# Patient Record
Sex: FEMALE | Race: BLACK OR AFRICAN AMERICAN | NOT HISPANIC OR LATINO | Employment: OTHER | URBAN - METROPOLITAN AREA
[De-identification: names, ages, dates, MRNs, and addresses within clinical notes are randomized per-mention and may not be internally consistent; named-entity substitution may affect disease eponyms.]

---

## 2017-01-09 ENCOUNTER — LAB VISIT (OUTPATIENT)
Dept: LAB | Facility: HOSPITAL | Age: 82
End: 2017-01-09
Attending: INTERNAL MEDICINE
Payer: MEDICARE

## 2017-01-09 DIAGNOSIS — E55.9 HYPOVITAMINOSIS D: ICD-10-CM

## 2017-01-09 DIAGNOSIS — D63.8 ANEMIA OF CHRONIC DISEASE: ICD-10-CM

## 2017-01-09 DIAGNOSIS — D63.8 ANEMIA OF OTHER CHRONIC DISEASE: ICD-10-CM

## 2017-01-09 DIAGNOSIS — E78.00 HYPERCHOLESTEREMIA: ICD-10-CM

## 2017-01-09 DIAGNOSIS — R76.8 ELEVATED SERUM IMMUNOGLOBULIN FREE LIGHT CHAINS: ICD-10-CM

## 2017-01-09 DIAGNOSIS — D64.9 ANEMIA: ICD-10-CM

## 2017-01-09 LAB
25(OH)D3+25(OH)D2 SERPL-MCNC: 41 NG/ML
ALBUMIN SERPL BCP-MCNC: 3.5 G/DL
ALP SERPL-CCNC: 61 U/L
ALT SERPL W/O P-5'-P-CCNC: 21 U/L
ANION GAP SERPL CALC-SCNC: 10 MMOL/L
AST SERPL-CCNC: 16 U/L
BASOPHILS # BLD AUTO: 0.02 K/UL
BASOPHILS # BLD AUTO: 0.02 K/UL
BASOPHILS NFR BLD: 0.6 %
BASOPHILS NFR BLD: 0.6 %
BILIRUB SERPL-MCNC: 0.4 MG/DL
BUN SERPL-MCNC: 20 MG/DL
CALCIUM SERPL-MCNC: 9.3 MG/DL
CHLORIDE SERPL-SCNC: 107 MMOL/L
CHOLEST/HDLC SERPL: 5.4 {RATIO}
CO2 SERPL-SCNC: 25 MMOL/L
CREAT SERPL-MCNC: 1.1 MG/DL
DIFFERENTIAL METHOD: ABNORMAL
DIFFERENTIAL METHOD: ABNORMAL
EOSINOPHIL # BLD AUTO: 0 K/UL
EOSINOPHIL # BLD AUTO: 0 K/UL
EOSINOPHIL NFR BLD: 0.8 %
EOSINOPHIL NFR BLD: 0.8 %
ERYTHROCYTE [DISTWIDTH] IN BLOOD BY AUTOMATED COUNT: 14.1 %
ERYTHROCYTE [DISTWIDTH] IN BLOOD BY AUTOMATED COUNT: 14.1 %
ERYTHROCYTE [SEDIMENTATION RATE] IN BLOOD BY WESTERGREN METHOD: 18 MM/HR
EST. GFR  (AFRICAN AMERICAN): 55.2 ML/MIN/1.73 M^2
EST. GFR  (NON AFRICAN AMERICAN): 47.9 ML/MIN/1.73 M^2
GLUCOSE SERPL-MCNC: 109 MG/DL
HCT VFR BLD AUTO: 35.7 %
HCT VFR BLD AUTO: 35.7 %
HDL/CHOLESTEROL RATIO: 18.6 %
HDLC SERPL-MCNC: 215 MG/DL
HDLC SERPL-MCNC: 40 MG/DL
HGB BLD-MCNC: 11.6 G/DL
HGB BLD-MCNC: 11.6 G/DL
LDLC SERPL CALC-MCNC: 147.2 MG/DL
LYMPHOCYTES # BLD AUTO: 2.1 K/UL
LYMPHOCYTES # BLD AUTO: 2.1 K/UL
LYMPHOCYTES NFR BLD: 57.6 %
LYMPHOCYTES NFR BLD: 57.6 %
MCH RBC QN AUTO: 29.7 PG
MCH RBC QN AUTO: 29.7 PG
MCHC RBC AUTO-ENTMCNC: 32.5 %
MCHC RBC AUTO-ENTMCNC: 32.5 %
MCV RBC AUTO: 92 FL
MCV RBC AUTO: 92 FL
MONOCYTES # BLD AUTO: 0.3 K/UL
MONOCYTES # BLD AUTO: 0.3 K/UL
MONOCYTES NFR BLD: 9 %
MONOCYTES NFR BLD: 9 %
NEUTROPHILS # BLD AUTO: 1.1 K/UL
NEUTROPHILS # BLD AUTO: 1.1 K/UL
NEUTROPHILS NFR BLD: 32 %
NEUTROPHILS NFR BLD: 32 %
NONHDLC SERPL-MCNC: 175 MG/DL
PLATELET # BLD AUTO: 244 K/UL
PLATELET # BLD AUTO: 244 K/UL
PMV BLD AUTO: 10.7 FL
PMV BLD AUTO: 10.7 FL
POTASSIUM SERPL-SCNC: 4.2 MMOL/L
PROT SERPL-MCNC: 6.9 G/DL
RBC # BLD AUTO: 3.9 M/UL
RBC # BLD AUTO: 3.9 M/UL
SODIUM SERPL-SCNC: 142 MMOL/L
TRIGL SERPL-MCNC: 139 MG/DL
WBC # BLD AUTO: 3.56 K/UL
WBC # BLD AUTO: 3.56 K/UL

## 2017-01-09 PROCEDURE — 80061 LIPID PANEL: CPT

## 2017-01-09 PROCEDURE — 82306 VITAMIN D 25 HYDROXY: CPT

## 2017-01-09 PROCEDURE — 80053 COMPREHEN METABOLIC PANEL: CPT

## 2017-01-09 PROCEDURE — 85025 COMPLETE CBC W/AUTO DIFF WBC: CPT | Mod: PO

## 2017-01-09 PROCEDURE — 36415 COLL VENOUS BLD VENIPUNCTURE: CPT | Mod: PO

## 2017-01-09 PROCEDURE — 85651 RBC SED RATE NONAUTOMATED: CPT

## 2017-01-09 PROCEDURE — 83520 IMMUNOASSAY QUANT NOS NONAB: CPT

## 2017-01-10 LAB
KAPPA LC SER QL IA: 6.29 MG/DL
KAPPA LC/LAMBDA SER IA: 2.15
LAMBDA LC SER QL IA: 2.93 MG/DL

## 2017-01-12 ENCOUNTER — OFFICE VISIT (OUTPATIENT)
Dept: HEMATOLOGY/ONCOLOGY | Facility: CLINIC | Age: 82
End: 2017-01-12
Payer: MEDICARE

## 2017-01-12 VITALS
OXYGEN SATURATION: 97 % | BODY MASS INDEX: 33.38 KG/M2 | DIASTOLIC BLOOD PRESSURE: 68 MMHG | HEART RATE: 67 BPM | WEIGHT: 200.63 LBS | SYSTOLIC BLOOD PRESSURE: 120 MMHG | TEMPERATURE: 99 F

## 2017-01-12 DIAGNOSIS — D64.9 ANEMIA, UNSPECIFIED TYPE: Primary | ICD-10-CM

## 2017-01-12 PROCEDURE — 1125F AMNT PAIN NOTED PAIN PRSNT: CPT | Mod: S$GLB,,, | Performed by: INTERNAL MEDICINE

## 2017-01-12 PROCEDURE — 99999 PR PBB SHADOW E&M-EST. PATIENT-LVL V: CPT | Mod: PBBFAC,,, | Performed by: INTERNAL MEDICINE

## 2017-01-12 PROCEDURE — 1157F ADVNC CARE PLAN IN RCRD: CPT | Mod: S$GLB,,, | Performed by: INTERNAL MEDICINE

## 2017-01-12 PROCEDURE — 3078F DIAST BP <80 MM HG: CPT | Mod: S$GLB,,, | Performed by: INTERNAL MEDICINE

## 2017-01-12 PROCEDURE — 1160F RVW MEDS BY RX/DR IN RCRD: CPT | Mod: S$GLB,,, | Performed by: INTERNAL MEDICINE

## 2017-01-12 PROCEDURE — 3074F SYST BP LT 130 MM HG: CPT | Mod: S$GLB,,, | Performed by: INTERNAL MEDICINE

## 2017-01-12 PROCEDURE — 99213 OFFICE O/P EST LOW 20 MIN: CPT | Mod: S$GLB,,, | Performed by: INTERNAL MEDICINE

## 2017-01-12 PROCEDURE — 1159F MED LIST DOCD IN RCRD: CPT | Mod: S$GLB,,, | Performed by: INTERNAL MEDICINE

## 2017-01-12 RX ORDER — LATANOPROST 50 UG/ML
1 SOLUTION/ DROPS OPHTHALMIC NIGHTLY
Refills: 5 | COMMUNITY
Start: 2016-12-13 | End: 2017-03-14

## 2017-01-12 NOTE — MR AVS SNAPSHOT
[facility]  [address] [place] [id number]  Phone: [phone]                  Cindy Lyman   2017 2:00 PM   Office Visit    Description:  Female : 1935   Provider:  Kitty Sultana MD   [facility]  [facility]           Reason for Visit     Follow-up           Diagnoses this Visit        Comments    Anemia, unspecified type    -  Primary            To Do List           Future Appointments        Provider Department Dept Phone    3/1/2017 1:30 PM Tony Carmen MD [facility] - Cardiology [phone]    3/10/2017 1:15 PM LAB, [place] [facility] [phone]    3/14/2017 1:30 PM Kitty Sultana MD [facility] [phone]      Goals (5 Years of Data)              Today    16    Blood Pressure < 150/90   120/68  160/68  160/68      Follow-Up and Disposition     Return in about 2 months (around 3/12/2017).      [facility]     [facility] -  Assistance  Registered nurses in the [facility] provide clinical advisement, health education, appointment booking, and other advisory services.  Call for this free service at [phone].             Medications           Message regarding Medications     Verify the changes and/or additions to your medication regime listed below are the same as discussed with your clinician today.  If any of these changes or additions are incorrect, please notify your healthcare provider.        STOP taking these medications     ferrous sulfate 325 (65 FE) MG EC tablet Take 1 tablet (325 mg total) by mouth once daily.           Verify that the below list of medications is an accurate representation of the medications you are currently taking.  If none reported, the list may be blank. If incorrect, please contact your healthcare provider. Carry this list with you in case of emergency.           Current Medications      amlodipine (NORVASC) 10 MG tablet ONE TABLET BY MOUTH once a day    aspirin (ECOTRIN) 81 MG EC tablet Take 81 mg by mouth once daily.    carbamazepine (TEGRETOL) 200 mg tablet Take 1 tablet by mouth Daily.     cholecalciferol, vitamin D3, 1,000 unit capsule Take 1 capsule (1,000 Units total) by mouth once daily.    citalopram (CELEXA) 20 MG tablet ONE TABLET BY MOUTH once a day    cloNIDine (CATAPRES) 0.3 MG tablet Take 1 tablet (0.3 mg total) by mouth 3 (three) times daily.    clopidogrel (PLAVIX) 75 mg tablet Take 75 mg by mouth once daily.    CRESTOR 20 mg tablet ONE TABLET BY MOUTH EVERY EVENING    docusate sodium (COLACE) 100 MG capsule Take 1 capsule (100 mg total) by mouth 2 (two) times daily.    fluticasone (FLOVENT HFA) 110 mcg/actuation inhaler Inhale 1 puff into the lungs every 12 (twelve) hours.    furosemide (LASIX) 40 MG tablet TAKE ONE TABLET BY MOUTH EVERY DAY AS NEEDED FOR SHORTNESS OF BREATH or leg swelling    gabapentin (NEURONTIN) 300 MG capsule Take 1 capsule (300 mg total) by mouth 3 (three) times daily.    hydrALAZINE (APRESOLINE) 100 MG tablet Take 1 tablet (100 mg total) by mouth 3 (three) times daily.    hydrocodone-acetaminophen 5-325mg (NORCO) 5-325 mg per tablet Take 1 tablet by mouth 3 (three) times daily as needed for Pain.    latanoprost 0.005 % ophthalmic solution Place 1 drop into both eyes nightly.    metoprolol tartrate (LOPRESSOR) 100 MG tablet Take 1 tablet (100 mg total) by mouth 2 (two) times daily.    miconazole (MICOTIN) 2 % cream Apply topically 2 (two) times daily.    nitroGLYCERIN (NITROSTAT) 0.4 MG SL tablet Place 0.4 mg under the tongue every 5 (five) minutes as needed for Chest pain.    polyethylene glycol (GLYCOLAX) 17 gram PwPk Take 17 g by mouth once daily.    rosuvastatin (CRESTOR) 20 MG tablet Take 1.5 tablets (30 mg total) by mouth every evening. 1 Tablet Oral Every day    valsartan (DIOVAN) 320 MG tablet ONE TABLET BY MOUTH once a day    verapamil (VERELAN) 360  MG C24P Take 1 capsule (360 mg total) by mouth once daily.    verapamil (VERELAN) 360 MG C24P Take 1 capsule (360 mg total) by mouth once daily.    XOPENEX HFA 45 mcg/actuation inhaler one to two puffs by mouth every 4 hours as needed for wheezing and shortness of breath    azelastine (ASTELIN) 137 mcg (0.1 %) nasal spray 1 spray (137 mcg total) by Nasal route 2 (two) times daily.           Clinical Reference Information           Vital Signs - Last Recorded  Most recent update: 1/12/2017  1:50 PM by Tarsha Domingo LPN    BP Pulse Temp Wt SpO2 BMI    120/68 (BP Location: Right arm, Patient Position: Sitting, BP Method: Manual) 67 99.3 °F (37.4 °C) (Oral) 91 kg (200 lb 9.9 oz) 97% 33.38 kg/m2      Blood Pressure          Most Recent Value    BP  120/68      Allergies as of 1/12/2017     Doxycycline Hyclate    Singulair [Montelukast]    Latex, Natural Rubber    Penicillins    Ventolin  [Albuterol Sulfate]      Immunizations Administered on Date of Encounter - 1/12/2017     None      Orders Placed During Today's Visit     Future Labs/Procedures Expected by Expires    CBC auto differential  1/12/2017 1/12/2018    Ferritin  1/12/2017 1/12/2018    Iron and TIBC  1/12/2017 1/12/2018

## 2017-01-12 NOTE — PROGRESS NOTES
"Subjective:       Patient ID: Cindy Lyman is a 81 y.o. female.    Chief Complaint: Follow-up    Diagnosis: Anemia  HPI  The patient is a pleasant 80-year-old female with past medical history of CAD, asthma, CHF, COPD, depression, hypertension, thyroid disease, seen today for f/u for anemia of chronic disease. Bone marrow biopsy neg for hematological malignancy    Today,she has no new issues  She continues with chronic arthalgias and  back pain-stable  No SOB/CP/N/V/cough  No fatigue/lightheadedness  No melena,hematochezia or change in bowel habits    She is followed by Cardiology for CHF.      Hx of RLE DVT  S/p anticoagulation completed 11/2015     Colonoscopy 5 yrs ago  [facility] - unremarkable        Bone Marrow biopsy 5/26/2016  Hypercellular marrow for age, 50-70%, with trilineage hematopoiesis, see comment  --Erythroid hyperplasia  --Mild plasmacytosis, 5-10%, polytypic by in situ hybridization studies, see comment  --No increase in blasts  --Adequate megakaryocytes  --Decreased stainable iron  --Mild reticulin fibrosis  COMMENT: Concomitantly submitted flow cytometric analysis detects no abnormal hematopoietic population. Bcells are polyclonal with a subset of hematogones detected, and T cells are immunophenotypically unremarkable.  The blast gate is not increased.  Although there is a mild plasmacytosis, by in situ hybridization studies, this appears polytypic. Correlate clinicallyand with any available cytogenetic and molecular studies    Plasma cell proliferative disorder, FISH:  An insufficient number of plasma cells were observed using cytoplasmic immunoglobulin staining method .This result does not exclude the presence of a plasma cell proliferative disorder."    [place] red stain neg    PAST MEDICAL HISTORY:  Aortic stenosis, arthritis, asthma, CAD, carpal tunnel, cataracts, CHF, COPD, depression, hyperlipidemia, hypertension, pulmonary hypertension, thyroid disease, TMJ.    PAST SURGICAL HISTORY: "  Partial hysterectomy, carpal tunnel release, eye surgery, left hip replacement, back surgery, TMJ.            Review of Systems   Constitutional: Negative for appetite change, chills, fatigue and unexpected weight change.   HENT: Negative for congestion, hearing loss and nosebleeds.    Eyes: Negative for visual disturbance.   Respiratory: Negative for apnea, cough, chest tightness, shortness of breath and wheezing.    Cardiovascular: Negative for chest pain, palpitations and leg swelling.   Gastrointestinal: Negative for abdominal distention, abdominal pain, blood in stool, constipation, diarrhea, nausea and vomiting.   Genitourinary: Negative for dysuria, flank pain, frequency, hematuria and urgency.   Musculoskeletal: Positive for arthralgias and back pain. Negative for gait problem, joint swelling and neck pain.   Skin: Negative for rash.        No petechiae, ecchymoses   Neurological: Negative for dizziness, light-headedness, numbness and headaches.   Hematological: Negative for adenopathy. Does not bruise/bleed easily.         Lab Results   Component Value Date    WBC 3.56 (L) 01/09/2017    WBC 3.56 (L) 01/09/2017    HGB 11.6 (L) 01/09/2017    HGB 11.6 (L) 01/09/2017    HCT 35.7 (L) 01/09/2017    HCT 35.7 (L) 01/09/2017    MCV 92 01/09/2017    MCV 92 01/09/2017     01/09/2017     01/09/2017         Objective:       Vitals:    01/12/17 1348   BP: 120/68   BP Location: Right arm   Patient Position: Sitting   BP Method: Manual   Pulse: 67   Temp: 99.3 °F (37.4 °C)   TempSrc: Oral   SpO2: 97%   Weight: 91 kg (200 lb 9.9 oz)       Physical Exam   Constitutional: She is oriented to person, place, and time. She appears well-developed and well-nourished.   HENT:   Head: Normocephalic.   Mouth/Throat: Oropharynx is clear and moist. No oropharyngeal exudate.   Eyes: Conjunctivae and lids are normal. Pupils are equal, round, and reactive to light. No scleral icterus.   Neck: Normal range of motion. Neck  supple. No thyromegaly present.   Cardiovascular: Normal rate, regular rhythm and normal heart sounds.    No murmur heard.  Pulmonary/Chest: Breath sounds normal. She has no wheezes. She has no rales.   Abdominal: Soft. Bowel sounds are normal. She exhibits no distension and no mass. There is no hepatosplenomegaly. There is no tenderness. There is no rebound and no guarding.   Musculoskeletal: Normal range of motion. She exhibits edema ( 1+ RLE edema). She exhibits no tenderness.   Lymphadenopathy:     She has no cervical adenopathy.     She has no axillary adenopathy.        Right: No supraclavicular adenopathy present.        Left: No supraclavicular adenopathy present.   Neurological: She is alert and oriented to person, place, and time. No cranial nerve deficit. Coordination normal.   Skin: Skin is warm and dry. No ecchymosis, no petechiae and no rash noted. No erythema.   Psychiatric: She has a normal mood and affect.       Lab Results   Component Value Date    WBC 3.56 (L) 01/09/2017    WBC 3.56 (L) 01/09/2017    HGB 11.6 (L) 01/09/2017    HGB 11.6 (L) 01/09/2017    HCT 35.7 (L) 01/09/2017    HCT 35.7 (L) 01/09/2017    MCV 92 01/09/2017    MCV 92 01/09/2017     01/09/2017     01/09/2017     SPEP_ nl      Results for MARIE TURNER (MRN [id number]) as of 7/12/2016 14:10   Ref. Range 6/10/2016 11:08   Iron Latest Ref Range: 30 - 160 ug/dL 34   TIBC Latest Ref Range: 250 - 450 ug/dL 395   Saturated Iron Latest Ref Range: 20 - 50 % 9 (L)   Transferrin Latest Ref Range: 200 - 375 mg/dL 267   Ferritin Latest Ref Range: 20.0 - 300.0 ng/mL 63     Results for MARIE TURNER (MRN [id number]) as of 9/12/2016 14:18   Ref. Range 4/26/2016 10:45 6/10/2016 11:08   [place] Free Light Chains Latest Ref Range: 0.33 - 1.94 mg/dL 4.26 (H) 4.01 (H)   Lambda Free Light Chains Latest Ref Range: 0.57 - 2.63 mg/dL 1.67 1.68   Kappa/Lambda FLC Ratio Latest Ref Range: 0.26 - 1.60  2.55 (H) 2.39 (H)      Assessment:       1. Anemia, unspecified type        Plan:       Pt clinically stable  CBC reveals stable Hb 11g/dl+ range  Colonoscopy 5 yrs ago [facility] [place]- unremarkable  Cont Fe supp once daily based on tolerability  Bone marrow biopsy- neg for hematological malignancy  Cont to monitor  F/u  4mo with cbc, Fe studies, FLC  prior to f/u      CC: Jeremiah Reeves M.D.

## 2017-01-20 ENCOUNTER — TELEPHONE (OUTPATIENT)
Dept: FAMILY MEDICINE | Facility: CLINIC | Age: 82
End: 2017-01-20

## 2017-01-23 ENCOUNTER — OFFICE VISIT (OUTPATIENT)
Dept: FAMILY MEDICINE | Facility: CLINIC | Age: 82
End: 2017-01-23
Payer: MEDICARE

## 2017-01-23 VITALS
DIASTOLIC BLOOD PRESSURE: 78 MMHG | TEMPERATURE: 99 F | OXYGEN SATURATION: 96 % | HEIGHT: 65 IN | WEIGHT: 201.94 LBS | BODY MASS INDEX: 33.65 KG/M2 | HEART RATE: 71 BPM | SYSTOLIC BLOOD PRESSURE: 124 MMHG | RESPIRATION RATE: 17 BRPM

## 2017-01-23 DIAGNOSIS — J45.20 MILD INTERMITTENT ASTHMA WITHOUT COMPLICATION: ICD-10-CM

## 2017-01-23 DIAGNOSIS — I82.409 DEEP VEIN THROMBOSIS (DVT) OF LOWER EXTREMITY, UNSPECIFIED CHRONICITY, UNSPECIFIED LATERALITY, UNSPECIFIED VEIN: ICD-10-CM

## 2017-01-23 DIAGNOSIS — M54.31 RIGHT SCIATIC NERVE PAIN: ICD-10-CM

## 2017-01-23 DIAGNOSIS — J30.9 ALLERGIC RHINITIS, UNSPECIFIED ALLERGIC RHINITIS TRIGGER, UNSPECIFIED RHINITIS SEASONALITY: Primary | ICD-10-CM

## 2017-01-23 DIAGNOSIS — Z79.01 LONG TERM (CURRENT) USE OF ANTICOAGULANTS: ICD-10-CM

## 2017-01-23 DIAGNOSIS — I10 ESSENTIAL HYPERTENSION: ICD-10-CM

## 2017-01-23 DIAGNOSIS — M26.609 TMJ (TEMPOROMANDIBULAR JOINT DISORDER): ICD-10-CM

## 2017-01-23 DIAGNOSIS — B36.0 TINEA VERSICOLOR: ICD-10-CM

## 2017-01-23 PROCEDURE — 1159F MED LIST DOCD IN RCRD: CPT | Mod: S$GLB,,, | Performed by: NURSE PRACTITIONER

## 2017-01-23 PROCEDURE — 1125F AMNT PAIN NOTED PAIN PRSNT: CPT | Mod: S$GLB,,, | Performed by: NURSE PRACTITIONER

## 2017-01-23 PROCEDURE — 1160F RVW MEDS BY RX/DR IN RCRD: CPT | Mod: S$GLB,,, | Performed by: NURSE PRACTITIONER

## 2017-01-23 PROCEDURE — 3078F DIAST BP <80 MM HG: CPT | Mod: S$GLB,,, | Performed by: NURSE PRACTITIONER

## 2017-01-23 PROCEDURE — 99999 PR PBB SHADOW E&M-EST. PATIENT-LVL IV: CPT | Mod: PBBFAC,,, | Performed by: NURSE PRACTITIONER

## 2017-01-23 PROCEDURE — 99499 UNLISTED E&M SERVICE: CPT | Mod: S$PBB,,, | Performed by: NURSE PRACTITIONER

## 2017-01-23 PROCEDURE — 3074F SYST BP LT 130 MM HG: CPT | Mod: S$GLB,,, | Performed by: NURSE PRACTITIONER

## 2017-01-23 PROCEDURE — 1157F ADVNC CARE PLAN IN RCRD: CPT | Mod: S$GLB,,, | Performed by: NURSE PRACTITIONER

## 2017-01-23 PROCEDURE — 99214 OFFICE O/P EST MOD 30 MIN: CPT | Mod: S$GLB,,, | Performed by: NURSE PRACTITIONER

## 2017-01-23 RX ORDER — FLUTICASONE PROPIONATE 50 MCG
2 SPRAY, SUSPENSION (ML) NASAL DAILY
Qty: 1 BOTTLE | Refills: 2 | Status: SHIPPED | OUTPATIENT
Start: 2017-01-23 | End: 2017-02-22

## 2017-01-23 RX ORDER — AMLODIPINE BESYLATE 10 MG/1
TABLET ORAL
Qty: 90 TABLET | Refills: 1 | Status: SHIPPED | OUTPATIENT
Start: 2017-01-23 | End: 2017-03-07

## 2017-01-23 RX ORDER — CLOTRIMAZOLE AND BETAMETHASONE DIPROPIONATE 10; .64 MG/G; MG/G
CREAM TOPICAL 2 TIMES DAILY
Qty: 15 G | Refills: 2 | Status: SHIPPED | OUTPATIENT
Start: 2017-01-23 | End: 2017-03-14

## 2017-01-23 RX ORDER — CLONIDINE HYDROCHLORIDE 0.3 MG/1
0.3 TABLET ORAL 3 TIMES DAILY
Qty: 270 TABLET | Refills: 1 | Status: SHIPPED | OUTPATIENT
Start: 2017-01-23 | End: 2017-11-07 | Stop reason: SDUPTHER

## 2017-01-23 RX ORDER — LORATADINE 10 MG/1
10 TABLET ORAL DAILY
Refills: 0 | COMMUNITY
Start: 2017-01-23 | End: 2017-03-14

## 2017-01-23 RX ORDER — GABAPENTIN 300 MG/1
300 CAPSULE ORAL 3 TIMES DAILY
Qty: 360 CAPSULE | Refills: 0 | Status: SHIPPED | OUTPATIENT
Start: 2017-01-23 | End: 2017-01-23 | Stop reason: SDUPTHER

## 2017-01-23 RX ORDER — CARBAMAZEPINE 200 MG/1
200 TABLET ORAL DAILY
Qty: 90 TABLET | Refills: 1 | Status: SHIPPED | OUTPATIENT
Start: 2017-01-23 | End: 2017-06-21 | Stop reason: SDUPTHER

## 2017-01-23 RX ORDER — GABAPENTIN 300 MG/1
300 CAPSULE ORAL 3 TIMES DAILY
Qty: 270 CAPSULE | Refills: 1 | Status: SHIPPED | OUTPATIENT
Start: 2017-01-23 | End: 2017-08-24 | Stop reason: SDUPTHER

## 2017-01-23 RX ORDER — CLOPIDOGREL BISULFATE 75 MG/1
75 TABLET ORAL DAILY
Qty: 90 TABLET | Refills: 1 | Status: SHIPPED | OUTPATIENT
Start: 2017-01-23 | End: 2017-07-20 | Stop reason: SDUPTHER

## 2017-01-23 RX ORDER — FUROSEMIDE 40 MG/1
TABLET ORAL
Qty: 90 TABLET | Refills: 1 | Status: SHIPPED | OUTPATIENT
Start: 2017-01-23 | End: 2018-07-12 | Stop reason: SDUPTHER

## 2017-01-23 RX ORDER — LEVALBUTEROL TARTRATE 45 UG/1
AEROSOL, METERED ORAL
Qty: 15 G | Refills: 0 | Status: CANCELLED | OUTPATIENT
Start: 2017-01-23

## 2017-01-23 RX ORDER — FLUTICASONE PROPIONATE 110 UG/1
1 AEROSOL, METERED RESPIRATORY (INHALATION) EVERY 12 HOURS
Qty: 1 G | Refills: 2 | Status: SHIPPED | OUTPATIENT
Start: 2017-01-23 | End: 2018-01-12

## 2017-01-23 NOTE — MR AVS SNAPSHOT
[facility]  [address]  [place] [address]  Phone: [phone]                  Cindy Lyman   2017 1:00 PM   Office Visit    Description:  Female : 1935   Provider:  Roslyn Luz, NP-C   Department:  [facility]           Reason for Visit     Medication Refill           Diagnoses this Visit        Comments    Allergic rhinitis, unspecified allergic rhinitis trigger, unspecified rhinitis seasonality    -  Primary     Essential hypertension         Mild intermittent asthma without complication         Long term (current) use of anticoagulants         Deep vein thrombosis (DVT) of lower extremity, unspecified chronicity, unspecified laterality, unspecified vein         TMJ (temporomandibular joint disorder)         Right sciatic nerve pain         Tinea versicolor                To Do List           Future Appointments        Provider Department Dept Phone    3/1/2017 1:30 PM Tony Carmen MD [facility] - Cardiology [phone]    3/10/2017 1:15 PM [facility], [place] [facility] [phone]    3/14/2017 1:30 PM Kitty Sultana MD [facility]-Hematology Oncology [phone]      Goals (5 Years of Data)              Today    17    Blood Pressure < 150/90   124/78  120/68  160/68       These Medications        Disp Refills Start End    fluticasone (FLOVENT HFA) 110 mcg/actuation inhaler 1 g 2 2017    Inhale 1 puff into the lungs every 12 (twelve) hours. - Inhalation    [place] [facility] [place] [employer] [address] Ph #: [phone]       amlodipine (NORVASC) 10 MG tablet 90 tablet 1 2017     ONE TABLET BY MOUTH once a day    [place] [facility] [place] [employer] [address] Ph #: [phone]       furosemide (LASIX) 40 MG tablet 90 tablet 1 2017     TAKE ONE TABLET BY MOUTH EVERY DAY AS NEEDED FOR SHORTNESS OF BREATH or leg swelling     [place] [facility] [place] [facility] [employer]  #: [phone]       clopidogrel (PLAVIX) 75 mg tablet 90 tablet 1 1/23/2017     Take 1 tablet (75 mg total) by mouth once daily. - Oral    [place] [facility] [place] [facility] [employer]  #: [phone]       carbamazepine (TEGRETOL) 200 mg tablet 90 tablet 1 1/23/2017     Take 1 tablet (200 mg total) by mouth once daily. - Oral    [place] [facility] [place] [address] Ph #: [phone]       cloNIDine (CATAPRES) 0.3 MG tablet 270 tablet 1 1/23/2017     Take 1 tablet (0.3 mg total) by mouth 3 (three) times daily. - Oral    [place] [facility] [place] [address] [employer] Ph #: [phone]       fluticasone (FLONASE) 50 mcg/actuation nasal spray 1 Bottle 2 1/23/2017 2/22/2017    2 sprays by Each Nare route once daily. - Each Nare    [place] [facility] [place] [address] [employer]  #: [phone]       clotrimazole-betamethasone 1-0.05% (LOTRISONE) cream 15 g 2 1/23/2017     Apply topically 2 (two) times daily. To affected areas - Topical (Top)    [place] [facility] [place] [facility] [employer]  #: [phone]       gabapentin (NEURONTIN) 300 MG capsule 270 capsule 1 1/23/2017     Take 1 capsule (300 mg total) by mouth 3 (three) times daily. - Oral    [place] [facility] [place] [address] [employer]  #: [phone]         PURCHASE these Medications (No prescription required)        Start End    loratadine (CLARITIN) 10 mg tablet 1/23/2017 1/23/2018    Sig - Route: Take 1 tablet (10 mg total) by mouth once daily. - Oral    Class: OTC      [facility] On [facility] Care Line - 24/7 Assistance  Registered nurses in the [facility] provide clinical advisement, health education, appointment booking, and other advisory services.  Call for this free service at [phone].              Medications           Message regarding Medications     Verify the changes and/or additions to your medication regime listed below are the same as discussed with your clinician today.  If any of these changes or additions are incorrect, please notify your healthcare provider.        START taking these NEW medications        Refills    fluticasone (FLONASE) 50 mcg/actuation nasal spray 2    Si sprays by Each Nare route once daily.    Class: Normal    Route: Each Nare    loratadine (CLARITIN) 10 mg tablet 0    Sig: Take 1 tablet (10 mg total) by mouth once daily.    Class: OTC    Route: Oral    clotrimazole-betamethasone 1-0.05% (LOTRISONE) cream 2    Sig: Apply topically 2 (two) times daily. To affected areas    Class: Normal    Route: Topical (Top)      CHANGE how you are taking these medications     Start Taking Instead of    clopidogrel (PLAVIX) 75 mg tablet clopidogrel (PLAVIX) 75 mg tablet    Dosage:  Take 1 tablet (75 mg total) by mouth once daily. Dosage:  Take 75 mg by mouth once daily.    Reason for Change:  Reorder     carbamazepine (TEGRETOL) 200 mg tablet carbamazepine (TEGRETOL) 200 mg tablet    Dosage:  Take 1 tablet (200 mg total) by mouth once daily. Dosage:  Take 1 tablet by mouth Daily.     Reason for Change:  Reorder       STOP taking these medications     cholecalciferol, vitamin D3, 1,000 unit capsule Take 1 capsule (1,000 Units total) by mouth once daily.           Verify that the below list of medications is an accurate representation of the medications you are currently taking.  If none reported, the list may be blank. If incorrect, please contact your healthcare provider. Carry this list with you in case of emergency.           Current Medications     amlodipine (NORVASC) 10 MG tablet ONE TABLET BY MOUTH once a day    aspirin (ECOTRIN) 81 MG EC tablet Take 81 mg by mouth once daily.    carbamazepine (TEGRETOL) 200 mg tablet Take 1 tablet (200 mg total) by mouth once daily.     citalopram (CELEXA) 20 MG tablet ONE TABLET BY MOUTH once a day    cloNIDine (CATAPRES) 0.3 MG tablet Take 1 tablet (0.3 mg total) by mouth 3 (three) times daily.    clopidogrel (PLAVIX) 75 mg tablet Take 1 tablet (75 mg total) by mouth once daily.    CRESTOR 20 mg tablet ONE TABLET BY MOUTH EVERY EVENING    docusate sodium (COLACE) 100 MG capsule Take 1 capsule (100 mg total) by mouth 2 (two) times daily.    fluticasone (FLOVENT HFA) 110 mcg/actuation inhaler Inhale 1 puff into the lungs every 12 (twelve) hours.    furosemide (LASIX) 40 MG tablet TAKE ONE TABLET BY MOUTH EVERY DAY AS NEEDED FOR SHORTNESS OF BREATH or leg swelling    gabapentin (NEURONTIN) 300 MG capsule Take 1 capsule (300 mg total) by mouth 3 (three) times daily.    hydrALAZINE (APRESOLINE) 100 MG tablet Take 1 tablet (100 mg total) by mouth 3 (three) times daily.    hydrocodone-acetaminophen 5-325mg (NORCO) 5-325 mg per tablet Take 1 tablet by mouth 3 (three) times daily as needed for Pain.    latanoprost 0.005 % ophthalmic solution Place 1 drop into both eyes nightly.    metoprolol tartrate (LOPRESSOR) 100 MG tablet Take 1 tablet (100 mg total) by mouth 2 (two) times daily.    miconazole (MICOTIN) 2 % cream Apply topically 2 (two) times daily.    rosuvastatin (CRESTOR) 20 MG tablet Take 1.5 tablets (30 mg total) by mouth every evening. 1 Tablet Oral Every day    valsartan (DIOVAN) 320 MG tablet ONE TABLET BY MOUTH once a day    verapamil (VERELAN) 360 MG C24P Take 1 capsule (360 mg total) by mouth once daily.    XOPENEX HFA 45 mcg/actuation inhaler one to two puffs by mouth every 4 hours as needed for wheezing and shortness of breath    azelastine (ASTELIN) 137 mcg (0.1 %) nasal spray 1 spray (137 mcg total) by Nasal route 2 (two) times daily.    clotrimazole-betamethasone 1-0.05% (LOTRISONE) cream Apply topically 2 (two) times daily. To affected areas    fluticasone (FLONASE) 50 mcg/actuation nasal spray 2 sprays by Each Nare route once daily.  "   loratadine (CLARITIN) 10 mg tablet Take 1 tablet (10 mg total) by mouth once daily.    nitroGLYCERIN (NITROSTAT) 0.4 MG SL tablet Place 0.4 mg under the tongue every 5 (five) minutes as needed for Chest pain.    polyethylene glycol (GLYCOLAX) 17 gram PwPk Take 17 g by mouth once daily.           Clinical Reference Information           Vital Signs - Last Recorded  Most recent update: 1/23/2017  1:13 PM by Brandon Campos MA    BP Pulse Temp Resp Ht Wt    124/78 (BP Location: Right arm, Patient Position: Sitting, BP Method: Manual) 71 98.9 °F (37.2 °C) (Oral) 17 5' 5" (1.651 m) 91.6 kg (201 lb 15.1 oz)    SpO2 BMI             96% 33.6 kg/m2         Blood Pressure          Most Recent Value    BP  124/78      Allergies as of 1/23/2017     Doxycycline Hyclate    Singulair [Montelukast]    Latex, Natural Rubber    Penicillins    Ventolin  [Albuterol Sulfate]      Immunizations Administered on Date of Encounter - 1/23/2017     None      Instructions    Follow up in 6 months, sooner if necessary  Go to ER for new worse or concerning symptoms    Eating Heart-Healthy Foods  Eating has a big impact on your heart health. In fact, eating healthier can improve several of your heart risks at once. For instance, it helps you manage weight, cholesterol, and blood pressure. Here are ideas to help you make heart-healthy changes without giving up all the foods and flavors you love.  Getting started  · Talk with your health care provider about eating plans, such as the DASH or Mediterranean diet. You may also be referred to a dietitian.  · Change a few things at a time. Give yourself time to get used to a few eating changes before adding more.  · Work to create a tasty, healthy eating plan that you can stick to for the rest of your life.    Goals for healthy eating  Below are some tips to improve your eating habits:  · Limit saturated fats and trans fats. Saturated fats raise your levels of cholesterol, so keep these fats to a " minimum. They are found in foods such as fatty meats, whole milk, cheese, and palm and coconut oils. Avoid trans fats because they lower good cholesterol as well as raise bad cholesterol. Trans fats are most often found in processed foods.  · Reduce sodium (salt) intake. Eating too much salt may increase your blood pressure. Limit your sodium intake to 2,300 milligrams (mg) per day, or less if your health care provider recommends it. Dining out less often and eating fewer processed foods are two great ways to decrease the amount of salt you consume.  · Managing calories. A calorie is a unit of energy. Your body burns calories for fuel, but if you eat more calories than your body burns, the extras are stored as fat. Your health care provider can help you create a diet plan to manage your calories. This will likely include eating healthier foods as well as exercising regularly. To help you track your progress, keep a diary to record what you eat and how often you exercise.  Choose the right foods  Aim to make these foods staples of your diet. If you have diabetes, you may have different recommendations than what is listed here:  · Fruits and vegetable provide plenty of nutrients without a lot of calories. At meals, fill half your plate with these foods. Split the other half of your plate between whole grains and lean protein.  · Whole grains are high in fiber and rich in vitamins and nutrients. Good choices include whole-wheat bread, pasta, and brown rice.  · Lean proteins give you nutrition with less fat. Good choices include fish, skinless chicken, and beans.  · Low-fat or nonfat dairy provides nutrients without a lot of fat. Try low-fat or nonfat milk, cheese, or yogurt.  · Healthy fats can be good for you in small amounts. These are unsaturated fats, such as olive oil, nuts, and fish. Try to have at least 2 servings per week of fatty fish such as salmon, sardines, mackerel, rainbow trout, and albacore tuna. These  contain omega-3 fatty acids, which are good for your heart. Flaxseed is another source of a heart-healthy fat.  More on heart healthy eating    Read food labels  Healthy eating starts at the grocery store. Be sure to pay attention to food labels on packaged foods. Look for products that are high in fiber and protein, and low in saturated fat, cholesterol, and sodium. Avoid products that contain trans fat. And pay close attention to serving size. For instance, if you plan to eat two servings, double all the numbers on the label.  Prepare food right  A key part of healthy cooking is cutting down on added fat and salt. Look on the internet for lower-fat, lower-sodium recipes. Also, try these tips:  · Remove fat from meat and skin from poultry before cooking.  · Skim fat from the surface of soups and sauces.  · Broil, boil, bake, steam, grill, and microwave food without added fats.  · Choose ingredients that spice up your food without adding calories, fat, or sodium. Try these items: horseradish, hot sauce, lemon, mustard, nonfat salad dressings, and vinegar. For salt-free herbs and spices, try basil, cilantro, cinnamon, pepper, and rosemary.  © 0231-8742 [employer]. [address] [place] [address], [place] [address]. All rights reserved. This information is not intended as a substitute for professional medical care. Always follow your healthcare professional's instructions.

## 2017-01-23 NOTE — PATIENT INSTRUCTIONS
Follow up in 6 months, sooner if necessary  Go to ER for new worse or concerning symptoms    Eating Heart-Healthy Foods  Eating has a big impact on your heart health. In fact, eating healthier can improve several of your heart risks at once. For instance, it helps you manage weight, cholesterol, and blood pressure. Here are ideas to help you make heart-healthy changes without giving up all the foods and flavors you love.  Getting started  · Talk with your health care provider about eating plans, such as the DASH or Mediterranean diet. You may also be referred to a dietitian.  · Change a few things at a time. Give yourself time to get used to a few eating changes before adding more.  · Work to create a tasty, healthy eating plan that you can stick to for the rest of your life.    Goals for healthy eating  Below are some tips to improve your eating habits:  · Limit saturated fats and trans fats. Saturated fats raise your levels of cholesterol, so keep these fats to a minimum. They are found in foods such as fatty meats, whole milk, cheese, and palm and coconut oils. Avoid trans fats because they lower good cholesterol as well as raise bad cholesterol. Trans fats are most often found in processed foods.  · Reduce sodium (salt) intake. Eating too much salt may increase your blood pressure. Limit your sodium intake to 2,300 milligrams (mg) per day, or less if your health care provider recommends it. Dining out less often and eating fewer processed foods are two great ways to decrease the amount of salt you consume.  · Managing calories. A calorie is a unit of energy. Your body burns calories for fuel, but if you eat more calories than your body burns, the extras are stored as fat. Your health care provider can help you create a diet plan to manage your calories. This will likely include eating healthier foods as well as exercising regularly. To help you track your progress, keep a diary to record what you eat and how  often you exercise.  Choose the right foods  Aim to make these foods staples of your diet. If you have diabetes, you may have different recommendations than what is listed here:  · Fruits and vegetable provide plenty of nutrients without a lot of calories. At meals, fill half your plate with these foods. Split the other half of your plate between whole grains and lean protein.  · Whole grains are high in fiber and rich in vitamins and nutrients. Good choices include whole-wheat bread, pasta, and brown rice.  · Lean proteins give you nutrition with less fat. Good choices include fish, skinless chicken, and beans.  · Low-fat or nonfat dairy provides nutrients without a lot of fat. Try low-fat or nonfat milk, cheese, or yogurt.  · Healthy fats can be good for you in small amounts. These are unsaturated fats, such as olive oil, nuts, and fish. Try to have at least 2 servings per week of fatty fish such as salmon, sardines, mackerel, rainbow trout, and albacore tuna. These contain omega-3 fatty acids, which are good for your heart. Flaxseed is another source of a heart-healthy fat.  More on heart healthy eating    Read food labels  Healthy eating starts at the grocery store. Be sure to pay attention to food labels on packaged foods. Look for products that are high in fiber and protein, and low in saturated fat, cholesterol, and sodium. Avoid products that contain trans fat. And pay close attention to serving size. For instance, if you plan to eat two servings, double all the numbers on the label.  Prepare food right  A key part of healthy cooking is cutting down on added fat and salt. Look on the internet for lower-fat, lower-sodium recipes. Also, try these tips:  · Remove fat from meat and skin from poultry before cooking.  · Skim fat from the surface of soups and sauces.  · Broil, boil, bake, steam, grill, and microwave food without added fats.  · Choose ingredients that spice up your food without adding calories, fat,  or sodium. Try these items: horseradish, hot sauce, lemon, mustard, nonfat salad dressings, and vinegar. For salt-free herbs and spices, try basil, cilantro, cinnamon, pepper, and rosemary.  © 4095-6348 [employer]. [address] [place] [address]. All rights reserved. This information is not intended as a substitute for professional medical care. Always follow your healthcare professional's instructions.

## 2017-01-23 NOTE — PROGRESS NOTES
Subjective:       Patient ID: Cindy Lyman is a 81 y.o. female.    Chief Complaint: Medication Refill    HPI Ms Lyman is here for a follow up for her chronic medical conditions including TMJ and hypertension.  She feels well today.  Other than a rash to her R wrist.  She also reports a loss of taste.  Her labs are up to date. She would like refills.  Review of Systems   Constitutional: Negative for fever.   Respiratory: Negative.    Cardiovascular: Negative.        Objective:      Physical Exam   Constitutional: She is oriented to person, place, and time. She appears well-developed and well-nourished. No distress.   HENT:   Head: Normocephalic and atraumatic.   Right Ear: A middle ear effusion is present.   Left Ear: A middle ear effusion is present.   Nose: No mucosal edema or rhinorrhea. Right sinus exhibits no maxillary sinus tenderness and no frontal sinus tenderness. Left sinus exhibits no maxillary sinus tenderness and no frontal sinus tenderness.   Mouth/Throat: Uvula is midline, oropharynx is clear and moist and mucous membranes are normal.   Cardiovascular: Normal rate, regular rhythm and normal heart sounds.  Exam reveals no friction rub.    No murmur heard.  Pulses:       Dorsalis pedis pulses are 2+ on the right side, and 2+ on the left side.        Posterior tibial pulses are 2+ on the right side, and 2+ on the left side.   Pulmonary/Chest: Effort normal and breath sounds normal. No respiratory distress. She has no decreased breath sounds. She has no wheezes. She has no rhonchi. She has no rales.   Musculoskeletal: Normal range of motion. She exhibits no edema.   Neurological: She is alert and oriented to person, place, and time.   Skin: Skin is warm and dry. No erythema.        Psychiatric: She has a normal mood and affect. Her behavior is normal.   Vitals reviewed.      Assessment:       1. Allergic rhinitis, unspecified allergic rhinitis trigger, unspecified rhinitis seasonality    2.  Essential hypertension    3. Mild intermittent asthma without complication    4. Long term (current) use of anticoagulants [V58.61]    5. Deep vein thrombosis (DVT) of lower extremity, unspecified chronicity, unspecified laterality, unspecified vein    6. TMJ (temporomandibular joint disorder)    7. Right sciatic nerve pain    8. Tinea versicolor        Plan:       Allergic rhinitis, unspecified allergic rhinitis trigger, unspecified rhinitis seasonality  -     fluticasone (FLONASE) 50 mcg/actuation nasal spray; 2 sprays by Each Nare route once daily.  Dispense: 1 Bottle; Refill: 2  -     loratadine (CLARITIN) 10 mg tablet; Take 1 tablet (10 mg total) by mouth once daily.; Refill: 0    Essential hypertension  -     amlodipine (NORVASC) 10 MG tablet; ONE TABLET BY MOUTH once a day  Dispense: 90 tablet; Refill: 1  -     furosemide (LASIX) 40 MG tablet; TAKE ONE TABLET BY MOUTH EVERY DAY AS NEEDED FOR SHORTNESS OF BREATH or leg swelling  Dispense: 90 tablet; Refill: 1  -     cloNIDine (CATAPRES) 0.3 MG tablet; Take 1 tablet (0.3 mg total) by mouth 3 (three) times daily.  Dispense: 270 tablet; Refill: 1    Mild intermittent asthma without complication  -     fluticasone (FLOVENT HFA) 110 mcg/actuation inhaler; Inhale 1 puff into the lungs every 12 (twelve) hours.  Dispense: 1 g; Refill: 2    Long term (current) use of anticoagulants [V58.61]  -     clopidogrel (PLAVIX) 75 mg tablet; Take 1 tablet (75 mg total) by mouth once daily.  Dispense: 90 tablet; Refill: 1    Deep vein thrombosis (DVT) of lower extremity, unspecified chronicity, unspecified laterality, unspecified vein  -     clopidogrel (PLAVIX) 75 mg tablet; Take 1 tablet (75 mg total) by mouth once daily.  Dispense: 90 tablet; Refill: 1    TMJ (temporomandibular joint disorder)  -     carbamazepine (TEGRETOL) 200 mg tablet; Take 1 tablet (200 mg total) by mouth once daily.  Dispense: 90 tablet; Refill: 1    Right sciatic nerve pain  -     gabapentin (NEURONTIN)  300 MG capsule; Take 1 capsule (300 mg total) by mouth 3 (three) times daily.  Dispense: 270 capsule; Refill: 1    Tinea versicolor  -     clotrimazole-betamethasone 1-0.05% (LOTRISONE) cream; Apply topically 2 (two) times daily. To affected areas  Dispense: 15 g; Refill: 2    We reviewed her labs. After reviewing her medication, it is noted that she is on Amlodipine and Verapamil.  I have suggested she discuss this at her next visit with Dr Carmen.    F/u in 6 months, sooner if necessary.

## 2017-02-13 ENCOUNTER — TELEPHONE (OUTPATIENT)
Dept: FAMILY MEDICINE | Facility: CLINIC | Age: 82
End: 2017-02-13

## 2017-02-13 NOTE — TELEPHONE ENCOUNTER
----- Message from Vandana Lewis sent at 2/13/2017 12:50 PM CST -----  Contact: self  [phone]  Pt said that her script for Carbamazepine 200 mg says to take 1 a day, pt said that she takes 3 a day, Pls call pt [phone]. Thanks.....Brianda

## 2017-02-15 NOTE — TELEPHONE ENCOUNTER
Spoke with Steff- pharmacist at [facility], she states patient is suppose to take Carbamazapine once daily.

## 2017-02-16 NOTE — TELEPHONE ENCOUNTER
Patient reports having TMJ surgery by Dr. Jessee Sterling (an MD in Dr. Diaz's office @ [facility]) but states that the results only lasted one year.  She states he then prescribed carbamazepine 200 mg TID for the severe pain and the seizures that she would experience. She reports taking the medication TID instead of once daily per the most recent prescription.  Roslyn Sukh notified of this information and states that the patient needs to contact Dr. Sterling's office to obtain a refill since he is the original prescriber.  Patient notified and verbalized understanding.

## 2017-02-27 RX ORDER — METOPROLOL TARTRATE 100 MG/1
TABLET ORAL
Qty: 180 TABLET | Refills: 1 | Status: SHIPPED | OUTPATIENT
Start: 2017-02-27 | End: 2017-08-22 | Stop reason: SDUPTHER

## 2017-03-07 ENCOUNTER — OFFICE VISIT (OUTPATIENT)
Dept: CARDIOLOGY | Facility: CLINIC | Age: 82
End: 2017-03-07
Payer: MEDICARE

## 2017-03-07 VITALS
OXYGEN SATURATION: 97 % | BODY MASS INDEX: 33.79 KG/M2 | DIASTOLIC BLOOD PRESSURE: 72 MMHG | SYSTOLIC BLOOD PRESSURE: 160 MMHG | WEIGHT: 202.81 LBS | HEIGHT: 65 IN | HEART RATE: 71 BPM

## 2017-03-07 DIAGNOSIS — I25.83 CORONARY ARTERY DISEASE DUE TO LIPID RICH PLAQUE: Chronic | ICD-10-CM

## 2017-03-07 DIAGNOSIS — I10 ESSENTIAL HYPERTENSION: Chronic | ICD-10-CM

## 2017-03-07 DIAGNOSIS — E78.5 HYPERLIPIDEMIA, UNSPECIFIED HYPERLIPIDEMIA TYPE: Chronic | ICD-10-CM

## 2017-03-07 DIAGNOSIS — I82.411 ACUTE DEEP VEIN THROMBOSIS (DVT) OF FEMORAL VEIN OF RIGHT LOWER EXTREMITY: ICD-10-CM

## 2017-03-07 DIAGNOSIS — I25.10 CORONARY ARTERY DISEASE DUE TO LIPID RICH PLAQUE: Chronic | ICD-10-CM

## 2017-03-07 DIAGNOSIS — I27.20 PULMONARY HYPERTENSION: Primary | ICD-10-CM

## 2017-03-07 PROCEDURE — 1160F RVW MEDS BY RX/DR IN RCRD: CPT | Mod: S$GLB,,, | Performed by: INTERNAL MEDICINE

## 2017-03-07 PROCEDURE — 1157F ADVNC CARE PLAN IN RCRD: CPT | Mod: S$GLB,,, | Performed by: INTERNAL MEDICINE

## 2017-03-07 PROCEDURE — 99214 OFFICE O/P EST MOD 30 MIN: CPT | Mod: S$GLB,,, | Performed by: INTERNAL MEDICINE

## 2017-03-07 PROCEDURE — 99999 PR PBB SHADOW E&M-EST. PATIENT-LVL V: CPT | Mod: PBBFAC,,, | Performed by: INTERNAL MEDICINE

## 2017-03-07 PROCEDURE — 99499 UNLISTED E&M SERVICE: CPT | Mod: S$PBB,,, | Performed by: INTERNAL MEDICINE

## 2017-03-07 PROCEDURE — 93000 ELECTROCARDIOGRAM COMPLETE: CPT | Mod: S$GLB,,, | Performed by: INTERNAL MEDICINE

## 2017-03-07 PROCEDURE — 3077F SYST BP >= 140 MM HG: CPT | Mod: S$GLB,,, | Performed by: INTERNAL MEDICINE

## 2017-03-07 PROCEDURE — 3078F DIAST BP <80 MM HG: CPT | Mod: S$GLB,,, | Performed by: INTERNAL MEDICINE

## 2017-03-07 PROCEDURE — 1159F MED LIST DOCD IN RCRD: CPT | Mod: S$GLB,,, | Performed by: INTERNAL MEDICINE

## 2017-03-07 PROCEDURE — 1126F AMNT PAIN NOTED NONE PRSNT: CPT | Mod: S$GLB,,, | Performed by: INTERNAL MEDICINE

## 2017-03-07 NOTE — MR AVS SNAPSHOT
[facility]  [address] [place] [address]  Phone: [phone]                  Cindy Lyman   3/7/2017 1:30 PM   Office Visit    Description:  Female : 1935   Provider:  Tony Carmen MD   Department[facility]  [facility]           Reason for Visit     Valvular Heart Disease           Diagnoses this Visit        Comments    Pulmonary hypertension    -  Primary     Essential hypertension         Coronary artery disease due to lipid rich plaque         Acute deep vein thrombosis (DVT) of femoral vein of right lower extremity         Hyperlipidemia, unspecified hyperlipidemia type                To Do List           Future Appointments        Provider Department Dept Phone    3/10/2017 1:15 PM LAB, [place] [facility] [phone]    3/14/2017 1:30 PM [facility] [phone]      Goals (5 Years of Data)              Today    17    Blood Pressure < 150/90   160/72  160/72  160/72      [facility]     [facility] -  Assistance  Registered nurses in the [facility] provide clinical advisement, health education, appointment booking, and other advisory services.  Call for this free service at [phone].             Medications           Message regarding Medications     Verify the changes and/or additions to your medication regime listed below are the same as discussed with your clinician today.  If any of these changes or additions are incorrect, please notify your healthcare provider.             Verify that the below list of medications is an accurate representation of the medications you are currently taking.  If none reported, the list may be blank. If incorrect, please contact your healthcare provider. Carry this list with you in case of emergency.           Current Medications     amlodipine (NORVASC) 10 MG tablet ONE TABLET BY MOUTH once a day     aspirin (ECOTRIN) 81 MG EC tablet Take 81 mg by mouth once daily.    azelastine (ASTELIN) 137 mcg (0.1 %) nasal spray 1 spray (137 mcg total) by Nasal route 2 (two) times daily.    carbamazepine (TEGRETOL) 200 mg tablet Take 1 tablet (200 mg total) by mouth once daily.    citalopram (CELEXA) 20 MG tablet ONE TABLET BY MOUTH once a day    cloNIDine (CATAPRES) 0.3 MG tablet Take 1 tablet (0.3 mg total) by mouth 3 (three) times daily.    clopidogrel (PLAVIX) 75 mg tablet Take 1 tablet (75 mg total) by mouth once daily.    clotrimazole-betamethasone 1-0.05% (LOTRISONE) cream Apply topically 2 (two) times daily. To affected areas    CRESTOR 20 mg tablet ONE TABLET BY MOUTH EVERY EVENING    docusate sodium (COLACE) 100 MG capsule Take 1 capsule (100 mg total) by mouth 2 (two) times daily.    fluticasone (FLOVENT HFA) 110 mcg/actuation inhaler Inhale 1 puff into the lungs every 12 (twelve) hours.    furosemide (LASIX) 40 MG tablet TAKE ONE TABLET BY MOUTH EVERY DAY AS NEEDED FOR SHORTNESS OF BREATH or leg swelling    gabapentin (NEURONTIN) 300 MG capsule Take 1 capsule (300 mg total) by mouth 3 (three) times daily.    hydrALAZINE (APRESOLINE) 100 MG tablet Take 1 tablet (100 mg total) by mouth 3 (three) times daily.    hydrocodone-acetaminophen 5-325mg (NORCO) 5-325 mg per tablet Take 1 tablet by mouth 3 (three) times daily as needed for Pain.    latanoprost 0.005 % ophthalmic solution Place 1 drop into both eyes nightly.    loratadine (CLARITIN) 10 mg tablet Take 1 tablet (10 mg total) by mouth once daily.    metoprolol tartrate (LOPRESSOR) 100 MG tablet TAKE ONE TABLET BY MOUTH TWICE DAILY    miconazole (MICOTIN) 2 % cream Apply topically 2 (two) times daily.    nitroGLYCERIN (NITROSTAT) 0.4 MG SL tablet Place 0.4 mg under the tongue every 5 (five) minutes as needed for Chest pain.    polyethylene glycol (GLYCOLAX) 17 gram PwPk Take 17 g by mouth once daily.    rosuvastatin (CRESTOR) 20 MG tablet Take 1.5 tablets (30  "mg total) by mouth every evening. 1 Tablet Oral Every day    valsartan (DIOVAN) 320 MG tablet ONE TABLET BY MOUTH once a day    verapamil (VERELAN) 360 MG C24P Take 1 capsule (360 mg total) by mouth once daily.    XOPENEX HFA 45 mcg/actuation inhaler one to two puffs by mouth every 4 hours as needed for wheezing and shortness of breath           Clinical Reference Information           Your Vitals Were     BP Pulse Height Weight SpO2 BMI    160/72 (BP Location: Left arm, Patient Position: Sitting, BP Method: Automatic) 71 5' 5" (1.651 m) 92 kg (202 lb 13.2 oz) 97% 33.75 kg/m2      Blood Pressure          Most Recent Value    BP  (!)  160/72      Allergies as of 3/7/2017     Doxycycline Hyclate    Singulair [Montelukast]    Latex, Natural Rubber    Penicillins    Ventolin  [Albuterol Sulfate]      Immunizations Administered on Date of Encounter - 3/7/2017     None      Language Assistance Services     ATTENTION: Language assistance services are available, free of charge. Please call [phone].      ATENCIÓN: Si habla otoniel, tiene a pérez disposición servicios gratuitos de asistencia lingüística. Llame al [phone].     LAVON Ý: N?u b?n nói Ti?ng Vi?t, có các d?ch v? h? tr? ngôn ng? mi?n phí dành cho b?n. G?i s? [phone].         [facility] complies with applicable Federal civil rights laws and does not discriminate on the basis of race, color, national origin, age, disability, or sex.        "

## 2017-03-07 NOTE — PROGRESS NOTES
Subjective:    Patient ID:  Cindy Lyman is a 81 y.o. female who presents for follow-up of Valvular Heart Disease      HPI   CAD - stent LAD 4/2015 - moderate Cx disease  AS - moderate, HTN, COPD, Hx DVT, anemia    Echo 11/15/16    1 - Normal left ventricular systolic function (EF 55-60%).  Normal wall motion.     2 - Concentric hypertrophy.     3 - Left ventricular diastolic dysfunction.     4 - Moderate aortic stenosis, CLEMENT = 1.15 cm2, peak velocity = 2.9 m/s, mean gradient = 21.22 mmHg.     5 - Trivial mitral regurgitation.     6 - Trivial tricuspid regurgitation.     7 - Pulmonary hypertension. The estimated PA systolic pressure is 44 mmHg.      Stress test 5/25/16  LVEF: 56 %  Impression: ABNORMAL MYOCARDIAL PERFUSION  1. There is evidence for mild myocardial ischemia in the inferior wall of the left ventricle.   2. The perfusion scan is free of evidence for myocardial injury.   3. There is a mild intensity fixed defect in the anterior wall of the left ventricle, secondary to breast attenuation.   4. Resting wall motion is physiologic.   5. Resting LV function is normal.   6. The ventricular volumes are normal at rest and stress.   7. The extracardiac distribution of radioactivity is normal.   8. 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   HENT: Negative for ear discharge.    Eyes: Negative for blurred vision.   Respiratory: Negative for hemoptysis.    Endocrine: Negative for polyphagia.   Hematologic/Lymphatic: Negative for adenopathy.   Skin: Negative for color change.   Musculoskeletal: Negative for joint swelling.   Neurological: Negative for brief paralysis.   Psychiatric/Behavioral: Negative for hallucinations.        Objective:    Physical Exam   Constitutional: She is oriented to person, place, and time. She appears well-developed and well-nourished.    HENT:   Head: Normocephalic and atraumatic.   Eyes: Conjunctivae are normal. Pupils are equal, round, and reactive to light.   Neck: Normal range of motion. Neck supple.   Cardiovascular: Normal rate and intact distal pulses.    Murmur heard.   Early systolic murmur is present with a grade of 2/6   Pulmonary/Chest: Effort normal and breath sounds normal.   Abdominal: Soft. Bowel sounds are normal.   Musculoskeletal: Normal range of motion.   Neurological: She is alert and oriented to person, place, and time.   Skin: Skin is warm and dry.         Assessment:       1. Pulmonary hypertension    2. Essential hypertension    3. Coronary artery disease due to lipid rich plaque    4. Acute deep vein thrombosis (DVT) of femoral vein of right lower extremity    5. Hyperlipidemia, unspecified hyperlipidemia type         Plan:       Stop norvasc since she is on verapamil  If home BP increases consider adding cardura  OV 6 months with echo and lexiscan myoview

## 2017-03-14 ENCOUNTER — OFFICE VISIT (OUTPATIENT)
Dept: HEMATOLOGY/ONCOLOGY | Facility: CLINIC | Age: 82
End: 2017-03-14
Payer: MEDICARE

## 2017-03-14 VITALS
WEIGHT: 207.25 LBS | OXYGEN SATURATION: 97 % | HEART RATE: 65 BPM | TEMPERATURE: 99 F | SYSTOLIC BLOOD PRESSURE: 124 MMHG | DIASTOLIC BLOOD PRESSURE: 56 MMHG | BODY MASS INDEX: 34.49 KG/M2

## 2017-03-14 DIAGNOSIS — D63.8 ANEMIA OF CHRONIC DISEASE: Primary | Chronic | ICD-10-CM

## 2017-03-14 PROCEDURE — 3078F DIAST BP <80 MM HG: CPT | Mod: S$GLB,,, | Performed by: INTERNAL MEDICINE

## 2017-03-14 PROCEDURE — 1159F MED LIST DOCD IN RCRD: CPT | Mod: S$GLB,,, | Performed by: INTERNAL MEDICINE

## 2017-03-14 PROCEDURE — 1160F RVW MEDS BY RX/DR IN RCRD: CPT | Mod: S$GLB,,, | Performed by: INTERNAL MEDICINE

## 2017-03-14 PROCEDURE — 3074F SYST BP LT 130 MM HG: CPT | Mod: S$GLB,,, | Performed by: INTERNAL MEDICINE

## 2017-03-14 PROCEDURE — 99213 OFFICE O/P EST LOW 20 MIN: CPT | Mod: S$GLB,,, | Performed by: INTERNAL MEDICINE

## 2017-03-14 PROCEDURE — 1125F AMNT PAIN NOTED PAIN PRSNT: CPT | Mod: S$GLB,,, | Performed by: INTERNAL MEDICINE

## 2017-03-14 PROCEDURE — 1157F ADVNC CARE PLAN IN RCRD: CPT | Mod: S$GLB,,, | Performed by: INTERNAL MEDICINE

## 2017-03-14 PROCEDURE — 99999 PR PBB SHADOW E&M-EST. PATIENT-LVL IV: CPT | Mod: PBBFAC,,, | Performed by: INTERNAL MEDICINE

## 2017-03-14 NOTE — PROGRESS NOTES
"Subjective:       Patient ID: Cindy Lyman is a 81 y.o. female.    Chief Complaint: Follow-up    Diagnosis: Anemia of chronic disease  HPI  The patient is a pleasant 81-year-old female with past medical history of CAD, asthma, CHF, COPD, depression, hypertension, thyroid disease, seen today for f/u for anemia of chronic disease. Bone marrow biopsy neg for hematological malignancy    Today,she is doing well.  Visited ED 12/2016 with constipation  Fe supp discontinued       She continues with chronic arthalgias and  back pain-stable  No SOB/CP/N/V/cough  No fatigue/lightheadedness  No melena,hematochezia or change in bowel habits    She is followed by Cardiology for CHF.      Hx of RLE DVT  S/p anticoagulation completed 11/2015     Colonoscopy 5 yrs ago  Wjeff - unremarkable        Bone Marrow biopsy 5/26/2016  Hypercellular marrow for age, 50-70%, with trilineage hematopoiesis, see comment  --Erythroid hyperplasia  --Mild plasmacytosis, 5-10%, polytypic by in situ hybridization studies, see comment  --No increase in blasts  --Adequate megakaryocytes  --Decreased stainable iron  --Mild reticulin fibrosis  COMMENT: Concomitantly submitted flow cytometric analysis detects no abnormal hematopoietic population. Bcells are polyclonal with a subset of hematogones detected, and T cells are immunophenotypically unremarkable.  The blast gate is not increased.Although there is a mild plasmacytosis, by in situ hybridization studies, this appears polytypic. Correlate clinicallyand with any available cytogenetic and molecular studies    Plasma cell proliferative disorder, FISH:  An insufficient number of plasma cells were observed using cytoplasmic immunoglobulin staining method .This result does not exclude the presence of a plasma cell proliferative disorder."    Congo red stain neg    PAST MEDICAL HISTORY:  Aortic stenosis, arthritis, asthma, CAD, carpal tunnel, cataracts, CHF, COPD, depression, hyperlipidemia, " hypertension, pulmonary hypertension, thyroid disease, TMJ.    PAST SURGICAL HISTORY:  Partial hysterectomy, carpal tunnel release, eye surgery, left hip replacement, back surgery, TMJ.            Review of Systems   Constitutional: Negative for appetite change, chills, fatigue and unexpected weight change.   HENT: Negative for congestion, hearing loss and nosebleeds.    Eyes: Negative for visual disturbance.   Respiratory: Negative for apnea, cough, chest tightness, shortness of breath and wheezing.    Cardiovascular: Negative for chest pain, palpitations and leg swelling.   Gastrointestinal: Negative for abdominal distention, abdominal pain, blood in stool, constipation, diarrhea, nausea and vomiting.   Genitourinary: Negative for dysuria, flank pain, frequency, hematuria and urgency.   Musculoskeletal: Positive for arthralgias and back pain. Negative for gait problem, joint swelling and neck pain.   Skin: Negative for rash.        No petechiae, ecchymoses   Neurological: Negative for dizziness, light-headedness, numbness and headaches.   Hematological: Negative for adenopathy. Does not bruise/bleed easily.         Lab Results   Component Value Date    WBC 3.56 (L) 01/09/2017    WBC 3.56 (L) 01/09/2017    HGB 11.6 (L) 01/09/2017    HGB 11.6 (L) 01/09/2017    HCT 35.7 (L) 01/09/2017    HCT 35.7 (L) 01/09/2017    MCV 92 01/09/2017    MCV 92 01/09/2017     01/09/2017     01/09/2017         Objective:       Vitals:    03/14/17 1322   BP: (!) 124/56   BP Location: Right arm   Patient Position: Sitting   BP Method: Manual   Pulse: 65   Temp: 98.7 °F (37.1 °C)   TempSrc: Oral   SpO2: 97%   Weight: 94 kg (207 lb 3.7 oz)       Physical Exam   Constitutional: She is oriented to person, place, and time. She appears well-developed and well-nourished.   HENT:   Head: Normocephalic.   Mouth/Throat: Oropharynx is clear and moist. No oropharyngeal exudate.   Eyes: Conjunctivae and lids are normal. Pupils are equal,  round, and reactive to light. No scleral icterus.   Neck: Normal range of motion. Neck supple. No thyromegaly present.   Cardiovascular: Normal rate and regular rhythm.    Murmur heard.  Pulmonary/Chest: Breath sounds normal. She has no wheezes. She has no rales.   Abdominal: Soft. Bowel sounds are normal. She exhibits no distension and no mass. There is no hepatosplenomegaly. There is no tenderness. There is no rebound and no guarding.   Musculoskeletal: Normal range of motion. She exhibits edema ( 1+ RLE edema). She exhibits no tenderness.   Lymphadenopathy:     She has no cervical adenopathy.     She has no axillary adenopathy.        Right: No supraclavicular adenopathy present.        Left: No supraclavicular adenopathy present.   Neurological: She is alert and oriented to person, place, and time. No cranial nerve deficit. Coordination normal.   Skin: Skin is warm and dry. No ecchymosis, no petechiae and no rash noted. No erythema.   Psychiatric: She has a normal mood and affect.           Results for MARIE TURNER (MRN [id number]) as of 9/12/2016 14:18   Ref. Range 4/26/2016 10:45 6/10/2016 11:08   Ventura Free Light Chains Latest Ref Range: 0.33 - 1.94 mg/dL 4.26 (H) 4.01 (H)   Lambda Free Light Chains Latest Ref Range: 0.57 - 2.63 mg/dL 1.67 1.68   Kappa/Lambda FLC Ratio Latest Ref Range: 0.26 - 1.60  2.55 (H) 2.39 (H)         Labs: [facility] ( [facility]) wbc 3910/mm3 Hb 9.7g/dl Hct 28.5 % plt ct 233k  Assessment:       1. Anemia of chronic disease        Plan:       Pt clinically stable  CBC reveals trending  Hb 11g/dl to 9.7g/dl  range  Colonoscopy 5 yrs ago W [place]- unremarkable  Intolerant of Fe supp  Bone marrow biopsy- neg for hematological malignancy  Cont to monitor    F/u  4mo with cbc, Fe studies, FLC  prior to f/u      CC: Jeremiah Reeves M.D.

## 2017-03-14 NOTE — MR AVS SNAPSHOT
[facility]  [address] [place] [address]  Phone: [phone]                  Cindy Lyman   3/14/2017 1:30 PM   Office Visit    Description:  Female : 1935   Provider:  Kitty Sultana MD   [facility]           Reason for Visit     Follow-up           Diagnoses this Visit        Comments    Anemia of chronic disease    -  Primary            To Do List           Future Appointments        Provider Department Dept Phone    2017 11:00 AM LAB, [place] [facility] [phone]    6/15/2017 1:00 PM Kitty [facility]ult[facility] [phone]      Goals (5 Years of Data)              Today    3/7/17    1/23/17    Blood Pressure < 150/90   124/56  124/56  124/56      Follow-Up and Disposition     Return in about 3 months (around 2017).    Follow-up and Disposition History      [facility]     [facility] -  Assistance  Registered nurses in the [facility] provide clinical advisement, health education, appointment booking, and other advisory services.  Call for this free service at [phone].             Medications           Message regarding Medications     Verify the changes and/or additions to your medication regime listed below are the same as discussed with your clinician today.  If any of these changes or additions are incorrect, please notify your healthcare provider.        STOP taking these medications     clotrimazole-betamethasone 1-0.05% (LOTRISONE) cream Apply topically 2 (two) times daily. To affected areas    docusate sodium (COLACE) 100 MG capsule Take 1 capsule (100 mg total) by mouth 2 (two) times daily.    hydrocodone-acetaminophen 5-325mg (NORCO) 5-325 mg per tablet Take 1 tablet by mouth 3 (three) times daily as needed for Pain.    latanoprost 0.005 % ophthalmic solution Place 1 drop into both eyes nightly.     loratadine (CLARITIN) 10 mg tablet Take 1 tablet (10 mg total) by mouth once daily.    polyethylene glycol (GLYCOLAX) 17 gram PwPk Take 17 g by mouth once daily.           Verify that the below list of medications is an accurate representation of the medications you are currently taking.  If none reported, the list may be blank. If incorrect, please contact your healthcare provider. Carry this list with you in case of emergency.           Current Medications     aspirin (ECOTRIN) 81 MG EC tablet Take 81 mg by mouth once daily.    carbamazepine (TEGRETOL) 200 mg tablet Take 1 tablet (200 mg total) by mouth once daily.    citalopram (CELEXA) 20 MG tablet ONE TABLET BY MOUTH once a day    cloNIDine (CATAPRES) 0.3 MG tablet Take 1 tablet (0.3 mg total) by mouth 3 (three) times daily.    clopidogrel (PLAVIX) 75 mg tablet Take 1 tablet (75 mg total) by mouth once daily.    fluticasone (FLOVENT HFA) 110 mcg/actuation inhaler Inhale 1 puff into the lungs every 12 (twelve) hours.    furosemide (LASIX) 40 MG tablet TAKE ONE TABLET BY MOUTH EVERY DAY AS NEEDED FOR SHORTNESS OF BREATH or leg swelling    gabapentin (NEURONTIN) 300 MG capsule Take 1 capsule (300 mg total) by mouth 3 (three) times daily.    hydrALAZINE (APRESOLINE) 100 MG tablet Take 1 tablet (100 mg total) by mouth 3 (three) times daily.    metoprolol tartrate (LOPRESSOR) 100 MG tablet TAKE ONE TABLET BY MOUTH TWICE DAILY    miconazole (MICOTIN) 2 % cream Apply topically 2 (two) times daily.    nitroGLYCERIN (NITROSTAT) 0.4 MG SL tablet Place 0.4 mg under the tongue every 5 (five) minutes as needed for Chest pain.    rosuvastatin (CRESTOR) 20 MG tablet Take 1.5 tablets (30 mg total) by mouth every evening. 1 Tablet Oral Every day    valsartan (DIOVAN) 320 MG tablet ONE TABLET BY MOUTH once a day    verapamil (VERELAN) 360 MG C24P Take 1 capsule (360 mg total) by mouth once daily.    XOPENEX HFA 45 mcg/actuation inhaler one to two puffs by mouth every 4 hours as  needed for wheezing and shortness of breath    azelastine (ASTELIN) 137 mcg (0.1 %) nasal spray 1 spray (137 mcg total) by Nasal route 2 (two) times daily.           Clinical Reference Information           Your Vitals Were     BP Pulse Temp Weight SpO2 BMI    124/56 (BP Location: Right arm, Patient Position: Sitting, BP Method: Manual) 65 98.7 °F (37.1 °C) (Oral) 94 kg (207 lb 3.7 oz) 97% 34.49 kg/m2      Blood Pressure          Most Recent Value    BP  (!)  124/56      Allergies as of 3/14/2017     Doxycycline Hyclate    Singulair [Montelukast]    Latex, Natural Rubber    Penicillins    Ventolin  [Albuterol Sulfate]      Immunizations Administered on Date of Encounter - 3/14/2017     None      Orders Placed During Today's Visit     Future Labs/Procedures Expected by Expires    Immunoglobulin free LT chains blood  3/14/2017 3/14/2018      Language Assistance Services     ATTENTION: Language assistance services are available, free of charge. Please call [phone].      ATENCIÓN: Si yuli frederick, tiene a pérez disposición servicios gratuitos de asistencia lingüística. Llame al [phone].     [facility] Ý: N?u b?n nói Ti?ng Vi?t, có các d?ch v? h? tr? ngôn ng? mi?n phí jorge lh cho b?n. G?i s? [phone].         [facility] complies with applicable Federal civil rights laws and does not discriminate on the basis of race, color, national origin, age, disability, or sex.

## 2017-03-22 RX ORDER — HYDRALAZINE HYDROCHLORIDE 100 MG/1
TABLET, FILM COATED ORAL
Qty: 270 TABLET | Refills: 1 | Status: SHIPPED | OUTPATIENT
Start: 2017-03-22 | End: 2017-11-06 | Stop reason: SDUPTHER

## 2017-05-05 ENCOUNTER — TELEPHONE (OUTPATIENT)
Dept: FAMILY MEDICINE | Facility: CLINIC | Age: 82
End: 2017-05-05

## 2017-05-05 NOTE — TELEPHONE ENCOUNTER
----- Message from Lexus Castro sent at 5/5/2017 11:28 AM CDT -----  Contact: self  Pt states she is returning a call. Please call [phone].

## 2017-06-12 ENCOUNTER — LAB VISIT (OUTPATIENT)
Dept: LAB | Facility: HOSPITAL | Age: 82
End: 2017-06-12
Attending: INTERNAL MEDICINE
Payer: MEDICARE

## 2017-06-12 DIAGNOSIS — D63.8 ANEMIA OF CHRONIC DISEASE: Chronic | ICD-10-CM

## 2017-06-12 DIAGNOSIS — D64.9 ANEMIA, UNSPECIFIED TYPE: ICD-10-CM

## 2017-06-12 LAB
BASOPHILS # BLD AUTO: 0.02 K/UL
BASOPHILS NFR BLD: 0.4 %
DIFFERENTIAL METHOD: ABNORMAL
EOSINOPHIL # BLD AUTO: 0.2 K/UL
EOSINOPHIL NFR BLD: 3.4 %
ERYTHROCYTE [DISTWIDTH] IN BLOOD BY AUTOMATED COUNT: 14.3 %
FERRITIN SERPL-MCNC: 54 NG/ML
HCT VFR BLD AUTO: 31 %
HGB BLD-MCNC: 9.7 G/DL
IRON SERPL-MCNC: 38 UG/DL
LYMPHOCYTES # BLD AUTO: 2 K/UL
LYMPHOCYTES NFR BLD: 43.3 %
MCH RBC QN AUTO: 28.9 PG
MCHC RBC AUTO-ENTMCNC: 31.3 %
MCV RBC AUTO: 92 FL
MONOCYTES # BLD AUTO: 0.4 K/UL
MONOCYTES NFR BLD: 7.5 %
NEUTROPHILS # BLD AUTO: 2.1 K/UL
NEUTROPHILS NFR BLD: 45.4 %
PLATELET # BLD AUTO: 281 K/UL
PMV BLD AUTO: 10.1 FL
RBC # BLD AUTO: 3.36 M/UL
SATURATED IRON: 12 %
TOTAL IRON BINDING CAPACITY: 315 UG/DL
TRANSFERRIN SERPL-MCNC: 213 MG/DL
WBC # BLD AUTO: 4.67 K/UL

## 2017-06-12 PROCEDURE — 83540 ASSAY OF IRON: CPT

## 2017-06-12 PROCEDURE — 82728 ASSAY OF FERRITIN: CPT

## 2017-06-12 PROCEDURE — 83520 IMMUNOASSAY QUANT NOS NONAB: CPT | Mod: 59

## 2017-06-12 PROCEDURE — 85025 COMPLETE CBC W/AUTO DIFF WBC: CPT

## 2017-06-12 PROCEDURE — 36415 COLL VENOUS BLD VENIPUNCTURE: CPT | Mod: PO

## 2017-06-13 LAB
KAPPA LC SER QL IA: 4.62 MG/DL
KAPPA LC/LAMBDA SER IA: 2.83
LAMBDA LC SER QL IA: 1.63 MG/DL

## 2017-06-15 ENCOUNTER — OFFICE VISIT (OUTPATIENT)
Dept: HEMATOLOGY/ONCOLOGY | Facility: CLINIC | Age: 82
DRG: 871 | End: 2017-06-15
Payer: MEDICARE

## 2017-06-15 VITALS
DIASTOLIC BLOOD PRESSURE: 63 MMHG | SYSTOLIC BLOOD PRESSURE: 138 MMHG | WEIGHT: 210.56 LBS | OXYGEN SATURATION: 98 % | TEMPERATURE: 99 F | HEART RATE: 72 BPM | BODY MASS INDEX: 35.04 KG/M2

## 2017-06-15 DIAGNOSIS — I50.32 CHRONIC DIASTOLIC CHF (CONGESTIVE HEART FAILURE): ICD-10-CM

## 2017-06-15 DIAGNOSIS — J44.9 CHRONIC OBSTRUCTIVE PULMONARY DISEASE, UNSPECIFIED COPD TYPE: ICD-10-CM

## 2017-06-15 DIAGNOSIS — D63.8 ANEMIA OF CHRONIC DISEASE: Primary | Chronic | ICD-10-CM

## 2017-06-15 DIAGNOSIS — I10 ESSENTIAL HYPERTENSION: ICD-10-CM

## 2017-06-15 PROCEDURE — 1159F MED LIST DOCD IN RCRD: CPT | Mod: S$GLB,,, | Performed by: INTERNAL MEDICINE

## 2017-06-15 PROCEDURE — 1126F AMNT PAIN NOTED NONE PRSNT: CPT | Mod: S$GLB,,, | Performed by: INTERNAL MEDICINE

## 2017-06-15 PROCEDURE — 99999 PR PBB SHADOW E&M-EST. PATIENT-LVL IV: CPT | Mod: PBBFAC,,, | Performed by: INTERNAL MEDICINE

## 2017-06-15 PROCEDURE — 99213 OFFICE O/P EST LOW 20 MIN: CPT | Mod: S$GLB,,, | Performed by: INTERNAL MEDICINE

## 2017-06-15 PROCEDURE — 99499 UNLISTED E&M SERVICE: CPT | Mod: S$PBB,,, | Performed by: INTERNAL MEDICINE

## 2017-06-15 RX ORDER — LATANOPROST 50 UG/ML
1 SOLUTION/ DROPS OPHTHALMIC NIGHTLY
Refills: 5 | COMMUNITY
Start: 2017-03-21 | End: 2018-02-22

## 2017-06-15 RX ORDER — CLOTRIMAZOLE AND BETAMETHASONE DIPROPIONATE 10; .64 MG/G; MG/G
CREAM TOPICAL 2 TIMES DAILY
Refills: 2 | COMMUNITY
Start: 2017-03-21 | End: 2018-05-21

## 2017-06-15 NOTE — PROGRESS NOTES
Subjective:       Patient ID: Cindy Lyman is a 81 y.o. female.    Chief Complaint: Follow-up    Diagnosis: Anemia of chronic disease  HPI  The patient is a pleasant 81-year-old female with past medical history of CAD, asthma, CHF, COPD, depression, hypertension, thyroid disease, seen today for f/u for anemia of chronic disease. Bone marrow biopsy neg for hematological malignancy    Today,she reports minor bruisablity scattered on extremities  No bleeding   Taking ASA and plavix  Intolerant of Fe supp   Pt recently prescribed antifungal cream for rash on breast and extremities  No improvement in rash with medication    She continues with chronic arthalgias and  back pain-stable  She ambulates with assistance of walker  Mild GALVAN-stable, chronic  No cough/CP   No fatigue/lightheadedness  No melena,hematochezia or change in bowel habits    She is followed by Cardiology for CHF.      Hx of RLE DVT  S/p anticoagulation completed 11/2015     Colonoscopy 5 yrs ago  [facility] - unremarkable    She is followed by Pulm for COPD    Bone Marrow biopsy 5/26/2016  Hypercellular marrow for age, 50-70%, with trilineage hematopoiesis, see comment  --Erythroid hyperplasia  --Mild plasmacytosis, 5-10%, polytypic by in situ hybridization studies, see comment  --No increase in blasts  --Adequate megakaryocytes  --Decreased stainable iron  --Mild reticulin fibrosis  COMMENT: Concomitantly submitted flow cytometric analysis detects no abnormal hematopoietic population. Bcells are polyclonal with a subset of hematogones detected, and T cells are immunophenotypically unremarkable.  The blast gate is not increased.Although there is a mild plasmacytosis, by in situ hybridization studies, this appears polytypic. Correlate clinicallyand with any available cytogenetic and molecular studies    Plasma cell proliferative disorder, FISH:  An insufficient number of plasma cells were observed using cytoplasmic immunoglobulin staining method  ".This result does not exclude the presence of a plasma cell proliferative disorder."    Congo red stain neg    PAST MEDICAL HISTORY:  Aortic stenosis, arthritis, asthma, CAD, carpal tunnel, cataracts, CHF, COPD, depression, hyperlipidemia, hypertension, pulmonary hypertension, thyroid disease, TMJ.    PAST SURGICAL HISTORY:  Partial hysterectomy, carpal tunnel release, eye surgery, left hip replacement, back surgery, TMJ.            Review of Systems   Constitutional: Negative for appetite change, fatigue and unexpected weight change.   HENT: Negative for congestion, hearing loss and nosebleeds.    Eyes: Negative for visual disturbance.   Respiratory: Positive for shortness of breath. Negative for cough and chest tightness.    Cardiovascular: Negative for chest pain and leg swelling.   Gastrointestinal: Negative for abdominal pain, blood in stool, constipation, diarrhea, nausea and vomiting.   Genitourinary: Negative for flank pain and hematuria.   Musculoskeletal: Positive for arthralgias and back pain. Negative for gait problem, joint swelling and neck pain.   Skin: Positive for rash.        No petechiae, ecchymoses   Neurological: Negative for dizziness, light-headedness, numbness and headaches.   Hematological: Negative for adenopathy. Does not bruise/bleed easily.         Lab Results   Component Value Date    WBC 4.67 06/12/2017    HGB 9.7 (L) 06/12/2017    HCT 31.0 (L) 06/12/2017    MCV 92 06/12/2017     06/12/2017         Objective:       Vitals:    06/15/17 1302 06/15/17 1309   BP: 138/63    BP Location: Left arm    Patient Position: Sitting    BP Method: Manual    Pulse: 72    Temp: 100 °F (37.8 °C) 99.3 °F (37.4 °C)   TempSrc: Oral Oral   SpO2: 98%    Weight: 95.5 kg (210 lb 8.6 oz)        Physical Exam   Constitutional: She is oriented to person, place, and time. She appears well-developed and well-nourished.   HENT:   Head: Normocephalic.   Mouth/Throat: Oropharynx is clear and moist. No " oropharyngeal exudate.   Eyes: Conjunctivae and lids are normal. Pupils are equal, round, and reactive to light. No scleral icterus.   Neck: Normal range of motion. Neck supple. No thyromegaly present.   Cardiovascular: Normal rate and regular rhythm.    Murmur heard.  Pulmonary/Chest: Breath sounds normal. She has no wheezes. She has no rales.   Abdominal: Soft. Bowel sounds are normal. She exhibits no distension and no mass. There is no hepatosplenomegaly. There is no tenderness. There is no rebound and no guarding.   Musculoskeletal: Normal range of motion. She exhibits edema ( 1+ RLE edema). She exhibits no tenderness.   Lymphadenopathy:     She has no cervical adenopathy.     She has no axillary adenopathy.        Right: No supraclavicular adenopathy present.        Left: No supraclavicular adenopathy present.   Neurological: She is alert and oriented to person, place, and time. No cranial nerve deficit. Coordination normal.   Skin: Skin is warm and dry. No ecchymosis, no petechiae and no rash noted. No erythema.   Psychiatric: She has a normal mood and affect.         Results for MARIE TURNER (MRN [id number]) as of 6/15/2017 13:23   Ref. Range 6/10/2016 11:08 1/9/2017 13:46 6/12/2017 10:52   Peterson Free Light Chains Latest Ref Range: 0.33 - 1.94 mg/dL 4.01 (H) 6.29 (H) 4.62 (H)   Lambda Free Light Chains Latest Ref Range: 0.57 - 2.63 mg/dL 1.68 2.93 (H) 1.63   Kappa/Lambda FLC Ratio Latest Ref Range: 0.26 - 1.65  2.39 (H) 2.15 (H) 2.83 (H)                 Lab Results   Component Value Date    WBC 4.67 06/12/2017    HGB 9.7 (L) 06/12/2017    HCT 31.0 (L) 06/12/2017    MCV 92 06/12/2017     06/12/2017       Lab Results   Component Value Date    IRON 38 06/12/2017    TIBC 315 06/12/2017    FERRITIN 54 06/12/2017     Bone Marrow biopsy 5/26/2016  Hypercellular marrow for age, 50-70%, with trilineage hematopoiesis, see comment  --Erythroid hyperplasia  --Mild plasmacytosis, 5-10%, polytypic by in situ  "hybridization studies, see comment  --No increase in blasts  --Adequate megakaryocytes  --Decreased stainable iron  --Mild reticulin fibrosis  COMMENT: Concomitantly submitted flow cytometric analysis detects no abnormal hematopoietic population. Bcells are polyclonal with a subset of hematogones detected, and T cells are immunophenotypically unremarkable.  The blast gate is not increased.Although there is a mild plasmacytosis, by in situ hybridization studies, this appears polytypic. Correlate clinicallyand with any available cytogenetic and molecular studies    Plasma cell proliferative disorder, FISH:  An insufficient number of plasma cells were observed using cytoplasmic immunoglobulin staining method .This result does not exclude the presence of a plasma cell proliferative disorder."  Assessment:       1. Anemia of chronic disease    2. Essential hypertension    3. Chronic obstructive pulmonary disease, unspecified COPD type    4. Chronic diastolic CHF (congestive heart failure)        Plan:       Pt clinically stable  Hb 11.6 to 9.7g/dl   Colonoscopy 5 yrs ago [facility]- unremarkable  Intolerant of Fe supp  S/p extensive hematological workup including bmbx- neg for hematological malignancy  Cont to monitor  No bleeding     F/u  3mo with cbc, Fe studies, retic ct FLC  prior to f/u      CC: Jeremiah Reeves M.D.        "

## 2017-06-16 ENCOUNTER — HOSPITAL ENCOUNTER (INPATIENT)
Facility: HOSPITAL | Age: 82
LOS: 1 days | Discharge: HOME | DRG: 871 | End: 2017-06-18
Attending: EMERGENCY MEDICINE | Admitting: HOSPITALIST
Payer: MEDICARE

## 2017-06-16 DIAGNOSIS — E80.6 HYPERBILIRUBINEMIA: ICD-10-CM

## 2017-06-16 DIAGNOSIS — N30.00 ACUTE CYSTITIS WITHOUT HEMATURIA: ICD-10-CM

## 2017-06-16 DIAGNOSIS — A41.9 SEPSIS, DUE TO UNSPECIFIED ORGANISM: Primary | ICD-10-CM

## 2017-06-16 DIAGNOSIS — R10.10 PAIN OF UPPER ABDOMEN: ICD-10-CM

## 2017-06-16 DIAGNOSIS — R06.02 SHORTNESS OF BREATH: ICD-10-CM

## 2017-06-16 DIAGNOSIS — R11.10 VOMITING: ICD-10-CM

## 2017-06-16 DIAGNOSIS — I50.9 CHF, ACUTE ON CHRONIC: ICD-10-CM

## 2017-06-16 DIAGNOSIS — R50.9 FEVER, UNSPECIFIED FEVER CAUSE: ICD-10-CM

## 2017-06-16 DIAGNOSIS — R79.89 ELEVATED TROPONIN: ICD-10-CM

## 2017-06-16 DIAGNOSIS — K80.20 CHOLELITHIASIS WITHOUT CHOLECYSTITIS: ICD-10-CM

## 2017-06-16 DIAGNOSIS — I50.9 ACUTE ON CHRONIC CONGESTIVE HEART FAILURE, UNSPECIFIED CONGESTIVE HEART FAILURE TYPE: ICD-10-CM

## 2017-06-16 LAB
ALBUMIN SERPL BCP-MCNC: 3.2 G/DL
ALP SERPL-CCNC: 139 U/L
ALT SERPL W/O P-5'-P-CCNC: 167 U/L
ANION GAP SERPL CALC-SCNC: 10 MMOL/L
AST SERPL-CCNC: 113 U/L
BACTERIA #/AREA URNS HPF: NORMAL /HPF
BASOPHILS # BLD AUTO: 0 K/UL
BASOPHILS NFR BLD: 0 %
BILIRUB SERPL-MCNC: 3.2 MG/DL
BILIRUB UR QL STRIP: NEGATIVE
BNP SERPL-MCNC: 950 PG/ML
BUN SERPL-MCNC: 21 MG/DL
CALCIUM SERPL-MCNC: 8.9 MG/DL
CHLORIDE SERPL-SCNC: 105 MMOL/L
CLARITY UR: CLEAR
CO2 SERPL-SCNC: 24 MMOL/L
COLOR UR: ABNORMAL
CREAT SERPL-MCNC: 1.1 MG/DL
DIFFERENTIAL METHOD: ABNORMAL
EOSINOPHIL # BLD AUTO: 0 K/UL
EOSINOPHIL NFR BLD: 0 %
ERYTHROCYTE [DISTWIDTH] IN BLOOD BY AUTOMATED COUNT: 14.2 %
EST. GFR  (AFRICAN AMERICAN): 55 ML/MIN/1.73 M^2
EST. GFR  (NON AFRICAN AMERICAN): 48 ML/MIN/1.73 M^2
GLUCOSE SERPL-MCNC: 101 MG/DL
GLUCOSE UR QL STRIP: NEGATIVE
HCT VFR BLD AUTO: 26.9 %
HGB BLD-MCNC: 9 G/DL
HGB UR QL STRIP: NEGATIVE
HYALINE CASTS #/AREA URNS LPF: 0 /LPF
KETONES UR QL STRIP: NEGATIVE
LEUKOCYTE ESTERASE UR QL STRIP: ABNORMAL
LIPASE SERPL-CCNC: 15 U/L
LYMPHOCYTES # BLD AUTO: 0.7 K/UL
LYMPHOCYTES NFR BLD: 7.8 %
MCH RBC QN AUTO: 29.7 PG
MCHC RBC AUTO-ENTMCNC: 33.5 %
MCV RBC AUTO: 89 FL
MICROSCOPIC COMMENT: NORMAL
MONOCYTES # BLD AUTO: 0.6 K/UL
MONOCYTES NFR BLD: 6.7 %
NEUTROPHILS # BLD AUTO: 7.3 K/UL
NEUTROPHILS NFR BLD: 85.5 %
NITRITE UR QL STRIP: NEGATIVE
PH UR STRIP: 5 [PH] (ref 5–8)
PLATELET # BLD AUTO: 213 K/UL
PMV BLD AUTO: 10 FL
POTASSIUM SERPL-SCNC: 3.7 MMOL/L
PROT SERPL-MCNC: 6.7 G/DL
PROT UR QL STRIP: ABNORMAL
RBC # BLD AUTO: 3.03 M/UL
RBC #/AREA URNS HPF: 1 /HPF (ref 0–4)
SODIUM SERPL-SCNC: 139 MMOL/L
SP GR UR STRIP: 1.02 (ref 1–1.03)
SQUAMOUS #/AREA URNS HPF: 4 /HPF
TROPONIN I SERPL DL<=0.01 NG/ML-MCNC: 0.1 NG/ML
URN SPEC COLLECT METH UR: ABNORMAL
UROBILINOGEN UR STRIP-ACNC: ABNORMAL EU/DL
WBC # BLD AUTO: 8.51 K/UL
WBC #/AREA URNS HPF: 2 /HPF (ref 0–5)

## 2017-06-16 PROCEDURE — 93005 ELECTROCARDIOGRAM TRACING: CPT

## 2017-06-16 PROCEDURE — 84484 ASSAY OF TROPONIN QUANT: CPT

## 2017-06-16 PROCEDURE — 96376 TX/PRO/DX INJ SAME DRUG ADON: CPT

## 2017-06-16 PROCEDURE — 96366 THER/PROPH/DIAG IV INF ADDON: CPT

## 2017-06-16 PROCEDURE — 87086 URINE CULTURE/COLONY COUNT: CPT

## 2017-06-16 PROCEDURE — 99285 EMERGENCY DEPT VISIT HI MDM: CPT

## 2017-06-16 PROCEDURE — 96367 TX/PROPH/DG ADDL SEQ IV INF: CPT

## 2017-06-16 PROCEDURE — 80053 COMPREHEN METABOLIC PANEL: CPT

## 2017-06-16 PROCEDURE — 85025 COMPLETE CBC W/AUTO DIFF WBC: CPT

## 2017-06-16 PROCEDURE — 81000 URINALYSIS NONAUTO W/SCOPE: CPT

## 2017-06-16 PROCEDURE — 25000003 PHARM REV CODE 250: Performed by: EMERGENCY MEDICINE

## 2017-06-16 PROCEDURE — 63600175 PHARM REV CODE 636 W HCPCS: Performed by: EMERGENCY MEDICINE

## 2017-06-16 PROCEDURE — 83690 ASSAY OF LIPASE: CPT

## 2017-06-16 PROCEDURE — 25500020 PHARM REV CODE 255: Performed by: EMERGENCY MEDICINE

## 2017-06-16 PROCEDURE — 96365 THER/PROPH/DIAG IV INF INIT: CPT

## 2017-06-16 PROCEDURE — 96375 TX/PRO/DX INJ NEW DRUG ADDON: CPT

## 2017-06-16 PROCEDURE — 83880 ASSAY OF NATRIURETIC PEPTIDE: CPT

## 2017-06-16 RX ORDER — ONDANSETRON 2 MG/ML
4 INJECTION INTRAMUSCULAR; INTRAVENOUS
Status: COMPLETED | OUTPATIENT
Start: 2017-06-16 | End: 2017-06-16

## 2017-06-16 RX ADMIN — LIDOCAINE HYDROCHLORIDE: 20 SOLUTION ORAL; TOPICAL at 09:06

## 2017-06-16 RX ADMIN — ONDANSETRON 4 MG: 2 INJECTION INTRAMUSCULAR; INTRAVENOUS at 08:06

## 2017-06-16 RX ADMIN — IOHEXOL 100 ML: 350 INJECTION, SOLUTION INTRAVENOUS at 10:06

## 2017-06-17 PROBLEM — I50.33 ACUTE ON CHRONIC DIASTOLIC CHF (CONGESTIVE HEART FAILURE): Status: ACTIVE | Noted: 2017-06-17

## 2017-06-17 PROBLEM — A41.9 SEPSIS: Status: ACTIVE | Noted: 2017-06-17

## 2017-06-17 PROBLEM — R06.02 SHORTNESS OF BREATH: Status: ACTIVE | Noted: 2017-06-17

## 2017-06-17 PROBLEM — R79.89 ELEVATED LFTS: Status: ACTIVE | Noted: 2017-06-17

## 2017-06-17 LAB
APAP SERPL-MCNC: <3 UG/ML
TROPONIN I SERPL DL<=0.01 NG/ML-MCNC: 0.1 NG/ML

## 2017-06-17 PROCEDURE — 94761 N-INVAS EAR/PLS OXIMETRY MLT: CPT

## 2017-06-17 PROCEDURE — 63600175 PHARM REV CODE 636 W HCPCS: Performed by: EMERGENCY MEDICINE

## 2017-06-17 PROCEDURE — 63600175 PHARM REV CODE 636 W HCPCS: Performed by: HOSPITALIST

## 2017-06-17 PROCEDURE — 25000003 PHARM REV CODE 250: Performed by: EMERGENCY MEDICINE

## 2017-06-17 PROCEDURE — 80329 ANALGESICS NON-OPIOID 1 OR 2: CPT

## 2017-06-17 PROCEDURE — 36415 COLL VENOUS BLD VENIPUNCTURE: CPT

## 2017-06-17 PROCEDURE — 84484 ASSAY OF TROPONIN QUANT: CPT

## 2017-06-17 PROCEDURE — 21400001 HC TELEMETRY ROOM

## 2017-06-17 PROCEDURE — 80074 ACUTE HEPATITIS PANEL: CPT

## 2017-06-17 RX ORDER — ONDANSETRON 2 MG/ML
4 INJECTION INTRAMUSCULAR; INTRAVENOUS EVERY 4 HOURS PRN
Status: DISCONTINUED | OUTPATIENT
Start: 2017-06-17 | End: 2017-06-18 | Stop reason: HOSPADM

## 2017-06-17 RX ORDER — CLOPIDOGREL BISULFATE 75 MG/1
75 TABLET ORAL DAILY
Status: DISCONTINUED | OUTPATIENT
Start: 2017-06-17 | End: 2017-06-17

## 2017-06-17 RX ORDER — HYDRALAZINE HYDROCHLORIDE 20 MG/ML
10 INJECTION INTRAMUSCULAR; INTRAVENOUS
Status: COMPLETED | OUTPATIENT
Start: 2017-06-17 | End: 2017-06-17

## 2017-06-17 RX ORDER — METRONIDAZOLE 500 MG/100ML
500 INJECTION, SOLUTION INTRAVENOUS
Status: COMPLETED | OUTPATIENT
Start: 2017-06-17 | End: 2017-06-17

## 2017-06-17 RX ORDER — CITALOPRAM 20 MG/1
20 TABLET, FILM COATED ORAL DAILY
Status: DISCONTINUED | OUTPATIENT
Start: 2017-06-17 | End: 2017-06-18 | Stop reason: HOSPADM

## 2017-06-17 RX ORDER — CIPROFLOXACIN 2 MG/ML
400 INJECTION, SOLUTION INTRAVENOUS
Status: COMPLETED | OUTPATIENT
Start: 2017-06-17 | End: 2017-06-17

## 2017-06-17 RX ORDER — ASPIRIN 81 MG/1
81 TABLET ORAL DAILY
Status: DISCONTINUED | OUTPATIENT
Start: 2017-06-17 | End: 2017-06-18 | Stop reason: HOSPADM

## 2017-06-17 RX ORDER — ONDANSETRON 2 MG/ML
4 INJECTION INTRAMUSCULAR; INTRAVENOUS
Status: COMPLETED | OUTPATIENT
Start: 2017-06-17 | End: 2017-06-17

## 2017-06-17 RX ORDER — MORPHINE SULFATE 10 MG/ML
8 INJECTION INTRAMUSCULAR; INTRAVENOUS; SUBCUTANEOUS
Status: COMPLETED | OUTPATIENT
Start: 2017-06-17 | End: 2017-06-17

## 2017-06-17 RX ORDER — HYDRALAZINE HYDROCHLORIDE 25 MG/1
100 TABLET, FILM COATED ORAL EVERY 8 HOURS
Status: DISCONTINUED | OUTPATIENT
Start: 2017-06-17 | End: 2017-06-18 | Stop reason: HOSPADM

## 2017-06-17 RX ORDER — HYDRALAZINE HYDROCHLORIDE 20 MG/ML
10 INJECTION INTRAMUSCULAR; INTRAVENOUS EVERY 6 HOURS PRN
Status: DISCONTINUED | OUTPATIENT
Start: 2017-06-17 | End: 2017-06-18 | Stop reason: HOSPADM

## 2017-06-17 RX ORDER — FUROSEMIDE 10 MG/ML
40 INJECTION INTRAMUSCULAR; INTRAVENOUS 2 TIMES DAILY
Status: DISCONTINUED | OUTPATIENT
Start: 2017-06-17 | End: 2017-06-17

## 2017-06-17 RX ORDER — METRONIDAZOLE 500 MG/100ML
500 INJECTION, SOLUTION INTRAVENOUS
Status: DISCONTINUED | OUTPATIENT
Start: 2017-06-17 | End: 2017-06-18 | Stop reason: HOSPADM

## 2017-06-17 RX ORDER — CARBAMAZEPINE 200 MG/1
200 TABLET ORAL DAILY
Status: DISCONTINUED | OUTPATIENT
Start: 2017-06-17 | End: 2017-06-18 | Stop reason: HOSPADM

## 2017-06-17 RX ORDER — SODIUM CHLORIDE 0.9 % (FLUSH) 0.9 %
3 SYRINGE (ML) INJECTION EVERY 8 HOURS
Status: DISCONTINUED | OUTPATIENT
Start: 2017-06-17 | End: 2017-06-18 | Stop reason: HOSPADM

## 2017-06-17 RX ORDER — GABAPENTIN 300 MG/1
300 CAPSULE ORAL 3 TIMES DAILY
Status: DISCONTINUED | OUTPATIENT
Start: 2017-06-17 | End: 2017-06-18 | Stop reason: HOSPADM

## 2017-06-17 RX ORDER — LORAZEPAM 2 MG/ML
1 INJECTION INTRAMUSCULAR ONCE
Status: COMPLETED | OUTPATIENT
Start: 2017-06-17 | End: 2017-06-17

## 2017-06-17 RX ORDER — FUROSEMIDE 10 MG/ML
20 INJECTION INTRAMUSCULAR; INTRAVENOUS 2 TIMES DAILY
Status: DISCONTINUED | OUTPATIENT
Start: 2017-06-17 | End: 2017-06-18 | Stop reason: HOSPADM

## 2017-06-17 RX ORDER — CLONIDINE HYDROCHLORIDE 0.1 MG/1
0.3 TABLET ORAL 3 TIMES DAILY
Status: DISCONTINUED | OUTPATIENT
Start: 2017-06-17 | End: 2017-06-18 | Stop reason: HOSPADM

## 2017-06-17 RX ORDER — MORPHINE SULFATE 10 MG/ML
6 INJECTION INTRAMUSCULAR; INTRAVENOUS; SUBCUTANEOUS EVERY 4 HOURS PRN
Status: DISCONTINUED | OUTPATIENT
Start: 2017-06-17 | End: 2017-06-18 | Stop reason: HOSPADM

## 2017-06-17 RX ORDER — ACETAMINOPHEN 325 MG/1
650 TABLET ORAL EVERY 8 HOURS PRN
Status: DISCONTINUED | OUTPATIENT
Start: 2017-06-17 | End: 2017-06-18 | Stop reason: HOSPADM

## 2017-06-17 RX ORDER — MORPHINE SULFATE 10 MG/ML
4 INJECTION INTRAMUSCULAR; INTRAVENOUS; SUBCUTANEOUS EVERY 4 HOURS PRN
Status: DISCONTINUED | OUTPATIENT
Start: 2017-06-17 | End: 2017-06-18 | Stop reason: HOSPADM

## 2017-06-17 RX ORDER — FAMOTIDINE 20 MG/1
20 TABLET, FILM COATED ORAL DAILY
Status: DISCONTINUED | OUTPATIENT
Start: 2017-06-17 | End: 2017-06-18

## 2017-06-17 RX ORDER — SODIUM CHLORIDE 9 MG/ML
1000 INJECTION, SOLUTION INTRAVENOUS
Status: ACTIVE | OUTPATIENT
Start: 2017-06-17 | End: 2017-06-17

## 2017-06-17 RX ORDER — RAMELTEON 8 MG/1
8 TABLET ORAL NIGHTLY PRN
Status: DISCONTINUED | OUTPATIENT
Start: 2017-06-17 | End: 2017-06-18 | Stop reason: HOSPADM

## 2017-06-17 RX ORDER — METOPROLOL TARTRATE 50 MG/1
100 TABLET ORAL 2 TIMES DAILY
Status: DISCONTINUED | OUTPATIENT
Start: 2017-06-17 | End: 2017-06-18 | Stop reason: HOSPADM

## 2017-06-17 RX ORDER — POLYETHYLENE GLYCOL 3350 17 G/17G
17 POWDER, FOR SOLUTION ORAL DAILY
Status: DISCONTINUED | OUTPATIENT
Start: 2017-06-17 | End: 2017-06-18 | Stop reason: HOSPADM

## 2017-06-17 RX ORDER — CIPROFLOXACIN 2 MG/ML
400 INJECTION, SOLUTION INTRAVENOUS
Status: DISCONTINUED | OUTPATIENT
Start: 2017-06-17 | End: 2017-06-18 | Stop reason: HOSPADM

## 2017-06-17 RX ORDER — ROSUVASTATIN CALCIUM 10 MG/1
30 TABLET, COATED ORAL NIGHTLY
Status: DISCONTINUED | OUTPATIENT
Start: 2017-06-17 | End: 2017-06-18 | Stop reason: HOSPADM

## 2017-06-17 RX ADMIN — HYDRALAZINE HYDROCHLORIDE 100 MG: 25 TABLET ORAL at 06:06

## 2017-06-17 RX ADMIN — CIPROFLOXACIN 400 MG: 2 INJECTION, SOLUTION INTRAVENOUS at 01:06

## 2017-06-17 RX ADMIN — LORAZEPAM 1 MG: 2 INJECTION INTRAMUSCULAR; INTRAVENOUS at 11:06

## 2017-06-17 RX ADMIN — CIPROFLOXACIN 400 MG: 2 INJECTION, SOLUTION INTRAVENOUS at 02:06

## 2017-06-17 RX ADMIN — ONDANSETRON 4 MG: 2 INJECTION INTRAMUSCULAR; INTRAVENOUS at 02:06

## 2017-06-17 RX ADMIN — METRONIDAZOLE 500 MG: 500 INJECTION, SOLUTION INTRAVENOUS at 01:06

## 2017-06-17 RX ADMIN — SODIUM CHLORIDE, PRESERVATIVE FREE 3 ML: 5 INJECTION INTRAVENOUS at 02:06

## 2017-06-17 RX ADMIN — METRONIDAZOLE 500 MG: 500 INJECTION, SOLUTION INTRAVENOUS at 10:06

## 2017-06-17 RX ADMIN — GABAPENTIN 300 MG: 300 CAPSULE ORAL at 01:06

## 2017-06-17 RX ADMIN — CITALOPRAM HYDROBROMIDE 20 MG: 20 TABLET ORAL at 01:06

## 2017-06-17 RX ADMIN — ONDANSETRON 4 MG: 2 INJECTION INTRAMUSCULAR; INTRAVENOUS at 08:06

## 2017-06-17 RX ADMIN — CLONIDINE HYDROCHLORIDE 0.3 MG: 0.1 TABLET ORAL at 02:06

## 2017-06-17 RX ADMIN — FUROSEMIDE 20 MG: 10 INJECTION, SOLUTION INTRAVENOUS at 06:06

## 2017-06-17 RX ADMIN — FAMOTIDINE 20 MG: 20 TABLET, FILM COATED ORAL at 01:06

## 2017-06-17 RX ADMIN — METOPROLOL TARTRATE 100 MG: 50 TABLET ORAL at 01:06

## 2017-06-17 RX ADMIN — ROSUVASTATIN CALCIUM 30 MG: 10 TABLET ORAL at 09:06

## 2017-06-17 RX ADMIN — HYDRALAZINE HYDROCHLORIDE 100 MG: 25 TABLET ORAL at 02:06

## 2017-06-17 RX ADMIN — GABAPENTIN 300 MG: 300 CAPSULE ORAL at 06:06

## 2017-06-17 RX ADMIN — CLONIDINE HYDROCHLORIDE 0.3 MG: 0.1 TABLET ORAL at 06:06

## 2017-06-17 RX ADMIN — SODIUM CHLORIDE, PRESERVATIVE FREE 3 ML: 5 INJECTION INTRAVENOUS at 10:06

## 2017-06-17 RX ADMIN — GABAPENTIN 300 MG: 300 CAPSULE ORAL at 09:06

## 2017-06-17 RX ADMIN — VERAPAMIL HYDROCHLORIDE 360 MG: 120 TABLET, FILM COATED, EXTENDED RELEASE ORAL at 01:06

## 2017-06-17 RX ADMIN — ASPIRIN 81 MG: 81 TABLET, COATED ORAL at 01:06

## 2017-06-17 RX ADMIN — CARBAMAZEPINE 200 MG: 200 TABLET ORAL at 01:06

## 2017-06-17 RX ADMIN — HYDRALAZINE HYDROCHLORIDE 10 MG: 20 INJECTION INTRAMUSCULAR; INTRAVENOUS at 03:06

## 2017-06-17 RX ADMIN — MORPHINE SULFATE 8 MG: 10 INJECTION INTRAVENOUS at 02:06

## 2017-06-17 RX ADMIN — METRONIDAZOLE 500 MG: 500 INJECTION, SOLUTION INTRAVENOUS at 04:06

## 2017-06-17 NOTE — H&P
"[facility] Hospital Medicine  History & Physical    Patient Name: Cindy Lyman  MRN: [id number]  Admission Date: 6/16/2017  Attending Physician: Ashley Moreno MD   Primary Care Provider: Jeremiah Reeves MD         Patient information was obtained from patient and ER records.     Subjective:     Principal Problem:<principal problem not specified>    Chief Complaint:   Chief Complaint   Patient presents with    Shortness of Breath     SOB since this a.m. Has hx CHF. Denies chest pain    Abdominal Pain     Abd pain, nausea. Dry heaves. Home health nurse saw her this a.m. & suspects dehydration        HPI:  81 y.o. F with history of HTN,diastolic CHF,morbid obesity,CAD,hyperlipidemia,presents to the ED c/o constant, severe upper abdominal pain with associated nausea beginning 1 night PTA. Pt states she has been "dry heaving." Pt notes chronic constipation since starting iron pills. Pt reports of having a bowel movement every 3 days. Last bowel movements (x2) were yesterday. Pt also notes chronic, unchanged pain with bowel movements. Pt took an unknown medication yesterday with tempoary relief of her abdominal pain. Pain returned upon waking. No medication changes. Pt denies fever, chills, emesis, blood in her stool, and any other symptoms. Pt also continues to complain of SOB and chest tightness. This is mildly worse than baseline. She states she has been compliant with her home meds.   Patient at arrival to ER was septic with fever and tachycardia,she has elevated LFT,likely source GI and gallbladder,CT abdomen and US show no sign of obstruction,or definitive cholecystitis,she has gall balder sludge,consern for cholangitis,she has been started on IV Abx and GI has been consulted,her abdominal pain and SOB is much improved at this time,will do stat MRCP.keep NPO at this time.    Past Medical History:   Diagnosis Date    Anemia     Anticoagulant long-term use     Aortic stenosis  "    Arthritis     Asthma     CAD (coronary artery disease) 4/24/2015    Carpal tunnel syndrome on both sides     Cataract     CHF (congestive heart failure) 5/2013    COPD (chronic obstructive pulmonary disease)     Depression     DVT (deep venous thrombosis)     Hyperlipidemia     Hypertension     Pulmonary HTN     TMJ (temporomandibular joint disorder)        Past Surgical History:   Procedure Laterality Date    BACK SURGERY      cardiac stents      CARPAL TUNNEL RELEASE      Right/Left    HYSTERECTOMY      Partial    JOINT REPLACEMENT  2009    Left hip    TEMPOROMANDIBULAR JOINT SURGERY         Review of patient's allergies indicates:   Allergen Reactions    Doxycycline hyclate Diarrhea and Nausea And Vomiting    Singulair [montelukast]      Stomach pain, Muscle pain, Cough      Latex, natural rubber     Penicillins      Other reaction(s): Anaphylaxis    Ventolin  [albuterol sulfate] Other (See Comments)       No current facility-administered medications on file prior to encounter.      Current Outpatient Prescriptions on File Prior to Encounter   Medication Sig    aspirin (ECOTRIN) 81 MG EC tablet Take 81 mg by mouth once daily.    carbamazepine (TEGRETOL) 200 mg tablet Take 1 tablet (200 mg total) by mouth once daily.    cloNIDine (CATAPRES) 0.3 MG tablet Take 1 tablet (0.3 mg total) by mouth 3 (three) times daily.    clopidogrel (PLAVIX) 75 mg tablet Take 1 tablet (75 mg total) by mouth once daily.    fluticasone (FLOVENT HFA) 110 mcg/actuation inhaler Inhale 1 puff into the lungs every 12 (twelve) hours.    furosemide (LASIX) 40 MG tablet TAKE ONE TABLET BY MOUTH EVERY DAY AS NEEDED FOR SHORTNESS OF BREATH or leg swelling    gabapentin (NEURONTIN) 300 MG capsule Take 1 capsule (300 mg total) by mouth 3 (three) times daily.    hydrALAZINE (APRESOLINE) 100 MG tablet ONE TABLET BY MOUTH THREE TIMES DAILY    metoprolol tartrate (LOPRESSOR) 100 MG tablet TAKE ONE TABLET BY MOUTH  TWICE DAILY    rosuvastatin (CRESTOR) 20 MG tablet Take 1.5 tablets (30 mg total) by mouth every evening. 1 Tablet Oral Every day    verapamil (VERELAN) 360 MG C24P Take 1 capsule (360 mg total) by mouth once daily.    azelastine (ASTELIN) 137 mcg (0.1 %) nasal spray 1 spray (137 mcg total) by Nasal route 2 (two) times daily.    citalopram (CELEXA) 20 MG tablet ONE TABLET BY MOUTH once a day    clotrimazole-betamethasone 1-0.05% (LOTRISONE) cream 2 (two) times daily. Apply to affected area    latanoprost 0.005 % ophthalmic solution Place 1 drop into both eyes nightly.    nitroGLYCERIN (NITROSTAT) 0.4 MG SL tablet Place 0.4 mg under the tongue every 5 (five) minutes as needed for Chest pain.     Family History     Problem Relation (Age of Onset)    Cancer Son    Heart disease Mother    Hypertension Daughter        Social History Main Topics    Smoking status: Never Smoker    Smokeless tobacco: Never Used    Alcohol use No    Drug use: No    Sexual activity: No     Review of Systems   Constitutional: Positive for appetite change and fever. Negative for activity change.   HENT: Negative for congestion and dental problem.    Eyes: Negative for discharge.   Respiratory: Positive for shortness of breath. Negative for wheezing.    Cardiovascular: Negative for chest pain and leg swelling.   Gastrointestinal: Positive for abdominal pain, nausea and vomiting.   Endocrine: Negative for cold intolerance and heat intolerance.   Genitourinary: Negative for difficulty urinating and dyspareunia.   Musculoskeletal: Negative for arthralgias and back pain.   Skin: Negative for color change and pallor.   Allergic/Immunologic: Negative for environmental allergies.   Neurological: Negative for dizziness and facial asymmetry.   Hematological: Negative for adenopathy. Does not bruise/bleed easily.   Psychiatric/Behavioral: Negative for agitation and behavioral problems.     Objective:     Vital Signs (Most Recent):  Temp: 99.8  °F (37.7 °C) (06/17/17 0800)  Pulse: 92 (06/17/17 0800)  Resp: 20 (06/17/17 0800)  BP: (!) 181/79 (06/17/17 0800)  SpO2: 100 % (06/17/17 0800) Vital Signs (24h Range):  Temp:  [99.8 °F (37.7 °C)-101.1 °F (38.4 °C)] 99.8 °F (37.7 °C)  Pulse:  [68-95] 92  Resp:  [18-20] 20  SpO2:  [92 %-100 %] 100 %  BP: (149-201)/(67-89) 181/79     Weight: 94.8 kg (209 lb)  Body mass index is 34.78 kg/m².    Physical Exam   Constitutional: She is oriented to person, place, and time. No distress.   HENT:   Head: Atraumatic.   Eyes: EOM are normal. Pupils are equal, round, and reactive to light.   Neck: Normal range of motion. Neck supple.   Cardiovascular: Normal rate and regular rhythm.    Pulmonary/Chest: Effort normal and breath sounds normal.   Abdominal: Soft. There is tenderness.   Musculoskeletal: Normal range of motion.   Neurological: She is oriented to person, place, and time. No cranial nerve deficit. Coordination normal.   Skin: Skin is warm and dry. She is not diaphoretic.   Psychiatric: She has a normal mood and affect. Her behavior is normal.        Significant Labs:   CBC:   Recent Labs  Lab 06/16/17 1927   WBC 8.51   HGB 9.0*   HCT 26.9*        CMP:   Recent Labs  Lab 06/16/17 1927      K 3.7      CO2 24      BUN 21   CREATININE 1.1   CALCIUM 8.9   PROT 6.7   ALBUMIN 3.2*   BILITOT 3.2*   ALKPHOS 139*   *   *   ANIONGAP 10   EGFRNONAA 48*     Troponin:   Recent Labs  Lab 06/16/17 1927 06/17/17 0625   TROPONINI 0.097* 0.100*       Significant Imaging: reviewed    Assessment/Plan:     Sepsis    concern for cholangitis,will continue with IV Abx,follow cultures,will do MRCP,may had stone and already passed.          Elevated LFTs    As above,will check also tylenol level and hepatitis panel.          Pulmonary hypertension    On Echo,stable at this time on RA.          Acute on chronic diastolic CHF (congestive heart failure)    On IV lasix,improving.stable on RA.           Shortness of breath    As above,improved.          Essential hypertension    On Home medication,monitor.          Coronary artery disease involving native coronary artery without angina pectoris    Monitor,slight elevated troponin duo to CHF exacerbation,she denies chest pain.          Obesity (BMI 35.0-39.9)    Weight ose as out patient.          Anemia of chronic disease    Stable.          Depression    On Celexa.          Hyperlipidemia    Hold statin at this time duo to elevated LFT.            VTE Risk Mitigation         Ordered     Medium Risk of VTE  Once      06/17/17 0517     Place sequential compression device  Until discontinued      06/17/17 0517     Place LAUREANO hose  Until discontinued      06/17/17 0517        Ashley Moreno MD  Department of Hospital Medicine   [facility]

## 2017-06-17 NOTE — NURSING
Received patient from ER to room via stretcher. Patient is SOB. Patient accompanied by transport and family. Transferred patient to bed. Evaluated general patient appearance and condition. Admit assessment initiated. Tele monitoring initiated. Saline lock at clean dry and Intact. No apparent distress noted at this time. Patient laying in bed. Vitals /84, RR 18, O2 95 (after resting for a bit), HR 95. Pulses palpable, abdominal sounds heard in all four quadrants, IV dislodged after checking, need to replace. Communication board up to date, call bell in reach, bed in lowest position, patient is alert and oriented times 4. Very pleasant lady.

## 2017-06-17 NOTE — PLAN OF CARE
06/17/17 1630   Discharge Assessment   Assessment Type Discharge Planning Assessment   Confirmed/corrected address and phone number on facesheet? Yes   Assessment information obtained from? Patient   Communicated expected length of stay with patient/caregiver no   Type of Healthcare Directive Received Durable power of  for health care   If Healthcare Directive is received, is it scanned into Epic? no (comment)   Prior to hospitilization cognitive status: Alert/Oriented   Prior to hospitalization functional status: Independent  (Cane as needed)   Current cognitive status: Alert/Oriented   Current Functional Status: Independent  (Cane as needed)   Arrived From home or self-care  (Lives with daughter who assists as/if needed)   Lives With child(radha), adult   Able to Return to Prior Arrangements yes   Is patient able to care for self after discharge? Yes   How many people do you have in your home that can help with your care after discharge? 1   Who are your caregiver(s) and their phone number(s)? DaughterPetar: [phone]   Patient's perception of discharge disposition home or selfcare;home health  (Patient indicated that she may want HH upon discharge)   Readmission Within The Last 30 Days no previous admission in last 30 days   Patient currently being followed by outpatient case management? No   Patient currently receives home health services? No  (N nurse visits her once per year)   Does the patient currently use HME? Yes   Name and contact number for HME provider: PHN   Patient currently receives private duty nursing? No   Patient currently receives any other outside agency services? No   Equipment Currently Used at Home cane, straight   Do you have any problems affording any of your prescribed medications? No   Is the patient taking medications as prescribed? yes   Do you have any financial concerns preventing you from receiving the healthcare you need? No   Does the patient have transportation  to healthcare appointments? Yes   Transportation Available family or friend will provide;car   On Dialysis? No   Does the patient receive services at the Coumadin Clinic? No   Are there any open cases? No   Discharge Plan A Home with family;Home Health  (Possible HH services if appropraite)   Discharge Plan B Other  (TBD)   Patient/Family In Agreement With Plan yes     [facility]  [id number], [place] [address]   [phone]    Daughter lives with patient, who assists/helps if/as needed    Signs/symptoms education reviewed and provided to patient and placed in blue folder at bedside

## 2017-06-17 NOTE — HPI
" 81 y.o. F with history of HTN,diastolic CHF,morbid obesity,CAD,hyperlipidemia,presents to the ED c/o constant, severe upper abdominal pain with associated nausea beginning 1 night PTA. Pt states she has been "dry heaving." Pt notes chronic constipation since starting iron pills. Pt reports of having a bowel movement every 3 days. Last bowel movements (x2) were yesterday. Pt also notes chronic, unchanged pain with bowel movements. Pt took an unknown medication yesterday with tempoary relief of her abdominal pain. Pain returned upon waking. No medication changes. Pt denies fever, chills, emesis, blood in her stool, and any other symptoms. Pt also continues to complain of SOB and chest tightness. This is mildly worse than baseline. She states she has been compliant with her home meds.   Patient at arrival to ER was septic with fever and tachycardia,she has elevated LFT,likely source GI and gallbladder,CT abdomen and US show no sign of obstruction,or definitive cholecystitis,she has gall balder sludge,consern for cholangitis,she has been started on IV Abx and GI has been consulted,her abdominal pain and SOB is much improved at this time,will do stat MRCP.keep NPO at this time.  "

## 2017-06-17 NOTE — CONSULTS
Reason for Consult:      Abdominal pain    HPI:    Pt is a 81 y.o. year old female comes to the hospital for evaluation of abdominal pain for the past two days, pain is cramping, mostly in the upper abdomen, radiates to her back and associated with nausea but no vomiting.  Pain was worse on 6/16/2017 and came to the hospital for further evaluation.  She reported that she was having low grade fever on 6/16/2017.  No chills.  Her CBC on 6/16 was remarkable for anemia.  Her CMP that was completely normal in Jan 2017 is now abnormal.  Her total bilirubin is 3.2, SGOT 139, AGPT 113.  CT scan of her abdomen showed GB sludge. No acute process. Her abdominal ultrasound showed gallstones otherwise unremarkable. MRCP showed gallstones but no evidence of choledocholithiasis.     Past Medical History:   Diagnosis Date    Anemia     Anticoagulant long-term use     Aortic stenosis     Arthritis     Asthma     CAD (coronary artery disease) 4/24/2015    Carpal tunnel syndrome on both sides     Cataract     CHF (congestive heart failure) 5/2013    COPD (chronic obstructive pulmonary disease)     Depression     DVT (deep venous thrombosis)     Hyperlipidemia     Hypertension     Pulmonary HTN     TMJ (temporomandibular joint disorder)        Past Surgical History:   Procedure Laterality Date    BACK SURGERY      cardiac stents      CARPAL TUNNEL RELEASE      Right/Left    HYSTERECTOMY      Partial    JOINT REPLACEMENT  2009    Left hip    TEMPOROMANDIBULAR JOINT SURGERY         Review of patient's allergies indicates:   Allergen Reactions    Doxycycline hyclate Diarrhea and Nausea And Vomiting    Singulair [montelukast]      Stomach pain, Muscle pain, Cough      Latex, natural rubber     Penicillins      Other reaction(s): Anaphylaxis    Ventolin  [albuterol sulfate] Other (See Comments)       No current facility-administered medications on file prior to encounter.      Current Outpatient Prescriptions on  File Prior to Encounter   Medication Sig Dispense Refill    aspirin (ECOTRIN) 81 MG EC tablet Take 81 mg by mouth once daily.      carbamazepine (TEGRETOL) 200 mg tablet Take 1 tablet (200 mg total) by mouth once daily. 90 tablet 1    cloNIDine (CATAPRES) 0.3 MG tablet Take 1 tablet (0.3 mg total) by mouth 3 (three) times daily. 270 tablet 1    clopidogrel (PLAVIX) 75 mg tablet Take 1 tablet (75 mg total) by mouth once daily. 90 tablet 1    fluticasone (FLOVENT HFA) 110 mcg/actuation inhaler Inhale 1 puff into the lungs every 12 (twelve) hours. 1 g 2    furosemide (LASIX) 40 MG tablet TAKE ONE TABLET BY MOUTH EVERY DAY AS NEEDED FOR SHORTNESS OF BREATH or leg swelling 90 tablet 1    gabapentin (NEURONTIN) 300 MG capsule Take 1 capsule (300 mg total) by mouth 3 (three) times daily. 270 capsule 1    hydrALAZINE (APRESOLINE) 100 MG tablet ONE TABLET BY MOUTH THREE TIMES DAILY 270 tablet 1    metoprolol tartrate (LOPRESSOR) 100 MG tablet TAKE ONE TABLET BY MOUTH TWICE DAILY 180 tablet 1    rosuvastatin (CRESTOR) 20 MG tablet Take 1.5 tablets (30 mg total) by mouth every evening. 1 Tablet Oral Every day 135 tablet 1    verapamil (VERELAN) 360 MG C24P Take 1 capsule (360 mg total) by mouth once daily. 90 capsule 3    azelastine (ASTELIN) 137 mcg (0.1 %) nasal spray 1 spray (137 mcg total) by Nasal route 2 (two) times daily. 30 mL 0    citalopram (CELEXA) 20 MG tablet ONE TABLET BY MOUTH once a day 90 tablet 0    clotrimazole-betamethasone 1-0.05% (LOTRISONE) cream 2 (two) times daily. Apply to affected area  2    latanoprost 0.005 % ophthalmic solution Place 1 drop into both eyes nightly.  5    nitroGLYCERIN (NITROSTAT) 0.4 MG SL tablet Place 0.4 mg under the tongue every 5 (five) minutes as needed for Chest pain.       Scheduled Meds:   sodium chloride 0.9%  1,000 mL Intravenous ED 1 Time    aspirin  81 mg Oral Daily    carbamazepine  200 mg Oral Daily    ciprofloxacin (CIPRO)400mg/200ml D5W IVPB  400  mg Intravenous Q12H    citalopram  20 mg Oral Daily    cloNIDine  0.3 mg Oral TID    famotidine  20 mg Oral Daily    furosemide  20 mg Intravenous BID    gabapentin  300 mg Oral TID    hydrALAZINE  100 mg Oral Q8H    metoprolol tartrate  100 mg Oral BID    metronidazole  500 mg Intravenous Q8H    polyethylene glycol  17 g Oral Daily    rosuvastatin  30 mg Oral QHS    sodium chloride 0.9%  3 mL Intravenous Q8H    verapamil  360 mg Oral Daily     Continuous Infusions:   PRN Meds:.acetaminophen, acetaminophen, hydrALAZINE, morphine, morphine, ondansetron, promethazine (PHENERGAN) IVPB, ramelteon    Social History     Social History    Marital status:      Spouse name: N/A    Number of children: N/A    Years of education: N/A     Occupational History    Not on file.     Social History Main Topics    Smoking status: Never Smoker    Smokeless tobacco: Never Used    Alcohol use No    Drug use: No    Sexual activity: No     Other Topics Concern    Not on file     Social History Narrative    No narrative on file       Family history:  Family History   Problem Relation Age of Onset    Heart disease Mother     Hypertension Daughter     Cancer Son     Breast cancer Neg Hx     Colon cancer Neg Hx     Ovarian cancer Neg Hx          Endoscopic History:  None available    Review of Systems -     Constitutional: no fever or chills  Eyes: no visual changes  ENT: no nasal congestion or sore throat  Respiratory: no cough or shorness of breath  Cardiovascular: no chest pain or palpitations  Gastrointestinal: Abdominal pain with nausea  Genitourinary: no hematuria or dysuria    Physical Exam -    General: Well developed, well nourished, no apparent distress  Vital Signs Range (Last 24H):  Temp:  [99.8 °F (37.7 °C)-101.1 °F (38.4 °C)]   Pulse:  [68-95]   Resp:  [18-20]   BP: (149-201)/(67-89)   SpO2:  [92 %-100 %]   HEENT: Anicteric, PERRLA  CVS: S1, S2, no murmers/rubs  Lungs: CTA-B, normal  excursion  Abdomen: Tender in the epigastric area. Bowel sounds are normal.  Extremities: No c/c/e, 1+ DP pulses to BLE's  Skin: No rashes/lesions.    Labs:    Recent Labs  Lab 06/16/17 1927   CALCIUM 8.9   PROT 6.7      K 3.7   CO2 24      BUN 21   CREATININE 1.1   ALKPHOS 139*   *   *   BILITOT 3.2*     Recent Results (from the past 336 hour(s))   CBC auto differential    Collection Time: 06/16/17  7:27 PM   Result Value Ref Range    WBC 8.51 3.90 - 12.70 K/uL    Hemoglobin 9.0 (L) 12.0 - 16.0 g/dL    Hematocrit 26.9 (L) 37.0 - 48.5 %    Platelets 213 150 - 350 K/uL   CBC auto differential    Collection Time: 06/12/17 10:52 AM   Result Value Ref Range    WBC 4.67 3.90 - 12.70 K/uL    Hemoglobin 9.7 (L) 12.0 - 16.0 g/dL    Hematocrit 31.0 (L) 37.0 - 48.5 %    Platelets 281 150 - 350 K/uL     No results for input(s): INR, APTT in the last 24 hours.    Invalid input(s): PT    Imaging:  CT, ultrasound and MRI results as noted.    Assessment:  Patient Active Problem List: Abdominal pain, fever and abnormal LFTs. No CBD stones on MRCP   Diagnosis    Hypertension    Hyperlipidemia    Depression    Bilateral cataracts    Pulmonary hypertension    TMJ (temporomandibular joint disorder)    Carpal tunnel syndrome on both sides    AS (aortic stenosis)    Anemia of chronic disease    CAD s/p PCI    Presence of drug coated stent in LAD coronary artery    Right leg DVT    Obesity (BMI 35.0-39.9)    DVT (deep venous thrombosis)    Uncomplicated asthma    Coronary artery disease involving native coronary artery without angina pectoris    Long term (current) use of anticoagulants [V58.61]    Pain in right ankle    Anemia    Essential hypertension    Chronic diastolic CHF (congestive heart failure)    Vitamin D deficiency    Preop examination    Right sciatic nerve pain    COPD (chronic obstructive pulmonary disease)    Shortness of breath    Elevated LFTs    Sepsis    Acute on  chronic diastolic CHF (congestive heart failure)       Recommendations:  1. Continue with antibiotics.  If patient spikes fever then I will consider doing an ERCP.  Will follow with you.      I would like to take this opportunity to thank you for this consult.  If you have any questions or concerns, please do not hesitate to contact me.       Chirag Aguilar MD

## 2017-06-17 NOTE — ED PROVIDER NOTES
"Encounter Date: 6/16/2017    SCRIBE #1 NOTE: I, Danna Mckeon, am scribing for, and in the presence of,  Patsy Blevins MD. I have scribed the following portions of the note - Other sections scribed: HPI/ROS/PE.       History     Chief Complaint   Patient presents with    Shortness of Breath     SOB since this a.m. Has hx CHF. Denies chest pain    Abdominal Pain     Abd pain, nausea. Dry heaves. Home health nurse saw her this a.m. & suspects dehydration     Review of patient's allergies indicates:   Allergen Reactions    Doxycycline hyclate Diarrhea and Nausea And Vomiting    Singulair [montelukast]      Stomach pain, Muscle pain, Cough      Latex, natural rubber     Penicillins      Other reaction(s): Anaphylaxis    Ventolin  [albuterol sulfate] Other (See Comments)     CC: Abdominal Pain    HPI: 81 y.o. F presents to the ED c/o constant, severe upper abdominal pain with associated nausea beginning 1 night PTA. Pt states she has been "dry heaving." Pt notes chronic constipation since starting iron pills. Pt reports of having a bowel movement every 3 days. Last bowel movements (x2) were yesterday. Pt also notes chronic, unchanged pain with bowel movements. Pt took an unknown medication yesterday with tempoary relief of her abdominal pain. Pain returned upon waking. No medication changes. Pt denies fever, chills, emesis, blood in her stool, and any other symptoms.     Pt also continues to complain of SOB and chest tightness. This is mildly worse than baseline. She states she has been compliant with her home meds.       PAST MEDICAL HISTORY: Aortic stenosis, CAD, CHF, pulmonary HTN, HTN, DLD, COPD, asthma, DLD, DVT, anemia, depression, arthritis, carpal tunnel, cataracts, thyroid disease, TMJ     PAST SURGICAL HISTORY:  Partial hysterectomy, carpal tunnel release, eye surgery, left hip replacement, back surgery, TMJ, cardiac stents      The history is provided by the patient.     Past Medical History: "   Diagnosis Date    Anemia     Anticoagulant long-term use     Aortic stenosis     Arthritis     Asthma     CAD (coronary artery disease) 4/24/2015    Carpal tunnel syndrome on both sides     Cataract     CHF (congestive heart failure) 5/2013    COPD (chronic obstructive pulmonary disease)     Depression     DVT (deep venous thrombosis)     Hyperlipidemia     Hypertension     Pulmonary HTN     TMJ (temporomandibular joint disorder)      Past Surgical History:   Procedure Laterality Date    BACK SURGERY      cardiac stents      CARPAL TUNNEL RELEASE      Right/Left    HYSTERECTOMY      Partial    JOINT REPLACEMENT  2009    Left hip    TEMPOROMANDIBULAR JOINT SURGERY       Family History   Problem Relation Age of Onset    Heart disease Mother     Hypertension Daughter     Cancer Son     Breast cancer Neg Hx     Colon cancer Neg Hx     Ovarian cancer Neg Hx      Social History   Substance Use Topics    Smoking status: Never Smoker    Smokeless tobacco: Never Used    Alcohol use No     Review of Systems   Constitutional: Negative for chills and fever.   HENT: Negative for ear pain and sore throat.    Eyes: Negative for pain.   Respiratory: Positive for shortness of breath (mildly worse than baseline). Negative for cough.    Cardiovascular: Negative for chest pain.   Gastrointestinal: Positive for abdominal pain, constipation and nausea. Negative for blood in stool and vomiting.   Genitourinary: Negative for dysuria and flank pain.   Musculoskeletal: Negative for neck pain.   Skin: Negative for rash.   Neurological: Negative for headaches.       Physical Exam     Initial Vitals [06/16/17 1837]   BP Pulse Resp Temp SpO2   (!) 159/72 85 20 (!) 101.1 °F (38.4 °C) 98 %     Physical Exam    Nursing note and vitals reviewed.  Constitutional: She appears well-developed and well-nourished. She is not diaphoretic. She is Obese . She is active.   Uncomfortable elderly female, awake/alert, obese    HENT:   Head: Normocephalic and atraumatic.   Mouth/Throat: Oropharynx is clear and moist.   Eyes: Conjunctivae and EOM are normal. Pupils are equal, round, and reactive to light.   Neck: Normal range of motion. Neck supple.   Cardiovascular: Normal rate and regular rhythm.   Murmur (significant) heard.  Pulmonary/Chest: No respiratory distress. She has no decreased breath sounds. She has no wheezes. She has no rhonchi. She has rales (faint bilat).   Abdominal: Soft. She exhibits no distension. There is tenderness. There is no rebound and no guarding.   Upper abdomen is diffusely tender.    Musculoskeletal: Normal range of motion. She exhibits no edema.   Pt is moving all extremities well. No lower extremity edema. Pt ambulates with a walker.   Neurological: She is alert and oriented to person, place, and time. She has normal strength.   Skin: Skin is warm and dry. No rash noted. No erythema.   Psychiatric: Her speech is normal.         ED Course   Procedures  Labs Reviewed   CBC W/ AUTO DIFFERENTIAL - Abnormal; Notable for the following:        Result Value    RBC 3.03 (*)     Hemoglobin 9.0 (*)     Hematocrit 26.9 (*)     Lymph # 0.7 (*)     Gran% 85.5 (*)     Lymph% 7.8 (*)     All other components within normal limits   COMPREHENSIVE METABOLIC PANEL - Abnormal; Notable for the following:     Albumin 3.2 (*)     Total Bilirubin 3.2 (*)     Alkaline Phosphatase 139 (*)      (*)      (*)     eGFR if  55 (*)     eGFR if non  48 (*)     All other components within normal limits   TROPONIN I - Abnormal; Notable for the following:     Troponin I 0.097 (*)     All other components within normal limits   B-TYPE NATRIURETIC PEPTIDE - Abnormal; Notable for the following:      (*)     All other components within normal limits   URINALYSIS - Abnormal; Notable for the following:     Protein, UA 2+ (*)     Urobilinogen, UA 4.0-6.0 (*)     Leukocytes, UA Trace (*)     All  other components within normal limits   CULTURE, URINE   LIPASE   URINALYSIS MICROSCOPIC     EKG Readings: (Independently Interpreted)   19:06: NSR, HR 72. L axis. RBBB. LVH. No STEMI. Appears c/w 3/7/17.       X-Rays:   Independently Interpreted Readings:   Other Readings:  CXR Cardiomegaly, mild pulmonary edema, +unchanged elevation of L hemidiaphragm    Medical Decision Making:   History:   Old Medical Records: I decided to obtain old medical records.  Old Records Summarized: records from clinic visits and records from previous admission(s).  Initial Assessment:   This is an emergent evaluation of 81-year-old female with multiple comorbidities who presents with acute, severe upper abdominal pain, nausea, vomiting, febrile here, as well as gradually increasing shortness of breath.  Differential Diagnosis:   Differential includes pancreatitis, cholecystitis, cholelithiasis, GERD, ACS equivalent, aortic dissection, dehydration, acute kidney injury, cholangitis, other.  Independently Interpreted Test(s):   I have ordered and independently interpreted X-rays - see prior notes.  I have ordered and independently interpreted EKG Reading(s) - see prior notes  Clinical Tests:   Lab Tests: Reviewed and Ordered  Radiological Study: Reviewed and Ordered  Medical Tests: Ordered and Reviewed  ED Management:  The patient initially received IV Zofran with some improvement in her nausea.  She received a GI cocktail, but continued to have pain.      EKG unchanged.    Chest x-ray shows cardiomegaly with mild pulmonary edema, consistent with priors.  Abdominal x-ray does not show acute obstruction.    WBC 8.5, left shift without bands.  Hemoglobin 9, stable.  CMP shows elevated bilirubin, alkaline phosphatase, AST, ALT, all new from prior.  Troponin elevated at 0.097, BNP elevated at 950.  UA with trace leukocytes.    CT abdomen and pelvis shows cardiomegaly, gallbladder sludge, no acute findings to explain patient's pain.   Gallbladder ultrasound shows cholelithiasis without acute cholecystitis.    Patient continued to have pain.  Given morphine with some relief.    Given suspicion for intraabdominal pathology, with equivocal UA, patient was treated with Cipro and Flagyl IV.  (She is ALLERGIC to penicillin, so I chose to avoid Zosyn.)    Patient discussed with Dr. Walls who recommended GI consult to discuss r/o cholangitis given elevated LFTs, fever, abdominal pain.  Will keep NPO, do IV fluids, continue cipro/flagyl.  Regarding abnormal cardiac findings, Will admit to tele for elevated troponin.  Will do IV lasix for pulmonary edema.  Will continue regular BP meds.    Other:   I have discussed this case with another health care provider.            Scribe Attestation:   Scribe #1: I performed the above scribed service and the documentation accurately describes the services I performed. I attest to the accuracy of the note.    Attending Attestation:           Physician Attestation for Scribe:  Physician Attestation Statement for Scribe #1: I, Patsy Blevins MD, reviewed documentation, as scribed by Danna Mckeon in my presence, and it is both accurate and complete.                 ED Course     Clinical Impression:   The primary encounter diagnosis was Shortness of breath. Diagnoses of Vomiting, Pain of upper abdomen, Cholelithiasis without cholecystitis, Acute cystitis without hematuria, Hyperbilirubinemia, Acute on chronic congestive heart failure, unspecified congestive heart failure type, Elevated troponin, and Fever, unspecified fever cause were also pertinent to this visit.          Patsy Blevins MD  06/17/17 [id number]

## 2017-06-17 NOTE — ASSESSMENT & PLAN NOTE
concern for cholangitis,will continue with IV Abx,follow cultures,will do MRCP,may had stone and already passed.

## 2017-06-17 NOTE — ED TRIAGE NOTES
Patient reports nausea , abdominal pain, fever, and sob X1 day. Patient also reports a history of CHF.

## 2017-06-17 NOTE — ED NOTES
"Report received from BRITNEY Kidd. Pt lying in bed. States "I have to pee". Pt placed on bedpan. Complains of 8/10 back pain.   "

## 2017-06-17 NOTE — SUBJECTIVE & OBJECTIVE
Past Medical History:   Diagnosis Date    Anemia     Anticoagulant long-term use     Aortic stenosis     Arthritis     Asthma     CAD (coronary artery disease) 4/24/2015    Carpal tunnel syndrome on both sides     Cataract     CHF (congestive heart failure) 5/2013    COPD (chronic obstructive pulmonary disease)     Depression     DVT (deep venous thrombosis)     Hyperlipidemia     Hypertension     Pulmonary HTN     TMJ (temporomandibular joint disorder)        Past Surgical History:   Procedure Laterality Date    BACK SURGERY      cardiac stents      CARPAL TUNNEL RELEASE      Right/Left    HYSTERECTOMY      Partial    JOINT REPLACEMENT  2009    Left hip    TEMPOROMANDIBULAR JOINT SURGERY         Review of patient's allergies indicates:   Allergen Reactions    Doxycycline hyclate Diarrhea and Nausea And Vomiting    Singulair [montelukast]      Stomach pain, Muscle pain, Cough      Latex, natural rubber     Penicillins      Other reaction(s): Anaphylaxis    Ventolin  [albuterol sulfate] Other (See Comments)       No current facility-administered medications on file prior to encounter.      Current Outpatient Prescriptions on File Prior to Encounter   Medication Sig    aspirin (ECOTRIN) 81 MG EC tablet Take 81 mg by mouth once daily.    carbamazepine (TEGRETOL) 200 mg tablet Take 1 tablet (200 mg total) by mouth once daily.    cloNIDine (CATAPRES) 0.3 MG tablet Take 1 tablet (0.3 mg total) by mouth 3 (three) times daily.    clopidogrel (PLAVIX) 75 mg tablet Take 1 tablet (75 mg total) by mouth once daily.    fluticasone (FLOVENT HFA) 110 mcg/actuation inhaler Inhale 1 puff into the lungs every 12 (twelve) hours.    furosemide (LASIX) 40 MG tablet TAKE ONE TABLET BY MOUTH EVERY DAY AS NEEDED FOR SHORTNESS OF BREATH or leg swelling    gabapentin (NEURONTIN) 300 MG capsule Take 1 capsule (300 mg total) by mouth 3 (three) times daily.    hydrALAZINE (APRESOLINE) 100 MG tablet ONE TABLET  BY MOUTH THREE TIMES DAILY    metoprolol tartrate (LOPRESSOR) 100 MG tablet TAKE ONE TABLET BY MOUTH TWICE DAILY    rosuvastatin (CRESTOR) 20 MG tablet Take 1.5 tablets (30 mg total) by mouth every evening. 1 Tablet Oral Every day    verapamil (VERELAN) 360 MG C24P Take 1 capsule (360 mg total) by mouth once daily.    azelastine (ASTELIN) 137 mcg (0.1 %) nasal spray 1 spray (137 mcg total) by Nasal route 2 (two) times daily.    citalopram (CELEXA) 20 MG tablet ONE TABLET BY MOUTH once a day    clotrimazole-betamethasone 1-0.05% (LOTRISONE) cream 2 (two) times daily. Apply to affected area    latanoprost 0.005 % ophthalmic solution Place 1 drop into both eyes nightly.    nitroGLYCERIN (NITROSTAT) 0.4 MG SL tablet Place 0.4 mg under the tongue every 5 (five) minutes as needed for Chest pain.     Family History     Problem Relation (Age of Onset)    Cancer Son    Heart disease Mother    Hypertension Daughter        Social History Main Topics    Smoking status: Never Smoker    Smokeless tobacco: Never Used    Alcohol use No    Drug use: No    Sexual activity: No     Review of Systems   Constitutional: Positive for appetite change and fever. Negative for activity change.   HENT: Negative for congestion and dental problem.    Eyes: Negative for discharge.   Respiratory: Positive for shortness of breath. Negative for wheezing.    Cardiovascular: Negative for chest pain and leg swelling.   Gastrointestinal: Positive for abdominal pain, nausea and vomiting.   Endocrine: Negative for cold intolerance and heat intolerance.   Genitourinary: Negative for difficulty urinating and dyspareunia.   Musculoskeletal: Negative for arthralgias and back pain.   Skin: Negative for color change and pallor.   Allergic/Immunologic: Negative for environmental allergies.   Neurological: Negative for dizziness and facial asymmetry.   Hematological: Negative for adenopathy. Does not bruise/bleed easily.   Psychiatric/Behavioral:  Negative for agitation and behavioral problems.     Objective:     Vital Signs (Most Recent):  Temp: 99.8 °F (37.7 °C) (06/17/17 0800)  Pulse: 92 (06/17/17 0800)  Resp: 20 (06/17/17 0800)  BP: (!) 181/79 (06/17/17 0800)  SpO2: 100 % (06/17/17 0800) Vital Signs (24h Range):  Temp:  [99.8 °F (37.7 °C)-101.1 °F (38.4 °C)] 99.8 °F (37.7 °C)  Pulse:  [68-95] 92  Resp:  [18-20] 20  SpO2:  [92 %-100 %] 100 %  BP: (149-201)/(67-89) 181/79     Weight: 94.8 kg (209 lb)  Body mass index is 34.78 kg/m².    Physical Exam   Constitutional: She is oriented to person, place, and time. No distress.   HENT:   Head: Atraumatic.   Eyes: EOM are normal. Pupils are equal, round, and reactive to light.   Neck: Normal range of motion. Neck supple.   Cardiovascular: Normal rate and regular rhythm.    Pulmonary/Chest: Effort normal and breath sounds normal.   Abdominal: Soft. There is tenderness.   Musculoskeletal: Normal range of motion.   Neurological: She is oriented to person, place, and time. No cranial nerve deficit. Coordination normal.   Skin: Skin is warm and dry. She is not diaphoretic.   Psychiatric: She has a normal mood and affect. Her behavior is normal.        Significant Labs:   CBC:   Recent Labs  Lab 06/16/17 1927   WBC 8.51   HGB 9.0*   HCT 26.9*        CMP:   Recent Labs  Lab 06/16/17 1927      K 3.7      CO2 24      BUN 21   CREATININE 1.1   CALCIUM 8.9   PROT 6.7   ALBUMIN 3.2*   BILITOT 3.2*   ALKPHOS 139*   *   *   ANIONGAP 10   EGFRNONAA 48*     Troponin:   Recent Labs  Lab 06/16/17 1927 06/17/17 0625   TROPONINI 0.097* 0.100*       Significant Imaging: reviewed

## 2017-06-18 VITALS
SYSTOLIC BLOOD PRESSURE: 131 MMHG | BODY MASS INDEX: 33.3 KG/M2 | RESPIRATION RATE: 16 BRPM | HEIGHT: 65 IN | DIASTOLIC BLOOD PRESSURE: 60 MMHG | TEMPERATURE: 99 F | OXYGEN SATURATION: 97 % | HEART RATE: 68 BPM | WEIGHT: 199.88 LBS

## 2017-06-18 LAB
ALBUMIN SERPL BCP-MCNC: 2.7 G/DL
ALP SERPL-CCNC: 104 U/L
ALT SERPL W/O P-5'-P-CCNC: 80 U/L
ANION GAP SERPL CALC-SCNC: 8 MMOL/L
AST SERPL-CCNC: 29 U/L
BACTERIA UR CULT: NORMAL
BASOPHILS # BLD AUTO: 0.01 K/UL
BASOPHILS NFR BLD: 0.2 %
BILIRUB SERPL-MCNC: 0.9 MG/DL
BUN SERPL-MCNC: 28 MG/DL
CALCIUM SERPL-MCNC: 8.7 MG/DL
CHLORIDE SERPL-SCNC: 105 MMOL/L
CO2 SERPL-SCNC: 27 MMOL/L
CREAT SERPL-MCNC: 1.5 MG/DL
DIFFERENTIAL METHOD: ABNORMAL
EOSINOPHIL # BLD AUTO: 0.1 K/UL
EOSINOPHIL NFR BLD: 2.1 %
ERYTHROCYTE [DISTWIDTH] IN BLOOD BY AUTOMATED COUNT: 13.6 %
EST. GFR  (AFRICAN AMERICAN): 38 ML/MIN/1.73 M^2
EST. GFR  (NON AFRICAN AMERICAN): 33 ML/MIN/1.73 M^2
GLUCOSE SERPL-MCNC: 95 MG/DL
HCT VFR BLD AUTO: 27.3 %
HGB BLD-MCNC: 8.9 G/DL
LYMPHOCYTES # BLD AUTO: 0.9 K/UL
LYMPHOCYTES NFR BLD: 15.3 %
MCH RBC QN AUTO: 29.6 PG
MCHC RBC AUTO-ENTMCNC: 32.6 %
MCV RBC AUTO: 91 FL
MONOCYTES # BLD AUTO: 0.6 K/UL
MONOCYTES NFR BLD: 10.4 %
NEUTROPHILS # BLD AUTO: 4.1 K/UL
NEUTROPHILS NFR BLD: 72 %
PLATELET # BLD AUTO: 197 K/UL
PMV BLD AUTO: 11 FL
POTASSIUM SERPL-SCNC: 4.2 MMOL/L
PROT SERPL-MCNC: 6.1 G/DL
RBC # BLD AUTO: 3.01 M/UL
SODIUM SERPL-SCNC: 140 MMOL/L
WBC # BLD AUTO: 5.68 K/UL

## 2017-06-18 PROCEDURE — 80053 COMPREHEN METABOLIC PANEL: CPT

## 2017-06-18 PROCEDURE — 25000003 PHARM REV CODE 250: Performed by: EMERGENCY MEDICINE

## 2017-06-18 PROCEDURE — 85025 COMPLETE CBC W/AUTO DIFF WBC: CPT

## 2017-06-18 PROCEDURE — 63600175 PHARM REV CODE 636 W HCPCS: Performed by: EMERGENCY MEDICINE

## 2017-06-18 PROCEDURE — 36415 COLL VENOUS BLD VENIPUNCTURE: CPT

## 2017-06-18 RX ORDER — CIPROFLOXACIN 500 MG/1
500 TABLET ORAL 2 TIMES DAILY
Qty: 14 TABLET | Refills: 0 | Status: SHIPPED | OUTPATIENT
Start: 2017-06-18 | End: 2017-06-25

## 2017-06-18 RX ADMIN — ASPIRIN 81 MG: 81 TABLET, COATED ORAL at 09:06

## 2017-06-18 RX ADMIN — SODIUM CHLORIDE, PRESERVATIVE FREE 3 ML: 5 INJECTION INTRAVENOUS at 06:06

## 2017-06-18 RX ADMIN — HYDRALAZINE HYDROCHLORIDE 100 MG: 25 TABLET ORAL at 06:06

## 2017-06-18 RX ADMIN — CARBAMAZEPINE 200 MG: 200 TABLET ORAL at 09:06

## 2017-06-18 RX ADMIN — VERAPAMIL HYDROCHLORIDE 360 MG: 120 TABLET, FILM COATED, EXTENDED RELEASE ORAL at 09:06

## 2017-06-18 RX ADMIN — CLONIDINE HYDROCHLORIDE 0.3 MG: 0.1 TABLET ORAL at 06:06

## 2017-06-18 RX ADMIN — MORPHINE SULFATE 4 MG: 10 INJECTION INTRAVENOUS at 04:06

## 2017-06-18 RX ADMIN — METOPROLOL TARTRATE 100 MG: 50 TABLET ORAL at 09:06

## 2017-06-18 RX ADMIN — CITALOPRAM HYDROBROMIDE 20 MG: 20 TABLET ORAL at 09:06

## 2017-06-18 RX ADMIN — CIPROFLOXACIN 400 MG: 2 INJECTION, SOLUTION INTRAVENOUS at 01:06

## 2017-06-18 RX ADMIN — POLYETHYLENE GLYCOL 3350 17 G: 17 POWDER, FOR SOLUTION ORAL at 09:06

## 2017-06-18 RX ADMIN — METRONIDAZOLE 500 MG: 500 INJECTION, SOLUTION INTRAVENOUS at 06:06

## 2017-06-18 RX ADMIN — FAMOTIDINE 20 MG: 20 TABLET, FILM COATED ORAL at 09:06

## 2017-06-18 RX ADMIN — GABAPENTIN 300 MG: 300 CAPSULE ORAL at 06:06

## 2017-06-18 NOTE — PLAN OF CARE
Problem: Fall Risk (Adult)  Goal: Identify Related Risk Factors and Signs and Symptoms  Related risk factors and signs and symptoms are identified upon initiation of Human Response Clinical Practice Guideline (CPG)   Outcome: Ongoing (interventions implemented as appropriate)   06/18/17 0334   Fall Risk   Related Risk Factors (Fall Risk) age-related changes;fatigue/slow reaction;fear of falling;gait/mobility problems;objects hard to reach;polypharmacy;sleep pattern alteration;environment unfamiliar   Signs and Symptoms (Fall Risk) presence of risk factors     Goal: Absence of Falls  Patient will demonstrate the desired outcomes by discharge/transition of care.   Outcome: Ongoing (interventions implemented as appropriate)   06/18/17 0334   Fall Risk (Adult)   Absence of Falls making progress toward outcome       Problem: Pressure Ulcer Risk (Villa Scale) (Adult,Obstetrics,Pediatric)  Goal: Identify Related Risk Factors and Signs and Symptoms  Related risk factors and signs and symptoms are identified upon initiation of Human Response Clinical Practice Guideline (CPG)   Outcome: Ongoing (interventions implemented as appropriate)   06/18/17 0334   Pressure Ulcer Risk (Villa Scale)   Related Risk Factors (Pressure Ulcer Risk (Villa Scale)) age extremes;infection;medical devices;mobility impaired     Goal: Skin Integrity  Patient will demonstrate the desired outcomes by discharge/transition of care.   Outcome: Ongoing (interventions implemented as appropriate)   06/18/17 0334   Pressure Ulcer Risk (Villa Scale) (Adult,Obstetrics,Pediatric)   Skin Integrity making progress toward outcome

## 2017-06-18 NOTE — HOSPITAL COURSE
" 81 y.o. F with history of HTN,diastolic CHF,morbid obesity,CAD,hyperlipidemia,presents to the ED c/o constant, severe upper abdominal pain with associated nausea beginning 1 night PTA. Pt states she has been "dry heaving." Pt notes chronic constipation since starting iron pills. Pt reports of having a bowel movement every 3 days. Last bowel movements (x2) were yesterday. Pt also notes chronic, unchanged pain with bowel movements. Pt took an unknown medication  with tempoary relief of her abdominal pain. Pain returned upon waking. No medication changes. Pt denies fever, chills, emesis, blood in her stool, and any other symptoms. Pt also continues to complain of SOB and chest tightness. This is mildly worse than baseline. She states she has been compliant with her home meds.   Patient at arrival to ER was septic with fever and tachycardia,she had  elevated LFT,likely source GI and gallbladder,CT abdomen and US show no sign of obstruction,or definitive cholecystitis,she has gall balder sludge,it was consern for cholangitis,she has been started on IV Abx and GI has been consulted,her abdominal pain and SOB is much improved next day,GI was consulted,and patient had unremarkable MRCP with no sign of obstruction or infection,abdominal pain,fever,resolved,patient was tolerating diet with non complain.elevatde LFT resolved,patient has been discharged home with PO Cipro and follow up with PCP in next few days.  "

## 2017-06-18 NOTE — NURSING
Saline lock removed cath tip intact no redness or swelling to site observed. Cardiac monitor removed.

## 2017-06-18 NOTE — NURSING
Received report by BRITNEY Omalley. The pt is lying in bed resting. She is not in any pain or discomfort. Tele monitor in progress with alarms set. Safety precautions maintained and bed locked in lowest position. Side rails up X2. Call bell and personal items within reach. Will continue to monitor pt for any changes.

## 2017-06-18 NOTE — DISCHARGE SUMMARY
"[facility] Hospital Medicine  Discharge Summary      Patient Name: Cindy Lyman  MRN: [id number]  Admission Date: 6/16/2017  Hospital Length of Stay: 1 days  Discharge Date and Time:  06/18/2017 10:01 AM  Attending Physician: Ashley Moreno MD   Discharging Provider: Ashley Moreno MD  Primary Care Provider: Jeremiah Reeves MD      HPI:    81 y.o. F with history of HTN,diastolic CHF,morbid obesity,CAD,hyperlipidemia,presents to the ED c/o constant, severe upper abdominal pain with associated nausea beginning 1 night PTA. Pt states she has been "dry heaving." Pt notes chronic constipation since starting iron pills. Pt reports of having a bowel movement every 3 days. Last bowel movements (x2) were yesterday. Pt also notes chronic, unchanged pain with bowel movements. Pt took an unknown medication yesterday with tempoary relief of her abdominal pain. Pain returned upon waking. No medication changes. Pt denies fever, chills, emesis, blood in her stool, and any other symptoms. Pt also continues to complain of SOB and chest tightness. This is mildly worse than baseline. She states she has been compliant with her home meds.   Patient at arrival to ER was septic with fever and tachycardia,she has elevated LFT,likely source GI and gallbladder,CT abdomen and US show no sign of obstruction,or definitive cholecystitis,she has gall balder sludge,consern for cholangitis,she has been started on IV Abx and GI has been consulted,her abdominal pain and SOB is much improved at this time,will do stat MRCP.keep NPO at this time.    * No surgery found *      Indwelling Lines/Drains at time of discharge:   Lines/Drains/Airways          No matching active lines, drains, or airways        Hospital Course:    81 y.o. F with history of HTN,diastolic CHF,morbid obesity,CAD,hyperlipidemia,presents to the ED c/o constant, severe upper abdominal pain with associated nausea beginning 1 night PTA. Pt " "states she has been "dry heaving." Pt notes chronic constipation since starting iron pills. Pt reports of having a bowel movement every 3 days. Last bowel movements (x2) were yesterday. Pt also notes chronic, unchanged pain with bowel movements. Pt took an unknown medication  with tempoary relief of her abdominal pain. Pain returned upon waking. No medication changes. Pt denies fever, chills, emesis, blood in her stool, and any other symptoms. Pt also continues to complain of SOB and chest tightness. This is mildly worse than baseline. She states she has been compliant with her home meds.   Patient at arrival to ER was septic with fever and tachycardia,she had  elevated LFT,likely source GI and gallbladder,CT abdomen and US show no sign of obstruction,or definitive cholecystitis,she has gall balder sludge,it was consern for cholangitis,she has been started on IV Abx and GI has been consulted,her abdominal pain and SOB is much improved next day,GI was consulted,and patient had unremarkable MRCP with no sign of obstruction or infection,abdominal pain,fever,resolved,patient was tolerating diet with non complain.elevatde LFT resolved,patient has been discharged home with PO Cipro and follow up with PCP in next few days.     Consults:   Consults         Status Ordering Provider     Inpatient consult to Gastroenterology  Once     Provider:  Higinio Harrell MD    Completed TACHO SHANKAR          Significant Diagnostic Studies: Labs:   CMP   Recent Labs  Lab 06/16/17 1927 06/18/17  0550    140   K 3.7 4.2    105   CO2 24 27    95   BUN 21 28*   CREATININE 1.1 1.5*   CALCIUM 8.9 8.7   PROT 6.7 6.1   ALBUMIN 3.2* 2.7*   BILITOT 3.2* 0.9   ALKPHOS 139* 104   * 29   * 80*   ANIONGAP 10 8   ESTGFRAFRICA 55* 38*   EGFRNONAA 48* 33*    and CBC   Recent Labs  Lab 06/16/17 1927 06/18/17  0550   WBC 8.51 5.68   HGB 9.0* 8.9*   HCT 26.9* 27.3*    197     Radiology: MRI: MRCP   CT scan: " CT ABDOMEN PELVIS WITH CONTRAST:   Results for orders placed or performed during the hospital encounter of 06/16/17   CT Abdomen Pelvis With Contrast    Addendum: 6/17/2017      Impression #7: Medial right renal upper pole primarily exophytic 1.7 cm intermediate attenuation mass with average 39-40 Hounsfield units, not consistent with a simple cyst. Further evaluation with elective renal ultrasound to confirm cystic components versus contrast enhanced CT or MRI abdomen with renal mass protocol can be obtained as warranted.        EPIC notification system activated.      Electronically signed by: MICHAEL JOSE MD, MD  Date:     06/17/17  Time:    01:55       Narrative    CT of the abdomen and pelvis with 100 cc of Omnipaque 350 IV contrast. No oral contrast was administered. Delayed images were obtained.    Results: Comparison made to abdominal series earlier same day, CT thorax 6/10/16 and a CT abdomen and pelvis 9/15/10.    Beam hardening with streak artifact from left hip total arthroplasty limits evaluation of the hip and pelvis.    The lung bases show minimal dependent atelectasis and stable mild elevation of the left hemidiaphragm.  The visualized heart remains enlarged without significant pericardial fluid. Aortic and mitral annular and coronary arterial calcifications noted.    The liver is normal in size containing stable subcentimeter low-attenuation parenchymal focus within the anterior lateral left hepatic lobe, which is too small to characterize but likely a cyst given the stability. The gallbladder is normally distended with layering hyperattenuated material suggesting sludge and/or small stones. The pancreas is atrophic similar to prior. Stomach is mildly distended with intraluminal contents, without wall thickening or adjacent stranding. The spleen, duodenum and bilateral adrenal glands are within normal limits. No intrahepatic or extrahepatic biliary ductal dilatation.    The kidneys are stable in  size, shape and location concentrating and extruding contrast appropriately.  No hydronephrosis.  A few scattered subcentimeter cortical foci in both kidneys, which are too small characterize. There is a 1.7 cm simple appearing cortical cyst at the medial aspect of the left renal interpolar region. The urinary bladder is within normal limits.  The uterus is surgically absent. No adnexal mass seen. Pelvic phleboliths noted.    No ascites or free air.  No lymphadenopathy.    The appendix is not identified; however, no pericecal inflammatory change. Terminal ileum is within normal limits. The small and large loops of bowel are normal in caliber without focal wall thickening or adjacent fat stranding.  No pneumatosis or portal venous gas.    Moderate to advanced scattered atherosclerosis. The aorta tapers appropriately in its descent through the abdomen.    The osseous structures appear grossly stable without acute or destructive process seen.    Impression    1.  No acute process or CT findings identified to explain patient's symptoms of upper abdominal pain.    2. Cardiomegaly.    3. Chronic elevated left hemidiaphragm.    4. Atherosclerosis, including coronary artery calcification.    5. Gallbladder sludge.    6. Additional findings as above.      Electronically signed by: MICHAEL JOSE MD, MD  Date:     06/16/17  Time:    23:47      Ultrasound: abdomen     Pending Diagnostic Studies:     Procedure Component Value Units Date/Time    Hepatitis panel, acute [[id number]] Collected:  06/17/17 0625    Order Status:  Sent Lab Status:  In process Updated:  06/17/17 0954    Specimen:  Blood from Blood         Final Active Diagnoses:    Diagnosis Date Noted POA    PRINCIPAL PROBLEM:  Sepsis [A41.9] 06/17/2017 Yes    Elevated LFTs [R94.5] 06/17/2017 Yes    Pulmonary hypertension [I27.2] 09/28/2012 Yes    Acute on chronic diastolic CHF (congestive heart failure) [I50.33] 06/17/2017 Yes    Shortness of breath [R06.02]  06/17/2017 Yes    Essential hypertension [I10] 06/10/2016 Yes    Coronary artery disease involving native coronary artery without angina pectoris [I25.10] 05/11/2015 Yes    Obesity (BMI 35.0-39.9) [E66.9] 05/09/2015 Yes     Chronic    Anemia of chronic disease [D63.8] 11/29/2013 Yes     Chronic    Hyperlipidemia [E78.5]  Yes     Chronic    Depression [F32.9]  Yes     Chronic      Problems Resolved During this Admission:    Diagnosis Date Noted Date Resolved POA      No new Assessment & Plan notes have been filed under this hospital service since the last note was generated.  Service: Hospital Medicine      Discharged Condition: stable    Disposition: Home or Self Care    Follow Up:  Follow-up Information     Jeremiah Reeves MD In 3 days.    Specialty:  Internal Medicine  Contact information:  [place] [id number] [phone]                 Patient Instructions:     Diet general     Activity as tolerated       Medications:  Reconciled Home Medications:   Current Discharge Medication List      START taking these medications    Details   ciprofloxacin HCl (CIPRO) 500 MG tablet Take 1 tablet (500 mg total) by mouth 2 (two) times daily.  Qty: 14 tablet, Refills: 0         CONTINUE these medications which have NOT CHANGED    Details   aspirin (ECOTRIN) 81 MG EC tablet Take 81 mg by mouth once daily.      carbamazepine (TEGRETOL) 200 mg tablet Take 1 tablet (200 mg total) by mouth once daily.  Qty: 90 tablet, Refills: 1    Associated Diagnoses: TMJ (temporomandibular joint disorder)      cloNIDine (CATAPRES) 0.3 MG tablet Take 1 tablet (0.3 mg total) by mouth 3 (three) times daily.  Qty: 270 tablet, Refills: 1    Associated Diagnoses: Essential hypertension      clopidogrel (PLAVIX) 75 mg tablet Take 1 tablet (75 mg total) by mouth once daily.  Qty: 90 tablet, Refills: 1    Associated Diagnoses: Long term (current) use of anticoagulants; Deep vein thrombosis (DVT) of lower extremity, unspecified  chronicity, unspecified laterality, unspecified vein      fluticasone (FLOVENT HFA) 110 mcg/actuation inhaler Inhale 1 puff into the lungs every 12 (twelve) hours.  Qty: 1 g, Refills: 2    Associated Diagnoses: Mild intermittent asthma without complication      furosemide (LASIX) 40 MG tablet TAKE ONE TABLET BY MOUTH EVERY DAY AS NEEDED FOR SHORTNESS OF BREATH or leg swelling  Qty: 90 tablet, Refills: 1    Associated Diagnoses: Essential hypertension      gabapentin (NEURONTIN) 300 MG capsule Take 1 capsule (300 mg total) by mouth 3 (three) times daily.  Qty: 270 capsule, Refills: 1    Associated Diagnoses: Right sciatic nerve pain      hydrALAZINE (APRESOLINE) 100 MG tablet ONE TABLET BY MOUTH THREE TIMES DAILY  Qty: 270 tablet, Refills: 1      metoprolol tartrate (LOPRESSOR) 100 MG tablet TAKE ONE TABLET BY MOUTH TWICE DAILY  Qty: 180 tablet, Refills: 1      rosuvastatin (CRESTOR) 20 MG tablet Take 1.5 tablets (30 mg total) by mouth every evening. 1 Tablet Oral Every day  Qty: 135 tablet, Refills: 1    Comments: Discontinue 30 day supply  Associated Diagnoses: Hyperlipidemia      verapamil (VERELAN) 360 MG C24P Take 1 capsule (360 mg total) by mouth once daily.  Qty: 90 capsule, Refills: 3      azelastine (ASTELIN) 137 mcg (0.1 %) nasal spray 1 spray (137 mcg total) by Nasal route 2 (two) times daily.  Qty: 30 mL, Refills: 0    Associated Diagnoses: Allergic rhinitis, unspecified allergic rhinitis type      citalopram (CELEXA) 20 MG tablet ONE TABLET BY MOUTH once a day  Qty: 90 tablet, Refills: 0      clotrimazole-betamethasone 1-0.05% (LOTRISONE) cream 2 (two) times daily. Apply to affected area  Refills: 2      latanoprost 0.005 % ophthalmic solution Place 1 drop into both eyes nightly.  Refills: 5      nitroGLYCERIN (NITROSTAT) 0.4 MG SL tablet Place 0.4 mg under the tongue every 5 (five) minutes as needed for Chest pain.           Time spent on the discharge of patient: 30  minutes    Ashley Moreno  MD  Department of Hospital Medicine  [facility]

## 2017-06-19 LAB
HAV IGM SERPL QL IA: NEGATIVE
HBV CORE IGM SERPL QL IA: NEGATIVE
HBV SURFACE AG SERPL QL IA: NEGATIVE
HCV AB SERPL QL IA: NEGATIVE

## 2017-06-21 ENCOUNTER — TELEPHONE (OUTPATIENT)
Dept: FAMILY MEDICINE | Facility: CLINIC | Age: 82
End: 2017-06-21

## 2017-06-21 DIAGNOSIS — M26.609 TMJ (TEMPOROMANDIBULAR JOINT DISORDER): ICD-10-CM

## 2017-06-21 RX ORDER — CARBAMAZEPINE 200 MG/1
200 TABLET ORAL 3 TIMES DAILY
Qty: 270 TABLET | Refills: 1 | Status: SHIPPED | OUTPATIENT
Start: 2017-06-21 | End: 2018-03-16 | Stop reason: SDUPTHER

## 2017-06-21 NOTE — TELEPHONE ENCOUNTER
Spoke with Darrin. States that patient was discharged from the hospital 6-18-17. Patient is still complaining of abdominal pain. Imaging showed patient with renal mass and gall stones.     Patient also states that her Tegretol is supposed to be written for 3 times daily. Last prescription was written for once daily.     Patient has follow up appointment scheduled tomorrow

## 2017-06-21 NOTE — TELEPHONE ENCOUNTER
----- Message from Lexus Castro sent at 6/20/2017  3:53 PM CDT -----  Contact: PHN  PHN calling to state pt is still having lower quadrant pain, and she isn't correct amount of carbamazepine (TEGRETOL) 200 mg tablet medication from pharmacy. Please call Darrin at [phone]

## 2017-06-22 ENCOUNTER — OFFICE VISIT (OUTPATIENT)
Dept: FAMILY MEDICINE | Facility: CLINIC | Age: 82
End: 2017-06-22
Payer: MEDICARE

## 2017-06-22 VITALS
HEART RATE: 68 BPM | SYSTOLIC BLOOD PRESSURE: 150 MMHG | RESPIRATION RATE: 17 BRPM | DIASTOLIC BLOOD PRESSURE: 74 MMHG | WEIGHT: 203.5 LBS | TEMPERATURE: 99 F | BODY MASS INDEX: 33.91 KG/M2 | HEIGHT: 65 IN | OXYGEN SATURATION: 97 %

## 2017-06-22 DIAGNOSIS — R10.11 RIGHT UPPER QUADRANT ABDOMINAL PAIN: Primary | ICD-10-CM

## 2017-06-22 DIAGNOSIS — D63.8 ANEMIA OF CHRONIC DISEASE: Chronic | ICD-10-CM

## 2017-06-22 DIAGNOSIS — K80.20 CALCULUS OF GALLBLADDER WITHOUT CHOLECYSTITIS WITHOUT OBSTRUCTION: ICD-10-CM

## 2017-06-22 DIAGNOSIS — I10 ESSENTIAL HYPERTENSION: ICD-10-CM

## 2017-06-22 DIAGNOSIS — N30.00 ACUTE CYSTITIS WITHOUT HEMATURIA: ICD-10-CM

## 2017-06-22 DIAGNOSIS — R79.89 ELEVATED LFTS: ICD-10-CM

## 2017-06-22 DIAGNOSIS — N28.1 RENAL CYST, RIGHT: ICD-10-CM

## 2017-06-22 PROCEDURE — 99499 UNLISTED E&M SERVICE: CPT | Mod: S$PBB,,, | Performed by: INTERNAL MEDICINE

## 2017-06-22 PROCEDURE — 99999 PR PBB SHADOW E&M-EST. PATIENT-LVL IV: CPT | Mod: PBBFAC,,, | Performed by: INTERNAL MEDICINE

## 2017-06-22 PROCEDURE — 1159F MED LIST DOCD IN RCRD: CPT | Mod: S$GLB,,, | Performed by: INTERNAL MEDICINE

## 2017-06-22 PROCEDURE — 1125F AMNT PAIN NOTED PAIN PRSNT: CPT | Mod: S$GLB,,, | Performed by: INTERNAL MEDICINE

## 2017-06-22 PROCEDURE — 99214 OFFICE O/P EST MOD 30 MIN: CPT | Mod: S$GLB,,, | Performed by: INTERNAL MEDICINE

## 2017-06-23 NOTE — PROGRESS NOTES
Subjective:       Patient ID: Cindy Lyman is a 81 y.o. female.    Chief Complaint: Hospital Follow Up and Abdominal Pain    She presents for follow-up after recent hospital admission with abdominal pain.  Her workup revealed cholelithiasis with gallbladder sludge.  She was also treated for UTI and noted to have a complex right renal cyst on imaging.  She continues to have abdominal pain though slightly improved.  Her pain is currently 6 out of 10.  She is tolerating a bland diet.  She is having regular bowel movements.  She denies fever since being home.  She is taking her antibiotics as prescribed.  She is with her daughter today who is her caregiver.      Review of Systems   Constitutional: Negative for fever.   Gastrointestinal: Positive for abdominal pain. Negative for blood in stool, constipation, diarrhea, nausea and vomiting.       Objective:      Physical Exam   Constitutional: She is oriented to person, place, and time. She appears well-developed and well-nourished. No distress.   HENT:   Head: Normocephalic and atraumatic.   Right Ear: External ear normal.   Left Ear: External ear normal.   Dry mucus membranes   Eyes: Conjunctivae are normal. No scleral icterus.   Cardiovascular: Normal rate, regular rhythm and normal heart sounds.  Exam reveals no gallop and no friction rub.    No murmur heard.  Pulmonary/Chest: Effort normal and breath sounds normal. No respiratory distress. She has no wheezes. She has no rales.   Abdominal: Soft. Bowel sounds are normal. She exhibits no distension and no mass. There is tenderness in the right upper quadrant. There is no rebound and no guarding.   Musculoskeletal: She exhibits no edema.   Neurological: She is alert and oriented to person, place, and time. No cranial nerve deficit.   Skin: Skin is warm and dry.   Psychiatric: She has a normal mood and affect.   Vitals reviewed.      Assessment:       1. Right upper quadrant abdominal pain    2. Calculus of  gallbladder without cholecystitis without obstruction    3. Anemia of chronic disease    4. Elevated LFTs    5. Essential hypertension    6. Renal cyst, right    7. Acute cystitis without hematuria        Plan:       Cindy was seen today for hospital follow up and abdominal pain.    Diagnoses and all orders for this visit:    Right upper quadrant abdominal pain - Continued pain though improved.  Referring for further management  -     Ambulatory referral to General Surgery    Calculus of gallbladder without cholecystitis without obstruction - noted on CT and ultrasound.  As above the patient continues to have pain.  She will continue her bland diet.  We have discussed foods to avoid.  Will refer as above for further management.  I have given her instructions to go to emergency room with any fever, worsening pain, nausea and vomiting or no BM  -     Ambulatory referral to General Surgery    Anemia of chronic disease - follow-up with hematology.    Elevated LFTs - trended down during admission.    Essential hypertension - slightly elevated today.  Monitor    Renal cyst, right - complex cyst noted on CT as well as MRCP with concern for neoplasm.  Will refer for further workup.  -     Ambulatory referral to Urology    Acute cystitis without hematuria - complete antibiotic course       follow-up after workups as above.

## 2017-07-08 ENCOUNTER — HOSPITAL ENCOUNTER (INPATIENT)
Facility: HOSPITAL | Age: 82
LOS: 9 days | Discharge: HOME HEALTH | DRG: 987 | End: 2017-07-17
Attending: EMERGENCY MEDICINE | Admitting: INTERNAL MEDICINE
Payer: MEDICARE

## 2017-07-08 DIAGNOSIS — I50.33 ACUTE ON CHRONIC DIASTOLIC CHF (CONGESTIVE HEART FAILURE): ICD-10-CM

## 2017-07-08 DIAGNOSIS — I10 MALIGNANT HYPERTENSION: ICD-10-CM

## 2017-07-08 DIAGNOSIS — R06.02 SHORTNESS OF BREATH: ICD-10-CM

## 2017-07-08 DIAGNOSIS — K80.20 CALCULUS OF GALLBLADDER WITHOUT CHOLECYSTITIS WITHOUT OBSTRUCTION: ICD-10-CM

## 2017-07-08 DIAGNOSIS — I50.9 ACUTE ON CHRONIC CONGESTIVE HEART FAILURE, UNSPECIFIED CONGESTIVE HEART FAILURE TYPE: ICD-10-CM

## 2017-07-08 DIAGNOSIS — I50.9 CHF (CONGESTIVE HEART FAILURE): ICD-10-CM

## 2017-07-08 DIAGNOSIS — R10.11 RIGHT UPPER QUADRANT ABDOMINAL PAIN: ICD-10-CM

## 2017-07-08 DIAGNOSIS — I50.42 CHRONIC COMBINED SYSTOLIC AND DIASTOLIC HEART FAILURE: ICD-10-CM

## 2017-07-08 DIAGNOSIS — K80.50 CHOLEDOCHOLITHIASIS: Primary | ICD-10-CM

## 2017-07-08 DIAGNOSIS — K80.20 CHOLELITHIASIS WITHOUT CHOLECYSTITIS: ICD-10-CM

## 2017-07-08 DIAGNOSIS — R06.2 WHEEZING: ICD-10-CM

## 2017-07-08 DIAGNOSIS — R11.2 NAUSEA AND VOMITING, INTRACTABILITY OF VOMITING NOT SPECIFIED, UNSPECIFIED VOMITING TYPE: ICD-10-CM

## 2017-07-08 DIAGNOSIS — R07.9 CHEST PAIN: ICD-10-CM

## 2017-07-08 DIAGNOSIS — R06.03 RESPIRATORY DISTRESS: ICD-10-CM

## 2017-07-08 PROBLEM — N18.30 STAGE 3 CHRONIC KIDNEY DISEASE: Chronic | Status: ACTIVE | Noted: 2017-07-08

## 2017-07-08 LAB
ALBUMIN SERPL BCP-MCNC: 3.5 G/DL
ALP SERPL-CCNC: 271 U/L
ALT SERPL W/O P-5'-P-CCNC: 184 U/L
ANION GAP SERPL CALC-SCNC: 10 MMOL/L
AST SERPL-CCNC: 381 U/L
BASOPHILS # BLD AUTO: 0.01 K/UL
BASOPHILS NFR BLD: 0.2 %
BILIRUB SERPL-MCNC: 1.5 MG/DL
BILIRUB UR QL STRIP: NEGATIVE
BNP SERPL-MCNC: 598 PG/ML
BUN SERPL-MCNC: 22 MG/DL
CALCIUM SERPL-MCNC: 9.2 MG/DL
CHLORIDE SERPL-SCNC: 105 MMOL/L
CLARITY UR: CLEAR
CO2 SERPL-SCNC: 26 MMOL/L
COLOR UR: YELLOW
CREAT SERPL-MCNC: 1.2 MG/DL
DIFFERENTIAL METHOD: ABNORMAL
EOSINOPHIL # BLD AUTO: 0 K/UL
EOSINOPHIL NFR BLD: 0.4 %
ERYTHROCYTE [DISTWIDTH] IN BLOOD BY AUTOMATED COUNT: 13.9 %
EST. GFR  (AFRICAN AMERICAN): 50 ML/MIN/1.73 M^2
EST. GFR  (NON AFRICAN AMERICAN): 43 ML/MIN/1.73 M^2
GLUCOSE SERPL-MCNC: 115 MG/DL
GLUCOSE UR QL STRIP: NEGATIVE
HCT VFR BLD AUTO: 30.7 %
HGB BLD-MCNC: 9.8 G/DL
HGB UR QL STRIP: NEGATIVE
INR PPP: 1
KETONES UR QL STRIP: NEGATIVE
LEUKOCYTE ESTERASE UR QL STRIP: NEGATIVE
LIPASE SERPL-CCNC: 49 U/L
LYMPHOCYTES # BLD AUTO: 0.8 K/UL
LYMPHOCYTES NFR BLD: 15.8 %
MCH RBC QN AUTO: 29.3 PG
MCHC RBC AUTO-ENTMCNC: 31.9 %
MCV RBC AUTO: 92 FL
MONOCYTES # BLD AUTO: 0.3 K/UL
MONOCYTES NFR BLD: 5.5 %
NEUTROPHILS # BLD AUTO: 4 K/UL
NEUTROPHILS NFR BLD: 77.9 %
NITRITE UR QL STRIP: NEGATIVE
PH UR STRIP: 7 [PH] (ref 5–8)
PLATELET # BLD AUTO: 329 K/UL
PMV BLD AUTO: 10.3 FL
POTASSIUM SERPL-SCNC: 4 MMOL/L
PROT SERPL-MCNC: 7.5 G/DL
PROT UR QL STRIP: NEGATIVE
PROTHROMBIN TIME: 10.5 SEC
RBC # BLD AUTO: 3.35 M/UL
SODIUM SERPL-SCNC: 141 MMOL/L
SP GR UR STRIP: 1.01 (ref 1–1.03)
TROPONIN I SERPL DL<=0.01 NG/ML-MCNC: 0.02 NG/ML
TROPONIN I SERPL DL<=0.01 NG/ML-MCNC: 0.06 NG/ML
TROPONIN I SERPL DL<=0.01 NG/ML-MCNC: 0.08 NG/ML
URN SPEC COLLECT METH UR: ABNORMAL
UROBILINOGEN UR STRIP-ACNC: ABNORMAL EU/DL
WBC # BLD AUTO: 5.07 K/UL

## 2017-07-08 PROCEDURE — 96375 TX/PRO/DX INJ NEW DRUG ADDON: CPT

## 2017-07-08 PROCEDURE — 94660 CPAP INITIATION&MGMT: CPT

## 2017-07-08 PROCEDURE — 63600175 PHARM REV CODE 636 W HCPCS: Performed by: INTERNAL MEDICINE

## 2017-07-08 PROCEDURE — 84484 ASSAY OF TROPONIN QUANT: CPT | Mod: 91

## 2017-07-08 PROCEDURE — 96361 HYDRATE IV INFUSION ADD-ON: CPT

## 2017-07-08 PROCEDURE — 94640 AIRWAY INHALATION TREATMENT: CPT | Mod: 76

## 2017-07-08 PROCEDURE — 87086 URINE CULTURE/COLONY COUNT: CPT

## 2017-07-08 PROCEDURE — 93306 TTE W/DOPPLER COMPLETE: CPT

## 2017-07-08 PROCEDURE — 80053 COMPREHEN METABOLIC PANEL: CPT

## 2017-07-08 PROCEDURE — 93306 TTE W/DOPPLER COMPLETE: CPT | Mod: 26,,, | Performed by: INTERNAL MEDICINE

## 2017-07-08 PROCEDURE — 84484 ASSAY OF TROPONIN QUANT: CPT

## 2017-07-08 PROCEDURE — 99900035 HC TECH TIME PER 15 MIN (STAT)

## 2017-07-08 PROCEDURE — 25000003 PHARM REV CODE 250: Performed by: EMERGENCY MEDICINE

## 2017-07-08 PROCEDURE — 85610 PROTHROMBIN TIME: CPT

## 2017-07-08 PROCEDURE — 85025 COMPLETE CBC W/AUTO DIFF WBC: CPT

## 2017-07-08 PROCEDURE — 21400001 HC TELEMETRY ROOM

## 2017-07-08 PROCEDURE — 99285 EMERGENCY DEPT VISIT HI MDM: CPT | Mod: 25

## 2017-07-08 PROCEDURE — 83690 ASSAY OF LIPASE: CPT

## 2017-07-08 PROCEDURE — 27000221 HC OXYGEN, UP TO 24 HOURS

## 2017-07-08 PROCEDURE — 83880 ASSAY OF NATRIURETIC PEPTIDE: CPT

## 2017-07-08 PROCEDURE — 25500020 PHARM REV CODE 255: Performed by: EMERGENCY MEDICINE

## 2017-07-08 PROCEDURE — 25000003 PHARM REV CODE 250: Performed by: INTERNAL MEDICINE

## 2017-07-08 PROCEDURE — 27000190 HC CPAP FULL FACE MASK W/VALVE

## 2017-07-08 PROCEDURE — 96365 THER/PROPH/DIAG IV INF INIT: CPT

## 2017-07-08 PROCEDURE — 63600175 PHARM REV CODE 636 W HCPCS: Performed by: EMERGENCY MEDICINE

## 2017-07-08 PROCEDURE — 36415 COLL VENOUS BLD VENIPUNCTURE: CPT

## 2017-07-08 PROCEDURE — 81003 URINALYSIS AUTO W/O SCOPE: CPT

## 2017-07-08 PROCEDURE — 87040 BLOOD CULTURE FOR BACTERIA: CPT

## 2017-07-08 RX ORDER — CLOPIDOGREL BISULFATE 75 MG/1
75 TABLET ORAL DAILY
Status: DISCONTINUED | OUTPATIENT
Start: 2017-07-08 | End: 2017-07-08

## 2017-07-08 RX ORDER — FUROSEMIDE 10 MG/ML
40 INJECTION INTRAMUSCULAR; INTRAVENOUS
Status: COMPLETED | OUTPATIENT
Start: 2017-07-08 | End: 2017-07-08

## 2017-07-08 RX ORDER — ROSUVASTATIN CALCIUM 10 MG/1
30 TABLET, COATED ORAL NIGHTLY
Status: DISCONTINUED | OUTPATIENT
Start: 2017-07-08 | End: 2017-07-13

## 2017-07-08 RX ORDER — HYDRALAZINE HYDROCHLORIDE 20 MG/ML
10 INJECTION INTRAMUSCULAR; INTRAVENOUS EVERY 4 HOURS PRN
Status: DISCONTINUED | OUTPATIENT
Start: 2017-07-08 | End: 2017-07-14

## 2017-07-08 RX ORDER — POLYETHYLENE GLYCOL 3350 17 G/17G
17 POWDER, FOR SOLUTION ORAL DAILY
Status: DISCONTINUED | OUTPATIENT
Start: 2017-07-08 | End: 2017-07-12

## 2017-07-08 RX ORDER — CITALOPRAM 20 MG/1
20 TABLET, FILM COATED ORAL DAILY
Status: DISCONTINUED | OUTPATIENT
Start: 2017-07-08 | End: 2017-07-13

## 2017-07-08 RX ORDER — FAMOTIDINE 20 MG/1
20 TABLET, FILM COATED ORAL DAILY
Status: DISCONTINUED | OUTPATIENT
Start: 2017-07-08 | End: 2017-07-13

## 2017-07-08 RX ORDER — VERAPAMIL HYDROCHLORIDE 180 MG/1
360 TABLET, FILM COATED, EXTENDED RELEASE ORAL DAILY
Status: DISCONTINUED | OUTPATIENT
Start: 2017-07-08 | End: 2017-07-13

## 2017-07-08 RX ORDER — FUROSEMIDE 10 MG/ML
40 INJECTION INTRAMUSCULAR; INTRAVENOUS 2 TIMES DAILY
Status: DISCONTINUED | OUTPATIENT
Start: 2017-07-08 | End: 2017-07-09

## 2017-07-08 RX ORDER — METOPROLOL TARTRATE 50 MG/1
100 TABLET ORAL 2 TIMES DAILY
Status: DISCONTINUED | OUTPATIENT
Start: 2017-07-08 | End: 2017-07-09

## 2017-07-08 RX ORDER — ALPRAZOLAM 0.5 MG/1
0.5 TABLET ORAL
Status: DISCONTINUED | OUTPATIENT
Start: 2017-07-08 | End: 2017-07-13

## 2017-07-08 RX ORDER — ENOXAPARIN SODIUM 100 MG/ML
40 INJECTION SUBCUTANEOUS EVERY 24 HOURS
Status: DISCONTINUED | OUTPATIENT
Start: 2017-07-08 | End: 2017-07-13

## 2017-07-08 RX ORDER — MORPHINE SULFATE 10 MG/ML
2 INJECTION INTRAMUSCULAR; INTRAVENOUS; SUBCUTANEOUS EVERY 4 HOURS PRN
Status: DISCONTINUED | OUTPATIENT
Start: 2017-07-08 | End: 2017-07-13

## 2017-07-08 RX ORDER — LEVALBUTEROL 1.25 MG/.5ML
1.25 SOLUTION, CONCENTRATE RESPIRATORY (INHALATION)
Status: COMPLETED | OUTPATIENT
Start: 2017-07-08 | End: 2017-07-08

## 2017-07-08 RX ORDER — HYDRALAZINE HYDROCHLORIDE 25 MG/1
100 TABLET, FILM COATED ORAL EVERY 8 HOURS
Status: DISCONTINUED | OUTPATIENT
Start: 2017-07-08 | End: 2017-07-13

## 2017-07-08 RX ORDER — METHYLPREDNISOLONE SODIUM SUCCINATE 125 MG/2ML
125 INJECTION INTRAMUSCULAR; INTRAVENOUS EVERY 8 HOURS
Status: DISCONTINUED | OUTPATIENT
Start: 2017-07-08 | End: 2017-07-08

## 2017-07-08 RX ORDER — SODIUM CHLORIDE 0.9 % (FLUSH) 0.9 %
3 SYRINGE (ML) INJECTION EVERY 8 HOURS
Status: DISCONTINUED | OUTPATIENT
Start: 2017-07-08 | End: 2017-07-13

## 2017-07-08 RX ORDER — METHYLPREDNISOLONE SODIUM SUCCINATE 125 MG/2ML
125 INJECTION INTRAMUSCULAR; INTRAVENOUS
Status: COMPLETED | OUTPATIENT
Start: 2017-07-08 | End: 2017-07-08

## 2017-07-08 RX ORDER — CLONIDINE HYDROCHLORIDE 0.1 MG/1
0.3 TABLET ORAL 3 TIMES DAILY
Status: DISCONTINUED | OUTPATIENT
Start: 2017-07-08 | End: 2017-07-13

## 2017-07-08 RX ORDER — MORPHINE SULFATE 10 MG/ML
6 INJECTION INTRAMUSCULAR; INTRAVENOUS; SUBCUTANEOUS
Status: COMPLETED | OUTPATIENT
Start: 2017-07-08 | End: 2017-07-08

## 2017-07-08 RX ORDER — ASPIRIN 81 MG/1
81 TABLET ORAL DAILY
Status: DISCONTINUED | OUTPATIENT
Start: 2017-07-08 | End: 2017-07-13

## 2017-07-08 RX ORDER — ACETAMINOPHEN 325 MG/1
650 TABLET ORAL EVERY 8 HOURS PRN
Status: DISCONTINUED | OUTPATIENT
Start: 2017-07-08 | End: 2017-07-13

## 2017-07-08 RX ORDER — MORPHINE SULFATE 10 MG/ML
4 INJECTION INTRAMUSCULAR; INTRAVENOUS; SUBCUTANEOUS EVERY 4 HOURS PRN
Status: DISCONTINUED | OUTPATIENT
Start: 2017-07-08 | End: 2017-07-15

## 2017-07-08 RX ORDER — CARBAMAZEPINE 200 MG/1
200 TABLET ORAL 3 TIMES DAILY
Status: DISCONTINUED | OUTPATIENT
Start: 2017-07-08 | End: 2017-07-13

## 2017-07-08 RX ORDER — FLUTICASONE PROPIONATE 110 UG/1
1 AEROSOL, METERED RESPIRATORY (INHALATION) EVERY 12 HOURS
Status: DISCONTINUED | OUTPATIENT
Start: 2017-07-08 | End: 2017-07-13

## 2017-07-08 RX ORDER — ONDANSETRON 2 MG/ML
4 INJECTION INTRAMUSCULAR; INTRAVENOUS
Status: COMPLETED | OUTPATIENT
Start: 2017-07-08 | End: 2017-07-08

## 2017-07-08 RX ORDER — ONDANSETRON 2 MG/ML
4 INJECTION INTRAMUSCULAR; INTRAVENOUS EVERY 4 HOURS PRN
Status: DISCONTINUED | OUTPATIENT
Start: 2017-07-08 | End: 2017-07-17 | Stop reason: HOSPADM

## 2017-07-08 RX ORDER — GABAPENTIN 300 MG/1
300 CAPSULE ORAL 3 TIMES DAILY
Status: DISCONTINUED | OUTPATIENT
Start: 2017-07-08 | End: 2017-07-13

## 2017-07-08 RX ADMIN — HYDRALAZINE HYDROCHLORIDE 100 MG: 25 TABLET ORAL at 01:07

## 2017-07-08 RX ADMIN — ASPIRIN 81 MG: 81 TABLET, COATED ORAL at 10:07

## 2017-07-08 RX ADMIN — SODIUM CHLORIDE 1000 ML: 0.9 INJECTION, SOLUTION INTRAVENOUS at 05:07

## 2017-07-08 RX ADMIN — GABAPENTIN 300 MG: 300 CAPSULE ORAL at 01:07

## 2017-07-08 RX ADMIN — ENOXAPARIN SODIUM 40 MG: 100 INJECTION SUBCUTANEOUS at 06:07

## 2017-07-08 RX ADMIN — Medication 3 ML: at 09:07

## 2017-07-08 RX ADMIN — Medication 3 ML: at 01:07

## 2017-07-08 RX ADMIN — LEVALBUTEROL 1.25 MG: 1.25 SOLUTION, CONCENTRATE RESPIRATORY (INHALATION) at 06:07

## 2017-07-08 RX ADMIN — VERAPAMIL HYDROCHLORIDE 360 MG: 120 TABLET, FILM COATED, EXTENDED RELEASE ORAL at 10:07

## 2017-07-08 RX ADMIN — FUROSEMIDE 40 MG: 10 INJECTION, SOLUTION INTRAMUSCULAR; INTRAVENOUS at 06:07

## 2017-07-08 RX ADMIN — MORPHINE SULFATE 6 MG: 10 INJECTION INTRAVENOUS at 05:07

## 2017-07-08 RX ADMIN — FAMOTIDINE 20 MG: 20 TABLET, FILM COATED ORAL at 10:07

## 2017-07-08 RX ADMIN — IOHEXOL 100 ML: 350 INJECTION, SOLUTION INTRAVENOUS at 04:07

## 2017-07-08 RX ADMIN — GABAPENTIN 300 MG: 300 CAPSULE ORAL at 09:07

## 2017-07-08 RX ADMIN — CLONIDINE HYDROCHLORIDE 0.3 MG: 0.1 TABLET ORAL at 01:07

## 2017-07-08 RX ADMIN — CLONIDINE HYDROCHLORIDE 0.3 MG: 0.1 TABLET ORAL at 09:07

## 2017-07-08 RX ADMIN — METHYLPREDNISOLONE SODIUM SUCCINATE 125 MG: 125 INJECTION, POWDER, FOR SOLUTION INTRAMUSCULAR; INTRAVENOUS at 06:07

## 2017-07-08 RX ADMIN — ONDANSETRON 4 MG: 2 INJECTION INTRAMUSCULAR; INTRAVENOUS at 05:07

## 2017-07-08 RX ADMIN — ALPRAZOLAM 0.5 MG: 0.5 TABLET ORAL at 12:07

## 2017-07-08 RX ADMIN — HYDRALAZINE HYDROCHLORIDE 10 MG: 20 INJECTION INTRAMUSCULAR; INTRAVENOUS at 10:07

## 2017-07-08 RX ADMIN — ROSUVASTATIN CALCIUM 30 MG: 10 TABLET ORAL at 09:07

## 2017-07-08 RX ADMIN — CARBAMAZEPINE 200 MG: 200 TABLET ORAL at 01:07

## 2017-07-08 RX ADMIN — PROMETHAZINE HYDROCHLORIDE 25 MG: 25 INJECTION INTRAMUSCULAR; INTRAVENOUS at 05:07

## 2017-07-08 RX ADMIN — METOPROLOL TARTRATE 100 MG: 50 TABLET, FILM COATED ORAL at 09:07

## 2017-07-08 RX ADMIN — HYDRALAZINE HYDROCHLORIDE 100 MG: 25 TABLET ORAL at 09:07

## 2017-07-08 RX ADMIN — CITALOPRAM HYDROBROMIDE 20 MG: 20 TABLET ORAL at 10:07

## 2017-07-08 RX ADMIN — CARBAMAZEPINE 200 MG: 200 TABLET ORAL at 09:07

## 2017-07-08 RX ADMIN — METOPROLOL TARTRATE 100 MG: 50 TABLET, FILM COATED ORAL at 07:07

## 2017-07-08 NOTE — ASSESSMENT & PLAN NOTE
Fluids given for n/v, now volume overloaded. Very sensitive to fluids. Check echo 11/16 pEF, +DD, PAP 44, mod AS. Wean O2 as tolerated to sat >88%.

## 2017-07-08 NOTE — CONSULTS
CC: Abdominal pain.    History of present illness: The patient is a 81 year old female with a history of HTN, pulmonary HTN, HLD, CAD, CHF, COPD, DVT, aortic stenosis and depression presenting with abdominal pain and elevated liver enzymes.  She reports development of upper abdominal pain approximately two weeks ago. The pain is located in the LUQ and RUQ and radiates to her back.  It is intermittent, sharp/stabbing in nature.  Associated nausea and dry heaving.  She was evaluated at [facility] last month for the pain and found to have gallstones on ultrasound and CT.  Her liver enzymes and bilirubin were notably elevated, and a MRCP showed cholelithiasis but no choledocholithiasis.  She was discharged home and reports the pain resolved temporarily.  Approximately two days ago, the pain returned and is now constant.  She is short of breath and requiring oxygen.  She reports a fever or 101.3 at home though is currently afebrile.  She denies heavy NSAID use.  Her bowel movements are regular.  No melena or hematochezia.    Past medical history:  Hypertension.  Pulmonary hypertension.  Hyperlipidemia.  Coronary artery disease.  Aortic stenosis.  Congestive heart failure.   Chronic obstructive pulmonary disease.  Deep venous thrombosis.  Depression.    Past surgical history:  Back surgery.  Carpal tunnel release, bilateral.  Hysterectomy, partial.  Hip replacement, left.  TMJ surgery.  Cardiac stent placement.    Social history:  Tobacco use: denies.  Alcohol use: denies.  Illicit drug use: denies.    Family history:   No family history of liver or colon cancer.    Allergies:  Doxycycline.  Singulair.  Penicillins.  Ventolin.    Medications:  aspirin (ECOTRIN) 81 MG EC tablet Take 81 mg by mouth once daily.         carbamazepine (TEGRETOL) 200 mg tablet Take 1 tablet (200 mg total) by mouth once daily. 90 tablet 1    cloNIDine (CATAPRES) 0.3 MG tablet Take 1 tablet (0.3 mg total) by mouth 3 (three) times daily. 270 tablet 1     clopidogrel (PLAVIX) 75 mg tablet Take 1 tablet (75 mg total) by mouth once daily. 90 tablet 1    fluticasone (FLOVENT HFA) 110 mcg/actuation inhaler Inhale 1 puff into the lungs every 12 (twelve) hours. 1 g 2    furosemide (LASIX) 40 MG tablet TAKE ONE TABLET BY MOUTH EVERY DAY AS NEEDED FOR SHORTNESS OF BREATH or leg swelling 90 tablet 1    gabapentin (NEURONTIN) 300 MG capsule Take 1 capsule (300 mg total) by mouth 3 (three) times daily. 270 capsule 1    hydrALAZINE (APRESOLINE) 100 MG tablet ONE TABLET BY MOUTH THREE TIMES DAILY 270 tablet 1    metoprolol tartrate (LOPRESSOR) 100 MG tablet TAKE ONE TABLET BY MOUTH TWICE DAILY 180 tablet 1    rosuvastatin (CRESTOR) 20 MG tablet Take 1.5 tablets (30 mg total) by mouth every evening. 1 Tablet Oral Every day 135 tablet 1    verapamil (VERELAN) 360 MG C24P Take 1 capsule (360 mg total) by mouth once daily. 90 capsule 3    azelastine (ASTELIN) 137 mcg (0.1 %) nasal spray 1 spray (137 mcg total) by Nasal route 2 (two) times daily. 30 mL 0    citalopram (CELEXA) 20 MG tablet ONE TABLET BY MOUTH once a day 90 tablet 0    clotrimazole-betamethasone 1-0.05% (LOTRISONE) cream 2 (two) times daily. Apply to affected area   2    latanoprost 0.005 % ophthalmic solution Place 1 drop into both eyes nightly.   5    nitroGLYCERIN (NITROSTAT) 0.4 MG SL tablet Place 0.4 mg under the tongue every 5 (five) minutes as needed for Chest pain.       Review of systems:  CONSTITUTIONAL: Positive for fever.  No weight loss.  HEENT: Eyes: Negative for double/blurred vision. Ears: Negative pain or loss of hearing. Nose:Negative for nasal congestion. Negative for rhinorrhea Mouth: Negative for dry mouth/pain Throat: Negative for masses or hoarseness.  CARDIOVASCULAR: Negative for chest pain or palpitations.  RESPIRATORY: Positive for shortness of breath.  GASTROINTESTINAL: See HPI  GENITOURINARY: Negative for dysuria/hematuria.  MUSCULOSKELETAL: Negative for osteoarthritis,  muscle pain.  SKIN: Negative for rashes/lesions.  NEUROLOGIC: Negative for headaches, numbness/tingling..  ENDOCRINE: Negative for diabetes or thyroid abnormalities.  HEMATOLOGIC: Negative for anemia or blood dyscrasias.    Vitals:    07/08/17 0835   BP: (!) 175/74   Pulse:    Resp:    Temp:        Recent Labs  Lab 07/08/17  0322   WBC 5.07   RBC 3.35*   HGB 9.8*   HCT 30.7*      MCV 92   MCH 29.3   MCHC 31.9*   ALT= 184  AST= 381  ALP= 271  Tbili= 1.5    Physical exam:  General: Elderly, overweight female in mild distress.  HEENT: PERRLA, EOMI, anicteric sclera  Cardiovascular: Non-displaced PMI, normal s1/s2, RRR, no M/G/R  Lungs: Chest nontender.  Decreased breath sounds bilaterally.  No rales, ronchi or wheezes.  Abdomen: Normal BS.  Soft, moderate RUQ and epigastric TTP.  No rebound or guarding.  Extremities: No C/C/E, 2+ dorsalis pedis pulses bilaterally  Neuro: CN II-XII intact, no asterixes or tremors  Psych: AA&O x 3  Skin: No jaundice, rashes or lesions  Musculoskeletal: 5/5 strength bilaterally    Imaging:  CT and ultrasound results reviewed.    Impression: 81 year old female with a history of HTN, pulmonary HTN, HLD, CAD, CHF, COPD, DVT, aortic stenosis and depression presenting with shortness of breath, abdominal pain and elevated liver enzymes.     Plan:  1.  Abdominal pain - likely secondary to cholelithiasis with possible choledocholithiasis (no signs of acute cholecystitis on imaging).  Liver enzymes and bilirubin are elevated, yet CBD is 5 mm on ultrasound and no choledocholithiasis on MRCP last month.  Suspect she is intermittently passing stones.  She is currently afebrile with a normal white count.  She is on plavix and last dose was taken yesterday.  Would ultimately benefit from an ERCP w/ sphincterotomy but given recent plavix use and current respiratory status, will need to defer until next week once she is more stable.   Patient seen and examined. Agree with above. Patient is  scheduled for another MRCP today. Will hold plavix for now. May consder ERCP on 7/10. Thanks for consulting.

## 2017-07-08 NOTE — ED NOTES
Pt report recieved from [facility]. Pt in room on cardiac monitor, with Sean pap in place. Pt is AAOx3. And being placed on bed pan. NAD noted will continue to monitor

## 2017-07-08 NOTE — ED PROVIDER NOTES
"Encounter Date: 7/8/2017    SCRIBE #1 NOTE: I, Jennifer Lozano, am scribing for, and in the presence of,  Patsy Blevins MD. I have scribed the following portions of the note - Other sections scribed: HPI, ROS, PE.       History     Chief Complaint   Patient presents with    Abdominal Pain     via ems with abdominal pain x 1 day , states same symptoms 3 weeks ago and she had sepsis.Denies nausea or vomiting     CC: Abdominal Pain    HPI: 81 year old female presents to the ED via EMS c/o abdominal pain and SOB.    Pt c/o generalized abdominal pain with associated nausea and "dry heaving" since yesterday morning. Symptoms are acute onset and severe (10/10). Pt reports the pain begins at the L side of her abdomen and radiates across to her R abdomen and R mid-back. Pt had a BM last night with no irregularities or complications. Pt otherwise denies fever, chills, diaphoresis, bloody stool, dysuria, and any other associated symptoms.    Pt was evaluated at this ED on 6/16/17 for similar symptoms. She had an ultrasound performed which revealed cholelithiasis without acute cholecystitis. Pt was hospitalized to r/o cholangitis due to elevated LFTs and fever. She was discharged 2 days later on 6/18/17 after a negative MRCP.     SOB is somewhat worse than chronic. Pt is not on home O2. No cough. No CP.     Pt has a past medical history of Anemia; Anticoagulant long-term use; Aortic stenosis; Arthritis; Asthma; CAD; Carpal tunnel syndrome on both sides; Cataract; CHF; COPD; Depression; DVT; Hyperlipidemia; Hypertension; Pulmonary HTN; and TMJ.    Pt has a past surgical history that includes Hysterectomy; Carpal tunnel release; Joint replacement (2009); Back surgery; Temporomandibular joint surgery; and cardiac stents..    Pt is on Plavix. She is compliant with her daily medications.      The history is provided by the patient. No  was used.     Review of patient's allergies indicates:   Allergen Reactions " "   Doxycycline hyclate Diarrhea and Nausea And Vomiting    Singulair [montelukast]      Stomach pain, Muscle pain, Cough      Latex, natural rubber     Penicillins      Other reaction(s): Anaphylaxis    Ventolin  [albuterol sulfate] Other (See Comments)     Past Medical History:   Diagnosis Date    Anemia     Anticoagulant long-term use     Aortic stenosis     Arthritis     Asthma     CAD (coronary artery disease) 4/24/2015    Carpal tunnel syndrome on both sides     Cataract     CHF (congestive heart failure) 5/2013    COPD (chronic obstructive pulmonary disease)     Depression     DVT (deep venous thrombosis)     Hyperlipidemia     Hypertension     Pulmonary HTN     TMJ (temporomandibular joint disorder)      Past Surgical History:   Procedure Laterality Date    BACK SURGERY      cardiac stents      CARPAL TUNNEL RELEASE      Right/Left    HYSTERECTOMY      Partial    JOINT REPLACEMENT  2009    Left hip    TEMPOROMANDIBULAR JOINT SURGERY       Family History   Problem Relation Age of Onset    Heart disease Mother     Hypertension Daughter     Cancer Son     Breast cancer Neg Hx     Colon cancer Neg Hx     Ovarian cancer Neg Hx      Social History   Substance Use Topics    Smoking status: Never Smoker    Smokeless tobacco: Never Used    Alcohol use No     Review of Systems   Constitutional: Negative for chills, diaphoresis and fever.   HENT: Negative for ear pain and sore throat.    Eyes: Negative for photophobia and visual disturbance.   Respiratory: Negative for cough and shortness of breath.    Cardiovascular: Negative for chest pain.   Gastrointestinal: Positive for abdominal pain and nausea. Negative for blood in stool, constipation, diarrhea and vomiting.        (+) "dry heaving"   Genitourinary: Negative for dysuria and flank pain.   Musculoskeletal: Negative for back pain and neck stiffness.   Skin: Negative for rash.   Neurological: Negative for headaches.       Physical " Exam     Initial Vitals [07/08/17 0250]   BP Pulse Resp Temp SpO2   (!) 206/87 67 18 98.2 °F (36.8 °C) 96 %      MAP       126.67         Physical Exam    Nursing note and vitals reviewed.  Constitutional: She appears well-developed and well-nourished. She is not diaphoretic.  Non-toxic appearance. She does not appear ill.   Elderly female. Awake and alert. Moaning in stretcher.   HENT:   Head: Normocephalic and atraumatic.   Eyes: Conjunctivae and EOM are normal. Pupils are equal, round, and reactive to light.   Neck: Normal range of motion. Neck supple.   Cardiovascular: Normal rate and regular rhythm.   Murmur heard.  Pulmonary/Chest: She is in respiratory distress. She has wheezes. She has rales (rare).   Abdominal: Soft. Normal appearance and bowel sounds are normal. She exhibits no distension. There is tenderness (diffuse).   Musculoskeletal: Normal range of motion.   Neurological: She is alert and oriented to person, place, and time. She has normal strength.   Skin: Skin is warm and dry.   Psychiatric: She has a normal mood and affect.         ED Course   Critical Care  Date/Time: 7/8/2017 6:00 AM  Performed by: TACHO SHANKAR  Authorized by: AIME TEMPLETON   Direct patient critical care time: 15 minutes  Additional history critical care time: 10 minutes  Ordering / reviewing critical care time: 10 minutes  Documentation critical care time: 10 minutes  Total critical care time (exclusive of procedural time) : 45 minutes        Labs Reviewed   CBC W/ AUTO DIFFERENTIAL - Abnormal; Notable for the following:        Result Value    RBC 3.35 (*)     Hemoglobin 9.8 (*)     Hematocrit 30.7 (*)     MCHC 31.9 (*)     Lymph # 0.8 (*)     Gran% 77.9 (*)     Lymph% 15.8 (*)     All other components within normal limits   COMPREHENSIVE METABOLIC PANEL - Abnormal; Notable for the following:     Glucose 115 (*)     Total Bilirubin 1.5 (*)     Alkaline Phosphatase 271 (*)      (*)      (*)     eGFR if   50 (*)     eGFR if non  43 (*)     All other components within normal limits   B-TYPE NATRIURETIC PEPTIDE - Abnormal; Notable for the following:      (*)     All other components within normal limits   URINALYSIS - Abnormal; Notable for the following:     Urobilinogen, UA 4.0-6.0 (*)     All other components within normal limits   TROPONIN I   LIPASE     EKG Readings: (Independently Interpreted)   03:36: NSR, HR 72. RBBB. LVH. No STEMI. C/w 6/16/17.       X-Rays:   Independently Interpreted Readings:   Other Readings:  CXR +cardiomegaly, +pulmonary edema     Medical Decision Making:   History:   Old Medical Records: I decided to obtain old medical records.  Old Records Summarized: records from previous admission(s) and records from clinic visits.  Initial Assessment:   This is an emergent evaluation of a 81 y.o. Female with CHF, pulmonary HTN, CAD, HTN, DLD, recent admit for cholelithiasis, presents with RUQ pain, also worsening SOB.   Differential Diagnosis:   Ddx includes cholelithiasis, cholecystitis, cholangitis, CHF exac, ACS, other.  Independently Interpreted Test(s):   I have ordered and independently interpreted X-rays - see prior notes.  I have ordered and independently interpreted EKG Reading(s) - see prior notes  Clinical Tests:   Lab Tests: Ordered and Reviewed  Radiological Study: Ordered and Reviewed  Medical Tests: Reviewed and Ordered  ED Management:  EKG unchanged.     Labs reassuring -- minimal elevation of AST/ALT, c/w 6/16/17, but otherwise no elevated WBCs, Hgb is stable, renal function is stable.     CT abdom/pelvis and u/s RUQ show cholelithiasis without acute cholecystitis or other intraabdominal pathology.    Patient had been bolus'ed NS in ED, then given morphine/zofran, then had phenergan for nausea. Increasingly SOB despite xopenex and subsequently with more rales. Started on BiPAP and also had solumedrol/lasix. CXR shows cardiomegaly, fluid  overload.    Given that patient is now requiring BiPAP, also continues to have abdominal discomfort/nausea, requires admit for management and reeval. Discussed with Dr. Nielsen, hospitalist. Will trend troponins, obtain echocardiogram (last was in 11/16), continue solumedrol/lasix/nebs PRN, and consult GI re: abdominal pain and elevated LFTs.  Other:   I have discussed this case with another health care provider.            Scribe Attestation:   Scribe #1: I performed the above scribed service and the documentation accurately describes the services I performed. I attest to the accuracy of the note.    Attending Attestation:           Physician Attestation for Scribe:  Physician Attestation Statement for Scribe #1: I, Patsy Blevins MD, reviewed documentation, as scribed by Jennifer Lozano in my presence, and it is both accurate and complete.                 ED Course     Clinical Impression:   The primary encounter diagnosis was Respiratory distress. Diagnoses of Shortness of breath, Wheezing, Right upper quadrant abdominal pain, Nausea and vomiting, intractability of vomiting not specified, unspecified vomiting type, Acute on chronic congestive heart failure, unspecified congestive heart failure type, and Calculus of gallbladder without cholecystitis without obstruction were also pertinent to this visit.                           Patsy Blevins MD  07/08/17 1745

## 2017-07-08 NOTE — PLAN OF CARE
"Problem: Patient Care Overview  Goal: Plan of Care Review  Outcome: Ongoing (interventions implemented as appropriate)  No falls this shift bed kept in low position call light and phone remain within reach bed alarm remain active. Patient able to reposition self in bed without assist. No evidence of skin breakdown noted.     Breath sounds diminished and clear, patient currently on 2.5L of oxygen via nasal canula oxygen saturation high 90s. Bipap order PRN.   7/8/17 CXR: There is bibasilar subsegmental atelectasis.    Patient complain of abdominal discomfort  No nausea or vomiting noted  patient currently NPO to allow bowel to rest.   7/8/17 GI consulted, per GI "Would ultimately benefit from an ERCP w/ sphincterotomy" however must hold PO plavix   7/8/17 CT of abd: Biliary sludge and/or layering stones the gallbladder lumen with slight distention of the gallbladder.  Mild intrahepatic biliary ductal dilatation  7/8/17 MRI MRCP: Markedly distended gallbladder with innumerable dependent gallstones.  There has been development of pericholecystic fluid near the fundus of the gallbladder  7/8/17 Surgery consulted per Surgery"  agree that she is likely intermittently passing sludge/stones.  Pain is improving.  Would need to be off plavix for 5 days prior to cholecystectomy    Cardiology consulted    Highest troponin 0.058  7/8/17 echo pending          "

## 2017-07-08 NOTE — ED TRIAGE NOTES
Pt presents to the ED with abdominal pain. Pt states that she was here 3 weeks ago and they think she has gallstones. Reports nausea but denies vomiting. Reports SOB when she lays flat. Will continue to monitor.

## 2017-07-08 NOTE — SUBJECTIVE & OBJECTIVE
Past Medical History:   Diagnosis Date    Anemia     Anticoagulant long-term use     Aortic stenosis     Arthritis     Asthma     CAD (coronary artery disease) 4/24/2015    Carpal tunnel syndrome on both sides     Cataract     CHF (congestive heart failure) 5/2013    COPD (chronic obstructive pulmonary disease)     Depression     DVT (deep venous thrombosis)     Hyperlipidemia     Hypertension     Pulmonary HTN     TMJ (temporomandibular joint disorder)        Past Surgical History:   Procedure Laterality Date    BACK SURGERY      cardiac stents      CARPAL TUNNEL RELEASE      Right/Left    HYSTERECTOMY      Partial    JOINT REPLACEMENT  2009    Left hip    TEMPOROMANDIBULAR JOINT SURGERY         Review of patient's allergies indicates:   Allergen Reactions    Doxycycline hyclate Diarrhea and Nausea And Vomiting    Singulair [montelukast]      Stomach pain, Muscle pain, Cough      Latex, natural rubber     Penicillins      Other reaction(s): Anaphylaxis    Ventolin  [albuterol sulfate] Other (See Comments)       No current facility-administered medications on file prior to encounter.      Current Outpatient Prescriptions on File Prior to Encounter   Medication Sig    aspirin (ECOTRIN) 81 MG EC tablet Take 81 mg by mouth once daily.    carbamazepine (TEGRETOL) 200 mg tablet Take 1 tablet (200 mg total) by mouth 3 (three) times daily.    citalopram (CELEXA) 20 MG tablet ONE TABLET BY MOUTH once a day    cloNIDine (CATAPRES) 0.3 MG tablet Take 1 tablet (0.3 mg total) by mouth 3 (three) times daily.    clopidogrel (PLAVIX) 75 mg tablet Take 1 tablet (75 mg total) by mouth once daily.    furosemide (LASIX) 40 MG tablet TAKE ONE TABLET BY MOUTH EVERY DAY AS NEEDED FOR SHORTNESS OF BREATH or leg swelling    gabapentin (NEURONTIN) 300 MG capsule Take 1 capsule (300 mg total) by mouth 3 (three) times daily.    hydrALAZINE (APRESOLINE) 100 MG tablet ONE TABLET BY MOUTH THREE TIMES DAILY     metoprolol tartrate (LOPRESSOR) 100 MG tablet TAKE ONE TABLET BY MOUTH TWICE DAILY    rosuvastatin (CRESTOR) 20 MG tablet Take 1.5 tablets (30 mg total) by mouth every evening. 1 Tablet Oral Every day    verapamil (VERELAN) 360 MG C24P Take 1 capsule (360 mg total) by mouth once daily.    azelastine (ASTELIN) 137 mcg (0.1 %) nasal spray 1 spray (137 mcg total) by Nasal route 2 (two) times daily.    clotrimazole-betamethasone 1-0.05% (LOTRISONE) cream 2 (two) times daily. Apply to affected area    fluticasone (FLOVENT HFA) 110 mcg/actuation inhaler Inhale 1 puff into the lungs every 12 (twelve) hours.    latanoprost 0.005 % ophthalmic solution Place 1 drop into both eyes nightly.    nitroGLYCERIN (NITROSTAT) 0.4 MG SL tablet Place 0.4 mg under the tongue every 5 (five) minutes as needed for Chest pain.     Family History     Problem Relation (Age of Onset)    Cancer Son    Heart disease Mother    Hypertension Daughter        Social History Main Topics    Smoking status: Never Smoker    Smokeless tobacco: Never Used    Alcohol use No    Drug use: No    Sexual activity: No     Review of Systems   Constitutional: Negative for chills and fever.   HENT: Negative for congestion and rhinorrhea.    Eyes: Negative for photophobia and visual disturbance.   Respiratory: Positive for shortness of breath. Negative for cough.    Cardiovascular: Negative for chest pain and palpitations.   Gastrointestinal: Positive for abdominal pain, nausea and vomiting.   Genitourinary: Negative for difficulty urinating and dysuria.   Musculoskeletal: Negative for joint swelling and neck stiffness.   Skin: Negative for rash and wound.   Neurological: Negative for dizziness and light-headedness.   Psychiatric/Behavioral: Negative for agitation and behavioral problems.     Objective:     Vital Signs (Most Recent):  Temp: 99 °F (37.2 °C) (07/08/17 0935)  Pulse: 87 (07/08/17 0935)  Resp: (!) 24 (07/08/17 0949)  BP: (!) 210/106 (07/08/17  0949)  SpO2: 97 % (07/08/17 0949) Vital Signs (24h Range):  Temp:  [97.3 °F (36.3 °C)-99 °F (37.2 °C)] 99 °F (37.2 °C)  Pulse:  [67-94] 87  Resp:  [18-26] 24  SpO2:  [90 %-99 %] 97 %  BP: (146-226)/() 210/106     Weight: 91.6 kg (202 lb)  Body mass index is 33.61 kg/m².    Physical Exam   Constitutional: She is oriented to person, place, and time. She appears well-developed and well-nourished. No distress.   HENT:   Right Ear: External ear normal.   Left Ear: External ear normal.   Nose: Nose normal.   Eyes: EOM are normal. Pupils are equal, round, and reactive to light. No scleral icterus.   Neck: Normal range of motion. Neck supple. No thyromegaly present.   Cardiovascular: Normal rate and normal heart sounds.  Exam reveals no friction rub.    No murmur heard.  Pulmonary/Chest: Effort normal. She has no wheezes. She has rales.   Abdominal: Soft. Bowel sounds are normal. She exhibits no mass. There is tenderness (RUQ).   obese   Musculoskeletal: Normal range of motion. She exhibits no edema or deformity.   Neurological: She is alert and oriented to person, place, and time. No cranial nerve deficit.   Skin: Skin is warm and dry. No pallor.        Significant Labs:   CBC:   Recent Labs  Lab 07/08/17  0322   WBC 5.07   HGB 9.8*   HCT 30.7*        CMP:   Recent Labs  Lab 07/08/17  0322      K 4.0      CO2 26   *   BUN 22   CREATININE 1.2   CALCIUM 9.2   PROT 7.5   ALBUMIN 3.5   BILITOT 1.5*   ALKPHOS 271*   *   *   ANIONGAP 10   EGFRNONAA 43*     Troponin:   Recent Labs  Lab 07/08/17  0322   TROPONINI 0.018     Urine Studies:   Recent Labs  Lab 07/08/17  0330   COLORU Yellow   APPEARANCEUA Clear   PHUR 7.0   SPECGRAV 1.010   PROTEINUA Negative   GLUCUA Negative   KETONESU Negative   BILIRUBINUA Negative   OCCULTUA Negative   NITRITE Negative   UROBILINOGEN 4.0-6.0*   LEUKOCYTESUR Negative       Significant Imaging: I have reviewed all pertinent imaging results/findings  within the past 24 hours.

## 2017-07-08 NOTE — PROGRESS NOTES
Telemetry box 2620 applied to patient (MRN [id number] ) contacted telemetry monitor tech, patient currently sinus rhythm with BBB 89, patient oriented to room. Head to toe assessment performed.  No complaints of pain or SOB noted bed in low position call light and phone within reach bed alarm active will continue to monitor.

## 2017-07-08 NOTE — HPI
Ms. Lyman is a 80 yo woman with obesity (BMI 34), HFpEF, mod AS, CKD3, anemia of chronic disease (baseline ~9.0), chronic constipation, and recent admission for abdominal pain who presented 7/8/17 for abdominal pain. Last admission she was found to be septic with fever and tachycardia (no leukocytosis). She was treated for a UTI and followed up with her PCP. She had persistent RUQ abdominal pain at her PCP visit and was referred to general surgery but has not yet seen them. Her symptoms continue to be associated with nausea and vomiting. In the ER she was treated with pain medication and antiemetics as well as fluids. She then developed shortness of breath and was found to be volume overloaded on CXR. She was started on Lasix 40 IV and BiPAP and admitted to medicine.

## 2017-07-08 NOTE — ASSESSMENT & PLAN NOTE
" SLEEP STUDY INTERPRETATION  POLYSOMNOGRAPHY REPORT      Patient: Nicole Monge  YOB: 1930  Study Date: 6/14/2017  MRN: [id number]  Referring Provider: [facility]  Ordering Provider: Que Treviño MD    Indications for Polysomnography: The patient is a 87 y old Male who is 5' 4\" and weighs 135.0 lbs.  His BMI is 23.3, Harriman sleepiness scale 0.0 and neck size is 37.0.  Relevant medical history includes ischemic cardiomyopathy, stroke, hypertension and intermittent AFib. A diagnostic polysomnogram was performed to evaluate for sleep apnea, RBD given concerns for parasomnia.    Polysomnogram Data:  A full night polysomnogram recorded the standard physiologic parameters including EEG, EOG, EMG, ECG, nasal and oral airflow.  Respiratory parameters of chest and abdominal movements were recorded with respiratory inductance plethysmography.  Oxygen saturation was recorded by pulse oximetry.      Sleep Architecture: Increased sleep latency and prolonged wake after sleep onset resulted in decreased sleep efficiency.   The total recording time of the polysomnogram was 493.8 minutes.  The total sleep time was 356.5 minutes.  Sleep latency was increased at 48.1 minutes without the use of a sleep aid.  REM latency was 211.0 minutes.  Arousal index was normal at 21.9 arousals per hour.  Sleep efficiency was decreased at 72.2%.  Wake after sleep onset was 88.5 minutes.  The patient spent 10.8% of total sleep time in Stage N1, 83.7% in Stage N2, 0.0% in Stages N3, and 5.5% in REM.  Time in REM supine was 0 minutes.    Respiration: No sleep related breathing disorder seen.     Events - The polysomnogram revealed a presence of 0 obstructive, 0 central, and 0 mixed apneas resulting in an apnea index of 0 events per hour.  There were 0 hypopneas resulting in a hypopnea index of 0 events per hour.  The combined apnea/hypopnea index was 0 events per hour.  The REM AHI was 0 events per hour.  The supine AHI was 0 events per " Counseled on weight loss.   hour.  The RERA index was 0.2 events per hour.   The RDI was 0.2 events per hour.    Snoring - was reported as absent.    Respiratory rate and pattern - was notable for normal respiratory rate and pattern.    Sustained Sleep Associated Hypoventilation - Transcutaneous carbon dioxide monitoring was not used, however significant hypoventilation was not suggested by oximetry.    Sleep Associated Hypoxemia - (Greater than 5 minutes O2 sat below 89%) was not present.  Baseline oxygen saturation was 95.8%. Lowest oxygen saturation was 85.9%.  Time spent less than or equal to 88% was 0.1 minutes.  Time spent less than or equal to 89% was 0.1 minutes.  0.2 0.2 -     Movement Activity: No sleep related movement abnormalities noted.     Periodic Limb Activity - There were 125 PLMs during the entire study. The PLM index was 21.0 movements per hour.  The PLM Arousal Index was 7.4 per hour.    REM EMG Activity - Excessive transient / sustained muscle activity was not present.    Nocturnal Behavior - Abnormal sleep related behaviors were not noted during / arising out of NREM / REM sleep.  Patient was noted to whisper through the night during periods of wakefulness.     Bruxism - None apparent.    Cardiac Summary: No cardiac abnormalities noted.   The average pulse rate was 60.0 bpm.  The minimum pulse rate was 46.9 bpm while the maximum pulse rate was 84.4 bpm. The rhythm is normal sinus. Arrhythmias were not noted.      Assessment:     No sleep related breathing disorders noted.    Increased sleep onset latency and prolonged wake after sleep onset resulted in decreased sleep efficiency.    No sleep related movement disorders or dream enactment behavior noted.    REM sleep atonia was preserved.     Patient whispered on several occasions through the night but these were during wakefulness and not during sleep.     No cardiac abnormalities noted.     Recommendations:    Advise regarding the risks of drowsy driving.    Suggest  optimizing sleep schedule and avoiding sleep deprivation.    Pharmacologic therapy should be used for management of restless legs syndrome only if present and clinically indicated and not based on the presence of periodic limb movements alone.    Diagnostic Codes:     Periodic Limb Movement Disorder G47.61    Repetitive Intrusions Into Sleep F51.8    Unspecified Sleep Disturbance G47.9           ELECTRONICALLY SIGNED BY   Cliff Deleon MD (6/16/2017)             Range(%) Time in range (min) Time in range (%) Time in or below range (min) Time in or below range (%)   0.0 - 89.0 0.1 0.0% 0.1 0.0%   0.0 - 88.0 0.1 0.0% 0.1 0.0%      - - -

## 2017-07-08 NOTE — CONSULTS
[facility]  General Surgery  Consult Note    Inpatient consult to General Surgery  Consult performed by: KATHERYN SANTOS  Consult ordered by: AIME TEMPLETON        Subjective:     Chief Complaint/Reason for Admission: abdominal pain    History of Present Illness: 80 yo female recently admitted with abdominal pain and abnormal LFTs.  Found to have sludge at the time and felt to be intermittently passing stones.  On plavix for stents placed 3-4 years ago.  Currently on Bipap with some SOB.    No current facility-administered medications on file prior to encounter.      Current Outpatient Prescriptions on File Prior to Encounter   Medication Sig    aspirin (ECOTRIN) 81 MG EC tablet Take 81 mg by mouth once daily.    carbamazepine (TEGRETOL) 200 mg tablet Take 1 tablet (200 mg total) by mouth 3 (three) times daily.    citalopram (CELEXA) 20 MG tablet ONE TABLET BY MOUTH once a day    cloNIDine (CATAPRES) 0.3 MG tablet Take 1 tablet (0.3 mg total) by mouth 3 (three) times daily.    clopidogrel (PLAVIX) 75 mg tablet Take 1 tablet (75 mg total) by mouth once daily.    furosemide (LASIX) 40 MG tablet TAKE ONE TABLET BY MOUTH EVERY DAY AS NEEDED FOR SHORTNESS OF BREATH or leg swelling    gabapentin (NEURONTIN) 300 MG capsule Take 1 capsule (300 mg total) by mouth 3 (three) times daily.    hydrALAZINE (APRESOLINE) 100 MG tablet ONE TABLET BY MOUTH THREE TIMES DAILY    metoprolol tartrate (LOPRESSOR) 100 MG tablet TAKE ONE TABLET BY MOUTH TWICE DAILY    rosuvastatin (CRESTOR) 20 MG tablet Take 1.5 tablets (30 mg total) by mouth every evening. 1 Tablet Oral Every day    verapamil (VERELAN) 360 MG C24P Take 1 capsule (360 mg total) by mouth once daily.    azelastine (ASTELIN) 137 mcg (0.1 %) nasal spray 1 spray (137 mcg total) by Nasal route 2 (two) times daily.    clotrimazole-betamethasone 1-0.05% (LOTRISONE) cream 2 (two) times daily. Apply to affected area    fluticasone (FLOVENT HFA)  110 mcg/actuation inhaler Inhale 1 puff into the lungs every 12 (twelve) hours.    latanoprost 0.005 % ophthalmic solution Place 1 drop into both eyes nightly.    nitroGLYCERIN (NITROSTAT) 0.4 MG SL tablet Place 0.4 mg under the tongue every 5 (five) minutes as needed for Chest pain.       Review of patient's allergies indicates:   Allergen Reactions    Doxycycline hyclate Diarrhea and Nausea And Vomiting    Singulair [montelukast]      Stomach pain, Muscle pain, Cough      Latex, natural rubber     Penicillins      Other reaction(s): Anaphylaxis    Ventolin  [albuterol sulfate] Other (See Comments)       Past Medical History:   Diagnosis Date    Anemia     Anticoagulant long-term use     Aortic stenosis     Arthritis     Asthma     CAD (coronary artery disease) 4/24/2015    Carpal tunnel syndrome on both sides     Cataract     CHF (congestive heart failure) 5/2013    COPD (chronic obstructive pulmonary disease)     Depression     DVT (deep venous thrombosis)     Hyperlipidemia     Hypertension     Pulmonary HTN     TMJ (temporomandibular joint disorder)      Past Surgical History:   Procedure Laterality Date    BACK SURGERY      cardiac stents      CARPAL TUNNEL RELEASE      Right/Left    HYSTERECTOMY      Partial    JOINT REPLACEMENT  2009    Left hip    TEMPOROMANDIBULAR JOINT SURGERY       Family History     Problem Relation (Age of Onset)    Cancer Son    Heart disease Mother    Hypertension Daughter        Social History Main Topics    Smoking status: Never Smoker    Smokeless tobacco: Never Used    Alcohol use No    Drug use: No    Sexual activity: No     Review of Systems   Constitutional: Negative for chills and fever.   HENT: Negative.    Eyes: Negative.    Respiratory: Positive for shortness of breath. Negative for cough.    Cardiovascular: Negative for chest pain and palpitations.   Gastrointestinal: Positive for abdominal pain and nausea. Negative for constipation and  vomiting.   Musculoskeletal: Negative.    Skin: Negative.    Hematological: Negative.    Psychiatric/Behavioral: Negative.      Objective:     Vital Signs (Most Recent):  Temp: 98.6 °F (37 °C) (07/08/17 1130)  Pulse: 81 (07/08/17 1130)  Resp: (!) 24 (07/08/17 0949)  BP: (!) 190/90 (07/08/17 1246)  SpO2: 99 % (07/08/17 1130) Vital Signs (24h Range):  Temp:  [97.3 °F (36.3 °C)-99 °F (37.2 °C)] 98.6 °F (37 °C)  Pulse:  [67-94] 81  Resp:  [18-26] 24  SpO2:  [90 %-99 %] 99 %  BP: (146-226)/() 190/90     Weight: 91.6 kg (202 lb)  Body mass index is 33.61 kg/m².      Intake/Output Summary (Last 24 hours) at 07/08/17 1255  Last data filed at 07/08/17 0900   Gross per 24 hour   Intake              120 ml   Output              100 ml   Net               20 ml       Physical Exam   Constitutional: She is oriented to person, place, and time. She appears well-developed and well-nourished.   HENT:   Head: Normocephalic and atraumatic.   Eyes: Pupils are equal, round, and reactive to light. No scleral icterus.   Neck: Normal range of motion. No thyromegaly present.   Cardiovascular: Normal rate and regular rhythm.    Pulmonary/Chest: Effort normal.   Coarse BS.  Pt wearing BiPap when examined   Abdominal: Soft. She exhibits no distension. There is no tenderness.   Neurological: She is alert and oriented to person, place, and time.   Skin: Skin is warm and dry.   Psychiatric: She has a normal mood and affect. Thought content normal.       Significant Labs:  CBC:   Recent Labs  Lab 07/08/17  0322   WBC 5.07   RBC 3.35*   HGB 9.8*   HCT 30.7*      MCV 92   MCH 29.3   MCHC 31.9*     CMP:   Recent Labs  Lab 07/08/17  0322   *   CALCIUM 9.2   ALBUMIN 3.5   PROT 7.5      K 4.0   CO2 26      BUN 22   CREATININE 1.2   ALKPHOS 271*   *   *   BILITOT 1.5*       Significant Diagnostics:  CT: I have reviewed all pertinent results/findings within the past 24 hours.    U/S: I have reviewed all  pertinent results/findings within the past 24 hours.      Assessment/Plan:     Active Diagnoses:    Diagnosis Date Noted POA    PRINCIPAL PROBLEM:  Acute on chronic diastolic CHF (congestive heart failure) [I50.33] 06/17/2017 Yes    Respiratory distress [R06.00] 07/08/2017 Yes    Cholelithiasis without cholecystitis [K80.20] 07/08/2017 Yes    Stage 3 chronic kidney disease [N18.3] 07/08/2017 Yes     Chronic    Elevated LFTs [R79.89] 06/17/2017 Yes    Coronary artery disease involving native coronary artery without angina pectoris [I25.10] 05/11/2015 Yes     Chronic    Obesity (BMI 30-39.9) [E66.9] 05/09/2015 Yes     Chronic    Anemia of chronic disease [D63.8] 11/29/2013 Yes     Chronic    AS (aortic stenosis) [I35.0] 11/22/2013 Yes     Chronic    Pulmonary hypertension [I27.2] 09/28/2012 Yes     Chronic    Hypertension [I10]  Yes     Chronic    Hyperlipidemia [E78.5]  Yes     Chronic    Depression [F32.9]  Yes     Chronic      Problems Resolved During this Admission:    Diagnosis Date Noted Date Resolved POA       Thank you for your consult.       I agree that she is likely intermittently passing sludge/stones.  Pain is improving.  Would need to be off plavix for 5 days prior to cholecystectomy.  If she can come off now and would be cleared for surgery, we would be happy to do this while she is here.  Otherwise if her symptoms resolve she can follow up as outpatient.  Does have a high chance of recurrence in the short term.  If bilirubin continues to climb would need ERCP.    Giancarlo Kevin MD  General Surgery  [facility]

## 2017-07-08 NOTE — ED NOTES
Verified Levalbuterol order between MD and pharmacy; MD states pt can receive the Levalbuterol treatment because she has received it and tolerated it in the past; pt's nurse, MD, and respiratory will monitor pt before, during, and after treatment

## 2017-07-08 NOTE — H&P
[facility] Hospital Medicine  History & Physical    Patient Name: Cindy Lyman  MRN: [id number]  Admission Date: 7/8/2017  Attending Physician: Marisela Nielsen MD  Primary Care Provider: Jeremiah Reeves MD         Patient information was obtained from patient, past medical records and ER records.     Subjective:     Principal Problem:Acute on chronic diastolic CHF (congestive heart failure)    Chief Complaint:   Chief Complaint   Patient presents with    Abdominal Pain     via ems with abdominal pain x 1 day , states same symptoms 3 weeks ago and she had sepsis.Denies nausea or vomiting        HPI: Ms. Lyman is a 82 yo woman with obesity (BMI 34), HFpEF, mod AS, CKD3, anemia of chronic disease (baseline ~9.0), chronic constipation, and recent admission for abdominal pain who presented 7/8/17 for abdominal pain. Last admission she was found to be septic with fever and tachycardia (no leukocytosis). She was treated for a UTI and followed up with her PCP. She had persistent RUQ abdominal pain at her PCP visit and was referred to general surgery but has not yet seen them. Her symptoms continue to be associated with nausea and vomiting. In the ER she was treated with pain medication and antiemetics as well as fluids. She then developed shortness of breath and was found to be volume overloaded on CXR. She was started on Lasix 40 IV and BiPAP and admitted to medicine.    Repeat echo and wean O2 as tolerated. US abdomen showed biliary sludge and cholelithiasis but no definite sonographic evidence to suggest acute cholecystitis. GI and gen surg were consulted.     Past Medical History:   Diagnosis Date    Anemia     Anticoagulant long-term use     Aortic stenosis     Arthritis     Asthma     CAD (coronary artery disease) 4/24/2015    Carpal tunnel syndrome on both sides     Cataract     CHF (congestive heart failure) 5/2013    COPD (chronic obstructive pulmonary disease)      Depression     DVT (deep venous thrombosis)     Hyperlipidemia     Hypertension     Pulmonary HTN     TMJ (temporomandibular joint disorder)        Past Surgical History:   Procedure Laterality Date    BACK SURGERY      cardiac stents      CARPAL TUNNEL RELEASE      Right/Left    HYSTERECTOMY      Partial    JOINT REPLACEMENT  2009    Left hip    TEMPOROMANDIBULAR JOINT SURGERY         Review of patient's allergies indicates:   Allergen Reactions    Doxycycline hyclate Diarrhea and Nausea And Vomiting    Singulair [montelukast]      Stomach pain, Muscle pain, Cough      Latex, natural rubber     Penicillins      Other reaction(s): Anaphylaxis    Ventolin  [albuterol sulfate] Other (See Comments)       No current facility-administered medications on file prior to encounter.      Current Outpatient Prescriptions on File Prior to Encounter   Medication Sig    aspirin (ECOTRIN) 81 MG EC tablet Take 81 mg by mouth once daily.    carbamazepine (TEGRETOL) 200 mg tablet Take 1 tablet (200 mg total) by mouth 3 (three) times daily.    citalopram (CELEXA) 20 MG tablet ONE TABLET BY MOUTH once a day    cloNIDine (CATAPRES) 0.3 MG tablet Take 1 tablet (0.3 mg total) by mouth 3 (three) times daily.    clopidogrel (PLAVIX) 75 mg tablet Take 1 tablet (75 mg total) by mouth once daily.    furosemide (LASIX) 40 MG tablet TAKE ONE TABLET BY MOUTH EVERY DAY AS NEEDED FOR SHORTNESS OF BREATH or leg swelling    gabapentin (NEURONTIN) 300 MG capsule Take 1 capsule (300 mg total) by mouth 3 (three) times daily.    hydrALAZINE (APRESOLINE) 100 MG tablet ONE TABLET BY MOUTH THREE TIMES DAILY    metoprolol tartrate (LOPRESSOR) 100 MG tablet TAKE ONE TABLET BY MOUTH TWICE DAILY    rosuvastatin (CRESTOR) 20 MG tablet Take 1.5 tablets (30 mg total) by mouth every evening. 1 Tablet Oral Every day    verapamil (VERELAN) 360 MG C24P Take 1 capsule (360 mg total) by mouth once daily.    azelastine (ASTELIN) 137 mcg (0.1  %) nasal spray 1 spray (137 mcg total) by Nasal route 2 (two) times daily.    clotrimazole-betamethasone 1-0.05% (LOTRISONE) cream 2 (two) times daily. Apply to affected area    fluticasone (FLOVENT HFA) 110 mcg/actuation inhaler Inhale 1 puff into the lungs every 12 (twelve) hours.    latanoprost 0.005 % ophthalmic solution Place 1 drop into both eyes nightly.    nitroGLYCERIN (NITROSTAT) 0.4 MG SL tablet Place 0.4 mg under the tongue every 5 (five) minutes as needed for Chest pain.     Family History     Problem Relation (Age of Onset)    Cancer Son    Heart disease Mother    Hypertension Daughter        Social History Main Topics    Smoking status: Never Smoker    Smokeless tobacco: Never Used    Alcohol use No    Drug use: No    Sexual activity: No     Review of Systems   Constitutional: Negative for chills and fever.   HENT: Negative for congestion and rhinorrhea.    Eyes: Negative for photophobia and visual disturbance.   Respiratory: Positive for shortness of breath. Negative for cough.    Cardiovascular: Negative for chest pain and palpitations.   Gastrointestinal: Positive for abdominal pain, nausea and vomiting.   Genitourinary: Negative for difficulty urinating and dysuria.   Musculoskeletal: Negative for joint swelling and neck stiffness.   Skin: Negative for rash and wound.   Neurological: Negative for dizziness and light-headedness.   Psychiatric/Behavioral: Negative for agitation and behavioral problems.     Objective:     Vital Signs (Most Recent):  Temp: 99 °F (37.2 °C) (07/08/17 0935)  Pulse: 87 (07/08/17 0935)  Resp: (!) 24 (07/08/17 0949)  BP: (!) 210/106 (07/08/17 0949)  SpO2: 97 % (07/08/17 0949) Vital Signs (24h Range):  Temp:  [97.3 °F (36.3 °C)-99 °F (37.2 °C)] 99 °F (37.2 °C)  Pulse:  [67-94] 87  Resp:  [18-26] 24  SpO2:  [90 %-99 %] 97 %  BP: (146-226)/() 210/106     Weight: 91.6 kg (202 lb)  Body mass index is 33.61 kg/m².    Physical Exam   Constitutional: She is oriented  to person, place, and time. She appears well-developed and well-nourished. No distress.   HENT:   Right Ear: External ear normal.   Left Ear: External ear normal.   Nose: Nose normal.   Eyes: EOM are normal. Pupils are equal, round, and reactive to light. No scleral icterus.   Neck: Normal range of motion. Neck supple. No thyromegaly present.   Cardiovascular: Normal rate and normal heart sounds.  Exam reveals no friction rub.    No murmur heard.  Pulmonary/Chest: Effort normal. She has no wheezes. She has rales.   Abdominal: Soft. Bowel sounds are normal. She exhibits no mass. There is tenderness (RUQ).   obese   Musculoskeletal: Normal range of motion. She exhibits no edema or deformity.   Neurological: She is alert and oriented to person, place, and time. No cranial nerve deficit.   Skin: Skin is warm and dry. No pallor.        Significant Labs:   CBC:   Recent Labs  Lab 07/08/17  0322   WBC 5.07   HGB 9.8*   HCT 30.7*        CMP:   Recent Labs  Lab 07/08/17  0322      K 4.0      CO2 26   *   BUN 22   CREATININE 1.2   CALCIUM 9.2   PROT 7.5   ALBUMIN 3.5   BILITOT 1.5*   ALKPHOS 271*   *   *   ANIONGAP 10   EGFRNONAA 43*     Troponin:   Recent Labs  Lab 07/08/17  0322   TROPONINI 0.018     Urine Studies:   Recent Labs  Lab 07/08/17  0330   COLORU Yellow   APPEARANCEUA Clear   PHUR 7.0   SPECGRAV 1.010   PROTEINUA Negative   GLUCUA Negative   KETONESU Negative   BILIRUBINUA Negative   OCCULTUA Negative   NITRITE Negative   UROBILINOGEN 4.0-6.0*   LEUKOCYTESUR Negative       Significant Imaging: I have reviewed all pertinent imaging results/findings within the past 24 hours.    Assessment/Plan:     * Acute on chronic diastolic CHF (congestive heart failure)    Fluids given for n/v, now volume overloaded. Very sensitive to fluids. Check echo 11/16 pEF, +DD, PAP 44, mod AS. Wean O2 as tolerated to sat >88%.        Cholelithiasis without cholecystitis    CC RUQ pain. Consulted  GI and surgery. Elevated LFTs. MRCP ordered.        Elevated LFTs    See above        Respiratory distress    See above        AS (aortic stenosis)    Echo        Pulmonary hypertension    Echo        Stage 3 chronic kidney disease    Stable        Coronary artery disease involving native coronary artery without angina pectoris    Stable. Trop unremarkable. Continue home meds.         Obesity (BMI 30-39.9)    Counseled on weight loss.        Anemia of chronic disease    Stable        Depression    Continue home meds        Hypertension    Cont home meds. PRN hydralazine.          VTE Risk Mitigation         Ordered     enoxaparin injection 40 mg  Daily     Route:  Subcutaneous        07/08/17 0936     Medium Risk of VTE  Once      07/08/17 0936     Place sequential compression device  Until discontinued      07/08/17 0936     Place LAUREANO hose  Until discontinued      07/08/17 0936        Marisela Nielsen MD  Department of Hospital Medicine   [facility]

## 2017-07-08 NOTE — PROGRESS NOTES
Patient returned to room no complaints of pain or SOB noted. Bed in low position call light and phone within reach bed alarm active will continue to monitor.

## 2017-07-09 LAB
ABO + RH BLD: NORMAL
ALBUMIN SERPL BCP-MCNC: 2.7 G/DL
ALP SERPL-CCNC: 211 U/L
ALT SERPL W/O P-5'-P-CCNC: 120 U/L
ANION GAP SERPL CALC-SCNC: 9 MMOL/L
AORTIC VALVE STENOSIS: ABNORMAL
AST SERPL-CCNC: 101 U/L
BASOPHILS # BLD AUTO: 0.01 K/UL
BASOPHILS NFR BLD: 0.2 %
BILIRUB SERPL-MCNC: 3.3 MG/DL
BLD GP AB SCN CELLS X3 SERPL QL: NORMAL
BUN SERPL-MCNC: 29 MG/DL
CALCIUM SERPL-MCNC: 8.6 MG/DL
CHLORIDE SERPL-SCNC: 104 MMOL/L
CO2 SERPL-SCNC: 27 MMOL/L
CREAT SERPL-MCNC: 1.9 MG/DL
DIFFERENTIAL METHOD: ABNORMAL
EOSINOPHIL # BLD AUTO: 0 K/UL
EOSINOPHIL NFR BLD: 0.2 %
ERYTHROCYTE [DISTWIDTH] IN BLOOD BY AUTOMATED COUNT: 14 %
EST. GFR  (AFRICAN AMERICAN): 28 ML/MIN/1.73 M^2
EST. GFR  (NON AFRICAN AMERICAN): 25 ML/MIN/1.73 M^2
ESTIMATED PA SYSTOLIC PRESSURE: 50.89
GLOBAL PERICARDIAL EFFUSION: ABNORMAL
GLUCOSE SERPL-MCNC: 97 MG/DL
HCT VFR BLD AUTO: 25.5 %
HGB BLD-MCNC: 8.1 G/DL
LYMPHOCYTES # BLD AUTO: 0.9 K/UL
LYMPHOCYTES NFR BLD: 17.8 %
MCH RBC QN AUTO: 29.1 PG
MCHC RBC AUTO-ENTMCNC: 31.8 %
MCV RBC AUTO: 92 FL
MITRAL VALVE STENOSIS: ABNORMAL
MONOCYTES # BLD AUTO: 0.4 K/UL
MONOCYTES NFR BLD: 7.7 %
NEUTROPHILS # BLD AUTO: 3.8 K/UL
NEUTROPHILS NFR BLD: 73.9 %
PLATELET # BLD AUTO: 253 K/UL
PMV BLD AUTO: 11.2 FL
POTASSIUM SERPL-SCNC: 4.1 MMOL/L
PROT SERPL-MCNC: 5.9 G/DL
RBC # BLD AUTO: 2.78 M/UL
RETIRED EF AND QEF - SEE NOTES: 55 (ref 55–65)
SODIUM SERPL-SCNC: 140 MMOL/L
TRICUSPID VALVE REGURGITATION: ABNORMAL
WBC # BLD AUTO: 5.18 K/UL

## 2017-07-09 PROCEDURE — 86901 BLOOD TYPING SEROLOGIC RH(D): CPT

## 2017-07-09 PROCEDURE — 63600175 PHARM REV CODE 636 W HCPCS: Performed by: EMERGENCY MEDICINE

## 2017-07-09 PROCEDURE — 36430 TRANSFUSION BLD/BLD COMPNT: CPT

## 2017-07-09 PROCEDURE — 25000003 PHARM REV CODE 250: Performed by: EMERGENCY MEDICINE

## 2017-07-09 PROCEDURE — 94640 AIRWAY INHALATION TREATMENT: CPT

## 2017-07-09 PROCEDURE — 94761 N-INVAS EAR/PLS OXIMETRY MLT: CPT

## 2017-07-09 PROCEDURE — 25000003 PHARM REV CODE 250: Performed by: INTERNAL MEDICINE

## 2017-07-09 PROCEDURE — 27000221 HC OXYGEN, UP TO 24 HOURS

## 2017-07-09 PROCEDURE — 63600175 PHARM REV CODE 636 W HCPCS: Performed by: INTERNAL MEDICINE

## 2017-07-09 PROCEDURE — 21400001 HC TELEMETRY ROOM

## 2017-07-09 PROCEDURE — P9021 RED BLOOD CELLS UNIT: HCPCS

## 2017-07-09 PROCEDURE — 86920 COMPATIBILITY TEST SPIN: CPT

## 2017-07-09 PROCEDURE — 36415 COLL VENOUS BLD VENIPUNCTURE: CPT

## 2017-07-09 PROCEDURE — 80053 COMPREHEN METABOLIC PANEL: CPT

## 2017-07-09 PROCEDURE — 86900 BLOOD TYPING SEROLOGIC ABO: CPT

## 2017-07-09 PROCEDURE — 85025 COMPLETE CBC W/AUTO DIFF WBC: CPT

## 2017-07-09 PROCEDURE — 99223 1ST HOSP IP/OBS HIGH 75: CPT | Mod: ,,, | Performed by: INTERNAL MEDICINE

## 2017-07-09 RX ORDER — HYDROCODONE BITARTRATE AND ACETAMINOPHEN 500; 5 MG/1; MG/1
TABLET ORAL
Status: DISCONTINUED | OUTPATIENT
Start: 2017-07-09 | End: 2017-07-15

## 2017-07-09 RX ORDER — AMOXICILLIN 250 MG
2 CAPSULE ORAL 2 TIMES DAILY
Status: DISCONTINUED | OUTPATIENT
Start: 2017-07-09 | End: 2017-07-13

## 2017-07-09 RX ORDER — METOPROLOL TARTRATE 25 MG/1
25 TABLET, FILM COATED ORAL 2 TIMES DAILY
Status: DISCONTINUED | OUTPATIENT
Start: 2017-07-09 | End: 2017-07-14

## 2017-07-09 RX ADMIN — CARBAMAZEPINE 200 MG: 200 TABLET ORAL at 09:07

## 2017-07-09 RX ADMIN — HYDRALAZINE HYDROCHLORIDE 10 MG: 20 INJECTION INTRAMUSCULAR; INTRAVENOUS at 07:07

## 2017-07-09 RX ADMIN — CLONIDINE HYDROCHLORIDE 0.3 MG: 0.1 TABLET ORAL at 05:07

## 2017-07-09 RX ADMIN — ENOXAPARIN SODIUM 40 MG: 100 INJECTION SUBCUTANEOUS at 06:07

## 2017-07-09 RX ADMIN — HYDRALAZINE HYDROCHLORIDE 100 MG: 25 TABLET ORAL at 01:07

## 2017-07-09 RX ADMIN — Medication 3 ML: at 05:07

## 2017-07-09 RX ADMIN — CLONIDINE HYDROCHLORIDE 0.3 MG: 0.1 TABLET ORAL at 01:07

## 2017-07-09 RX ADMIN — DOCUSATE SODIUM AND SENNOSIDES 2 TABLET: 8.6; 5 TABLET, FILM COATED ORAL at 09:07

## 2017-07-09 RX ADMIN — GABAPENTIN 300 MG: 300 CAPSULE ORAL at 09:07

## 2017-07-09 RX ADMIN — CITALOPRAM HYDROBROMIDE 20 MG: 20 TABLET ORAL at 09:07

## 2017-07-09 RX ADMIN — FUROSEMIDE 40 MG: 10 INJECTION, SOLUTION INTRAMUSCULAR; INTRAVENOUS at 09:07

## 2017-07-09 RX ADMIN — FAMOTIDINE 20 MG: 20 TABLET, FILM COATED ORAL at 09:07

## 2017-07-09 RX ADMIN — METOPROLOL TARTRATE 25 MG: 25 TABLET, FILM COATED ORAL at 09:07

## 2017-07-09 RX ADMIN — Medication 3 ML: at 01:07

## 2017-07-09 RX ADMIN — POLYETHYLENE GLYCOL 3350 17 G: 17 POWDER, FOR SOLUTION ORAL at 09:07

## 2017-07-09 RX ADMIN — CARBAMAZEPINE 200 MG: 200 TABLET ORAL at 05:07

## 2017-07-09 RX ADMIN — GABAPENTIN 300 MG: 300 CAPSULE ORAL at 05:07

## 2017-07-09 RX ADMIN — GABAPENTIN 300 MG: 300 CAPSULE ORAL at 01:07

## 2017-07-09 RX ADMIN — Medication 3 ML: at 09:07

## 2017-07-09 RX ADMIN — ASPIRIN 81 MG: 81 TABLET, COATED ORAL at 09:07

## 2017-07-09 RX ADMIN — FLUTICASONE PROPIONATE 1 PUFF: 110 AEROSOL, METERED RESPIRATORY (INHALATION) at 08:07

## 2017-07-09 RX ADMIN — HYDRALAZINE HYDROCHLORIDE 100 MG: 25 TABLET ORAL at 05:07

## 2017-07-09 RX ADMIN — CLONIDINE HYDROCHLORIDE 0.3 MG: 0.1 TABLET ORAL at 09:07

## 2017-07-09 RX ADMIN — ROSUVASTATIN CALCIUM 30 MG: 10 TABLET ORAL at 09:07

## 2017-07-09 RX ADMIN — CARBAMAZEPINE 200 MG: 200 TABLET ORAL at 01:07

## 2017-07-09 RX ADMIN — FLUTICASONE PROPIONATE 1 PUFF: 110 AEROSOL, METERED RESPIRATORY (INHALATION) at 07:07

## 2017-07-09 RX ADMIN — VERAPAMIL HYDROCHLORIDE 360 MG: 120 TABLET, FILM COATED, EXTENDED RELEASE ORAL at 09:07

## 2017-07-09 RX ADMIN — HYDRALAZINE HYDROCHLORIDE 100 MG: 25 TABLET ORAL at 09:07

## 2017-07-09 NOTE — PROGRESS NOTES
Follows with Kermit in clinic.  Preop for lap titus but delayed while holding plavix.  Pt is intermediate risk with low functional METS.  Ef preserved and no progression in AS.  Will plan fro NST in am and likely clear based on results if no significant ischemia.      Full consult to follow.

## 2017-07-09 NOTE — ASSESSMENT & PLAN NOTE
CC RUQ pain. Consulted GI and surgery. Elevated LFTs. Multiple stones. Plavix on hold for cholecystectomy.

## 2017-07-09 NOTE — PROGRESS NOTES
Dr. Strickland order dental soft diet and NPO at midnight.     @[id number] Dr. Strickland order 1 unit of PRBC's consent signed witness and placed in chart.

## 2017-07-09 NOTE — PROGRESS NOTES
"Informed Dr. Nielsen patient has anxiety when in tight spaces, patient stated "they give me something before a MRI.  Dr. Nielsen order xanax 0.5mg PO once prior to procedure.   "

## 2017-07-09 NOTE — HPI
"81 year old female presents to the ED via EMS c/o abdominal pain and SOB.  Pt has a past medical history of Anemia; Anticoagulant long-term use; Aortic stenosis; Arthritis; Asthma; CAD; Carpal tunnel syndrome on both sides; Cataract; CHF; COPD; Depression; DVT; Hyperlipidemia; Hypertension; Pulmonary HTN; and TMJ.  Pt c/o generalized abdominal pain with associated nausea and "dry heaving" since yesterday morning. Symptoms are acute onset and severe (10/10). Pt reports the pain begins at the L side of her abdomen and radiates across to her R abdomen and R mid-back. Pt had a BM last night with no irregularities or complications. Pt otherwise denies fever, chills, diaphoresis, bloody stool, dysuria, and any other associated symptoms.    Patient follows with Dr. Carmen in clinic.  She was last seen several months ago and was stable from cardiac standpoint.  She's followed for routine surveillance for aortic stenosis.  She did have diagnostic testing over a year ago which was stable.  She denies any current cardiopulmonary complaints.  She is preop for possible cholecystectomy.  Plavix is currently held for the procedure.  She does have history of PCI with stent placement couple years ago.  She has very low functional exercise tolerance.  She's agreeable to repeat ischemic workup to clear for surgery from cardiac standpoint.  "

## 2017-07-09 NOTE — CONSULTS
"  [facility]  Cardiology  Consult Note    Patient Name: Cindy Lyman  MRN: [id number]  Admission Date: 7/8/2017  Hospital Length of Stay: 1 days  Code Status: Full Code   Attending Provider: Marisela Nielsen MD   Consulting Provider: Cresencio Alfredo MD  Primary Care Physician: Jeremiah Reeves MD  Principal Problem:Acute on chronic diastolic CHF (congestive heart failure)    Patient information was obtained from patient, past medical records and ER records.     Consults preop clearance  Subjective:     Chief Complaint:  Abdominal pain and shortness of breath     HPI:   81 year old female presents to the ED via EMS c/o abdominal pain and SOB.  Pt has a past medical history of Anemia; Anticoagulant long-term use; Aortic stenosis; Arthritis; Asthma; CAD; Carpal tunnel syndrome on both sides; Cataract; CHF; COPD; Depression; DVT; Hyperlipidemia; Hypertension; Pulmonary HTN; and TMJ.  Pt c/o generalized abdominal pain with associated nausea and "dry heaving" since yesterday morning. Symptoms are acute onset and severe (10/10). Pt reports the pain begins at the L side of her abdomen and radiates across to her R abdomen and R mid-back. Pt had a BM last night with no irregularities or complications. Pt otherwise denies fever, chills, diaphoresis, bloody stool, dysuria, and any other associated symptoms.    Patient follows with Dr. Carmen in clinic.  She was last seen several months ago and was stable from cardiac standpoint.  She's followed for routine surveillance for aortic stenosis.  She did have diagnostic testing over a year ago which was stable.  She denies any current cardiopulmonary complaints.  She is preop for possible cholecystectomy.  Plavix is currently held for the procedure.  She does have history of PCI with stent placement couple years ago.  She has very low functional exercise tolerance.  She's agreeable to repeat ischemic workup to clear for surgery from cardiac " standpoint.    Past Medical History:   Diagnosis Date    Anemia     Anticoagulant long-term use     Aortic stenosis     Arthritis     Asthma     CAD (coronary artery disease) 4/24/2015    Carpal tunnel syndrome on both sides     Cataract     CHF (congestive heart failure) 5/2013    COPD (chronic obstructive pulmonary disease)     Depression     DVT (deep venous thrombosis)     Hyperlipidemia     Hypertension     Pulmonary HTN     TMJ (temporomandibular joint disorder)        Past Surgical History:   Procedure Laterality Date    BACK SURGERY      cardiac stents      CARPAL TUNNEL RELEASE      Right/Left    HYSTERECTOMY      Partial    JOINT REPLACEMENT  2009    Left hip    TEMPOROMANDIBULAR JOINT SURGERY         Review of patient's allergies indicates:   Allergen Reactions    Doxycycline hyclate Diarrhea and Nausea And Vomiting    Singulair [montelukast]      Stomach pain, Muscle pain, Cough      Latex, natural rubber     Penicillins      Other reaction(s): Anaphylaxis    Ventolin  [albuterol sulfate] Other (See Comments)       No current facility-administered medications on file prior to encounter.      Current Outpatient Prescriptions on File Prior to Encounter   Medication Sig    aspirin (ECOTRIN) 81 MG EC tablet Take 81 mg by mouth once daily.    carbamazepine (TEGRETOL) 200 mg tablet Take 1 tablet (200 mg total) by mouth 3 (three) times daily.    citalopram (CELEXA) 20 MG tablet ONE TABLET BY MOUTH once a day    cloNIDine (CATAPRES) 0.3 MG tablet Take 1 tablet (0.3 mg total) by mouth 3 (three) times daily.    clopidogrel (PLAVIX) 75 mg tablet Take 1 tablet (75 mg total) by mouth once daily.    furosemide (LASIX) 40 MG tablet TAKE ONE TABLET BY MOUTH EVERY DAY AS NEEDED FOR SHORTNESS OF BREATH or leg swelling    gabapentin (NEURONTIN) 300 MG capsule Take 1 capsule (300 mg total) by mouth 3 (three) times daily.    hydrALAZINE (APRESOLINE) 100 MG tablet ONE TABLET BY MOUTH THREE  TIMES DAILY    metoprolol tartrate (LOPRESSOR) 100 MG tablet TAKE ONE TABLET BY MOUTH TWICE DAILY    rosuvastatin (CRESTOR) 20 MG tablet Take 1.5 tablets (30 mg total) by mouth every evening. 1 Tablet Oral Every day    verapamil (VERELAN) 360 MG C24P Take 1 capsule (360 mg total) by mouth once daily.    azelastine (ASTELIN) 137 mcg (0.1 %) nasal spray 1 spray (137 mcg total) by Nasal route 2 (two) times daily.    clotrimazole-betamethasone 1-0.05% (LOTRISONE) cream 2 (two) times daily. Apply to affected area    fluticasone (FLOVENT HFA) 110 mcg/actuation inhaler Inhale 1 puff into the lungs every 12 (twelve) hours.    latanoprost 0.005 % ophthalmic solution Place 1 drop into both eyes nightly.    nitroGLYCERIN (NITROSTAT) 0.4 MG SL tablet Place 0.4 mg under the tongue every 5 (five) minutes as needed for Chest pain.     Family History     Problem Relation (Age of Onset)    Cancer Son    Heart disease Mother    Hypertension Daughter        Social History Main Topics    Smoking status: Never Smoker    Smokeless tobacco: Never Used    Alcohol use No    Drug use: No    Sexual activity: No     Review of Systems   Constitution: Negative.   HENT: Negative.    Eyes: Negative.    Cardiovascular: Positive for dyspnea on exertion. Negative for chest pain, irregular heartbeat, leg swelling, near-syncope, orthopnea, palpitations, paroxysmal nocturnal dyspnea and syncope.   Respiratory: Positive for shortness of breath.    Skin: Negative.    Musculoskeletal: Negative.    Gastrointestinal: Positive for abdominal pain. Negative for constipation and diarrhea.   Genitourinary: Negative for dysuria.   Neurological: Negative.    Psychiatric/Behavioral: Negative.      Objective:     Vital Signs (Most Recent):  Temp: 98.2 °F (36.8 °C) (07/09/17 1100)  Pulse: 67 (07/09/17 1100)  Resp: 20 (07/09/17 0816)  BP: (!) 164/73 (07/09/17 1100)  SpO2: 96 % (07/09/17 1100) Vital Signs (24h Range):  Temp:  [97.8 °F (36.6 °C)-98.7 °F  (37.1 °C)] 98.2 °F (36.8 °C)  Pulse:  [] 67  Resp:  [18-22] 20  SpO2:  [95 %-100 %] 96 %  BP: (124-166)/(60-96) 164/73     Weight: 91.3 kg (201 lb 4.5 oz)  Body mass index is 33.49 kg/m².    SpO2: 96 %  O2 Device (Oxygen Therapy): nasal cannula      Intake/Output Summary (Last 24 hours) at 07/09/17 1543  Last data filed at 07/09/17 1242   Gross per 24 hour   Intake              560 ml   Output              500 ml   Net               60 ml       Lines/Drains/Airways     Peripheral Intravenous Line                 Peripheral IV - Single Lumen 07/08/17 Left Wrist 1 day                Physical Exam   Constitutional: She is oriented to person, place, and time. She appears well-developed and well-nourished.   HENT:   Head: Normocephalic and atraumatic.   Eyes: Conjunctivae and EOM are normal. Pupils are equal, round, and reactive to light.   Neck: Normal range of motion. Neck supple. No thyromegaly present.   Cardiovascular: Normal rate and regular rhythm.    No murmur heard.  Pulmonary/Chest: Effort normal and breath sounds normal. No respiratory distress.   Abdominal: Soft. Bowel sounds are normal. There is tenderness. There is no rebound and no guarding.   Musculoskeletal: She exhibits no edema.   Neurological: She is alert and oriented to person, place, and time.   Skin: Skin is warm and dry.   Psychiatric: She has a normal mood and affect. Her behavior is normal.       Significant Labs:   CMP   Recent Labs  Lab 07/08/17  0322 07/09/17  0410    140   K 4.0 4.1    104   CO2 26 27   * 97   BUN 22 29*   CREATININE 1.2 1.9*   CALCIUM 9.2 8.6*   PROT 7.5 5.9*   ALBUMIN 3.5 2.7*   BILITOT 1.5* 3.3*   ALKPHOS 271* 211*   * 101*   * 120*   ANIONGAP 10 9   ESTGFRAFRICA 50* 28*   EGFRNONAA 43* 25*   , CBC   Recent Labs  Lab 07/08/17  0322 07/09/17  0410   WBC 5.07 5.18   HGB 9.8* 8.1*   HCT 30.7* 25.5*    253   , INR   Recent Labs  Lab 07/08/17  1049   INR 1.0   , Lipid Panel No  results for input(s): CHOL, HDL, LDLCALC, TRIG, CHOLHDL in the last 48 hours. and Troponin   Recent Labs  Lab 07/08/17  0322 07/08/17  1049 07/08/17  1747   TROPONINI 0.018 0.058* 0.079*       Significant Imaging: Echocardiogram:   2D echo with color flow doppler:   Results for orders placed or performed during the hospital encounter of 07/08/17   2D echo with color flow doppler   Result Value Ref Range    EF 55 55 - 65    Aortic Valve Stenosis MODERATE (A)     Est. PA Systolic Pressure 50.89 (A)     Pericardial Effusion NONE     Mitral Valve Stenosis MILD (A)     Tricuspid Valve Regurgitation TRIVIAL TO MILD      Assessment and Plan:     Preop examination    Patient is intermediate pretest probability with low functional METS  Plan for repeat ischemic workup in a.m. and clearance based off results        Stage 3 chronic kidney disease    Continue follow serum creatinine         Cholelithiasis without cholecystitis    Plan is for surgery if cleared from cardiac standpoint        Coronary artery disease involving native coronary artery without angina pectoris    Continues of medicines for secondary prevention  History of PCI to the LAD in 2015 with  Plavix on hold for possible surgery  No new signs of ACS  Nuclear stress test as above        Obesity (BMI 30-39.9)    Encouraged diet and exercise as tolerated        AS (aortic stenosis)    Moderate by recent echo  Continue outpatient surveillance  No further intervention required currently        Hyperlipidemia    On statin        Hypertension                 VTE Risk Mitigation         Ordered     enoxaparin injection 40 mg  Daily     Route:  Subcutaneous        07/08/17 0936     Medium Risk of VTE  Once      07/08/17 0936     Place sequential compression device  Until discontinued      07/08/17 0936     Place LAUREANO hose  Until discontinued      07/08/17 0936          Thank you for your consult. I will follow-up with patient. Please contact us if you have any additional  questions.    Cresencio Alfredo MD  Cardiology   [facility]

## 2017-07-09 NOTE — SUBJECTIVE & OBJECTIVE
Review of Systems   Constitutional: Negative for chills and fever.   HENT: Negative for congestion and rhinorrhea.    Eyes: Negative for photophobia and visual disturbance.   Respiratory: Negative for cough and shortness of breath.    Cardiovascular: Negative for chest pain and palpitations.   Gastrointestinal: Positive for abdominal pain. Negative for nausea and vomiting.   Genitourinary: Negative for difficulty urinating and dysuria.   Musculoskeletal: Negative for joint swelling and neck stiffness.   Skin: Negative for rash and wound.   Neurological: Negative for dizziness and light-headedness.   Psychiatric/Behavioral: Negative for agitation and behavioral problems.     Objective:     Vital Signs (Most Recent):  Temp: 99.2 °F (37.3 °C) (07/09/17 1600)  Pulse: 62 (07/09/17 1600)  Resp: 20 (07/09/17 0816)  BP: (!) 183/78 (07/09/17 1600)  SpO2: 96 % (07/09/17 1700) Vital Signs (24h Range):  Temp:  [97.8 °F (36.6 °C)-99.2 °F (37.3 °C)] 99.2 °F (37.3 °C)  Pulse:  [] 62  Resp:  [18-20] 20  SpO2:  [90 %-100 %] 96 %  BP: (124-183)/(60-96) 183/78     Weight: 91.3 kg (201 lb 4.5 oz)  Body mass index is 33.49 kg/m².    Physical Exam   Constitutional: She is oriented to person, place, and time. She appears well-developed and well-nourished. No distress.   Pleasant   HENT:   Right Ear: External ear normal.   Left Ear: External ear normal.   Nose: Nose normal.   Eyes: EOM are normal. Pupils are equal, round, and reactive to light. No scleral icterus.   Neck: Normal range of motion. Neck supple. No thyromegaly present.   Cardiovascular: Normal rate and normal heart sounds.  Exam reveals no friction rub.    No murmur heard.  Pulmonary/Chest: Effort normal. She has no wheezes. She has no rales.   Abdominal: Soft. Bowel sounds are normal. She exhibits no mass. There is tenderness (RUQ).   obese   Musculoskeletal: Normal range of motion. She exhibits no edema or deformity.   Neurological: She is alert and oriented to person,  place, and time. No cranial nerve deficit.   Skin: Skin is warm and dry. No pallor.   Psychiatric: She has a normal mood and affect. Her behavior is normal. Thought content normal.        Significant Labs:   CBC:     Recent Labs  Lab 07/08/17 0322 07/09/17  0410   WBC 5.07 5.18   HGB 9.8* 8.1*   HCT 30.7* 25.5*    253     CMP:     Recent Labs  Lab 07/08/17 0322 07/09/17  0410    140   K 4.0 4.1    104   CO2 26 27   * 97   BUN 22 29*   CREATININE 1.2 1.9*   CALCIUM 9.2 8.6*   PROT 7.5 5.9*   ALBUMIN 3.5 2.7*   BILITOT 1.5* 3.3*   ALKPHOS 271* 211*   * 101*   * 120*   ANIONGAP 10 9   EGFRNONAA 43* 25*     Troponin:     Recent Labs  Lab 07/08/17 0322 07/08/17  1049 07/08/17  1747   TROPONINI 0.018 0.058* 0.079*     Urine Studies:     Recent Labs  Lab 07/08/17  0330   COLORU Yellow   APPEARANCEUA Clear   PHUR 7.0   SPECGRAV 1.010   PROTEINUA Negative   GLUCUA Negative   KETONESU Negative   BILIRUBINUA Negative   OCCULTUA Negative   NITRITE Negative   UROBILINOGEN 4.0-6.0*   LEUKOCYTESUR Negative       Significant Imaging: I have reviewed all pertinent imaging results/findings within the past 24 hours.

## 2017-07-09 NOTE — ASSESSMENT & PLAN NOTE
Fluids given for n/v and became volume overloaded. Very sensitive to fluids. Echo with EF 50%, stable mod AS. Similar compared to 11/2016 pEF, +DD, PAP 44, mod AS. O2 weaned. Lasix held 2/2 elevated creatinine. Plan for NST 7/10/17 for pre-op risk assess.

## 2017-07-09 NOTE — PROGRESS NOTES
"[facility] Medicine  Progress Note    Patient Name: Cindy Lyman  MRN: [id number]  Patient Class: IP- Inpatient   Admission Date: 7/8/2017  Length of Stay: 1 days  Attending Physician: Marisela Nielsen MD  Primary Care Provider: Jeremiah Reeves MD        Subjective:     Principal Problem:Acute on chronic diastolic CHF (congestive heart failure)    HPI:  Ms. Lyamn is a 80 yo woman with obesity (BMI 34), HFpEF, mod AS, CKD3, anemia of chronic disease (baseline ~9.0), chronic constipation, and recent admission for abdominal pain who presented 7/8/17 for abdominal pain. Last admission she was found to be septic with fever and tachycardia (no leukocytosis). She was treated for a UTI and followed up with her PCP. She had persistent RUQ abdominal pain at her PCP visit and was referred to general surgery but has not yet seen them. Her symptoms continue to be associated with nausea and vomiting. In the ER she was treated with pain medication and antiemetics as well as fluids. She then developed shortness of breath and was found to be volume overloaded on CXR. She was started on Lasix 40 IV and BiPAP and admitted to medicine.    Hospital Course:  Cardiology, GI, and gen surg were consulted. Quickly weaned off of BiPAP to room air. Lasix initially, then held due to elevated creatinine. Echo 7/8/17 w/ LVEF 55%, mod AS, mild MS and TR, PAP 51. NST 7/10/2017.  US abdomen showed biliary sludge and cholelithiasis but no definite sonographic evidence to suggest acute cholecystitis. MRCP showed, "Markedly distended gallbladder with innumerable dependent gallstones.  There has been development of pericholecystic fluid near the fundus of the gallbladder that could reflect the sequela of cholecystitis" as well as a mild to moderate pericardial effusion. Plavix held in anticipation of cholecystectomy.  H&H 8.1/25.5. Transfused RBCs 7/9/17.    Review of Systems   Constitutional: Negative for " chills and fever.   HENT: Negative for congestion and rhinorrhea.    Eyes: Negative for photophobia and visual disturbance.   Respiratory: Negative for cough and shortness of breath.    Cardiovascular: Negative for chest pain and palpitations.   Gastrointestinal: Positive for abdominal pain. Negative for nausea and vomiting.   Genitourinary: Negative for difficulty urinating and dysuria.   Musculoskeletal: Negative for joint swelling and neck stiffness.   Skin: Negative for rash and wound.   Neurological: Negative for dizziness and light-headedness.   Psychiatric/Behavioral: Negative for agitation and behavioral problems.     Objective:     Vital Signs (Most Recent):  Temp: 99.2 °F (37.3 °C) (07/09/17 1600)  Pulse: 62 (07/09/17 1600)  Resp: 20 (07/09/17 0816)  BP: (!) 183/78 (07/09/17 1600)  SpO2: 96 % (07/09/17 1700) Vital Signs (24h Range):  Temp:  [97.8 °F (36.6 °C)-99.2 °F (37.3 °C)] 99.2 °F (37.3 °C)  Pulse:  [] 62  Resp:  [18-20] 20  SpO2:  [90 %-100 %] 96 %  BP: (124-183)/(60-96) 183/78     Weight: 91.3 kg (201 lb 4.5 oz)  Body mass index is 33.49 kg/m².    Physical Exam   Constitutional: She is oriented to person, place, and time. She appears well-developed and well-nourished. No distress.   Pleasant   HENT:   Right Ear: External ear normal.   Left Ear: External ear normal.   Nose: Nose normal.   Eyes: EOM are normal. Pupils are equal, round, and reactive to light. No scleral icterus.   Neck: Normal range of motion. Neck supple. No thyromegaly present.   Cardiovascular: Normal rate and normal heart sounds.  Exam reveals no friction rub.    No murmur heard.  Pulmonary/Chest: Effort normal. She has no wheezes. She has no rales.   Abdominal: Soft. Bowel sounds are normal. She exhibits no mass. There is tenderness (RUQ).   obese   Musculoskeletal: Normal range of motion. She exhibits no edema or deformity.   Neurological: She is alert and oriented to person, place, and time. No cranial nerve deficit.    Skin: Skin is warm and dry. No pallor.   Psychiatric: She has a normal mood and affect. Her behavior is normal. Thought content normal.        Significant Labs:   CBC:     Recent Labs  Lab 07/08/17  0322 07/09/17  0410   WBC 5.07 5.18   HGB 9.8* 8.1*   HCT 30.7* 25.5*    253     CMP:     Recent Labs  Lab 07/08/17 0322 07/09/17  0410    140   K 4.0 4.1    104   CO2 26 27   * 97   BUN 22 29*   CREATININE 1.2 1.9*   CALCIUM 9.2 8.6*   PROT 7.5 5.9*   ALBUMIN 3.5 2.7*   BILITOT 1.5* 3.3*   ALKPHOS 271* 211*   * 101*   * 120*   ANIONGAP 10 9   EGFRNONAA 43* 25*     Troponin:     Recent Labs  Lab 07/08/17 0322 07/08/17  1049 07/08/17  1747   TROPONINI 0.018 0.058* 0.079*     Urine Studies:     Recent Labs  Lab 07/08/17  0330   COLORU Yellow   APPEARANCEUA Clear   PHUR 7.0   SPECGRAV 1.010   PROTEINUA Negative   GLUCUA Negative   KETONESU Negative   BILIRUBINUA Negative   OCCULTUA Negative   NITRITE Negative   UROBILINOGEN 4.0-6.0*   LEUKOCYTESUR Negative       Significant Imaging: I have reviewed all pertinent imaging results/findings within the past 24 hours.    Assessment/Plan:      * Acute on chronic diastolic CHF (congestive heart failure)    Fluids given for n/v and became volume overloaded. Very sensitive to fluids. Echo with EF 50%, stable mod AS. Similar compared to 11/2016 pEF, +DD, PAP 44, mod AS. O2 weaned. Lasix held 2/2 elevated creatinine. Plan for NST 7/10/17 for pre-op risk assess.        Cholelithiasis without cholecystitis    CC RUQ pain. Consulted GI and surgery. Elevated LFTs. Multiple stones. Plavix on hold for cholecystectomy.        Elevated LFTs    See above        Respiratory distress    See above        AS (aortic stenosis)    Echo        Pulmonary hypertension    Echo        Stage 3 chronic kidney disease    Stable        Coronary artery disease involving native coronary artery without angina pectoris    Stable. Trop unremarkable. Continue home meds.          Obesity (BMI 30-39.9)    Counseled on weight loss.        Anemia of chronic disease    Stable        Depression    Continue home meds        Hypertension    Cont home meds. PRN hydralazine.          VTE Risk Mitigation         Ordered     enoxaparin injection 40 mg  Daily     Route:  Subcutaneous        07/08/17 0936     Medium Risk of VTE  Once      07/08/17 0936     Place sequential compression device  Until discontinued      07/08/17 0936     Place LAUREANO hose  Until discontinued      07/08/17 0936          Marisela Nielsen MD  Department of Hospital Medicine   [facility]

## 2017-07-09 NOTE — ASSESSMENT & PLAN NOTE
Continues of medicines for secondary prevention  History of PCI to the LAD in 2015 with  Plavix on hold for possible surgery  No new signs of ACS  Nuclear stress test as above

## 2017-07-09 NOTE — ASSESSMENT & PLAN NOTE
Patient is intermediate pretest probability with low functional METS  Plan for repeat ischemic workup in a.m. and clearance based off results

## 2017-07-09 NOTE — ASSESSMENT & PLAN NOTE
Moderate by recent echo  Continue outpatient surveillance  No further intervention required currently

## 2017-07-09 NOTE — PROGRESS NOTES
Chief Complaint / Reason for Consult:abdominal pain  80 yo woman with hx of gallstones  Symptomatic admitted for lap titus. Had elevated LFT's MRCP found no gall stones in CBD even though study was sub optimal. Patient has multiple mediacal problems on plavix. On hold for 2 days. Significat drop in H/H no signs of active GI bleeding.    ROS: no chest pain or SOB. Has some abdominal pain or melena or BRBPR.    Patient Vitals for the past 24 hrs:   BP Temp Temp src Pulse Resp SpO2 Weight   07/09/17 0816 - - - (!) 53 20 96 % -   07/09/17 0740 - - - (!) 54 - - -   07/09/17 0701 124/60 98.6 °F (37 °C) - (!) 55 18 99 % -   07/09/17 0700 124/60 98.6 °F (37 °C) - (!) 55 18 99 % -   07/09/17 0540 (!) 156/69 98 °F (36.7 °C) Oral 62 18 96 % 91.3 kg (201 lb 4.5 oz)   07/09/17 0213 - - - (!) 52 - - -   07/09/17 0019 (!) 141/92 97.8 °F (36.6 °C) Oral (!) 111 18 98 % -   07/08/17 2041 (!) 158/96 98.7 °F (37.1 °C) Oral 62 19 100 % -   07/08/17 1910 - - - 61 - - -   07/08/17 1600 (!) 166/77 98.3 °F (36.8 °C) - (!) 54 (!) 22 95 % -   07/08/17 1351 - - - - - 98 % -   07/08/17 1246 (!) 190/90 - - - - - -   07/08/17 1130 (!) 198/72 98.6 °F (37 °C) - 81 - 99 % -   07/08/17 0949 (!) 210/106 - - - (!) 24 97 % -   07/08/17 0935 (!) 226/98 99 °F (37.2 °C) Oral 87 (!) 25 95 % -       Physical Exam:  Gen - Well developed, well nourished, no apparent distress  HEENT - Anicteric  CV - S1, S2, no murmurs/rubs  Lungs - CTA-B, normal excursion  Abd - Soft, diffuse upper quadrant tenderness, ND, normal BS's, no HSM.  Ext - No c/c/e  Neuro - No asterixis    Labs:    Recent Labs  Lab 07/09/17  0410   WBC 5.18   RBC 2.78*   HGB 8.1*   HCT 25.5*      MCV 92   MCH 29.1   MCHC 31.8*       Recent Labs  Lab 07/09/17  0410   CALCIUM 8.6*   PROT 5.9*      K 4.1   CO2 27      BUN 29*   CREATININE 1.9*   ALKPHOS 211*   *   *   BILITOT 3.3*       Recent Labs  Lab 07/08/17  1049   INR 1.0       Imaging:  MRCP results  noted    Assessment:  This patient is a 81 y.o. female with:   1. Symptomatic gallstones  2. Abnormal LFT's : r/o CBD stone  3.Anemia. multifactoreal no Signs of GI bleeding  4. Multiple medical problems.    Recommendations:  1.  transfuse PRBC in light of her multiple medical problems. Lap titus with IOC when stable.

## 2017-07-09 NOTE — PROGRESS NOTES
Pain controlled.    Vitals:    07/09/17 1600 07/09/17 1700 07/09/17 1802 07/09/17 1810   BP: (!) 183/78   (!) 169/76   BP Location:       Patient Position:       BP Method:       Pulse: 62      Resp:       Temp: 99.2 °F (37.3 °C)      TempSrc:       SpO2: (!) 90% 96% (!) 94%    Weight:       Height:         A/O x 3  RRR  Soft, nontender    Labs reviewed, bili trending up.    A/P cholelithiasis, possible choledocholithiasis.  plavix on hold  CAD--workup for clearance in process by cardiology.    May need ERCP prior to cholecystectomy

## 2017-07-09 NOTE — SUBJECTIVE & OBJECTIVE
Past Medical History:   Diagnosis Date    Anemia     Anticoagulant long-term use     Aortic stenosis     Arthritis     Asthma     CAD (coronary artery disease) 4/24/2015    Carpal tunnel syndrome on both sides     Cataract     CHF (congestive heart failure) 5/2013    COPD (chronic obstructive pulmonary disease)     Depression     DVT (deep venous thrombosis)     Hyperlipidemia     Hypertension     Pulmonary HTN     TMJ (temporomandibular joint disorder)        Past Surgical History:   Procedure Laterality Date    BACK SURGERY      cardiac stents      CARPAL TUNNEL RELEASE      Right/Left    HYSTERECTOMY      Partial    JOINT REPLACEMENT  2009    Left hip    TEMPOROMANDIBULAR JOINT SURGERY         Review of patient's allergies indicates:   Allergen Reactions    Doxycycline hyclate Diarrhea and Nausea And Vomiting    Singulair [montelukast]      Stomach pain, Muscle pain, Cough      Latex, natural rubber     Penicillins      Other reaction(s): Anaphylaxis    Ventolin  [albuterol sulfate] Other (See Comments)       No current facility-administered medications on file prior to encounter.      Current Outpatient Prescriptions on File Prior to Encounter   Medication Sig    aspirin (ECOTRIN) 81 MG EC tablet Take 81 mg by mouth once daily.    carbamazepine (TEGRETOL) 200 mg tablet Take 1 tablet (200 mg total) by mouth 3 (three) times daily.    citalopram (CELEXA) 20 MG tablet ONE TABLET BY MOUTH once a day    cloNIDine (CATAPRES) 0.3 MG tablet Take 1 tablet (0.3 mg total) by mouth 3 (three) times daily.    clopidogrel (PLAVIX) 75 mg tablet Take 1 tablet (75 mg total) by mouth once daily.    furosemide (LASIX) 40 MG tablet TAKE ONE TABLET BY MOUTH EVERY DAY AS NEEDED FOR SHORTNESS OF BREATH or leg swelling    gabapentin (NEURONTIN) 300 MG capsule Take 1 capsule (300 mg total) by mouth 3 (three) times daily.    hydrALAZINE (APRESOLINE) 100 MG tablet ONE TABLET BY MOUTH THREE TIMES DAILY     metoprolol tartrate (LOPRESSOR) 100 MG tablet TAKE ONE TABLET BY MOUTH TWICE DAILY    rosuvastatin (CRESTOR) 20 MG tablet Take 1.5 tablets (30 mg total) by mouth every evening. 1 Tablet Oral Every day    verapamil (VERELAN) 360 MG C24P Take 1 capsule (360 mg total) by mouth once daily.    azelastine (ASTELIN) 137 mcg (0.1 %) nasal spray 1 spray (137 mcg total) by Nasal route 2 (two) times daily.    clotrimazole-betamethasone 1-0.05% (LOTRISONE) cream 2 (two) times daily. Apply to affected area    fluticasone (FLOVENT HFA) 110 mcg/actuation inhaler Inhale 1 puff into the lungs every 12 (twelve) hours.    latanoprost 0.005 % ophthalmic solution Place 1 drop into both eyes nightly.    nitroGLYCERIN (NITROSTAT) 0.4 MG SL tablet Place 0.4 mg under the tongue every 5 (five) minutes as needed for Chest pain.     Family History     Problem Relation (Age of Onset)    Cancer Son    Heart disease Mother    Hypertension Daughter        Social History Main Topics    Smoking status: Never Smoker    Smokeless tobacco: Never Used    Alcohol use No    Drug use: No    Sexual activity: No     Review of Systems   Constitution: Negative.   HENT: Negative.    Eyes: Negative.    Cardiovascular: Positive for dyspnea on exertion. Negative for chest pain, irregular heartbeat, leg swelling, near-syncope, orthopnea, palpitations, paroxysmal nocturnal dyspnea and syncope.   Respiratory: Positive for shortness of breath.    Skin: Negative.    Musculoskeletal: Negative.    Gastrointestinal: Positive for abdominal pain. Negative for constipation and diarrhea.   Genitourinary: Negative for dysuria.   Neurological: Negative.    Psychiatric/Behavioral: Negative.      Objective:     Vital Signs (Most Recent):  Temp: 98.2 °F (36.8 °C) (07/09/17 1100)  Pulse: 67 (07/09/17 1100)  Resp: 20 (07/09/17 0816)  BP: (!) 164/73 (07/09/17 1100)  SpO2: 96 % (07/09/17 1100) Vital Signs (24h Range):  Temp:  [97.8 °F (36.6 °C)-98.7 °F (37.1 °C)] 98.2 °F  (36.8 °C)  Pulse:  [] 67  Resp:  [18-22] 20  SpO2:  [95 %-100 %] 96 %  BP: (124-166)/(60-96) 164/73     Weight: 91.3 kg (201 lb 4.5 oz)  Body mass index is 33.49 kg/m².    SpO2: 96 %  O2 Device (Oxygen Therapy): nasal cannula      Intake/Output Summary (Last 24 hours) at 07/09/17 1543  Last data filed at 07/09/17 1242   Gross per 24 hour   Intake              560 ml   Output              500 ml   Net               60 ml       Lines/Drains/Airways     Peripheral Intravenous Line                 Peripheral IV - Single Lumen 07/08/17 Left Wrist 1 day                Physical Exam   Constitutional: She is oriented to person, place, and time. She appears well-developed and well-nourished.   HENT:   Head: Normocephalic and atraumatic.   Eyes: Conjunctivae and EOM are normal. Pupils are equal, round, and reactive to light.   Neck: Normal range of motion. Neck supple. No thyromegaly present.   Cardiovascular: Normal rate and regular rhythm.    No murmur heard.  Pulmonary/Chest: Effort normal and breath sounds normal. No respiratory distress.   Abdominal: Soft. Bowel sounds are normal. There is tenderness. There is no rebound and no guarding.   Musculoskeletal: She exhibits no edema.   Neurological: She is alert and oriented to person, place, and time.   Skin: Skin is warm and dry.   Psychiatric: She has a normal mood and affect. Her behavior is normal.       Significant Labs:   CMP   Recent Labs  Lab 07/08/17  0322 07/09/17  0410    140   K 4.0 4.1    104   CO2 26 27   * 97   BUN 22 29*   CREATININE 1.2 1.9*   CALCIUM 9.2 8.6*   PROT 7.5 5.9*   ALBUMIN 3.5 2.7*   BILITOT 1.5* 3.3*   ALKPHOS 271* 211*   * 101*   * 120*   ANIONGAP 10 9   ESTGFRAFRICA 50* 28*   EGFRNONAA 43* 25*   , CBC   Recent Labs  Lab 07/08/17  0322 07/09/17  0410   WBC 5.07 5.18   HGB 9.8* 8.1*   HCT 30.7* 25.5*    253   , INR   Recent Labs  Lab 07/08/17  1049   INR 1.0   , Lipid Panel No results for input(s):  CHOL, HDL, LDLCALC, TRIG, CHOLHDL in the last 48 hours. and Troponin   Recent Labs  Lab 07/08/17  0322 07/08/17  1049 07/08/17  1747   TROPONINI 0.018 0.058* 0.079*       Significant Imaging: Echocardiogram:   2D echo with color flow doppler:   Results for orders placed or performed during the hospital encounter of 07/08/17   2D echo with color flow doppler   Result Value Ref Range    EF 55 55 - 65    Aortic Valve Stenosis MODERATE (A)     Est. PA Systolic Pressure 50.89 (A)     Pericardial Effusion NONE     Mitral Valve Stenosis MILD (A)     Tricuspid Valve Regurgitation TRIVIAL TO MILD

## 2017-07-09 NOTE — HOSPITAL COURSE
"Ms Lyman presented with acute exacerbation of heart failure likely due to acute cholecystitis. Cardiology, GI, and gen surg were consulted. Quickly weaned off of BiPAP to room air. Lasix initially, then held due to elevated creatinine. Echo 7/8/17 w/ LVEF 55%, mod AS, mild MS and TR, PAP 51.  US abdomen showed biliary sludge and cholelithiasis but no definite sonographic evidence to suggest acute cholecystitis. MRCP showed, "Markedly distended gallbladder with innumerable dependent gallstones.  There has been development of pericholecystic fluid near the fundus of the gallbladder that could reflect the sequela of cholecystitis" as well as a mild to moderate pericardial effusion. Plavix held in anticipation of cholecystectomy. NST 7/10/2017 stable. Low-intermediate cardiovascular risk for surgery. Laproscopic cholecystectomy 7/13/17. Intraoperative angiogram showed a retained common bile duct stone. Post cholecystectomy, patient failed extubation, was re-intubated then extubated again while in PACU. Remained BiPAP dependent and was noted delirious. Was transferred to ICU for close monitoring. ABG showed adequate oxygenation while on BiPAP, noted to be CO2 retainer with adequate renal compensation. On 7/14, patient was weaned to low flow. Advised to wear BiPAP at nights but patient declined. Liver enzymes and TBil, have actually improved, suggesting patient may have passed the stone seen in CBD. GI had initially considered ERCP, but will treat conservatively as LFT's normalizing. Was on nicardipine infusion temporarily for malignant hypertension. NG removed and diet started. PO antihypertensives started on 7/15. Nicardipine infusion weaned off.  Patient is currently hemodynamically stable.  Tolerating diet without complications.  Will check home oxygen needs prior to discharge.  She will be discharged home with .  Patient will follow up with PCP, GI and Surgery.  "

## 2017-07-09 NOTE — PLAN OF CARE
07/09/17 1553   Discharge Assessment   Assessment Type Discharge Planning Assessment   Confirmed/corrected address and phone number on facesheet? Yes   Assessment information obtained from? Patient   Communicated expected length of stay with patient/caregiver no   If Healthcare Directive is received, is it scanned into Epic? no (comment)   Prior to hospitilization cognitive status: Alert/Oriented;No Deficits   Prior to hospitalization functional status: Independent;Assistive Equipment   Current cognitive status: Alert/Oriented;No Deficits   Current Functional Status: Independent;Assistive Equipment   Arrived From admitted as an inpatient   Lives With child(radha), adult   Able to Return to Prior Arrangements yes   Is patient able to care for self after discharge? Yes   How many people do you have in your home that can help with your care after discharge? 1   Who are your caregiver(s) and their phone number(s)? (dtr, Kady Swartz [phone])   Patient's perception of discharge disposition admitted as an inpatient   Readmission Within The Last 30 Days unable to assess   Patient currently being followed by outpatient case management? No   Patient currently receives home health services? No   Does the patient currently use HME? Yes   Patient currently receives private duty nursing? No   Patient currently receives any other outside agency services? No   Equipment Currently Used at Home rollator;other (see comments)  (escalator chair to second floor.)   Do you have any problems affording any of your prescribed medications? No   Is the patient taking medications as prescribed? yes   Do you have any financial concerns preventing you from receiving the healthcare you need? No   Does the patient have transportation to healthcare appointments? Yes   Transportation Available car;family or friend will provide   On Dialysis? No   Does the patient receive services at the Coumadin Clinic? No   Are there any open cases? No    Discharge Plan A Home with family   Discharge Plan B Home with family;Home Health   Patient/Family In Agreement With Plan yes   Help at home discussed.   Prefers afternoonn appts  Things that YOU are responsible for to Manage Your Care At Home:  1. Getting your prescriptions filled.  2. Taking you medications as directed. DO NOT MISS ANY DOSES!  3. Going to your follow-up doctor appointments. This is important because it allows the doctor to monitor your progress and to determine if any changes need to be made to your treatment plan.      [facility] [place] [address]  [address]  [place] [address]  Phone: [phone] Fax: [phone]

## 2017-07-10 PROBLEM — R06.03 RESPIRATORY DISTRESS: Status: RESOLVED | Noted: 2017-07-08 | Resolved: 2017-07-10

## 2017-07-10 LAB
ALBUMIN SERPL BCP-MCNC: 2.7 G/DL
ALP SERPL-CCNC: 224 U/L
ALT SERPL W/O P-5'-P-CCNC: 88 U/L
ANION GAP SERPL CALC-SCNC: 8 MMOL/L
AST SERPL-CCNC: 51 U/L
BACTERIA UR CULT: NORMAL
BASOPHILS # BLD AUTO: 0.01 K/UL
BASOPHILS NFR BLD: 0.2 %
BILIRUB SERPL-MCNC: 1.5 MG/DL
BUN SERPL-MCNC: 32 MG/DL
CALCIUM SERPL-MCNC: 8.7 MG/DL
CHLORIDE SERPL-SCNC: 105 MMOL/L
CO2 SERPL-SCNC: 27 MMOL/L
CREAT SERPL-MCNC: 1.5 MG/DL
DIASTOLIC DYSFUNCTION: NO
DIFFERENTIAL METHOD: ABNORMAL
EOSINOPHIL # BLD AUTO: 0 K/UL
EOSINOPHIL NFR BLD: 0.8 %
ERYTHROCYTE [DISTWIDTH] IN BLOOD BY AUTOMATED COUNT: 14.2 %
EST. GFR  (AFRICAN AMERICAN): 38 ML/MIN/1.73 M^2
EST. GFR  (NON AFRICAN AMERICAN): 33 ML/MIN/1.73 M^2
GLUCOSE SERPL-MCNC: 116 MG/DL
HCT VFR BLD AUTO: 30.4 %
HGB BLD-MCNC: 10.1 G/DL
LYMPHOCYTES # BLD AUTO: 0.8 K/UL
LYMPHOCYTES NFR BLD: 16 %
MCH RBC QN AUTO: 29.7 PG
MCHC RBC AUTO-ENTMCNC: 33.2 %
MCV RBC AUTO: 89 FL
MONOCYTES # BLD AUTO: 0.5 K/UL
MONOCYTES NFR BLD: 9.9 %
NEUTROPHILS # BLD AUTO: 3.5 K/UL
NEUTROPHILS NFR BLD: 72.9 %
PLATELET # BLD AUTO: 230 K/UL
PMV BLD AUTO: 11 FL
POTASSIUM SERPL-SCNC: 3.5 MMOL/L
PROT SERPL-MCNC: 6.5 G/DL
RBC # BLD AUTO: 3.4 M/UL
SODIUM SERPL-SCNC: 140 MMOL/L
WBC # BLD AUTO: 4.75 K/UL

## 2017-07-10 PROCEDURE — 93016 CV STRESS TEST SUPVJ ONLY: CPT | Mod: ,,, | Performed by: INTERNAL MEDICINE

## 2017-07-10 PROCEDURE — 25000003 PHARM REV CODE 250: Performed by: EMERGENCY MEDICINE

## 2017-07-10 PROCEDURE — 94640 AIRWAY INHALATION TREATMENT: CPT

## 2017-07-10 PROCEDURE — 85025 COMPLETE CBC W/AUTO DIFF WBC: CPT

## 2017-07-10 PROCEDURE — 36415 COLL VENOUS BLD VENIPUNCTURE: CPT

## 2017-07-10 PROCEDURE — 27000221 HC OXYGEN, UP TO 24 HOURS

## 2017-07-10 PROCEDURE — 94761 N-INVAS EAR/PLS OXIMETRY MLT: CPT

## 2017-07-10 PROCEDURE — 25000003 PHARM REV CODE 250: Performed by: INTERNAL MEDICINE

## 2017-07-10 PROCEDURE — 80053 COMPREHEN METABOLIC PANEL: CPT

## 2017-07-10 PROCEDURE — 63600175 PHARM REV CODE 636 W HCPCS: Performed by: EMERGENCY MEDICINE

## 2017-07-10 PROCEDURE — 93018 CV STRESS TEST I&R ONLY: CPT | Mod: ,,, | Performed by: INTERNAL MEDICINE

## 2017-07-10 PROCEDURE — 63600175 PHARM REV CODE 636 W HCPCS

## 2017-07-10 PROCEDURE — 21400001 HC TELEMETRY ROOM

## 2017-07-10 PROCEDURE — 36430 TRANSFUSION BLD/BLD COMPNT: CPT

## 2017-07-10 PROCEDURE — 78452 HT MUSCLE IMAGE SPECT MULT: CPT | Mod: 26,,, | Performed by: INTERNAL MEDICINE

## 2017-07-10 PROCEDURE — 93017 CV STRESS TEST TRACING ONLY: CPT

## 2017-07-10 RX ORDER — FUROSEMIDE 40 MG/1
40 TABLET ORAL DAILY
Status: DISCONTINUED | OUTPATIENT
Start: 2017-07-11 | End: 2017-07-13

## 2017-07-10 RX ORDER — ONDANSETRON 2 MG/ML
INJECTION INTRAMUSCULAR; INTRAVENOUS
Status: COMPLETED
Start: 2017-07-10 | End: 2017-07-10

## 2017-07-10 RX ORDER — REGADENOSON 0.08 MG/ML
INJECTION, SOLUTION INTRAVENOUS
Status: DISPENSED
Start: 2017-07-10 | End: 2017-07-10

## 2017-07-10 RX ADMIN — MORPHINE SULFATE 2 MG: 10 INJECTION INTRAVENOUS at 07:07

## 2017-07-10 RX ADMIN — CLONIDINE HYDROCHLORIDE 0.3 MG: 0.1 TABLET ORAL at 02:07

## 2017-07-10 RX ADMIN — FAMOTIDINE 20 MG: 20 TABLET, FILM COATED ORAL at 09:07

## 2017-07-10 RX ADMIN — CLONIDINE HYDROCHLORIDE 0.3 MG: 0.1 TABLET ORAL at 09:07

## 2017-07-10 RX ADMIN — GABAPENTIN 300 MG: 300 CAPSULE ORAL at 02:07

## 2017-07-10 RX ADMIN — GABAPENTIN 300 MG: 300 CAPSULE ORAL at 05:07

## 2017-07-10 RX ADMIN — ONDANSETRON 4 MG: 2 INJECTION INTRAMUSCULAR; INTRAVENOUS at 12:07

## 2017-07-10 RX ADMIN — HYDRALAZINE HYDROCHLORIDE 100 MG: 25 TABLET ORAL at 02:07

## 2017-07-10 RX ADMIN — HYDRALAZINE HYDROCHLORIDE 100 MG: 25 TABLET ORAL at 05:07

## 2017-07-10 RX ADMIN — Medication 3 ML: at 09:07

## 2017-07-10 RX ADMIN — ENOXAPARIN SODIUM 40 MG: 100 INJECTION SUBCUTANEOUS at 05:07

## 2017-07-10 RX ADMIN — DOCUSATE SODIUM AND SENNOSIDES 2 TABLET: 8.6; 5 TABLET, FILM COATED ORAL at 09:07

## 2017-07-10 RX ADMIN — ROSUVASTATIN CALCIUM 30 MG: 10 TABLET ORAL at 09:07

## 2017-07-10 RX ADMIN — HYDRALAZINE HYDROCHLORIDE 100 MG: 25 TABLET ORAL at 09:07

## 2017-07-10 RX ADMIN — Medication 3 ML: at 02:07

## 2017-07-10 RX ADMIN — GABAPENTIN 300 MG: 300 CAPSULE ORAL at 09:07

## 2017-07-10 RX ADMIN — Medication 3 ML: at 05:07

## 2017-07-10 RX ADMIN — ASPIRIN 81 MG: 81 TABLET, COATED ORAL at 09:07

## 2017-07-10 RX ADMIN — CLONIDINE HYDROCHLORIDE 0.3 MG: 0.1 TABLET ORAL at 05:07

## 2017-07-10 RX ADMIN — VERAPAMIL HYDROCHLORIDE 360 MG: 120 TABLET, FILM COATED, EXTENDED RELEASE ORAL at 09:07

## 2017-07-10 RX ADMIN — POLYETHYLENE GLYCOL 3350 17 G: 17 POWDER, FOR SOLUTION ORAL at 09:07

## 2017-07-10 RX ADMIN — CARBAMAZEPINE 200 MG: 200 TABLET ORAL at 05:07

## 2017-07-10 RX ADMIN — METOPROLOL TARTRATE 25 MG: 25 TABLET, FILM COATED ORAL at 09:07

## 2017-07-10 RX ADMIN — ONDANSETRON 4 MG: 2 INJECTION, SOLUTION INTRAMUSCULAR; INTRAVENOUS at 12:07

## 2017-07-10 RX ADMIN — FLUTICASONE PROPIONATE 1 PUFF: 110 AEROSOL, METERED RESPIRATORY (INHALATION) at 09:07

## 2017-07-10 RX ADMIN — CITALOPRAM HYDROBROMIDE 20 MG: 20 TABLET ORAL at 09:07

## 2017-07-10 RX ADMIN — CARBAMAZEPINE 200 MG: 200 TABLET ORAL at 02:07

## 2017-07-10 NOTE — PT/OT/SLP PROGRESS
Occupational Therapy      Cindybraeden Lyman  MRN: [id number]    Patient not seen today secondary to Unavailable (Comment) (off the unit for stress test).     Nicolette Nichols OT  7/10/2017

## 2017-07-10 NOTE — SUBJECTIVE & OBJECTIVE
Review of Systems   Constitutional: Negative for chills and fever.   HENT: Negative for congestion and rhinorrhea.    Eyes: Negative for photophobia and visual disturbance.   Respiratory: Negative for cough and shortness of breath.    Cardiovascular: Negative for chest pain and palpitations.   Gastrointestinal: Positive for abdominal pain. Negative for nausea and vomiting.   Genitourinary: Negative for difficulty urinating and dysuria.   Musculoskeletal: Negative for joint swelling and neck stiffness.   Skin: Negative for rash and wound.   Neurological: Negative for dizziness and light-headedness.   Psychiatric/Behavioral: Negative for agitation and behavioral problems.     Objective:     Vital Signs (Most Recent):  Temp: 99.2 °F (37.3 °C) (07/10/17 0900)  Pulse: 70 (07/10/17 0944)  Resp: 18 (07/10/17 0944)  BP: (!) 168/72 (07/10/17 0900)  SpO2: 97 % (07/10/17 0944) Vital Signs (24h Range):  Temp:  [98 °F (36.7 °C)-99.2 °F (37.3 °C)] 99.2 °F (37.3 °C)  Pulse:  [61-93] 70  Resp:  [14-20] 18  SpO2:  [90 %-98 %] 97 %  BP: (150-205)/(65-96) 168/72     Weight: 91.2 kg (201 lb)  Body mass index is 33.45 kg/m².    Physical Exam   Constitutional: She is oriented to person, place, and time. She appears well-developed and well-nourished. No distress.   Pleasant   HENT:   Right Ear: External ear normal.   Left Ear: External ear normal.   Nose: Nose normal.   Eyes: EOM are normal. Pupils are equal, round, and reactive to light. No scleral icterus.   Neck: Normal range of motion. Neck supple. No thyromegaly present.   Cardiovascular: Normal rate and normal heart sounds.  Exam reveals no friction rub.    No murmur heard.  Pulmonary/Chest: Effort normal. She has no wheezes. She has no rales.   Abdominal: Soft. Bowel sounds are normal. She exhibits no mass. There is tenderness (RUQ).   obese   Musculoskeletal: Normal range of motion. She exhibits no edema or deformity.   Neurological: She is alert and oriented to person, place, and  time. No cranial nerve deficit.   Skin: Skin is warm and dry. No pallor.   Psychiatric: She has a normal mood and affect. Her behavior is normal. Thought content normal.        Significant Labs:   CBC:     Recent Labs  Lab 07/09/17  0410 07/10/17  0655   WBC 5.18 4.75   HGB 8.1* 10.1*   HCT 25.5* 30.4*    230     CMP:     Recent Labs  Lab 07/09/17  0410 07/10/17  0655    140   K 4.1 3.5    105   CO2 27 27   GLU 97 116*   BUN 29* 32*   CREATININE 1.9* 1.5*   CALCIUM 8.6* 8.7   PROT 5.9* 6.5   ALBUMIN 2.7* 2.7*   BILITOT 3.3* 1.5*   ALKPHOS 211* 224*   * 51*   * 88*   ANIONGAP 9 8   EGFRNONAA 25* 33*     Troponin:     Recent Labs  Lab 07/08/17  1747   TROPONINI 0.079*     Urine Studies:   No results for input(s): COLORU, APPEARANCEUA, PHUR, SPECGRAV, PROTEINUA, GLUCUA, KETONESU, BILIRUBINUA, OCCULTUA, NITRITE, UROBILINOGEN, LEUKOCYTESUR, RBCUA, WBCUA, BACTERIA, SQUAMEPITHEL, HYALINECASTS in the last 48 hours.    Invalid input(s): DANNI    Significant Imaging: I have reviewed all pertinent imaging results/findings within the past 24 hours.

## 2017-07-10 NOTE — PROGRESS NOTES
"[facility] Medicine  Progress Note    Patient Name: Cindy Lyman  MRN: [id number]  Patient Class: IP- Inpatient   Admission Date: 7/8/2017  Length of Stay: 2 days  Attending Physician: Marisela Nielsen MD  Primary Care Provider: Jeremiah Reeves MD        Subjective:     Principal Problem:Acute on chronic diastolic CHF (congestive heart failure)    HPI:  Ms. Lyman is a 82 yo woman with obesity (BMI 34), HFpEF, mod AS, CKD3, anemia of chronic disease (baseline ~9.0), chronic constipation, and recent admission for abdominal pain who presented 7/8/17 for abdominal pain. Last admission she was found to be septic with fever and tachycardia (no leukocytosis). She was treated for a UTI and followed up with her PCP. She had persistent RUQ abdominal pain at her PCP visit and was referred to general surgery but has not yet seen them. Her symptoms continue to be associated with nausea and vomiting. In the ER she was treated with pain medication and antiemetics as well as fluids. She then developed shortness of breath and was found to be volume overloaded on CXR. She was started on Lasix 40 IV and BiPAP and admitted to medicine.    Hospital Course:  Cardiology, GI, and gen surg were consulted. Quickly weaned off of BiPAP to room air. Lasix initially, then held due to elevated creatinine. Echo 7/8/17 w/ LVEF 55%, mod AS, mild MS and TR, PAP 51. NST 7/10/2017.  US abdomen showed biliary sludge and cholelithiasis but no definite sonographic evidence to suggest acute cholecystitis. MRCP showed, "Markedly distended gallbladder with innumerable dependent gallstones.  There has been development of pericholecystic fluid near the fundus of the gallbladder that could reflect the sequela of cholecystitis" as well as a mild to moderate pericardial effusion. Plavix held in anticipation of cholecystectomy. Plan for ERCP once cardiac evaluation complete. NST 7/10/2017.  H&H 8.1/25.5. Transfused RBCs " 7/9/17.    Review of Systems   Constitutional: Negative for chills and fever.   HENT: Negative for congestion and rhinorrhea.    Eyes: Negative for photophobia and visual disturbance.   Respiratory: Negative for cough and shortness of breath.    Cardiovascular: Negative for chest pain and palpitations.   Gastrointestinal: Positive for abdominal pain. Negative for nausea and vomiting.   Genitourinary: Negative for difficulty urinating and dysuria.   Musculoskeletal: Negative for joint swelling and neck stiffness.   Skin: Negative for rash and wound.   Neurological: Negative for dizziness and light-headedness.   Psychiatric/Behavioral: Negative for agitation and behavioral problems.     Objective:     Vital Signs (Most Recent):  Temp: 99.2 °F (37.3 °C) (07/10/17 0900)  Pulse: 70 (07/10/17 0944)  Resp: 18 (07/10/17 0944)  BP: (!) 168/72 (07/10/17 0900)  SpO2: 97 % (07/10/17 0944) Vital Signs (24h Range):  Temp:  [98 °F (36.7 °C)-99.2 °F (37.3 °C)] 99.2 °F (37.3 °C)  Pulse:  [61-93] 70  Resp:  [14-20] 18  SpO2:  [90 %-98 %] 97 %  BP: (150-205)/(65-96) 168/72     Weight: 91.2 kg (201 lb)  Body mass index is 33.45 kg/m².    Physical Exam   Constitutional: She is oriented to person, place, and time. She appears well-developed and well-nourished. No distress.   Pleasant   HENT:   Right Ear: External ear normal.   Left Ear: External ear normal.   Nose: Nose normal.   Eyes: EOM are normal. Pupils are equal, round, and reactive to light. No scleral icterus.   Neck: Normal range of motion. Neck supple. No thyromegaly present.   Cardiovascular: Normal rate and normal heart sounds.  Exam reveals no friction rub.    No murmur heard.  Pulmonary/Chest: Effort normal. She has no wheezes. She has no rales.   Abdominal: Soft. Bowel sounds are normal. She exhibits no mass. There is tenderness (RUQ).   obese   Musculoskeletal: Normal range of motion. She exhibits no edema or deformity.   Neurological: She is alert and oriented to person,  place, and time. No cranial nerve deficit.   Skin: Skin is warm and dry. No pallor.   Psychiatric: She has a normal mood and affect. Her behavior is normal. Thought content normal.        Significant Labs:   CBC:     Recent Labs  Lab 07/09/17  0410 07/10/17  0655   WBC 5.18 4.75   HGB 8.1* 10.1*   HCT 25.5* 30.4*    230     CMP:     Recent Labs  Lab 07/09/17  0410 07/10/17  0655    140   K 4.1 3.5    105   CO2 27 27   GLU 97 116*   BUN 29* 32*   CREATININE 1.9* 1.5*   CALCIUM 8.6* 8.7   PROT 5.9* 6.5   ALBUMIN 2.7* 2.7*   BILITOT 3.3* 1.5*   ALKPHOS 211* 224*   * 51*   * 88*   ANIONGAP 9 8   EGFRNONAA 25* 33*     Troponin:     Recent Labs  Lab 07/08/17  1747   TROPONINI 0.079*     Urine Studies:   No results for input(s): COLORU, APPEARANCEUA, PHUR, SPECGRAV, PROTEINUA, GLUCUA, KETONESU, BILIRUBINUA, OCCULTUA, NITRITE, UROBILINOGEN, LEUKOCYTESUR, RBCUA, WBCUA, BACTERIA, SQUAMEPITHEL, HYALINECASTS in the last 48 hours.    Invalid input(s): DANNI    Significant Imaging: I have reviewed all pertinent imaging results/findings within the past 24 hours.    Assessment/Plan:      * Acute on chronic diastolic CHF (congestive heart failure)    Fluids given for n/v and became volume overloaded. Very sensitive to fluids. Echo with EF 50%, stable mod AS. Similar compared to 11/2016 pEF, +DD, PAP 44, mod AS. O2 weaned. Lasix held 2/2 elevated creatinine. Cr improved and lasix home dose restarted. NST 7/10/17 for pre-op risk assess.        Cholelithiasis without cholecystitis    CC RUQ pain. Consulted GI and surgery. Elevated LFTs. Multiple stones. Plavix on hold for cholecystectomy. ERCP once cardiac work up complete.        Elevated LFTs    See above        AS (aortic stenosis)    Echo        Pulmonary hypertension    Echo        Stage 3 chronic kidney disease    Stable        Coronary artery disease involving native coronary artery without angina pectoris    Stable. Trop unremarkable.  Continue home meds.         Obesity (BMI 30-39.9)    Counseled on weight loss.        Anemia of chronic disease    Stable. Transfused 7/9/17 with appropriate response.        Depression    Continue home meds        Hypertension    Cont home meds. PRN hydralazine.          VTE Risk Mitigation         Ordered     enoxaparin injection 40 mg  Daily     Route:  Subcutaneous        07/08/17 0936     Medium Risk of VTE  Once      07/08/17 0936     Place sequential compression device  Until discontinued      07/08/17 0936     Place LAUREANO hose  Until discontinued      07/08/17 0936          Marisela Nielsen MD  Department of Hospital Medicine   [facility]

## 2017-07-10 NOTE — PT/OT/SLP PROGRESS
"Physical Therapy      Cindy Lyman  MRN: [id number]    Patient not seen today secondary to pt is "off the floor" having a stress test.     Nurse Pat notified.     Will follow-up at later day.    Riley Cortez, PT   07/10/2017    "

## 2017-07-10 NOTE — PROGRESS NOTES
[facility]  Cardiology  Progress Note    Patient Name: Cindy Lyman  MRN: [id number]  Admission Date: 7/8/2017  Hospital Length of Stay: 2 days  Code Status: Full Code   Attending Physician: Marisela Nielsen MD   Primary Care Physician: Jeremiah Reeves MD  Expected Discharge Date:   Principal Problem:Acute on chronic diastolic CHF (congestive heart failure)    Subjective:     Hospital Course:   No notes on file    Interval History: Seen during stress test.  No acute events overnight    Review of Systems   All other systems reviewed and are negative.    Objective:     Vital Signs (Most Recent):  Temp: 99.2 °F (37.3 °C) (07/10/17 0900)  Pulse: 70 (07/10/17 0944)  Resp: 18 (07/10/17 0944)  BP: (!) 168/72 (07/10/17 0900)  SpO2: 97 % (07/10/17 0944) Vital Signs (24h Range):  Temp:  [98 °F (36.7 °C)-99.2 °F (37.3 °C)] 99.2 °F (37.3 °C)  Pulse:  [61-93] 70  Resp:  [14-20] 18  SpO2:  [93 %-98 %] 97 %  BP: (150-205)/(65-96) 168/72     Weight: 91.2 kg (201 lb)  Body mass index is 33.45 kg/m².     SpO2: 97 %  O2 Device (Oxygen Therapy): nasal cannula      Intake/Output Summary (Last 24 hours) at 07/10/17 1644  Last data filed at 07/10/17 1000   Gross per 24 hour   Intake           468.33 ml   Output              100 ml   Net           368.33 ml       Lines/Drains/Airways     Peripheral Intravenous Line                 Peripheral IV - Single Lumen 07/08/17 Left Wrist 2 days         Peripheral IV - Single Lumen 07/09/17 1925 Right;Anterior Forearm less than 1 day                Physical Exam   Constitutional: She is oriented to person, place, and time. She appears well-developed and well-nourished.   Cardiovascular: Normal rate and regular rhythm.    Pulmonary/Chest: Effort normal and breath sounds normal.   Musculoskeletal: She exhibits no edema.   Neurological: She is alert and oriented to person, place, and time.       Significant Labs:   BMP:   Recent Labs  Lab 07/09/17  0410 07/10/17  0655   GLU  97 116*    140   K 4.1 3.5    105   CO2 27 27   BUN 29* 32*   CREATININE 1.9* 1.5*   CALCIUM 8.6* 8.7    and CBC   Recent Labs  Lab 07/09/17  0410 07/10/17  0655   WBC 5.18 4.75   HGB 8.1* 10.1*   HCT 25.5* 30.4*    230       Significant Imaging: Echocardiogram:   2D echo with color flow doppler:   Results for orders placed or performed during the hospital encounter of 07/08/17   2D echo with color flow doppler   Result Value Ref Range    EF 55 55 - 65    Aortic Valve Stenosis MODERATE (A)     Est. PA Systolic Pressure 50.89 (A)     Pericardial Effusion NONE     Mitral Valve Stenosis MILD (A)     Tricuspid Valve Regurgitation TRIVIAL TO MILD      NST:  Impression: ABNORMAL MYOCARDIAL PERFUSION  1. There is evidence for mild myocardial ischemia in the inferior wall of the left ventricle.   2. The perfusion scan is free of evidence for myocardial injury.   3. There is a mild to moderate intensity fixed defect in the anterior wall of the left ventricle, secondary to breast attenuation.   4. Resting wall motion is physiologic.   5. There is resting LV dysfunction with a reduced ejection fraction of 44 %.   6. The ventricular volumes are normal at rest and stress.   7. The extracardiac distribution of radioactivity is normal.   8. When compared to the previous study from 05/25/2016, no change.    Assessment and Plan:     Brief HPI:     Preop examination    Patient is cleared at low to intermediate risk from cardiovascular standpoint was stable NST and overall normal LVEF  Okay to hold antiplatelets therapy for the procedure        Stage 3 chronic kidney disease    Continue follow serum creatinine         Cholelithiasis without cholecystitis    Plan is for surgery if cleared from cardiac standpoint        Coronary artery disease involving native coronary artery without angina pectoris    Continues of medicines for secondary prevention  History of PCI to the LAD in 2015 with  Plavix on hold for possible  surgery  No new signs of ACS  Nuclear stress test as above        Obesity (BMI 30-39.9)    Encouraged diet and exercise as tolerated        AS (aortic stenosis)    Moderate by recent echo  Continue outpatient surveillance  No further intervention required currently        Hyperlipidemia    On statin        Hypertension                 VTE Risk Mitigation         Ordered     enoxaparin injection 40 mg  Daily     Route:  Subcutaneous        07/08/17 0936     Medium Risk of VTE  Once      07/08/17 0936     Place sequential compression device  Until discontinued      07/08/17 0936     Place LAUREANO hose  Until discontinued      07/08/17 0936        No further recommendations from CV standpoint.  We'll see as needed    Cresencio Alfredo MD  Cardiology  [facility]

## 2017-07-10 NOTE — PLAN OF CARE
"Problem: Patient Care Overview  Goal: Plan of Care Review  Outcome: Ongoing (interventions implemented as appropriate)  No falls this shift bed kept in low position call light and phone remain within reach bed alarm remain active. Patient able to reposition self in bed without assist and ambulate with personal assistive device to and from bathroom. No evidence of skin breakdown noted.      Breath sounds diminished and clear, patient room air oxygen mid to low 90s, Bipap order d/c .   7/8/17 CXR: There is bibasilar subsegmental atelectasis.     Patient complain of abdominal discomfort  No nausea or vomiting noted  patient currently on dental soft diet.   7/9/17 Bilirubin 3.3  7/8/17 GI consulted, per GI "Would ultimately benefit from an ERCP w/ sphincterotomy" however must hold PO plavix   7/8/17 CT of abd: Biliary sludge and/or layering stones the gallbladder lumen with slight distention of the gallbladder.  Mild intrahepatic biliary ductal dilatation  7/8/17 MRI MRCP: Markedly distended gallbladder with innumerable dependent gallstones.  There has been development of pericholecystic fluid near the fundus of the gallbladder  7/8/17 Surgery consulted per Surgery"  agree that she is likely intermittently passing sludge/stones.  Pain is improving.  Would need to be off plavix for 5 days prior to cholecystectomy     Cardiology consulted    Highest troponin 0.058  7/8/17 EF 55%   Angiogram schedule for 7/10/17    7/9/17 Hgb 8.1/hct 25.5  Therefore, 1 unit of PRBC's order.         "

## 2017-07-10 NOTE — PROGRESS NOTES
[facility]  General Surgery  Progress Note    Subjective:     Interval History: no acute events    Post-Op Info:  * No surgery found *          Medications:  Continuous Infusions:   Scheduled Meds:   alprazolam  0.5 mg Oral 30 Min Pre-Op    aspirin  81 mg Oral Daily    carbamazepine  200 mg Oral TID    citalopram  20 mg Oral Daily    cloNIDine  0.3 mg Oral TID    enoxaparin  40 mg Subcutaneous Daily    famotidine  20 mg Oral Daily    fluticasone  1 puff Inhalation Q12H    gabapentin  300 mg Oral TID    hydrALAZINE  100 mg Oral Q8H    metoprolol tartrate  25 mg Oral BID    polyethylene glycol  17 g Oral Daily    rosuvastatin  30 mg Oral QHS    senna-docusate 8.6-50 mg  2 tablet Oral BID    sodium chloride 0.9%  3 mL Intravenous Q8H    verapamil  360 mg Oral Daily     PRN Meds:sodium chloride, acetaminophen, hydrALAZINE, morphine, morphine, ondansetron, promethazine (PHENERGAN) IVPB     Objective:     Vital Signs (Most Recent):  Temp: 98 °F (36.7 °C) (07/10/17 0552)  Pulse: 69 (07/10/17 0552)  Resp: 19 (07/10/17 0552)  BP: (!) 166/90 (07/10/17 0608)  SpO2: 96 % (07/10/17 0552) Vital Signs (24h Range):  Temp:  [98 °F (36.7 °C)-99.2 °F (37.3 °C)] 98 °F (36.7 °C)  Pulse:  [53-93] 69  Resp:  [14-20] 19  SpO2:  [90 %-97 %] 96 %  BP: (150-205)/(65-96) 166/90       Intake/Output Summary (Last 24 hours) at 07/10/17 0725  Last data filed at 07/10/17 0600   Gross per 24 hour   Intake           828.33 ml   Output              100 ml   Net           728.33 ml       Physical Exam  No apparent distress  Tender to deep palpation RUQ, no guarding    Significant Labs:  CBC:   Recent Labs  Lab 07/09/17 0410   WBC 5.18   RBC 2.78*   HGB 8.1*   HCT 25.5*      MCV 92   MCH 29.1   MCHC 31.8*     CMP:   Recent Labs  Lab 07/09/17 0410   GLU 97   CALCIUM 8.6*   ALBUMIN 2.7*   PROT 5.9*      K 4.1   CO2 27      BUN 29*   CREATININE 1.9*   ALKPHOS 211*   *   *   BILITOT 3.3*        Significant Diagnostics:  None    Assessment/Plan:   81F with gallbladder sludge/stones, with significant cardiac history with home plavix.  Continue to hold plavix  Plan for lap titus with IOC, but may need ERCP prior if evidence of CBD stone  Follow up chemistry, trend bili    Active Diagnoses:    Diagnosis Date Noted POA    PRINCIPAL PROBLEM:  Acute on chronic diastolic CHF (congestive heart failure) [I50.33] 06/17/2017 Yes    Respiratory distress [R06.00] 07/08/2017 Yes    Cholelithiasis without cholecystitis [K80.20] 07/08/2017 Yes    Stage 3 chronic kidney disease [N18.3] 07/08/2017 Yes     Chronic    Elevated LFTs [R79.89] 06/17/2017 Yes    Coronary artery disease involving native coronary artery without angina pectoris [I25.10] 05/11/2015 Yes     Chronic    Obesity (BMI 30-39.9) [E66.9] 05/09/2015 Yes     Chronic    Anemia of chronic disease [D63.8] 11/29/2013 Yes     Chronic    AS (aortic stenosis) [I35.0] 11/22/2013 Yes     Chronic    Pulmonary hypertension [I27.2] 09/28/2012 Yes     Chronic    Hypertension [I10]  Yes     Chronic    Hyperlipidemia [E78.5]  Yes     Chronic    Depression [F32.9]  Yes     Chronic      Problems Resolved During this Admission:    Diagnosis Date Noted Date Resolved POA         Mara Douglas MD  General Surgery  [facility]

## 2017-07-10 NOTE — ASSESSMENT & PLAN NOTE
CC RUQ pain. Consulted GI and surgery. Elevated LFTs. Multiple stones. Plavix on hold for cholecystectomy. ERCP once cardiac work up complete.

## 2017-07-10 NOTE — ASSESSMENT & PLAN NOTE
Fluids given for n/v and became volume overloaded. Very sensitive to fluids. Echo with EF 50%, stable mod AS. Similar compared to 11/2016 pEF, +DD, PAP 44, mod AS. O2 weaned. Lasix held 2/2 elevated creatinine. Cr improved and lasix home dose restarted. NST 7/10/17 for pre-op risk assess.

## 2017-07-10 NOTE — SUBJECTIVE & OBJECTIVE
Interval History: Seen during stress test.  No acute events overnight    Review of Systems   All other systems reviewed and are negative.    Objective:     Vital Signs (Most Recent):  Temp: 99.2 °F (37.3 °C) (07/10/17 0900)  Pulse: 70 (07/10/17 0944)  Resp: 18 (07/10/17 0944)  BP: (!) 168/72 (07/10/17 0900)  SpO2: 97 % (07/10/17 0944) Vital Signs (24h Range):  Temp:  [98 °F (36.7 °C)-99.2 °F (37.3 °C)] 99.2 °F (37.3 °C)  Pulse:  [61-93] 70  Resp:  [14-20] 18  SpO2:  [93 %-98 %] 97 %  BP: (150-205)/(65-96) 168/72     Weight: 91.2 kg (201 lb)  Body mass index is 33.45 kg/m².     SpO2: 97 %  O2 Device (Oxygen Therapy): nasal cannula      Intake/Output Summary (Last 24 hours) at 07/10/17 1644  Last data filed at 07/10/17 1000   Gross per 24 hour   Intake           468.33 ml   Output              100 ml   Net           368.33 ml       Lines/Drains/Airways     Peripheral Intravenous Line                 Peripheral IV - Single Lumen 07/08/17 Left Wrist 2 days         Peripheral IV - Single Lumen 07/09/17 1925 Right;Anterior Forearm less than 1 day                Physical Exam   Constitutional: She is oriented to person, place, and time. She appears well-developed and well-nourished.   Cardiovascular: Normal rate and regular rhythm.    Pulmonary/Chest: Effort normal and breath sounds normal.   Musculoskeletal: She exhibits no edema.   Neurological: She is alert and oriented to person, place, and time.       Significant Labs:   BMP:   Recent Labs  Lab 07/09/17  0410 07/10/17  0655   GLU 97 116*    140   K 4.1 3.5    105   CO2 27 27   BUN 29* 32*   CREATININE 1.9* 1.5*   CALCIUM 8.6* 8.7    and CBC   Recent Labs  Lab 07/09/17  0410 07/10/17  0655   WBC 5.18 4.75   HGB 8.1* 10.1*   HCT 25.5* 30.4*    230       Significant Imaging: Echocardiogram:   2D echo with color flow doppler:   Results for orders placed or performed during the hospital encounter of 07/08/17   2D echo with color flow doppler   Result  Value Ref Range    EF 55 55 - 65    Aortic Valve Stenosis MODERATE (A)     Est. PA Systolic Pressure 50.89 (A)     Pericardial Effusion NONE     Mitral Valve Stenosis MILD (A)     Tricuspid Valve Regurgitation TRIVIAL TO MILD      NST:  Impression: ABNORMAL MYOCARDIAL PERFUSION  1. There is evidence for mild myocardial ischemia in the inferior wall of the left ventricle.   2. The perfusion scan is free of evidence for myocardial injury.   3. There is a mild to moderate intensity fixed defect in the anterior wall of the left ventricle, secondary to breast attenuation.   4. Resting wall motion is physiologic.   5. There is resting LV dysfunction with a reduced ejection fraction of 44 %.   6. The ventricular volumes are normal at rest and stress.   7. The extracardiac distribution of radioactivity is normal.   8. When compared to the previous study from 05/25/2016, no change.

## 2017-07-10 NOTE — ASSESSMENT & PLAN NOTE
Patient is cleared at low to intermediate risk from cardiovascular standpoint was stable NST and overall normal LVEF  Okay to hold antiplatelets therapy for the procedure

## 2017-07-10 NOTE — PROGRESS NOTES
The patient denies having any pain overnight and she states she did not require pain medications.    Vitals:    07/09/17 2300 07/10/17 0003 07/10/17 0552 07/10/17 0600   BP: (!) 183/84 (!) 158/74 (!) 205/96    BP Location: Left arm  Left arm    Patient Position: Lying  Lying    BP Method: Automatic Manual Automatic    Pulse: 93  69    Resp: 20  19    Temp:   98 °F (36.7 °C)    TempSrc: Oral  Oral    SpO2: 97%  96%    Weight:    91.2 kg (201 lb)   Height:          alprazolam  0.5 mg Oral 30 Min Pre-Op    aspirin  81 mg Oral Daily    carbamazepine  200 mg Oral TID    citalopram  20 mg Oral Daily    cloNIDine  0.3 mg Oral TID    enoxaparin  40 mg Subcutaneous Daily    famotidine  20 mg Oral Daily    fluticasone  1 puff Inhalation Q12H    gabapentin  300 mg Oral TID    hydrALAZINE  100 mg Oral Q8H    metoprolol tartrate  25 mg Oral BID    polyethylene glycol  17 g Oral Daily    rosuvastatin  30 mg Oral QHS    senna-docusate 8.6-50 mg  2 tablet Oral BID    sodium chloride 0.9%  3 mL Intravenous Q8H    verapamil  360 mg Oral Daily     P.E.:  GEN: A x O x 3, NAD  HEENT: EOMI, PERRL, anicteric sclera  CV: RRR, no M/R/G  Chest: CTA B  Abdomen: soft, NTND, normoactive BS  Ext: No C/C/E. 2+ dorsalis pedis pulses B    Labs:    Recent Results (from the past 336 hour(s))   CBC auto differential    Collection Time: 07/09/17  4:10 AM   Result Value Ref Range    WBC 5.18 3.90 - 12.70 K/uL    Hemoglobin 8.1 (L) 12.0 - 16.0 g/dL    Hematocrit 25.5 (L) 37.0 - 48.5 %    Platelets 253 150 - 350 K/uL   CBC auto differential    Collection Time: 07/08/17  3:22 AM   Result Value Ref Range    WBC 5.07 3.90 - 12.70 K/uL    Hemoglobin 9.8 (L) 12.0 - 16.0 g/dL    Hematocrit 30.7 (L) 37.0 - 48.5 %    Platelets 329 150 - 350 K/uL     CMP  Sodium   Date Value Ref Range Status   07/09/2017 140 136 - 145 mmol/L Final     Potassium   Date Value Ref Range Status   07/09/2017 4.1 3.5 - 5.1 mmol/L Final     Chloride   Date Value Ref Range  Status   07/09/2017 104 95 - 110 mmol/L Final     CO2   Date Value Ref Range Status   07/09/2017 27 23 - 29 mmol/L Final     Glucose   Date Value Ref Range Status   07/09/2017 97 70 - 110 mg/dL Final     BUN, Bld   Date Value Ref Range Status   07/09/2017 29 (H) 8 - 23 mg/dL Final     Creatinine   Date Value Ref Range Status   07/09/2017 1.9 (H) 0.5 - 1.4 mg/dL Final     Calcium   Date Value Ref Range Status   07/09/2017 8.6 (L) 8.7 - 10.5 mg/dL Final     Total Protein   Date Value Ref Range Status   07/09/2017 5.9 (L) 6.0 - 8.4 g/dL Final     Albumin   Date Value Ref Range Status   07/09/2017 2.7 (L) 3.5 - 5.2 g/dL Final     Total Bilirubin   Date Value Ref Range Status   07/09/2017 3.3 (H) 0.1 - 1.0 mg/dL Final     Comment:     For infants and newborns, interpretation of results should be based  on gestational age, weight and in agreement with clinical  observations.  Premature Infant recommended reference ranges:  Up to 24 hours.............<8.0 mg/dL  Up to 48 hours............<12.0 mg/dL  3-5 days..................<15.0 mg/dL  6-29 days.................<15.0 mg/dL       Alkaline Phosphatase   Date Value Ref Range Status   07/09/2017 211 (H) 55 - 135 U/L Final     AST   Date Value Ref Range Status   07/09/2017 101 (H) 10 - 40 U/L Final     ALT   Date Value Ref Range Status   07/09/2017 120 (H) 10 - 44 U/L Final     Anion Gap   Date Value Ref Range Status   07/09/2017 9 8 - 16 mmol/L Final     eGFR if    Date Value Ref Range Status   07/09/2017 28 (A) >60 mL/min/1.73 m^2 Final     eGFR if non    Date Value Ref Range Status   07/09/2017 25 (A) >60 mL/min/1.73 m^2 Final     Comment:     Calculation used to obtain the estimated glomerular filtration  rate (eGFR) is the CKD-EPI equation. Since race is unknown   in our information system, the eGFR values for   -American and Non--American patients are given   for each creatinine result.         No results for input(s): INR,  APTT in the last 24 hours.    Invalid input(s): PT    A/P:  The patient is a 81 year old woman with a history of HTN, pulmonary HTN, CAD, CHF, aortic stenosis, COPD, DVT, and depression presenting with abdominal pain and transaminitis  1.  Abdominal Pain/Transaminitis - she presented with similar symptoms last month and a an MRCP was negative for choledocholithiasis.  This admission, an ultrasound showed a common bile duct of 5 mm with cholelithiasis but no definitive choledocholithiasis.  An MRCP was repeated yesterday and showed no choledocholithiasis, but possible cholecystitis.  She was previously on bipap and she had dyspnea after walking to the bathroom this morning.  Her pulmonary status needs to be optimized.  Her last dose of plavix was on Saturday, July 7th.  Once plavix has been held for five days, a laparoscopic cholecystectomy with intraoperative cholangiogram is being considered.  Gastroenterology will follow along in the event she needs an ERCP.  All labs from today are pending.

## 2017-07-11 PROBLEM — I50.42 CHRONIC COMBINED SYSTOLIC AND DIASTOLIC HEART FAILURE: Status: ACTIVE | Noted: 2017-06-17

## 2017-07-11 LAB
ALBUMIN SERPL BCP-MCNC: 2.7 G/DL
ALP SERPL-CCNC: 198 U/L
ALT SERPL W/O P-5'-P-CCNC: 69 U/L
ANION GAP SERPL CALC-SCNC: 9 MMOL/L
AST SERPL-CCNC: 30 U/L
BASOPHILS # BLD AUTO: 0.02 K/UL
BASOPHILS NFR BLD: 0.4 %
BILIRUB SERPL-MCNC: 1 MG/DL
BUN SERPL-MCNC: 31 MG/DL
CALCIUM SERPL-MCNC: 8.4 MG/DL
CHLORIDE SERPL-SCNC: 105 MMOL/L
CO2 SERPL-SCNC: 27 MMOL/L
CREAT SERPL-MCNC: 1.4 MG/DL
DIFFERENTIAL METHOD: ABNORMAL
EOSINOPHIL # BLD AUTO: 0.1 K/UL
EOSINOPHIL NFR BLD: 2.1 %
ERYTHROCYTE [DISTWIDTH] IN BLOOD BY AUTOMATED COUNT: 14.4 %
EST. GFR  (AFRICAN AMERICAN): 41 ML/MIN/1.73 M^2
EST. GFR  (NON AFRICAN AMERICAN): 36 ML/MIN/1.73 M^2
GLUCOSE SERPL-MCNC: 95 MG/DL
HCT VFR BLD AUTO: 28.5 %
HGB BLD-MCNC: 9.5 G/DL
LYMPHOCYTES # BLD AUTO: 1.2 K/UL
LYMPHOCYTES NFR BLD: 24.8 %
MCH RBC QN AUTO: 29.4 PG
MCHC RBC AUTO-ENTMCNC: 33.3 %
MCV RBC AUTO: 88 FL
MONOCYTES # BLD AUTO: 0.6 K/UL
MONOCYTES NFR BLD: 11.4 %
NEUTROPHILS # BLD AUTO: 3 K/UL
NEUTROPHILS NFR BLD: 61.3 %
PLATELET # BLD AUTO: 219 K/UL
PMV BLD AUTO: 11.1 FL
POTASSIUM SERPL-SCNC: 4.2 MMOL/L
PROT SERPL-MCNC: 5.9 G/DL
RBC # BLD AUTO: 3.23 M/UL
SODIUM SERPL-SCNC: 141 MMOL/L
WBC # BLD AUTO: 4.84 K/UL

## 2017-07-11 PROCEDURE — 25000003 PHARM REV CODE 250: Performed by: EMERGENCY MEDICINE

## 2017-07-11 PROCEDURE — 97165 OT EVAL LOW COMPLEX 30 MIN: CPT

## 2017-07-11 PROCEDURE — G8989 SELF CARE D/C STATUS: HCPCS | Mod: CH

## 2017-07-11 PROCEDURE — G8980 MOBILITY D/C STATUS: HCPCS | Mod: CI | Performed by: PHYSICAL THERAPIST

## 2017-07-11 PROCEDURE — G8979 MOBILITY GOAL STATUS: HCPCS | Mod: CI | Performed by: PHYSICAL THERAPIST

## 2017-07-11 PROCEDURE — 21400001 HC TELEMETRY ROOM

## 2017-07-11 PROCEDURE — 36415 COLL VENOUS BLD VENIPUNCTURE: CPT

## 2017-07-11 PROCEDURE — 97162 PT EVAL MOD COMPLEX 30 MIN: CPT | Performed by: PHYSICAL THERAPIST

## 2017-07-11 PROCEDURE — G8987 SELF CARE CURRENT STATUS: HCPCS | Mod: CH

## 2017-07-11 PROCEDURE — G8988 SELF CARE GOAL STATUS: HCPCS | Mod: CH

## 2017-07-11 PROCEDURE — 63600175 PHARM REV CODE 636 W HCPCS: Performed by: INTERNAL MEDICINE

## 2017-07-11 PROCEDURE — 27000221 HC OXYGEN, UP TO 24 HOURS

## 2017-07-11 PROCEDURE — 80053 COMPREHEN METABOLIC PANEL: CPT

## 2017-07-11 PROCEDURE — 25000003 PHARM REV CODE 250: Performed by: INTERNAL MEDICINE

## 2017-07-11 PROCEDURE — G8978 MOBILITY CURRENT STATUS: HCPCS | Mod: CI | Performed by: PHYSICAL THERAPIST

## 2017-07-11 PROCEDURE — 94640 AIRWAY INHALATION TREATMENT: CPT

## 2017-07-11 PROCEDURE — 94761 N-INVAS EAR/PLS OXIMETRY MLT: CPT

## 2017-07-11 PROCEDURE — 63600175 PHARM REV CODE 636 W HCPCS: Performed by: EMERGENCY MEDICINE

## 2017-07-11 PROCEDURE — 85025 COMPLETE CBC W/AUTO DIFF WBC: CPT

## 2017-07-11 RX ADMIN — FUROSEMIDE 40 MG: 40 TABLET ORAL at 09:07

## 2017-07-11 RX ADMIN — CLONIDINE HYDROCHLORIDE 0.3 MG: 0.1 TABLET ORAL at 09:07

## 2017-07-11 RX ADMIN — METOPROLOL TARTRATE 25 MG: 25 TABLET, FILM COATED ORAL at 09:07

## 2017-07-11 RX ADMIN — CARBAMAZEPINE 200 MG: 200 TABLET ORAL at 12:07

## 2017-07-11 RX ADMIN — FAMOTIDINE 20 MG: 20 TABLET, FILM COATED ORAL at 09:07

## 2017-07-11 RX ADMIN — FLUTICASONE PROPIONATE 1 PUFF: 110 AEROSOL, METERED RESPIRATORY (INHALATION) at 08:07

## 2017-07-11 RX ADMIN — GABAPENTIN 300 MG: 300 CAPSULE ORAL at 01:07

## 2017-07-11 RX ADMIN — CARBAMAZEPINE 200 MG: 200 TABLET ORAL at 05:07

## 2017-07-11 RX ADMIN — DOCUSATE SODIUM AND SENNOSIDES 2 TABLET: 8.6; 5 TABLET, FILM COATED ORAL at 09:07

## 2017-07-11 RX ADMIN — ENOXAPARIN SODIUM 40 MG: 100 INJECTION SUBCUTANEOUS at 04:07

## 2017-07-11 RX ADMIN — MORPHINE SULFATE 2 MG: 10 INJECTION INTRAVENOUS at 12:07

## 2017-07-11 RX ADMIN — VERAPAMIL HYDROCHLORIDE 360 MG: 120 TABLET, FILM COATED, EXTENDED RELEASE ORAL at 09:07

## 2017-07-11 RX ADMIN — CLONIDINE HYDROCHLORIDE 0.3 MG: 0.1 TABLET ORAL at 01:07

## 2017-07-11 RX ADMIN — Medication 3 ML: at 01:07

## 2017-07-11 RX ADMIN — CITALOPRAM HYDROBROMIDE 20 MG: 20 TABLET ORAL at 09:07

## 2017-07-11 RX ADMIN — Medication 3 ML: at 09:07

## 2017-07-11 RX ADMIN — GABAPENTIN 300 MG: 300 CAPSULE ORAL at 05:07

## 2017-07-11 RX ADMIN — POLYETHYLENE GLYCOL 3350 17 G: 17 POWDER, FOR SOLUTION ORAL at 09:07

## 2017-07-11 RX ADMIN — CARBAMAZEPINE 200 MG: 200 TABLET ORAL at 01:07

## 2017-07-11 RX ADMIN — FLUTICASONE PROPIONATE 1 PUFF: 110 AEROSOL, METERED RESPIRATORY (INHALATION) at 09:07

## 2017-07-11 RX ADMIN — GABAPENTIN 300 MG: 300 CAPSULE ORAL at 09:07

## 2017-07-11 RX ADMIN — CARBAMAZEPINE 200 MG: 200 TABLET ORAL at 09:07

## 2017-07-11 RX ADMIN — MORPHINE SULFATE 2 MG: 10 INJECTION INTRAVENOUS at 07:07

## 2017-07-11 RX ADMIN — HYDRALAZINE HYDROCHLORIDE 10 MG: 20 INJECTION INTRAMUSCULAR; INTRAVENOUS at 10:07

## 2017-07-11 RX ADMIN — HYDRALAZINE HYDROCHLORIDE 100 MG: 25 TABLET ORAL at 01:07

## 2017-07-11 RX ADMIN — ASPIRIN 81 MG: 81 TABLET, COATED ORAL at 09:07

## 2017-07-11 RX ADMIN — HYDRALAZINE HYDROCHLORIDE 10 MG: 20 INJECTION INTRAMUSCULAR; INTRAVENOUS at 02:07

## 2017-07-11 RX ADMIN — ROSUVASTATIN CALCIUM 30 MG: 10 TABLET ORAL at 09:07

## 2017-07-11 RX ADMIN — CLONIDINE HYDROCHLORIDE 0.3 MG: 0.1 TABLET ORAL at 05:07

## 2017-07-11 RX ADMIN — Medication 3 ML: at 05:07

## 2017-07-11 RX ADMIN — HYDRALAZINE HYDROCHLORIDE 100 MG: 25 TABLET ORAL at 05:07

## 2017-07-11 RX ADMIN — HYDRALAZINE HYDROCHLORIDE 100 MG: 25 TABLET ORAL at 09:07

## 2017-07-11 NOTE — PT/OT/SLP EVAL
Occupational Therapy  Evaluation/Discharge    Cindy Lyman   MRN: [id number]   Admitting Diagnosis: Chronic combined systolic and diastolic heart failure    OT Date of Treatment: 07/11/17   OT Start Time: 0944  OT Stop Time: 1008  OT Total Time (min): 24 min    Billable Minutes:  Evaluation 24 (co-tx with PT)    Diagnosis: Chronic combined systolic and diastolic heart failure       Past Medical History:   Diagnosis Date    Anemia     Anticoagulant long-term use     Aortic stenosis     Arthritis     Asthma     CAD (coronary artery disease) 4/24/2015    Carpal tunnel syndrome on both sides     Cataract     CHF (congestive heart failure) 5/2013    COPD (chronic obstructive pulmonary disease)     Depression     DVT (deep venous thrombosis)     Hyperlipidemia     Hypertension     Pulmonary HTN     TMJ (temporomandibular joint disorder)       Past Surgical History:   Procedure Laterality Date    BACK SURGERY      cardiac stents      CARPAL TUNNEL RELEASE      Right/Left    HYSTERECTOMY      Partial    JOINT REPLACEMENT  2009    Left hip    TEMPOROMANDIBULAR JOINT SURGERY         Referring physician: Morales  Date referred to OT: 7/10/17    General Precautions: Standard, fall  Orthopedic Precautions: N/A  Braces: N/A    Do you have any cultural, spiritual, Yarsani conflicts, given your current situation?: none     Patient History:  Living Environment  Lives With: child(radha), adult (daughter and 15 yo grandson who is Autistic)  Living Arrangements: house  Home Layout: Bedroom on 2nd floor, Bathroom on 2nd floor  Number of Stairs to Enter Home: 1  Number of Stairs Within Home: 13  Stair Railings at Home: inside, present on right side  Living Environment Comment: stair lift available to ascend/descend the stairs to bed/bath    Prior level of function:   Bed Mobility/Transfers: needs device  Grooming: independent  Bathing: independent  Upper Body Dressing: independent  Lower Body Dressing:  independent  Toileting: independent  Home Management Skills: independent  Driving License: Yes (Rocky is driving)  IADL Comments: The patient states she is able to amb in the house w/out AD. She uses a cane to amb to the car and then uses a rollator in the community.      Dominant hand: right    Subjective:  Communicated with nurse prior to session.    Chief Complaint: needs to rest because she moves so fast  Patient/Family stated goals: return home and resume PLOF    Pain/Comfort  Pain Rating 1: 0/10    Objective:  Patient found with: peripheral IV, pulse ox (continuous), oxygen    Cognitive Exam:  Oriented to: Person, Place, Time and Situation  Follows Commands/attention: Follows multistep  commands  Communication: clear/fluent  Memory:  No Deficits noted  Safety awareness/insight to disability: impaired as patient moves to quickly and is unaware of O2 lines  Coping skills/emotional control: Appropriate to situation    Visual/perceptual:  Intact    Physical Exam:  Postural examination/scapula alignment: No postural abnormalities identified  Skin integrity: Visible skin intact  Edema: None noted     Sensation:   Intact    Upper Extremity Range of Motion:  Right Upper Extremity: WFL  Left Upper Extremity: WFL    Upper Extremity Strength:  Right Upper Extremity: WFL  Left Upper Extremity: WFL   Strength: WFL    Fine motor coordination:   Intact    Gross motor coordination: WFL    Functional Mobility:  Bed Mobility:  Scooting/Bridging: Modified Independent  Supine to Sit: Modified Independent, Supervision    Transfers:  Sit <> Stand Assistance: Stand By Assistance  Sit <> Stand Assistive Device: Other (see comments) (rollator)  Toilet Transfer Technique: Stand Pivot  Toilet Transfer Assistance: Stand By Assistance (assist needed to manage the O2 lines)  Toilet Transfer Assistive Device: 4 wheeled walker    Functional Ambulation: The patient amb using a rollator with assist to manage O2 lines    Activities of  "Daily Living:   Grooming Position: Standing  Grooming Level of Assistance: Supervision  Toileting Where Assessed: Toilet  Toileting Level of Assistance: Modified independent          Balance:   Static Sit: GOOD: Takes MODERATE challenges from all directions  Dynamic Sit: GOOD: Maintains balance through MODERATE excursions of active trunk movement  Static Stand: FAIR+: Takes MINIMAL challenges from all directions  Dynamic stand: FAIR+: Needs CLOSE SUPERVISION during gait and is able to right self with minor LOB    AM-PAC 6 CLICK ADL  How much help from another person does this patient currently need?  1 = Unable, Total/Dependent Assistance  2 = A lot, Maximum/Moderate Assistance  3 = A little, Minimum/Contact Guard/Supervision  4 = None, Modified [place]/Independent    Putting on and taking off regular lower body clothing? : 4  Bathing (including washing, rinsing, drying)?: 4  Toileting, which includes using toilet, bedpan, or urinal? : 4  Putting on and taking off regular upper body clothing?: 4  Taking care of personal grooming such as brushing teeth?: 4  Eating meals?: 4  Total Score: 24    AM-PAC Raw Score CMS "G-Code Modifier Level of Impairment Assistance   6 % Total / Unable   7 - 9 CM 80 - 100% Maximal Assist   10-14 CL 60 - 80% Moderate Assist   15 - 19 CK 40 - 60% Moderate Assist   20 - 22 CJ 20 - 40% Minimal Assist   23 CI 1-20% SBA / CGA   24 CH 0% Independent/ Mod I       Patient left up in chair with all lines intact, call button in reach and Tomasa notified    Assessment:  Cindy Lyman is a 81 y.o. female with a medical diagnosis of Chronic combined systolic and diastolic heart failure. OT eval is complete. The patient is at her functional baseline and no OT is recommended. Nursing should assist the patient to to the bathroom for safety 2* assist needed to manage the O2 lines. Patient was was educated to request OT again if she has weakness after her scheduled surgery.      Rehab " identified problem list/impairments: Rehab identified problem list/impairments: impaired cardiopulmonary response to activity, decreased safety awareness, impaired endurance    Rehab potential is good.    Activity tolerance: Good    Discharge recommendations: Discharge Facility/Level Of Care Needs: home     Barriers to discharge: Barriers to Discharge: None    Equipment recommendations: none     GOALS:    Occupational Therapy Goals     Not on file          Multidisciplinary Problems (Resolved)        Problem: Occupational Therapy Goal    Goal Priority Disciplines Outcome Interventions   Occupational Therapy Goal   (Resolved)     OT, PT/OT Outcome(s) achieved                    PLAN:  D/C OT  Plan of Care reviewed with: patient    OT G-codes  Functional Assessment Tool Used: AM PAC  Score: 24  Functional Limitation: Self care  Self Care Current Status ():   Self Care Goal Status ():   Self Care Discharge Status (): HILDA Nichols OT  07/11/2017

## 2017-07-11 NOTE — PT/OT/SLP EVAL
Physical Therapy  Evaluation    Cindy Lyman   MRN: [id number]   Admitting Diagnosis: Acute on chronic diastolic CHF (congestive heart failure)    PT Received On: 07/11/17 (- co treated with OT. )  PT Start Time: 0940     PT Stop Time: 1010    PT Total Time (min): 30 min       Billable Minutes:  Evaluation 30 min     Diagnosis: Acute on chronic diastolic CHF (congestive heart failure)    Past Medical History:   Diagnosis Date    Anemia     Anticoagulant long-term use     Aortic stenosis     Arthritis     Asthma     CAD (coronary artery disease) 4/24/2015    Carpal tunnel syndrome on both sides     Cataract     CHF (congestive heart failure) 5/2013    COPD (chronic obstructive pulmonary disease)     Depression     DVT (deep venous thrombosis)     Hyperlipidemia     Hypertension     Pulmonary HTN     TMJ (temporomandibular joint disorder)       Past Surgical History:   Procedure Laterality Date    BACK SURGERY      cardiac stents      CARPAL TUNNEL RELEASE      Right/Left    HYSTERECTOMY      Partial    JOINT REPLACEMENT  2009    Left hip    TEMPOROMANDIBULAR JOINT SURGERY         Referring physician: MD Morales  Date referred to PT: 07/10/2017    General Precautions: Standard, fall  Orthopedic Precautions: Full weight bearing   Braces: N/A            Patient History:  Lives With: child(radha), adult (- daughter. )  Living Arrangements: house  Home Accessibility: stairs to enter home, stairs within home  Home Layout: Bedroom on 2nd floor, Bathroom on 2nd floor  Number of Stairs to Enter Home: 1  Number of Stairs Within Home: 13  Stair Railings at Home: inside, present on right side  Transportation Available: car, family or friend will provide  Living Environment Comment: - pt has a chair lift to ascend/descend stairs.     Equipment Currently Used at Home: cane, straight, rollator  DME owned (not currently used): none    Previous Level of Function:  Ambulation Skills: needs device (-  rollator for community ambulation (only). )  Transfer Skills: independent  ADL Skills: independent    Subjective:  Communicated with Nurse prior to session.    Chief Complaint: I have to go to the bathroom  Patient goals: to return to PLOF    Pain/Comfort  Pain Rating 1: 0/10      Objective:   Patient found with: pulse ox (continuous), telemetry, peripheral IV, oxygen     Cognitive Exam:  Oriented to: Person, Place and Situation    Follows Commands/attention: Follows one-step commands  Communication: clear/fluent  Safety awareness/insight to disability: impaired; pt required continuous VC's to slow down to increase safety with weight bearing task.     Physical Exam:  Postural examination/scapula alignment: Rounded shoulder, Head forward and Abnormal trunk flexion    Skin integrity: Visible skin intact  Edema: None noted     Lower Extremity Range of Motion:  Right Lower Extremity: WFL  Left Lower Extremity: WFL    Lower Extremity Strength:  Right Lower Extremity: WFL  Left Lower Extremity: WFL     Gross motor coordination: WFL    Functional Mobility:  Bed Mobility:  Rolling/Turning to Left: Modified independent, With side rail  Rolling/Turning Right: Modified independent, With side rail  Supine to Sit: Modified Independent, With side rail  Sit to Supine: Modified Independent, With siderail    Transfers:  Sit <> Stand Assistance: Modified Independent  Sit <> Stand Assistive Device: Rolling Walker    Gait:   Gait Distance: 400' with 2.0 Lpm of O2 via [place] with a sitting rest break < 1 min   Assistance 1: Modified Independent  Gait Assistive Device: Rolling walker  Gait Pattern: 3-point gait  Gait Deviation(s): forward lean    Balance:   Static Sit: GOOD: Takes MODERATE challenges from all directions  Dynamic Sit: GOOD: Maintains balance through MODERATE excursions of active trunk movement  Static Stand: FAIR+: Takes MINIMAL challenges from all directions  Dynamic stand: FAIR+: Needs CLOSE SUPERVISION during gait and is  able to right self with minor LOB    Therapeutic Activities and Exercises:  Pt required supervision with toileting and toileting transfers.     AM-PAC 6 CLICK MOBILITY  How much help from another person does this patient currently need?   1 = Unable, Total/Dependent Assistance  2 = A lot, Maximum/Moderate Assistance  3 = A little, Minimum/Contact Guard/Supervision  4 = None, Modified [place]/Independent    Turning over in bed (including adjusting bedclothes, sheets and blankets)?: 4  Sitting down on and standing up from a chair with arms (e.g., wheelchair, bedside commode, etc.): 4  Moving from lying on back to sitting on the side of the bed?: 4  Moving to and from a bed to a chair (including a wheelchair)?: 4  Need to walk in hospital room?: 4  Climbing 3-5 steps with a railing?: 3  Total Score: 23     AM-PAC Raw Score CMS G-Code Modifier Level of Impairment Assistance   6 % Total / Unable   7 - 9 CM 80 - 100% Maximal Assist   10 - 14 CL 60 - 80% Moderate Assist   15 - 19 CK 40 - 60% Moderate Assist   20 - 22 CJ 20 - 40% Minimal Assist   23 CI 1-20% SBA / CGA   24 CH 0% Independent/ Mod I     Patient left up in chair with all lines intact and call button in reach.    Assessment:   Cindy Lyman is a 81 y.o. female with a medical diagnosis of Acute on chronic diastolic CHF (congestive heart failure) and presents with Mod I  With ambulation and transfers.  No Skilled PT services warranted at this time.     Rehab identified problem list/impairments: Rehab identified problem list/impairments: impaired cardiopulmonary response to activity, decreased safety awareness    Rehab potential is good.    Activity tolerance: Good    Discharge recommendations: Discharge Facility/Level Of Care Needs: home     Barriers to discharge:  None    Equipment recommendations: Equipment Needed After Discharge: none     GOALS:    Physical Therapy Goals        Problem: Physical Therapy Goal    Goal Priority Disciplines  Outcome Goal Variances Interventions   Physical Therapy Goal     PT/OT, PT                      PLAN:    Patient to be seen   to address the above listed problems via    Plan of Care expires: 07/11/17  Plan of Care reviewed with: patient    Functional Assessment Tool Used: AM PAC  Score: 23  Functional Limitation: Mobility: Walking and moving around  Mobility: Walking and Moving Around Current Status (): CI  Mobility: Walking and Moving Around Goal Status (): CI  Mobility: Walking and Moving Around Discharge Status (): CI     Riley Cortez, PT  07/11/2017

## 2017-07-11 NOTE — ASSESSMENT & PLAN NOTE
Acute exacerbation resolved. - Fluids given for n/v and became volume overloaded. Very sensitive to fluids. Echo with EF 50%, stable mod AS. Similar compared to 11/2016 pEF, +DD, PAP 44, mod AS. O2 weaned. Lasix held 2/2 elevated creatinine. Cr improved and lasix home dose restarted. NST 7/10/17 stable. Intermediate-low cardiovascular risk for procedure.

## 2017-07-11 NOTE — PROGRESS NOTES
The patient again denies having any pain overnight and she states she did not require pain medications.    Vitals:    07/11/17 0000 07/11/17 0202 07/11/17 0233 07/11/17 0556   BP: (!) 185/84 (!) 190/80 (!) 156/70 (!) 146/68   Pulse: (!) 58 (!) 59 65    Resp: 18      Temp: 98.1 °F (36.7 °C)      TempSrc: Oral      SpO2: 95%      Weight:       Height:          alprazolam  0.5 mg Oral 30 Min Pre-Op    aspirin  81 mg Oral Daily    carbamazepine  200 mg Oral TID    citalopram  20 mg Oral Daily    cloNIDine  0.3 mg Oral TID    enoxaparin  40 mg Subcutaneous Daily    famotidine  20 mg Oral Daily    fluticasone  1 puff Inhalation Q12H    furosemide  40 mg Oral Daily    gabapentin  300 mg Oral TID    hydrALAZINE  100 mg Oral Q8H    metoprolol tartrate  25 mg Oral BID    polyethylene glycol  17 g Oral Daily    rosuvastatin  30 mg Oral QHS    senna-docusate 8.6-50 mg  2 tablet Oral BID    sodium chloride 0.9%  3 mL Intravenous Q8H    verapamil  360 mg Oral Daily     P.E.:  GEN: A x O x 3, NAD  HEENT: EOMI, PERRL, anicteric sclera  CV: RRR, no M/R/G  Chest: CTA B  Abdomen: soft, NTND, normoactive BS  Ext: No C/C/E. 2+ dorsalis pedis pulses B    Labs:    Recent Results (from the past 336 hour(s))   CBC auto differential    Collection Time: 07/10/17  6:55 AM   Result Value Ref Range    WBC 4.75 3.90 - 12.70 K/uL    Hemoglobin 10.1 (L) 12.0 - 16.0 g/dL    Hematocrit 30.4 (L) 37.0 - 48.5 %    Platelets 230 150 - 350 K/uL   CBC auto differential    Collection Time: 07/09/17  4:10 AM   Result Value Ref Range    WBC 5.18 3.90 - 12.70 K/uL    Hemoglobin 8.1 (L) 12.0 - 16.0 g/dL    Hematocrit 25.5 (L) 37.0 - 48.5 %    Platelets 253 150 - 350 K/uL   CBC auto differential    Collection Time: 07/08/17  3:22 AM   Result Value Ref Range    WBC 5.07 3.90 - 12.70 K/uL    Hemoglobin 9.8 (L) 12.0 - 16.0 g/dL    Hematocrit 30.7 (L) 37.0 - 48.5 %    Platelets 329 150 - 350 K/uL     CMP  Sodium   Date Value Ref Range Status    07/10/2017 140 136 - 145 mmol/L Final     Potassium   Date Value Ref Range Status   07/10/2017 3.5 3.5 - 5.1 mmol/L Final     Chloride   Date Value Ref Range Status   07/10/2017 105 95 - 110 mmol/L Final     CO2   Date Value Ref Range Status   07/10/2017 27 23 - 29 mmol/L Final     Glucose   Date Value Ref Range Status   07/10/2017 116 (H) 70 - 110 mg/dL Final     BUN, Bld   Date Value Ref Range Status   07/10/2017 32 (H) 8 - 23 mg/dL Final     Creatinine   Date Value Ref Range Status   07/10/2017 1.5 (H) 0.5 - 1.4 mg/dL Final     Calcium   Date Value Ref Range Status   07/10/2017 8.7 8.7 - 10.5 mg/dL Final     Total Protein   Date Value Ref Range Status   07/10/2017 6.5 6.0 - 8.4 g/dL Final     Albumin   Date Value Ref Range Status   07/10/2017 2.7 (L) 3.5 - 5.2 g/dL Final     Total Bilirubin   Date Value Ref Range Status   07/10/2017 1.5 (H) 0.1 - 1.0 mg/dL Final     Comment:     For infants and newborns, interpretation of results should be based  on gestational age, weight and in agreement with clinical  observations.  Premature Infant recommended reference ranges:  Up to 24 hours.............<8.0 mg/dL  Up to 48 hours............<12.0 mg/dL  3-5 days..................<15.0 mg/dL  6-29 days.................<15.0 mg/dL       Alkaline Phosphatase   Date Value Ref Range Status   07/10/2017 224 (H) 55 - 135 U/L Final     AST   Date Value Ref Range Status   07/10/2017 51 (H) 10 - 40 U/L Final     ALT   Date Value Ref Range Status   07/10/2017 88 (H) 10 - 44 U/L Final     Anion Gap   Date Value Ref Range Status   07/10/2017 8 8 - 16 mmol/L Final     eGFR if    Date Value Ref Range Status   07/10/2017 38 (A) >60 mL/min/1.73 m^2 Final     eGFR if non    Date Value Ref Range Status   07/10/2017 33 (A) >60 mL/min/1.73 m^2 Final     Comment:     Calculation used to obtain the estimated glomerular filtration  rate (eGFR) is the CKD-EPI equation. Since race is unknown   in our information  system, the eGFR values for   -American and Non--American patients are given   for each creatinine result.         No results for input(s): INR, APTT in the last 24 hours.    Invalid input(s): PT    A/P:  The patient is a 81 year old woman with a history of HTN, pulmonary HTN, CAD, CHF, aortic stenosis, COPD, DVT, and depression presenting with abdominal pain and transaminitis  1.  Abdominal Pain/Transaminitis - she presented with similar symptoms last month and a an MRCP was negative for choledocholithiasis.  This admission, an ultrasound showed a common bile duct of 5 mm with cholelithiasis but no definitive choledocholithiasis.  An MRCP was repeated and showed no choledocholithiasis, but possible cholecystitis.  She was previously on bipap and she had dyspnea after walking to the bathroom yesterday, but her breathing status appears better today.  Her pulmonary status needs to be optimized.  Her last dose of plavix was on Saturday, July 7th.  Once plavix has been held for five days, a laparoscopic cholecystectomy with intraoperative cholangiogram is being planned.  This was discussed with Dr. Kevin.  Gastroenterology will follow along in the event she needs an ERCP.  All labs from today are pending, but her bilirubin decreased yesterday.

## 2017-07-11 NOTE — PLAN OF CARE
Problem: Physical Therapy Goal  Goal: Physical Therapy Goal    Skilled PT orders received and initial PT Evaluation completed.      Recommended D/C to home with family.    Recommended DME's = none required at this time.    Riley Cortez, PT   07/11/2017

## 2017-07-11 NOTE — PLAN OF CARE
Problem: Fall Risk (Adult)  Intervention: Monitor/Assist with Self Care   07/09/17 0735 07/11/17 0800   Functional Level Current   Ambulation 2 - assistive person --    Transferring 2 - assistive person --    Toileting 2 - assistive person --    Bathing 2 - assistive person --    Dressing 1 - assistive equipment --    Eating 0 - independent --    Communication 0 - understands/communicates without difficulty --    Swallowing 0 - swallows foods/liquids without difficulty --    Daily Care Interventions   Self-Care Promotion independence encouraged;BADL personal objects within reach;BADL personal routines maintained;meal setup provided --    Activity   Activity Assistance Provided --  assistance, 1 person     Intervention: Reduce Risk/Promote Restraint Free Environment   07/11/17 0600   Safety Interventions   Safety Precautions emergency equipment at bedside     Intervention: Patient Rounds   07/11/17 0800   Safety Interventions   Patient Rounds bed in low position;bed wheels locked;call light in reach;clutter free environment maintained;ID band on;placement of personal items at bedside;toileting offered;visualized patient     Intervention: Safety Promotion/Fall Prevention   07/11/17 0800   Safety Interventions   Safety Promotion/Fall Prevention bed alarm set;assistive device/personal item within reach     Intervention: Safety Precautions   07/11/17 0600   Safety Interventions   Safety Precautions emergency equipment at bedside       Goal: Identify Related Risk Factors and Signs and Symptoms  Related risk factors and signs and symptoms are identified upon initiation of Human Response Clinical Practice Guideline (CPG)   Outcome: Ongoing (interventions implemented as appropriate)   07/11/17 0822   Fall Risk   Related Risk Factors (Fall Risk) age-related changes;environment unfamiliar   Signs and Symptoms (Fall Risk) presence of risk factors     Goal: Absence of Falls  Patient will demonstrate the desired outcomes by  discharge/transition of care.   Outcome: Ongoing (interventions implemented as appropriate)   17   Fall Risk (Adult)   Absence of Falls making progress toward outcome       Problem: Respiratory Distress Syndrome (,NICU)  Intervention: Correct and Maintain Adequate Oxygenation   17   Respiratory Interventions   Airway/Ventilation Management (Infant) airway patency maintained;calming measures promoted     Intervention: Provide Preventive/Supportive Care   17   Developmental Care   Environmental Modifications clustered care;lighting decreased   Hypothermia Management (Infant)   Warming Method adjust environmental temperature       Goal: Signs and Symptoms of Listed Potential Problems Will be Absent, Minimized or Managed (Respiratory Distress Syndrome)  Signs and symptoms of listed potential problems will be absent, minimized or managed by discharge/transition of care (reference Respiratory Distress Syndrome ([place],NICU) CPG).   Outcome: Ongoing (interventions implemented as appropriate)   17   Respiratory Distress Syndrome   Problems Assessed (Respiratory Distress Syndrome) all   Problems Present (Respiratory Distress Syndrome) none

## 2017-07-11 NOTE — PLAN OF CARE
Problem: Occupational Therapy Goal  Goal: Occupational Therapy Goal  Outcome: Outcome(s) achieved Date Met: 07/11/17  OT eval is complete. The patient is at her functional baseline and no OT is recommended. Nursing should assist the patient to to the bathroom for safety 2* assist needed to manage the O2 lines.

## 2017-07-11 NOTE — PROGRESS NOTES
[facility]  General Surgery  Progress Note    Subjective:     Interval History: s/p cards evaluation. Mild nausea, no other complaints    Post-Op Info:  * No surgery found *          Medications:  Continuous Infusions:   Scheduled Meds:   alprazolam  0.5 mg Oral 30 Min Pre-Op    aspirin  81 mg Oral Daily    carbamazepine  200 mg Oral TID    citalopram  20 mg Oral Daily    cloNIDine  0.3 mg Oral TID    enoxaparin  40 mg Subcutaneous Daily    famotidine  20 mg Oral Daily    fluticasone  1 puff Inhalation Q12H    furosemide  40 mg Oral Daily    gabapentin  300 mg Oral TID    hydrALAZINE  100 mg Oral Q8H    metoprolol tartrate  25 mg Oral BID    polyethylene glycol  17 g Oral Daily    rosuvastatin  30 mg Oral QHS    senna-docusate 8.6-50 mg  2 tablet Oral BID    sodium chloride 0.9%  3 mL Intravenous Q8H    verapamil  360 mg Oral Daily     PRN Meds:sodium chloride, acetaminophen, hydrALAZINE, morphine, morphine, ondansetron, promethazine (PHENERGAN) IVPB     Objective:     Vital Signs (Most Recent):  Temp: 98.2 °F (36.8 °C) (07/11/17 0400)  Pulse: 69 (07/11/17 0400)  Resp: 18 (07/11/17 0400)  BP: (!) 146/68 (07/11/17 0556)  SpO2: 97 % (07/11/17 0400) Vital Signs (24h Range):  Temp:  [98.1 °F (36.7 °C)-99.2 °F (37.3 °C)] 98.2 °F (36.8 °C)  Pulse:  [55-70] 69  Resp:  [18-19] 18  SpO2:  [95 %-99 %] 97 %  BP: (122-190)/(61-84) 146/68       Intake/Output Summary (Last 24 hours) at 07/11/17 0831  Last data filed at 07/10/17 1700   Gross per 24 hour   Intake              240 ml   Output                0 ml   Net              240 ml       Physical Exam  No apparent distress  Mild RUQ tenderness to deep palpation  Significant Labs:  CBC:   Recent Labs  Lab 07/11/17  0456   WBC 4.84   RBC 3.23*   HGB 9.5*   HCT 28.5*      MCV 88   MCH 29.4   MCHC 33.3     CMP:   Recent Labs  Lab 07/11/17  0456   GLU 95   CALCIUM 8.4*   ALBUMIN 2.7*   PROT 5.9*      K 4.2   CO2 27      BUN 31*    CREATININE 1.4   ALKPHOS 198*   ALT 69*   AST 30   BILITOT 1.0       Significant Diagnostics:  None    Assessment/Plan:   81M with gallbladder sludge/stones, with significant cardiac history  Cards clearance with low moderate risk  Off plavix since 7/8/17  Plan for cholecystectomy later this week    Active Diagnoses:    Diagnosis Date Noted POA    PRINCIPAL PROBLEM:  Acute on chronic diastolic CHF (congestive heart failure) [I50.33] 06/17/2017 Yes    Cholelithiasis without cholecystitis [K80.20] 07/08/2017 Yes    Stage 3 chronic kidney disease [N18.3] 07/08/2017 Yes     Chronic    Elevated LFTs [R79.89] 06/17/2017 Yes    Coronary artery disease involving native coronary artery without angina pectoris [I25.10] 05/11/2015 Yes     Chronic    Obesity (BMI 30-39.9) [E66.9] 05/09/2015 Yes     Chronic    Anemia of chronic disease [D63.8] 11/29/2013 Yes     Chronic    AS (aortic stenosis) [I35.0] 11/22/2013 Yes     Chronic    Pulmonary hypertension [I27.2] 09/28/2012 Yes     Chronic    Hypertension [I10]  Yes     Chronic    Hyperlipidemia [E78.5]  Yes     Chronic    Depression [F32.9]  Yes     Chronic      Problems Resolved During this Admission:    Diagnosis Date Noted Date Resolved POA    Respiratory distress [R06.00] 07/08/2017 07/10/2017 Yes         Mara Douglas MD  General Surgery  [facility]

## 2017-07-11 NOTE — SUBJECTIVE & OBJECTIVE
Review of Systems   Constitutional: Negative for chills and fever.   HENT: Negative for congestion and rhinorrhea.    Eyes: Negative for photophobia and visual disturbance.   Respiratory: Negative for cough and shortness of breath.    Cardiovascular: Negative for chest pain and palpitations.   Gastrointestinal: Positive for abdominal pain. Negative for nausea and vomiting.   Genitourinary: Negative for difficulty urinating and dysuria.   Musculoskeletal: Negative for joint swelling and neck stiffness.   Skin: Negative for rash and wound.   Neurological: Negative for dizziness and light-headedness.   Psychiatric/Behavioral: Negative for agitation and behavioral problems.     Objective:     Vital Signs (Most Recent):  Temp: 98.5 °F (36.9 °C) (07/11/17 0827)  Pulse: 69 (07/11/17 0948)  Resp: 20 (07/11/17 0948)  BP: (!) 157/71 (07/11/17 0827)  SpO2: 99 % (07/11/17 0948) Vital Signs (24h Range):  Temp:  [98.1 °F (36.7 °C)-98.6 °F (37 °C)] 98.5 °F (36.9 °C)  Pulse:  [54-69] 69  Resp:  [18-20] 20  SpO2:  [95 %-99 %] 99 %  BP: (122-190)/(61-84) 157/71     Weight: 95.3 kg (210 lb)  Body mass index is 34.95 kg/m².    Physical Exam   Constitutional: She is oriented to person, place, and time. She appears well-developed and well-nourished. No distress.   Pleasant   HENT:   Right Ear: External ear normal.   Left Ear: External ear normal.   Nose: Nose normal.   Eyes: EOM are normal. Pupils are equal, round, and reactive to light. No scleral icterus.   Neck: Normal range of motion. Neck supple. No thyromegaly present.   Cardiovascular: Normal rate and normal heart sounds.  Exam reveals no friction rub.    No murmur heard.  Pulmonary/Chest: Effort normal. She has no wheezes. She has no rales.   Abdominal: Soft. Bowel sounds are normal. She exhibits no mass. There is tenderness (RUQ).   obese   Musculoskeletal: Normal range of motion. She exhibits no edema or deformity.   Neurological: She is alert and oriented to person, place, and  time. No cranial nerve deficit.   Skin: Skin is warm and dry. No pallor.   Psychiatric: She has a normal mood and affect. Her behavior is normal. Thought content normal.        Significant Labs:   CBC:     Recent Labs  Lab 07/10/17  0655 07/11/17 0456   WBC 4.75 4.84   HGB 10.1* 9.5*   HCT 30.4* 28.5*    219     CMP:     Recent Labs  Lab 07/10/17  0655 07/11/17  0456    141   K 3.5 4.2    105   CO2 27 27   * 95   BUN 32* 31*   CREATININE 1.5* 1.4   CALCIUM 8.7 8.4*   PROT 6.5 5.9*   ALBUMIN 2.7* 2.7*   BILITOT 1.5* 1.0   ALKPHOS 224* 198*   AST 51* 30   ALT 88* 69*   ANIONGAP 8 9   EGFRNONAA 33* 36*     Troponin:   No results for input(s): TROPONINI in the last 48 hours.  Urine Studies:   No results for input(s): COLORU, APPEARANCEUA, PHUR, SPECGRAV, PROTEINUA, GLUCUA, KETONESU, BILIRUBINUA, OCCULTUA, NITRITE, UROBILINOGEN, LEUKOCYTESUR, RBCUA, WBCUA, BACTERIA, SQUAMEPITHEL, HYALINECASTS in the last 48 hours.    Invalid input(s): DANNI    Significant Imaging: I have reviewed all pertinent imaging results/findings within the past 24 hours.

## 2017-07-11 NOTE — ASSESSMENT & PLAN NOTE
CC RUQ pain. Consulted GI and surgery. Elevated LFTs. Multiple stones in gallbladder and e/o cholecystitis. Plavix on hold for cholecystectomy.

## 2017-07-11 NOTE — PROGRESS NOTES
"[facility]  Progress Note    Patient Name: Cindy Lyman  MRN: [id number]  Patient Class: IP- Inpatient   Admission Date: 7/8/2017  Length of Stay: 3 days  Attending Physician: Marisela Nielsen MD  Primary Care Provider: Jeremiah Reeves MD        Subjective:     Principal Problem:Chronic combined systolic and diastolic heart failure    HPI:  Ms. Lyman is a 82 yo woman with obesity (BMI 34), HFpEF, mod AS, CKD3, anemia of chronic disease (baseline ~9.0), chronic constipation, and recent admission for abdominal pain who presented 7/8/17 for abdominal pain. Last admission she was found to be septic with fever and tachycardia (no leukocytosis). She was treated for a UTI and followed up with her PCP. She had persistent RUQ abdominal pain at her PCP visit and was referred to general surgery but has not yet seen them. Her symptoms continue to be associated with nausea and vomiting. In the ER she was treated with pain medication and antiemetics as well as fluids. She then developed shortness of breath and was found to be volume overloaded on CXR. She was started on Lasix 40 IV and BiPAP and admitted to medicine.    Hospital Course:  Cardiology, GI, and gen surg were consulted. Quickly weaned off of BiPAP to room air. Lasix initially, then held due to elevated creatinine. Echo 7/8/17 w/ LVEF 55%, mod AS, mild MS and TR, PAP 51. NST 7/10/2017.  US abdomen showed biliary sludge and cholelithiasis but no definite sonographic evidence to suggest acute cholecystitis. MRCP showed, "Markedly distended gallbladder with innumerable dependent gallstones.  There has been development of pericholecystic fluid near the fundus of the gallbladder that could reflect the sequela of cholecystitis" as well as a mild to moderate pericardial effusion. Plavix held in anticipation of cholecystectomy. NST 7/10/2017 stable. Low-intermediate cardiovascular risk for surgery planned for later this week. "   Complaining of GALVAN, likely from deconditioning. Did well with PT/OT. O2 to maintain sat >88%.   H&H 8.1/25.5. Transfused 1 unit RBCs 7/9/17.    Review of Systems   Constitutional: Negative for chills and fever.   HENT: Negative for congestion and rhinorrhea.    Eyes: Negative for photophobia and visual disturbance.   Respiratory: Negative for cough and shortness of breath.    Cardiovascular: Negative for chest pain and palpitations.   Gastrointestinal: Positive for abdominal pain. Negative for nausea and vomiting.   Genitourinary: Negative for difficulty urinating and dysuria.   Musculoskeletal: Negative for joint swelling and neck stiffness.   Skin: Negative for rash and wound.   Neurological: Negative for dizziness and light-headedness.   Psychiatric/Behavioral: Negative for agitation and behavioral problems.     Objective:     Vital Signs (Most Recent):  Temp: 98.5 °F (36.9 °C) (07/11/17 0827)  Pulse: 69 (07/11/17 0948)  Resp: 20 (07/11/17 0948)  BP: (!) 157/71 (07/11/17 0827)  SpO2: 99 % (07/11/17 0948) Vital Signs (24h Range):  Temp:  [98.1 °F (36.7 °C)-98.6 °F (37 °C)] 98.5 °F (36.9 °C)  Pulse:  [54-69] 69  Resp:  [18-20] 20  SpO2:  [95 %-99 %] 99 %  BP: (122-190)/(61-84) 157/71     Weight: 95.3 kg (210 lb)  Body mass index is 34.95 kg/m².    Physical Exam   Constitutional: She is oriented to person, place, and time. She appears well-developed and well-nourished. No distress.   Pleasant   HENT:   Right Ear: External ear normal.   Left Ear: External ear normal.   Nose: Nose normal.   Eyes: EOM are normal. Pupils are equal, round, and reactive to light. No scleral icterus.   Neck: Normal range of motion. Neck supple. No thyromegaly present.   Cardiovascular: Normal rate and normal heart sounds.  Exam reveals no friction rub.    +murmur heard.  Pulmonary/Chest: Effort normal. She has no wheezes. She has no rales.   Abdominal: Soft. Bowel sounds are normal. She exhibits no mass. There is tenderness (RUQ).    obese   Musculoskeletal: Normal range of motion. She exhibits no edema or deformity.   Neurological: She is alert and oriented to person, place, and time. No cranial nerve deficit.   Skin: Skin is warm and dry. No pallor.   Psychiatric: She has a normal mood and affect. Her behavior is normal. Thought content normal.        Significant Labs:   CBC:     Recent Labs  Lab 07/10/17  0655 07/11/17  0456   WBC 4.75 4.84   HGB 10.1* 9.5*   HCT 30.4* 28.5*    219     CMP:     Recent Labs  Lab 07/10/17  0655 07/11/17  0456    141   K 3.5 4.2    105   CO2 27 27   * 95   BUN 32* 31*   CREATININE 1.5* 1.4   CALCIUM 8.7 8.4*   PROT 6.5 5.9*   ALBUMIN 2.7* 2.7*   BILITOT 1.5* 1.0   ALKPHOS 224* 198*   AST 51* 30   ALT 88* 69*   ANIONGAP 8 9   EGFRNONAA 33* 36*     Significant Imaging: I have reviewed all pertinent imaging results/findings within the past 24 hours.    Assessment/Plan:      * Chronic combined systolic and diastolic heart failure    Acute exacerbation resolved. - Fluids given for n/v and became volume overloaded. Very sensitive to fluids. Echo with EF 50%, stable mod AS. Similar compared to 11/2016 pEF, +DD, PAP 44, mod AS. O2 weaned. Lasix held 2/2 elevated creatinine. Cr improved and lasix home dose restarted. NST 7/10/17 stable. Intermediate-low cardiovascular risk for procedure.        Cholelithiasis without cholecystitis    CC RUQ pain. Consulted GI and surgery. Elevated LFTs. Multiple stones in gallbladder and e/o cholecystitis. Plavix on hold for cholecystectomy.        Elevated LFTs    See above        AS (aortic stenosis)    Echo        Pulmonary hypertension    Echo        Stage 3 chronic kidney disease    Stable        Coronary artery disease involving native coronary artery without angina pectoris    Stable. Trop unremarkable. Continue home meds.         Obesity (BMI 30-39.9)    Counseled on weight loss.        Anemia of chronic disease    Stable. Transfused 7/9/17 with  appropriate response.        Depression    Continue home meds        Hypertension    Cont home meds. PRN hydralazine.          VTE Risk Mitigation         Ordered     enoxaparin injection 40 mg  Daily     Route:  Subcutaneous        07/08/17 0936     Medium Risk of VTE  Once      07/08/17 0936     Place sequential compression device  Until discontinued      07/08/17 0936     Place LAUREANO hose  Until discontinued      07/08/17 0936          Marisela Nielsen MD  Department of Hospital Medicine   [facility]

## 2017-07-12 ENCOUNTER — ANESTHESIA EVENT (OUTPATIENT)
Dept: SURGERY | Facility: HOSPITAL | Age: 82
DRG: 987 | End: 2017-07-12
Payer: MEDICARE

## 2017-07-12 LAB
ALBUMIN SERPL BCP-MCNC: 2.5 G/DL
ALP SERPL-CCNC: 201 U/L
ALT SERPL W/O P-5'-P-CCNC: 54 U/L
ANION GAP SERPL CALC-SCNC: 6 MMOL/L
AST SERPL-CCNC: 37 U/L
BASOPHILS # BLD AUTO: 0.03 K/UL
BASOPHILS NFR BLD: 0.7 %
BILIRUB SERPL-MCNC: 0.7 MG/DL
BUN SERPL-MCNC: 25 MG/DL
CALCIUM SERPL-MCNC: 8.2 MG/DL
CHLORIDE SERPL-SCNC: 104 MMOL/L
CO2 SERPL-SCNC: 29 MMOL/L
CREAT SERPL-MCNC: 1.1 MG/DL
DIFFERENTIAL METHOD: ABNORMAL
EOSINOPHIL # BLD AUTO: 0.2 K/UL
EOSINOPHIL NFR BLD: 4.6 %
ERYTHROCYTE [DISTWIDTH] IN BLOOD BY AUTOMATED COUNT: 14.3 %
EST. GFR  (AFRICAN AMERICAN): 55 ML/MIN/1.73 M^2
EST. GFR  (NON AFRICAN AMERICAN): 48 ML/MIN/1.73 M^2
GLUCOSE SERPL-MCNC: 98 MG/DL
HCT VFR BLD AUTO: 27.7 %
HGB BLD-MCNC: 9 G/DL
LYMPHOCYTES # BLD AUTO: 1 K/UL
LYMPHOCYTES NFR BLD: 23.9 %
MCH RBC QN AUTO: 28.8 PG
MCHC RBC AUTO-ENTMCNC: 32.5 %
MCV RBC AUTO: 89 FL
MONOCYTES # BLD AUTO: 0.6 K/UL
MONOCYTES NFR BLD: 13.3 %
NEUTROPHILS # BLD AUTO: 2.4 K/UL
NEUTROPHILS NFR BLD: 58.5 %
PLATELET # BLD AUTO: ABNORMAL K/UL
PMV BLD AUTO: ABNORMAL FL
POTASSIUM SERPL-SCNC: 4.1 MMOL/L
PROT SERPL-MCNC: 5.4 G/DL
RBC # BLD AUTO: 3.13 M/UL
SODIUM SERPL-SCNC: 139 MMOL/L
WBC # BLD AUTO: 4.14 K/UL

## 2017-07-12 PROCEDURE — 80053 COMPREHEN METABOLIC PANEL: CPT

## 2017-07-12 PROCEDURE — 25000003 PHARM REV CODE 250: Performed by: EMERGENCY MEDICINE

## 2017-07-12 PROCEDURE — 36415 COLL VENOUS BLD VENIPUNCTURE: CPT

## 2017-07-12 PROCEDURE — 25000003 PHARM REV CODE 250: Performed by: INTERNAL MEDICINE

## 2017-07-12 PROCEDURE — 94640 AIRWAY INHALATION TREATMENT: CPT

## 2017-07-12 PROCEDURE — 85025 COMPLETE CBC W/AUTO DIFF WBC: CPT

## 2017-07-12 PROCEDURE — 63600175 PHARM REV CODE 636 W HCPCS: Performed by: EMERGENCY MEDICINE

## 2017-07-12 PROCEDURE — 21400001 HC TELEMETRY ROOM

## 2017-07-12 RX ORDER — POLYETHYLENE GLYCOL 3350 17 G/17G
17 POWDER, FOR SOLUTION ORAL 2 TIMES DAILY
Status: DISCONTINUED | OUTPATIENT
Start: 2017-07-12 | End: 2017-07-13

## 2017-07-12 RX ORDER — GLYCERIN 1 G/1
1 SUPPOSITORY RECTAL DAILY PRN
Status: DISCONTINUED | OUTPATIENT
Start: 2017-07-12 | End: 2017-07-13

## 2017-07-12 RX ADMIN — FUROSEMIDE 40 MG: 40 TABLET ORAL at 09:07

## 2017-07-12 RX ADMIN — CLONIDINE HYDROCHLORIDE 0.3 MG: 0.1 TABLET ORAL at 03:07

## 2017-07-12 RX ADMIN — Medication 3 ML: at 04:07

## 2017-07-12 RX ADMIN — METOPROLOL TARTRATE 25 MG: 25 TABLET, FILM COATED ORAL at 08:07

## 2017-07-12 RX ADMIN — GABAPENTIN 300 MG: 300 CAPSULE ORAL at 03:07

## 2017-07-12 RX ADMIN — ASPIRIN 81 MG: 81 TABLET, COATED ORAL at 09:07

## 2017-07-12 RX ADMIN — Medication 3 ML: at 10:07

## 2017-07-12 RX ADMIN — CARBAMAZEPINE 200 MG: 200 TABLET ORAL at 10:07

## 2017-07-12 RX ADMIN — CARBAMAZEPINE 200 MG: 200 TABLET ORAL at 05:07

## 2017-07-12 RX ADMIN — HYDRALAZINE HYDROCHLORIDE 100 MG: 25 TABLET ORAL at 05:07

## 2017-07-12 RX ADMIN — ROSUVASTATIN CALCIUM 30 MG: 10 TABLET ORAL at 08:07

## 2017-07-12 RX ADMIN — VERAPAMIL HYDROCHLORIDE 360 MG: 120 TABLET, FILM COATED, EXTENDED RELEASE ORAL at 09:07

## 2017-07-12 RX ADMIN — GABAPENTIN 300 MG: 300 CAPSULE ORAL at 05:07

## 2017-07-12 RX ADMIN — GABAPENTIN 300 MG: 300 CAPSULE ORAL at 09:07

## 2017-07-12 RX ADMIN — POLYETHYLENE GLYCOL 3350 17 G: 17 POWDER, FOR SOLUTION ORAL at 09:07

## 2017-07-12 RX ADMIN — CARBAMAZEPINE 200 MG: 200 TABLET ORAL at 04:07

## 2017-07-12 RX ADMIN — CITALOPRAM HYDROBROMIDE 20 MG: 20 TABLET ORAL at 09:07

## 2017-07-12 RX ADMIN — HYDRALAZINE HYDROCHLORIDE 100 MG: 25 TABLET ORAL at 09:07

## 2017-07-12 RX ADMIN — HYDRALAZINE HYDROCHLORIDE 100 MG: 25 TABLET ORAL at 03:07

## 2017-07-12 RX ADMIN — FLUTICASONE PROPIONATE 1 PUFF: 110 AEROSOL, METERED RESPIRATORY (INHALATION) at 10:07

## 2017-07-12 RX ADMIN — DOCUSATE SODIUM AND SENNOSIDES 2 TABLET: 8.6; 5 TABLET, FILM COATED ORAL at 09:07

## 2017-07-12 RX ADMIN — ENOXAPARIN SODIUM 40 MG: 100 INJECTION SUBCUTANEOUS at 04:07

## 2017-07-12 RX ADMIN — CLONIDINE HYDROCHLORIDE 0.3 MG: 0.1 TABLET ORAL at 09:07

## 2017-07-12 RX ADMIN — CLONIDINE HYDROCHLORIDE 0.3 MG: 0.1 TABLET ORAL at 05:07

## 2017-07-12 RX ADMIN — Medication 3 ML: at 06:07

## 2017-07-12 RX ADMIN — METOPROLOL TARTRATE 25 MG: 25 TABLET, FILM COATED ORAL at 09:07

## 2017-07-12 RX ADMIN — FAMOTIDINE 20 MG: 20 TABLET, FILM COATED ORAL at 09:07

## 2017-07-12 NOTE — ANESTHESIA PREPROCEDURE EVALUATION
07/12/2017  Cindy Lyman is a 81 y.o., female.    Pre-op Assessment    I have reviewed the Patient Summary Reports.      I have reviewed the Medications.     Review of Systems  Anesthesia Hx:  No problems with previous Anesthesia  History of prior surgery of interest to airway management or planning: Denies Family Hx of Anesthesia complications.   Denies Personal Hx of Anesthesia complications.   Social:  Non-Smoker    Hematology/Oncology:         -- Anemia: Hematology Comments: Hx DVT   EENT/Dental:   TMJ   Cardiovascular:   Hypertension Valvular problems/Murmurs, AS CAD  CABG/stent (CARLOS MANUEL X 2 4/2015)  CHF hyperlipidemia NST:  Impression: ABNORMAL MYOCARDIAL PERFUSION  1. There is evidence for mild myocardial ischemia in the inferior wall of the left ventricle.   2. The perfusion scan is free of evidence for myocardial injury.   3. There is a mild to moderate intensity fixed defect in the anterior wall of the left ventricle, secondary to breast attenuation.   4. Resting wall motion is physiologic.   5. There is resting LV dysfunction with a reduced ejection fraction of 44 %.   6. The ventricular volumes are normal at rest and stress.   7. The extracardiac distribution of radioactivity is normal.   8. When compared to the previous study from 05/25/2016, no change.     Assessment and Plan:     Brief HPI:      Preop examination    Patient is cleared at low to intermediate risk from cardiovascular standpoint was stable NST and overall normal LVEF  Okay to hold antiplatelets therapy for the procedure     Stage 3 chronic kidney disease    Continue follow serum creatinine      Cholelithiasis without cholecystitis    Plan is for surgery if cleared from cardiac standpoint     Coronary artery disease involving native coronary artery without angina pectoris    Continues of medicines for secondary  prevention  History of PCI to the LAD in 2015 with  Plavix on hold for possible surgery  No new signs of ACS  Nuclear stress test as above     Pulmonary:   Denies Pneumonia COPD Asthma Shortness of breath    Renal/:   Chronic Renal Disease, CRI    Hepatic/GI:  Hepatic/GI Normal    Musculoskeletal:   Arthritis     Neurological:   Denies CVA. Denies Seizures.    Endocrine:   Denies Diabetes.    Psych:   Psychiatric History          Physical Exam  General:  Obesity, Well nourished    Airway/Jaw/Neck:  Airway Findings: Mouth Opening: Normal Tongue: Large  General Airway Assessment: Adult, Average  Mallampati: III  TM Distance: Normal, at least 6 cm  edentulous    Dental:  Dental Findings: Edentulous   Chest/Lungs:  Chest/Lungs Findings: Clear to auscultation     Heart/Vascular:  Heart Findings: Rate: Normal  Rhythm: Regular Rhythm  Heart Murmur  Systolic Heart Murmur Grade: Grade II        Mental Status:  Mental Status Findings: Normal        Anesthesia Plan  Type of Anesthesia, risks & benefits discussed:  Anesthesia Type:  general  Patient's Preference:   Intra-op Monitoring Plan: standard ASA monitors  Intra-op Monitoring Plan Comments:   Post Op Pain Control Plan: IV/PO Opioids PRN  Post Op Pain Control Plan Comments:   Induction:   IV  Beta Blocker:  Patient is on a Beta-Blocker and has received one dose within the past 24 hours (No further documentation required).       Informed Consent: Patient understands risks and agrees with Anesthesia plan.  Questions answered. Anesthesia consent signed with patient.  ASA Score: 4     Day of Surgery Review of History & Physical:    H&P update referred to the provider.     Anesthesia Plan Notes: Treat hypotension and treat tachycardia to avoid decreased perfusion given AS and mild reversible ischemia  Will confirm last Plavix dose () and last metoprolol dose ()  Cardiac and AS precautions with goal MAP within 10% of baseline.        Ready For Surgery From Anesthesia  Perspective.

## 2017-07-12 NOTE — NURSING
Report received from BRITNEY Yepez. Patient resting comfortably, no complaints, no acute distress noted. Plan of care reviewed with patient. Instructed patient to call for assistance before ambulating, side rails up x3, bed alarm set, call light in reach, non skid socks in use. Patient verbalized understanding of instructions.

## 2017-07-12 NOTE — PROGRESS NOTES
[facility]  General Surgery  Progress Note    Subjective:     Interval History: no acute events overnight, tolerating diet, abdominal pain resolved    Post-Op Info:  * No surgery found *          Medications:  Continuous Infusions:   Scheduled Meds:   alprazolam  0.5 mg Oral 30 Min Pre-Op    aspirin  81 mg Oral Daily    carbamazepine  200 mg Oral TID    citalopram  20 mg Oral Daily    cloNIDine  0.3 mg Oral TID    enoxaparin  40 mg Subcutaneous Daily    famotidine  20 mg Oral Daily    fluticasone  1 puff Inhalation Q12H    furosemide  40 mg Oral Daily    gabapentin  300 mg Oral TID    hydrALAZINE  100 mg Oral Q8H    metoprolol tartrate  25 mg Oral BID    polyethylene glycol  17 g Oral Daily    rosuvastatin  30 mg Oral QHS    senna-docusate 8.6-50 mg  2 tablet Oral BID    sodium chloride 0.9%  3 mL Intravenous Q8H    verapamil  360 mg Oral Daily     PRN Meds:sodium chloride, acetaminophen, hydrALAZINE, morphine, morphine, ondansetron, promethazine (PHENERGAN) IVPB     Objective:     Vital Signs (Most Recent):  Temp: 98.2 °F (36.8 °C) (07/12/17 0527)  Pulse: 61 (07/12/17 0715)  Resp: 18 (07/12/17 0527)  BP: (!) 168/78 (07/12/17 0527)  SpO2: 99 % (07/12/17 0527) Vital Signs (24h Range):  Temp:  [98.2 °F (36.8 °C)-98.8 °F (37.1 °C)] 98.2 °F (36.8 °C)  Pulse:  [57-69] 61  Resp:  [18-20] 18  SpO2:  [98 %-100 %] 99 %  BP: (116-201)/(57-88) 168/78       Intake/Output Summary (Last 24 hours) at 07/12/17 0835  Last data filed at 07/11/17 1200   Gross per 24 hour   Intake              240 ml   Output                0 ml   Net              240 ml       Physical Exam  Awake, alert  Abdomen soft, non tender, non distended    Significant Labs:  CMP:   Recent Labs  Lab 07/12/17  0706   GLU 98   CALCIUM 8.2*   ALBUMIN 2.5*   PROT 5.4*      K 4.1   CO2 29      BUN 25*   CREATININE 1.1   ALKPHOS 201*   ALT 54*   AST 37   BILITOT 0.7       Significant  Diagnostics:  None    Assessment/Plan:   81M with gallbladder sludge/stones, with significant cardiac history  Cards clearance with low moderate risk  Bili normalized  Off plavix since 7/8/17  Plan for cholecystectomy with IOC tomorrow, NPO at midnight    Active Diagnoses:    Diagnosis Date Noted POA    PRINCIPAL PROBLEM:  Chronic combined systolic and diastolic heart failure [I50.42] 06/17/2017 Yes    Cholelithiasis without cholecystitis [K80.20] 07/08/2017 Yes    Stage 3 chronic kidney disease [N18.3] 07/08/2017 Yes     Chronic    Elevated LFTs [R79.89] 06/17/2017 Yes    Coronary artery disease involving native coronary artery without angina pectoris [I25.10] 05/11/2015 Yes     Chronic    Obesity (BMI 30-39.9) [E66.9] 05/09/2015 Yes     Chronic    Anemia of chronic disease [D63.8] 11/29/2013 Yes     Chronic    AS (aortic stenosis) [I35.0] 11/22/2013 Yes     Chronic    Pulmonary hypertension [I27.2] 09/28/2012 Yes     Chronic    Hypertension [I10]  Yes     Chronic    Hyperlipidemia [E78.5]  Yes     Chronic    Depression [F32.9]  Yes     Chronic      Problems Resolved During this Admission:    Diagnosis Date Noted Date Resolved POA    Respiratory distress [R06.00] 07/08/2017 07/10/2017 Yes         Mara Douglas MD  General Surgery  [facility]

## 2017-07-12 NOTE — PROGRESS NOTES
Bedside report given to BRITNEY Catalan; pt in bed; awake and alert; no distress noted; hourly rounding maintained throughout shift to assess ad address pt needs.

## 2017-07-12 NOTE — PLAN OF CARE
Problem: Fall Risk (Adult)  Intervention: Reduce Risk/Promote Restraint Free Environment   07/12/17 1839   Safety Interventions   Environmental Safety Modification assistive device/personal items within reach;clutter free environment maintained;lighting adjusted     Intervention: Review Medications/Identify Contributors to Fall Risk   07/12/17 1839   Safety Interventions   Medication Review/Management medications reviewed     Intervention: Patient Rounds   07/12/17 1837   Safety Interventions   Patient Rounds bed in low position;bed wheels locked;call light in reach;clutter free environment maintained;ID band on;placement of personal items at bedside;toileting offered;visualized patient     Intervention: Safety Promotion/Fall Prevention   07/12/17 1837   Safety Interventions   Safety Promotion/Fall Prevention assistive device/personal item within reach;bed alarm set;Fall Risk reviewed with patient/family;lighting adjusted;medications reviewed;nonskid shoes/socks when out of bed;side rails raised x 2       Goal: Identify Related Risk Factors and Signs and Symptoms  Related risk factors and signs and symptoms are identified upon initiation of Human Response Clinical Practice Guideline (CPG)   Outcome: Ongoing (interventions implemented as appropriate)   07/11/17 0822   Fall Risk   Related Risk Factors (Fall Risk) age-related changes;environment unfamiliar   Signs and Symptoms (Fall Risk) presence of risk factors     Goal: Absence of Falls  Patient will demonstrate the desired outcomes by discharge/transition of care.   Outcome: Ongoing (interventions implemented as appropriate)   07/11/17 0822   Fall Risk (Adult)   Absence of Falls making progress toward outcome

## 2017-07-12 NOTE — PROGRESS NOTES
CC - abdominal pain    Subjective:    No complaints this morning.  She denies abdominal pain or N/V.  She is currently tolerating eggs and grits without issues.  No fevers.    Objective:  Vitals:    07/12/17 0835   BP: (!) 159/74   Pulse: (!) 56   Resp: 18   Temp: 98.9 °F (37.2 °C)     Physical Exam:  GEN: Elderly female in no acute distress.  HEENT: EOMI, PERRL, anicteric sclera  Cardiovascular: normal s1/s2, RRR, no M/R/G   Chest: CTA B   Abdomen: soft, NTND, normoactive BS, no HSM  Extermities: No C/C/E. 2+ dorsalis pedis pulses bilaterally      Recent Labs  Lab 07/12/17  0706   WBC 4.14   RBC 3.13*   HGB 9.0*   HCT 27.7*   PLT SEE COMMENT   MCV 89   MCH 28.8   MCHC 32.5   ALT= 54  AST= 37  ALP= 201  Tbili= 0.7    Imaging:  MRCP, CT and US reviewed.    Impression: 81 year old female with a history of HTN, pulmonary HTN, HLD, CAD, CHF, COPD, DVT, aortic stenosis and depression presenting with shortness of breath, abdominal pain and elevated liver enzymes.     Plan:  1.  Abdominal pain - presenting with associated transaminitis with history of similar symptoms last month (MRCP negative for choledocholithiasis at that time).  Ultrasound this admission revealed a CBD of 5 mm with gallstones but no choledocholithiasis.  Repeat MRCP showed possible cholecystitis but no choledocholithiasis.  Surgery is tentatively planning for laparoscopic cholecystectomy once plavix has been held 5 days (last dose on 7/8/17) and her respiratory status is optimal (seems much more stable today).  An intraoperative cholangiogram will be performed at that time to determine if there are any remaining stones that need to be removed via ERCP.  Overall appears much better today.  Will continue to follow with you.

## 2017-07-13 ENCOUNTER — ANESTHESIA (OUTPATIENT)
Dept: SURGERY | Facility: HOSPITAL | Age: 82
DRG: 987 | End: 2017-07-13
Payer: MEDICARE

## 2017-07-13 PROBLEM — R06.03 RESPIRATORY DISTRESS: Status: ACTIVE | Noted: 2017-07-13

## 2017-07-13 LAB
ABO + RH BLD: NORMAL
ALBUMIN SERPL BCP-MCNC: 2.5 G/DL
ALBUMIN SERPL BCP-MCNC: 2.9 G/DL
ALLENS TEST: ABNORMAL
ALP SERPL-CCNC: 187 U/L
ALP SERPL-CCNC: 272 U/L
ALT SERPL W/O P-5'-P-CCNC: 59 U/L
ALT SERPL W/O P-5'-P-CCNC: 79 U/L
ANION GAP SERPL CALC-SCNC: 6 MMOL/L
ANION GAP SERPL CALC-SCNC: 9 MMOL/L
AST SERPL-CCNC: 44 U/L
AST SERPL-CCNC: 83 U/L
BACTERIA BLD CULT: NORMAL
BACTERIA BLD CULT: NORMAL
BASOPHILS # BLD AUTO: 0 K/UL
BASOPHILS # BLD AUTO: 0.02 K/UL
BASOPHILS NFR BLD: 0 %
BASOPHILS NFR BLD: 0.5 %
BILIRUB SERPL-MCNC: 0.6 MG/DL
BILIRUB SERPL-MCNC: 3 MG/DL
BLD GP AB SCN CELLS X3 SERPL QL: NORMAL
BLD PROD TYP BPU: NORMAL
BLD PROD TYP BPU: NORMAL
BLOOD UNIT EXPIRATION DATE: NORMAL
BLOOD UNIT EXPIRATION DATE: NORMAL
BLOOD UNIT TYPE CODE: 6200
BLOOD UNIT TYPE CODE: 6200
BLOOD UNIT TYPE: NORMAL
BLOOD UNIT TYPE: NORMAL
BUN SERPL-MCNC: 18 MG/DL
BUN SERPL-MCNC: 19 MG/DL
CALCIUM SERPL-MCNC: 8.6 MG/DL
CALCIUM SERPL-MCNC: 8.7 MG/DL
CHLORIDE SERPL-SCNC: 101 MMOL/L
CHLORIDE SERPL-SCNC: 104 MMOL/L
CO2 SERPL-SCNC: 29 MMOL/L
CO2 SERPL-SCNC: 30 MMOL/L
CODING SYSTEM: NORMAL
CODING SYSTEM: NORMAL
CREAT SERPL-MCNC: 1.1 MG/DL
CREAT SERPL-MCNC: 1.3 MG/DL
DELSYS: ABNORMAL
DIFFERENTIAL METHOD: ABNORMAL
DIFFERENTIAL METHOD: ABNORMAL
DISPENSE STATUS: NORMAL
DISPENSE STATUS: NORMAL
EOSINOPHIL # BLD AUTO: 0 K/UL
EOSINOPHIL # BLD AUTO: 0.2 K/UL
EOSINOPHIL NFR BLD: 0.7 %
EOSINOPHIL NFR BLD: 5.1 %
EP: 5
ERYTHROCYTE [DISTWIDTH] IN BLOOD BY AUTOMATED COUNT: 14.2 %
ERYTHROCYTE [DISTWIDTH] IN BLOOD BY AUTOMATED COUNT: 14.3 %
ERYTHROCYTE [SEDIMENTATION RATE] IN BLOOD BY WESTERGREN METHOD: 18 MM/H
ERYTHROCYTE [SEDIMENTATION RATE] IN BLOOD BY WESTERGREN METHOD: 18 MM/H
ERYTHROCYTE [SEDIMENTATION RATE] IN BLOOD BY WESTERGREN METHOD: 23 MM/H
EST. GFR  (AFRICAN AMERICAN): 45 ML/MIN/1.73 M^2
EST. GFR  (AFRICAN AMERICAN): 55 ML/MIN/1.73 M^2
EST. GFR  (NON AFRICAN AMERICAN): 39 ML/MIN/1.73 M^2
EST. GFR  (NON AFRICAN AMERICAN): 48 ML/MIN/1.73 M^2
FIO2: 50
FIO2: 60
GLUCOSE SERPL-MCNC: 98 MG/DL
GLUCOSE SERPL-MCNC: 99 MG/DL
HCO3 UR-SCNC: 28.5 MMOL/L (ref 24–28)
HCO3 UR-SCNC: 28.8 MMOL/L (ref 24–28)
HCO3 UR-SCNC: 29.5 MMOL/L (ref 24–28)
HCO3 UR-SCNC: 30.3 MMOL/L (ref 24–28)
HCT VFR BLD AUTO: 28.8 %
HCT VFR BLD AUTO: 30.8 %
HGB BLD-MCNC: 10 G/DL
HGB BLD-MCNC: 9.3 G/DL
IP: 10
LYMPHOCYTES # BLD AUTO: 0.6 K/UL
LYMPHOCYTES # BLD AUTO: 1.6 K/UL
LYMPHOCYTES NFR BLD: 12.8 %
LYMPHOCYTES NFR BLD: 40.7 %
MCH RBC QN AUTO: 29.4 PG
MCH RBC QN AUTO: 30.2 PG
MCHC RBC AUTO-ENTMCNC: 32.3 %
MCHC RBC AUTO-ENTMCNC: 32.5 %
MCV RBC AUTO: 91 FL
MCV RBC AUTO: 93 FL
MIN VOL: 7
MODE: ABNORMAL
MONOCYTES # BLD AUTO: 0.5 K/UL
MONOCYTES # BLD AUTO: 0.5 K/UL
MONOCYTES NFR BLD: 11.8 %
MONOCYTES NFR BLD: 13.2 %
NEUTROPHILS # BLD AUTO: 1.6 K/UL
NEUTROPHILS # BLD AUTO: 3.3 K/UL
NEUTROPHILS NFR BLD: 40.5 %
NEUTROPHILS NFR BLD: 74.7 %
PCO2 BLDA: 52.9 MMHG (ref 35–45)
PCO2 BLDA: 55 MMHG (ref 35–45)
PCO2 BLDA: 56.1 MMHG (ref 35–45)
PCO2 BLDA: 61.4 MMHG (ref 35–45)
PEEP: 5
PH SMN: 7.28 [PH] (ref 7.35–7.45)
PH SMN: 7.32 [PH] (ref 7.35–7.45)
PH SMN: 7.35 [PH] (ref 7.35–7.45)
PH SMN: 7.35 [PH] (ref 7.35–7.45)
PLATELET # BLD AUTO: 205 K/UL
PLATELET # BLD AUTO: 208 K/UL
PMV BLD AUTO: 10.3 FL
PMV BLD AUTO: 9.9 FL
PO2 BLDA: 112 MMHG (ref 80–100)
PO2 BLDA: 121 MMHG (ref 80–100)
PO2 BLDA: 75 MMHG (ref 80–100)
PO2 BLDA: 95 MMHG (ref 80–100)
POC BE: 1 MMOL/L
POC BE: 2 MMOL/L
POC BE: 3 MMOL/L
POC BE: 4 MMOL/L
POC SATURATED O2: 92 % (ref 95–100)
POC SATURATED O2: 97 % (ref 95–100)
POC SATURATED O2: 98 % (ref 95–100)
POC SATURATED O2: 98 % (ref 95–100)
POC TCO2: 30 MMOL/L (ref 23–27)
POC TCO2: 30 MMOL/L (ref 23–27)
POC TCO2: 31 MMOL/L (ref 23–27)
POC TCO2: 32 MMOL/L (ref 23–27)
POCT GLUCOSE: 159 MG/DL (ref 70–110)
POTASSIUM SERPL-SCNC: 3.5 MMOL/L
POTASSIUM SERPL-SCNC: 4.3 MMOL/L
PROT SERPL-MCNC: 6 G/DL
PROT SERPL-MCNC: 6.6 G/DL
PS: 10
RBC # BLD AUTO: 3.16 M/UL
RBC # BLD AUTO: 3.31 M/UL
SAMPLE: ABNORMAL
SITE: ABNORMAL
SODIUM SERPL-SCNC: 139 MMOL/L
SODIUM SERPL-SCNC: 140 MMOL/L
SP02: 100
SP02: 100
SP02: 92
SP02: 99
SPONT RATE: 29
TRANS ERYTHROCYTES VOL PATIENT: NORMAL ML
TRANS ERYTHROCYTES VOL PATIENT: NORMAL ML
WBC # BLD AUTO: 3.93 K/UL
WBC # BLD AUTO: 4.39 K/UL

## 2017-07-13 PROCEDURE — 87086 URINE CULTURE/COLONY COUNT: CPT

## 2017-07-13 PROCEDURE — 36415 COLL VENOUS BLD VENIPUNCTURE: CPT

## 2017-07-13 PROCEDURE — 88304 TISSUE EXAM BY PATHOLOGIST: CPT | Performed by: PATHOLOGY

## 2017-07-13 PROCEDURE — D9220A PRA ANESTHESIA: Mod: CRNA,,, | Performed by: NURSE ANESTHETIST, CERTIFIED REGISTERED

## 2017-07-13 PROCEDURE — 94002 VENT MGMT INPAT INIT DAY: CPT

## 2017-07-13 PROCEDURE — 20000000 HC ICU ROOM

## 2017-07-13 PROCEDURE — 25000003 PHARM REV CODE 250: Performed by: STUDENT IN AN ORGANIZED HEALTH CARE EDUCATION/TRAINING PROGRAM

## 2017-07-13 PROCEDURE — 25000003 PHARM REV CODE 250: Performed by: EMERGENCY MEDICINE

## 2017-07-13 PROCEDURE — 36000709 HC OR TIME LEV III EA ADD 15 MIN: Performed by: SURGERY

## 2017-07-13 PROCEDURE — 0FT44ZZ RESECTION OF GALLBLADDER, PERCUTANEOUS ENDOSCOPIC APPROACH: ICD-10-PCS | Performed by: SURGERY

## 2017-07-13 PROCEDURE — 86900 BLOOD TYPING SEROLOGIC ABO: CPT

## 2017-07-13 PROCEDURE — 80053 COMPREHEN METABOLIC PANEL: CPT

## 2017-07-13 PROCEDURE — 71000033 HC RECOVERY, INTIAL HOUR: Performed by: SURGERY

## 2017-07-13 PROCEDURE — D9220A PRA ANESTHESIA: Mod: ANES,,, | Performed by: ANESTHESIOLOGY

## 2017-07-13 PROCEDURE — 71000039 HC RECOVERY, EACH ADD'L HOUR: Performed by: SURGERY

## 2017-07-13 PROCEDURE — 25500020 PHARM REV CODE 255: Performed by: SURGERY

## 2017-07-13 PROCEDURE — 27201423 OPTIME MED/SURG SUP & DEVICES STERILE SUPPLY: Performed by: SURGERY

## 2017-07-13 PROCEDURE — 27000190 HC CPAP FULL FACE MASK W/VALVE

## 2017-07-13 PROCEDURE — 82803 BLOOD GASES ANY COMBINATION: CPT

## 2017-07-13 PROCEDURE — 88304 TISSUE EXAM BY PATHOLOGIST: CPT | Mod: 26,,, | Performed by: PATHOLOGY

## 2017-07-13 PROCEDURE — 25000003 PHARM REV CODE 250: Performed by: ANESTHESIOLOGY

## 2017-07-13 PROCEDURE — 86901 BLOOD TYPING SEROLOGIC RH(D): CPT

## 2017-07-13 PROCEDURE — 63600175 PHARM REV CODE 636 W HCPCS: Performed by: EMERGENCY MEDICINE

## 2017-07-13 PROCEDURE — 85025 COMPLETE CBC W/AUTO DIFF WBC: CPT

## 2017-07-13 PROCEDURE — 25000003 PHARM REV CODE 250: Performed by: NURSE ANESTHETIST, CERTIFIED REGISTERED

## 2017-07-13 PROCEDURE — 37000009 HC ANESTHESIA EA ADD 15 MINS: Performed by: SURGERY

## 2017-07-13 PROCEDURE — 63600175 PHARM REV CODE 636 W HCPCS: Performed by: INTERNAL MEDICINE

## 2017-07-13 PROCEDURE — 36600 WITHDRAWAL OF ARTERIAL BLOOD: CPT

## 2017-07-13 PROCEDURE — 63600175 PHARM REV CODE 636 W HCPCS: Performed by: NURSE ANESTHETIST, CERTIFIED REGISTERED

## 2017-07-13 PROCEDURE — 37000008 HC ANESTHESIA 1ST 15 MINUTES: Performed by: SURGERY

## 2017-07-13 PROCEDURE — 25000003 PHARM REV CODE 250: Performed by: SURGERY

## 2017-07-13 PROCEDURE — 63600175 PHARM REV CODE 636 W HCPCS: Performed by: ANESTHESIOLOGY

## 2017-07-13 PROCEDURE — 94660 CPAP INITIATION&MGMT: CPT

## 2017-07-13 PROCEDURE — 80053 COMPREHEN METABOLIC PANEL: CPT | Mod: 91

## 2017-07-13 PROCEDURE — 99900035 HC TECH TIME PER 15 MIN (STAT)

## 2017-07-13 PROCEDURE — 36000708 HC OR TIME LEV III 1ST 15 MIN: Performed by: SURGERY

## 2017-07-13 RX ORDER — SUCCINYLCHOLINE CHLORIDE 20 MG/ML
INJECTION INTRAMUSCULAR; INTRAVENOUS
Status: DISCONTINUED | OUTPATIENT
Start: 2017-07-13 | End: 2017-07-13

## 2017-07-13 RX ORDER — DEXTROSE MONOHYDRATE, SODIUM CHLORIDE, AND POTASSIUM CHLORIDE 50; 1.49; 4.5 G/1000ML; G/1000ML; G/1000ML
INJECTION, SOLUTION INTRAVENOUS CONTINUOUS
Status: DISCONTINUED | OUTPATIENT
Start: 2017-07-13 | End: 2017-07-14

## 2017-07-13 RX ORDER — HYDROMORPHONE HYDROCHLORIDE 2 MG/ML
0.2 INJECTION, SOLUTION INTRAMUSCULAR; INTRAVENOUS; SUBCUTANEOUS EVERY 5 MIN PRN
Status: COMPLETED | OUTPATIENT
Start: 2017-07-13 | End: 2017-07-13

## 2017-07-13 RX ORDER — MORPHINE SULFATE 10 MG/ML
2 INJECTION INTRAMUSCULAR; INTRAVENOUS; SUBCUTANEOUS
Status: DISCONTINUED | OUTPATIENT
Start: 2017-07-13 | End: 2017-07-13

## 2017-07-13 RX ORDER — LABETALOL HYDROCHLORIDE 5 MG/ML
20 INJECTION, SOLUTION INTRAVENOUS EVERY 10 MIN PRN
Status: DISCONTINUED | OUTPATIENT
Start: 2017-07-13 | End: 2017-07-13

## 2017-07-13 RX ORDER — GLYCOPYRROLATE 0.2 MG/ML
INJECTION INTRAMUSCULAR; INTRAVENOUS
Status: DISCONTINUED | OUTPATIENT
Start: 2017-07-13 | End: 2017-07-13

## 2017-07-13 RX ORDER — METOPROLOL TARTRATE 1 MG/ML
5 INJECTION, SOLUTION INTRAVENOUS EVERY 6 HOURS PRN
Status: DISCONTINUED | OUTPATIENT
Start: 2017-07-13 | End: 2017-07-14

## 2017-07-13 RX ORDER — ROCURONIUM BROMIDE 10 MG/ML
INJECTION, SOLUTION INTRAVENOUS
Status: DISCONTINUED | OUTPATIENT
Start: 2017-07-13 | End: 2017-07-13

## 2017-07-13 RX ORDER — SODIUM CHLORIDE, SODIUM LACTATE, POTASSIUM CHLORIDE, CALCIUM CHLORIDE 600; 310; 30; 20 MG/100ML; MG/100ML; MG/100ML; MG/100ML
INJECTION, SOLUTION INTRAVENOUS CONTINUOUS PRN
Status: DISCONTINUED | OUTPATIENT
Start: 2017-07-13 | End: 2017-07-13

## 2017-07-13 RX ORDER — PANTOPRAZOLE SODIUM 40 MG/10ML
40 INJECTION, POWDER, LYOPHILIZED, FOR SOLUTION INTRAVENOUS DAILY
Status: DISCONTINUED | OUTPATIENT
Start: 2017-07-13 | End: 2017-07-13

## 2017-07-13 RX ORDER — NEOSTIGMINE METHYLSULFATE 1 MG/ML
INJECTION, SOLUTION INTRAVENOUS
Status: DISCONTINUED | OUTPATIENT
Start: 2017-07-13 | End: 2017-07-13

## 2017-07-13 RX ORDER — PROPOFOL 10 MG/ML
VIAL (ML) INTRAVENOUS
Status: DISCONTINUED | OUTPATIENT
Start: 2017-07-13 | End: 2017-07-13

## 2017-07-13 RX ORDER — LABETALOL HYDROCHLORIDE 5 MG/ML
20 INJECTION, SOLUTION INTRAVENOUS ONCE
Status: COMPLETED | OUTPATIENT
Start: 2017-07-13 | End: 2017-07-13

## 2017-07-13 RX ORDER — ONDANSETRON 2 MG/ML
INJECTION INTRAMUSCULAR; INTRAVENOUS
Status: DISCONTINUED | OUTPATIENT
Start: 2017-07-13 | End: 2017-07-13

## 2017-07-13 RX ORDER — PHENYLEPHRINE HYDROCHLORIDE 10 MG/ML
INJECTION INTRAVENOUS
Status: DISCONTINUED | OUTPATIENT
Start: 2017-07-13 | End: 2017-07-13

## 2017-07-13 RX ORDER — LABETALOL HYDROCHLORIDE 5 MG/ML
20 INJECTION, SOLUTION INTRAVENOUS ONCE
Status: DISCONTINUED | OUTPATIENT
Start: 2017-07-13 | End: 2017-07-13

## 2017-07-13 RX ORDER — LABETALOL HYDROCHLORIDE 5 MG/ML
INJECTION, SOLUTION INTRAVENOUS
Status: DISCONTINUED | OUTPATIENT
Start: 2017-07-13 | End: 2017-07-13

## 2017-07-13 RX ORDER — ETOMIDATE 2 MG/ML
INJECTION INTRAVENOUS
Status: DISCONTINUED | OUTPATIENT
Start: 2017-07-13 | End: 2017-07-13

## 2017-07-13 RX ORDER — ONDANSETRON 2 MG/ML
4 INJECTION INTRAMUSCULAR; INTRAVENOUS EVERY 8 HOURS PRN
Status: DISCONTINUED | OUTPATIENT
Start: 2017-07-13 | End: 2017-07-13 | Stop reason: HOSPADM

## 2017-07-13 RX ORDER — SODIUM CHLORIDE 0.9 % (FLUSH) 0.9 %
3 SYRINGE (ML) INJECTION
Status: DISCONTINUED | OUTPATIENT
Start: 2017-07-13 | End: 2017-07-13 | Stop reason: HOSPADM

## 2017-07-13 RX ORDER — BUPIVACAINE HYDROCHLORIDE 2.5 MG/ML
INJECTION, SOLUTION EPIDURAL; INFILTRATION; INTRACAUDAL
Status: DISCONTINUED | OUTPATIENT
Start: 2017-07-13 | End: 2017-07-13 | Stop reason: HOSPADM

## 2017-07-13 RX ORDER — METOCLOPRAMIDE HYDROCHLORIDE 5 MG/ML
INJECTION INTRAMUSCULAR; INTRAVENOUS
Status: DISCONTINUED | OUTPATIENT
Start: 2017-07-13 | End: 2017-07-13

## 2017-07-13 RX ORDER — FENTANYL CITRATE 50 UG/ML
INJECTION, SOLUTION INTRAMUSCULAR; INTRAVENOUS
Status: DISCONTINUED | OUTPATIENT
Start: 2017-07-13 | End: 2017-07-13

## 2017-07-13 RX ADMIN — LABETALOL HYDROCHLORIDE 5 MG: 5 INJECTION, SOLUTION INTRAVENOUS at 09:07

## 2017-07-13 RX ADMIN — SODIUM CHLORIDE, SODIUM LACTATE, POTASSIUM CHLORIDE, AND CALCIUM CHLORIDE: .6; .31; .03; .02 INJECTION, SOLUTION INTRAVENOUS at 07:07

## 2017-07-13 RX ADMIN — SUCCINYLCHOLINE CHLORIDE 100 MG: 20 INJECTION, SOLUTION INTRAMUSCULAR; INTRAVENOUS at 07:07

## 2017-07-13 RX ADMIN — Medication 3 ML: at 06:07

## 2017-07-13 RX ADMIN — HYDRALAZINE HYDROCHLORIDE 100 MG: 25 TABLET ORAL at 05:07

## 2017-07-13 RX ADMIN — HYDROMORPHONE HYDROCHLORIDE 0.2 MG: 2 INJECTION INTRAMUSCULAR; INTRAVENOUS; SUBCUTANEOUS at 10:07

## 2017-07-13 RX ADMIN — PHENYLEPHRINE HYDROCHLORIDE 100 MCG: 10 INJECTION INTRAVENOUS at 08:07

## 2017-07-13 RX ADMIN — LABETALOL HYDROCHLORIDE 20 MG: 5 INJECTION, SOLUTION INTRAVENOUS at 12:07

## 2017-07-13 RX ADMIN — DEXTROSE MONOHYDRATE, SODIUM CHLORIDE, AND POTASSIUM CHLORIDE: 50; 4.5; 1.49 INJECTION, SOLUTION INTRAVENOUS at 02:07

## 2017-07-13 RX ADMIN — NEOSTIGMINE METHYLSULFATE 2.5 MG: 1 INJECTION INTRAVENOUS at 09:07

## 2017-07-13 RX ADMIN — LABETALOL HYDROCHLORIDE 20 MG: 5 INJECTION, SOLUTION INTRAVENOUS at 09:07

## 2017-07-13 RX ADMIN — MORPHINE SULFATE 4 MG: 10 INJECTION INTRAVENOUS at 08:07

## 2017-07-13 RX ADMIN — METOCLOPRAMIDE 10 MG: 5 INJECTION, SOLUTION INTRAMUSCULAR; INTRAVENOUS at 08:07

## 2017-07-13 RX ADMIN — CLONIDINE HYDROCHLORIDE 0.3 MG: 0.1 TABLET ORAL at 05:07

## 2017-07-13 RX ADMIN — ROCURONIUM BROMIDE 5 MG: 10 INJECTION, SOLUTION INTRAVENOUS at 07:07

## 2017-07-13 RX ADMIN — FENTANYL CITRATE 100 MCG: 50 INJECTION INTRAMUSCULAR; INTRAVENOUS at 07:07

## 2017-07-13 RX ADMIN — GLYCOPYRROLATE 0.1 MG: 0.2 INJECTION, SOLUTION INTRAMUSCULAR; INTRAVENOUS at 08:07

## 2017-07-13 RX ADMIN — METOPROLOL TARTRATE 5 MG: 5 INJECTION, SOLUTION INTRAVENOUS at 05:07

## 2017-07-13 RX ADMIN — GLYCOPYRROLATE 0.3 MG: 0.2 INJECTION, SOLUTION INTRAMUSCULAR; INTRAVENOUS at 09:07

## 2017-07-13 RX ADMIN — HYDROMORPHONE HYDROCHLORIDE 0.2 MG: 2 INJECTION INTRAMUSCULAR; INTRAVENOUS; SUBCUTANEOUS at 09:07

## 2017-07-13 RX ADMIN — HYDROMORPHONE HYDROCHLORIDE 0.2 MG: 2 INJECTION INTRAMUSCULAR; INTRAVENOUS; SUBCUTANEOUS at 11:07

## 2017-07-13 RX ADMIN — HYDRALAZINE HYDROCHLORIDE 10 MG: 20 INJECTION INTRAMUSCULAR; INTRAVENOUS at 04:07

## 2017-07-13 RX ADMIN — HYDRALAZINE HYDROCHLORIDE 10 MG: 20 INJECTION INTRAMUSCULAR; INTRAVENOUS at 08:07

## 2017-07-13 RX ADMIN — GABAPENTIN 300 MG: 300 CAPSULE ORAL at 05:07

## 2017-07-13 RX ADMIN — ROCURONIUM BROMIDE 10 MG: 10 INJECTION, SOLUTION INTRAVENOUS at 08:07

## 2017-07-13 RX ADMIN — DEXTROSE 2 G: 50 INJECTION, SOLUTION INTRAVENOUS at 07:07

## 2017-07-13 RX ADMIN — ONDANSETRON 4 MG: 2 INJECTION, SOLUTION INTRAMUSCULAR; INTRAVENOUS at 08:07

## 2017-07-13 RX ADMIN — PROPOFOL 100 MG: 10 INJECTION, EMULSION INTRAVENOUS at 09:07

## 2017-07-13 RX ADMIN — CARBAMAZEPINE 200 MG: 200 TABLET ORAL at 05:07

## 2017-07-13 RX ADMIN — ROCURONIUM BROMIDE 25 MG: 10 INJECTION, SOLUTION INTRAVENOUS at 08:07

## 2017-07-13 RX ADMIN — DEXTROSE MONOHYDRATE, SODIUM CHLORIDE, AND POTASSIUM CHLORIDE: 50; 4.5; 1.49 INJECTION, SOLUTION INTRAVENOUS at 03:07

## 2017-07-13 RX ADMIN — ETOMIDATE 15 MG: 2 INJECTION, SOLUTION INTRAVENOUS at 07:07

## 2017-07-13 NOTE — PROGRESS NOTES
"[facility] Hospital Medicine  Progress Note    Patient Name: Cindy Lyman  MRN: [id number]  Patient Class: IP- Inpatient   Admission Date: 7/8/2017  Length of Stay: 5 days  Attending Physician: Marisela Nielsen MD  Primary Care Provider: Jeremiah Reeves MD        Subjective:     Principal Problem:Chronic combined systolic and diastolic heart failure    HPI:  Ms. Lyman is a 82 yo woman with obesity (BMI 34), HFpEF, mod AS, CKD3, anemia of chronic disease (baseline ~9.0), chronic constipation, and recent admission for abdominal pain who presented 7/8/17 for abdominal pain. Last admission she was found to be septic with fever and tachycardia (no leukocytosis). She was treated for a UTI and followed up with her PCP. She had persistent RUQ abdominal pain at her PCP visit and was referred to general surgery but has not yet seen them. Her symptoms continue to be associated with nausea and vomiting. In the ER she was treated with pain medication and antiemetics as well as fluids. She then developed shortness of breath and was found to be volume overloaded on CXR. She was started on Lasix 40 IV and BiPAP and admitted to medicine.    Hospital Course:  Cardiology, GI, and gen surg were consulted. Quickly weaned off of BiPAP to room air. Lasix initially, then held due to elevated creatinine. Echo 7/8/17 w/ LVEF 55%, mod AS, mild MS and TR, PAP 51.  US abdomen showed biliary sludge and cholelithiasis but no definite sonographic evidence to suggest acute cholecystitis. MRCP showed, "Markedly distended gallbladder with innumerable dependent gallstones.  There has been development of pericholecystic fluid near the fundus of the gallbladder that could reflect the sequela of cholecystitis" as well as a mild to moderate pericardial effusion. Plavix held in anticipation of cholecystectomy. NST 7/10/2017 stable. Low-intermediate cardiovascular risk for surgery. Laproscopic cholecystectomy 7/13/17. " Evidence of CBD and hepatic duct stones during titus. Anticipate ERCP by GI.  Complained of GALVAN, likely from deconditioning. Did well with PT/OT. O2 to maintain sat >88%.   H&H 8.1/25.5. Transfused 1 unit RBCs 7/9/17.    Review of Systems   Constitutional: Negative for chills and fever.   HENT: Negative for congestion and rhinorrhea.    Eyes: Negative for photophobia and visual disturbance.   Respiratory: Negative for cough and shortness of breath.    Cardiovascular: Negative for chest pain and palpitations.   Gastrointestinal: Positive for abdominal pain. Negative for nausea and vomiting.   Genitourinary: Negative for difficulty urinating and dysuria.   Musculoskeletal: Negative for joint swelling and neck stiffness.   Skin: Negative for rash and wound.   Neurological: Negative for dizziness and light-headedness.   Psychiatric/Behavioral: Negative for agitation and behavioral problems.     Objective:     Vital Signs (Most Recent):  Temp: 97.5 °F (36.4 °C) (07/13/17 1345)  Pulse: 72 (07/13/17 1345)  Resp: 18 (07/13/17 1345)  BP: 139/66 (07/13/17 1345)  SpO2: 97 % (07/13/17 1345) Vital Signs (24h Range):  Temp:  [97.2 °F (36.2 °C)-99.7 °F (37.6 °C)] 97.5 °F (36.4 °C)  Pulse:  [56-80] 72  Resp:  [4-31] 18  SpO2:  [72 %-100 %] 97 %  BP: ()/() 139/66     Weight: 92 kg (202 lb 12.8 oz)  Body mass index is 33.75 kg/m².    Physical Exam   Constitutional: She is oriented to person, place, and time. She appears well-developed and well-nourished. No distress.   Pleasant   HENT:   Right Ear: External ear normal.   Left Ear: External ear normal.   Nose: Nose normal.   Eyes: EOM are normal. Pupils are equal, round, and reactive to light. No scleral icterus.   Neck: Normal range of motion. Neck supple. No thyromegaly present.   Cardiovascular: Normal rate and normal heart sounds.  Exam reveals no friction rub.    No murmur heard.  Pulmonary/Chest: Effort normal. She has no wheezes. She has no rales.   Abdominal:    Obese, bandages c/d/i   Musculoskeletal: Normal range of motion. She exhibits no edema or deformity.   Neurological: She is alert and oriented to person, place, and time. No cranial nerve deficit.   Skin: Skin is warm and dry. No pallor.        Significant Labs:   CBC:     Recent Labs  Lab 07/12/17  0706 07/13/17  0302   WBC 4.14 3.93   HGB 9.0* 9.3*   HCT 27.7* 28.8*   PLT SEE COMMENT 208     CMP:     Recent Labs  Lab 07/12/17  0706 07/13/17  0302    139   K 4.1 4.3    104   CO2 29 29   GLU 98 98   BUN 25* 19   CREATININE 1.1 1.1   CALCIUM 8.2* 8.6*   PROT 5.4* 6.0   ALBUMIN 2.5* 2.5*   BILITOT 0.7 0.6   ALKPHOS 201* 187*   AST 37 44*   ALT 54* 59*   ANIONGAP 6* 6*   EGFRNONAA 48* 48*     Significant Imaging: I have reviewed all pertinent imaging results/findings within the past 24 hours.    Assessment/Plan:      * Chronic combined systolic and diastolic heart failure    Acute exacerbation resolved. - Fluids given for n/v and became volume overloaded. Very sensitive to fluids. Echo with EF 50%, stable mod AS. Similar compared to 11/2016 pEF, +DD, PAP 44, mod AS. O2 weaned. Lasix held 2/2 elevated creatinine. Cr improved and lasix home dose restarted. NST 7/10/17 stable. Intermediate-low cardiovascular risk for procedure.        Cholelithiasis without cholecystitis    CC RUQ pain. Consulted GI and surgery. Elevated LFTs. Multiple stones in gallbladder and e/o cholecystitis. Plavix on hold for cholecystectomy x5 days prior to OR.  Lap titus 7/13/17. Noted hepatic duct and CBD intra-operatively. Anticipating ERCP.        Elevated LFTs    See above        AS (aortic stenosis)    Echo        Pulmonary hypertension    Echo        Stage 3 chronic kidney disease    Stable        Coronary artery disease involving native coronary artery without angina pectoris    Stable. Trop unremarkable. Continue home meds.         Obesity (BMI 30-39.9)    Counseled on weight loss.        Anemia of chronic disease    Stable.  Transfused 7/9/17 with appropriate response.        Depression    Continue home meds        Hypertension    Cont home meds. PRN hydralazine.          VTE Risk Mitigation         Ordered     enoxaparin injection 40 mg  Daily     Route:  Subcutaneous        07/08/17 0936     Medium Risk of VTE  Once      07/08/17 0936     Place sequential compression device  Until discontinued      07/08/17 0936     Place LAUREANO hose  Until discontinued      07/08/17 0936          Marisela Nielsen MD  Department of Hospital Medicine   [facility]

## 2017-07-13 NOTE — OR NURSING
Pt restless, attempts to pull out et tube, dr michelle paged, dr lewis comes to bedside. Speaks to pt. Vent changes per dr lewis, rate increased to 12, emotional support provided, soft restraints initiated

## 2017-07-13 NOTE — TRANSFER OF CARE
"Anesthesia Transfer of Care Note    Patient: Cindy Lyman    Procedure(s) Performed: Procedure(s) (LRB):  CHOLECYSTECTOMY-LAPAROSCOPIC W/ CHOLANGIOGRAM (N/A)    Patient location: PACU    Anesthesia Type: general    Transport from OR: Transported from OR on room air with adequate spontaneous ventilation    Post pain: adequate analgesia    Post assessment: no apparent anesthetic complications    Post vital signs: unstable    Level of consciousness: lethargic, obtunded and responds to stimulation    Nausea/Vomiting: no nausea/vomiting    Complications: none    Transfer of care protocol was followedComments: Pt. Reintubated in RR      Last vitals:   Visit Vitals  BP (!) 146/78   Pulse 78   Temp 36.2 °C (97.2 °F)   Resp 12   Ht 5' 5" (1.651 m)   Wt 92 kg (202 lb 12.8 oz)   SpO2 98%   Breastfeeding? No   BMI 33.75 kg/m²     "

## 2017-07-13 NOTE — PROGRESS NOTES
Pt received from PACU via stretcher.  She is noted to have focal twitching in right side of her face.  She is drowsy and restless.  Pulse ox is 96% on VM which she was transferred here with.  Non verbal but moving all extremities.  Dr. Jarvis paged to notify and got Dr. Douglas who states she is coming right over.

## 2017-07-13 NOTE — ASSESSMENT & PLAN NOTE
CC RUQ pain. Consulted GI and surgery. Elevated LFTs. Multiple stones in gallbladder and e/o cholecystitis. Plavix on hold for cholecystectomy x5 days prior to OR.  Lap titus 7/13/17. Noted hepatic duct and CBD intra-operatively. Anticipating ERCP.

## 2017-07-13 NOTE — PROGRESS NOTES
Chief Complaint / Reason for Consult:  Abd.  Pain    Pt. Just returned from [facility], had some resp. Distress, now in ICU on Bipap    ROS: Difficult to obtain, secondary to Bipap    Patient Vitals for the past 24 hrs:   BP Temp Temp src Pulse Resp SpO2 Weight   07/13/17 1430 (!) 169/77 - - 75 (!) 23 99 % -   07/13/17 1415 (!) 206/93 - - 79 (!) 32 96 % -   07/13/17 1400 (!) 158/71 - - 73 (!) 25 99 % -   07/13/17 1345 139/66 97.5 °F (36.4 °C) Axillary 72 18 97 % -   07/13/17 1330 (!) 123/58 - - 68 19 98 % -   07/13/17 1315 127/60 97.8 °F (36.6 °C) Axillary 74 (!) 31 (!) 93 % -   07/13/17 1300 (!) 150/66 - - 68 20 96 % -   07/13/17 1245 (!) 218/94 - - 78 (!) 26 95 % -   07/13/17 1230 (!) 217/98 - - 78 (!) 25 95 % -   07/13/17 1215 (!) 220/85 - - 80 (!) 27 (!) 93 % -   07/13/17 1200 (!) 143/65 - - 60 (!) 30 98 % -   07/13/17 1145 (!) 123/58 - - 62 14 100 % -   07/13/17 1130 (!) 94/53 - - 62 (!) 21 99 % -   07/13/17 1115 (!) 96/54 - - 64 (!) 9 99 % -   07/13/17 1100 (!) 104/51 - - 62 (!) 23 98 % -   07/13/17 1045 (!) 104/55 - - 62 16 98 % -   07/13/17 1030 (!) 119/56 - - 66 (!) 23 98 % -   07/13/17 1015 (!) 146/66 - - 66 (!) 27 95 % -   07/13/17 1004 (!) 146/78 97.2 °F (36.2 °C) - 78 12 98 % -   07/13/17 1000 115/78 - - 72 (!) 28 (!) 93 % -   07/13/17 0955 - - - 72 (!) 29 (!) 93 % -   07/13/17 0945 120/79 - - 72 19 97 % -   07/13/17 0940 (!) 146/62 - - 70 14 98 % -   07/13/17 0935 (!) 215/95 - - 72 (!) 30 (!) 72 % -   07/13/17 0930 (!) 227/100 - - 78 (!) 4 (!) 78 % -   07/13/17 0929 - - - 78 - (!) 78 % -   07/13/17 0715 (!) 152/82 99.2 °F (37.3 °C) Oral 64 18 97 % -   07/13/17 0647 (!) 154/82 - - - - - -   07/13/17 0500 (!) 196/76 99.1 °F (37.3 °C) Oral 67 19 98 % 92 kg (202 lb 12.8 oz)   07/12/17 2347 (!) 156/72 98.8 °F (37.1 °C) Oral (!) 56 16 - -   07/12/17 2337 - - - - - 96 % -   07/12/17 2233 - - - 60 14 96 % -   07/12/17 2137 - - - - - 98 % -   07/12/17 2133 (!) 202/98 - - - - - -   07/12/17 2100 - - - 65 - 98 % -    07/12/17 2000 - 98.8 °F (37.1 °C) Oral 65 18 97 % -   07/1937 - - - - - 100 % -   07/12/17 1745 (!) 158/80 - - - - - -   07/12/17 1701 (!) 171/85 - - - - - -   07/12/17 1620 (!) 193/87 99.7 °F (37.6 °C) Oral 62 16 99 % -       Physical Exam:  Gen - Well developed, elderly, + on Bipap  HEENT - Anicteric  CV - S1, S2, no murmurs/rubs  Lungs - CTA-B, normal excursion  Abd - Soft, NT, ND, normal BS's, no HSM.  Ext - No c/c/e  Neuro - No asterixis    Labs:    Recent Labs  Lab 07/13/17  0302   WBC 3.93   RBC 3.16*   HGB 9.3*   HCT 28.8*      MCV 91   MCH 29.4   MCHC 32.3       Recent Labs  Lab 07/13/17  0302   CALCIUM 8.6*   PROT 6.0      K 4.3   CO2 29      BUN 19   CREATININE 1.1   ALKPHOS 187*   ALT 59*   AST 44*   BILITOT 0.6       Imaging:  Reviewed MRCP    Assessment:  This patient is a 81 y.o. female with:   1. Choledocholithiasis, per IOC - No findings consistent with cholangitis.  2. Cholecystitis - now s/p cholecystectomy.  3. Abnormal LFT's - secondary to #1/2  4. HTN, Pulm HTN, Hyperlipidemia, CAD, CHF, COPD, DVT, Aortic Stenosis....    Recommendations:  1. Continue with supportive care.  2. Pulm/Resp treatment  3. Tentative ERCP tomorrow if Resp status is stable/appropriate.  4. Follow LFT's

## 2017-07-13 NOTE — SUBJECTIVE & OBJECTIVE
Review of Systems   Constitutional: Negative for chills and fever.   HENT: Negative for congestion and rhinorrhea.    Eyes: Negative for photophobia and visual disturbance.   Respiratory: Negative for cough and shortness of breath.    Cardiovascular: Negative for chest pain and palpitations.   Gastrointestinal: Positive for abdominal pain. Negative for nausea and vomiting.   Genitourinary: Negative for difficulty urinating and dysuria.   Musculoskeletal: Negative for joint swelling and neck stiffness.   Skin: Negative for rash and wound.   Neurological: Negative for dizziness and light-headedness.   Psychiatric/Behavioral: Negative for agitation and behavioral problems.     Objective:     Vital Signs (Most Recent):  Temp: 99.7 °F (37.6 °C) (07/12/17 1620)  Pulse: 62 (07/12/17 1620)  Resp: 16 (07/12/17 1620)  BP: (!) 158/80 (07/12/17 1745)  SpO2: 99 % (07/12/17 1620) Vital Signs (24h Range):  Temp:  [97.7 °F (36.5 °C)-99.7 °F (37.6 °C)] 99.7 °F (37.6 °C)  Pulse:  [56-66] 62  Resp:  [16-18] 16  SpO2:  [97 %-100 %] 99 %  BP: (137-193)/(60-87) 158/80     Weight: 95.3 kg (210 lb)  Body mass index is 34.95 kg/m².    Physical Exam   Constitutional: She is oriented to person, place, and time. She appears well-developed and well-nourished. No distress.   Pleasant   HENT:   Right Ear: External ear normal.   Left Ear: External ear normal.   Nose: Nose normal.   Eyes: EOM are normal. Pupils are equal, round, and reactive to light. No scleral icterus.   Neck: Normal range of motion. Neck supple. No thyromegaly present.   Cardiovascular: Normal rate and normal heart sounds.  Exam reveals no friction rub.    No murmur heard.  Pulmonary/Chest: Effort normal. She has no wheezes. She has no rales.   Abdominal: Soft. Bowel sounds are normal. She exhibits no mass. There is tenderness (RUQ).   obese   Musculoskeletal: Normal range of motion. She exhibits no edema or deformity.   Neurological: She is alert and oriented to person, place,  and time. No cranial nerve deficit.   Skin: Skin is warm and dry. No pallor.   Psychiatric: She has a normal mood and affect. Her behavior is normal. Thought content normal.        Significant Labs:   CBC:     Recent Labs  Lab 07/11/17 0456 07/12/17 0706   WBC 4.84 4.14   HGB 9.5* 9.0*   HCT 28.5* 27.7*    SEE COMMENT     CMP:     Recent Labs  Lab 07/11/17 0456 07/12/17 0706    139   K 4.2 4.1    104   CO2 27 29   GLU 95 98   BUN 31* 25*   CREATININE 1.4 1.1   CALCIUM 8.4* 8.2*   PROT 5.9* 5.4*   ALBUMIN 2.7* 2.5*   BILITOT 1.0 0.7   ALKPHOS 198* 201*   AST 30 37   ALT 69* 54*   ANIONGAP 9 6*   EGFRNONAA 36* 48*     Troponin:   No results for input(s): TROPONINI in the last 48 hours.  Urine Studies:   No results for input(s): COLORU, APPEARANCEUA, PHUR, SPECGRAV, PROTEINUA, GLUCUA, KETONESU, BILIRUBINUA, OCCULTUA, NITRITE, UROBILINOGEN, LEUKOCYTESUR, RBCUA, WBCUA, BACTERIA, SQUAMEPITHEL, HYALINECASTS in the last 48 hours.    Invalid input(s): DANNI    Significant Imaging: I have reviewed all pertinent imaging results/findings within the past 24 hours.

## 2017-07-13 NOTE — PROGRESS NOTES
[facility]  General Surgery  Progress Note    Subjective:     Interval History: s/p lap cholecystectomy and intraoperative cholangiogram this morning, with evidence of common and hepatic ducts stones      Post-Op Info:  Procedure(s) (LRB):  CHOLECYSTECTOMY-LAPAROSCOPIC W/ CHOLANGIOGRAM (N/A)   Day of Surgery      Medications:  Continuous Infusions:   Scheduled Meds:   alprazolam  0.5 mg Oral 30 Min Pre-Op    aspirin  81 mg Oral Daily    carbamazepine  200 mg Oral TID    citalopram  20 mg Oral Daily    cloNIDine  0.3 mg Oral TID    enoxaparin  40 mg Subcutaneous Daily    famotidine  20 mg Oral Daily    fluticasone  1 puff Inhalation Q12H    furosemide  40 mg Oral Daily    gabapentin  300 mg Oral TID    hydrALAZINE  100 mg Oral Q8H    metoprolol tartrate  25 mg Oral BID    polyethylene glycol  17 g Oral BID    rosuvastatin  30 mg Oral QHS    senna-docusate 8.6-50 mg  2 tablet Oral BID    sodium chloride 0.9%  3 mL Intravenous Q8H    verapamil  360 mg Oral Daily     PRN Meds:sodium chloride, acetaminophen, bupivacaine (PF) 0.25% (2.5 mg/ml), bupivacaine liposome (PF), glycerin adult, hydrALAZINE, morphine, morphine, omnipaque 300 iohexol, ondansetron, promethazine (PHENERGAN) IVPB     Objective:     Vital Signs (Most Recent):  Temp: 99.1 °F (37.3 °C) (07/13/17 0500)  Pulse: 67 (07/13/17 0500)  Resp: 19 (07/13/17 0500)  BP: (!) 154/82 (07/13/17 0647)  SpO2: 98 % (07/13/17 0500) Vital Signs (24h Range):  Temp:  [97.7 °F (36.5 °C)-99.7 °F (37.6 °C)] 99.1 °F (37.3 °C)  Pulse:  [56-67] 67  Resp:  [14-19] 19  SpO2:  [96 %-100 %] 98 %  BP: (154-202)/(69-98) 154/82       Intake/Output Summary (Last 24 hours) at 07/13/17 0922  Last data filed at 07/13/17 0500   Gross per 24 hour   Intake              755 ml   Output              200 ml   Net              555 ml       Physical Exam  No apparent distress  Abdomen non tender    Significant Labs:  CBC:   Recent Labs  Lab 07/13/17  0302   WBC 3.93    RBC 3.16*   HGB 9.3*   HCT 28.8*      MCV 91   MCH 29.4   MCHC 32.3     CMP:   Recent Labs  Lab 07/13/17  0302   GLU 98   CALCIUM 8.6*   ALBUMIN 2.5*   PROT 6.0      K 4.3   CO2 29      BUN 19   CREATININE 1.1   ALKPHOS 187*   ALT 59*   AST 44*   BILITOT 0.6       Significant Diagnostics:  None    Assessment/Plan:   81F with cholelithiasis, choledocholithiasis s/p lap titus and intraop cholangiogram with +common and hepatic duct stones.     ERCP with GI for removal of stones  Continue NPO, continue holding plavix    Active Diagnoses:    Diagnosis Date Noted POA    PRINCIPAL PROBLEM:  Chronic combined systolic and diastolic heart failure [I50.42] 06/17/2017 Yes    Cholelithiasis without cholecystitis [K80.20] 07/08/2017 Yes    Stage 3 chronic kidney disease [N18.3] 07/08/2017 Yes     Chronic    Elevated LFTs [R79.89] 06/17/2017 Yes    Coronary artery disease involving native coronary artery without angina pectoris [I25.10] 05/11/2015 Yes     Chronic    Obesity (BMI 30-39.9) [E66.9] 05/09/2015 Yes     Chronic    Anemia of chronic disease [D63.8] 11/29/2013 Yes     Chronic    AS (aortic stenosis) [I35.0] 11/22/2013 Yes     Chronic    Pulmonary hypertension [I27.2] 09/28/2012 Yes     Chronic    Hypertension [I10]  Yes     Chronic    Hyperlipidemia [E78.5]  Yes     Chronic    Depression [F32.9]  Yes     Chronic      Problems Resolved During this Admission:    Diagnosis Date Noted Date Resolved POA    Respiratory distress [R06.00] 07/08/2017 07/10/2017 Yes         Mara Douglas MD  General Surgery  [facility]

## 2017-07-13 NOTE — PROGRESS NOTES
ABG done and reported to Dr. Phan.  Placed patient on SIMV rate 4 and tidal volume 450.  Try CPAP again in 30 minutes.

## 2017-07-13 NOTE — NURSING
Report received from NOREEN Nickerson. Patient resting comfortably, no complaints, no acute distress noted. Plan of care reviewed with patient. Instructed patient to call for assistance before ambulating, side rails up x3, bed alarm set, call light in reach, non skid socks in use. Assisted patient to bedside commode.Patient verbalized understanding of instructions.     Transport at the bedside to transport patient to scheduled surgical procedure. No complaints, no acute distress noted. Will monitor upon return.

## 2017-07-13 NOTE — NURSING
Report called to BRITNEY Garcia in ICU. Patients belongings gathered and transferred via transport.

## 2017-07-13 NOTE — PROGRESS NOTES
1215 patient extubated by anesthesia and placed on 40% venturi mask.  Spo2 93%.  Incentive Spirometry attempted with very poor effort  Unable to achieve 500.  Patient falling to asleep.  SPO2 dropping to 88.  Dr. Phan at bedside and BIPAP was ordered.  Placed patient on Bipap 10/5 at 40% with back up rate of 18.  SPO2 96%.

## 2017-07-13 NOTE — OR NURSING
At 1213 pt extubated to 40% venti mask by DR Landry. Initially pt was able to maintain o2 sats but sats quickly drifted below 90, pt now being placed on bipap by resp 1

## 2017-07-13 NOTE — CONSULTS
Consult Note  Pulmonology    Consult Requested By: Marisela Nielsen MD  Reason for Consult : post op cpap    SUBJECTIVE:     S/p cholecystitis; extubated , failed , reintubated and then extubated again ; now in ICU on bipap  History of Present Illness:  Patient unresponsive;information obtained from chart      Review of patient's allergies indicates:   Allergen Reactions    Doxycycline hyclate Diarrhea and Nausea And Vomiting    Singulair [montelukast]      Stomach pain, Muscle pain, Cough      Latex, natural rubber     Penicillins      Other reaction(s): Anaphylaxis    Ventolin  [albuterol sulfate] Other (See Comments)       Past Medical History:   Diagnosis Date    Anemia     Anticoagulant long-term use     Aortic stenosis     Arthritis     lower back    Asthma     CAD (coronary artery disease) 4/24/2015    Carpal tunnel syndrome on both sides     Cataract     CHF (congestive heart failure) 5/2013    COPD (chronic obstructive pulmonary disease)     Depression     DVT (deep venous thrombosis)     Hyperlipidemia     Hypertension     Pulmonary HTN     TMJ (temporomandibular joint disorder)      Past Surgical History:   Procedure Laterality Date    BACK SURGERY      cardiac stents      CARPAL TUNNEL RELEASE      Right/Left    HYSTERECTOMY      Partial    JOINT REPLACEMENT  2009    Left hip    TEMPOROMANDIBULAR JOINT SURGERY       Family History   Problem Relation Age of Onset    Heart disease Mother     Hypertension Daughter     Cancer Son     Breast cancer Neg Hx     Colon cancer Neg Hx     Ovarian cancer Neg Hx      Review of systems not obtained due to patient factors ; patient intubated and unable to communicate.    OBJECTIVE:     Vital Signs (Most Recent)  Temp: 97.5 °F (36.4 °C) (07/13/17 1345)  Pulse: 75 (07/13/17 1430)  Resp: (!) 23 (07/13/17 1430)  BP: (!) 169/77 (07/13/17 1430)  SpO2: 99 % (07/13/17 1430)    Vital Signs Range (Last 24H):  Temp:  [97.2 °F (36.2 °C)-99.7 °F  (37.6 °C)]   Pulse:  [56-80]   Resp:  [4-32]   BP: ()/()   SpO2:  [72 %-100 %]     Physical Exam:    General: no distress,.on the ventilator  Neck: no jugular venous distention and no carotid bruit  Lungs:  diminished breath sounds bilaterally and 7.5 ett in place and rhonchi bilaterally  Heart: regular rate and rhythm and no murmur  Abdomen: soft, non-tender non-distended; bowel sounds normal and right NGT in place  Extremities: no cyanosis or edema, or clubbing  Neurologic: sedated.  Skin-warm, moist. No rash      Laboratory:  Recent Results (from the past 24 hour(s))   Type & Screen    Collection Time: 07/13/17  3:02 AM   Result Value Ref Range    Group & Rh A POS     Indirect Surekha NEG    CBC auto differential    Collection Time: 07/13/17  3:02 AM   Result Value Ref Range    WBC 3.93 3.90 - 12.70 K/uL    RBC 3.16 (L) 4.00 - 5.40 M/uL    Hemoglobin 9.3 (L) 12.0 - 16.0 g/dL    Hematocrit 28.8 (L) 37.0 - 48.5 %    MCV 91 82 - 98 fL    MCH 29.4 27.0 - 31.0 pg    MCHC 32.3 32.0 - 36.0 %    RDW 14.3 11.5 - 14.5 %    Platelets 208 150 - 350 K/uL    MPV 10.3 9.2 - 12.9 fL    Gran # 1.6 (L) 1.8 - 7.7 K/uL    Lymph # 1.6 1.0 - 4.8 K/uL    Mono # 0.5 0.3 - 1.0 K/uL    Eos # 0.2 0.0 - 0.5 K/uL    Baso # 0.02 0.00 - 0.20 K/uL    Gran% 40.5 38.0 - 73.0 %    Lymph% 40.7 18.0 - 48.0 %    Mono% 13.2 4.0 - 15.0 %    Eosinophil% 5.1 0.0 - 8.0 %    Basophil% 0.5 0.0 - 1.9 %    Differential Method Automated    Comprehensive metabolic panel    Collection Time: 07/13/17  3:02 AM   Result Value Ref Range    Sodium 139 136 - 145 mmol/L    Potassium 4.3 3.5 - 5.1 mmol/L    Chloride 104 95 - 110 mmol/L    CO2 29 23 - 29 mmol/L    Glucose 98 70 - 110 mg/dL    BUN, Bld 19 8 - 23 mg/dL    Creatinine 1.1 0.5 - 1.4 mg/dL    Calcium 8.6 (L) 8.7 - 10.5 mg/dL    Total Protein 6.0 6.0 - 8.4 g/dL    Albumin 2.5 (L) 3.5 - 5.2 g/dL    Total Bilirubin 0.6 0.1 - 1.0 mg/dL    Alkaline Phosphatase 187 (H) 55 - 135 U/L    AST 44 (H) 10 - 40  U/L    ALT 59 (H) 10 - 44 U/L    Anion Gap 6 (L) 8 - 16 mmol/L    eGFR if African American 55 (A) >60 mL/min/1.73 m^2    eGFR if non African American 48 (A) >60 mL/min/1.73 m^2   POCT glucose    Collection Time: 07/13/17  9:58 AM   Result Value Ref Range    POCT Glucose 159 (H) 70 - 110 mg/dL   ISTAT PROCEDURE    Collection Time: 07/13/17  9:58 AM   Result Value Ref Range    POC PH 7.275 (LL) 7.35 - 7.45    POC PCO2 61.4 (HH) 35 - 45 mmHg    POC PO2 75 (L) 80 - 100 mmHg    POC HCO3 28.5 (H) 24 - 28 mmol/L    POC BE 1 -2 to 2 mmol/L    POC SATURATED O2 92 (L) 95 - 100 %    POC TCO2 30 (H) 23 - 27 mmol/L    Sample ARTERIAL     Site RR     Allens Test Pass     DelSys Adult Vent     Mode CPAP     PEEP 5     PS 10     FiO2 60     Spont Rate 29     Sp02 92    ISTAT PROCEDURE    Collection Time: 07/13/17  2:33 PM   Result Value Ref Range    POC PH 7.319 (L) 7.35 - 7.45    POC PCO2 56.1 (HH) 35 - 45 mmHg    POC PO2 112 (H) 80 - 100 mmHg    POC HCO3 28.8 (H) 24 - 28 mmol/L    POC BE 2 -2 to 2 mmol/L    POC SATURATED O2 98 95 - 100 %    POC TCO2 30 (H) 23 - 27 mmol/L    Rate 18     Sample ARTERIAL     Site RR     Allens Test Pass     DelSys CPAP/BiPAP     Mode BiPAP     FiO2 50     Sp02 100     IP 10     EP 5    Comprehensive metabolic panel    Collection Time: 07/13/17  3:21 PM   Result Value Ref Range    Sodium 140 136 - 145 mmol/L    Potassium 3.5 3.5 - 5.1 mmol/L    Chloride 101 95 - 110 mmol/L    CO2 30 (H) 23 - 29 mmol/L    Glucose 99 70 - 110 mg/dL    BUN, Bld 18 8 - 23 mg/dL    Creatinine 1.3 0.5 - 1.4 mg/dL    Calcium 8.7 8.7 - 10.5 mg/dL    Total Protein 6.6 6.0 - 8.4 g/dL    Albumin 2.9 (L) 3.5 - 5.2 g/dL    Total Bilirubin 3.0 (H) 0.1 - 1.0 mg/dL    Alkaline Phosphatase 272 (H) 55 - 135 U/L    AST 83 (H) 10 - 40 U/L    ALT 79 (H) 10 - 44 U/L    Anion Gap 9 8 - 16 mmol/L    eGFR if African American 45 (A) >60 mL/min/1.73 m^2    eGFR if non African American 39 (A) >60 mL/min/1.73 m^2   CBC auto differential     Collection Time: 07/13/17  3:22 PM   Result Value Ref Range    WBC 4.39 3.90 - 12.70 K/uL    RBC 3.31 (L) 4.00 - 5.40 M/uL    Hemoglobin 10.0 (L) 12.0 - 16.0 g/dL    Hematocrit 30.8 (L) 37.0 - 48.5 %    MCV 93 82 - 98 fL    MCH 30.2 27.0 - 31.0 pg    MCHC 32.5 32.0 - 36.0 %    RDW 14.2 11.5 - 14.5 %    Platelets 205 150 - 350 K/uL    MPV 9.9 9.2 - 12.9 fL    Gran # 3.3 1.8 - 7.7 K/uL    Lymph # 0.6 (L) 1.0 - 4.8 K/uL    Mono # 0.5 0.3 - 1.0 K/uL    Eos # 0.0 0.0 - 0.5 K/uL    Baso # 0.00 0.00 - 0.20 K/uL    Gran% 74.7 (H) 38.0 - 73.0 %    Lymph% 12.8 (L) 18.0 - 48.0 %    Mono% 11.8 4.0 - 15.0 %    Eosinophil% 0.7 0.0 - 8.0 %    Basophil% 0.0 0.0 - 1.9 %    Differential Method Automated      CBC:   Recent Labs  Lab 07/13/17  1522   WBC 4.39   RBC 3.31*   HGB 10.0*   HCT 30.8*      MCV 93   MCH 30.2   MCHC 32.5     BMP:   Recent Labs  Lab 07/13/17  1521   GLU 99      K 3.5      CO2 30*   BUN 18   CREATININE 1.3   CALCIUM 8.7     CMP:   Recent Labs  Lab 07/13/17  1521   GLU 99   CALCIUM 8.7   ALBUMIN 2.9*   PROT 6.6      K 3.5   CO2 30*      BUN 18   CREATININE 1.3   ALKPHOS 272*   ALT 79*   AST 83*   BILITOT 3.0*     LFTs:   Recent Labs  Lab 07/13/17  1521   ALT 79*   AST 83*   ALKPHOS 272*   BILITOT 3.0*   PROT 6.6   ALBUMIN 2.9*     Coagulation:   Recent Labs  Lab 07/08/17  1049   INR 1.0     Cardiac markers: No results for input(s): CKMB, TROPONINT, MYOGLOBIN in the last 168 hours.  Microbiology Results (last 7 days)     Procedure Component Value Units Date/Time    Blood culture #2 **CANNOT BE ORDERED STAT** [[id number]] Collected:  07/08/17 0710    Order Status:  Completed Specimen:  Blood from Peripheral, Antecubital, Right Updated:  07/13/17 0903     Blood Culture, Routine No growth after 5 days.    Blood culture #1 **CANNOT BE ORDERED STAT** [[id number]] Collected:  07/08/17 0718    Order Status:  Completed Specimen:  Blood from Peripheral, Antecubital, Right Updated:  07/13/17 0903      Blood Culture, Routine No growth after 5 days.    Urine culture **CANNOT BE ORDERED STAT** [[id number]] Collected:  07/08/17 0330    Order Status:  Completed Specimen:  Urine from Urine, Clean Catch Updated:  07/10/17 0828     Urine Culture, Routine No significant growth        ABGs:   Recent Labs  Lab 07/13/17  1433   PH 7.319*   PCO2 56.1*   PO2 112*   HCO3 28.8*   POCSATURATED 98   BE 2       Ventilator Setting:  Vent Mode: PS/CPAP  Oxygen Concentration (%):  [] 50  Resp Rate Total:  [20 br/min-32 br/min] 29 br/min  Vt Set:  [450 mL] 450 mL  PEEP/CPAP:  [5 cmH20] 5 cmH20  Pressure Support:  [10 cmH20] 10 cmH20  Mean Airway Pressure:  [7.8 cmH20-8.5 cmH20] 7.8 cmH20     Diagnostic Results:   Imaging Results          X-Ray Chest 1 View (Final result)  Result time 07/13/17 14:26:58    Final result by Akash Granda MD (07/13/17 14:26:58)                 Impression:      Cardiomegaly.  Poor depth of inspiration with increased density within both lung bases greater on the right than on the left.  While this may be related to atelectatic changes, right basilar consolidation/pneumonia cannot be excluded.  A small right-sided pleural effusion is suspected.      Electronically signed by: AKASH GRANDA MD  Date:     07/13/17  Time:    14:26              Narrative:    Comparison is made to prior examination dated 7/11/17.    A single AP radiograph of the chest was obtained.  The cardiac silhouette appears enlarged as before.  Atherosclerotic calcification is present within the aortic arch.  There is increased density within the base of the right hemithorax with probable right basilar atelectasis/consolidation present.  There is also elevation of the left hemidiaphragm with mild left basilar atelectasis.  There is no evidence for pneumothorax.  A small right-sided pleural effusion cannot be excluded.  Bony structures are grossly intact.                             SURG FL Surgery Fluoro Less Than 1 Hour (Final  result)  Result time 07/13/17 08:45:56    Final result by Kavitha Batres RT (07/13/17 08:45:56)                 Impression:    See OP Notes for results.             This procedure was auto-finalized by: Virtual Radiologist                 Narrative:    See OP Notes for results.                                X-Ray Chest 1 View (Final result)  Result time 07/11/17 08:32:21    Final result by Neelam Stock MD (07/11/17 08:32:21)                 Impression:     There is elevation of the left hemidiaphragm.      Electronically signed by: NEELAM STOCK MD  Date:     07/11/17  Time:    08:32              Narrative:    There is no pneumothorax, pleural effusion or focal consolidation. There is elevation of the left hemidiaphragm. There is atherosclerotic disease of aorta. The cardiac silhouette is enlarged.                             NM Myocardial Perfusion Spect Multi Pharmacologic (In process)                MRI MRCP Without Contrast (Final result)  Result time 07/08/17 16:18:38   Procedure changed from MRI Abdomen W WO Contrast_INC MRCP     Final result by Goldie Bray MD (07/08/17 16:18:38)                 Impression:        #1. Suboptimal exam secondary to artifact as above.    #2.  Markedly distended gallbladder with innumerable dependent gallstones.  There has been development of pericholecystic fluid near the fundus of the gallbladder that could reflect the sequela of cholecystitis in the correct clinical scenario.  Clinical correlation is recommended.  Consider a HIDA scan to increase specificity.    #3.  No evidence of choledocholithiasis although sensitivity is limited.    #4.  Cardiomegaly and mild to moderate pericardial effusion.            Electronically signed by: GOLDIE BRAY MD  Date:     07/08/17  Time:    16:18              Narrative:    HISTORY: Pain, biliary sludge    COMPARISON: CT abdomen 07/08/17, MRI abdomen 06/17/17, ultrasound and abdomen 07/08/17    FINDINGS: This is a  suboptimal exam secondary to artifact from the patient's body habitus.    The gallbladder is markedly distended and there is mild amount of pericholecystic fluid near the fundus of the gallbladder and anterior aspect of the liver.  There are innumerable tiny dependent stones within the gallbladder.  No bile duct dilatation.  No definitive choledocholithiasis although sensitivity is limited.  The liver is otherwise grossly unremarkable.     The heart is markedly enlarged.  There is a mild to moderate pericardial effusion.  There is a mild right pleural effusion.  There are a few renal cysts.  The visualized abdominal structures are otherwise grossly unremarkable.                             X-Ray Chest 1 View (Final result)  Result time 07/08/17 07:10:24    Final result by Valerie Lin MD (07/08/17 07:10:24)                 Impression:      As above.      Electronically signed by: VALERIE LIN  Date:     07/08/17  Time:    07:10              Narrative:    Comparison: 6/16/2017    Technique: Single portable AP chest radiograph    Findings: Cardiac monitoring leads overlie the chest.  The cardiomediastinal silhouette is prominent.  There is persistent elevation of the left hemidiaphragm, unchanged from prior studies.  There is slight increased interstitial prominence and parenchymal attenuation, similar to prior examination.  There is bibasilar subsegmental atelectasis.  There is no evidence of pneumothorax.  Osseous structures demonstrate no acute abnormality.                             US Abdomen Limited (Final result)  Result time 07/08/17 06:56:24    Final result by Valerie Lin MD (07/08/17 06:56:24)                 Impression:      1.  Biliary sludge and cholelithiasis.  No definite sonographic evidence to suggest acute cholecystitis.  Clinical correlation recommended.    2.  Left hepatic lobe cyst.    3.  Redemonstration of 1.2 cm right renal cystic lesion with possible septations, seen on prior MRI and CT  examinations.          Electronically signed by: VALERIE LIN  Date:     07/08/17  Time:    06:56              Narrative:    Comparison: CT 7/8/2017 and limited abdominal ultrasound T. 2017.    Technique: Limited right upper quadrant ultrasound was performed.    Findings:     The liver is normal in size measuring 15.1cm.  parenchyma is homogeneous in echogenicity with a 0.5 x 0.3 x 0.4 cm left hepatic lobe cyst, similar to prior examinations.  The gallbladder prominent containing biliary sludge and small stones.  There is no gallbladder wall thickening.  There is no pericholecystic fluid.  Sonographic Mueller sign is reported to be negative by the ultrasound technologist.  There is no intra-or extrahepatic biliary ductal dilatation, with the common bile duct measuring 0.5 cm.    The pancreas and aorta are obscured by overlying bowel gas.  The IVC is unremarkable in its visualized extent.    The right kidney is normal in size measuring 9.7 cm.  There is no hydronephrosis.  There is a approximately 1.2 cm hypoechoic lesion within the upper pole the right kidney with possible septations, likely coinciding with previously identified indeterminate lesion seen on prior CT and MRI examinations.                             CT Abdomen Pelvis With Contrast (Final result)  Result time 07/08/17 05:42:56    Final result by Valerie Lin MD (07/08/17 05:42:56)                 Impression:      1.  Biliary sludge and/or layering stones the gallbladder lumen with slight distention of the gallbladder.  Mild intrahepatic biliary ductal dilatation.    2.  Redemonstration of indeterminate complex right renal lesion involving the upper pole, demonstrated to better extent on prior MRI of the abdomen.  Stable left renal cyst as well as subcentimeter renal hypodensities too small to definitively characterize.    3.  Cardiomegaly, persistent atherosclerosis of the aorta and coronary vessels and trace pericardial fluid.    4.  Additional  findings as above.      Electronically signed by: CASE CARDOSO  Date:     07/08/17  Time:    05:42              Narrative:    TECHNIQUE: The patient was surveyed from the lung bases through the pelvis following the administration of 100 cc Omni 350 IV contrast  and data was reconstructed for coronal, sagittal, and axial images.    COMPARISON: CT abdomen pelvis 6/16/2017, abdominal ultrasound and MRI abdomen both dated 6/17/2017..    FINDINGS:  There is bibasilar dependent atelectatic change.  The visualized portions of the heart and pericardium demonstrate mild cardiomegaly.  There are calcifications of the aortic valve and mitral annulus.  There is significant calcific atherosclerosis of the coronary vessels.  There is trace volume pericardial fluid present.    The liver is normal in size.  There is a subcentimeter hypodensity in the left hepatic lobe too small to definitively characterize on CT.  The portal vein and splenic vein are patent.  The gallbladder is distended and contains high density layering material likely representing biliary sludge versus small layering stones.  There is mild intrahepatic biliary ductal dilatation.  The common bile duct is within normal limits.  The spleen and adrenal glands are unremarkable.  The pancreas is atrophic.  No significant peripancreatic fat stranding or inflammatory change.  There is a subcentimeter hypodensity within the region of the pancreatic head, which coincides with a previously identified T2 hyperintense lesion seen on MRI examination of 6/17/2017.  No peripancreatic inflammatory change or fat stranding to suggest pancreatitis.    Kidneys are normal in size and location and concentrate and excrete contrast satisfactorily on delayed phase imaging.  There is no hydronephrosis.  There is redemonstration of a 1.7 cm exophytic hypodensity projecting from the superior pole of the right kidney which does not demonstrate imaging characteristics of a simple cyst.  There  is a stable left renal cyst as well as additional subcentimeter renal hypodensities too small to definitively characterize.  Evaluation of the structures of the pelvis is limited due to artifact from left hip prosthesis.  Urinary bladder appears unremarkable.  The uterus is not definitively visualized, likely surgically absent.    Visualized loops of large and small bowel and straight no evidence of obstruction or inflammatory change.  Appendix is not definitively visualized, but there is no inflammatory change in the right lower quadrant.    The abdominal aorta is non-aneurysmal with atherosclerosis.    Visualized osseous structures to rate degenerative change including grade 1 anterolisthesis of L3 on L4 as well as probable T9 vertebral body hemangioma.                                  ASSESSMENT/PLAN:     1. Respiratory distress    2. Shortness of breath    3. Wheezing    4. Right upper quadrant abdominal pain    5. Nausea and vomiting, intractability of vomiting not specified, unspecified vomiting type    6. Acute on chronic congestive heart failure, unspecified congestive heart failure type    7. Calculus of gallbladder without cholecystitis without obstruction    8. CHF (congestive heart failure)    9. Acute on chronic diastolic CHF (congestive heart failure)    10. Chest pain    11. Cholelithiasis without cholecystitis            Plan:she is now extubated and maintaining adequate vs/abg on bipap ;but marginal and would not be surprised if she should require reintubation.

## 2017-07-13 NOTE — NURSING
Report given to NOREEN Nickerson. Patient resting comfortably, no complaints, no acute distress.  12 hour chart check completed

## 2017-07-13 NOTE — PROGRESS NOTES
Results for MARIE TURNER (MRN [id number]) as of 7/13/2017 16:23   Ref. Range 7/13/2017 14:33   POC PH Latest Ref Range: 7.35 - 7.45  7.319 (L)   POC PCO2 Latest Ref Range: 35 - 45 mmHg 56.1 (HH)   POC PO2 Latest Ref Range: 80 - 100 mmHg 112 (H)   POC BE Latest Ref Range: -2 to 2 mmol/L 2   POC HCO3 Latest Ref Range: 24 - 28 mmol/L 28.8 (H)   POC SATURATED O2 Latest Ref Range: 95 - 100 % 98   POC TCO2 Latest Ref Range: 23 - 27 mmol/L 30 (H)   FiO2 Unknown 50   Sample Unknown ARTERIAL   DelSys Unknown CPAP/BiPAP   Allens Test Unknown Pass   Site Unknown RR   Mode Unknown BiPAP   EP Unknown 5   IP Unknown 10   Rate Unknown 18   Sp02 Unknown 100   ABG was done and reported to Dr Douglas pt remains on BiPAP 10/5@50%

## 2017-07-13 NOTE — OR NURSING
Dr torres and dr michelle at bedside, pt awake, communiicates  with doctors nodding and using hand signals, deep breathes when requested. Respiratory therapist adjusts vent to cpap. Pt does wll with change

## 2017-07-13 NOTE — BRIEF OP NOTE
[facility]  Brief Operative Note    SUMMARY     Surgery Date: 7/13/2017     Surgeon(s) and Role:     * Luis Carlos Jarvis MD - Primary    Assisting Surgeon: None    Pre-op Diagnosis:  Calculus of gallbladder without cholecystitis without obstruction [K80.20]    Post-op Diagnosis:  Post-Op Diagnosis Codes:     * Calculus of gallbladder without cholecystitis without obstruction [K80.20]    Procedure(s) (LRB):  CHOLECYSTECTOMY-LAPAROSCOPIC W/ CHOLANGIOGRAM (N/A)    Anesthesia: General    Description of Procedure: lap titus and ioc  Description of the findings of the procedure:common duct stone  Estimated Blood Loss: 0       Specimens:   Specimen (12h ago through future)    Start     Ordered    07/13/17 0838  Specimen to Pathology - Surgery  Once     Comments:  Gallbladder      07/13/17 0838

## 2017-07-13 NOTE — PLAN OF CARE
Problem: Respiratory Distress Syndrome (,NICU)  Intervention: Correct and Maintain Adequate Oxygenation   17   Respiratory Interventions   Airway/Ventilation Management (Infant) airway patency maintained;calming measures promoted     Intervention: Position to Optimize Ventilation   17   Developmental Care   Developmental Care Positioning Body HOB elevated     Intervention: Provide Preventive/Supportive Care   17   Developmental Care   Environmental Modifications clustered care;lighting decreased   Hypothermia Management (Infant)   Warming Method adjust environmental temperature         Comments: No c/o pain. Oxygen level dropped to 90 % while asleep but % while awake with NC in @ 2 lpm. Skin remains intact and no injury during shift. Call light at side and bed side commode utilized.

## 2017-07-13 NOTE — PLAN OF CARE
Problem: Patient Care Overview  Goal: Plan of Care Review  Outcome: Ongoing (interventions implemented as appropriate)  Hypertensive and poorly responsive with agitation and restlessness post surgery.  Requiring BIPAP mask for elevated CO2.  Pulse ox 100%.  BP improved with PRN meds.  ABG's being monitored.

## 2017-07-13 NOTE — PROGRESS NOTES
"[facility] Medicine  Progress Note    Patient Name: Cindy Lyman  MRN: [id number]  Patient Class: IP- Inpatient   Admission Date: 7/8/2017  Length of Stay: 4 days  Attending Physician: Marisela Nielsen MD  Primary Care Provider: Jeremiah Reeves MD        Subjective:     Principal Problem:Chronic combined systolic and diastolic heart failure    HPI:  Ms. Lyman is a 80 yo woman with obesity (BMI 34), HFpEF, mod AS, CKD3, anemia of chronic disease (baseline ~9.0), chronic constipation, and recent admission for abdominal pain who presented 7/8/17 for abdominal pain. Last admission she was found to be septic with fever and tachycardia (no leukocytosis). She was treated for a UTI and followed up with her PCP. She had persistent RUQ abdominal pain at her PCP visit and was referred to general surgery but has not yet seen them. Her symptoms continue to be associated with nausea and vomiting. In the ER she was treated with pain medication and antiemetics as well as fluids. She then developed shortness of breath and was found to be volume overloaded on CXR. She was started on Lasix 40 IV and BiPAP and admitted to medicine.    Hospital Course:  Cardiology, GI, and gen surg were consulted. Quickly weaned off of BiPAP to room air. Lasix initially, then held due to elevated creatinine. Echo 7/8/17 w/ LVEF 55%, mod AS, mild MS and TR, PAP 51. NST 7/10/2017.  US abdomen showed biliary sludge and cholelithiasis but no definite sonographic evidence to suggest acute cholecystitis. MRCP showed, "Markedly distended gallbladder with innumerable dependent gallstones.  There has been development of pericholecystic fluid near the fundus of the gallbladder that could reflect the sequela of cholecystitis" as well as a mild to moderate pericardial effusion. Plavix held in anticipation of cholecystectomy. NST 7/10/2017 stable. Low-intermediate cardiovascular risk for surgery planned for tomorrow.. "   Complaining of GALVAN, likely from deconditioning. Did well with PT/OT. O2 to maintain sat >88%.   H&H 8.1/25.5. Transfused 1 unit RBCs 7/9/17.    Review of Systems   Constitutional: Negative for chills and fever.   HENT: Negative for congestion and rhinorrhea.    Eyes: Negative for photophobia and visual disturbance.   Respiratory: Negative for cough and shortness of breath.    Cardiovascular: Negative for chest pain and palpitations.   Gastrointestinal: Positive for abdominal pain. Negative for nausea and vomiting.   Genitourinary: Negative for difficulty urinating and dysuria.   Musculoskeletal: Negative for joint swelling and neck stiffness.   Skin: Negative for rash and wound.   Neurological: Negative for dizziness and light-headedness.   Psychiatric/Behavioral: Negative for agitation and behavioral problems.     Objective:     Vital Signs (Most Recent):  Temp: 99.7 °F (37.6 °C) (07/12/17 1620)  Pulse: 62 (07/12/17 1620)  Resp: 16 (07/12/17 1620)  BP: (!) 158/80 (07/12/17 1745)  SpO2: 99 % (07/12/17 1620) Vital Signs (24h Range):  Temp:  [97.7 °F (36.5 °C)-99.7 °F (37.6 °C)] 99.7 °F (37.6 °C)  Pulse:  [56-66] 62  Resp:  [16-18] 16  SpO2:  [97 %-100 %] 99 %  BP: (137-193)/(60-87) 158/80     Weight: 95.3 kg (210 lb)  Body mass index is 34.95 kg/m².    Physical Exam   Constitutional: She is oriented to person, place, and time. She appears well-developed and well-nourished. No distress.   Pleasant   HENT:   Right Ear: External ear normal.   Left Ear: External ear normal.   Nose: Nose normal.   Eyes: EOM are normal. Pupils are equal, round, and reactive to light. No scleral icterus.   Neck: Normal range of motion. Neck supple. No thyromegaly present.   Cardiovascular: Normal rate and normal heart sounds.  Exam reveals no friction rub.    No murmur heard.  Pulmonary/Chest: Effort normal. She has no wheezes. She has no rales.   Abdominal: Soft. Bowel sounds are normal. She exhibits no mass. There is tenderness (RUQ).    obese   Musculoskeletal: Normal range of motion. She exhibits no edema or deformity.   Neurological: She is alert and oriented to person, place, and time. No cranial nerve deficit.   Skin: Skin is warm and dry. No pallor.   Psychiatric: She has a normal mood and affect. Her behavior is normal. Thought content normal.        Significant Labs:   CBC:     Recent Labs  Lab 07/11/17 0456 07/12/17  0706   WBC 4.84 4.14   HGB 9.5* 9.0*   HCT 28.5* 27.7*    SEE COMMENT     CMP:     Recent Labs  Lab 07/11/17 0456 07/12/17  0706    139   K 4.2 4.1    104   CO2 27 29   GLU 95 98   BUN 31* 25*   CREATININE 1.4 1.1   CALCIUM 8.4* 8.2*   PROT 5.9* 5.4*   ALBUMIN 2.7* 2.5*   BILITOT 1.0 0.7   ALKPHOS 198* 201*   AST 30 37   ALT 69* 54*   ANIONGAP 9 6*   EGFRNONAA 36* 48*     Significant Imaging: I have reviewed all pertinent imaging results/findings within the past 24 hours.    Assessment/Plan:      * Chronic combined systolic and diastolic heart failure    Acute exacerbation resolved. - Fluids given for n/v and became volume overloaded. Very sensitive to fluids. Echo with EF 50%, stable mod AS. Similar compared to 11/2016 pEF, +DD, PAP 44, mod AS. O2 weaned. Lasix held 2/2 elevated creatinine. Cr improved and lasix home dose restarted. NST 7/10/17 stable. Intermediate-low cardiovascular risk for procedure.        Cholelithiasis without cholecystitis    CC RUQ pain. Consulted GI and surgery. Elevated LFTs. Multiple stones in gallbladder and e/o cholecystitis. Plavix on hold for cholecystectomy.        Elevated LFTs    See above        AS (aortic stenosis)    Echo        Pulmonary hypertension    Echo        Stage 3 chronic kidney disease    Stable        Coronary artery disease involving native coronary artery without angina pectoris    Stable. Trop unremarkable. Continue home meds.         Obesity (BMI 30-39.9)    Counseled on weight loss.        Anemia of chronic disease    Stable. Transfused 7/9/17 with  appropriate response.        Depression    Continue home meds        Hypertension    Cont home meds. PRN hydralazine.          VTE Risk Mitigation         Ordered     enoxaparin injection 40 mg  Daily     Route:  Subcutaneous        07/08/17 0936     Medium Risk of VTE  Once      07/08/17 0936     Place sequential compression device  Until discontinued      07/08/17 0936     Place LAUREANO hose  Until discontinued      07/08/17 0936          Marisela Nielsen MD  Department of Hospital Medicine   [facility]

## 2017-07-13 NOTE — SUBJECTIVE & OBJECTIVE
Review of Systems   Constitutional: Negative for chills and fever.   HENT: Negative for congestion and rhinorrhea.    Eyes: Negative for photophobia and visual disturbance.   Respiratory: Negative for cough and shortness of breath.    Cardiovascular: Negative for chest pain and palpitations.   Gastrointestinal: Positive for abdominal pain. Negative for nausea and vomiting.   Genitourinary: Negative for difficulty urinating and dysuria.   Musculoskeletal: Negative for joint swelling and neck stiffness.   Skin: Negative for rash and wound.   Neurological: Negative for dizziness and light-headedness.   Psychiatric/Behavioral: Negative for agitation and behavioral problems.     Objective:     Vital Signs (Most Recent):  Temp: 97.5 °F (36.4 °C) (07/13/17 1345)  Pulse: 72 (07/13/17 1345)  Resp: 18 (07/13/17 1345)  BP: 139/66 (07/13/17 1345)  SpO2: 97 % (07/13/17 1345) Vital Signs (24h Range):  Temp:  [97.2 °F (36.2 °C)-99.7 °F (37.6 °C)] 97.5 °F (36.4 °C)  Pulse:  [56-80] 72  Resp:  [4-31] 18  SpO2:  [72 %-100 %] 97 %  BP: ()/() 139/66     Weight: 92 kg (202 lb 12.8 oz)  Body mass index is 33.75 kg/m².    Physical Exam   Constitutional: She is oriented to person, place, and time. She appears well-developed and well-nourished. No distress.   Pleasant   HENT:   Right Ear: External ear normal.   Left Ear: External ear normal.   Nose: Nose normal.   Eyes: EOM are normal. Pupils are equal, round, and reactive to light. No scleral icterus.   Neck: Normal range of motion. Neck supple. No thyromegaly present.   Cardiovascular: Normal rate and normal heart sounds.  Exam reveals no friction rub.    No murmur heard.  Pulmonary/Chest: Effort normal. She has no wheezes. She has no rales.   Abdominal:   Obese, bandages c/d/i   Musculoskeletal: Normal range of motion. She exhibits no edema or deformity.   Neurological: She is alert and oriented to person, place, and time. No cranial nerve deficit.   Skin: Skin is warm and  dry. No pallor.        Significant Labs:   CBC:     Recent Labs  Lab 07/12/17  0706 07/13/17  0302   WBC 4.14 3.93   HGB 9.0* 9.3*   HCT 27.7* 28.8*   PLT SEE COMMENT 208     CMP:     Recent Labs  Lab 07/12/17  0706 07/13/17  0302    139   K 4.1 4.3    104   CO2 29 29   GLU 98 98   BUN 25* 19   CREATININE 1.1 1.1   CALCIUM 8.2* 8.6*   PROT 5.4* 6.0   ALBUMIN 2.5* 2.5*   BILITOT 0.7 0.6   ALKPHOS 201* 187*   AST 37 44*   ALT 54* 59*   ANIONGAP 6* 6*   EGFRNONAA 48* 48*     Significant Imaging: I have reviewed all pertinent imaging results/findings within the past 24 hours.

## 2017-07-13 NOTE — PROGRESS NOTES
Results for MARIE TURNER (MRN [id number]) as of 7/13/2017 17:47   Ref. Range 7/13/2017 16:11   POC PH Latest Ref Range: 7.35 - 7.45  7.349 (L)   POC PCO2 Latest Ref Range: 35 - 45 mmHg 55.0 (H)   POC PO2 Latest Ref Range: 80 - 100 mmHg 95   POC BE Latest Ref Range: -2 to 2 mmol/L 4   POC HCO3 Latest Ref Range: 24 - 28 mmol/L 30.3 (H)   POC SATURATED O2 Latest Ref Range: 95 - 100 % 97   POC TCO2 Latest Ref Range: 23 - 27 mmol/L 32 (H)   FiO2 Unknown 50   Sample Unknown ARTERIAL   DelSys Unknown CPAP/BiPAP   Allens Test Unknown Pass   Site Unknown RR   Mode Unknown BiPAP   EP Unknown 5   IP Unknown 10   Rate Unknown 18   Sp02 Unknown 99   ABG was done and reported to Dr Weston pt remains on BiPAP 10/5@50%

## 2017-07-14 PROBLEM — G47.33 OBSTRUCTIVE SLEEP APNEA SYNDROME: Status: ACTIVE | Noted: 2017-07-14

## 2017-07-14 LAB
ALBUMIN SERPL BCP-MCNC: 2.7 G/DL
ALBUMIN SERPL BCP-MCNC: 2.7 G/DL
ALLENS TEST: ABNORMAL
ALP SERPL-CCNC: 262 U/L
ALP SERPL-CCNC: 271 U/L
ALT SERPL W/O P-5'-P-CCNC: 73 U/L
ALT SERPL W/O P-5'-P-CCNC: 74 U/L
ANION GAP SERPL CALC-SCNC: 11 MMOL/L
APTT BLDCRRT: 28.9 SEC
AST SERPL-CCNC: 58 U/L
AST SERPL-CCNC: 58 U/L
BASOPHILS # BLD AUTO: 0.01 K/UL
BASOPHILS NFR BLD: 0.1 %
BILIRUB DIRECT SERPL-MCNC: 1.7 MG/DL
BILIRUB SERPL-MCNC: 2.3 MG/DL
BILIRUB SERPL-MCNC: 2.4 MG/DL
BUN SERPL-MCNC: 20 MG/DL
CALCIUM SERPL-MCNC: 9.2 MG/DL
CHLORIDE SERPL-SCNC: 102 MMOL/L
CO2 SERPL-SCNC: 26 MMOL/L
CREAT SERPL-MCNC: 1.2 MG/DL
DELSYS: ABNORMAL
DIFFERENTIAL METHOD: ABNORMAL
EOSINOPHIL # BLD AUTO: 0 K/UL
EOSINOPHIL NFR BLD: 0.1 %
EP: 5
ERYTHROCYTE [DISTWIDTH] IN BLOOD BY AUTOMATED COUNT: 14.2 %
ERYTHROCYTE [SEDIMENTATION RATE] IN BLOOD BY WESTERGREN METHOD: 12 MM/H
EST. GFR  (AFRICAN AMERICAN): 50 ML/MIN/1.73 M^2
EST. GFR  (NON AFRICAN AMERICAN): 43 ML/MIN/1.73 M^2
FIO2: 50
GLUCOSE SERPL-MCNC: 112 MG/DL
HCO3 UR-SCNC: 29.2 MMOL/L (ref 24–28)
HCT VFR BLD AUTO: 32 %
HGB BLD-MCNC: 10.3 G/DL
INR PPP: 1
IP: 10
LYMPHOCYTES # BLD AUTO: 1 K/UL
LYMPHOCYTES NFR BLD: 10 %
MCH RBC QN AUTO: 29.5 PG
MCHC RBC AUTO-ENTMCNC: 32.2 %
MCV RBC AUTO: 92 FL
MIN VOL: 8
MODE: ABNORMAL
MONOCYTES # BLD AUTO: 1.1 K/UL
MONOCYTES NFR BLD: 11.4 %
NEUTROPHILS # BLD AUTO: 7.6 K/UL
NEUTROPHILS NFR BLD: 78.4 %
PCO2 BLDA: 55.6 MMHG (ref 35–45)
PH SMN: 7.33 [PH] (ref 7.35–7.45)
PLATELET # BLD AUTO: 222 K/UL
PMV BLD AUTO: 10.7 FL
PO2 BLDA: 75 MMHG (ref 80–100)
POC BE: 2 MMOL/L
POC SATURATED O2: 93 % (ref 95–100)
POC TCO2: 31 MMOL/L (ref 23–27)
POTASSIUM SERPL-SCNC: 4.2 MMOL/L
PROT SERPL-MCNC: 6.8 G/DL
PROT SERPL-MCNC: 6.9 G/DL
PROTHROMBIN TIME: 10.6 SEC
RBC # BLD AUTO: 3.49 M/UL
SAMPLE: ABNORMAL
SITE: ABNORMAL
SODIUM SERPL-SCNC: 139 MMOL/L
SP02: 100
SPONT RATE: 14
WBC # BLD AUTO: 9.66 K/UL

## 2017-07-14 PROCEDURE — 82803 BLOOD GASES ANY COMBINATION: CPT

## 2017-07-14 PROCEDURE — 85025 COMPLETE CBC W/AUTO DIFF WBC: CPT

## 2017-07-14 PROCEDURE — 25000003 PHARM REV CODE 250: Performed by: INTERNAL MEDICINE

## 2017-07-14 PROCEDURE — 85610 PROTHROMBIN TIME: CPT

## 2017-07-14 PROCEDURE — 27000221 HC OXYGEN, UP TO 24 HOURS

## 2017-07-14 PROCEDURE — 63600175 PHARM REV CODE 636 W HCPCS: Performed by: INTERNAL MEDICINE

## 2017-07-14 PROCEDURE — 94761 N-INVAS EAR/PLS OXIMETRY MLT: CPT

## 2017-07-14 PROCEDURE — 63600175 PHARM REV CODE 636 W HCPCS: Performed by: EMERGENCY MEDICINE

## 2017-07-14 PROCEDURE — 94660 CPAP INITIATION&MGMT: CPT

## 2017-07-14 PROCEDURE — 80053 COMPREHEN METABOLIC PANEL: CPT

## 2017-07-14 PROCEDURE — 25000003 PHARM REV CODE 250: Performed by: EMERGENCY MEDICINE

## 2017-07-14 PROCEDURE — 99900035 HC TECH TIME PER 15 MIN (STAT)

## 2017-07-14 PROCEDURE — 20000000 HC ICU ROOM

## 2017-07-14 PROCEDURE — 25000003 PHARM REV CODE 250: Performed by: STUDENT IN AN ORGANIZED HEALTH CARE EDUCATION/TRAINING PROGRAM

## 2017-07-14 PROCEDURE — 85730 THROMBOPLASTIN TIME PARTIAL: CPT

## 2017-07-14 PROCEDURE — 80076 HEPATIC FUNCTION PANEL: CPT

## 2017-07-14 PROCEDURE — 36600 WITHDRAWAL OF ARTERIAL BLOOD: CPT

## 2017-07-14 RX ORDER — LABETALOL HYDROCHLORIDE 5 MG/ML
20 INJECTION, SOLUTION INTRAVENOUS EVERY 4 HOURS PRN
Status: DISCONTINUED | OUTPATIENT
Start: 2017-07-14 | End: 2017-07-14

## 2017-07-14 RX ORDER — HYDRALAZINE HYDROCHLORIDE 20 MG/ML
10 INJECTION INTRAMUSCULAR; INTRAVENOUS EVERY 8 HOURS PRN
Status: DISCONTINUED | OUTPATIENT
Start: 2017-07-14 | End: 2017-07-14

## 2017-07-14 RX ORDER — NICARDIPINE HYDROCHLORIDE 0.2 MG/ML
1 INJECTION INTRAVENOUS CONTINUOUS
Status: DISCONTINUED | OUTPATIENT
Start: 2017-07-14 | End: 2017-07-15

## 2017-07-14 RX ADMIN — NICARDIPINE HYDROCHLORIDE 1 MG/HR: 0.2 INJECTION, SOLUTION INTRAVENOUS at 12:07

## 2017-07-14 RX ADMIN — NICARDIPINE HYDROCHLORIDE 15 MG/HR: 0.2 INJECTION, SOLUTION INTRAVENOUS at 11:07

## 2017-07-14 RX ADMIN — MORPHINE SULFATE: 10 INJECTION INTRAVENOUS at 02:07

## 2017-07-14 RX ADMIN — ONDANSETRON 4 MG: 2 INJECTION INTRAMUSCULAR; INTRAVENOUS at 05:07

## 2017-07-14 RX ADMIN — DEXTROSE MONOHYDRATE, SODIUM CHLORIDE, AND POTASSIUM CHLORIDE: 50; 4.5; 1.49 INJECTION, SOLUTION INTRAVENOUS at 03:07

## 2017-07-14 RX ADMIN — HYDRALAZINE HYDROCHLORIDE 10 MG: 20 INJECTION INTRAMUSCULAR; INTRAVENOUS at 01:07

## 2017-07-14 RX ADMIN — NICARDIPINE HYDROCHLORIDE 12.5 MG/HR: 0.2 INJECTION, SOLUTION INTRAVENOUS at 04:07

## 2017-07-14 RX ADMIN — MORPHINE SULFATE 4 MG: 10 INJECTION INTRAVENOUS at 08:07

## 2017-07-14 RX ADMIN — NICARDIPINE HYDROCHLORIDE 15 MG/HR: 0.2 INJECTION, SOLUTION INTRAVENOUS at 07:07

## 2017-07-14 RX ADMIN — HYDRALAZINE HYDROCHLORIDE 10 MG: 20 INJECTION INTRAMUSCULAR; INTRAVENOUS at 04:07

## 2017-07-14 RX ADMIN — METOPROLOL TARTRATE 5 MG: 5 INJECTION, SOLUTION INTRAVENOUS at 12:07

## 2017-07-14 RX ADMIN — METOPROLOL TARTRATE 5 MG: 5 INJECTION, SOLUTION INTRAVENOUS at 09:07

## 2017-07-14 RX ADMIN — LABETALOL HYDROCHLORIDE 20 MG: 5 INJECTION, SOLUTION INTRAVENOUS at 10:07

## 2017-07-14 RX ADMIN — ONDANSETRON 4 MG: 2 INJECTION INTRAMUSCULAR; INTRAVENOUS at 04:07

## 2017-07-14 RX ADMIN — MORPHINE SULFATE 4 MG: 10 INJECTION INTRAVENOUS at 04:07

## 2017-07-14 RX ADMIN — MORPHINE SULFATE 4 MG: 10 INJECTION INTRAVENOUS at 01:07

## 2017-07-14 RX ADMIN — PROMETHAZINE HYDROCHLORIDE 6.25 MG: 25 INJECTION INTRAMUSCULAR; INTRAVENOUS at 07:07

## 2017-07-14 NOTE — ASSESSMENT & PLAN NOTE
CC RUQ pain. Consulted GI and surgery. Elevated LFTs. Multiple stones in gallbladder and e/o cholecystitis. Plavix on hold for cholecystectomy x5 days prior to OR.  Lap titus 7/13/17. Noted hepatic duct and CBD intra-operatively. Anticipating ERCP but holding off for now as she developed respiratory distress soon after cholecystectomy. Was actually intubated twice. Extubated. Weaned off BiPAP.

## 2017-07-14 NOTE — ASSESSMENT & PLAN NOTE
Cardene infusion for now as she did not respond adequately to PRN hydralazine nor labetalol IV. Will restart home meds once able to take PO

## 2017-07-14 NOTE — PLAN OF CARE
Problem: Patient Care Overview  Goal: Plan of Care Review  Outcome: Ongoing (interventions implemented as appropriate)  Temp max 99.6f  axillary. Pt maint on bipap thru shift. Pt did desat to 90% with bipap off.  Pt required pain med and b/p med q 4hrs. B/p artificially elevated due to constantly pulling off bipap. Daughter did not want pt to have anything for aggiation due to reaction from anesthesia.  U/o 350 ml thru shift. ngt output-140 ml. No bm or flatulence thru shift. No falls, injury,or skin breakdown thru shift.

## 2017-07-14 NOTE — ASSESSMENT & PLAN NOTE
Acute exacerbation resolved. - Fluids given for n/v and became volume overloaded. Very sensitive to fluids. Echo with EF 50%, stable mod AS. Similar compared to 11/2016 pEF, +DD, PAP 44, mod AS. O2 weaned. Lasix held 2/2 elevated creatinine. Held IVFs. NST 7/10/17 stable. Intermediate-low cardiovascular risk for procedure.

## 2017-07-14 NOTE — OP NOTE
DATE OF PROCEDURE:  07/13/2017    PREOPERATIVE DIAGNOSES:  Chronic cholecystitis, cholelithiasis,   choledocholithiasis.    POSTOPERATIVE DIAGNOSES:  Chronic cholecystitis, cholelithiasis,   choledocholithiasis.    OPERATIVE PROCEDURE:  Laparoscopic cholecystectomy with operative   cholangiography.    SURGEON:  Luis Carlos Jarvis M.D.    ASSISTANT:  Stella.    PROCEDURE IN DETAIL:  After adequate premedication, the patient was taken to the   Operating Room, placed on the operating table in supine position.  General   anesthesia administered via endotracheal tube.  The abdomen was prepped and   draped in sterile fashion.  Infraumbilical incision was made.  The abdomen was   grasped and elevated.  Veress needle inserted.  The abdomen insufflated with   CO2.  A 5-mm trocar introduced.  A laparoscope was placed.  The other 3 trocars   placed in the usual position under direct vision.  Gallbladder was grasped and   elevated.  Adhesions were bluntly dissected.  The neck of the gallbladder was   then able to be completely skeletonized, identifying cystic duct and 2 branches   of the cystic artery.  Cystic duct was clipped at its junction with the   gallbladder.  Incision made in the cystic duct, stones and sludge were milked   from the cystic duct and cystic duct cholangiography then carried out, which   showed at least one stone, which was free floating in the common duct.  The   cystic duct was then clipped and divided.  The 2 branches of the cystic artery   clipped and divided and the gallbladder removed from the liver bed using   cautery.  Gallbladder was placed in an Endopouch, brought out through the   epigastric trocar site.  The operative site was irrigated.  Hemostasis checked.    The abdomen desufflated with the wounds closed with 3-0 nylon.  Blood loss was   minimal.  The patient tolerated the procedure, was transported to Recovery in   stable condition.  I was present for the entire operation.      HUNTER/SN   dd: 07/13/2017 13:29:18 (ERENT)  td: 07/14/2017 05:42:25 (TERE)  Doc ID   #[id number]  Job ID #[id number]    CC:

## 2017-07-14 NOTE — PROGRESS NOTES
[facility]  General Surgery  Progress Note    Subjective:     Interval History: s/p cholecystectomy and IOC yesterday, which demonstrated CBD stones. Reintubated post op, extubated, placed on BiPAP overnight in the ICU.  This morning on NC, satting well, complaining of mild abdominal pain.     Post-Op Info:  Procedure(s) (LRB):  CHOLECYSTECTOMY-LAPAROSCOPIC W/ CHOLANGIOGRAM (N/A)   1 Day Post-Op      Medications:  Continuous Infusions:   niCARdipine       Scheduled Meds:   PRN Meds:sodium chloride, labetalol, morphine, ondansetron, promethazine (PHENERGAN) IVPB     Objective:     Vital Signs (Most Recent):  Temp: 98.5 °F (36.9 °C) (07/14/17 0730)  Pulse: 79 (07/14/17 1106)  Resp: (!) 22 (07/14/17 1106)  BP: (!) 172/81 (07/14/17 1103)  SpO2: 97 % (07/14/17 1106) Vital Signs (24h Range):  Temp:  [97.5 °F (36.4 °C)-99.6 °F (37.6 °C)] 98.5 °F (36.9 °C)  Pulse:  [] 79  Resp:  [9-42] 22  SpO2:  [93 %-100 %] 97 %  BP: (123-230)/() 172/81       Intake/Output Summary (Last 24 hours) at 07/14/17 1142  Last data filed at 07/14/17 1000   Gross per 24 hour   Intake          1390.83 ml   Output              880 ml   Net           510.83 ml       Physical Exam  Awake, alert, answering questions appropriately  Ng in place with some bilious output  Abdomen soft, mildly tender, non distended, surgical dressings in place    Significant Labs:  CBC:   Recent Labs  Lab 07/14/17  0500   WBC 9.66   RBC 3.49*   HGB 10.3*   HCT 32.0*      MCV 92   MCH 29.5   MCHC 32.2     CMP:   Recent Labs  Lab 07/14/17  0501   *   CALCIUM 9.2   ALBUMIN 2.7*   PROT 6.9      K 4.2   CO2 26      BUN 20   CREATININE 1.2   ALKPHOS 262*   ALT 73*   AST 58*   BILITOT 2.4*       Significant Diagnostics:  None    Assessment/Plan:   81F with cholelithiasis, choledocholithiasis s/p lap titus and IOC 7/13 POD#1, with respiratory distress post op.  Stones seen on IOC, with rising bilirubin.   Need for ERCP- plan  for Monday.    Active Diagnoses:    Diagnosis Date Noted POA    PRINCIPAL PROBLEM:  Chronic combined systolic and diastolic heart failure [I50.42] 06/17/2017 Yes    Respiratory distress [R06.00] 07/13/2017 Yes    Cholelithiasis without cholecystitis [K80.20] 07/08/2017 Yes    Stage 3 chronic kidney disease [N18.3] 07/08/2017 Yes     Chronic    Elevated LFTs [R79.89] 06/17/2017 Yes    Coronary artery disease involving native coronary artery without angina pectoris [I25.10] 05/11/2015 Yes     Chronic    Obesity (BMI 30-39.9) [E66.9] 05/09/2015 Yes     Chronic    Anemia of chronic disease [D63.8] 11/29/2013 Yes     Chronic    AS (aortic stenosis) [I35.0] 11/22/2013 Yes     Chronic    Pulmonary hypertension [I27.2] 09/28/2012 Yes     Chronic    Hypertension [I10]  Yes     Chronic    Hyperlipidemia [E78.5]  Yes     Chronic    Depression [F32.9]  Yes     Chronic      Problems Resolved During this Admission:    Diagnosis Date Noted Date Resolved POA    Respiratory distress [R06.00] 07/08/2017 07/10/2017 Yes         Mara Douglas MD  General Surgery  [facility]

## 2017-07-14 NOTE — SUBJECTIVE & OBJECTIVE
Review of Systems   Constitutional: Negative for chills and fever.   HENT: Negative for congestion and rhinorrhea.    Eyes: Negative for photophobia and visual disturbance.   Respiratory: Negative for cough and shortness of breath.    Cardiovascular: Negative for chest pain and palpitations.   Gastrointestinal: Negative for abdominal pain, nausea and vomiting.   Genitourinary: Negative for difficulty urinating and dysuria.   Musculoskeletal: Negative for joint swelling and neck stiffness.   Skin: Negative for rash and wound.   Neurological: Negative for dizziness and light-headedness.   Psychiatric/Behavioral: Negative for agitation and behavioral problems.     Objective:     Vital Signs (Most Recent):  Temp: 98.5 °F (36.9 °C) (07/14/17 0730)  Pulse: 79 (07/14/17 1106)  Resp: (!) 22 (07/14/17 1106)  BP: (!) 172/81 (07/14/17 1103)  SpO2: 97 % (07/14/17 1106) Vital Signs (24h Range):  Temp:  [97.5 °F (36.4 °C)-99.6 °F (37.6 °C)] 98.5 °F (36.9 °C)  Pulse:  [] 79  Resp:  [9-42] 22  SpO2:  [95 %-100 %] 97 %  BP: (123-230)/() 172/81     Weight: 95.2 kg (209 lb 14.1 oz)  Body mass index is 34.93 kg/m².    Physical Exam   Constitutional: She is oriented to person, place, and time. She appears well-developed and well-nourished. No distress.   Pleasant   HENT:   Right Ear: External ear normal.   Left Ear: External ear normal.   Nose: Nose normal.   Eyes: EOM are normal. Pupils are equal, round, and reactive to light. No scleral icterus.   Neck: Normal range of motion. Neck supple. No thyromegaly present.   Cardiovascular: Normal rate and normal heart sounds.  Exam reveals no friction rub.    No murmur heard.  Pulmonary/Chest: Effort normal and breath sounds normal. She has no wheezes. She has no rales.   On low flow nasal cannula   Abdominal:   Obese, bandages c/d/i   Musculoskeletal: Normal range of motion. She exhibits no edema or deformity.   Neurological: She is alert and oriented to person, place, and time.  No cranial nerve deficit.   Skin: Skin is warm and dry. No pallor.   Psychiatric: She has a normal mood and affect. Her behavior is normal. Judgment and thought content normal.        Significant Labs:   CBC:     Recent Labs  Lab 07/13/17  0302 07/13/17  1522 07/14/17  0500   WBC 3.93 4.39 9.66   HGB 9.3* 10.0* 10.3*   HCT 28.8* 30.8* 32.0*    205 222     CMP:     Recent Labs  Lab 07/13/17  0302 07/13/17  1521 07/14/17  0500 07/14/17  0501    140  --  139   K 4.3 3.5  --  4.2    101  --  102   CO2 29 30*  --  26   GLU 98 99  --  112*   BUN 19 18  --  20   CREATININE 1.1 1.3  --  1.2   CALCIUM 8.6* 8.7  --  9.2   PROT 6.0 6.6 6.8 6.9   ALBUMIN 2.5* 2.9* 2.7* 2.7*   BILITOT 0.6 3.0* 2.3* 2.4*   ALKPHOS 187* 272* 271* 262*   AST 44* 83* 58* 58*   ALT 59* 79* 74* 73*   ANIONGAP 6* 9  --  11   EGFRNONAA 48* 39*  --  43*     Significant Imaging: I have reviewed all pertinent imaging results/findings within the past 24 hours.

## 2017-07-14 NOTE — NURSING
Spoke with daughter. She would like to hold off on any meds for anxiety due to difficulty from coming out of anesthesia.

## 2017-07-14 NOTE — PLAN OF CARE
07/14/17 1729   Discharge Reassessment   Assessment Type Discharge Planning Reassessment   Can the patient answer the patient profile reliably? Yes, cognitively intact   How does the patient rate their overall health at the present time? Fair   Describe the patient's ability to walk at the present time. Does not walk or unable to take any steps at all  (now in ICU s/p procedure)   How often would a person be available to care for the patient? Occasionally   Number of comorbid conditions (as recorded on the chart) Five or more   During the past month, has the patient often been bothered by feeling down, depressed or hopeless? No  (record)   During the past month, has the patient often been bothered by little interest or pleasure in doing things? No  (record)   Discharge plan remains the same: No   Provided patient/caregiver education on the expected discharge date and the discharge plan Yes   Discharge Plan A Home with family;Home Health   Discharge Plan B Home with family   Change in patient condition or support system Yes   Patient choice form signed by patient/caregiver No   Explained to the the patient/caregiver why the discharge planned changed: Yes   Involved the patient/caregiver in establishing a new discharge plan: Yes   patient transferred to ICU s/p procedure yesterday. She was intubated at time, has since extubated, then using BiPap, now on standby. FAITH reviewed chart, discussed with Dr Vences, introduced self to patient and called patient daughter Kady ([phone]). Per Dr Vences, patient will need out patient referral for sleep study as will benefit from CPAP. Per daughter, patient had CPAP in past, return it to her doctor 4 years (or more) ago because did not like to wear it. FAITH dicussed with Dr Mcmillan who informs referral will be more toward discharge as patient will still need ERCP once stable from respiratory issues (per Dr Gray notes possibly 7/17).   FAITH reviewed discharge plan  with daughter. Patient may benefit from home health RN and possibly therapy depending upon progress of hospital stay.   SW will follow in ICU and assist as needed.

## 2017-07-14 NOTE — PROGRESS NOTES
"Chief Complaint / Reason for Consult: Abd. Pain    Pt. SOB has improved    ROS: No CP, F/C, N/V    Patient Vitals for the past 24 hrs:   BP Temp Temp src Pulse Resp SpO2 Height Weight   07/14/17 1109 - - - - - - 5' 5" (1.651 m) 95.2 kg (209 lb 14.1 oz)   07/14/17 1106 - - - 79 (!) 22 97 % - -   07/14/17 1103 (!) 172/81 - - - - - - -   07/14/17 1100 - - - 79 16 97 % - -   07/14/17 1032 (!) 209/93 - - - - - - -   07/14/17 1030 (!) 230/100 - - 92 (!) 25 96 % - -   07/14/17 1000 (!) 214/86 - - 89 (!) 23 95 % - -   07/14/17 0939 (!) 201/87 - - - - - - -   07/14/17 0930 - - - 79 (!) 21 100 % - -   07/14/17 0906 (!) 212/95 - - - - - - -   07/14/17 0900 (!) 139/94 - - 91 (!) 27 100 % - -   07/14/17 0830 - - - 89 (!) 24 100 % - -   07/14/17 0802 (!) 181/78 - - - - - - -   07/14/17 0800 - - - 81 17 100 % - -   07/14/17 0748 (!) 193/82 - - - - - - -   07/14/17 0730 (!) 164/72 98.5 °F (36.9 °C) Axillary 80 14 100 % - -   07/14/17 0715 (!) 161/72 - - 82 - 100 % - -   07/14/17 0708 - - - 82 (!) 21 100 % - -   07/14/17 0702 (!) 161/72 - - - - - - -   07/14/17 0645 - - - 82 (!) 22 99 % - -   07/14/17 0631 (!) 176/77 - - - - - - -   07/14/17 0621 - - - 86 20 100 % - -   07/14/17 0617 (!) 182/76 - - - - - - -   07/14/17 0600 - - - - - - - 95.2 kg (209 lb 14.1 oz)   07/14/17 0446 (!) 209/83 - - - - - - -   07/14/17 0408 - - - 99 (!) 31 100 % - -   07/14/17 0312 - - - 97 (!) 27 100 % - -   07/14/17 0309 - 99.6 °F (37.6 °C) Axillary - - - - -   07/14/17 0302 (!) 179/81 - - - - - - -   07/14/17 0211 - - - 83 (!) 25 100 % - -   07/14/17 0202 (!) 154/70 - - - - - - -   07/14/17 0200 - - - 82 18 100 % - -   07/14/17 0106 (!) 203/91 - - - - - - -   07/14/17 0000 (!) 167/86 - - 103 (!) 32 99 % - -   07/13/17 2300 (!) 151/66 98.9 °F (37.2 °C) Axillary 81 19 99 % - -   07/13/17 2205 - - - 80 19 98 % - -   07/13/17 2201 134/64 - - - - - - -   07/13/17 2130 (!) 142/66 - - 91 (!) 24 99 % - -   07/13/17 2115 (!) 197/78 - - 94 (!) 26 98 % - - "   07/13/17 2100 (!) 218/88 - - 96 (!) 31 100 % - -   07/13/17 2030 (!) 193/83 - - 93 (!) 30 100 % - -   07/13/17 2000 (!) 198/88 - - 96 (!) 27 99 % - -   07/13/17 1945 - - - 97 (!) 29 100 % - -   07/13/17 1931 (!) 205/88 - - - - - - -   07/13/17 1930 (!) 187/84 - - 99 (!) 35 98 % - -   07/13/17 1926 - 97.6 °F (36.4 °C) Axillary - - - - -   07/13/17 1925 - - - 97 (!) 35 99 % - -   07/13/17 1916 (!) 139/101 - - - - - - -   07/13/17 1900 126/87 - - 83 (!) 26 100 % - -   07/13/17 1855 - - - 79 20 100 % - -   07/13/17 1850 - - - 79 19 100 % - -   07/13/17 1845 (!) 155/74 - - 79 19 100 % - -   07/13/17 1830 (!) 160/72 - - 79 20 100 % - -   07/13/17 1820 - - - 80 20 100 % - -   07/13/17 1815 (!) 166/72 - - 86 (!) 22 100 % - -   07/13/17 1810 - - - 85 (!) 24 100 % - -   07/13/17 1805 - - - 78 18 100 % - -   07/13/17 1800 (!) 165/69 - - 79 19 100 % - -   07/13/17 1750 (!) 174/79 - - 78 19 100 % - -   07/13/17 1745 (!) 156/72 - - 78 20 100 % - -   07/13/17 1730 (!) 157/70 - - 80 20 100 % - -   07/13/17 1725 - - - 79 20 100 % - -   07/13/17 1720 - - - 79 20 100 % - -   07/13/17 1715 (!) 159/69 - - 77 19 99 % - -   07/13/17 1710 (!) 161/68 - - 81 20 99 % - -   07/13/17 1705 (!) 190/76 - - 80 19 99 % - -   07/13/17 1700 (!) 199/84 - - 84 (!) 25 99 % - -   07/13/17 1655 (!) 215/93 - - 96 (!) 42 99 % - -   07/13/17 1611 - - - 81 20 100 % - -   07/13/17 1525 (!) 196/82 - - 77 19 100 % - -   07/13/17 1515 (!) 176/77 - - 79 (!) 22 100 % - -   07/13/17 1433 - - - 73 20 100 % - -   07/13/17 1430 (!) 169/77 - - 75 (!) 23 99 % - -   07/13/17 1425 (!) 177/86 - - 74 (!) 25 99 % - -   07/13/17 1415 (!) 206/93 - - 79 (!) 32 96 % - -   07/13/17 1400 (!) 158/71 - - 73 (!) 25 99 % - -   07/13/17 1355 139/66 - - 73 (!) 9 96 % - -       Physical Exam:  Gen - Well developed, well nourished, no apparent distress  HEENT - Anicteric, + NG  CV - S1, S2, no murmurs/rubs  Lungs - CTA-B, normal excursion  Abd - Soft, NT, ND, normal BS's, no HSM.  Ext -  No c/c/e  Neuro - No asterixis    Labs:    Recent Labs  Lab 07/14/17  0500   WBC 9.66   RBC 3.49*   HGB 10.3*   HCT 32.0*      MCV 92   MCH 29.5   MCHC 32.2       Recent Labs  Lab 07/14/17  0501   CALCIUM 9.2   PROT 6.9      K 4.2   CO2 26      BUN 20   CREATININE 1.2   ALKPHOS 262*   ALT 73*   AST 58*   BILITOT 2.4*       Recent Labs  Lab 07/14/17  0500   INR 1.0   APTT 28.9       Imaging:  Reviewed CXR    Assessment:  This patient is a 81 y.o. female with:   1. Choledocholithiasis, per IOC - No findings currently consistent with cholangitis.  2. Cholecystitis - now s/p cholecystectomy.  3. Abnormal LFT's - secondary to #1/2.  Recent Bili rise likely secondary to cholecystectomy.    4. HTN, Pulm HTN, Hyperlipidemia, CAD, CHF, COPD, DVT, Aortic Stenosis....    Recommendations:  1. Continue with supportive care.  2. Pulm/Resp treatments  3. Defer ERCP until further improvement in Pulm/Resp status.  Consider for Monday, can do sooner, if indicated.    4. Follow LFT's

## 2017-07-14 NOTE — CONSULTS
"    [facility] [place]  Adult Nutrition  Consult Note    SUMMARY     Recommendations    Recommendation/Intervention:   1. Advance diet once medically able: to Clear liquid diet. If TF recs needed: Isosource 1.5 @50 mL/hr. Initiate at 10 ml hr.   Increase by 10ml/hr until final goal rate of 50 ml/hr is reached.   This will provide 1800 kcal, 90 gm protein. Water flushes per MD. Hold for N/V.   Hold for residuals >400ml.   2. RD to monitor  Goals: Pt to recieve nutrition by next RD visit.   Nutrition Goal Status: new    Reason for Assessment    Reason for Assessment: physician consult (per assessment)  Diagnosis: cardiac disease (Abdominal pain)  Relevent Medical History: CAD, COPD, HLD, Pulm HTN   General Information Comments: Pt. resting at time of visit. S/p cholecystitis; extubated , failed , reintubated and then extubated again ; now in ICU on bipap.     Nutrition Discharge Planning: Too early to determine.     Nutrition Prescription Ordered    Current Diet Order: NPO    Evaluation of Received Nutrients/Fluid Intake    Energy Calories Required: not meeting needs  Protein Required: not meeting needs  Fluid Required: not meeting needs  % Intake of Estimated Energy Needs: 0 - 25 %  % Meal Intake: NPO     Nutrition Risk Screen     Nutrition Risk Screen: no indicators present    Nutrition/Diet History    Factors Affecting Nutritional Intake: abdominal pain    Labs/Tests/Procedures/Meds    Pertinent Labs Reviewed: reviewed  Pertinent Labs Comments: GFR 50L, Glu 112H, Alb 2.7L, TBili 2.4H  Pertinent Medications Reviewed: reviewed    Physical Findings    Skin: incision    Anthropometrics    Temp: 98.5 °F (36.9 °C)     Height: 5' 5" (165.1 cm)  Weight Method: Bed Scale  Weight: 95.2 kg (209 lb 14.1 oz)     Ideal Body Weight (IBW), Female: 125 lb     % Ideal Body Weight, Female (lb): 167.9 lb  BMI (Calculated): 35  BMI Grade: 35 - 39.9 - obesity - grade II                            Estimated/Assessed " Needs    Weight Used For Calorie Calculations: 95.2 kg (209 lb 14.1 oz)   Height (cm): 165.1 cm  Energy Calorie Requirements (kcal): 1783 kcal ([place] x1.25 PAL)  Energy Need Method: [place]-St Jeor     RMR ([place]-St. Jeor Equation): 1427.88     Weight Used For Protein Calculations: 95.2 kg (209 lb 14.1 oz)  Protein Requirements: 76-95 gm/kg (0.8-1.0 gm/day)  0.8 gm Protein (gm): 76.32 and 1.0 gm Protein (gm): 95.4  Fluid Requirements (mL): 1 mL per kcal or per MD  Fluid Need Method: RDA Method  RDA Method (mL): 1783    Assessment and Plan    Nutrition Diagnosis:  Problem: Inadequate energy intake  Etiology: procedure, cholecystectomy  Symptoms: NPO  Status: new    Monitor and Evaluation    Food and Nutrient Intake: energy intake  Anthropometric Measurements: weight, weight change  Biochemical Data, Medical Tests and Procedures: electrolyte and renal panel, lipid profile, gastrointestinal profile, glucose/endocrine profile, inflammatory profile  Nutrition-Focused Physical Findings: overall appearance    Nutrition Risk    Level of Risk:  (F/u 2x/weekly)    Nutrition Follow-Up    RD Follow-up?: Yes

## 2017-07-14 NOTE — PLAN OF CARE
Problem: Patient Care Overview  Goal: Plan of Care Review  DTR Rec's 7/14:  1. Advance diet once medically able: to Clear liquid diet. If TF recs needed: Isosource 1.5 @50 mL/hr. Initiate at 10 ml hr.   Increase by 10ml/hr until final goal rate of 50 ml/hr is reached.   This will provide 1800 kcal, 90 gm protein. Water flushes per MD. Hold for N/V.   Hold for residuals >400ml.   2. Please see full note for more info.

## 2017-07-14 NOTE — PLAN OF CARE
Problem: Patient Care Overview  Goal: Plan of Care Review  Patient placed on Nicardipine infusion initially to keep sbp <140. She became diaphoretic, dyspneic, and complained of chest pain with  sbp in the 150's. Nicardipine dose was decreased and parameters were changed. She was weaned to a nasal canula with 2 L/min oxygen. She will placed on Bipap at night. The ERCP was cancelled for today and rescheduled for Monday if patient remains stable. NGT was removed and a clear liquid diet was ordered. Prn pain medication was administered on 1 occasion with good results as well as antiemetic. She sustained no injury, fall or trauma this shift.

## 2017-07-14 NOTE — NURSING
Informed Dr. Walls of pt's hx and dx.informed u/o for last 2 hrs has been 25 ml /hr x2. Asked if he wanted to increase fluids. Due to chf hx will just monitor. I'm to call him if u/o goes to 0.

## 2017-07-14 NOTE — PROGRESS NOTES
Progress Note  Pulmonology    Admit Date: 7/8/2017   LOS: 6 days       SUBJECTIVE: acute resp distress     History of Present Illness:  S/p cholecystitis; extubated , failed , reintubated and then extubated again ; now in ICU on bipap    Review of patient's allergies indicates:   Allergen Reactions    Doxycycline hyclate Diarrhea and Nausea And Vomiting    Singulair [montelukast]      Stomach pain, Muscle pain, Cough      Latex, natural rubber     Penicillins      Other reaction(s): Anaphylaxis    Ventolin  [albuterol sulfate] Other (See Comments)       Past Medical History:   Diagnosis Date    Anemia     Anticoagulant long-term use     Aortic stenosis     Arthritis     lower back    Asthma     CAD (coronary artery disease) 4/24/2015    Carpal tunnel syndrome on both sides     Cataract     CHF (congestive heart failure) 5/2013    COPD (chronic obstructive pulmonary disease)     Depression     DVT (deep venous thrombosis)     Hyperlipidemia     Hypertension     Pulmonary HTN     TMJ (temporomandibular joint disorder)      Past Surgical History:   Procedure Laterality Date    BACK SURGERY      cardiac stents      CARPAL TUNNEL RELEASE      Right/Left    HYSTERECTOMY      Partial    JOINT REPLACEMENT  2009    Left hip    TEMPOROMANDIBULAR JOINT SURGERY       Family History   Problem Relation Age of Onset    Heart disease Mother     Hypertension Daughter     Cancer Son     Breast cancer Neg Hx     Colon cancer Neg Hx     Ovarian cancer Neg Hx          Review of Systems:  No headaches  No diploplia  No dysphagia  No chest pains   No nausea or vomiting   No hematochezia  No arthralgia  No dysuria no frequency      OBJECTIVE:     Vital Signs (Most Recent)  Temp: 99.6 °F (37.6 °C) (07/14/17 0309)  Pulse: 82 (07/14/17 0708)  Resp: (!) 21 (07/14/17 0708)  BP: (!) 161/72 (07/14/17 0702)  SpO2: 100 % (07/14/17 0708)    Vital Signs Range (Last 24H):  Temp:  [97.5 °F (36.4 °C)-99.6 °F (37.6 °C)]    Pulse:  []   Resp:  [9-42]   BP: ()/()   SpO2:  [93 %-100 %]     Physical Exam:  t  Head: normocephalic, atraumatic  Eyes:  conjunctivae/corneas clear. PERRL.  , trachea midline  Lungs:  rales bilaterally and rhonchi bilaterally  Chest Wall: no tenderness  Heart: regular rate and rhythm and no murmur  Abdomen: soft, non-tender non-distended; bowel sounds normal  Extremities: no cyanosis or edema, or clubbing  Skin: No rashes or lesions or good skin turgor  Neurologic: alert, oriented, thought content appropriate    Laboratory:  Recent Results (from the past 24 hour(s))   ISTAT PROCEDURE    Collection Time: 07/13/17  2:33 PM   Result Value Ref Range    POC PH 7.319 (L) 7.35 - 7.45    POC PCO2 56.1 (HH) 35 - 45 mmHg    POC PO2 112 (H) 80 - 100 mmHg    POC HCO3 28.8 (H) 24 - 28 mmol/L    POC BE 2 -2 to 2 mmol/L    POC SATURATED O2 98 95 - 100 %    POC TCO2 30 (H) 23 - 27 mmol/L    Rate 18     Sample ARTERIAL     Site RR     Allens Test Pass     DelSys CPAP/BiPAP     Mode BiPAP     FiO2 50     Sp02 100     IP 10     EP 5    Comprehensive metabolic panel    Collection Time: 07/13/17  3:21 PM   Result Value Ref Range    Sodium 140 136 - 145 mmol/L    Potassium 3.5 3.5 - 5.1 mmol/L    Chloride 101 95 - 110 mmol/L    CO2 30 (H) 23 - 29 mmol/L    Glucose 99 70 - 110 mg/dL    BUN, Bld 18 8 - 23 mg/dL    Creatinine 1.3 0.5 - 1.4 mg/dL    Calcium 8.7 8.7 - 10.5 mg/dL    Total Protein 6.6 6.0 - 8.4 g/dL    Albumin 2.9 (L) 3.5 - 5.2 g/dL    Total Bilirubin 3.0 (H) 0.1 - 1.0 mg/dL    Alkaline Phosphatase 272 (H) 55 - 135 U/L    AST 83 (H) 10 - 40 U/L    ALT 79 (H) 10 - 44 U/L    Anion Gap 9 8 - 16 mmol/L    eGFR if African American 45 (A) >60 mL/min/1.73 m^2    eGFR if non African American 39 (A) >60 mL/min/1.73 m^2   CBC auto differential    Collection Time: 07/13/17  3:22 PM   Result Value Ref Range    WBC 4.39 3.90 - 12.70 K/uL    RBC 3.31 (L) 4.00 - 5.40 M/uL    Hemoglobin 10.0 (L) 12.0 - 16.0 g/dL     Hematocrit 30.8 (L) 37.0 - 48.5 %    MCV 93 82 - 98 fL    MCH 30.2 27.0 - 31.0 pg    MCHC 32.5 32.0 - 36.0 %    RDW 14.2 11.5 - 14.5 %    Platelets 205 150 - 350 K/uL    MPV 9.9 9.2 - 12.9 fL    Gran # 3.3 1.8 - 7.7 K/uL    Lymph # 0.6 (L) 1.0 - 4.8 K/uL    Mono # 0.5 0.3 - 1.0 K/uL    Eos # 0.0 0.0 - 0.5 K/uL    Baso # 0.00 0.00 - 0.20 K/uL    Gran% 74.7 (H) 38.0 - 73.0 %    Lymph% 12.8 (L) 18.0 - 48.0 %    Mono% 11.8 4.0 - 15.0 %    Eosinophil% 0.7 0.0 - 8.0 %    Basophil% 0.0 0.0 - 1.9 %    Differential Method Automated    ISTAT PROCEDURE    Collection Time: 07/13/17  4:11 PM   Result Value Ref Range    POC PH 7.349 (L) 7.35 - 7.45    POC PCO2 55.0 (H) 35 - 45 mmHg    POC PO2 95 80 - 100 mmHg    POC HCO3 30.3 (H) 24 - 28 mmol/L    POC BE 4 -2 to 2 mmol/L    POC SATURATED O2 97 95 - 100 %    POC TCO2 32 (H) 23 - 27 mmol/L    Rate 18     Sample ARTERIAL     Site RR     Allens Test Pass     DelSys CPAP/BiPAP     Mode BiPAP     FiO2 50     Sp02 99     IP 10     EP 5    ISTAT PROCEDURE    Collection Time: 07/13/17  6:16 PM   Result Value Ref Range    POC PH 7.354 7.35 - 7.45    POC PCO2 52.9 (H) 35 - 45 mmHg    POC PO2 121 (H) 80 - 100 mmHg    POC HCO3 29.5 (H) 24 - 28 mmol/L    POC BE 3 -2 to 2 mmol/L    POC SATURATED O2 98 95 - 100 %    POC TCO2 31 (H) 23 - 27 mmol/L    Rate 23     Sample ARTERIAL     Site RR     Allens Test Pass     DelSys CPAP/BiPAP     Mode BiPAP     FiO2 50     Min Vol 7     Sp02 100     IP 10     EP 5    Hepatic function panel    Collection Time: 07/14/17  5:00 AM   Result Value Ref Range    Total Protein 6.8 6.0 - 8.4 g/dL    Albumin 2.7 (L) 3.5 - 5.2 g/dL    Total Bilirubin 2.3 (H) 0.1 - 1.0 mg/dL    Bilirubin, Direct 1.7 (H) 0.1 - 0.3 mg/dL    AST 58 (H) 10 - 40 U/L    ALT 74 (H) 10 - 44 U/L    Alkaline Phosphatase 271 (H) 55 - 135 U/L   CBC auto differential    Collection Time: 07/14/17  5:00 AM   Result Value Ref Range    WBC 9.66 3.90 - 12.70 K/uL    RBC 3.49 (L) 4.00 - 5.40 M/uL     Hemoglobin 10.3 (L) 12.0 - 16.0 g/dL    Hematocrit 32.0 (L) 37.0 - 48.5 %    MCV 92 82 - 98 fL    MCH 29.5 27.0 - 31.0 pg    MCHC 32.2 32.0 - 36.0 %    RDW 14.2 11.5 - 14.5 %    Platelets 222 150 - 350 K/uL    MPV 10.7 9.2 - 12.9 fL    Gran # 7.6 1.8 - 7.7 K/uL    Lymph # 1.0 1.0 - 4.8 K/uL    Mono # 1.1 (H) 0.3 - 1.0 K/uL    Eos # 0.0 0.0 - 0.5 K/uL    Baso # 0.01 0.00 - 0.20 K/uL    Gran% 78.4 (H) 38.0 - 73.0 %    Lymph% 10.0 (L) 18.0 - 48.0 %    Mono% 11.4 4.0 - 15.0 %    Eosinophil% 0.1 0.0 - 8.0 %    Basophil% 0.1 0.0 - 1.9 %    Differential Method Automated    Protime-INR    Collection Time: 07/14/17  5:00 AM   Result Value Ref Range    Prothrombin Time 10.6 9.0 - 12.5 sec    INR 1.0 0.8 - 1.2   APTT    Collection Time: 07/14/17  5:00 AM   Result Value Ref Range    aPTT 28.9 21.0 - 32.0 sec   Comprehensive metabolic panel    Collection Time: 07/14/17  5:01 AM   Result Value Ref Range    Sodium 139 136 - 145 mmol/L    Potassium 4.2 3.5 - 5.1 mmol/L    Chloride 102 95 - 110 mmol/L    CO2 26 23 - 29 mmol/L    Glucose 112 (H) 70 - 110 mg/dL    BUN, Bld 20 8 - 23 mg/dL    Creatinine 1.2 0.5 - 1.4 mg/dL    Calcium 9.2 8.7 - 10.5 mg/dL    Total Protein 6.9 6.0 - 8.4 g/dL    Albumin 2.7 (L) 3.5 - 5.2 g/dL    Total Bilirubin 2.4 (H) 0.1 - 1.0 mg/dL    Alkaline Phosphatase 262 (H) 55 - 135 U/L    AST 58 (H) 10 - 40 U/L    ALT 73 (H) 10 - 44 U/L    Anion Gap 11 8 - 16 mmol/L    eGFR if African American 50 (A) >60 mL/min/1.73 m^2    eGFR if non African American 43 (A) >60 mL/min/1.73 m^2   ISTAT PROCEDURE    Collection Time: 07/14/17  9:20 AM   Result Value Ref Range    POC PH 7.328 (L) 7.35 - 7.45    POC PCO2 55.6 (H) 35 - 45 mmHg    POC PO2 75 (L) 80 - 100 mmHg    POC HCO3 29.2 (H) 24 - 28 mmol/L    POC BE 2 -2 to 2 mmol/L    POC SATURATED O2 93 (L) 95 - 100 %    POC TCO2 31 (H) 23 - 27 mmol/L    Rate 12     Sample ARTERIAL     Site LR     Allens Test Pass     DelSys CPAP/BiPAP     Mode BiPAP     FiO2 50     Spont Rate  14     Min Vol 8     Sp02 100     IP 10     EP 5      CBC:   Recent Labs  Lab 07/14/17  0500   WBC 9.66   RBC 3.49*   HGB 10.3*   HCT 32.0*      MCV 92   MCH 29.5   MCHC 32.2     BMP:   Recent Labs  Lab 07/14/17  0501   *      K 4.2      CO2 26   BUN 20   CREATININE 1.2   CALCIUM 9.2     CMP:   Recent Labs  Lab 07/14/17  0501   *   CALCIUM 9.2   ALBUMIN 2.7*   PROT 6.9      K 4.2   CO2 26      BUN 20   CREATININE 1.2   ALKPHOS 262*   ALT 73*   AST 58*   BILITOT 2.4*     LFTs:   Recent Labs  Lab 07/14/17  0501   ALT 73*   AST 58*   ALKPHOS 262*   BILITOT 2.4*   PROT 6.9   ALBUMIN 2.7*     Coagulation:   Recent Labs  Lab 07/14/17  0500   INR 1.0   APTT 28.9     Cardiac markers: No results for input(s): CKMB, TROPONINT, MYOGLOBIN in the last 168 hours.  Microbiology Results (last 7 days)     Procedure Component Value Units Date/Time    Urine culture [[id number]] Collected:  07/13/17 2028    Order Status:  Sent Specimen:  Urine from Urine, Catheterized Updated:  07/13/17 2044    Blood culture #2 **CANNOT BE ORDERED STAT** [[id number]] Collected:  07/08/17 0710    Order Status:  Completed Specimen:  Blood from Peripheral, Antecubital, Right Updated:  07/13/17 0903     Blood Culture, Routine No growth after 5 days.    Blood culture #1 **CANNOT BE ORDERED STAT** [[id number]] Collected:  07/08/17 0718    Order Status:  Completed Specimen:  Blood from Peripheral, Antecubital, Right Updated:  07/13/17 0903     Blood Culture, Routine No growth after 5 days.    Urine culture **CANNOT BE ORDERED STAT** [[id number]] Collected:  07/08/17 0330    Order Status:  Completed Specimen:  Urine from Urine, Clean Catch Updated:  07/10/17 0828     Urine Culture, Routine No significant growth        ABGs:   Recent Labs  Lab 07/14/17  0920   PH 7.328*   PCO2 55.6*   PO2 75*   HCO3 29.2*   POCSATURATED 93*   BE 2           Diagnostic Results:  There has been interval placement of an esophagogastric tube  which appears to course below the diaphragm with tip projecting over the gastric body. Cardiac monitoring leads overlie the chest. The cardiac silhouette remains enlarged. Right basilar atelectasis appears mildly improved from prior examination. There is persistent elevation of the left hemidiaphragm. No new focal air space consolidation is identified. No evidence of pneumothorax.    ASSESSMENT/PLAN:     1. Respiratory distress    2. Shortness of breath    3. Wheezing    4. Right upper quadrant abdominal pain    5. Nausea and vomiting, intractability of vomiting not specified, unspecified vomiting type    6. Acute on chronic congestive heart failure, unspecified congestive heart failure type    7. Calculus of gallbladder without cholecystitis without obstruction    8. CHF (congestive heart failure)    9. Acute on chronic diastolic CHF (congestive heart failure)    10. Chest pain    11. Cholelithiasis without cholecystitis    12. Chronic combined systolic and diastolic heart failure          Plan:doing much better this am ; L hemidiaphragm elevation is chronic ; for ERCP today.

## 2017-07-14 NOTE — NURSING
At 5:00am  attempt to give b/p med and iv became painful to pt. So tried using the left hand iv and it blew when flusihng. D/c both piv and pressure dsg to each site.  #22 piv to left lateral a/c area was obtained, after 4 attempts, by 2 different nurses.  Pt has very small veins. Gave b/p med and then pain med.  The Nursing Supervisor, Narendra,  was able to get a #20 piv to L mid anterior fa. Pt skinny. Well. B/p is artificial up  Due to piv starts. Will cont. To monitor.

## 2017-07-14 NOTE — ANESTHESIA POSTPROCEDURE EVALUATION
"Anesthesia Post Evaluation    Patient: Cindy Lyman    Procedure(s) Performed: Procedure(s) (LRB):  CHOLECYSTECTOMY-LAPAROSCOPIC W/ CHOLANGIOGRAM (N/A)    Final Anesthesia Type: general  Patient location during evaluation: ICU  Patient participation: Yes- Able to Participate  Level of consciousness: awake and alert and oriented  Post-procedure vital signs: reviewed and stable  Pain management: adequate  Airway patency: patent  PONV status at discharge: No PONV  Anesthetic complications: no      Cardiovascular status: blood pressure returned to baseline, hemodynamically stable and hypertensive  Respiratory status: unassisted, spontaneous ventilation and BIPAP  Hydration status: euvolemic  Follow-up not needed.        Visit Vitals  BP (!) 161/72   Pulse 82   Temp 37.6 °C (99.6 °F) (Axillary)   Resp (!) 21   Ht 5' 5" (1.651 m)   Wt 95.2 kg (209 lb 14.1 oz)   SpO2 100%   Breastfeeding? No   BMI 34.93 kg/m²       Pain/Malvin Score: Pain Assessment Performed: Yes (7/14/2017  6:00 AM)  Presence of Pain: denies (7/14/2017  6:00 AM)  Pain Rating Prior to Med Admin: 7 (7/14/2017  4:57 AM)  Pain Rating Post Med Admin: 7 (7/14/2017  5:27 AM)  Malvin Score: 8 (7/13/2017  1:35 PM)      "

## 2017-07-14 NOTE — PROGRESS NOTES
"[facility] Medicine  Progress Note    Patient Name: Cindy Lyman  MRN: [id number]  Patient Class: IP- Inpatient   Admission Date: 7/8/2017  Length of Stay: 6 days  Attending Physician: Lexus Vences MD  Primary Care Provider: Jeremiah Reeves MD        Subjective:     Principal Problem:Chronic combined systolic and diastolic heart failure    HPI:  Ms. Lyman is a 80 yo woman with obesity (BMI 34), HFpEF, mod AS, CKD3, anemia of chronic disease (baseline ~9.0), chronic constipation, and recent admission for abdominal pain who presented 7/8/17 for abdominal pain. Last admission she was found to be septic with fever and tachycardia (no leukocytosis). She was treated for a UTI and followed up with her PCP. She had persistent RUQ abdominal pain at her PCP visit and was referred to general surgery but has not yet seen them. Her symptoms continue to be associated with nausea and vomiting. In the ER she was treated with pain medication and antiemetics as well as fluids. She then developed shortness of breath and was found to be volume overloaded on CXR. She was started on Lasix 40 IV and BiPAP and admitted to medicine.    Hospital Course:  Cardiology, GI, and gen surg were consulted. Quickly weaned off of BiPAP to room air. Lasix initially, then held due to elevated creatinine. Echo 7/8/17 w/ LVEF 55%, mod AS, mild MS and TR, PAP 51.  US abdomen showed biliary sludge and cholelithiasis but no definite sonographic evidence to suggest acute cholecystitis. MRCP showed, "Markedly distended gallbladder with innumerable dependent gallstones.  There has been development of pericholecystic fluid near the fundus of the gallbladder that could reflect the sequela of cholecystitis" as well as a mild to moderate pericardial effusion. Plavix held in anticipation of cholecystectomy. NST 7/10/2017 stable. Low-intermediate cardiovascular risk for surgery. Laproscopic cholecystectomy 7/13/17. Evidence " of CBD and hepatic duct stones during titus. Anticipate ERCP by GI.  Complained of GALVAN, likely from deconditioning. Did well with PT/OT. O2 to maintain sat >88%.   H&H 8.1/25.5. Transfused 1 unit RBCs 7/9/17.    Review of Systems   Constitutional: Negative for chills and fever.   HENT: Negative for congestion and rhinorrhea.    Eyes: Negative for photophobia and visual disturbance.   Respiratory: Negative for cough and shortness of breath.    Cardiovascular: Negative for chest pain and palpitations.   Gastrointestinal: Negative for abdominal pain, nausea and vomiting.   Genitourinary: Negative for difficulty urinating and dysuria.   Musculoskeletal: Negative for joint swelling and neck stiffness.   Skin: Negative for rash and wound.   Neurological: Negative for dizziness and light-headedness.   Psychiatric/Behavioral: Negative for agitation and behavioral problems.     Objective:     Vital Signs (Most Recent):  Temp: 98.5 °F (36.9 °C) (07/14/17 0730)  Pulse: 79 (07/14/17 1106)  Resp: (!) 22 (07/14/17 1106)  BP: (!) 172/81 (07/14/17 1103)  SpO2: 97 % (07/14/17 1106) Vital Signs (24h Range):  Temp:  [97.5 °F (36.4 °C)-99.6 °F (37.6 °C)] 98.5 °F (36.9 °C)  Pulse:  [] 79  Resp:  [9-42] 22  SpO2:  [95 %-100 %] 97 %  BP: (123-230)/() 172/81     Weight: 95.2 kg (209 lb 14.1 oz)  Body mass index is 34.93 kg/m².    Physical Exam   Constitutional: She is oriented to person, place, and time. She appears well-developed and well-nourished. No distress.   Pleasant   HENT:   Right Ear: External ear normal.   Left Ear: External ear normal.   Nose: Nose normal.   Eyes: EOM are normal. Pupils are equal, round, and reactive to light. No scleral icterus.   Neck: Normal range of motion. Neck supple. No thyromegaly present.   Cardiovascular: Normal rate and normal heart sounds.  Exam reveals no friction rub.    No murmur heard.  Pulmonary/Chest: Effort normal and breath sounds normal. She has no wheezes. She has no rales.    On low flow nasal cannula   Abdominal:   Obese, bandages c/d/i   Musculoskeletal: Normal range of motion. She exhibits no edema or deformity.   Neurological: She is alert and oriented to person, place, and time. No cranial nerve deficit.   Skin: Skin is warm and dry. No pallor.   Psychiatric: She has a normal mood and affect. Her behavior is normal. Judgment and thought content normal.        Significant Labs:   CBC:     Recent Labs  Lab 07/13/17  0302 07/13/17  1522 07/14/17  0500   WBC 3.93 4.39 9.66   HGB 9.3* 10.0* 10.3*   HCT 28.8* 30.8* 32.0*    205 222     CMP:     Recent Labs  Lab 07/13/17  0302 07/13/17  1521 07/14/17  0500 07/14/17  0501    140  --  139   K 4.3 3.5  --  4.2    101  --  102   CO2 29 30*  --  26   GLU 98 99  --  112*   BUN 19 18  --  20   CREATININE 1.1 1.3  --  1.2   CALCIUM 8.6* 8.7  --  9.2   PROT 6.0 6.6 6.8 6.9   ALBUMIN 2.5* 2.9* 2.7* 2.7*   BILITOT 0.6 3.0* 2.3* 2.4*   ALKPHOS 187* 272* 271* 262*   AST 44* 83* 58* 58*   ALT 59* 79* 74* 73*   ANIONGAP 6* 9  --  11   EGFRNONAA 48* 39*  --  43*     Significant Imaging: I have reviewed all pertinent imaging results/findings within the past 24 hours.    Assessment/Plan:      * Chronic combined systolic and diastolic heart failure    Acute exacerbation resolved. - Fluids given for n/v and became volume overloaded. Very sensitive to fluids. Echo with EF 50%, stable mod AS. Similar compared to 11/2016 pEF, +DD, PAP 44, mod AS. O2 weaned. Lasix held 2/2 elevated creatinine. Held IVFs. NST 7/10/17 stable. Intermediate-low cardiovascular risk for procedure.        Cholelithiasis without cholecystitis    CC RUQ pain. Consulted GI and surgery. Elevated LFTs. Multiple stones in gallbladder and e/o cholecystitis. Plavix on hold for cholecystectomy x5 days prior to OR.  Lap titus 7/13/17. Noted hepatic duct and CBD intra-operatively. Anticipating ERCP but holding off for now as she developed respiratory distress soon after  cholecystectomy. Was actually intubated twice. Extubated. Weaned off BiPAP.        Respiratory distress    As above. On low flow nasal cannula          Obstructive sleep apnea syndrome    Will need BiPAP QHS. Evidence of CO2 retention          Stage 3 chronic kidney disease    Stable        Elevated LFTs    See above        Coronary artery disease involving native coronary artery without angina pectoris    Stable. Trop unremarkable. Continue home meds when able to take PO.         Obesity (BMI 30-39.9)    Counseled on weight loss.        Anemia of chronic disease    Stable. Transfused 7/9/17 with appropriate response.        Nonrheumatic aortic valve stenosis    It is moderate. At risk for pulm edema with aggressive IVFs. Will hold IVFs        Pulmonary hypertension    Per Echo        Depression    Holding all PO meds temporarily        Hyperlipidemia    Holding all PO meds temporarily          Malignant hypertension    Cardene infusion for now as she did not respond adequately to PRN hydralazine nor labetalol IV. Will restart home meds once able to take PO          VTE Risk Mitigation         Ordered     Place sequential compression device  Until discontinued      07/13/17 1445     Medium Risk of VTE  Once      07/08/17 0936        Continue ICU care. Wean off cardene infusion once able to take PO antihypertensives and BP stable with this. Hopefully by tomorrow    Time spent: > 45 minutes in patient's care    Lexus Good MD  Department of Hospital Medicine   [facility]

## 2017-07-15 PROBLEM — E66.2 OBESITY HYPOVENTILATION SYNDROME: Status: ACTIVE | Noted: 2017-07-14

## 2017-07-15 PROBLEM — R06.03 RESPIRATORY DISTRESS: Status: RESOLVED | Noted: 2017-07-13 | Resolved: 2017-07-15

## 2017-07-15 PROBLEM — R06.03 RESPIRATORY DISTRESS: Status: ACTIVE | Noted: 2017-07-15

## 2017-07-15 LAB
ALBUMIN SERPL BCP-MCNC: 2.6 G/DL
ALP SERPL-CCNC: 244 U/L
ALT SERPL W/O P-5'-P-CCNC: 55 U/L
ANION GAP SERPL CALC-SCNC: 7 MMOL/L
AST SERPL-CCNC: 31 U/L
BACTERIA UR CULT: NO GROWTH
BASOPHILS # BLD AUTO: 0.01 K/UL
BASOPHILS NFR BLD: 0.1 %
BILIRUB SERPL-MCNC: 1.2 MG/DL
BUN SERPL-MCNC: 19 MG/DL
CALCIUM SERPL-MCNC: 9.6 MG/DL
CHLORIDE SERPL-SCNC: 104 MMOL/L
CO2 SERPL-SCNC: 29 MMOL/L
CREAT SERPL-MCNC: 1 MG/DL
DIFFERENTIAL METHOD: ABNORMAL
EOSINOPHIL # BLD AUTO: 0 K/UL
EOSINOPHIL NFR BLD: 0.1 %
ERYTHROCYTE [DISTWIDTH] IN BLOOD BY AUTOMATED COUNT: 14.4 %
EST. GFR  (AFRICAN AMERICAN): >60 ML/MIN/1.73 M^2
EST. GFR  (NON AFRICAN AMERICAN): 54 ML/MIN/1.73 M^2
GLUCOSE SERPL-MCNC: 116 MG/DL
HCT VFR BLD AUTO: 29.3 %
HGB BLD-MCNC: 9.5 G/DL
LYMPHOCYTES # BLD AUTO: 1 K/UL
LYMPHOCYTES NFR BLD: 9.4 %
MCH RBC QN AUTO: 29.6 PG
MCHC RBC AUTO-ENTMCNC: 32.4 %
MCV RBC AUTO: 91 FL
MONOCYTES # BLD AUTO: 1.2 K/UL
MONOCYTES NFR BLD: 11.4 %
NEUTROPHILS # BLD AUTO: 8.2 K/UL
NEUTROPHILS NFR BLD: 79 %
PLATELET # BLD AUTO: 219 K/UL
PMV BLD AUTO: 10.7 FL
POTASSIUM SERPL-SCNC: 3.9 MMOL/L
PROT SERPL-MCNC: 6.8 G/DL
RBC # BLD AUTO: 3.21 M/UL
SODIUM SERPL-SCNC: 140 MMOL/L
WBC # BLD AUTO: 10.43 K/UL

## 2017-07-15 PROCEDURE — 80053 COMPREHEN METABOLIC PANEL: CPT

## 2017-07-15 PROCEDURE — 27000221 HC OXYGEN, UP TO 24 HOURS

## 2017-07-15 PROCEDURE — 25000003 PHARM REV CODE 250: Performed by: INTERNAL MEDICINE

## 2017-07-15 PROCEDURE — 94799 UNLISTED PULMONARY SVC/PX: CPT

## 2017-07-15 PROCEDURE — 63600175 PHARM REV CODE 636 W HCPCS: Performed by: INTERNAL MEDICINE

## 2017-07-15 PROCEDURE — 85025 COMPLETE CBC W/AUTO DIFF WBC: CPT

## 2017-07-15 PROCEDURE — 63600175 PHARM REV CODE 636 W HCPCS: Performed by: EMERGENCY MEDICINE

## 2017-07-15 PROCEDURE — 36415 COLL VENOUS BLD VENIPUNCTURE: CPT

## 2017-07-15 PROCEDURE — 21400001 HC TELEMETRY ROOM

## 2017-07-15 PROCEDURE — 94761 N-INVAS EAR/PLS OXIMETRY MLT: CPT

## 2017-07-15 RX ORDER — ISOSORBIDE MONONITRATE 30 MG/1
90 TABLET, EXTENDED RELEASE ORAL DAILY
Status: DISCONTINUED | OUTPATIENT
Start: 2017-07-16 | End: 2017-07-17 | Stop reason: HOSPADM

## 2017-07-15 RX ORDER — LORAZEPAM 2 MG/ML
1 INJECTION INTRAMUSCULAR EVERY 6 HOURS PRN
Status: DISCONTINUED | OUTPATIENT
Start: 2017-07-15 | End: 2017-07-15

## 2017-07-15 RX ORDER — METOPROLOL TARTRATE 50 MG/1
100 TABLET ORAL 2 TIMES DAILY
Status: DISCONTINUED | OUTPATIENT
Start: 2017-07-15 | End: 2017-07-17 | Stop reason: HOSPADM

## 2017-07-15 RX ORDER — ENOXAPARIN SODIUM 100 MG/ML
40 INJECTION SUBCUTANEOUS EVERY 24 HOURS
Status: DISCONTINUED | OUTPATIENT
Start: 2017-07-15 | End: 2017-07-17 | Stop reason: HOSPADM

## 2017-07-15 RX ORDER — ROSUVASTATIN CALCIUM 10 MG/1
20 TABLET, COATED ORAL NIGHTLY
Status: DISCONTINUED | OUTPATIENT
Start: 2017-07-15 | End: 2017-07-17 | Stop reason: HOSPADM

## 2017-07-15 RX ORDER — CLOPIDOGREL BISULFATE 75 MG/1
75 TABLET ORAL DAILY
Status: DISCONTINUED | OUTPATIENT
Start: 2017-07-16 | End: 2017-07-17 | Stop reason: HOSPADM

## 2017-07-15 RX ORDER — ISOSORBIDE MONONITRATE 30 MG/1
60 TABLET, EXTENDED RELEASE ORAL DAILY
Status: DISCONTINUED | OUTPATIENT
Start: 2017-07-15 | End: 2017-07-15

## 2017-07-15 RX ORDER — ISOSORBIDE MONONITRATE 30 MG/1
30 TABLET, EXTENDED RELEASE ORAL ONCE
Status: COMPLETED | OUTPATIENT
Start: 2017-07-15 | End: 2017-07-15

## 2017-07-15 RX ORDER — ACETAMINOPHEN 325 MG/1
650 TABLET ORAL EVERY 6 HOURS PRN
Status: DISCONTINUED | OUTPATIENT
Start: 2017-07-15 | End: 2017-07-17 | Stop reason: HOSPADM

## 2017-07-15 RX ORDER — RAMELTEON 8 MG/1
8 TABLET ORAL NIGHTLY PRN
Status: DISCONTINUED | OUTPATIENT
Start: 2017-07-15 | End: 2017-07-17 | Stop reason: HOSPADM

## 2017-07-15 RX ADMIN — ROSUVASTATIN CALCIUM 20 MG: 10 TABLET ORAL at 09:07

## 2017-07-15 RX ADMIN — METOPROLOL TARTRATE 100 MG: 50 TABLET ORAL at 07:07

## 2017-07-15 RX ADMIN — ACETAMINOPHEN 650 MG: 325 TABLET, FILM COATED ORAL at 01:07

## 2017-07-15 RX ADMIN — ISOSORBIDE MONONITRATE 60 MG: 30 TABLET, EXTENDED RELEASE ORAL at 07:07

## 2017-07-15 RX ADMIN — NICARDIPINE HYDROCHLORIDE 2.5 MG/HR: 0.2 INJECTION, SOLUTION INTRAVENOUS at 10:07

## 2017-07-15 RX ADMIN — NICARDIPINE HYDROCHLORIDE 10 MG/HR: 0.2 INJECTION, SOLUTION INTRAVENOUS at 04:07

## 2017-07-15 RX ADMIN — ISOSORBIDE MONONITRATE 30 MG: 30 TABLET, EXTENDED RELEASE ORAL at 11:07

## 2017-07-15 RX ADMIN — NICARDIPINE HYDROCHLORIDE 12.5 MG/HR: 0.2 INJECTION, SOLUTION INTRAVENOUS at 01:07

## 2017-07-15 RX ADMIN — ENOXAPARIN SODIUM 40 MG: 100 INJECTION SUBCUTANEOUS at 05:07

## 2017-07-15 RX ADMIN — LORAZEPAM 1 MG: 2 INJECTION INTRAMUSCULAR; INTRAVENOUS at 12:07

## 2017-07-15 RX ADMIN — MORPHINE SULFATE 4 MG: 10 INJECTION INTRAVENOUS at 02:07

## 2017-07-15 RX ADMIN — ONDANSETRON 4 MG: 2 INJECTION INTRAMUSCULAR; INTRAVENOUS at 01:07

## 2017-07-15 RX ADMIN — METOPROLOL TARTRATE 100 MG: 50 TABLET ORAL at 09:07

## 2017-07-15 NOTE — PROGRESS NOTES
[facility]  Pulmonology  Progress Note    Patient Name: Cindy Lyman  MRN: [id number]  Admission Date: 7/8/2017  Hospital Length of Stay: 7 days  Code Status: Full Code  Attending Provider: Lexus Vences MD  Primary Care Provider: Jeremiah Reeves MD   Principal Problem: Chronic combined systolic and diastolic heart failure    Subjective:     Interval History: respiratory failure. On nasal cannula. No dyspnea - feels better. Dry cough. Refused bipap last night.    ROS:  Gen: no fever or chills  Heart: mid-sternal chest pains come and go  Gi: gas pains. No nausea/vomiting     [START ON 7/16/2017] clopidogrel  75 mg Oral Daily    enoxaparin  40 mg Subcutaneous Daily    isosorbide mononitrate  30 mg Oral Once    [START ON 7/16/2017] isosorbide mononitrate  90 mg Oral Daily    metoprolol tartrate  100 mg Oral BID    rosuvastatin  20 mg Oral QHS       Objective:     Vital Signs (Most Recent):  Temp: 98.8 °F (37.1 °C) (07/15/17 0715)  Pulse: 108 (07/15/17 0930)  Resp: (!) 28 (07/15/17 0930)  BP: (!) 171/74 (07/15/17 0930)  SpO2: 97 % (07/15/17 0930) Vital Signs (24h Range):  Temp:  [98.4 °F (36.9 °C)-99.7 °F (37.6 °C)] 98.8 °F (37.1 °C)  Pulse:  [] 108  Resp:  [16-37] 28  SpO2:  [87 %-97 %] 97 %  BP: (131-198)/() 171/74     Weight: 94.3 kg (207 lb 14.3 oz)  Body mass index is 34.6 kg/m².    Intake/Output Summary (Last 24 hours) at 07/15/17 1147  Last data filed at 07/15/17 0800   Gross per 24 hour   Intake          1259.18 ml   Output             1625 ml   Net          -365.82 ml     Physical Exam   Gen: awake, no distress  Heart: regular, distant  Lungs: shallow, decreased bs bilateral lower lobes.  No wheezing  Abd: soft, obese. Hypoactive bs.  Ext: no CCE. SCDs to LE.     Lines/Drains/Airways     Drain                 Urethral Catheter 07/13/17 1939 Non-latex 18 Fr. 1 day          Peripheral Intravenous Line                 Peripheral IV - Single Lumen 07/14/17 0583  Left;Anterior Forearm 1 day         Peripheral IV - Single Lumen 07/14/17 0548 Left;Lateral Antecubital 1 day              Significant Labs:    CBC/Anemia Profile:    Recent Labs  Lab 07/13/17  1522 07/14/17  0500 07/15/17  0223   WBC 4.39 9.66 10.43   HGB 10.0* 10.3* 9.5*   HCT 30.8* 32.0* 29.3*    222 219   MCV 93 92 91   RDW 14.2 14.2 14.4        Chemistries:    Recent Labs  Lab 07/13/17  1521 07/14/17  0500 07/14/17  0501 07/15/17  0223     --  139 140   K 3.5  --  4.2 3.9     --  102 104   CO2 30*  --  26 29   BUN 18  --  20 19   CREATININE 1.3  --  1.2 1.0   CALCIUM 8.7  --  9.2 9.6   ALBUMIN 2.9* 2.7* 2.7* 2.6*   PROT 6.6 6.8 6.9 6.8   BILITOT 3.0* 2.3* 2.4* 1.2*   ALKPHOS 272* 271* 262* 244*   ALT 79* 74* 73* 55*   AST 83* 58* 58* 31       Assessment/Plan:     Active Diagnoses:    Diagnosis Date Noted POA    PRINCIPAL PROBLEM:  Chronic combined systolic and diastolic heart failure [I50.42] 06/17/2017 Yes    Obesity hypoventilation syndrome [E66.2] 07/14/2017 Yes    Cholelithiasis without cholecystitis [K80.20] 07/08/2017 Yes    Stage 3 chronic kidney disease [N18.3] 07/08/2017 Yes     Chronic    Elevated LFTs [R79.89] 06/17/2017 Yes    Coronary artery disease involving native coronary artery without angina pectoris [I25.10] 05/11/2015 Yes     Chronic    Obesity (BMI 30-39.9) [E66.9] 05/09/2015 Yes     Chronic    Anemia of chronic disease [D63.8] 11/29/2013 Yes     Chronic    Nonrheumatic aortic valve stenosis [I35.0] 11/22/2013 Yes    Pulmonary hypertension [I27.2] 09/28/2012 Yes     Chronic    Malignant hypertension [I10]  Yes    Hyperlipidemia [E78.5]  Yes     Chronic    Depression [F32.9]  Yes     Chronic      Problems Resolved During this Admission:    Diagnosis Date Noted Date Resolved POA    Respiratory distress [R06.00] 07/13/2017 07/15/2017 Yes    Respiratory distress [R06.00] 07/08/2017 07/10/2017 Yes     1) s/p lap titus. Gas pains. No nausea/vomiting. WBC normal  and patient afebrile. Transaminases are improving.  2) Respiratory failure - extubated. Did require some bipap afterwards. Last night, refused. Good sats on nasal cannula and seems appropriate. ?ester. Continue monitor. Bronchodilators if needed and nippv if need and patient agreeable.  3) Anemia - stable.  4) CHF with moderate as and pulmonary HTN. Intermittent mid sternal chest pains. Per cards.  5) CRI - creatinine slowly improving. Continue to monitor.  6) HTN       Neeru Shields MD  Pulmonology  [facility]

## 2017-07-15 NOTE — PLAN OF CARE
Problem: Patient Care Overview  Goal: Plan of Care Review  Outcome: Ongoing (interventions implemented as appropriate)  Pt alert and mostly oriented to person, place, and situation.  Intermittent confusion present after administration of pain meds.  Abdominal incisions clean and intact, old dried drainage on dressings.  Hypoactive bowel sounds.  Pain controlled with prn morphine.  Pt refused to wear bipap; tolerated 3L nasal cannula throughout night without distress.  Cardene drip continues to infuse for hypertension.  No skin breakdown or injury throughout night, safety precautions maintained.

## 2017-07-15 NOTE — PROGRESS NOTES
"Gastroenterology    CC: Elevated LFTs    HPI 81 y.o. female refusing BIPAP overnight.  No bleeding.  No confusion.  No abd pain.        Past Medical History:   Diagnosis Date    Anemia     Anticoagulant long-term use     Aortic stenosis     Arthritis     lower back    Asthma     CAD (coronary artery disease) 4/24/2015    Carpal tunnel syndrome on both sides     Cataract     CHF (congestive heart failure) 5/2013    COPD (chronic obstructive pulmonary disease)     Depression     DVT (deep venous thrombosis)     Hyperlipidemia     Hypertension     Pulmonary HTN     TMJ (temporomandibular joint disorder)          Review of Systems  General ROS: negative for chills, fever   Cardiovascular ROS: no chest pain or palpitations    Physical Examination  BP (!) 171/74   Pulse 108   Temp 98.8 °F (37.1 °C) (Oral)   Resp (!) 28   Ht 5' 5" (1.651 m)   Wt 94.3 kg (207 lb 14.3 oz)   SpO2 97%   Breastfeeding? No   BMI 34.60 kg/m²   General appearance: alert, cooperative, no distress  HENT: Normocephalic, atraumatic, neck symmetrical, no nasal discharge   Eyes: conjunctivae/corneas clear, no icterus, EOM's intact  Lungs: resp non-labored  Heart: regular rate   Abdomen: soft, non-tender; ND  Extremities: extremities symmetric; no clubbing, cyanosis  Neurologic: Alert and oriented X 3    Labs:  Bili near normal    Imaging:  IOC reviewed    Assessment:     This patient is a 81 y.o. female with:   1. Choledocholithiasis, per IOC - No findings currently consistent with cholangitis. Bili near normal this AM.  2. Cholecystitis - now s/p cholecystectomy.  3. Abnormal LFT's - secondary to #1/2.    4. HTN, Pulm HTN, Hyperlipidemia, CAD, CHF, COPD, DVT, Aortic Stenosis....    Plan:   - Bili has been trending down since titus and now, near normal.  No sign of cholangitis.  IOC reviewed - possible stone which was non-obstructing (contrast in duodenum), possibly passed spontaneously.  If her bilirubin continues to normalize " with no sign of cholangitis, I recommend close outpt EUS after D/C as next step, prior to ERCP, given her significant comorbidities and normalizing bilirubin.  If bilirubin trends back up or there is sign of cholangitis, will reconsider ERCP.

## 2017-07-15 NOTE — ASSESSMENT & PLAN NOTE
Holding off on antidepressant temporarily until acute issue has resolved to avoid potential drug-drug interaction

## 2017-07-15 NOTE — PROGRESS NOTES
"[facility] Medicine  Progress Note    Patient Name: Cindy Lyman  MRN: [id number]  Patient Class: IP- Inpatient   Admission Date: 7/8/2017  Length of Stay: 7 days  Attending Physician: Lexus Vences MD  Primary Care Provider: Jeremiah Reeves MD        Subjective:     Principal Problem:Chronic combined systolic and diastolic heart failure    HPI:  Ms. Lyman is a 82 yo woman with obesity (BMI 34), HFpEF, mod AS, CKD3, anemia of chronic disease (baseline ~9.0), chronic constipation, and recent admission for abdominal pain who presented 7/8/17 for abdominal pain. Last admission she was found to be septic with fever and tachycardia (no leukocytosis). She was treated for a UTI and followed up with her PCP. She had persistent RUQ abdominal pain at her PCP visit and was referred to general surgery but has not yet seen them. Her symptoms continue to be associated with nausea and vomiting. In the ER she was treated with pain medication and antiemetics as well as fluids. She then developed shortness of breath and was found to be volume overloaded on CXR. She was started on Lasix 40 IV and BiPAP and admitted to medicine.    Hospital Course:  Ms Lyman presented with acute exacerbation of heart failure likely due to acute cholecystitis. Cardiology, GI, and gen surg were consulted. Quickly weaned off of BiPAP to room air. Lasix initially, then held due to elevated creatinine. Echo 7/8/17 w/ LVEF 55%, mod AS, mild MS and TR, PAP 51.  US abdomen showed biliary sludge and cholelithiasis but no definite sonographic evidence to suggest acute cholecystitis. MRCP showed, "Markedly distended gallbladder with innumerable dependent gallstones.  There has been development of pericholecystic fluid near the fundus of the gallbladder that could reflect the sequela of cholecystitis" as well as a mild to moderate pericardial effusion. Plavix held in anticipation of cholecystectomy. NST 7/10/2017 " stable. Low-intermediate cardiovascular risk for surgery. Laproscopic cholecystectomy 7/13/17. Intraoperative angiogram showed a retained common bile duct stone. Post cholecystectomy, patient failed extubation, was re-intubated then extubated again while in PACU. Remained BiPAP dependent and was noted delirious. Was transferred to ICU for close monitoring. ABG showed adequate oxygenation while on BiPAP, noted to be CO2 retainer with adequate renal compensation. On 7/14, patient was weaned to low flow. Advised to wear BiPAP at nights but patient declined. Liver enzymes and TBil, have actually improved, suggesting patient may have passed the stone seen in CBD. Was on nicardipine infusion temporarily for malignant hypertension. NG removed and diet started. PO antihypertensives started on 7/15. Nicardipine infusion weaned off.     Review of Systems   Constitutional: Negative for chills and fever.   HENT: Negative for congestion and rhinorrhea.    Eyes: Negative for photophobia and visual disturbance.   Respiratory: Negative for cough and shortness of breath.    Cardiovascular: Negative for chest pain and palpitations.   Gastrointestinal: Negative for abdominal pain, nausea and vomiting.   Genitourinary: Negative for difficulty urinating and dysuria.   Musculoskeletal: Negative for joint swelling and neck stiffness.   Skin: Negative for rash and wound.   Neurological: Negative for dizziness and light-headedness.   Psychiatric/Behavioral: Positive for sleep disturbance. Negative for agitation and behavioral problems.     Objective:     Vital Signs (Most Recent):  Temp: 98.8 °F (37.1 °C) (07/15/17 0715)  Pulse: 108 (07/15/17 0930)  Resp: (!) 28 (07/15/17 0930)  BP: (!) 171/74 (07/15/17 0930)  SpO2: 97 % (07/15/17 0930) Vital Signs (24h Range):  Temp:  [98.4 °F (36.9 °C)-99.7 °F (37.6 °C)] 98.8 °F (37.1 °C)  Pulse:  [] 108  Resp:  [16-37] 28  SpO2:  [87 %-97 %] 97 %  BP: (131-198)/() 171/74     Weight: 94.3 kg  (207 lb 14.3 oz)  Body mass index is 34.6 kg/m².    Physical Exam   Constitutional: She is oriented to person, place, and time. She appears well-developed and well-nourished. No distress.   Pleasant   HENT:   Right Ear: External ear normal.   Left Ear: External ear normal.   Nose: Nose normal.   Eyes: EOM are normal. Pupils are equal, round, and reactive to light. No scleral icterus.   Neck: Normal range of motion. Neck supple. No thyromegaly present.   Cardiovascular: Normal rate and normal heart sounds.  Exam reveals no friction rub.    No murmur heard.  Pulmonary/Chest: Effort normal and breath sounds normal. She has no wheezes. She has no rales.   On low flow nasal cannula   Abdominal:   Obese, bandages c/d/i   Musculoskeletal: Normal range of motion. She exhibits no edema or deformity.   Neurological: She is alert and oriented to person, place, and time. No cranial nerve deficit.   Skin: Skin is warm and dry. No pallor.   Psychiatric: She has a normal mood and affect. Her behavior is normal. Judgment and thought content normal.        Significant Labs:   CBC:     Recent Labs  Lab 07/13/17  1522 07/14/17  0500 07/15/17  0223   WBC 4.39 9.66 10.43   HGB 10.0* 10.3* 9.5*   HCT 30.8* 32.0* 29.3*    222 219     CMP:     Recent Labs  Lab 07/13/17  1521 07/14/17  0500 07/14/17  0501 07/15/17  0223     --  139 140   K 3.5  --  4.2 3.9     --  102 104   CO2 30*  --  26 29   GLU 99  --  112* 116*   BUN 18  --  20 19   CREATININE 1.3  --  1.2 1.0   CALCIUM 8.7  --  9.2 9.6   PROT 6.6 6.8 6.9 6.8   ALBUMIN 2.9* 2.7* 2.7* 2.6*   BILITOT 3.0* 2.3* 2.4* 1.2*   ALKPHOS 272* 271* 262* 244*   AST 83* 58* 58* 31   ALT 79* 74* 73* 55*   ANIONGAP 9  --  11 7*   EGFRNONAA 39*  --  43* 54*     Significant Imaging: I have reviewed all pertinent imaging results/findings within the past 24 hours.    Assessment/Plan:      * Chronic combined systolic and diastolic heart failure    Acute exacerbation resolved. - Fluids  given for n/v and became volume overloaded. Very sensitive to fluids. Echo with EF 50%, stable mod AS. Similar compared to 11/2016 pEF, +DD, PAP 44, mod AS. O2 weaned. Lasix held 2/2 elevated creatinine. Held IVFs. NST 7/10/17 stable. Intermediate-low cardiovascular risk for procedure.        Cholelithiasis without cholecystitis    CC RUQ pain. Consulted GI and surgery. Elevated LFTs. Multiple stones in gallbladder and e/o cholecystitis. Plavix on hold for cholecystectomy x5 days prior to OR.  Lap titus 7/13/17. Noted hepatic duct and CBD intra-operatively. Liver enzymes improving along with TBil. No abd tenderness. ERCP on hold due to this improvement. Patient may have passed stone on her own. GI considering EUS as outpatient. Advance diet today.         Obesity hypoventilation syndrome    Will need BiPAP QHS. Evidence of CO2 retention. Patient declined BiPAP and stated she will never use it at home even after discussing benefits of its use at this point. On low flow supplemental O2. Needs weight loss          Stage 3 chronic kidney disease    Stable        Elevated LFTs    See above        Coronary artery disease involving native coronary artery without angina pectoris    Stable. Trop unremarkable. Restart plavix, BB, statin and BP control         Obesity (BMI 30-39.9)    Counseled on weight loss.        Anemia of chronic disease    Stable. Transfused 7/9/17 for symptoms with appropriate response.        Nonrheumatic aortic valve stenosis    It is moderate. At risk for pulm edema with aggressive IVFs. Holding IVFs        Pulmonary hypertension    Per Echo. Likely due to obesity hypoventilation syndrome and heart failure        Depression    Holding off on antidepressant temporarily until acute issue has resolved to avoid potential drug-drug interaction        Hyperlipidemia    Transaminitis almost resolved. Restart statin          Malignant hypertension    Starting isosorbide dinit and metoprolol. Patient reported  dizziness with SBP < 150. She does have moderate AS. Will adjust dose accordingly.           VTE Risk Mitigation         Ordered     enoxaparin injection 40 mg  Daily     Route:  Subcutaneous        07/15/17 0915     Place sequential compression device  Until discontinued      07/13/17 1445     Medium Risk of VTE  Once      07/08/17 0936        Step down to telemetry floor today. PT/OT.     Time spent: > 45 minutes in patient's care    Lexus Good MD  Department of Hospital Medicine   [facility]

## 2017-07-15 NOTE — CARE UPDATE
"ICU transfer note    Ms Lyman presented with acute exacerbation of heart failure likely due to acute cholecystitis. Cardiology, GI, and gen surg were consulted. Quickly weaned off of BiPAP to room air. Lasix initially, then held due to elevated creatinine. Echo 7/8/17 w/ LVEF 55%, mod AS, mild MS and TR, PAP 51.  US abdomen showed biliary sludge and cholelithiasis but no definite sonographic evidence to suggest acute cholecystitis. MRCP showed, "Markedly distended gallbladder with innumerable dependent gallstones.  There has been development of pericholecystic fluid near the fundus of the gallbladder that could reflect the sequela of cholecystitis" as well as a mild to moderate pericardial effusion. Plavix held in anticipation of cholecystectomy. NST 7/10/2017 stable. Low-intermediate cardiovascular risk for surgery. Laproscopic cholecystectomy 7/13/17. Intraoperative angiogram showed a retained common bile duct stone. Post cholecystectomy, patient failed extubation, was re-intubated then extubated again while in PACU. Remained BiPAP dependent and was noted delirious. Was transferred to ICU for close monitoring. ABG showed adequate oxygenation while on BiPAP, noted to be CO2 retainer with adequate renal compensation. On 7/14, patient was weaned to low flow. Advised to wear BiPAP at nights but patient declined. Liver enzymes and TBil, have actually improved, suggesting patient may have passed the stone seen in CBD. Was on nicardipine infusion temporarily for malignant hypertension. NG removed and diet started. PO antihypertensives started on 7/15. Nicardipine infusion weaned off. PT/OT in AM. Likely home with  in 2-3 days pending clinical improvement. Will need f/u with GI, surgery and PCP.   "

## 2017-07-15 NOTE — ASSESSMENT & PLAN NOTE
CC RUQ pain. Consulted GI and surgery. Elevated LFTs. Multiple stones in gallbladder and e/o cholecystitis. Plavix on hold for cholecystectomy x5 days prior to OR.  Lap titus 7/13/17. Noted hepatic duct and CBD intra-operatively. Liver enzymes improving along with TBil. No abd tenderness. ERCP on hold due to this improvement. Patient may have passed stone on her own. GI considering EUS as outpatient. Advance diet today.

## 2017-07-15 NOTE — ASSESSMENT & PLAN NOTE
Starting isosorbide dinit and metoprolol. Patient reported dizziness with SBP < 150. She does have moderate AS. Will adjust dose accordingly.

## 2017-07-15 NOTE — PROGRESS NOTES
[facility]  General Surgery  Progress Note    Subjective:     Interval History: no acute events overnight. Complains of mild abdominal discomfort, tolerating PO. ERCP planned for Monday.     Post-Op Info:  Procedure(s) (LRB):  CHOLECYSTECTOMY-LAPAROSCOPIC W/ CHOLANGIOGRAM (N/A)   2 Days Post-Op      Medications:  Continuous Infusions:   niCARdipine 1 mg/hr (07/15/17 1010)     Scheduled Meds:   enoxaparin  40 mg Subcutaneous Daily    isosorbide mononitrate  60 mg Oral Daily    metoprolol tartrate  100 mg Oral BID    rosuvastatin  20 mg Oral QHS     PRN Meds:sodium chloride, acetaminophen, ondansetron, promethazine (PHENERGAN) IVPB, ramelteon     Objective:     Vital Signs (Most Recent):  Temp: 98.8 °F (37.1 °C) (07/15/17 0715)  Pulse: 108 (07/15/17 0930)  Resp: (!) 28 (07/15/17 0930)  BP: (!) 171/74 (07/15/17 0930)  SpO2: 97 % (07/15/17 0930) Vital Signs (24h Range):  Temp:  [98.4 °F (36.9 °C)-99.7 °F (37.6 °C)] 98.8 °F (37.1 °C)  Pulse:  [] 108  Resp:  [16-37] 28  SpO2:  [87 %-97 %] 97 %  BP: (131-230)/() 171/74       Intake/Output Summary (Last 24 hours) at 07/15/17 1027  Last data filed at 07/15/17 0800   Gross per 24 hour   Intake          1259.18 ml   Output             1700 ml   Net          -440.82 ml       Physical Exam  No apparent distress  Abdomen with incisions intact, soft, non distended, mild tenderness    Significant Labs:  CMP:   Recent Labs  Lab 07/15/17  0223   *   CALCIUM 9.6   ALBUMIN 2.6*   PROT 6.8      K 3.9   CO2 29      BUN 19   CREATININE 1.0   ALKPHOS 244*   ALT 55*   AST 31   BILITOT 1.2*       Significant Diagnostics:  None    Assessment/Plan:   81F with cholelithiasis, choledocholithiasis s/p lap titus and IOC demonstrating CBD and common hepatic stones 7/13 POD#2, with respiratory distress post op- resolved.  ERCP planned for Monday.     Active Diagnoses:    Diagnosis Date Noted POA    PRINCIPAL PROBLEM:  Chronic combined systolic  and diastolic heart failure [I50.42] 06/17/2017 Yes    Obstructive sleep apnea syndrome [G47.33] 07/14/2017 Yes    Respiratory distress [R06.00] 07/13/2017 Yes    Cholelithiasis without cholecystitis [K80.20] 07/08/2017 Yes    Stage 3 chronic kidney disease [N18.3] 07/08/2017 Yes     Chronic    Elevated LFTs [R79.89] 06/17/2017 Yes    Coronary artery disease involving native coronary artery without angina pectoris [I25.10] 05/11/2015 Yes     Chronic    Obesity (BMI 30-39.9) [E66.9] 05/09/2015 Yes     Chronic    Anemia of chronic disease [D63.8] 11/29/2013 Yes     Chronic    Nonrheumatic aortic valve stenosis [I35.0] 11/22/2013 Yes    Pulmonary hypertension [I27.2] 09/28/2012 Yes     Chronic    Malignant hypertension [I10]  Yes    Hyperlipidemia [E78.5]  Yes     Chronic    Depression [F32.9]  Yes     Chronic      Problems Resolved During this Admission:    Diagnosis Date Noted Date Resolved POA    Respiratory distress [R06.00] 07/08/2017 07/10/2017 Yes         Mara Douglas MD  General Surgery  [facility]

## 2017-07-15 NOTE — NURSING TRANSFER
Nursing Transfer Note      7/15/2017     Transfer To: 340    Transfer via bed    Transfer with to O2, cardiac monitoring    Transported by Melva Banda    Medicines sent: lopressor    Chart send with patient: Yes    Notified: daughter    Patient reassessed at:  (7/20, 1400)    Upon arrival to floor: cardiac monitor applied, patient oriented to room, call bell in reach and bed in lowest position

## 2017-07-15 NOTE — SUBJECTIVE & OBJECTIVE
Review of Systems   Constitutional: Negative for chills and fever.   HENT: Negative for congestion and rhinorrhea.    Eyes: Negative for photophobia and visual disturbance.   Respiratory: Negative for cough and shortness of breath.    Cardiovascular: Negative for chest pain and palpitations.   Gastrointestinal: Negative for abdominal pain, nausea and vomiting.   Genitourinary: Negative for difficulty urinating and dysuria.   Musculoskeletal: Negative for joint swelling and neck stiffness.   Skin: Negative for rash and wound.   Neurological: Negative for dizziness and light-headedness.   Psychiatric/Behavioral: Positive for sleep disturbance. Negative for agitation and behavioral problems.     Objective:     Vital Signs (Most Recent):  Temp: 98.8 °F (37.1 °C) (07/15/17 0715)  Pulse: 108 (07/15/17 0930)  Resp: (!) 28 (07/15/17 0930)  BP: (!) 171/74 (07/15/17 0930)  SpO2: 97 % (07/15/17 0930) Vital Signs (24h Range):  Temp:  [98.4 °F (36.9 °C)-99.7 °F (37.6 °C)] 98.8 °F (37.1 °C)  Pulse:  [] 108  Resp:  [16-37] 28  SpO2:  [87 %-97 %] 97 %  BP: (131-198)/() 171/74     Weight: 94.3 kg (207 lb 14.3 oz)  Body mass index is 34.6 kg/m².    Physical Exam   Constitutional: She is oriented to person, place, and time. She appears well-developed and well-nourished. No distress.   Pleasant   HENT:   Right Ear: External ear normal.   Left Ear: External ear normal.   Nose: Nose normal.   Eyes: EOM are normal. Pupils are equal, round, and reactive to light. No scleral icterus.   Neck: Normal range of motion. Neck supple. No thyromegaly present.   Cardiovascular: Normal rate and normal heart sounds.  Exam reveals no friction rub.    No murmur heard.  Pulmonary/Chest: Effort normal and breath sounds normal. She has no wheezes. She has no rales.   On low flow nasal cannula   Abdominal:   Obese, bandages c/d/i   Musculoskeletal: Normal range of motion. She exhibits no edema or deformity.   Neurological: She is alert and  oriented to person, place, and time. No cranial nerve deficit.   Skin: Skin is warm and dry. No pallor.   Psychiatric: She has a normal mood and affect. Her behavior is normal. Judgment and thought content normal.        Significant Labs:   CBC:     Recent Labs  Lab 07/13/17  1522 07/14/17  0500 07/15/17  0223   WBC 4.39 9.66 10.43   HGB 10.0* 10.3* 9.5*   HCT 30.8* 32.0* 29.3*    222 219     CMP:     Recent Labs  Lab 07/13/17  1521 07/14/17  0500 07/14/17  0501 07/15/17  0223     --  139 140   K 3.5  --  4.2 3.9     --  102 104   CO2 30*  --  26 29   GLU 99  --  112* 116*   BUN 18  --  20 19   CREATININE 1.3  --  1.2 1.0   CALCIUM 8.7  --  9.2 9.6   PROT 6.6 6.8 6.9 6.8   ALBUMIN 2.9* 2.7* 2.7* 2.6*   BILITOT 3.0* 2.3* 2.4* 1.2*   ALKPHOS 272* 271* 262* 244*   AST 83* 58* 58* 31   ALT 79* 74* 73* 55*   ANIONGAP 9  --  11 7*   EGFRNONAA 39*  --  43* 54*     Significant Imaging: I have reviewed all pertinent imaging results/findings within the past 24 hours.

## 2017-07-15 NOTE — ASSESSMENT & PLAN NOTE
Will need BiPAP QHS. Evidence of CO2 retention. Patient declined BiPAP and stated she will never use it at home even after discussing benefits of its use at this point. On low flow supplemental O2. Needs weight loss

## 2017-07-15 NOTE — NURSING
Received from ICU via bed. Alert , oriented denies pain or discomfort at this time. No acute distress noted. Dressing to right outer abdomen dry and intact with old bloody drainage. Steri strips over sutures intact no drainage at sites. Abdomen soft non distended positive bowel sounds.

## 2017-07-15 NOTE — PROGRESS NOTES
"2145 - Pt refusing to wear BiPAP mask.  Pt oriented at this time and states "No, I don't want that".  Educated to importance of wearing while sleeping, but pt continues to refuse.  No respiratory distress noted, O2 sat 95% on 2L.  Dr. Walls notified, MD reviewed latest ABG results, states okay for pt to keep nasal cannula on unless respiratory status or mental status declines.  Will monitor.    0010 - Pt now pulling out nasal cannula stating "I'm tired of wearing this".  Pt alert and oriented to self, place, situation.  Educated to importance of oxygen, but pt refuses and is getting restless pulling out cannula.  O2 sat 88-89% on room air.  Dr. Walls notified, new order received.  "

## 2017-07-16 LAB
ALBUMIN SERPL BCP-MCNC: 2.9 G/DL
ALP SERPL-CCNC: 203 U/L
ALT SERPL W/O P-5'-P-CCNC: 46 U/L
ANION GAP SERPL CALC-SCNC: 13 MMOL/L
AST SERPL-CCNC: 26 U/L
BASOPHILS # BLD AUTO: 0.02 K/UL
BASOPHILS NFR BLD: 0.2 %
BILIRUB SERPL-MCNC: 1 MG/DL
BUN SERPL-MCNC: 19 MG/DL
CALCIUM SERPL-MCNC: 9.3 MG/DL
CHLORIDE SERPL-SCNC: 104 MMOL/L
CO2 SERPL-SCNC: 25 MMOL/L
CREAT SERPL-MCNC: 0.9 MG/DL
DIFFERENTIAL METHOD: ABNORMAL
EOSINOPHIL # BLD AUTO: 0 K/UL
EOSINOPHIL NFR BLD: 0.3 %
ERYTHROCYTE [DISTWIDTH] IN BLOOD BY AUTOMATED COUNT: 14.3 %
EST. GFR  (AFRICAN AMERICAN): >60 ML/MIN/1.73 M^2
EST. GFR  (NON AFRICAN AMERICAN): >60 ML/MIN/1.73 M^2
GLUCOSE SERPL-MCNC: 97 MG/DL
HCT VFR BLD AUTO: 29.5 %
HGB BLD-MCNC: 9.7 G/DL
LYMPHOCYTES # BLD AUTO: 1.7 K/UL
LYMPHOCYTES NFR BLD: 14.4 %
MCH RBC QN AUTO: 29.3 PG
MCHC RBC AUTO-ENTMCNC: 32.9 %
MCV RBC AUTO: 89 FL
MONOCYTES # BLD AUTO: 1.1 K/UL
MONOCYTES NFR BLD: 9.1 %
NEUTROPHILS # BLD AUTO: 9 K/UL
NEUTROPHILS NFR BLD: 76 %
PLATELET # BLD AUTO: 262 K/UL
PMV BLD AUTO: 11.4 FL
POTASSIUM SERPL-SCNC: 4.3 MMOL/L
PROT SERPL-MCNC: 6.7 G/DL
RBC # BLD AUTO: 3.31 M/UL
SODIUM SERPL-SCNC: 142 MMOL/L
WBC # BLD AUTO: 11.83 K/UL

## 2017-07-16 PROCEDURE — 36415 COLL VENOUS BLD VENIPUNCTURE: CPT

## 2017-07-16 PROCEDURE — 97164 PT RE-EVAL EST PLAN CARE: CPT | Performed by: PHYSICAL THERAPIST

## 2017-07-16 PROCEDURE — 21400001 HC TELEMETRY ROOM

## 2017-07-16 PROCEDURE — 80053 COMPREHEN METABOLIC PANEL: CPT

## 2017-07-16 PROCEDURE — G8980 MOBILITY D/C STATUS: HCPCS | Mod: CJ

## 2017-07-16 PROCEDURE — 85025 COMPLETE CBC W/AUTO DIFF WBC: CPT

## 2017-07-16 PROCEDURE — 25000003 PHARM REV CODE 250: Performed by: HOSPITALIST

## 2017-07-16 PROCEDURE — 25000003 PHARM REV CODE 250: Performed by: INTERNAL MEDICINE

## 2017-07-16 PROCEDURE — G8978 MOBILITY CURRENT STATUS: HCPCS | Mod: CJ | Performed by: PHYSICAL THERAPIST

## 2017-07-16 PROCEDURE — 63600175 PHARM REV CODE 636 W HCPCS: Performed by: INTERNAL MEDICINE

## 2017-07-16 PROCEDURE — G8979 MOBILITY GOAL STATUS: HCPCS | Mod: CI | Performed by: PHYSICAL THERAPIST

## 2017-07-16 RX ORDER — CLONIDINE HYDROCHLORIDE 0.1 MG/1
0.1 TABLET ORAL 3 TIMES DAILY
Status: DISCONTINUED | OUTPATIENT
Start: 2017-07-16 | End: 2017-07-17

## 2017-07-16 RX ORDER — GABAPENTIN 300 MG/1
300 CAPSULE ORAL 3 TIMES DAILY
Status: DISCONTINUED | OUTPATIENT
Start: 2017-07-16 | End: 2017-07-17 | Stop reason: HOSPADM

## 2017-07-16 RX ORDER — CARBAMAZEPINE 200 MG/1
200 TABLET ORAL 3 TIMES DAILY
Status: DISCONTINUED | OUTPATIENT
Start: 2017-07-16 | End: 2017-07-17 | Stop reason: HOSPADM

## 2017-07-16 RX ADMIN — CLONIDINE HYDROCHLORIDE 0.1 MG: 0.1 TABLET ORAL at 10:07

## 2017-07-16 RX ADMIN — CLONIDINE HYDROCHLORIDE 0.1 MG: 0.1 TABLET ORAL at 09:07

## 2017-07-16 RX ADMIN — METOPROLOL TARTRATE 100 MG: 50 TABLET ORAL at 10:07

## 2017-07-16 RX ADMIN — CARBAMAZEPINE 200 MG: 200 TABLET ORAL at 03:07

## 2017-07-16 RX ADMIN — ISOSORBIDE MONONITRATE 90 MG: 30 TABLET, EXTENDED RELEASE ORAL at 09:07

## 2017-07-16 RX ADMIN — ENOXAPARIN SODIUM 40 MG: 100 INJECTION SUBCUTANEOUS at 05:07

## 2017-07-16 RX ADMIN — GABAPENTIN 300 MG: 300 CAPSULE ORAL at 10:07

## 2017-07-16 RX ADMIN — CLOPIDOGREL BISULFATE 75 MG: 75 TABLET ORAL at 09:07

## 2017-07-16 RX ADMIN — GABAPENTIN 300 MG: 300 CAPSULE ORAL at 03:07

## 2017-07-16 RX ADMIN — CLONIDINE HYDROCHLORIDE 0.1 MG: 0.1 TABLET ORAL at 02:07

## 2017-07-16 RX ADMIN — METOPROLOL TARTRATE 100 MG: 50 TABLET ORAL at 09:07

## 2017-07-16 RX ADMIN — ROSUVASTATIN CALCIUM 20 MG: 10 TABLET ORAL at 10:07

## 2017-07-16 RX ADMIN — RAMELTEON 8 MG: 8 TABLET, FILM COATED ORAL at 10:07

## 2017-07-16 RX ADMIN — CARBAMAZEPINE 200 MG: 200 TABLET ORAL at 10:07

## 2017-07-16 NOTE — ASSESSMENT & PLAN NOTE
Acute exacerbation resolved. - Fluids given for n/v and became volume overloaded. Very sensitive to fluids. Echo with EF 50%, stable mod AS. Similar compared to 11/2016 pEF, +DD, PAP 44, mod AS. O2 weaned. Lasix held 2/2 elevated creatinine. Held IVFs. NST 7/10/17 stable. Intermediate-low cardiovascular risk for procedure.  PT/OT evaluation.  Hopefully home tomorrow.

## 2017-07-16 NOTE — PLAN OF CARE
Problem: Physical Therapy Goal  Goal: Physical Therapy Goal  Goals to be met by: 2017     Patient will increase functional independence with mobility by performin. Sit to stand transfer with Modified [place]  2. Gait  x 300 feet with Modified [place] using Rolling Walker.    Recommend: HHPT and Rolling Walker and Shower Chair at time of discharge.      Riley Cortez, PT  2017

## 2017-07-16 NOTE — PROGRESS NOTES
"[facility] Medicine  Progress Note    Patient Name: Cindy Lyman  MRN: [id number]  Patient Class: IP- Inpatient   Admission Date: 7/8/2017  Length of Stay: 8 days  Attending Physician: Grupo Wharton MD  Primary Care Provider: Jeremiah Reeves MD        Subjective:     Principal Problem:Chronic combined systolic and diastolic heart failure    HPI:  Ms. Lyman is a 80 yo woman with obesity (BMI 34), HFpEF, mod AS, CKD3, anemia of chronic disease (baseline ~9.0), chronic constipation, and recent admission for abdominal pain who presented 7/8/17 for abdominal pain. Last admission she was found to be septic with fever and tachycardia (no leukocytosis). She was treated for a UTI and followed up with her PCP. She had persistent RUQ abdominal pain at her PCP visit and was referred to general surgery but has not yet seen them. Her symptoms continue to be associated with nausea and vomiting. In the ER she was treated with pain medication and antiemetics as well as fluids. She then developed shortness of breath and was found to be volume overloaded on CXR. She was started on Lasix 40 IV and BiPAP and admitted to medicine.    Hospital Course:  Ms Lyman presented with acute exacerbation of heart failure likely due to acute cholecystitis. Cardiology, GI, and gen surg were consulted. Quickly weaned off of BiPAP to room air. Lasix initially, then held due to elevated creatinine. Echo 7/8/17 w/ LVEF 55%, mod AS, mild MS and TR, PAP 51.  US abdomen showed biliary sludge and cholelithiasis but no definite sonographic evidence to suggest acute cholecystitis. MRCP showed, "Markedly distended gallbladder with innumerable dependent gallstones.  There has been development of pericholecystic fluid near the fundus of the gallbladder that could reflect the sequela of cholecystitis" as well as a mild to moderate pericardial effusion. Plavix held in anticipation of cholecystectomy. NST 7/10/2017 " stable. Low-intermediate cardiovascular risk for surgery. Laproscopic cholecystectomy 7/13/17. Intraoperative angiogram showed a retained common bile duct stone. Post cholecystectomy, patient failed extubation, was re-intubated then extubated again while in PACU. Remained BiPAP dependent and was noted delirious. Was transferred to ICU for close monitoring. ABG showed adequate oxygenation while on BiPAP, noted to be CO2 retainer with adequate renal compensation. On 7/14, patient was weaned to low flow. Advised to wear BiPAP at nights but patient declined. Liver enzymes and TBil, have actually improved, suggesting patient may have passed the stone seen in CBD. Was on nicardipine infusion temporarily for malignant hypertension. NG removed and diet started. PO antihypertensives started on 7/15. Nicardipine infusion weaned off.     Interval History: No new issues.    Review of Systems   Constitutional: Negative for chills and fever.   HENT: Negative for ear discharge and ear pain.    Eyes: Negative for pain and itching.   Neurological: Negative for tremors and seizures.     Objective:     Vital Signs (Most Recent):  Temp: 98.5 °F (36.9 °C) (07/16/17 1204)  Pulse: 85 (07/16/17 1204)  Resp: 18 (07/16/17 1204)  BP: (!) 157/72 (07/16/17 1204)  SpO2: 96 % (07/16/17 1204) Vital Signs (24h Range):  Temp:  [98.3 °F (36.8 °C)-99.5 °F (37.5 °C)] 98.5 °F (36.9 °C)  Pulse:  [] 85  Resp:  [18-30] 18  SpO2:  [93 %-96 %] 96 %  BP: (146-194)/(71-91) 157/72     Weight: 94.5 kg (208 lb 4 oz)  Body mass index is 34.65 kg/m².    Intake/Output Summary (Last 24 hours) at 07/16/17 1249  Last data filed at 07/16/17 0531   Gross per 24 hour   Intake              600 ml   Output              301 ml   Net              299 ml      Physical Exam   Constitutional: She is oriented to person, place, and time. She appears well-developed and well-nourished. No distress.   Pleasant   HENT:   Right Ear: External ear normal.   Left Ear: External ear  normal.   Nose: Nose normal.   Eyes: EOM are normal. Pupils are equal, round, and reactive to light. No scleral icterus.   Neck: Normal range of motion. Neck supple. No thyromegaly present.   Cardiovascular: Normal rate and normal heart sounds.  Exam reveals no friction rub.    No murmur heard.  Pulmonary/Chest: Effort normal and breath sounds normal. She has no wheezes. She has no rales.   On low flow nasal cannula   Abdominal:   Obese, bandages c/d/i   Musculoskeletal: Normal range of motion. She exhibits no edema or deformity.   Neurological: She is alert and oriented to person, place, and time. No cranial nerve deficit.   Skin: Skin is warm and dry. No pallor.   Psychiatric: She has a normal mood and affect. Her behavior is normal. Judgment and thought content normal.       Significant Labs:   BMP:   Recent Labs  Lab 07/16/17  0506   GLU 97      K 4.3      CO2 25   BUN 19   CREATININE 0.9   CALCIUM 9.3     CBC:   Recent Labs  Lab 07/15/17  0223 07/16/17  0506   WBC 10.43 11.83   HGB 9.5* 9.7*   HCT 29.3* 29.5*    262         Assessment/Plan:      * Chronic combined systolic and diastolic heart failure    Acute exacerbation resolved. - Fluids given for n/v and became volume overloaded. Very sensitive to fluids. Echo with EF 50%, stable mod AS. Similar compared to 11/2016 pEF, +DD, PAP 44, mod AS. O2 weaned. Lasix held 2/2 elevated creatinine. Held IVFs. NST 7/10/17 stable. Intermediate-low cardiovascular risk for procedure.  PT/OT evaluation.  Hopefully home tomorrow.        Cholelithiasis without cholecystitis    CC RUQ pain. Consulted GI and surgery. Elevated LFTs. Multiple stones in gallbladder and e/o cholecystitis. Plavix on hold for cholecystectomy x5 days prior to OR.  Lap titus 7/13/17. Noted hepatic duct and CBD intra-operatively. Liver enzymes improving along with TBil. No abd tenderness. ERCP on hold due to this improvement. Patient may have passed stone on her own. GI considering  EUS as outpatient. Advance diet.         Respiratory distress              Obesity hypoventilation syndrome    Will need BiPAP QHS. Evidence of CO2 retention. Patient declined BiPAP and stated she will never use it at home even after discussing benefits of its use at this point. On low flow supplemental O2. Needs weight loss          Stage 3 chronic kidney disease    Stable        Elevated LFTs    See above        Coronary artery disease involving native coronary artery without angina pectoris    Stable. Trop unremarkable. Restart plavix, BB, statin and BP control         Obesity (BMI 30-39.9)    Counseled on weight loss.        Anemia of chronic disease    Stable. Transfused 7/9/17 for symptoms with appropriate response.  H/H stable.        Nonrheumatic aortic valve stenosis    It is moderate. At risk for pulm edema with aggressive IVFs. Holding IVFs        Pulmonary hypertension    Per Echo. Likely due to obesity hypoventilation syndrome and heart failure        Depression    Holding off on antidepressant temporarily until acute issue has resolved to avoid potential drug-drug interaction        Hyperlipidemia    Transaminitis almost resolved. Restart statin          Malignant hypertension    Starting isosorbide dinit and metoprolol. Patient reported dizziness with SBP < 150. She does have moderate AS. Will adjust dose accordingly.           VTE Risk Mitigation         Ordered     enoxaparin injection 40 mg  Daily     Route:  Subcutaneous        07/15/17 0915     Place sequential compression device  Until discontinued      07/13/17 1445     Medium Risk of VTE  Once      07/08/17 0936          Grupo Wharton MD  Department of Hospital Medicine   [facility]

## 2017-07-16 NOTE — SUBJECTIVE & OBJECTIVE
Interval History: No new issues.    Review of Systems   Constitutional: Negative for chills and fever.   HENT: Negative for ear discharge and ear pain.    Eyes: Negative for pain and itching.   Neurological: Negative for tremors and seizures.     Objective:     Vital Signs (Most Recent):  Temp: 98.5 °F (36.9 °C) (07/16/17 1204)  Pulse: 85 (07/16/17 1204)  Resp: 18 (07/16/17 1204)  BP: (!) 157/72 (07/16/17 1204)  SpO2: 96 % (07/16/17 1204) Vital Signs (24h Range):  Temp:  [98.3 °F (36.8 °C)-99.5 °F (37.5 °C)] 98.5 °F (36.9 °C)  Pulse:  [] 85  Resp:  [18-30] 18  SpO2:  [93 %-96 %] 96 %  BP: (146-194)/(71-91) 157/72     Weight: 94.5 kg (208 lb 4 oz)  Body mass index is 34.65 kg/m².    Intake/Output Summary (Last 24 hours) at 07/16/17 1249  Last data filed at 07/16/17 0531   Gross per 24 hour   Intake              600 ml   Output              301 ml   Net              299 ml      Physical Exam   Constitutional: She is oriented to person, place, and time. She appears well-developed and well-nourished. No distress.   Pleasant   HENT:   Right Ear: External ear normal.   Left Ear: External ear normal.   Nose: Nose normal.   Eyes: EOM are normal. Pupils are equal, round, and reactive to light. No scleral icterus.   Neck: Normal range of motion. Neck supple. No thyromegaly present.   Cardiovascular: Normal rate and normal heart sounds.  Exam reveals no friction rub.    No murmur heard.  Pulmonary/Chest: Effort normal and breath sounds normal. She has no wheezes. She has no rales.   On low flow nasal cannula   Abdominal:   Obese, bandages c/d/i   Musculoskeletal: Normal range of motion. She exhibits no edema or deformity.   Neurological: She is alert and oriented to person, place, and time. No cranial nerve deficit.   Skin: Skin is warm and dry. No pallor.   Psychiatric: She has a normal mood and affect. Her behavior is normal. Judgment and thought content normal.       Significant Labs:   BMP:   Recent Labs  Lab  07/16/17  0506   GLU 97      K 4.3      CO2 25   BUN 19   CREATININE 0.9   CALCIUM 9.3     CBC:   Recent Labs  Lab 07/15/17  0223 07/16/17  0506   WBC 10.43 11.83   HGB 9.5* 9.7*   HCT 29.3* 29.5*    262

## 2017-07-16 NOTE — PROGRESS NOTES
[facility]  General Surgery  Progress Note    Subjective:     Interval History: Stepped down from ICU yesterday. Minimal abdominal soreness. Tolerating diet    Post-Op Info:  Procedure(s) (LRB):  CHOLECYSTECTOMY-LAPAROSCOPIC W/ CHOLANGIOGRAM (N/A)   3 Days Post-Op      Medications:  Continuous Infusions:   Scheduled Meds:   clopidogrel  75 mg Oral Daily    enoxaparin  40 mg Subcutaneous Daily    isosorbide mononitrate  90 mg Oral Daily    metoprolol tartrate  100 mg Oral BID    rosuvastatin  20 mg Oral QHS     PRN Meds:acetaminophen, ondansetron, promethazine (PHENERGAN) IVPB, ramelteon     Objective:     Vital Signs (Most Recent):  Temp: 99.4 °F (37.4 °C) (07/16/17 0814)  Pulse: 102 (07/16/17 0814)  Resp: 20 (07/16/17 0814)  BP: (!) 194/91 (07/16/17 0814)  SpO2: 96 % (07/16/17 0814) Vital Signs (24h Range):  Temp:  [98.3 °F (36.8 °C)-99.5 °F (37.5 °C)] 99.4 °F (37.4 °C)  Pulse:  [] 102  Resp:  [19-32] 20  SpO2:  [93 %-97 %] 96 %  BP: (146-194)/(65-91) 194/91       Intake/Output Summary (Last 24 hours) at 07/16/17 0844  Last data filed at 07/16/17 0531   Gross per 24 hour   Intake              600 ml   Output              751 ml   Net             -151 ml       Physical Exam  No acute distress, eating breakfast  Mild abdominal tenderness RUQ, non distended, incisions intact  Significant Labs:  CMP:   Recent Labs  Lab 07/16/17  0506   GLU 97   CALCIUM 9.3   ALBUMIN 2.9*   PROT 6.7      K 4.3   CO2 25      BUN 19   CREATININE 0.9   ALKPHOS 203*   ALT 46*   AST 26   BILITOT 1.0       Significant Diagnostics:  None    Assessment/Plan:   81F with cholelithiasis, choledocholithiasis s/p lap titus and IOC demonstrating CBD and common hepatic stones 7/13 POD#3, with respiratory distress post op- resolved.  Bilirubin normal. Plan per GI is ERCP Monday vs EUS after discharge, if bili remains normal. Will place NPO at midnight in event of bilirubin rise tomorrow.     Active Diagnoses:     Diagnosis Date Noted POA    PRINCIPAL PROBLEM:  Chronic combined systolic and diastolic heart failure [I50.42] 06/17/2017 Yes    Respiratory distress [R06.00] 07/15/2017 Yes    Obesity hypoventilation syndrome [E66.2] 07/14/2017 Yes    Cholelithiasis without cholecystitis [K80.20] 07/08/2017 Yes    Stage 3 chronic kidney disease [N18.3] 07/08/2017 Yes     Chronic    Elevated LFTs [R79.89] 06/17/2017 Yes    Coronary artery disease involving native coronary artery without angina pectoris [I25.10] 05/11/2015 Yes     Chronic    Obesity (BMI 30-39.9) [E66.9] 05/09/2015 Yes     Chronic    Anemia of chronic disease [D63.8] 11/29/2013 Yes     Chronic    Nonrheumatic aortic valve stenosis [I35.0] 11/22/2013 Yes    Pulmonary hypertension [I27.2] 09/28/2012 Yes     Chronic    Malignant hypertension [I10]  Yes    Hyperlipidemia [E78.5]  Yes     Chronic    Depression [F32.9]  Yes     Chronic      Problems Resolved During this Admission:    Diagnosis Date Noted Date Resolved POA    Respiratory distress [R06.00] 07/13/2017 07/15/2017 Yes    Respiratory distress [R06.00] 07/08/2017 07/10/2017 Yes         Mara Douglas MD  General Surgery  [facility]

## 2017-07-16 NOTE — PT/OT/SLP RE-EVAL
Physical Therapy  Re-evaluation    Cindy Lyman   MRN: [id number]   Admitting Diagnosis: Chronic combined systolic and diastolic heart failure    PT Received On: 07/16/17  PT Start Time: 0930     PT Stop Time: 1000    PT Total Time (min): 30 min       Billable Minutes:  Re-eval 30 min     Diagnosis: Chronic combined systolic and diastolic heart failure    Past Medical History:   Diagnosis Date    Anemia     Anticoagulant long-term use     Aortic stenosis     Arthritis     lower back    Asthma     CAD (coronary artery disease) 4/24/2015    Carpal tunnel syndrome on both sides     Cataract     CHF (congestive heart failure) 5/2013    COPD (chronic obstructive pulmonary disease)     Depression     DVT (deep venous thrombosis)     Hyperlipidemia     Hypertension     Pulmonary HTN     TMJ (temporomandibular joint disorder)       Past Surgical History:   Procedure Laterality Date    BACK SURGERY      cardiac stents      CARPAL TUNNEL RELEASE      Right/Left    HYSTERECTOMY      Partial    JOINT REPLACEMENT  2009    Left hip    TEMPOROMANDIBULAR JOINT SURGERY         Referring physician: MD Dio  Date referred to PT: 07/15/201    General Precautions: Standard, fall  Orthopedic Precautions: Full weight bearing   Braces: N/A       Patient History:  Lives With: child(radha), adult (- daughter and grandson )  Living Arrangements: house  Home Accessibility: stairs to enter home, stairs within home  Home Layout: Bathroom on 2nd floor, Bedroom on 2nd floor  Number of Stairs to Enter Home: 1  Number of Stairs Within Home: 13  Stair Railings at Home: inside, present on right side  Transportation Available: car, family or friend will provide  Living Environment Comment: pt reports having a stair lift to ascend/descend to bed/bath.   Equipment Currently Used at Home: cane, straight, rollator  DME owned (not currently used): none    Previous Level of Function:  Ambulation Skills: needs device (- Rollator  for community ambulation. )  Transfer Skills: independent  ADL Skills: independent    Subjective:  Communicated with Nurse Omalley prior to session.    Chief Complaint: SOB  Patient goals: to return to PLOF    Pain/Comfort  Pain Rating 1: 0/10    Objective:   Patient found with: peripheral IV, oxygen, telemetry     Cognitive Exam:  Oriented to: Person, Place and Situation    Follows Commands/attention: Follows one-step commands  Communication: clear/fluent  Safety awareness/insight to disability: intact    Physical Exam:  Postural examination/scapula alignment: Rounded shoulder, Head forward and Abnormal trunk flexion    Skin integrity: Visible skin intact  Edema: None noted     Lower Extremity Range of Motion:  Right Lower Extremity: WFL  Left Lower Extremity: WFL    Lower Extremity Strength:  Right Lower Extremity: WFL  Left Lower Extremity: WFL     Functional Mobility:  Bed Mobility:  Rolling/Turning to Left: Stand by assistance, With side rail  Rolling/Turning Right: Stand by assistance, With side rail  Supine to Sit: Contact Guard Assistance, With side rail  Sit to Supine: Contact Guard Assistance, With siderail    Transfers:  Sit <> Stand Assistance: Contact Guard Assistance  Sit <> Stand Assistive Device: Rolling Walker    Gait:   Gait Distance: 30'   Assistance 1: Contact Guard Assistance  Gait Assistive Device: Rolling walker  Gait Pattern: 3-point gait  Gait Deviation(s): decreased terell, increased time in double stance, decreased step length, forward lean, decreased toe-to-floor clearance, decreased weight-shifting ability    Balance:   Static Sit: FAIR: Maintains without assist, but unable to take any challenges   Dynamic Sit: FAIR: Cannot move trunk without losing balance  Static Stand: FAIR: Maintains without assist but unable to take challenges  Dynamic stand: FAIR: Needs CONTACT GUARD during gait    Therapeutic Activities and Exercises:  Pt required CGA with Toileting transfers.     AM-PAC 6 CLICK  MOBILITY  How much help from another person does this patient currently need?   1 = Unable, Total/Dependent Assistance  2 = A lot, Maximum/Moderate Assistance  3 = A little, Minimum/Contact Guard/Supervision  4 = None, Modified [place]/Independent    Turning over in bed (including adjusting bedclothes, sheets and blankets)?: 4  Sitting down on and standing up from a chair with arms (e.g., wheelchair, bedside commode, etc.): 4  Moving from lying on back to sitting on the side of the bed?: 4  Moving to and from a bed to a chair (including a wheelchair)?: 4  Need to walk in hospital room?: 3  Climbing 3-5 steps with a railing?: 3  Total Score: 22     AM-PAC Raw Score CMS G-Code Modifier Level of Impairment Assistance   6 % Total / Unable   7 - 9 CM 80 - 100% Maximal Assist   10 - 14 CL 60 - 80% Moderate Assist   15 - 19 CK 40 - 60% Moderate Assist   20 - 22 CJ 20 - 40% Minimal Assist   23 CI 1-20% SBA / CGA   24 CH 0% Independent/ Mod I     Patient left supine with all lines intact and call button in reach.    Assessment:   Cindy Lyman is a 81 y.o. female with a medical diagnosis of Chronic combined systolic and diastolic heart failure and presents with decrease functional independence with ADLs and ambulation distances.  Pt would benefit from skilled PT services to address established deficits in POC to return to PLOF and QOL within previous living environment.    Rehab identified problem list/impairments: Rehab identified problem list/impairments: impaired endurance, impaired cardiopulmonary response to activity, decreased safety awareness    Rehab potential is good.    Activity tolerance: Good    Discharge recommendations: Discharge Facility/Level Of Care Needs: home health PT     Barriers to discharge: Barriers to Discharge: None    Equipment recommendations: Equipment Needed After Discharge: walker, rolling, shower chair     GOALS:    Physical Therapy Goals        Problem: Physical Therapy  Goal    Goal Priority Disciplines Outcome Goal Variances Interventions   Physical Therapy Goal     PT/OT, PT             Problem: Physical Therapy Goal    Goal Priority Disciplines Outcome Goal Variances Interventions   Physical Therapy Goal     PT/OT, PT      Description:  Goals to be met by: 2017     Patient will increase functional independence with mobility by performin. Sit to stand transfer with Modified [place]  2. Gait  x 300 feet with Modified [place] using Rolling Walker.    Recommend: HHPT and Rolling Walker and Shower Chair at time of discharge.                        PLAN:    Patient to be seen 5 x/week to address the above listed problems via gait training, therapeutic activities, therapeutic exercises  Plan of Care expires: 17  Plan of Care reviewed with: patient    Functional Assessment Tool Used: AM PAC  Score: 22  Functional Limitation: Mobility: Walking and moving around  Mobility: Walking and Moving Around Current Status (): CJ  Mobility: Walking and Moving Around Goal Status (): FELI Cortez, PT  2017

## 2017-07-16 NOTE — ASSESSMENT & PLAN NOTE
CC RUQ pain. Consulted GI and surgery. Elevated LFTs. Multiple stones in gallbladder and e/o cholecystitis. Plavix on hold for cholecystectomy x5 days prior to OR.  Lap titus 7/13/17. Noted hepatic duct and CBD intra-operatively. Liver enzymes improving along with TBil. No abd tenderness. ERCP on hold due to this improvement. Patient may have passed stone on her own. GI considering EUS as outpatient. Advance diet.

## 2017-07-17 VITALS
BODY MASS INDEX: 32.89 KG/M2 | SYSTOLIC BLOOD PRESSURE: 142 MMHG | WEIGHT: 197.38 LBS | OXYGEN SATURATION: 95 % | RESPIRATION RATE: 18 BRPM | TEMPERATURE: 99 F | HEART RATE: 72 BPM | DIASTOLIC BLOOD PRESSURE: 66 MMHG | HEIGHT: 65 IN

## 2017-07-17 PROBLEM — R06.03 RESPIRATORY DISTRESS: Status: RESOLVED | Noted: 2017-07-15 | Resolved: 2017-07-17

## 2017-07-17 PROBLEM — K80.20 CHOLELITHIASIS WITHOUT CHOLECYSTITIS: Status: RESOLVED | Noted: 2017-07-08 | Resolved: 2017-07-17

## 2017-07-17 PROBLEM — R79.89 ELEVATED LFTS: Status: RESOLVED | Noted: 2017-06-17 | Resolved: 2017-07-17

## 2017-07-17 LAB
ALBUMIN SERPL BCP-MCNC: 2.4 G/DL
ALP SERPL-CCNC: 150 U/L
ALT SERPL W/O P-5'-P-CCNC: 31 U/L
ANION GAP SERPL CALC-SCNC: 7 MMOL/L
AST SERPL-CCNC: 27 U/L
BILIRUB SERPL-MCNC: 0.6 MG/DL
BUN SERPL-MCNC: 23 MG/DL
CALCIUM SERPL-MCNC: 8.9 MG/DL
CHLORIDE SERPL-SCNC: 101 MMOL/L
CO2 SERPL-SCNC: 29 MMOL/L
CREAT SERPL-MCNC: 1.6 MG/DL
EST. GFR  (AFRICAN AMERICAN): 35 ML/MIN/1.73 M^2
EST. GFR  (NON AFRICAN AMERICAN): 30 ML/MIN/1.73 M^2
GLUCOSE SERPL-MCNC: 102 MG/DL
POTASSIUM SERPL-SCNC: 3.7 MMOL/L
PROT SERPL-MCNC: 6.3 G/DL
SODIUM SERPL-SCNC: 137 MMOL/L

## 2017-07-17 PROCEDURE — G8988 SELF CARE GOAL STATUS: HCPCS | Mod: CH

## 2017-07-17 PROCEDURE — G8987 SELF CARE CURRENT STATUS: HCPCS | Mod: CH

## 2017-07-17 PROCEDURE — 25000003 PHARM REV CODE 250: Performed by: HOSPITALIST

## 2017-07-17 PROCEDURE — G8989 SELF CARE D/C STATUS: HCPCS | Mod: CH

## 2017-07-17 PROCEDURE — 97168 OT RE-EVAL EST PLAN CARE: CPT

## 2017-07-17 PROCEDURE — 80053 COMPREHEN METABOLIC PANEL: CPT

## 2017-07-17 PROCEDURE — 97116 GAIT TRAINING THERAPY: CPT

## 2017-07-17 PROCEDURE — 25000003 PHARM REV CODE 250: Performed by: INTERNAL MEDICINE

## 2017-07-17 PROCEDURE — 97110 THERAPEUTIC EXERCISES: CPT

## 2017-07-17 PROCEDURE — 36415 COLL VENOUS BLD VENIPUNCTURE: CPT

## 2017-07-17 RX ORDER — ACETAMINOPHEN 325 MG/1
650 TABLET ORAL EVERY 6 HOURS PRN
Refills: 0 | COMMUNITY
Start: 2017-07-17 | End: 2019-04-16

## 2017-07-17 RX ORDER — CLONIDINE HYDROCHLORIDE 0.1 MG/1
0.3 TABLET ORAL 3 TIMES DAILY
Status: DISCONTINUED | OUTPATIENT
Start: 2017-07-17 | End: 2017-07-17 | Stop reason: HOSPADM

## 2017-07-17 RX ORDER — TRAMADOL HYDROCHLORIDE 50 MG/1
50 TABLET ORAL EVERY 6 HOURS PRN
Qty: 20 TABLET | Refills: 0 | Status: SHIPPED | OUTPATIENT
Start: 2017-07-17 | End: 2017-07-27

## 2017-07-17 RX ORDER — ONDANSETRON 8 MG/1
8 TABLET, ORALLY DISINTEGRATING ORAL EVERY 6 HOURS PRN
Qty: 10 TABLET | Refills: 0 | Status: SHIPPED | OUTPATIENT
Start: 2017-07-17 | End: 2018-02-22

## 2017-07-17 RX ADMIN — CARBAMAZEPINE 200 MG: 200 TABLET ORAL at 06:07

## 2017-07-17 RX ADMIN — CLONIDINE HYDROCHLORIDE 0.3 MG: 0.1 TABLET ORAL at 03:07

## 2017-07-17 RX ADMIN — CARBAMAZEPINE 200 MG: 200 TABLET ORAL at 03:07

## 2017-07-17 RX ADMIN — GABAPENTIN 300 MG: 300 CAPSULE ORAL at 03:07

## 2017-07-17 RX ADMIN — GABAPENTIN 300 MG: 300 CAPSULE ORAL at 06:07

## 2017-07-17 RX ADMIN — METOPROLOL TARTRATE 100 MG: 50 TABLET ORAL at 09:07

## 2017-07-17 RX ADMIN — CLONIDINE HYDROCHLORIDE 0.1 MG: 0.1 TABLET ORAL at 06:07

## 2017-07-17 RX ADMIN — CLOPIDOGREL BISULFATE 75 MG: 75 TABLET ORAL at 09:07

## 2017-07-17 RX ADMIN — ISOSORBIDE MONONITRATE 90 MG: 30 TABLET, EXTENDED RELEASE ORAL at 09:07

## 2017-07-17 NOTE — PLAN OF CARE
Problem: Physical Therapy Goal  Goal: Physical Therapy Goal  Goals to be met by: 2017     Patient will increase functional independence with mobility by performin. Sit to stand transfer with Modified [place]  2. Gait  x 300 feet with Modified [place] using Rolling Walker.    Recommend: HHPT and Rolling Walker and Shower Chair at time of discharge.       Outcome: Ongoing (interventions implemented as appropriate)  Pt progressing well toward treatment goals. Continue with POC.

## 2017-07-17 NOTE — PROGRESS NOTES
[facility]  Pulmonology  Progress Note    Patient Name: Cindy Lyman  MRN: [id number]  Admission Date: 7/8/2017  Hospital Length of Stay: 9 days  Code Status: Full Code  Attending Provider: Grupo Wharton MD  Primary Care Provider: Jeremiah Reeves MD   Principal Problem: Chronic combined systolic and diastolic heart failure    Subjective:     Interval History: respiratory failure. On room air. No dyspnea - feels better.     ROS:  Gen: no fever or chills  Heart: no further chest pains.  Gi: some pains still but no nausea/vomiting  Musculo: no edema. No pains     carbamazepine  200 mg Oral TID    cloNIDine  0.3 mg Oral TID    clopidogrel  75 mg Oral Daily    enoxaparin  40 mg Subcutaneous Daily    gabapentin  300 mg Oral TID    isosorbide mononitrate  90 mg Oral Daily    metoprolol tartrate  100 mg Oral BID    rosuvastatin  20 mg Oral QHS       Objective:     Vital Signs (Most Recent):  Temp: 98.2 °F (36.8 °C) (07/17/17 0811)  Pulse: 70 (07/17/17 0811)  Resp: 17 (07/17/17 0811)  BP: (!) 163/71 (07/17/17 0811)  SpO2: 99 % (07/17/17 0811) Vital Signs (24h Range):  Temp:  [97.6 °F (36.4 °C)-99.2 °F (37.3 °C)] 98.2 °F (36.8 °C)  Pulse:  [66-88] 70  Resp:  [17-20] 17  SpO2:  [96 %-99 %] 99 %  BP: (157-184)/(65-90) 163/71     Weight: 89.5 kg (197 lb 6.4 oz)  Body mass index is 32.85 kg/m².  No intake or output data in the 24 hours ending 07/17/17 1109     Physical Exam   Gen: awake, no distress. On room air and comfortable.  Heart: regular, distant  Lungs: shallow, decreased bs bilateral lower lobes.  No wheezing  Abd: soft, obese. Hypoactive bs.  Ext: no CCE.   Skin: warm, dry. No rash.    Lines/Drains/Airways     Peripheral Intravenous Line                 Peripheral IV - Single Lumen 07/14/17 0548 Left;Lateral Antecubital 3 days              Significant Labs:    CBC/Anemia Profile:    Recent Labs  Lab 07/16/17  0506   WBC 11.83   HGB 9.7*   HCT 29.5*      MCV 89   RDW 14.3         Chemistries:    Recent Labs  Lab 07/16/17  0506      K 4.3      CO2 25   BUN 19   CREATININE 0.9   CALCIUM 9.3   ALBUMIN 2.9*   PROT 6.7   BILITOT 1.0   ALKPHOS 203*   ALT 46*   AST 26       Assessment/Plan:     Active Diagnoses:    Diagnosis Date Noted POA    PRINCIPAL PROBLEM:  Chronic combined systolic and diastolic heart failure [I50.42] 06/17/2017 Yes    Obesity hypoventilation syndrome [E66.2] 07/14/2017 Yes    Stage 3 chronic kidney disease [N18.3] 07/08/2017 Yes     Chronic    Coronary artery disease involving native coronary artery without angina pectoris [I25.10] 05/11/2015 Yes     Chronic    Obesity (BMI 30-39.9) [E66.9] 05/09/2015 Yes     Chronic    Anemia of chronic disease [D63.8] 11/29/2013 Yes     Chronic    Nonrheumatic aortic valve stenosis [I35.0] 11/22/2013 Yes    Pulmonary hypertension [I27.2] 09/28/2012 Yes     Chronic    Hyperlipidemia [E78.5]  Yes     Chronic    Depression [F32.9]  Yes     Chronic      Problems Resolved During this Admission:    Diagnosis Date Noted Date Resolved POA    Respiratory distress [R06.00] 07/15/2017 07/17/2017 Yes    Respiratory distress [R06.00] 07/13/2017 07/15/2017 Yes    Respiratory distress [R06.00] 07/08/2017 07/10/2017 Yes    Cholelithiasis without cholecystitis [K80.20] 07/08/2017 07/17/2017 Yes    Elevated LFTs [R79.89] 06/17/2017 07/17/2017 Yes    Malignant hypertension [I10]  07/17/2017 Yes     1) s/p lap titus. Mild pain. No nausea/vomiting. WBC normal and patient afebrile. Transaminases are improving. Off abx.  2) Respiratory failure - extubated. Did require some bipap afterwards. On room air now and no dyspnea. Suspected related to excess volume and likely sedation. Looks very comfortable. Encourage deep inspiratory efforts and mobilization.  3) Anemia - stable.  4) CHF with moderate as and pulmonary HTN. Intermittent mid sternal chest pains. Per cards.  5) CRI - creatinine slowly improving. Continue to monitor.  6)  HTN  Much improved. No new recs. Will sign off.     Neeru Shields MD  Pulmonology  [facility]

## 2017-07-17 NOTE — PLAN OF CARE
Problem: Occupational Therapy Goal  Goal: Occupational Therapy Goal  Outcome: Outcome(s) achieved Date Met: 07/17/17  OT eval is completed. The patient is able to perform self care and functional mobility with (S). The patient D/C to home with family.

## 2017-07-17 NOTE — DISCHARGE SUMMARY
"[facility]  Discharge Summary      Patient Name: Cindy Lyman  MRN: [id number]  Admission Date: 7/8/2017  Hospital Length of Stay: 9 days  Discharge Date and Time:  07/17/2017 9:40 AM  Attending Physician: Grupo Wharton MD   Discharging Provider: Grupo Wharton MD  Primary Care Provider: Jeremiah Reeves MD      HPI:   Ms. Lyman is a 80 yo woman with obesity (BMI 34), HFpEF, mod AS, CKD3, anemia of chronic disease (baseline ~9.0), chronic constipation, and recent admission for abdominal pain who presented 7/8/17 for abdominal pain. Last admission she was found to be septic with fever and tachycardia (no leukocytosis). She was treated for a UTI and followed up with her PCP. She had persistent RUQ abdominal pain at her PCP visit and was referred to general surgery but has not yet seen them. Her symptoms continue to be associated with nausea and vomiting. In the ER she was treated with pain medication and antiemetics as well as fluids. She then developed shortness of breath and was found to be volume overloaded on CXR. She was started on Lasix 40 IV and BiPAP and admitted to medicine.    Procedure(s) (LRB):  CHOLECYSTECTOMY-LAPAROSCOPIC W/ CHOLANGIOGRAM (N/A)      Indwelling Lines/Drains at time of discharge:   Lines/Drains/Airways          No matching active lines, drains, or airways        Hospital Course:   Ms Lyman presented with acute exacerbation of heart failure likely due to acute cholecystitis. Cardiology, GI, and gen surg were consulted. Quickly weaned off of BiPAP to room air. Lasix initially, then held due to elevated creatinine. Echo 7/8/17 w/ LVEF 55%, mod AS, mild MS and TR, PAP 51.  US abdomen showed biliary sludge and cholelithiasis but no definite sonographic evidence to suggest acute cholecystitis. MRCP showed, "Markedly distended gallbladder with innumerable dependent gallstones.  There has been development of pericholecystic fluid near the " "fundus of the gallbladder that could reflect the sequela of cholecystitis" as well as a mild to moderate pericardial effusion. Plavix held in anticipation of cholecystectomy. NST 7/10/2017 stable. Low-intermediate cardiovascular risk for surgery. Laproscopic cholecystectomy 7/13/17. Intraoperative angiogram showed a retained common bile duct stone. Post cholecystectomy, patient failed extubation, was re-intubated then extubated again while in PACU. Remained BiPAP dependent and was noted delirious. Was transferred to ICU for close monitoring. ABG showed adequate oxygenation while on BiPAP, noted to be CO2 retainer with adequate renal compensation. On 7/14, patient was weaned to low flow. Advised to wear BiPAP at nights but patient declined. Liver enzymes and TBil, have actually improved, suggesting patient may have passed the stone seen in CBD. GI had initially considered ERCP, but will treat conservatively as LFT's normalizing. Was on nicardipine infusion temporarily for malignant hypertension. NG removed and diet started. PO antihypertensives started on 7/15. Nicardipine infusion weaned off.  Patient is currently hemodynamically stable.  Tolerating diet without complications.  Will check home oxygen needs prior to discharge.  She will be discharged home with .  Patient will follow up with PCP, GI and Surgery.     Consults:   Consults         Status Ordering Provider     Inpatient consult to Cardiology  Once     Provider:  Cresencio Alfredo MD    Acknowledged AIME TEMPLETON     Inpatient consult to Gastroenterology  Once     Provider:  Jyothi Strickland MD    Completed TACHO SHANKAR     Inpatient consult to General Surgery  Once     Provider:  Giancarlo Kevin MD    Completed AIME TEMPLETON     Inpatient consult to Pulmonology  Once     Provider:  Star Weston MD    Completed REBEKAH AGUIAR     IP consult to dietary  Once     Provider:  (Not yet assigned)    Completed ZORA SHABAZZ "              Pending Diagnostic Studies:     Procedure Component Value Units Date/Time    FL ERCP Biliary Only [[id number]]     Order Status:  Sent Lab Status:  No result     NM Myocardial Perfusion Spect Multi Pharmacologic [[id number]] Resulted:  07/10/17 0912    Order Status:  Sent Lab Status:  In process Updated:  07/10/17 1258        Final Active Diagnoses:    Diagnosis Date Noted POA    PRINCIPAL PROBLEM:  Chronic combined systolic and diastolic heart failure [I50.42] 06/17/2017 Yes    Obesity hypoventilation syndrome [E66.2] 07/14/2017 Yes    Stage 3 chronic kidney disease [N18.3] 07/08/2017 Yes     Chronic    Coronary artery disease involving native coronary artery without angina pectoris [I25.10] 05/11/2015 Yes     Chronic    Obesity (BMI 30-39.9) [E66.9] 05/09/2015 Yes     Chronic    Anemia of chronic disease [D63.8] 11/29/2013 Yes     Chronic    Nonrheumatic aortic valve stenosis [I35.0] 11/22/2013 Yes    Pulmonary hypertension [I27.2] 09/28/2012 Yes     Chronic    Hyperlipidemia [E78.5]  Yes     Chronic    Depression [F32.9]  Yes     Chronic      Problems Resolved During this Admission:    Diagnosis Date Noted Date Resolved POA    Cholelithiasis without cholecystitis [K80.20] 07/08/2017 07/17/2017 Yes    Respiratory distress [R06.00] 07/15/2017 07/17/2017 Yes    Respiratory distress [R06.00] 07/13/2017 07/15/2017 Yes    Respiratory distress [R06.00] 07/08/2017 07/10/2017 Yes    Elevated LFTs [R79.89] 06/17/2017 07/17/2017 Yes    Malignant hypertension [I10]  07/17/2017 Yes      No new Assessment & Plan notes have been filed under this hospital service since the last note was generated.  Service: Hospital Medicine      Discharged Condition: stable    Disposition: Home-Health Care [facility]    Follow Up:  Follow-up Information     Jeremiah Reeves MD In 2 weeks.    Specialty:  Internal Medicine  Contact information:  [address] [place] [address]  [place] [address]  [phone]             Luis Carlos Jarvis MD In  "1 week.    Specialty:  General Surgery  Contact information:  [address] [facility] [facility]  [address]  [place] [address]  [phone]             Medhat Olmedo MD In 2 weeks.    Specialty:  Gastroenterology  Contact information:  [address] [place] [address]  [address]  [facility]  [place] [id number] [phone]                 Patient Instructions:     WALKER FOR HOME USE   Order Specific Question Answer Comments   Type of Walker: Adult (5'4"-6'6")    With wheels? Yes    Height: 5' 5" (1.651 m)    Weight: 89.5 kg (197 lb 6.4 oz)    Length of need (1-99 months): 99    Does patient have medical equipment at home? cane, straight    Does patient have medical equipment at home? rollator    Please check all that apply: Patient's condition impairs ambulation.      BATH/SHOWER CHAIR FOR HOME USE   Order Specific Question Answer Comments   Height: 5' 5" (1.651 m)    Weight: 89.5 kg (197 lb 6.4 oz)    Does patient have medical equipment at home? cane, straight    Does patient have medical equipment at home? rollator    Length of need (1-99 months): 99    Type: With back      Diet Cardiac     Activity as tolerated     Call MD for:  temperature >100.4     Call MD for:  persistent nausea and vomiting or diarrhea     Call MD for:  difficulty breathing or increased cough     Call MD for:  persistent dizziness, light-headedness, or visual disturbances     Call MD for:  increased confusion or weakness       Medications:  Reconciled Home Medications:   Current Discharge Medication List      START taking these medications    Details   acetaminophen (TYLENOL) 325 MG tablet Take 2 tablets (650 mg total) by mouth every 6 (six) hours as needed for Pain or Temperature greater than (100.4).  Refills: 0      ondansetron (ZOFRAN-ODT) 8 MG TbDL Take 1 tablet (8 mg total) by mouth every 6 (six) hours as needed (Nausea).  Qty: 10 tablet, Refills: 0      tramadol (ULTRAM) 50 mg tablet Take 1 tablet (50 mg total) by mouth every 6 " (six) hours as needed for Pain.  Qty: 20 tablet, Refills: 0         CONTINUE these medications which have NOT CHANGED    Details   aspirin (ECOTRIN) 81 MG EC tablet Take 81 mg by mouth once daily.      carbamazepine (TEGRETOL) 200 mg tablet Take 1 tablet (200 mg total) by mouth 3 (three) times daily.  Qty: 270 tablet, Refills: 1    Associated Diagnoses: TMJ (temporomandibular joint disorder)      citalopram (CELEXA) 20 MG tablet ONE TABLET BY MOUTH once a day  Qty: 90 tablet, Refills: 0      cloNIDine (CATAPRES) 0.3 MG tablet Take 1 tablet (0.3 mg total) by mouth 3 (three) times daily.  Qty: 270 tablet, Refills: 1    Associated Diagnoses: Essential hypertension      clopidogrel (PLAVIX) 75 mg tablet Take 1 tablet (75 mg total) by mouth once daily.  Qty: 90 tablet, Refills: 1    Associated Diagnoses: Long term (current) use of anticoagulants; Deep vein thrombosis (DVT) of lower extremity, unspecified chronicity, unspecified laterality, unspecified vein      furosemide (LASIX) 40 MG tablet TAKE ONE TABLET BY MOUTH EVERY DAY AS NEEDED FOR SHORTNESS OF BREATH or leg swelling  Qty: 90 tablet, Refills: 1    Associated Diagnoses: Essential hypertension      gabapentin (NEURONTIN) 300 MG capsule Take 1 capsule (300 mg total) by mouth 3 (three) times daily.  Qty: 270 capsule, Refills: 1    Associated Diagnoses: Right sciatic nerve pain      hydrALAZINE (APRESOLINE) 100 MG tablet ONE TABLET BY MOUTH THREE TIMES DAILY  Qty: 270 tablet, Refills: 1      metoprolol tartrate (LOPRESSOR) 100 MG tablet TAKE ONE TABLET BY MOUTH TWICE DAILY  Qty: 180 tablet, Refills: 1      rosuvastatin (CRESTOR) 20 MG tablet Take 1.5 tablets (30 mg total) by mouth every evening. 1 Tablet Oral Every day  Qty: 135 tablet, Refills: 1    Comments: Discontinue 30 day supply  Associated Diagnoses: Hyperlipidemia      verapamil (VERELAN) 360 MG C24P Take 1 capsule (360 mg total) by mouth once daily.  Qty: 90 capsule, Refills: 3      azelastine (ASTELIN) 137  mcg (0.1 %) nasal spray 1 spray (137 mcg total) by Nasal route 2 (two) times daily.  Qty: 30 mL, Refills: 0    Associated Diagnoses: Allergic rhinitis, unspecified allergic rhinitis type      clotrimazole-betamethasone 1-0.05% (LOTRISONE) cream 2 (two) times daily. Apply to affected area  Refills: 2      fluticasone (FLOVENT HFA) 110 mcg/actuation inhaler Inhale 1 puff into the lungs every 12 (twelve) hours.  Qty: 1 g, Refills: 2    Associated Diagnoses: Mild intermittent asthma without complication      latanoprost 0.005 % ophthalmic solution Place 1 drop into both eyes nightly.  Refills: 5      nitroGLYCERIN (NITROSTAT) 0.4 MG SL tablet Place 0.4 mg under the tongue every 5 (five) minutes as needed for Chest pain.           Time spent on the discharge of patient: >30 minutes    Grupo Wahrton MD  Department of Hospital Medicine  [facility]

## 2017-07-17 NOTE — PLAN OF CARE
07/17/17 1450   Final Note   What phone number can be called within the next 1-3 days to see how you are doing after discharge? [phone]   Hospital Follow Up  Appt(s) scheduled? Yes   Discharge plans and expectations educations in teach back method with documentation complete? Yes   Offered [facility] -- Bedside Delivery? n/a   Discharge/Hospital Encounter Summary to ([facility]) PCP n/a   Referral to Outpatient Case Management complete? n/a   Referral to / orders for Home Health Complete? Yes   Any social issues identified prior to discharge? No   Did you assess the readiness or willingness of the family or caregiver to support self management of care? Yes   Right Care Referral Info   Post Acute Recommendation Home-care   Referral Type Home Care   Facility Name [facility], [address]  [address]   Micaela Melendez LMSW, ACARON-Diane, [facility]  07/17/2017

## 2017-07-17 NOTE — PT/OT/SLP RE-EVAL
Occupational Therapy  Re-evaluation/Discharge    Cindy Lyman   MRN: [id number]   Admitting Diagnosis: Chronic combined systolic and diastolic heart failure    OT Date of Treatment: 07/17/17   OT Start Time: 1522  OT Stop Time: 1534  OT Total Time (min): 12 min    Billable Minutes:  Re-eval 12    Diagnosis: Chronic combined systolic and diastolic heart failure   S/P CHOLECYSTECTOMY-LAPAROSCOPIC 7/13/17    Past Medical History:   Diagnosis Date    Anemia     Anticoagulant long-term use     Aortic stenosis     Arthritis     lower back    Asthma     CAD (coronary artery disease) 4/24/2015    Carpal tunnel syndrome on both sides     Cataract     CHF (congestive heart failure) 5/2013    COPD (chronic obstructive pulmonary disease)     Depression     DVT (deep venous thrombosis)     Hyperlipidemia     Hypertension     Pulmonary HTN     TMJ (temporomandibular joint disorder)       Past Surgical History:   Procedure Laterality Date    BACK SURGERY      cardiac stents      CARPAL TUNNEL RELEASE      Right/Left    HYSTERECTOMY      Partial    JOINT REPLACEMENT  2009    Left hip    TEMPOROMANDIBULAR JOINT SURGERY         Referring physician: Ru  Date referred to OT: 7/16/17    General Precautions: Standard, fall  Orthopedic Precautions: N/A  Braces: N/A    Do you have any cultural, spiritual, Baptism conflicts, given your current situation?: none     Patient History:  Living Environment  Lives With: child(radha), adult (daughter and 17 yo grandson who is Autistic)  Living Arrangements: house  Home Layout: Bedroom on 2nd floor, Bathroom on 2nd floor  Number of Stairs to Enter Home: 1  Number of Stairs Within Home: 13  Stair Railings at Home: inside, present on right side  Living Environment Comment: stair lift available to ascend/descend the stairs to bed/bath    Prior level of function:   Bed Mobility/Transfers: needs device  Grooming: independent  Bathing: independent  Upper Body Dressing:  "independent  Lower Body Dressing: independent  Toileting: independent  Home Management Skills: independent  Driving License: Yes (Rocky is driving)  IADL Comments: The patient states she is able to amb in the house w/out AD. She uses a cane to amb to the car and then uses a rollator in the community.      Dominant hand: right    Subjective:  Communicated with nurseCharli prior to session.    Chief Complaint: ready to go home  Patient/Family stated goals: go home today with her son    Pain/Comfort  Pain Rating 1: 0/10 (belly is "sore")    Objective:  Patient found with:  (telemetry and IV removed by nurse)    Cognitive Exam:  Oriented to: Person, Place and Situation  Follows Commands/attention: Follows multistep  commands  Communication: clear/fluent  Memory:  No Deficits noted  Safety awareness/insight to disability: intact  Coping skills/emotional control: Appropriate to situation    Visual/perceptual:  Intact    Physical Exam:  Postural examination/scapula alignment: No postural abnormalities identified  Skin integrity: abdominal incision covered by dressing  Edema: None noted     Sensation:   Intact    Upper Extremity Range of Motion:  Right Upper Extremity: WFL  Left Upper Extremity: WFL    Upper Extremity Strength:  Right Upper Extremity: WFL  Left Upper Extremity: WFL   Strength: WFL    Fine motor coordination:   Intact    Gross motor coordination: WFL    Functional Mobility:  Bed Mobility:  Supine to Sit:  (Patient was found seated in the chair)    Transfers:  Sit <> Stand Assistance: Stand By Assistance  Sit <> Stand Assistive Device: No Assistive Device      Activities of Daily Living  UE Dressing Level of Assistance: Modified independent  UE adaptive equipment: none  LE Dressing Level of Assistance: Modified independent  LE adaptive equipment: none      Balance:   Static Sit: FAIR+: Able to take MINIMAL challenges from all directions  Dynamic Sit: GOOD-: Maintains balance through MODERATE " "excursions of active trunk movement,     Static Stand: FAIR+: Takes MINIMAL challenges from all directions  Dynamic stand: FAIR+: Needs CLOSE SUPERVISION during gait and is able to right self with minor LOB      AM-PAC 6 CLICK ADL  How much help from another person does this patient currently need?  1 = Unable, Total/Dependent Assistance  2 = A lot, Maximum/Moderate Assistance  3 = A little, Minimum/Contact Guard/Supervision  4 = None, Modified [place]/Independent    Putting on and taking off regular lower body clothing? : 4  Bathing (including washing, rinsing, drying)?: 4  Toileting, which includes using toilet, bedpan, or urinal? : 4  Putting on and taking off regular upper body clothing?: 4  Taking care of personal grooming such as brushing teeth?: 4  Eating meals?: 4  Total Score: 24    AM-PAC Raw Score CMS "G-Code Modifier Level of Impairment Assistance   6 % Total / Unable   7 - 9 CM 80 - 100% Maximal Assist   10 - 14 CL 60 - 80% Moderate Assist   15 - 19 CK 40 - 60% Moderate Assist   20 - 22 CJ 20 - 40% Minimal Assist   23 CI 1-20% SBA / CGA   24 CH 0% Independent/ Mod I       Patient left up in chair with all lines intact and call button in reach    Assessment:  Cindy Lyman is a 81 y.o. female with a medical diagnosis of Chronic combined systolic and diastolic heart failure. The patient was able to dress herself Mod (I). The patient states she will have assist as needed at home.    Rehab identified problem list/impairments: Rehab identified problem list/impairments: weakness, impaired self care skills, impaired functional mobilty    Rehab potential is good.    Activity tolerance: Good    Discharge recommendations: Discharge Facility/Level Of Care Needs: home (with family assist)     Barriers to discharge: Barriers to Discharge: None    Equipment recommendations: shower chair, walker, rolling     GOALS:    Occupational Therapy Goals     Not on file          Multidisciplinary Problems " (Resolved)        Problem: Occupational Therapy Goal    Goal Priority Disciplines Outcome Interventions   Occupational Therapy Goal   (Resolved)     OT, PT/OT Outcome(s) achieved           Problem: Occupational Therapy Goal    Goal Priority Disciplines Outcome Interventions   Occupational Therapy Goal   (Resolved)     OT, PT/OT Outcome(s) achieved                    PLAN:   (D/C home today with family)  Plan of Care reviewed with: patient    OT G-codes  Functional Assessment Tool Used: AM PAC  Score: 24  Functional Limitation: Self care  Self Care Current Status ():   Self Care Goal Status ():   Self Care Discharge Status (): HILDA Nichols OT  07/17/2017

## 2017-07-17 NOTE — PLAN OF CARE
[facility]    HOME HEALTH ORDERS  FACE TO FACE ENCOUNTER    Patient Name: Cindy Lyman  YOB: 1935    PCP: Jeremiah Reeves MD   PCP Address: [address] / [place] [address]  PCP Phone Number: [phone]  PCP Fax: [phone]       Encounter Date: 07/17/2017    Admit to Home Health    Diagnoses:  Active Hospital Problems    Diagnosis  POA    *Chronic combined systolic and diastolic heart failure [I50.42]  Yes     Priority: 1 - High    Obesity hypoventilation syndrome [E66.2]  Yes    Stage 3 chronic kidney disease [N18.3]  Yes     Chronic    Coronary artery disease involving native coronary artery without angina pectoris [I25.10]  Yes     Chronic    Obesity (BMI 30-39.9) [E66.9]  Yes     Chronic    Anemia of chronic disease [D63.8]  Yes     Chronic    Nonrheumatic aortic valve stenosis [I35.0]  Yes     mild      Pulmonary hypertension [I27.2]  Yes     Chronic     Dr. Weston      Hyperlipidemia [E78.5]  Yes     Chronic    Depression [F32.9]  Yes     Chronic      Resolved Hospital Problems    Diagnosis Date Resolved POA    Cholelithiasis without cholecystitis [K80.20] 07/17/2017 Yes     Priority: 2     Respiratory distress [R06.00] 07/17/2017 Yes     Priority: 3     Respiratory distress [R06.00] 07/15/2017 Yes    Respiratory distress [R06.00] 07/10/2017 Yes    Elevated LFTs [R79.89] 07/17/2017 Yes    Malignant hypertension [I10] 07/17/2017 Yes       Future Appointments  Date Time Provider Department Center   8/1/2017 11:20 AM Fernanda Bland MD [facility]   9/11/2017 8:00 AM [facility], [facility]   9/15/2017 1:00 PM Kitty Sultana MD [facility] HEM [facility]     Follow-up Information     Jeremiah Reeves MD In 2 weeks.    Specialty:  Internal Medicine  Contact information:  [address] [place] [address]  [phone]             Luis Carlos Jarvis MD In 1 week.    Specialty:  General Surgery  Contact information:  [address]  [facility]  [address]  [place] [address]  [phone]             Medhat Olmedo MD In 2 weeks.    Specialty:  Gastroenterology  Contact information:  [address] [place] [address]  [address]  [facility]  [place] [address] [phone]                     I have seen and examined this patient face to face today. My clinical findings that support the need for the home health skilled services and home bound status are the following:  Weakness/numbness causing balance and gait disturbance due to Heart Failure and Acute Cholecystitis making it taxing to leave home.    Allergies:  Review of patient's allergies indicates:   Allergen Reactions    Doxycycline hyclate Diarrhea and Nausea And Vomiting    Singulair [montelukast]      Stomach pain, Muscle pain, Cough      Latex, natural rubber     Penicillins      Other reaction(s): Anaphylaxis    Ventolin  [albuterol sulfate] Other (See Comments)       Diet: cardiac diet    Activities: activity as tolerated    Nursing:   SN to complete comprehensive assessment including routine vital signs. Instruct on disease process and s/s of complications to report to MD. Review/verify medication list sent home with the patient at time of discharge  and instruct patient/caregiver as needed. Frequency may be adjusted depending on start of care date.    Notify MD if SBP > 160 or < 90; DBP > 90 or < 50; HR > 120 or < 50; Temp > 101    CONSULTS:    Physical Therapy to evaluate and treat. Evaluate for home safety and equipment needs; Establish/upgrade home exercise program. Perform / instruct on therapeutic exercises, gait training, transfer training, and Range of Motion.  Occupational Therapy to evaluate and treat. Evaluate home environment for safety and equipment needs. Perform/Instruct on transfers, ADL training, ROM, and therapeutic exercises.    Medications: Review discharge medications with patient and family and provide education.      Current Discharge  Medication List      START taking these medications    Details   acetaminophen (TYLENOL) 325 MG tablet Take 2 tablets (650 mg total) by mouth every 6 (six) hours as needed for Pain or Temperature greater than (100.4).  Refills: 0      ondansetron (ZOFRAN-ODT) 8 MG TbDL Take 1 tablet (8 mg total) by mouth every 6 (six) hours as needed (Nausea).  Qty: 10 tablet, Refills: 0      tramadol (ULTRAM) 50 mg tablet Take 1 tablet (50 mg total) by mouth every 6 (six) hours as needed for Pain.  Qty: 20 tablet, Refills: 0         CONTINUE these medications which have NOT CHANGED    Details   aspirin (ECOTRIN) 81 MG EC tablet Take 81 mg by mouth once daily.      carbamazepine (TEGRETOL) 200 mg tablet Take 1 tablet (200 mg total) by mouth 3 (three) times daily.  Qty: 270 tablet, Refills: 1    Associated Diagnoses: TMJ (temporomandibular joint disorder)      citalopram (CELEXA) 20 MG tablet ONE TABLET BY MOUTH once a day  Qty: 90 tablet, Refills: 0      cloNIDine (CATAPRES) 0.3 MG tablet Take 1 tablet (0.3 mg total) by mouth 3 (three) times daily.  Qty: 270 tablet, Refills: 1    Associated Diagnoses: Essential hypertension      clopidogrel (PLAVIX) 75 mg tablet Take 1 tablet (75 mg total) by mouth once daily.  Qty: 90 tablet, Refills: 1    Associated Diagnoses: Long term (current) use of anticoagulants; Deep vein thrombosis (DVT) of lower extremity, unspecified chronicity, unspecified laterality, unspecified vein      furosemide (LASIX) 40 MG tablet TAKE ONE TABLET BY MOUTH EVERY DAY AS NEEDED FOR SHORTNESS OF BREATH or leg swelling  Qty: 90 tablet, Refills: 1    Associated Diagnoses: Essential hypertension      gabapentin (NEURONTIN) 300 MG capsule Take 1 capsule (300 mg total) by mouth 3 (three) times daily.  Qty: 270 capsule, Refills: 1    Associated Diagnoses: Right sciatic nerve pain      hydrALAZINE (APRESOLINE) 100 MG tablet ONE TABLET BY MOUTH THREE TIMES DAILY  Qty: 270 tablet, Refills: 1      metoprolol tartrate  (LOPRESSOR) 100 MG tablet TAKE ONE TABLET BY MOUTH TWICE DAILY  Qty: 180 tablet, Refills: 1      rosuvastatin (CRESTOR) 20 MG tablet Take 1.5 tablets (30 mg total) by mouth every evening. 1 Tablet Oral Every day  Qty: 135 tablet, Refills: 1    Comments: Discontinue 30 day supply  Associated Diagnoses: Hyperlipidemia      verapamil (VERELAN) 360 MG C24P Take 1 capsule (360 mg total) by mouth once daily.  Qty: 90 capsule, Refills: 3      azelastine (ASTELIN) 137 mcg (0.1 %) nasal spray 1 spray (137 mcg total) by Nasal route 2 (two) times daily.  Qty: 30 mL, Refills: 0    Associated Diagnoses: Allergic rhinitis, unspecified allergic rhinitis type      clotrimazole-betamethasone 1-0.05% (LOTRISONE) cream 2 (two) times daily. Apply to affected area  Refills: 2      fluticasone (FLOVENT HFA) 110 mcg/actuation inhaler Inhale 1 puff into the lungs every 12 (twelve) hours.  Qty: 1 g, Refills: 2    Associated Diagnoses: Mild intermittent asthma without complication      latanoprost 0.005 % ophthalmic solution Place 1 drop into both eyes nightly.  Refills: 5      nitroGLYCERIN (NITROSTAT) 0.4 MG SL tablet Place 0.4 mg under the tongue every 5 (five) minutes as needed for Chest pain.             I certify that this patient is confined to her home and needs intermittent skilled nursing care, physical therapy and occupational therapy.

## 2017-07-17 NOTE — PROGRESS NOTES
Post discharge appts reviewed with patient.  Post discharge/post op instructions reviewed with patient.  Patient advised SW that she has a walker as well as a shower chair.  Does not need ordered equipment.  Micaela Melendez LMSW, ADRIENNE-FAITH, [facility]07/17/2017

## 2017-07-17 NOTE — PROGRESS NOTES
[facility]  General Surgery  Progress Note    Subjective:     Interval History: no issues overnight    Post-Op Info:  Procedure(s) (LRB):  CHOLECYSTECTOMY-LAPAROSCOPIC W/ CHOLANGIOGRAM (N/A)   4 Days Post-Op      Medications:  Continuous Infusions:   Scheduled Meds:   carbamazepine  200 mg Oral TID    cloNIDine  0.3 mg Oral TID    clopidogrel  75 mg Oral Daily    enoxaparin  40 mg Subcutaneous Daily    gabapentin  300 mg Oral TID    isosorbide mononitrate  90 mg Oral Daily    metoprolol tartrate  100 mg Oral BID    rosuvastatin  20 mg Oral QHS     PRN Meds:acetaminophen, ondansetron, promethazine (PHENERGAN) IVPB, ramelteon     Objective:     Vital Signs (Most Recent):  Temp: 99.1 °F (37.3 °C) (07/17/17 1201)  Pulse: 71 (07/17/17 1201)  Resp: 18 (07/17/17 1201)  BP: 123/60 (07/17/17 1201)  SpO2: (!) 94 % (07/17/17 1201) Vital Signs (24h Range):  Temp:  [97.6 °F (36.4 °C)-99.2 °F (37.3 °C)] 99.1 °F (37.3 °C)  Pulse:  [66-88] 71  Resp:  [17-20] 18  SpO2:  [94 %-99 %] 94 %  BP: (123-184)/(60-90) 123/60       Intake/Output Summary (Last 24 hours) at 07/17/17 1329  Last data filed at 07/17/17 1050   Gross per 24 hour   Intake              120 ml   Output                0 ml   Net              120 ml       Physical Exam  No apparent distress  Abdomen soft, non tender    Significant Labs:  CMP pending    Significant Diagnostics:  None    Assessment/Plan:   81F with cholelithiasis, choledocholithiasis s/p lap titus and IOC demonstrating CBD and common hepatic stones 7/13, with respiratory distress post op- resolved.  Bilirubin normal. EUS as outpatient with GI. Patient to be discharged.   Will sign off.     Active Diagnoses:    Diagnosis Date Noted POA    PRINCIPAL PROBLEM:  Chronic combined systolic and diastolic heart failure [I50.42] 06/17/2017 Yes    Obesity hypoventilation syndrome [E66.2] 07/14/2017 Yes    Stage 3 chronic kidney disease [N18.3] 07/08/2017 Yes     Chronic    Coronary  artery disease involving native coronary artery without angina pectoris [I25.10] 05/11/2015 Yes     Chronic    Obesity (BMI 30-39.9) [E66.9] 05/09/2015 Yes     Chronic    Anemia of chronic disease [D63.8] 11/29/2013 Yes     Chronic    Nonrheumatic aortic valve stenosis [I35.0] 11/22/2013 Yes    Pulmonary hypertension [I27.2] 09/28/2012 Yes     Chronic    Hyperlipidemia [E78.5]  Yes     Chronic    Depression [F32.9]  Yes     Chronic      Problems Resolved During this Admission:    Diagnosis Date Noted Date Resolved POA    Respiratory distress [R06.00] 07/15/2017 07/17/2017 Yes    Respiratory distress [R06.00] 07/13/2017 07/15/2017 Yes    Respiratory distress [R06.00] 07/08/2017 07/10/2017 Yes    Cholelithiasis without cholecystitis [K80.20] 07/08/2017 07/17/2017 Yes    Elevated LFTs [R79.89] 06/17/2017 07/17/2017 Yes    Malignant hypertension [I10]  07/17/2017 Yes         Mara Douglas MD  General Surgery  [facility]

## 2017-07-17 NOTE — PROGRESS NOTES
WRITTEN HEALTHCARE INFORMATION:    Follow-up Information     Jeremiah Reeves MD On 7/31/2017.    Specialty:  Internal Medicine  Why:  2:00 p.m. Monday, July 31, 2017 see Dr. Reeves for your hospital followup visit.  Contact information:  [address] [place] [address]  [phone]             Luis Carlos Jarvis MD On 7/25/2017.    Specialty:  General Surgery  Why:  1:30 p.m. on July 25, 2017 see Dr. Jarvis for your hospital followup visit.  Please arrive 15 minutes early.  Contact information:  [address]  [address]  [place] [address]  [phone]             Medhat Olmedo MD On 8/23/2017.    Specialty:  Gastroenterology  Why:  First available appt, is on August 23, 2017 at 8:15 a.m.  Arrive at 8:00 to complete paperwork.  Bring your ID, insurance card, and discharge paperwork.  Contact information:  [address] [place] [address]  [address]  [facility] [place]  [facility] [address]  [phone]             [facility] includes the [facility].    Specialty:  Home Health Services  Why:  Home Health  Contact information:  [address]  [facility] [address]  [phone]                 [facility]  Unless otherwise directed by your provider, please   contact [facility], our nurse care line   that is available for 24/7 assistance.      [phone] (toll-free)      Registered nurses in the [facility]   provide: appointment scheduling, clinical advisement, health education, and other advisory services.    Thank you for choosing [facility] for your care.  Within 48-72 hours after leaving the hospital you will receive a call from [facility] nurses following up to see how you are doing.  The team will ask you a few questions and the call will last approximately 20 minutes.    Please answer any calls you may receive from [facility].  We want to continue to support you as you manage your healthcare needs.  [facility] is happy to have the opportunity to serve  you.    Sincerely      Micaela-  Your [facility]  [phone]

## 2017-07-17 NOTE — PROGRESS NOTES
Orders for home health and PT/OT notes sent to [facility] for arrangement with [facility]. TN indicated that the plan was for pt to discharge on today and provided contact number.  TN to continue to follow    1318- walker and shower chair order sent to [facility] to be arranged and delivered to the home as pt is discharging on today.

## 2017-07-17 NOTE — PROGRESS NOTES
"    [facility]  Adult Nutrition  Consult Note    SUMMARY     Recommendations    Recommendation/Intervention:   1. Reviewed CHF nutrition therapy   2. Encouraged use of supplements if appetite not improved at home   3. RD to monitor    Goals: Pt to recieve nutrition by next RD visit.   Nutrition Goal Status: goal met  Communication of RD Recs: reviewed with RN    Continuum of Care Plan    D/C  Planning: low sodium diet. Supplements if appetite not normalized.     Reason for Assessment    Reason for Assessment: RD follow-up  Diagnosis: cardiac disease (Abdominal pain)  Relevent Medical History: CAD, COPD, HLD, Pulm HTN       General Information Comments: Patient reports appetite slowly improving. Plan to d/c- discussed home diet and use of supplements if appetite does not improve. Reviewed basics of CHF and nutrition. Patient very knowledgeable on low salt diet. Provided handouts and f/u resources with seasoning alternatives.     Nutrition Prescription Ordered    Current Diet Order: Cardiac      Evaluation of Received Nutrients/Fluid Intake     % Intake of Estimated Energy Needs: 50-75%  % Meal Intake: % (55% on average)    I/O: not fully recorded at this time.      Nutrition Risk Screen     Nutrition Risk Screen: no indicators present    Nutrition/Diet History     Factors Affecting Nutritional Intake: decreased appetite     Labs/Tests/Procedures/Meds     Pertinent Labs Reviewed: reviewed  Pertinent Labs Comments: GFR 50L, Glu 112H, Alb 2.7L, TBili 2.4H  Pertinent Medications Reviewed: reviewed       Physical Findings    Overall Physical Appearance: nourished   Oral/Mouth Cavity: tooth/teeth missing  Skin: incision    Anthropometrics    Temp: 99.1 °F (37.3 °C)     Height: 5' 5" (165.1 cm)  Weight Method: Bed Scale  Weight: 89.5 kg (197 lb 6.4 oz)     Ideal Body Weight (IBW), Female: 125 lb     % Ideal Body Weight, Female (lb): 167.9 lb  BMI (Calculated): 35  BMI Grade: 35 - 39.9 - obesity - " grade II     Estimated/Assessed Needs    Weight Used For Calorie Calculations: 89.5 kg (197 lb 5 oz)   Height (cm): 165.1 cm  Energy Calorie Requirements (kcal): 1783 kcal ([place] x1.25 PAL)  Energy Need Method: [place]-St Jeor     RMR ([place]-St. Jeor Equation): 1370.88      Weight Used For Protein Calculations: 95.2 kg (209 lb 14.1 oz)  Protein Requirements: 76-95 gm/kg (0.8-1.0 gm/day)    Fluid Requirements (mL): 1 mL per kcal or per MD  Fluid Need Method: RDA Method      RDA Method (mL): 1783     Assessment and Plan    Nutrition Diagnosis  Problem: Inadequate Energy Intake  Etiology: Decreased appetite  As Evidenced by: 50% po intake with meals    Monitor and Evaluation    Food and Nutrient Intake: energy intake  Food and Nutrient Adminstration: diet order   Anthropometric Measurements: weight, weight change  Biochemical Data, Medical Tests and Procedures: electrolyte and renal panel, lipid profile, gastrointestinal profile, glucose/endocrine profile, inflammatory profile  Nutrition-Focused Physical Findings: overall appearance    Nutrition Risk    Level of Risk:  (F/u 2x/weekly)    Nutrition Follow-Up    RD Follow-up?: Yes

## 2017-07-17 NOTE — PLAN OF CARE
"Problem: Patient Care Overview  Goal: Plan of Care Review  Outcome: Ongoing (interventions implemented as appropriate)  No falls this shift bed kept in low position call light and phone remain within reach bed alarm remain active. Patient able to reposition self in bed without assist. No evidence of skin breakdown noted.      Breath sounds diminished and clear, patient currently on 1L of oxygen via nasal canula oxygen saturation high 90s. Bipap order PRN.   7/14/17 CXR: The cardiac silhouette remains enlarged. Right basilar atelectasis appears mildly improved from prior examination  Pulmonary consulted r/t post op complication patient was intubated and transfer to ICU  Today 7/17/17 Pulmonary signed off.      NO complaints of abdominal discomfort this shift  No nausea or vomiting noted  7/8/17 GI consulted, per GI "Would ultimately benefit from an ERCP w/ sphincterotomy" however must hold PO plavix   7/8/17 CT of abd: Biliary sludge and/or layering stones the gallbladder lumen with slight distention of the gallbladder.  Mild intrahepatic biliary ductal dilatation  7/8/17 MRI MRCP: Markedly distended gallbladder with innumerable dependent gallstones.  There has been development of pericholecystic fluid near the fundus of the gallbladder  7/17/17 ERCP cancled r/t Admit Bilirubin 1.5 as of 7/16/17 Bilirubin 1.0, Admit 7/8/17 / , As of 7/16/17 AST 26/ ALT 46  Will follow up with GI outpatient     7/8/17 Surgery consulted  7/13/17 Lap titus   4 steri strip dressing clean dry and intact     Cardiology consulted and signed off 7/10/17  Admit   Highest troponin 0.058  7/10/17 NM stress test: There is evidence for mild myocardial ischemia in the inferior wall of the left ventricle. The perfusion scan is free of evidence for myocardial injury.  7/8/17 EF 55%  with moderate aortic valve stenosis       "

## 2017-07-17 NOTE — PROGRESS NOTES
Post discharge appts schedule with PCP, surgery, and GI.  FAITH met with patient and provided list of HH providers in network with [facility].  Patient's preferred provider is [facility].  Patient's Choice form signed.    Patient demographics, orders, and clinics for DME and HH faxed to N.    Micaela Melendez LMSW, Liseth-FAITH, [facility]  07/15/2017

## 2017-07-17 NOTE — PT/OT/SLP PROGRESS
"Physical Therapy  Treatment    Cindy Lyman   MRN: [id number]   Admitting Diagnosis: Chronic combined systolic and diastolic heart failure    PT Received On: 07/17/17  PT Start Time: 1104     PT Stop Time: 1130    PT Total Time (min): 26 min       Billable Minutes:  Gait [id number] and Therapeutic Exercise 12    Treatment Type: Treatment  PT/PTA: PTA     PTA Visit Number: 1       General Precautions: Standard, fall  Orthopedic Precautions: N/A   Braces: N/A         Subjective:  Communicated with nurse Augustin prior to session.  Pt agreeable to therapy. " I am feeling better today "   Pt requested to walk with her rollator.   Pain/Comfort  Pain Rating 1: 0/10  Pain Rating Post-Intervention 1: 0/10    Objective:   Patient found with: telemetry    Functional Mobility:  Bed Mobility:   Rolling/Turning to Left: Stand by assistance  Scooting/Bridging: Stand by Assistance  Supine to Sit: Stand by Assistance    Transfers: VC's for safety technique .   Sit <> Stand Assistance: Stand By Assistance  Sit <> Stand Assistive Device: 4 wheeled walker    Gait:   Gait Distance: 120 ft . VC's for safety technique and walker management. O2 SATS maintain from 92-93 % during gait training on RA.   Assistance 1: Contact Guard Assistance, Stand by Assistance  Gait Assistive Device: Rollator  Gait Pattern: reciprocal  Gait Deviation(s): decreased terell, decreased velocity of limb motion, decreased step length, decreased swing-to-stance ratio    Balance:   Static Sit: GOOD-: Takes MODERATE challenges from all directions but inconsistently  Dynamic Sit: GOOD-: Maintains balance through MODERATE excursions of active trunk movement,     Static Stand: FAIR+: Takes MINIMAL challenges from all directions  Dynamic stand: FAIR+: Needs CLOSE SUPERVISION during gait and is able to right self with minor LOB     Therapeutic Activities and Exercises:  Pt performed bed mobility, transfer and gait training as above.   Pt performed seated BLE x 15 " reps: heel/toes raises, LAQ, HS, hip flexion and pillow squeezes. VC's for proper technique and sequence.    O2 sats on RA : 94-96%   AM-PAC 6 CLICK MOBILITY  How much help from another person does this patient currently need?   1 = Unable, Total/Dependent Assistance  2 = A lot, Maximum/Moderate Assistance  3 = A little, Minimum/Contact Guard/Supervision  4 = None, Modified [place]/Independent    Turning over in bed (including adjusting bedclothes, sheets and blankets)?: 4  Sitting down on and standing up from a chair with arms (e.g., wheelchair, bedside commode, etc.): 4  Moving from lying on back to sitting on the side of the bed?: 4  Moving to and from a bed to a chair (including a wheelchair)?: 3  Need to walk in hospital room?: 3  Climbing 3-5 steps with a railing?: 3  Total Score: 21    AM-PAC Raw Score CMS G-Code Modifier Level of Impairment Assistance   6 % Total / Unable   7 - 9 CM 80 - 100% Maximal Assist   10 - 14 CL 60 - 80% Moderate Assist   15 - 19 CK 40 - 60% Moderate Assist   20 - 22 CJ 20 - 40% Minimal Assist   23 CI 1-20% SBA / CGA   24 CH 0% Independent/ Mod I     Patient left up in reclined chair with all lines intact, call button in reach and nurse Charli notified.    Assessment:  Cindy Lyman is a 81 y.o. female with a medical diagnosis of Chronic combined systolic and diastolic heart failure . Pt increased distance with gait training today and tolerated treatment well. Pt will benefit from further therapy in order to get back to PLOF.    Rehab identified problem list/impairments: Rehab identified problem list/impairments: weakness, decreased lower extremity function, decreased coordination, decreased upper extremity function, decreased ROM    Rehab potential is good.    Activity tolerance: Good    Discharge recommendations: Discharge Facility/Level Of Care Needs: home health PT     Barriers to discharge:  None     Equipment recommendations: Equipment Needed After  Discharge: walker, rolling, shower chair     GOALS:    Physical Therapy Goals        Problem: Physical Therapy Goal    Goal Priority Disciplines Outcome Goal Variances Interventions   Physical Therapy Goal     PT/OT, PT             Problem: Physical Therapy Goal    Goal Priority Disciplines Outcome Goal Variances Interventions   Physical Therapy Goal     PT/OT, PT      Description:  Goals to be met by: 2017     Patient will increase functional independence with mobility by performin. Sit to stand transfer with Modified [place]  2. Gait  x 300 feet with Modified [place] using Rolling Walker.    Recommend: HHPT and Rolling Walker and Shower Chair at time of discharge.                        PLAN:    Patient to be seen 5 x/week  to address the above listed problems via gait training, therapeutic activities, therapeutic exercises  Plan of Care expires: 17  Plan of Care reviewed with: patient         Rosmery Pandya, PTA  2017

## 2017-07-17 NOTE — PROGRESS NOTES
"SW received call from [facility] representative Eugenie [place] advising that shower chair was not a covered item.  SW advised Eugenie that SW had conversation with patient and patient advised SW that she had a rollator as well as a shower chair and did not need ordered DME.  Eugenie asked SW to write "cancelled" on orders and fax it to her.  SW faxed order back as requested.  Patient's HH with [facility] confirmed.  Micaela Melendez LMSw, ADRIENNE-Diane, [facility]  07/17/2017  "

## 2017-07-18 NOTE — PT/OT/SLP DISCHARGE
Physical Therapy Discharge Summary    Cindy Lyman  MRN: [id number]   Chronic combined systolic and diastolic heart failure   Patient Discharged from acute Physical Therapy on 17.  Please refer to prior PT noted date on 17 for functional status.     Assessment:   Goals partially met. Patient appropriate for care in another setting.  GOALS:    Physical Therapy Goals        Problem: Physical Therapy Goal    Goal Priority Disciplines Outcome Goal Variances Interventions   Physical Therapy Goal     PT/OT, PT             Problem: Physical Therapy Goal    Goal Priority Disciplines Outcome Goal Variances Interventions   Physical Therapy Goal     PT/OT, PT Unable to achieve outcome(s) by discharge     Description:  Goals to be met by: 2017     Patient will increase functional independence with mobility by performin. Sit to stand transfer with Modified [place]  2. Gait  x 300 feet with Modified [place] using Rolling Walker.    Recommend: HHPT and Rolling Walker and Shower Chair at time of discharge.                      Reasons for Discontinuation of Therapy Services  Transfer to alternate level of care.      Plan:  Patient Discharged to: Home with Home Health Service.

## 2017-07-19 ENCOUNTER — NURSE TRIAGE (OUTPATIENT)
Dept: ADMINISTRATIVE | Facility: CLINIC | Age: 82
End: 2017-07-19

## 2017-07-19 ENCOUNTER — PATIENT OUTREACH (OUTPATIENT)
Dept: ADMINISTRATIVE | Facility: CLINIC | Age: 82
End: 2017-07-19

## 2017-07-19 NOTE — PATIENT INSTRUCTIONS

## 2017-07-19 NOTE — TELEPHONE ENCOUNTER
"    Reason for Disposition   [1] Nasal discharge AND [2] present > 10 days    Answer Assessment - Initial Assessment Questions  1. ONSET: "When did the cough begin?"       After surgery  2. SEVERITY: "How bad is the cough today?"       mild  3. RESPIRATORY DISTRESS: "Describe your breathing."       No SOB  4. FEVER: "Do you have a fever?" If so, ask: "What is your temperature, how was it measured, and when did it start?"      no  5. HEMOPTYSIS: "Are you coughing up any blood?" If so ask: "How much?" (flecks, streaks, tablespoons, etc.)      no  6. TREATMENT: "What have you done so far to treat the cough?" (e.g., meds, fluids, humidifier)      Nose spray, inhaler  7. CARDIAC HISTORY: "Do you have any history of heart disease?" (e.g., heart attack, congestive heart failure)       chf  8. LUNG HISTORY: "Do you have any history of lung disease?"  (e.g., pulmonary embolus, asthma, emphysema)      asthma  9. PE RISK FACTORS: "Do you have a history of blood clots?" (or: recent major surgery, recent prolonged travel, bedridden )     Yes, dvt  10. OTHER SYMPTOMS: "Do you have any other symptoms? (e.g., runny nose, wheezing, chest pain)       Clear, runny nose  11. PREGNANCY: "Is there any chance you are pregnant?" "When was your last menstrual period?"        no  12. TRAVEL: "Have you traveled out of the country in the last month?" (e.g., travel history, exposures)        no    Protocols used: ST COUGH - ACUTE NON-PRODUCTIVE-A-AH      "

## 2017-07-19 NOTE — TELEPHONE ENCOUNTER
Patient states has been having cough since surgery (lap titus) and clear runny nose. Denies any other symptoms. Triage protocol states to be seen in office in next 3 days. High priority message sent to PCP. Appointment scheduled with 7\20\17 Roslyn Luz NP at 1000.

## 2017-07-20 ENCOUNTER — OFFICE VISIT (OUTPATIENT)
Dept: FAMILY MEDICINE | Facility: CLINIC | Age: 82
End: 2017-07-20
Payer: MEDICARE

## 2017-07-20 ENCOUNTER — APPOINTMENT (OUTPATIENT)
Dept: RADIOLOGY | Facility: HOSPITAL | Age: 82
End: 2017-07-20
Attending: NURSE PRACTITIONER
Payer: MEDICARE

## 2017-07-20 ENCOUNTER — TELEPHONE (OUTPATIENT)
Dept: FAMILY MEDICINE | Facility: CLINIC | Age: 82
End: 2017-07-20

## 2017-07-20 VITALS
OXYGEN SATURATION: 97 % | SYSTOLIC BLOOD PRESSURE: 130 MMHG | TEMPERATURE: 99 F | HEART RATE: 62 BPM | DIASTOLIC BLOOD PRESSURE: 72 MMHG | RESPIRATION RATE: 17 BRPM | HEIGHT: 65 IN

## 2017-07-20 DIAGNOSIS — R42 DIZZINESS: ICD-10-CM

## 2017-07-20 DIAGNOSIS — I82.409 DEEP VEIN THROMBOSIS (DVT) OF LOWER EXTREMITY, UNSPECIFIED CHRONICITY, UNSPECIFIED LATERALITY, UNSPECIFIED VEIN: ICD-10-CM

## 2017-07-20 DIAGNOSIS — Z79.01 LONG TERM (CURRENT) USE OF ANTICOAGULANTS: ICD-10-CM

## 2017-07-20 DIAGNOSIS — R42 DIZZINESS: Primary | ICD-10-CM

## 2017-07-20 LAB
BACTERIA #/AREA URNS HPF: ABNORMAL /HPF
BILIRUB SERPL-MCNC: NORMAL MG/DL
BILIRUB UR QL STRIP: NEGATIVE
BLOOD URINE, POC: NORMAL
CLARITY UR: ABNORMAL
COLOR UR: ABNORMAL
COLOR, POC UA: YELLOW
GLUCOSE UR QL STRIP: NEGATIVE
GLUCOSE UR QL STRIP: NORMAL
HGB UR QL STRIP: NEGATIVE
KETONES UR QL STRIP: NEGATIVE
KETONES UR QL STRIP: NORMAL
LEUKOCYTE ESTERASE UR QL STRIP: ABNORMAL
LEUKOCYTE ESTERASE URINE, POC: NORMAL
MICROSCOPIC COMMENT: ABNORMAL
NITRITE UR QL STRIP: NEGATIVE
NITRITE, POC UA: NORMAL
PH UR STRIP: 5 [PH] (ref 5–8)
PH, POC UA: 5
PROT UR QL STRIP: NEGATIVE
PROTEIN, POC: NORMAL
RBC #/AREA URNS HPF: 1 /HPF (ref 0–4)
SP GR UR STRIP: 1.02 (ref 1–1.03)
SPECIFIC GRAVITY, POC UA: 1.02
SQUAMOUS #/AREA URNS HPF: 15 /HPF
URN SPEC COLLECT METH UR: ABNORMAL
UROBILINOGEN UR STRIP-ACNC: NEGATIVE EU/DL
UROBILINOGEN, POC UA: NORMAL
WBC #/AREA URNS HPF: 8 /HPF (ref 0–5)

## 2017-07-20 PROCEDURE — 1159F MED LIST DOCD IN RCRD: CPT | Mod: S$GLB,,, | Performed by: NURSE PRACTITIONER

## 2017-07-20 PROCEDURE — 71020 XR CHEST PA AND LATERAL: CPT | Mod: TC,PN

## 2017-07-20 PROCEDURE — 71020 XR CHEST PA AND LATERAL: CPT | Mod: 26,,, | Performed by: RADIOLOGY

## 2017-07-20 PROCEDURE — 1126F AMNT PAIN NOTED NONE PRSNT: CPT | Mod: S$GLB,,, | Performed by: NURSE PRACTITIONER

## 2017-07-20 PROCEDURE — 99214 OFFICE O/P EST MOD 30 MIN: CPT | Mod: 25,S$GLB,, | Performed by: NURSE PRACTITIONER

## 2017-07-20 PROCEDURE — 99999 PR PBB SHADOW E&M-EST. PATIENT-LVL IV: CPT | Mod: PBBFAC,,, | Performed by: NURSE PRACTITIONER

## 2017-07-20 PROCEDURE — 81001 URINALYSIS AUTO W/SCOPE: CPT | Mod: S$GLB,,, | Performed by: NURSE PRACTITIONER

## 2017-07-20 PROCEDURE — 81000 URINALYSIS NONAUTO W/SCOPE: CPT

## 2017-07-20 PROCEDURE — 87086 URINE CULTURE/COLONY COUNT: CPT

## 2017-07-20 PROCEDURE — 99499 UNLISTED E&M SERVICE: CPT | Mod: S$PBB,,, | Performed by: NURSE PRACTITIONER

## 2017-07-20 RX ORDER — CLOPIDOGREL BISULFATE 75 MG/1
75 TABLET ORAL DAILY
Qty: 90 TABLET | Refills: 1 | Status: SHIPPED | OUTPATIENT
Start: 2017-07-20 | End: 2017-10-12

## 2017-07-20 NOTE — TELEPHONE ENCOUNTER
Please let her know her chest xray does not show any pneumonia. I'm waiting for her urine to come back.  Once I have those results, I will send in a treatment.

## 2017-07-20 NOTE — PATIENT INSTRUCTIONS
Follow up next week  Go to ER for new worse or concerning symptoms    Understanding Dizziness, Balance Problems, and Fainting  Balance is a group effort of the eyes, inner ear, joints, and muscles. They each send signals to the brain about body position and head movement. Then the brain uses this information to achieve balance. When the brain receives conflicting signals, or when there is a problem with blood flow, dizziness or fainting can happen.    Vertigo  Vertigo is the feeling of spinning. It may happen if the brain receives conflicting balance signals. Vertigo is often caused by a problem in the inner ear. Problems include changes in inner ear structures, infection, swelling, or excess fluid. Sometimes vertigo is due to a brain problem, such as migraine.   Dysequilibrium  Dysequilibrium is the feeling of imbalance without a sense of spinning. It may happen if the signal path between the body and brain is disrupted. There are many causes of dysequilibrium, including diabetes, anemia, head injury, and aging.  Syncope  Syncope is losing consciousness or fainting. The brain needs oxygen-rich blood to function. The heart pumps that blood to the brain. If there is a problem with the heart, blood flow (such as low blood pressure), or blood vessels, faintness may happen.  Date Last Reviewed: 10/6/2015  © 2498-8698 [employer]. [address] [place] [address], [place] [address]. All rights reserved. This information is not intended as a substitute for professional medical care. Always follow your healthcare professional's instructions.

## 2017-07-20 NOTE — PROGRESS NOTES
Subjective:       Patient ID: Cindy Lyman is a 81 y.o. female.    Chief Complaint: Cough; Nasal Congestion; Orders (for wheelchair ); Dizziness; and Fatigue    HPI  Ms Lyman is here for some dizziness and light headednes since she was in the hospital.  Her daughter states that she gets up too fast.  She denies any SOB, CP, bloody or black stool, no treatment attempted.  Review of Systems   Constitutional: Negative for fever.   Respiratory: Negative.  Negative for shortness of breath.    Cardiovascular: Negative for chest pain.   Neurological: Positive for dizziness.       Objective:      Physical Exam   Constitutional: She is oriented to person, place, and time. She appears well-developed and well-nourished. No distress.   Neck: Carotid bruit is not present.   Cardiovascular: Normal rate, regular rhythm and normal heart sounds.  Exam reveals no friction rub.    No murmur heard.  Pulses:       Dorsalis pedis pulses are 2+ on the right side, and 2+ on the left side.        Posterior tibial pulses are 2+ on the right side, and 2+ on the left side.   Pulmonary/Chest: Effort normal and breath sounds normal. No respiratory distress. She has no decreased breath sounds. She has no wheezes. She has no rhonchi. She has no rales.   Abdominal:   4 lap titus sites healthy, no oozing, or bleeding, only some bruising.   Musculoskeletal: Normal range of motion. She exhibits no edema.   Neurological: She is alert and oriented to person, place, and time.   Skin: Skin is warm and dry. No erythema.   Psychiatric: She has a normal mood and affect. Her behavior is normal.   Vitals reviewed.      Assessment:       1. Dizziness    2. Long term (current) use of anticoagulants [V58.61]    3. Deep vein thrombosis (DVT) of lower extremity, unspecified chronicity, unspecified laterality, unspecified vein        Plan:       Dizziness  -     CBC auto differential; Future; Expected date: 07/20/2017  -     Basic metabolic panel;  Future; Expected date: 07/20/2017  -     POCT urinalysis, dipstick or tablet reag  -     X-Ray Chest PA And Lateral; Future; Expected date: 07/20/2017  -     Urinalysis  -     Urine culture  Consider worsening of anemia, UTI, she is barely febrile today, will get CXR to assess pneumonia since she is recently postop titus.    Long term (current) use of anticoagulants [V58.61]  -     clopidogrel (PLAVIX) 75 mg tablet; Take 1 tablet (75 mg total) by mouth once daily.  Dispense: 90 tablet; Refill: 1    Deep vein thrombosis (DVT) of lower extremity, unspecified chronicity, unspecified laterality, unspecified vein  -     clopidogrel (PLAVIX) 75 mg tablet; Take 1 tablet (75 mg total) by mouth once daily.  Dispense: 90 tablet; Refill: 1    If all testing is WNL, consider treating for BPV.  ER for new worse or concerning symptoms    Verbalized understanding

## 2017-07-22 LAB — BACTERIA UR CULT: NORMAL

## 2017-07-27 ENCOUNTER — TELEPHONE (OUTPATIENT)
Dept: FAMILY MEDICINE | Facility: CLINIC | Age: 82
End: 2017-07-27

## 2017-07-27 DIAGNOSIS — I27.20 PULMONARY HYPERTENSION: Primary | ICD-10-CM

## 2017-07-27 NOTE — TELEPHONE ENCOUNTER
----- Message from Harper Briscoe sent at 7/26/2017  1:24 PM CDT -----  Contact: self  Daughter called back stating that patient needs a wheelchair instead of rollater. Please contact patient at [phone].    Thanks!

## 2017-07-27 NOTE — TELEPHONE ENCOUNTER
----- Message from Vandana Lewis sent at 7/26/2017 12:12 PM CDT -----  Contact: Karrie aguillon/  [facility]  [phone]  Pt is requesting a order for rolator walker  With clinic notes and medical neccessity.Pls fax to [facility] [phone]. Thanks......SIOMARA

## 2017-07-28 NOTE — PHYSICIAN QUERY
PT Name: Cindy Lyman  MR #: [id number]    Physician Query Form - Respiratory Condition Clarification      CDS/: Kaela Sparrow               Contact information:     This form is a permanent document in the medical record.    Query Date: July 28, 2017    By submitting this query, we are merely seeking further clarification of documentation. Please utilize your independent clinical judgment when addressing the question(s) below.    The Medical record contains the following   Indicators   Supporting Clinical Findings Location in Medical Record      SOB, GALVAN, Wheezing, Productive Cough, Use of Accessory Muscles, etc.      X     Acute/Chronic Illness Obesity hypoventilation syndrome  D/C summary - noted 7/14      Radiology Findings       X   Respiratory Failure Respiratory failure - extubated. Did require some bipap   7/17 progress note by Dr Cornelius BALLARD   Respiratory Distress - noted 7/8  Respiratory Distress - noted 7/10  Respiratory Distress - noted 7/13   Post cholecystectomy, patient failed extubation, was re-intubated then extubated again while in PACU.  Remained BiPAP dependent and was noted delirious.   Was transferred to ICU for close monitoring. ABG showed adequate oxygenation while on BiPAP, noted to be CO2 retainer with adequate renal compensation. On 7/14, patient was weaned to low flow                                                    D/C summary         ABGs         POC PH 7.275   POC PCO2 61.4   POC PO2 75   POC HCO3 28.5   POC BE 1   POC SATURATED O2 92      7/13 labs      Supplemental O2        Home O2, Oxygen Dependence        Treatment        Other       Provider, please specify diagnosis or diagnoses associated with above clinical findings.    [ x ] Acute Respiratory Failure with Hypoxia  [  ] Acute Respiratory Failure with Hypercapnia  [  ] Acute Respiratory Failure with Hypoxia and Hypercapnia  [  ] Other Acute Respiratory Failure   [  ] Acute Respiratory Distress  [  ] Acute and (on)  Chronic Respiratory Failure with Hypoxia  [  ] Acute and (on) Chronic Respiratory Failure with Hypercapnia  [  ] Acute and (on) Chronic Respiratory Failure with Hypoxia and Hypercapnia  [  ] Other Acute and (on) Chronic Respiratory Failure  [  ] Chronic Respiratory Failure with Hypoxia  [  ] Chronic Respiratory Failure with Hypercapnia  [  ] Chronic Respiratory Failure with Hypoxia and Hypercapnia  [  ] Other Chronic Respiratory Failure  [  ] Chronic Respiratory Distress  [  ] Other Respiratory diagnosis (please specify): _______________________________  [  ] Clinically Undetermined    Please document in your progress notes daily for the duration of treatment until resolved and include in your discharge summary.

## 2017-07-28 NOTE — PHYSICIAN QUERY
PT Name: Cindy Lyman  MR #: [id number]  Physician Query Form - Respiratory Condition Clarification     CDS/: Danna Joy               Contact information:    This form is a permanent document in the medical record.     Query Date: July 28, 2017    By submitting this query, we are merely seeking further clarification of documentation. Please utilize your independent clinical judgment when addressing the question(s) below.    The medical record contains the following:    Indicators Supporting Clinical Findings Location in Medical Record    Surgery or Procedure performed:  Laparoscopy cholecystectomy 7/13 op note    DC sum    Anesthesia type: General      Respiratory Failure documented Progress note     Mechanical Ventilation: Failed extubation  Re-intubated then extubated in PACU remained BiPAP dependent  Transfer to ICU Op note progress note dc sum    Difficulty weaning or Prolonged Weaning documented PACU Op note  Dc sum     Reintubation documented In PACU  Op note dc sum     BiPAP, CPAP, or Oxygen administration post-extubation BiPAP dependent  Op note dc sum     Treatments: Vent / BiPAP      Other: PH 7.319  PCO2 56.1 PO2 112  On vent   Lab  Dc sum anesthesia record         Please clarify if the ___________________________ is                 [   ]   Inherent to the procedure               [   ]   Due to condition other than surgery (specify):_____________________               [   ]   Complication of surgery or procedure               [   ]   Complication of anesthesia               [   ]   Other: __________________________________               [   ]   Clinically undetermined                                          Please document in your progress notes daily for the duration of treatment, until resolved, and include in your discharge summary.

## 2017-08-01 ENCOUNTER — OFFICE VISIT (OUTPATIENT)
Dept: UROLOGY | Facility: CLINIC | Age: 82
End: 2017-08-01
Payer: MEDICARE

## 2017-08-01 VITALS
DIASTOLIC BLOOD PRESSURE: 60 MMHG | SYSTOLIC BLOOD PRESSURE: 132 MMHG | HEART RATE: 68 BPM | RESPIRATION RATE: 14 BRPM | WEIGHT: 197 LBS | BODY MASS INDEX: 32.82 KG/M2 | HEIGHT: 65 IN

## 2017-08-01 DIAGNOSIS — N28.1 COMPLEX RENAL CYST: Primary | ICD-10-CM

## 2017-08-01 PROCEDURE — 1126F AMNT PAIN NOTED NONE PRSNT: CPT | Mod: S$GLB,,, | Performed by: NURSE PRACTITIONER

## 2017-08-01 PROCEDURE — 99203 OFFICE O/P NEW LOW 30 MIN: CPT | Mod: S$GLB,,, | Performed by: NURSE PRACTITIONER

## 2017-08-01 PROCEDURE — 1159F MED LIST DOCD IN RCRD: CPT | Mod: S$GLB,,, | Performed by: NURSE PRACTITIONER

## 2017-08-01 PROCEDURE — 99999 PR PBB SHADOW E&M-EST. PATIENT-LVL V: CPT | Mod: PBBFAC,,, | Performed by: NURSE PRACTITIONER

## 2017-08-01 NOTE — PROGRESS NOTES
Subjective:       Patient ID: Cindy Lyman is a 81 y.o. female who is a new patient who was referred by Jeremiah Reeves MD for evaluation of renal cyst    Chief Complaint:   Chief Complaint   Patient presents with    Renal cyst     Patient here for renal cyst. Patient had U/S and CT       Renal Cyst  Patient was recently hospitalized for abdominal pain on 7/8/17. She was found to have acute cholecystitis/cholelithiasis and gallbladder sludge. She underwent lap cholecystectomy 7/13.     She was found to have a 1.7 cm complex renal cyst noted to right upper pole on imaging. She denies flank pain, hematuria, dysuria, urgency or frequency. Denies weight loss, fever, or night sweats. Denies hx of tobacco use. There is no personal/family history of kidney stones.    ACTIVE MEDICAL ISSUES:  Patient Active Problem List   Diagnosis    Hyperlipidemia    Depression    Bilateral cataracts    Pulmonary hypertension    TMJ (temporomandibular joint disorder)    Carpal tunnel syndrome on both sides    Nonrheumatic aortic valve stenosis    Anemia of chronic disease    CAD s/p PCI    Presence of drug coated stent in LAD coronary artery    Right leg DVT    Obesity (BMI 30-39.9)    DVT (deep venous thrombosis)    Uncomplicated asthma    Coronary artery disease involving native coronary artery without angina pectoris    Long term (current) use of anticoagulants [V58.61]    Pain in right ankle    Anemia    Essential hypertension    Chronic diastolic CHF (congestive heart failure)    Vitamin D deficiency    Preop examination    Right sciatic nerve pain    Shortness of breath    Chronic combined systolic and diastolic heart failure    Stage 3 chronic kidney disease    Obesity hypoventilation syndrome       PAST MEDICAL HISTORY  Past Medical History:   Diagnosis Date    Anemia     Anticoagulant long-term use     Aortic stenosis     Arthritis     lower back    Asthma     CAD (coronary artery  disease) 4/24/2015    Carpal tunnel syndrome on both sides     Cataract     CHF (congestive heart failure) 5/2013    COPD (chronic obstructive pulmonary disease)     Depression     DVT (deep venous thrombosis)     Hyperlipidemia     Hypertension     Pulmonary HTN     TMJ (temporomandibular joint disorder)        PAST SURGICAL HISTORY:  Past Surgical History:   Procedure Laterality Date    BACK SURGERY      cardiac stents      CARPAL TUNNEL RELEASE      Right/Left    HYSTERECTOMY      Partial    JOINT REPLACEMENT  2009    Left hip    TEMPOROMANDIBULAR JOINT SURGERY         SOCIAL HISTORY:  Social History   Substance Use Topics    Smoking status: Never Smoker    Smokeless tobacco: Never Used    Alcohol use No       FAMILY HISTORY:  Family History   Problem Relation Age of Onset    Heart disease Mother     Hypertension Daughter     Cancer Son     Breast cancer Neg Hx     Colon cancer Neg Hx     Ovarian cancer Neg Hx        ALLERGIES AND MEDICATIONS: updated and reviewed.  Review of patient's allergies indicates:   Allergen Reactions    Doxycycline hyclate Diarrhea and Nausea And Vomiting    Singulair [montelukast]      Stomach pain, Muscle pain, Cough      Latex, natural rubber     Penicillins      Other reaction(s): Anaphylaxis    Ventolin  [albuterol sulfate] Other (See Comments)     Current Outpatient Prescriptions   Medication Sig    acetaminophen (TYLENOL) 325 MG tablet Take 2 tablets (650 mg total) by mouth every 6 (six) hours as needed for Pain or Temperature greater than (100.4).    aspirin (ECOTRIN) 81 MG EC tablet Take 81 mg by mouth once daily.    carbamazepine (TEGRETOL) 200 mg tablet Take 1 tablet (200 mg total) by mouth 3 (three) times daily.    citalopram (CELEXA) 20 MG tablet ONE TABLET BY MOUTH once a day    cloNIDine (CATAPRES) 0.3 MG tablet Take 1 tablet (0.3 mg total) by mouth 3 (three) times daily.    clopidogrel (PLAVIX) 75 mg tablet Take 1 tablet (75 mg total)  by mouth once daily.    clotrimazole-betamethasone 1-0.05% (LOTRISONE) cream 2 (two) times daily. Apply to affected area    fluticasone (FLOVENT HFA) 110 mcg/actuation inhaler Inhale 1 puff into the lungs every 12 (twelve) hours.    furosemide (LASIX) 40 MG tablet TAKE ONE TABLET BY MOUTH EVERY DAY AS NEEDED FOR SHORTNESS OF BREATH or leg swelling    gabapentin (NEURONTIN) 300 MG capsule Take 1 capsule (300 mg total) by mouth 3 (three) times daily.    hydrALAZINE (APRESOLINE) 100 MG tablet ONE TABLET BY MOUTH THREE TIMES DAILY    latanoprost 0.005 % ophthalmic solution Place 1 drop into both eyes nightly.    metoprolol tartrate (LOPRESSOR) 100 MG tablet TAKE ONE TABLET BY MOUTH TWICE DAILY    nitroGLYCERIN (NITROSTAT) 0.4 MG SL tablet Place 0.4 mg under the tongue every 5 (five) minutes as needed for Chest pain.    ondansetron (ZOFRAN-ODT) 8 MG TbDL Take 1 tablet (8 mg total) by mouth every 6 (six) hours as needed (Nausea).    rosuvastatin (CRESTOR) 20 MG tablet Take 1.5 tablets (30 mg total) by mouth every evening. 1 Tablet Oral Every day    verapamil (VERELAN) 360 MG C24P Take 1 capsule (360 mg total) by mouth once daily.    azelastine (ASTELIN) 137 mcg (0.1 %) nasal spray 1 spray (137 mcg total) by Nasal route 2 (two) times daily.     No current facility-administered medications for this visit.        Review of Systems   Constitutional: Negative for activity change, chills, fatigue, fever and unexpected weight change.   Eyes: Negative for discharge, redness and visual disturbance.   Respiratory: Negative for cough, shortness of breath and wheezing.    Cardiovascular: Negative for chest pain and leg swelling.   Gastrointestinal: Negative for abdominal distention, abdominal pain, constipation, diarrhea, nausea and vomiting.   Genitourinary: Negative for difficulty urinating, dysuria, flank pain, frequency, hematuria and urgency.   Musculoskeletal: Negative for arthralgias, joint swelling and myalgias.  "  Skin: Negative for color change and rash.   Neurological: Negative for dizziness and light-headedness.   Psychiatric/Behavioral: Negative for behavioral problems and confusion. The patient is not nervous/anxious.        Objective:      Vitals:    08/01/17 1237   BP: 132/60   Pulse: 68   Resp: 14   Weight: 89.4 kg (197 lb)   Height: 5' 5" (1.651 m)     Physical Exam   Constitutional: She is oriented to person, place, and time. She appears well-developed.   HENT:   Head: Normocephalic and atraumatic.   Nose: Nose normal.   Eyes: Conjunctivae are normal. Right eye exhibits no discharge. Left eye exhibits no discharge.   Neck: Normal range of motion. Neck supple. No tracheal deviation present. No thyromegaly present.   Cardiovascular: Normal rate and regular rhythm.    Pulmonary/Chest: Effort normal. No respiratory distress. She has no wheezes.   Abdominal: Soft. She exhibits no distension. There is no hepatosplenomegaly. There is no tenderness. There is no CVA tenderness. No hernia.   Genitourinary:   Genitourinary Comments: Patient declined exam   Musculoskeletal: Normal range of motion. She exhibits no edema.   Neurological: She is alert and oriented to person, place, and time.   Skin: Skin is warm and dry. No rash noted. No erythema.     Psychiatric: She has a normal mood and affect. Her behavior is normal. Judgment normal.       Urine dipstick shows negative for all components.  Micro exam: negative for WBC's or RBC's.    Assessment:       1. Complex renal cyst          Plan:     1. Complex renal cyst  - CT Abdomen With Without Contrast; Future (Previous imaging (CT/MRI) reviewed with Dr. Bland)  - POCT urinalysis, dipstick or tablet reag  - Cytology, urine; Future  - Basic metabolic panel; Future              Return in about 3 months (around 11/1/2017) for Follow up.  "

## 2017-08-03 ENCOUNTER — TELEPHONE (OUTPATIENT)
Dept: FAMILY MEDICINE | Facility: CLINIC | Age: 82
End: 2017-08-03

## 2017-08-03 DIAGNOSIS — R53.81 DEBILITY: Primary | ICD-10-CM

## 2017-08-03 NOTE — TELEPHONE ENCOUNTER
----- Message from Marelni Iglesias sent at 8/2/2017 11:20 AM CDT -----  Contact: Danielle @ [employer]  [phone]  PT ordered the pt a rollator for her walker  and its too small for the pt and the pt needs a new order for a wider rollator pt weighs 196 lbs and her hips are not able to fit on the one she has now Thanks !

## 2017-08-03 NOTE — TELEPHONE ENCOUNTER
----- Message from Rasta Schaefer sent at 7/31/2017  9:52 AM CDT -----  Contact: Self  Pt states Rolator that was ordered for her is too small. Pt can be reached @ [phone].

## 2017-08-03 NOTE — TELEPHONE ENCOUNTER
Patient contacted and she states that the rollator walker is to small; she needs an order to get a larger size.

## 2017-08-08 DIAGNOSIS — E78.5 HYPERLIPIDEMIA: ICD-10-CM

## 2017-08-08 RX ORDER — ROSUVASTATIN CALCIUM 20 MG/1
TABLET, COATED ORAL
Qty: 90 TABLET | Refills: 1 | Status: SHIPPED | OUTPATIENT
Start: 2017-08-08 | End: 2018-05-15 | Stop reason: SDUPTHER

## 2017-08-22 ENCOUNTER — TELEPHONE (OUTPATIENT)
Dept: FAMILY MEDICINE | Facility: CLINIC | Age: 82
End: 2017-08-22

## 2017-08-22 RX ORDER — METOPROLOL TARTRATE 100 MG/1
TABLET ORAL
Qty: 180 TABLET | Refills: 1 | Status: SHIPPED | OUTPATIENT
Start: 2017-08-22 | End: 2018-04-04 | Stop reason: SDUPTHER

## 2017-08-24 ENCOUNTER — TELEPHONE (OUTPATIENT)
Dept: FAMILY MEDICINE | Facility: CLINIC | Age: 82
End: 2017-08-24

## 2017-08-24 DIAGNOSIS — R53.81 DEBILITY: Primary | ICD-10-CM

## 2017-08-24 DIAGNOSIS — M54.31 RIGHT SCIATIC NERVE PAIN: ICD-10-CM

## 2017-08-24 RX ORDER — GABAPENTIN 300 MG/1
300 CAPSULE ORAL 3 TIMES DAILY
Qty: 270 CAPSULE | Refills: 1 | Status: SHIPPED | OUTPATIENT
Start: 2017-08-24 | End: 2018-05-31 | Stop reason: SDUPTHER

## 2017-08-24 NOTE — TELEPHONE ENCOUNTER
----- Message from Jacey More sent at 2017  9:22 AM CDT -----  Contact: Danielle with [employer]   Per Danielle, patient need a wider rollator (not wheelchair) Danielle can be reached at [phone].    thanks

## 2017-08-29 ENCOUNTER — TELEPHONE (OUTPATIENT)
Dept: FAMILY MEDICINE | Facility: CLINIC | Age: 82
End: 2017-08-29

## 2017-08-29 NOTE — TELEPHONE ENCOUNTER
----- Message from Vandana Lewis sent at 8/29/2017 12:50 PM CDT -----  Contact: [facility]     Pasquale   [phone]  [facility] needs to verify orders that they received for this pt for a heavy duty walker and a rotor walker. Pls call Pasquale aguillon/ [facility] [phone]. Thanks......Brianda

## 2017-09-11 ENCOUNTER — LAB VISIT (OUTPATIENT)
Dept: LAB | Facility: HOSPITAL | Age: 82
End: 2017-09-11
Attending: INTERNAL MEDICINE
Payer: MEDICARE

## 2017-09-11 DIAGNOSIS — D63.8 ANEMIA OF CHRONIC DISEASE: Chronic | ICD-10-CM

## 2017-09-11 LAB
BASOPHILS # BLD AUTO: 0.01 K/UL
BASOPHILS NFR BLD: 0.2 %
DIFFERENTIAL METHOD: ABNORMAL
EOSINOPHIL # BLD AUTO: 0.2 K/UL
EOSINOPHIL NFR BLD: 4.9 %
ERYTHROCYTE [DISTWIDTH] IN BLOOD BY AUTOMATED COUNT: 14.9 %
ERYTHROCYTE [SEDIMENTATION RATE] IN BLOOD BY WESTERGREN METHOD: 17 MM/HR
FERRITIN SERPL-MCNC: 93 NG/ML
HCT VFR BLD AUTO: 30.2 %
HGB BLD-MCNC: 9.5 G/DL
IRON SERPL-MCNC: 65 UG/DL
LYMPHOCYTES # BLD AUTO: 2.2 K/UL
LYMPHOCYTES NFR BLD: 49 %
MCH RBC QN AUTO: 28.8 PG
MCHC RBC AUTO-ENTMCNC: 31.5 G/DL
MCV RBC AUTO: 92 FL
MONOCYTES # BLD AUTO: 0.4 K/UL
MONOCYTES NFR BLD: 9.4 %
NEUTROPHILS # BLD AUTO: 1.6 K/UL
NEUTROPHILS NFR BLD: 36.3 %
PLATELET # BLD AUTO: 233 K/UL
PMV BLD AUTO: 10.7 FL
RBC # BLD AUTO: 3.3 M/UL
SATURATED IRON: 20 %
TOTAL IRON BINDING CAPACITY: 318 UG/DL
TRANSFERRIN SERPL-MCNC: 215 MG/DL
WBC # BLD AUTO: 4.45 K/UL

## 2017-09-11 PROCEDURE — 83540 ASSAY OF IRON: CPT

## 2017-09-11 PROCEDURE — 82728 ASSAY OF FERRITIN: CPT

## 2017-09-11 PROCEDURE — 36415 COLL VENOUS BLD VENIPUNCTURE: CPT | Mod: PO

## 2017-09-11 PROCEDURE — 84238 ASSAY NONENDOCRINE RECEPTOR: CPT

## 2017-09-11 PROCEDURE — 85025 COMPLETE CBC W/AUTO DIFF WBC: CPT

## 2017-09-11 PROCEDURE — 85651 RBC SED RATE NONAUTOMATED: CPT

## 2017-09-12 LAB — STFR SERPL-MCNC: 3.6 MG/L

## 2017-09-15 ENCOUNTER — OFFICE VISIT (OUTPATIENT)
Dept: HEMATOLOGY/ONCOLOGY | Facility: CLINIC | Age: 82
End: 2017-09-15
Payer: MEDICARE

## 2017-09-15 VITALS
TEMPERATURE: 99 F | WEIGHT: 203.81 LBS | OXYGEN SATURATION: 98 % | HEART RATE: 62 BPM | HEIGHT: 65 IN | BODY MASS INDEX: 33.96 KG/M2 | SYSTOLIC BLOOD PRESSURE: 146 MMHG | DIASTOLIC BLOOD PRESSURE: 70 MMHG

## 2017-09-15 DIAGNOSIS — D63.8 ANEMIA OF CHRONIC DISEASE: Primary | Chronic | ICD-10-CM

## 2017-09-15 DIAGNOSIS — J44.9 CHRONIC OBSTRUCTIVE PULMONARY DISEASE, UNSPECIFIED COPD TYPE: ICD-10-CM

## 2017-09-15 PROCEDURE — 99499 UNLISTED E&M SERVICE: CPT | Mod: S$PBB,,, | Performed by: INTERNAL MEDICINE

## 2017-09-15 PROCEDURE — 1126F AMNT PAIN NOTED NONE PRSNT: CPT | Mod: S$GLB,,, | Performed by: INTERNAL MEDICINE

## 2017-09-15 PROCEDURE — 1159F MED LIST DOCD IN RCRD: CPT | Mod: S$GLB,,, | Performed by: INTERNAL MEDICINE

## 2017-09-15 PROCEDURE — 3008F BODY MASS INDEX DOCD: CPT | Mod: S$GLB,,, | Performed by: INTERNAL MEDICINE

## 2017-09-15 PROCEDURE — 3077F SYST BP >= 140 MM HG: CPT | Mod: S$GLB,,, | Performed by: INTERNAL MEDICINE

## 2017-09-15 PROCEDURE — 3078F DIAST BP <80 MM HG: CPT | Mod: S$GLB,,, | Performed by: INTERNAL MEDICINE

## 2017-09-15 PROCEDURE — 99213 OFFICE O/P EST LOW 20 MIN: CPT | Mod: S$GLB,,, | Performed by: INTERNAL MEDICINE

## 2017-09-15 PROCEDURE — 99999 PR PBB SHADOW E&M-EST. PATIENT-LVL IV: CPT | Mod: PBBFAC,,, | Performed by: INTERNAL MEDICINE

## 2017-09-18 ENCOUNTER — TELEPHONE (OUTPATIENT)
Dept: CARDIOLOGY | Facility: CLINIC | Age: 82
End: 2017-09-18

## 2017-10-02 ENCOUNTER — HOSPITAL ENCOUNTER (OUTPATIENT)
Dept: RADIOLOGY | Facility: HOSPITAL | Age: 82
Discharge: HOME | End: 2017-10-02
Attending: NURSE PRACTITIONER
Payer: MEDICARE

## 2017-10-02 DIAGNOSIS — N28.1 COMPLEX RENAL CYST: ICD-10-CM

## 2017-10-02 PROCEDURE — 74170 CT ABD WO CNTRST FLWD CNTRST: CPT | Mod: TC

## 2017-10-02 PROCEDURE — 74170 CT ABD WO CNTRST FLWD CNTRST: CPT | Mod: 26,,, | Performed by: RADIOLOGY

## 2017-10-02 PROCEDURE — 25500020 PHARM REV CODE 255: Performed by: NURSE PRACTITIONER

## 2017-10-02 RX ADMIN — IOHEXOL 100 ML: 350 INJECTION, SOLUTION INTRAVENOUS at 10:10

## 2017-10-10 ENCOUNTER — HOSPITAL ENCOUNTER (OUTPATIENT)
Dept: CARDIOLOGY | Facility: HOSPITAL | Age: 82
Discharge: HOME | End: 2017-10-10
Attending: INTERNAL MEDICINE
Payer: MEDICARE

## 2017-10-10 ENCOUNTER — HOSPITAL ENCOUNTER (OUTPATIENT)
Dept: RADIOLOGY | Facility: HOSPITAL | Age: 82
Discharge: HOME | End: 2017-10-10
Attending: INTERNAL MEDICINE
Payer: MEDICARE

## 2017-10-10 DIAGNOSIS — I27.20 PULMONARY HYPERTENSION: ICD-10-CM

## 2017-10-10 DIAGNOSIS — I25.83 CORONARY ARTERY DISEASE DUE TO LIPID RICH PLAQUE: Chronic | ICD-10-CM

## 2017-10-10 DIAGNOSIS — E78.5 HYPERLIPIDEMIA, UNSPECIFIED HYPERLIPIDEMIA TYPE: Chronic | ICD-10-CM

## 2017-10-10 DIAGNOSIS — I10 ESSENTIAL HYPERTENSION: Chronic | ICD-10-CM

## 2017-10-10 DIAGNOSIS — I82.411 ACUTE DEEP VEIN THROMBOSIS (DVT) OF FEMORAL VEIN OF RIGHT LOWER EXTREMITY: ICD-10-CM

## 2017-10-10 DIAGNOSIS — I25.10 CORONARY ARTERY DISEASE DUE TO LIPID RICH PLAQUE: Chronic | ICD-10-CM

## 2017-10-10 LAB
AORTIC VALVE REGURGITATION: ABNORMAL
AORTIC VALVE STENOSIS: ABNORMAL
DIASTOLIC DYSFUNCTION: NO
ESTIMATED PA SYSTOLIC PRESSURE: 53.41
GLOBAL PERICARDIAL EFFUSION: ABNORMAL
MITRAL VALVE REGURGITATION: ABNORMAL
MITRAL VALVE STENOSIS: ABNORMAL
RETIRED EF AND QEF - SEE NOTES: 55 (ref 55–65)
TRICUSPID VALVE REGURGITATION: ABNORMAL

## 2017-10-10 PROCEDURE — 93017 CV STRESS TEST TRACING ONLY: CPT

## 2017-10-10 PROCEDURE — 93016 CV STRESS TEST SUPVJ ONLY: CPT | Mod: ,,, | Performed by: INTERNAL MEDICINE

## 2017-10-10 PROCEDURE — 78452 HT MUSCLE IMAGE SPECT MULT: CPT | Mod: 26,,, | Performed by: INTERNAL MEDICINE

## 2017-10-10 PROCEDURE — A9502 TC99M TETROFOSMIN: HCPCS

## 2017-10-10 PROCEDURE — 93018 CV STRESS TEST I&R ONLY: CPT | Mod: ,,, | Performed by: INTERNAL MEDICINE

## 2017-10-10 PROCEDURE — 63600175 PHARM REV CODE 636 W HCPCS

## 2017-10-10 PROCEDURE — 93306 TTE W/DOPPLER COMPLETE: CPT | Mod: 26,,, | Performed by: INTERNAL MEDICINE

## 2017-10-10 PROCEDURE — 93306 TTE W/DOPPLER COMPLETE: CPT

## 2017-10-10 PROCEDURE — 78452 HT MUSCLE IMAGE SPECT MULT: CPT | Mod: TC

## 2017-10-10 RX ORDER — REGADENOSON 0.08 MG/ML
INJECTION, SOLUTION INTRAVENOUS
Status: DISPENSED
Start: 2017-10-10 | End: 2017-10-10

## 2017-10-12 ENCOUNTER — OFFICE VISIT (OUTPATIENT)
Dept: CARDIOLOGY | Facility: CLINIC | Age: 82
End: 2017-10-12
Payer: MEDICARE

## 2017-10-12 VITALS
WEIGHT: 200.63 LBS | BODY MASS INDEX: 33.43 KG/M2 | DIASTOLIC BLOOD PRESSURE: 77 MMHG | HEIGHT: 65 IN | SYSTOLIC BLOOD PRESSURE: 184 MMHG | OXYGEN SATURATION: 95 % | HEART RATE: 60 BPM

## 2017-10-12 DIAGNOSIS — Z95.5 PRESENCE OF DRUG COATED STENT IN LAD CORONARY ARTERY: ICD-10-CM

## 2017-10-12 DIAGNOSIS — I25.10 CORONARY ARTERY DISEASE DUE TO LIPID RICH PLAQUE: Primary | Chronic | ICD-10-CM

## 2017-10-12 DIAGNOSIS — I82.409 DEEP VEIN THROMBOSIS (DVT) OF LOWER EXTREMITY, UNSPECIFIED CHRONICITY, UNSPECIFIED LATERALITY, UNSPECIFIED VEIN: ICD-10-CM

## 2017-10-12 DIAGNOSIS — I10 ESSENTIAL HYPERTENSION: ICD-10-CM

## 2017-10-12 DIAGNOSIS — I25.83 CORONARY ARTERY DISEASE DUE TO LIPID RICH PLAQUE: Primary | Chronic | ICD-10-CM

## 2017-10-12 DIAGNOSIS — I10 HYPERTENSION: ICD-10-CM

## 2017-10-12 DIAGNOSIS — I35.0 NONRHEUMATIC AORTIC VALVE STENOSIS: ICD-10-CM

## 2017-10-12 DIAGNOSIS — I50.32 CHRONIC DIASTOLIC CHF (CONGESTIVE HEART FAILURE): ICD-10-CM

## 2017-10-12 PROCEDURE — 99499 UNLISTED E&M SERVICE: CPT | Mod: S$PBB,,, | Performed by: INTERNAL MEDICINE

## 2017-10-12 PROCEDURE — 99999 PR PBB SHADOW E&M-EST. PATIENT-LVL III: CPT | Mod: PBBFAC,,, | Performed by: INTERNAL MEDICINE

## 2017-10-12 PROCEDURE — 93000 ELECTROCARDIOGRAM COMPLETE: CPT | Mod: S$GLB,,, | Performed by: INTERNAL MEDICINE

## 2017-10-12 PROCEDURE — 99214 OFFICE O/P EST MOD 30 MIN: CPT | Mod: S$GLB,,, | Performed by: INTERNAL MEDICINE

## 2017-10-12 NOTE — PROGRESS NOTES
Subjective:    Patient ID:  Cindy Lyman is a 81 y.o. female who presents for follow-up of Hypertension      HPI     CAD - stent LAD 4/2015 - moderate Cx disease  AS - moderate to severe, HTN, COPD, Hx DVT, anemia     Echo 10/10/17    1 - Normal left ventricular systolic function (EF 55-60%).     2 - Concentric hypertrophy.     3 - Severe left atrial enlargement.     4 - Normal right ventricular systolic function .     5 - Pulmonary hypertension. The estimated PA systolic pressure is 53 mmHg.     6 - Moderate to severe aortic stenosis, CLEMENT = 0.76 cm2, peak velocity = 3.95 m/s, mean gradient = 35 mmHg.     7 - Mild mitral stenosis, MVA = 1.95 cm2.      Stress test 10/10/17  LVEF: 63 %  Impression: NORMAL MYOCARDIAL PERFUSION  1. The perfusion scan is free of evidence for myocardial ischemia or injury.   2. There is a mild to moderate intensity fixed defect in the anterolateral wall of the left ventricle, secondary to breast attenuation.   3. Resting wall motion is physiologic.   4. Resting LV function is normal.   5. The ventricular volumes are normal at rest and stress.   6. The extracardiac distribution of radioactivity is normal.   7. When compared to the previous study from 07/10/2017, no change.    Has been holding plavix for GI procedure - notices that her hands stop shaking when she is off of plavix and wants to stop it    Denies CP  GALVAN stable  Walks with walker  Last 40 pounds  EKG NSR RBBB/LAFB       Review of Systems   Constitution: Negative for decreased appetite.   HENT: Negative for ear discharge.    Eyes: Negative for blurred vision.   Respiratory: Negative for hemoptysis.    Endocrine: Negative for polyphagia.   Hematologic/Lymphatic: Negative for adenopathy.   Skin: Negative for color change.   Musculoskeletal: Negative for joint swelling.   Neurological: Negative for brief paralysis.   Psychiatric/Behavioral: Negative for hallucinations.        Objective:    Physical Exam   Constitutional:  She is oriented to person, place, and time. She appears well-developed and well-nourished.   HENT:   Head: Normocephalic and atraumatic.   Eyes: Conjunctivae are normal. Pupils are equal, round, and reactive to light.   Neck: Normal range of motion. Neck supple.   Cardiovascular: Normal rate and intact distal pulses.    Murmur heard.   Early systolic murmur is present with a grade of 2/6   Pulmonary/Chest: Effort normal and breath sounds normal.   Abdominal: Soft. Bowel sounds are normal.   Musculoskeletal: Normal range of motion.   Neurological: She is alert and oriented to person, place, and time.   Skin: Skin is warm and dry.         Assessment:       1. Coronary artery disease due to lipid rich plaque    2. Presence of drug coated stent in LAD coronary artery    3. Essential hypertension    4. Chronic diastolic CHF (congestive heart failure)    5. Deep vein thrombosis (DVT) of lower extremity, unspecified chronicity, unspecified laterality, unspecified vein    6. Nonrheumatic aortic valve stenosis         Plan:       Stop plavix  OV 3 months   repeat echo 6 months  If AS becomes severe will refer to TAVR clinic

## 2017-10-26 ENCOUNTER — OFFICE VISIT (OUTPATIENT)
Dept: UROLOGY | Facility: CLINIC | Age: 82
End: 2017-10-26
Payer: MEDICARE

## 2017-10-26 VITALS
HEIGHT: 65 IN | DIASTOLIC BLOOD PRESSURE: 70 MMHG | WEIGHT: 200.63 LBS | RESPIRATION RATE: 14 BRPM | HEART RATE: 60 BPM | SYSTOLIC BLOOD PRESSURE: 122 MMHG | BODY MASS INDEX: 33.43 KG/M2

## 2017-10-26 DIAGNOSIS — N28.1 HYPERDENSE RENAL CYST: Primary | ICD-10-CM

## 2017-10-26 PROCEDURE — 99213 OFFICE O/P EST LOW 20 MIN: CPT | Mod: S$GLB,,, | Performed by: UROLOGY

## 2017-10-26 PROCEDURE — 99999 PR PBB SHADOW E&M-EST. PATIENT-LVL III: CPT | Mod: PBBFAC,,, | Performed by: UROLOGY

## 2017-10-26 NOTE — PROGRESS NOTES
"  Subjective:       Cindy Lyman is a 81 y.o. female who is an established patient with Laura BOWEN, though new to me was seen for evaluation of renal lesion.      She saw Laura in 8/17 with renal lesion noted incidentally during cholecystitis episode (s/p lap titus 7/17). CT scan at that time showed a 1.7cm complex renal cyst in R UP. Denies flank pain, hematuria, dysuria, urgency or frequency. Denies weight loss, fever, or night sweats. Denies hx of tobacco use. There is no personal/family history of kidney stones.    Repeat CT scan 10/17 - stable 1.7cm exophytic hyperdense cyst      The following portions of the patient's history were reviewed and updated as appropriate: allergies, current medications, past family history, past medical history, past social history, past surgical history and problem list.    Review of Systems  Constitutional: no fever or chills  ENT: no nasal congestion or sore throat  Respiratory: no cough or shortness of breath  Cardiovascular: no chest pain or palpitations  Gastrointestinal: no nausea or vomiting, tolerating diet  Genitourinary: as per HPI  Hematologic/Lymphatic: no easy bruising or lymphadenopathy  Musculoskeletal: no arthralgias or myalgias  Skin: no rashes or lesions  Neurological: no seizures or tremors  Behavioral/Psych: no auditory or visual hallucinations        Objective:    Vitals: /70   Pulse 60   Resp 14   Ht 5' 5" (1.651 m)   Wt 91 kg (200 lb 9.9 oz)   BMI 33.38 kg/m²     Physical Exam   General: well developed, well nourished in no acute distress  Head: normocephalic, atraumatic  Neck: supple, trachea midline, no obvious enlargement of thyroid  HEENT: EOMI, mucus membranes moist, sclera anicteric, no hearing impairment  Lungs: symmetric expansion, non-labored breathing  Cardiovascular: regular rate and rhythm, normal pulses  Abdomen: soft, non tender, non distended, no palpable masses, no hepatosplenomegaly, no hernias, no CVA " tenderness  Musculoskeletal: no peripheral edema, normal ROM in bilateral upper and lower extremities  Lymphatics: no cervical or inguinal lymphadenopathy  Skin: no rashes or lesions  Neuro: alert and oriented x 3, no gross deficits  Psych: normal judgment and insight, normal mood/affect and non-anxious  Genitourinary:   patient declined exam      Lab Review   Urine analysis today in clinic shows positive for leukocytes ++    Lab Results   Component Value Date    WBC 4.45 09/11/2017    HGB 9.5 (L) 09/11/2017    HCT 30.2 (L) 09/11/2017    MCV 92 09/11/2017     09/11/2017     Lab Results   Component Value Date    CREATININE 1.2 10/02/2017    BUN 28 (H) 10/02/2017         Imaging  Images and reports were personally reviewed by me and discussed with patient  CT reviewed       Assessment/Plan:      1. Hyperdense renal cyst    - 1.7cm R UP lesion   - No need for surgical intervention at this time   - Recheck CT scan in 1 year         Follow up in 12 months

## 2017-11-06 RX ORDER — HYDRALAZINE HYDROCHLORIDE 100 MG/1
TABLET, FILM COATED ORAL
Qty: 270 TABLET | Refills: 0 | Status: SHIPPED | OUTPATIENT
Start: 2017-11-06 | End: 2018-04-04 | Stop reason: SDUPTHER

## 2017-11-07 DIAGNOSIS — I10 ESSENTIAL HYPERTENSION: ICD-10-CM

## 2017-11-07 RX ORDER — CLONIDINE HYDROCHLORIDE 0.3 MG/1
0.3 TABLET ORAL 3 TIMES DAILY
Qty: 270 TABLET | Refills: 1 | Status: SHIPPED | OUTPATIENT
Start: 2017-11-07 | End: 2019-01-02 | Stop reason: SDUPTHER

## 2017-11-15 ENCOUNTER — LAB VISIT (OUTPATIENT)
Dept: LAB | Facility: HOSPITAL | Age: 82
End: 2017-11-15
Attending: FAMILY MEDICINE
Payer: MEDICARE

## 2017-11-15 ENCOUNTER — OFFICE VISIT (OUTPATIENT)
Dept: FAMILY MEDICINE | Facility: CLINIC | Age: 82
End: 2017-11-15
Payer: MEDICARE

## 2017-11-15 VITALS
DIASTOLIC BLOOD PRESSURE: 64 MMHG | HEART RATE: 70 BPM | BODY MASS INDEX: 33.86 KG/M2 | SYSTOLIC BLOOD PRESSURE: 124 MMHG | TEMPERATURE: 99 F | OXYGEN SATURATION: 96 % | RESPIRATION RATE: 12 BRPM | HEIGHT: 65 IN | WEIGHT: 203.25 LBS

## 2017-11-15 DIAGNOSIS — Z00.00 WELL WOMAN EXAM WITHOUT GYNECOLOGICAL EXAM: ICD-10-CM

## 2017-11-15 DIAGNOSIS — E66.9 OBESITY (BMI 30-39.9): Chronic | ICD-10-CM

## 2017-11-15 DIAGNOSIS — D63.8 ANEMIA OF CHRONIC DISEASE: Chronic | ICD-10-CM

## 2017-11-15 DIAGNOSIS — I25.83 CORONARY ARTERY DISEASE DUE TO LIPID RICH PLAQUE: Chronic | ICD-10-CM

## 2017-11-15 DIAGNOSIS — E78.5 HYPERLIPIDEMIA, UNSPECIFIED HYPERLIPIDEMIA TYPE: Chronic | ICD-10-CM

## 2017-11-15 DIAGNOSIS — N18.30 STAGE 3 CHRONIC KIDNEY DISEASE: Chronic | ICD-10-CM

## 2017-11-15 DIAGNOSIS — Z00.00 WELL WOMAN EXAM WITHOUT GYNECOLOGICAL EXAM: Primary | ICD-10-CM

## 2017-11-15 DIAGNOSIS — I25.10 CORONARY ARTERY DISEASE DUE TO LIPID RICH PLAQUE: Chronic | ICD-10-CM

## 2017-11-15 DIAGNOSIS — I35.0 NONRHEUMATIC AORTIC VALVE STENOSIS: ICD-10-CM

## 2017-11-15 DIAGNOSIS — I50.32 CHRONIC DIASTOLIC CHF (CONGESTIVE HEART FAILURE): ICD-10-CM

## 2017-11-15 DIAGNOSIS — I10 ESSENTIAL HYPERTENSION: ICD-10-CM

## 2017-11-15 LAB
ALBUMIN SERPL BCP-MCNC: 3.2 G/DL
ALP SERPL-CCNC: 69 U/L
ALT SERPL W/O P-5'-P-CCNC: 9 U/L
ANION GAP SERPL CALC-SCNC: 8 MMOL/L
AST SERPL-CCNC: 11 U/L
BASOPHILS # BLD AUTO: 0.02 K/UL
BASOPHILS NFR BLD: 0.4 %
BILIRUB SERPL-MCNC: 0.2 MG/DL
BUN SERPL-MCNC: 42 MG/DL
CALCIUM SERPL-MCNC: 8.8 MG/DL
CHLORIDE SERPL-SCNC: 107 MMOL/L
CHOLEST SERPL-MCNC: 184 MG/DL
CHOLEST/HDLC SERPL: 3.8 {RATIO}
CO2 SERPL-SCNC: 28 MMOL/L
CREAT SERPL-MCNC: 1.9 MG/DL
DIFFERENTIAL METHOD: ABNORMAL
EOSINOPHIL # BLD AUTO: 0.2 K/UL
EOSINOPHIL NFR BLD: 3.1 %
ERYTHROCYTE [DISTWIDTH] IN BLOOD BY AUTOMATED COUNT: 14.6 %
EST. GFR  (AFRICAN AMERICAN): 28 ML/MIN/1.73 M^2
EST. GFR  (NON AFRICAN AMERICAN): 25 ML/MIN/1.73 M^2
GLUCOSE SERPL-MCNC: 101 MG/DL
HCT VFR BLD AUTO: 30 %
HDLC SERPL-MCNC: 49 MG/DL
HDLC SERPL: 26.6 %
HGB BLD-MCNC: 9.6 G/DL
LDLC SERPL CALC-MCNC: 114.2 MG/DL
LYMPHOCYTES # BLD AUTO: 1.4 K/UL
LYMPHOCYTES NFR BLD: 27.5 %
MCH RBC QN AUTO: 29.4 PG
MCHC RBC AUTO-ENTMCNC: 32 G/DL
MCV RBC AUTO: 92 FL
MONOCYTES # BLD AUTO: 0.5 K/UL
MONOCYTES NFR BLD: 10.3 %
NEUTROPHILS # BLD AUTO: 3 K/UL
NEUTROPHILS NFR BLD: 58.7 %
NONHDLC SERPL-MCNC: 135 MG/DL
PLATELET # BLD AUTO: 256 K/UL
PMV BLD AUTO: 10.1 FL
POTASSIUM SERPL-SCNC: 4.5 MMOL/L
PROT SERPL-MCNC: 6.5 G/DL
RBC # BLD AUTO: 3.27 M/UL
SODIUM SERPL-SCNC: 143 MMOL/L
TRIGL SERPL-MCNC: 104 MG/DL
WBC # BLD AUTO: 5.17 K/UL

## 2017-11-15 PROCEDURE — 90471 IMMUNIZATION ADMIN: CPT | Mod: S$GLB,,, | Performed by: FAMILY MEDICINE

## 2017-11-15 PROCEDURE — 85025 COMPLETE CBC W/AUTO DIFF WBC: CPT

## 2017-11-15 PROCEDURE — 90715 TDAP VACCINE 7 YRS/> IM: CPT | Mod: S$GLB,,, | Performed by: FAMILY MEDICINE

## 2017-11-15 PROCEDURE — 99999 PR PBB SHADOW E&M-EST. PATIENT-LVL IV: CPT | Mod: PBBFAC,,, | Performed by: FAMILY MEDICINE

## 2017-11-15 PROCEDURE — 36415 COLL VENOUS BLD VENIPUNCTURE: CPT | Mod: PN

## 2017-11-15 PROCEDURE — 80061 LIPID PANEL: CPT

## 2017-11-15 PROCEDURE — 99499 UNLISTED E&M SERVICE: CPT | Mod: S$PBB,,, | Performed by: FAMILY MEDICINE

## 2017-11-15 PROCEDURE — 80053 COMPREHEN METABOLIC PANEL: CPT

## 2017-11-15 PROCEDURE — 83036 HEMOGLOBIN GLYCOSYLATED A1C: CPT

## 2017-11-15 PROCEDURE — 99397 PER PM REEVAL EST PAT 65+ YR: CPT | Mod: 25,S$GLB,, | Performed by: FAMILY MEDICINE

## 2017-11-15 NOTE — PROGRESS NOTES
(10:20 AM) - TDAP  Vaccine was given IM in the left deltoid. Tolerated well. Instructed to remain in the clinic for 15 mins after admin for observation.,

## 2017-11-15 NOTE — PROGRESS NOTES
"Well Woman VISIT      CHIEF COMPLAINT  Chief Complaint   Patient presents with    [facility]       HPI  Cindy Lyman is a 81 y.o. female who presents for physical.     Social Factors  Tobacco use: No  Ready to Quit: No  Alcohol: No  Intimate partner violence screening  "Do you feel safe in your current relationship?" Yes   "Have you ever been in a relationship in which your partner frightened you or hurt you?" No  Living Will/POA: No  Regular Exercise: No    Depression  Over the past two weeks, have you felt down, depressed, or hopeless? No  Over the past two weeks, have you felt little interest or pleasure in doing things? No    Reproductive Health  Partial hysterectomy done 49 years ago    CHD, HTN, DM2  CHD Risk Factors: dyslipidemia, hypertension and obesity (BMI >= 30 kg/m2)  Women 45 years and older should be screened for dyslipidemia if at increased risk of CHD  Women 20 to 45 years of age should be screened for dyslipidemia if at increased risk of CHD  Asymptomatic adults with sustained blood pressure greater than 135/80 mm Hg (treated or untreated) should be screened for type 2 diabetes mellitus    Estimated body mass index is 33.82 kg/m² as calculated from the following:    Height as of this encounter: 5' 5" (1.651 m).    Weight as of this encounter: 92.2 kg (203 lb 4.2 oz).      Screening  Mammogram needed: n/a  Colonoscopy needed: n/a  Osteoporosis screen needed: n/a     Women 50 to 74 years of age should be screened for breast cancer with mammography biennially.  Women should be screened for cervical cancer with Pap tests beginning at 21 years of age. Low-risk women should receive Pap testing every three years. Co-testing for human papillomavirus is an option beginning at 30 years of age, and can extend the screening interval to five years. Cervical cancer screening should be discontinued at 65 years of age or after total hysterectomy if the woman has a benign gynecologic " "history  Adults 50 to 75 years of age should be screened for colorectal cancer with an FOBT annually, sigmoidoscopy every five years with an FOBT every three years, or colonoscopy every 10 years.  Women 65 years and older should be screened for osteoporosis. Women younger than 65 years should be screened if the risk of fracture is greater than or equal to that of a 65-year-old white woman without additional risk factors.      Immunizations  delayed    ALLERGIES and MEDS were verified.   PMHx, PSHx, FHx, SOCIALHx were updated as pertinent.    REVIEW OF SYSTEMS  Review of Systems   Constitutional: Negative.    HENT: Negative.    Eyes: Negative.    Cardiovascular: Negative.    Gastrointestinal: Positive for abdominal pain.   Genitourinary: Negative.    Musculoskeletal: Positive for joint pain and myalgias.   Skin: Negative.    Neurological: Negative.          PHYSICAL EXAM  VITAL SIGNS: /64 (BP Location: Left arm, Patient Position: Sitting, BP Method: Large (Manual))   Pulse 70   Temp 98.6 °F (37 °C) (Oral)   Resp 12   Ht 5' 5" (1.651 m)   Wt 92.2 kg (203 lb 4.2 oz)   SpO2 96%   BMI 33.82 kg/m²   GEN: Well developed, Well nourished, No acute distress.  HENT: Normocephalic, Atraumatic, Bilateral external ears normal, Nose normal, Oropharynx moist, No oral exudates.   Eyes: PERRLA, EOMI, Conjunctiva normal, No discharge.   Neck: Supple, No tenderness.  Lymphatic: No cervical or supraclavicular lymphadenopathy noted.   Cardiovascular: Normal heart rate, Normal rhythm, No murmurs, No rubs, No gallops.   Thorax & Lungs: Normal breath sounds, No respiratory distress, No wheezing.  Abdomen: Soft, No tenderness, Bowel sounds normal.  Breast: deferred  Genital: deferred   Skin: Warm, Dry, No erythema, No rash.   Extremities: No edema, No tenderness.       ASSESSMENT/PLAN    Cindy was seen today for establish care.    Diagnoses and all orders for this visit:    Well woman exam without gynecological exam  -     DXA " Bone Density Spine And Hip; Future  -     CBC auto differential; Future  -     Comprehensive metabolic panel; Future  -     Lipid panel; Future  -     Hemoglobin A1c; Future  -     Cancel: Tdap Vaccine    Coronary artery disease due to lipid rich plaque    Chronic diastolic CHF (congestive heart failure)    Essential hypertension    Hyperlipidemia, unspecified hyperlipidemia type    Nonrheumatic aortic valve stenosis    Stage 3 chronic kidney disease    Anemia of chronic disease    Obesity (BMI 30-39.9)    Other orders  -     (In Office Administered) Tdap Vaccine       Stage 3 CKD stable  HTN and CAD stable followed by cardiology  Anemia followed by hematology  Labs to be done today  Non-compliant with cpap      FOLLOW UP: 3 months or sooner if needed    Rodger Camarena MD

## 2017-11-16 LAB
ESTIMATED AVG GLUCOSE: 100 MG/DL
HBA1C MFR BLD HPLC: 5.1 %

## 2017-11-21 ENCOUNTER — TELEPHONE (OUTPATIENT)
Dept: ADMINISTRATIVE | Facility: HOSPITAL | Age: 82
End: 2017-11-21

## 2017-11-21 NOTE — TELEPHONE ENCOUNTER
Called and scheduled patient for a DEXA Bone Mineral Density Scan 12/4/2017 at 11:00 AM.  Mailed patient appointment reminder letter.

## 2017-11-29 ENCOUNTER — TELEPHONE (OUTPATIENT)
Dept: FAMILY MEDICINE | Facility: CLINIC | Age: 82
End: 2017-11-29

## 2017-11-29 NOTE — TELEPHONE ENCOUNTER
----- Message from Rodger Camarena MD sent at 11/29/2017 12:41 PM CST -----  Please let patient know that her labs are back and that her kidney function is a little lower than before.  She needs to be sure she is avoiding Ibuprofen containing products and staying appropriately hydrated with water.    Thanks,  Dr. Camarena

## 2017-11-29 NOTE — TELEPHONE ENCOUNTER
See orders per Dr Camarena. Pt was notified and states that she's only allowed to drink 32 oz of water per day - ordered by cardiologist - Dr Carmen.

## 2017-12-04 ENCOUNTER — HOSPITAL ENCOUNTER (OUTPATIENT)
Dept: RADIOLOGY | Facility: CLINIC | Age: 82
Discharge: HOME | End: 2017-12-04
Attending: FAMILY MEDICINE
Payer: MEDICARE

## 2017-12-04 DIAGNOSIS — Z00.00 WELL WOMAN EXAM WITHOUT GYNECOLOGICAL EXAM: ICD-10-CM

## 2017-12-04 PROCEDURE — 77080 DXA BONE DENSITY AXIAL: CPT | Mod: 26,,, | Performed by: INTERNAL MEDICINE

## 2017-12-04 PROCEDURE — 77080 DXA BONE DENSITY AXIAL: CPT | Mod: TC,PO

## 2017-12-05 RX ORDER — VERAPAMIL HYDROCHLORIDE 360 MG/1
CAPSULE, DELAYED RELEASE PELLETS ORAL
Qty: 90 CAPSULE | Refills: 3 | Status: SHIPPED | OUTPATIENT
Start: 2017-12-05 | End: 2019-04-16

## 2017-12-08 ENCOUNTER — TELEPHONE (OUTPATIENT)
Dept: GASTROENTEROLOGY | Facility: CLINIC | Age: 82
End: 2017-12-08

## 2017-12-08 NOTE — TELEPHONE ENCOUNTER
----- Message from Danielle Will sent at 12/8/2017  8:50 AM CST -----  Contact: [facility] Claire bernardoies,    Pt has now decided not to pursue further treatment at this time.     Please disregard previous appt requests. (Sanaz, I canceled the appt with Dr PATTERSON)    I cant thank you enough for all your assistance!    Sincerely,  [facility]   [id number]   ----- Message -----  From: Aidee Valenzuela MA  Sent: 11/28/2017  12:17 PM  To: Danielle Will    Thanks  ----- Message -----  From: Danielle Will  Sent: 11/28/2017  11:55 AM  To: Kamran Crowell V Staff    Dr ISRRAEL Olmedo states he spoke with you regarding this pt and would like to schedule an appt for pt.      dx Gastric Mass.     The referral and records are in media.      Please review and assist with appt.     Thank you,  [facility]   [id number]

## 2017-12-12 ENCOUNTER — LAB VISIT (OUTPATIENT)
Dept: LAB | Facility: HOSPITAL | Age: 82
End: 2017-12-12
Attending: INTERNAL MEDICINE
Payer: MEDICARE

## 2017-12-12 DIAGNOSIS — D63.8 ANEMIA OF CHRONIC DISEASE: Chronic | ICD-10-CM

## 2017-12-12 LAB
BASOPHILS # BLD AUTO: 0.03 K/UL
BASOPHILS NFR BLD: 0.6 %
DIFFERENTIAL METHOD: ABNORMAL
EOSINOPHIL # BLD AUTO: 0.1 K/UL
EOSINOPHIL NFR BLD: 2.7 %
ERYTHROCYTE [DISTWIDTH] IN BLOOD BY AUTOMATED COUNT: 14.6 %
FERRITIN SERPL-MCNC: 72 NG/ML
HCT VFR BLD AUTO: 31.9 %
HGB BLD-MCNC: 10 G/DL
IMM GRANULOCYTES # BLD AUTO: 0.01 K/UL
IMM GRANULOCYTES NFR BLD AUTO: 0.2 %
IRON SERPL-MCNC: 49 UG/DL
LYMPHOCYTES # BLD AUTO: 1.7 K/UL
LYMPHOCYTES NFR BLD: 36.8 %
MCH RBC QN AUTO: 28.7 PG
MCHC RBC AUTO-ENTMCNC: 31.3 G/DL
MCV RBC AUTO: 92 FL
MONOCYTES # BLD AUTO: 0.5 K/UL
MONOCYTES NFR BLD: 9.5 %
NEUTROPHILS # BLD AUTO: 2.4 K/UL
NEUTROPHILS NFR BLD: 50.2 %
NRBC BLD-RTO: 1 /100 WBC
PLATELET # BLD AUTO: 282 K/UL
PMV BLD AUTO: 10.8 FL
RBC # BLD AUTO: 3.48 M/UL
SATURATED IRON: 13 %
TOTAL IRON BINDING CAPACITY: 364 UG/DL
TRANSFERRIN SERPL-MCNC: 246 MG/DL
WBC # BLD AUTO: 4.73 K/UL

## 2017-12-12 PROCEDURE — 82728 ASSAY OF FERRITIN: CPT

## 2017-12-12 PROCEDURE — 36415 COLL VENOUS BLD VENIPUNCTURE: CPT | Mod: PO

## 2017-12-12 PROCEDURE — 83540 ASSAY OF IRON: CPT

## 2017-12-12 PROCEDURE — 85025 COMPLETE CBC W/AUTO DIFF WBC: CPT

## 2017-12-14 DIAGNOSIS — K86.2 CYST AND PSEUDOCYST OF PANCREAS: Primary | ICD-10-CM

## 2017-12-14 DIAGNOSIS — K86.3 CYST AND PSEUDOCYST OF PANCREAS: Primary | ICD-10-CM

## 2017-12-18 ENCOUNTER — OFFICE VISIT (OUTPATIENT)
Dept: HEMATOLOGY/ONCOLOGY | Facility: CLINIC | Age: 82
End: 2017-12-18
Payer: MEDICARE

## 2017-12-18 VITALS
OXYGEN SATURATION: 97 % | HEART RATE: 77 BPM | SYSTOLIC BLOOD PRESSURE: 138 MMHG | WEIGHT: 202.38 LBS | BODY MASS INDEX: 33.68 KG/M2 | DIASTOLIC BLOOD PRESSURE: 74 MMHG | TEMPERATURE: 99 F

## 2017-12-18 DIAGNOSIS — J44.9 CHRONIC OBSTRUCTIVE PULMONARY DISEASE, UNSPECIFIED COPD TYPE: ICD-10-CM

## 2017-12-18 DIAGNOSIS — E66.9 OBESITY (BMI 30-39.9): Chronic | ICD-10-CM

## 2017-12-18 DIAGNOSIS — K31.89 GASTRIC MASS: ICD-10-CM

## 2017-12-18 DIAGNOSIS — I50.32 CHRONIC DIASTOLIC CHF (CONGESTIVE HEART FAILURE): ICD-10-CM

## 2017-12-18 DIAGNOSIS — D63.8 ANEMIA OF CHRONIC DISEASE: Primary | Chronic | ICD-10-CM

## 2017-12-18 PROCEDURE — 99499 UNLISTED E&M SERVICE: CPT | Mod: S$PBB,,, | Performed by: INTERNAL MEDICINE

## 2017-12-18 PROCEDURE — 99213 OFFICE O/P EST LOW 20 MIN: CPT | Mod: S$GLB,,, | Performed by: INTERNAL MEDICINE

## 2017-12-18 PROCEDURE — 99999 PR PBB SHADOW E&M-EST. PATIENT-LVL IV: CPT | Mod: PBBFAC,,, | Performed by: INTERNAL MEDICINE

## 2017-12-18 NOTE — PROGRESS NOTES
"Subjective:       Patient ID: Cindy Lyman is a 82 y.o. female.    Chief Complaint: Follow-up    Diagnosis: Anemia of chronic disease  HPI      She has  past medical history of CAD, asthma, CHF, COPD, depression, hypertension, thyroid disease, seen today for f/u for anemia of chronic disease. Bone marrow biopsy neg for hematological malignancy    Pt hospitalized July 2017 for abd pain.  she was found to be septic with fever and tachycardia (no leukocytosis). She was treated for a UTI and followed up with her PCP. She had persistent RUQ abdominal pain .  Followed by Surgery and underwentnd was referred to general surgery but has not yet seen them. H   US abdomen showed biliary sludge and cholelithiasis but no definite sonographic evidence to suggest acute cholecystitis. MRCP showed, "Markedly distended gallbladder with innumerable dependent gallstones.  There has been development of pericholecystic fluid near the fundus of the gallbladder that could reflect the sequela of cholecystitis" as well as a mild to moderate pericardial effusion. Plavix held in anticipation of cholecystectomy. NST 7/10/2017 stable. Low-intermediate cardiovascular risk for surgery. Laproscopic cholecystectomy 7/13/17. Intraoperative angiogram showed a retained common bile duct stone. Post cholecystectomy, patient failed extubation, was re-intubated then extubated   . Liver enzymes and TBil, have  improved, suggesting patient may have passed the stone seen in [facility]. GI had initially considered ERCP, but will treat conservatively as LFT's normalizing  SHe reports she underwent 1 urpbc transfusion during hospitalization        She continues with chronic arthalgias and  back pain-stable  She ambulates with assistance of walker  Mild GALVAN-stable, chronic  No melena,hematochezia or change in bowel habits      Pt followed by GI , Dr. Olmedo for wt loss, gastric polyps, pancreas cyst   Pt recently underwent EGD - proximal gastric CIS within a " "polypoid lesions.   Recent CT w/out evidence of LNs or distant mets  She underwent EUS at [facility] - 2cm-2.5cm friable, polypoid mass at cardia/proximal gastric body.  Pt referred to Dr. Andrew for gastric mass -eval for ESD of lesion    Intolerant of Fe supp     She is followed by Cardiology for CHF.      Pt followed by GI , Dr. Olmedo  Pt recently underwent EGD  Pt referred to Dr. Andrew for gastric mass    Hx of RLE DVT  S/p anticoagulation completed 11/2015     Colonoscopy 5 yrs ago  [facility] - unremarkable    She is followed by Pulm for COPD    Bone Marrow biopsy 5/26/2016  Hypercellular marrow for age, 50-70%, with trilineage hematopoiesis, see comment  --Erythroid hyperplasia  --Mild plasmacytosis, 5-10%, polytypic by in situ hybridization studies, see comment  --No increase in blasts  --Adequate megakaryocytes  --Decreased stainable iron  --Mild reticulin fibrosis  COMMENT: Concomitantly submitted flow cytometric analysis detects no abnormal hematopoietic population. Bcells are polyclonal with a subset of hematogones detected, and T cells are immunophenotypically unremarkable.  The blast gate is not increased.Although there is a mild plasmacytosis, by in situ hybridization studies, this appears polytypic. Correlate clinicallyand with any available cytogenetic and molecular studies    Plasma cell proliferative disorder, FISH:  An insufficient number of plasma cells were observed using cytoplasmic immunoglobulin staining method .This result does not exclude the presence of a plasma cell proliferative disorder."    Congo red stain neg    PAST MEDICAL HISTORY:  Aortic stenosis, arthritis, asthma, CAD, carpal tunnel, cataracts, CHF, COPD, depression, hyperlipidemia, hypertension, pulmonary hypertension, thyroid disease, TMJ.    PAST SURGICAL HISTORY:  Partial hysterectomy, carpal tunnel release, eye surgery, left hip replacement, back surgery, TMJ.            Review of Systems   Constitutional: Negative for appetite " change, fatigue and unexpected weight change.   HENT: Negative for congestion, hearing loss and nosebleeds.    Eyes: Negative for visual disturbance.   Respiratory: Positive for shortness of breath. Negative for cough and chest tightness.    Cardiovascular: Negative for chest pain and leg swelling.   Gastrointestinal: Negative for abdominal pain, blood in stool, constipation, diarrhea, nausea and vomiting.   Genitourinary: Negative for flank pain and hematuria.   Musculoskeletal: Positive for arthralgias and back pain. Negative for gait problem, joint swelling and neck pain.   Skin: Positive for rash.        No petechiae, ecchymoses   Neurological: Negative for dizziness, light-headedness, numbness and headaches.   Hematological: Negative for adenopathy. Does not bruise/bleed easily.         Lab Results   Component Value Date    WBC 4.73 12/12/2017    HGB 10.0 (L) 12/12/2017    HCT 31.9 (L) 12/12/2017    MCV 92 12/12/2017     12/12/2017         Objective:       Vitals:    12/18/17 1441 12/18/17 1448   BP: (!) 144/67 138/74   BP Location: Left arm Left arm   Patient Position: Sitting Sitting   BP Method: Medium (Automatic) Medium (Manual)   Pulse: 77    Temp: 98.8 °F (37.1 °C)    TempSrc: Oral    SpO2: 97%    Weight: 91.8 kg (202 lb 6.1 oz)        Physical Exam   Constitutional: She is oriented to person, place, and time. She appears well-developed and well-nourished.   HENT:   Head: Normocephalic.   Mouth/Throat: Oropharynx is clear and moist. No oropharyngeal exudate.   Eyes: Conjunctivae and lids are normal. Pupils are equal, round, and reactive to light. No scleral icterus.   Neck: Normal range of motion. Neck supple. No thyromegaly present.   Cardiovascular: Normal rate and regular rhythm.    Murmur heard.  Pulmonary/Chest: Breath sounds normal. She has no wheezes. She has no rales.   Abdominal: Soft. Bowel sounds are normal. She exhibits no distension and no mass. There is no hepatosplenomegaly. There is  no tenderness. There is no rebound and no guarding.   Musculoskeletal: Normal range of motion. She exhibits edema ( 1+ RLE edema). She exhibits no tenderness.   Lymphadenopathy:     She has no cervical adenopathy.     She has no axillary adenopathy.        Right: No supraclavicular adenopathy present.        Left: No supraclavicular adenopathy present.   Neurological: She is alert and oriented to person, place, and time. No cranial nerve deficit. Coordination normal.   Skin: Skin is warm and dry. No ecchymosis, no petechiae and no rash noted. No erythema.   Psychiatric: She has a normal mood and affect.         Results for MARIE TURNER (MRN [id number]) as of 6/15/2017 13:23   Ref. Range 6/10/2016 11:08 1/9/2017 13:46 6/12/2017 10:52   [place] Free Light Chains Latest Ref Range: 0.33 - 1.94 mg/dL 4.01 (H) 6.29 (H) 4.62 (H)   Lambda Free Light Chains Latest Ref Range: 0.57 - 2.63 mg/dL 1.68 2.93 (H) 1.63   Kappa/Lambda FLC Ratio Latest Ref Range: 0.26 - 1.65  2.39 (H) 2.15 (H) 2.83 (H)                 Lab Results   Component Value Date    WBC 4.73 12/12/2017    HGB 10.0 (L) 12/12/2017    HCT 31.9 (L) 12/12/2017    MCV 92 12/12/2017     12/12/2017       Lab Results   Component Value Date    IRON 49 12/12/2017    TIBC 364 12/12/2017    FERRITIN 72 12/12/2017     Bone Marrow biopsy 5/26/2016  Hypercellular marrow for age, 50-70%, with trilineage hematopoiesis, see comment  --Erythroid hyperplasia  --Mild plasmacytosis, 5-10%, polytypic by in situ hybridization studies, see comment  --No increase in blasts  --Adequate megakaryocytes  --Decreased stainable iron  --Mild reticulin fibrosis  COMMENT: Concomitantly submitted flow cytometric analysis detects no abnormal hematopoietic population. Bcells are polyclonal with a subset of hematogones detected, and T cells are immunophenotypically unremarkable.  The blast gate is not increased.Although there is a mild plasmacytosis, by in situ hybridization studies, this  "appears polytypic. 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 Anemia of chronic disease    2. Obesity (BMI 30-39.9)    3. Chronic obstructive pulmonary disease, unspecified COPD type    4. Chronic diastolic CHF (congestive heart failure)    5. Gastric mass        Plan:       Pt clinically stable  Hb 10  g/dl   Colonoscopy 5 yrs ago [facility]- unremarkable  S/p extensive hematological workup including bmbx- neg for hematological malignancy  Cont to monitor  No active  bleeding   Follow-up with Dr. Andrew for gastric mas    F/u  2mo with cbc, Fe studies prior to f/u( or sooner if problems should arise in the interim)     CC: Jeremiah Reeves M.D.        "

## 2018-01-03 ENCOUNTER — HOSPITAL ENCOUNTER (OUTPATIENT)
Facility: HOSPITAL | Age: 83
Discharge: HOME | End: 2018-01-04
Attending: EMERGENCY MEDICINE | Admitting: EMERGENCY MEDICINE
Payer: MEDICARE

## 2018-01-03 DIAGNOSIS — I10 ESSENTIAL HYPERTENSION: Chronic | ICD-10-CM

## 2018-01-03 DIAGNOSIS — I27.20 PULMONARY HYPERTENSION: Chronic | ICD-10-CM

## 2018-01-03 DIAGNOSIS — E78.5 HYPERLIPIDEMIA, UNSPECIFIED HYPERLIPIDEMIA TYPE: Chronic | ICD-10-CM

## 2018-01-03 DIAGNOSIS — J06.9 URI, ACUTE: ICD-10-CM

## 2018-01-03 DIAGNOSIS — N18.30 STAGE 3 CHRONIC KIDNEY DISEASE: Chronic | ICD-10-CM

## 2018-01-03 DIAGNOSIS — J20.1 ACUTE BRONCHITIS DUE TO HAEMOPHILUS INFLUENZAE: Primary | ICD-10-CM

## 2018-01-03 DIAGNOSIS — J20.9 COPD (CHRONIC OBSTRUCTIVE PULMONARY DISEASE) WITH ACUTE BRONCHITIS: ICD-10-CM

## 2018-01-03 DIAGNOSIS — J44.0 COPD (CHRONIC OBSTRUCTIVE PULMONARY DISEASE) WITH ACUTE BRONCHITIS: ICD-10-CM

## 2018-01-03 PROBLEM — E66.2 OBESITY HYPOVENTILATION SYNDROME: Chronic | Status: ACTIVE | Noted: 2017-07-14

## 2018-01-03 PROBLEM — I50.42 CHRONIC COMBINED SYSTOLIC AND DIASTOLIC HEART FAILURE: Chronic | Status: ACTIVE | Noted: 2017-06-17

## 2018-01-03 PROBLEM — N28.1 HYPERDENSE RENAL CYST: Chronic | Status: ACTIVE | Noted: 2017-10-26

## 2018-01-03 LAB
ALBUMIN SERPL BCP-MCNC: 3.5 G/DL
ALP SERPL-CCNC: 78 U/L
ALT SERPL W/O P-5'-P-CCNC: 14 U/L
ANION GAP SERPL CALC-SCNC: 9 MMOL/L
AST SERPL-CCNC: 13 U/L
BASOPHILS # BLD AUTO: 0.01 K/UL
BASOPHILS NFR BLD: 0.2 %
BILIRUB SERPL-MCNC: 0.2 MG/DL
BNP SERPL-MCNC: 390 PG/ML
BUN SERPL-MCNC: 21 MG/DL
CALCIUM SERPL-MCNC: 8.8 MG/DL
CHLORIDE SERPL-SCNC: 106 MMOL/L
CO2 SERPL-SCNC: 25 MMOL/L
CREAT SERPL-MCNC: 1.1 MG/DL
DIFFERENTIAL METHOD: ABNORMAL
EOSINOPHIL # BLD AUTO: 0.1 K/UL
EOSINOPHIL NFR BLD: 1.9 %
ERYTHROCYTE [DISTWIDTH] IN BLOOD BY AUTOMATED COUNT: 14.2 %
EST. GFR  (AFRICAN AMERICAN): 55 ML/MIN/1.73 M^2
EST. GFR  (NON AFRICAN AMERICAN): 48 ML/MIN/1.73 M^2
FLUAV AG SPEC QL IA: NEGATIVE
FLUBV AG SPEC QL IA: NEGATIVE
GLUCOSE SERPL-MCNC: 97 MG/DL
HCT VFR BLD AUTO: 29.4 %
HGB BLD-MCNC: 9.3 G/DL
INR PPP: 1
LYMPHOCYTES # BLD AUTO: 1.3 K/UL
LYMPHOCYTES NFR BLD: 30.8 %
MCH RBC QN AUTO: 28.3 PG
MCHC RBC AUTO-ENTMCNC: 31.6 G/DL
MCV RBC AUTO: 89 FL
MONOCYTES # BLD AUTO: 0.4 K/UL
MONOCYTES NFR BLD: 10.2 %
NEUTROPHILS # BLD AUTO: 2.3 K/UL
NEUTROPHILS NFR BLD: 56.9 %
PLATELET # BLD AUTO: 209 K/UL
PMV BLD AUTO: 9.5 FL
POTASSIUM SERPL-SCNC: 3.9 MMOL/L
PROT SERPL-MCNC: 6.8 G/DL
PROTHROMBIN TIME: 10.1 SEC
RBC # BLD AUTO: 3.29 M/UL
SODIUM SERPL-SCNC: 140 MMOL/L
SPECIMEN SOURCE: NORMAL
TROPONIN I SERPL DL<=0.01 NG/ML-MCNC: 0.02 NG/ML
WBC # BLD AUTO: 4.12 K/UL

## 2018-01-03 PROCEDURE — 25000242 PHARM REV CODE 250 ALT 637 W/ HCPCS: Performed by: EMERGENCY MEDICINE

## 2018-01-03 PROCEDURE — G0378 HOSPITAL OBSERVATION PER HR: HCPCS

## 2018-01-03 PROCEDURE — 25000003 PHARM REV CODE 250: Performed by: HOSPITALIST

## 2018-01-03 PROCEDURE — 80053 COMPREHEN METABOLIC PANEL: CPT

## 2018-01-03 PROCEDURE — 83880 ASSAY OF NATRIURETIC PEPTIDE: CPT

## 2018-01-03 PROCEDURE — 84484 ASSAY OF TROPONIN QUANT: CPT

## 2018-01-03 PROCEDURE — 94640 AIRWAY INHALATION TREATMENT: CPT

## 2018-01-03 PROCEDURE — 25000003 PHARM REV CODE 250: Performed by: EMERGENCY MEDICINE

## 2018-01-03 PROCEDURE — 93005 ELECTROCARDIOGRAM TRACING: CPT

## 2018-01-03 PROCEDURE — 96372 THER/PROPH/DIAG INJ SC/IM: CPT

## 2018-01-03 PROCEDURE — 96374 THER/PROPH/DIAG INJ IV PUSH: CPT

## 2018-01-03 PROCEDURE — 87400 INFLUENZA A/B EACH AG IA: CPT

## 2018-01-03 PROCEDURE — 85610 PROTHROMBIN TIME: CPT

## 2018-01-03 PROCEDURE — 94761 N-INVAS EAR/PLS OXIMETRY MLT: CPT

## 2018-01-03 PROCEDURE — 99284 EMERGENCY DEPT VISIT MOD MDM: CPT | Mod: 25

## 2018-01-03 PROCEDURE — 63600175 PHARM REV CODE 636 W HCPCS: Performed by: EMERGENCY MEDICINE

## 2018-01-03 PROCEDURE — 93010 ELECTROCARDIOGRAM REPORT: CPT | Mod: ,,, | Performed by: INTERNAL MEDICINE

## 2018-01-03 PROCEDURE — 85025 COMPLETE CBC W/AUTO DIFF WBC: CPT

## 2018-01-03 RX ORDER — ENOXAPARIN SODIUM 100 MG/ML
40 INJECTION SUBCUTANEOUS EVERY 24 HOURS
Status: DISCONTINUED | OUTPATIENT
Start: 2018-01-03 | End: 2018-01-04 | Stop reason: HOSPADM

## 2018-01-03 RX ORDER — METOPROLOL TARTRATE 50 MG/1
100 TABLET ORAL 2 TIMES DAILY
Status: DISCONTINUED | OUTPATIENT
Start: 2018-01-03 | End: 2018-01-03

## 2018-01-03 RX ORDER — IPRATROPIUM BROMIDE 0.5 MG/2.5ML
0.5 SOLUTION RESPIRATORY (INHALATION) EVERY 8 HOURS
Status: DISCONTINUED | OUTPATIENT
Start: 2018-01-04 | End: 2018-01-04

## 2018-01-03 RX ORDER — NITROGLYCERIN 0.4 MG/1
0.4 TABLET SUBLINGUAL EVERY 5 MIN PRN
Status: DISCONTINUED | OUTPATIENT
Start: 2018-01-03 | End: 2018-01-04 | Stop reason: HOSPADM

## 2018-01-03 RX ORDER — GABAPENTIN 300 MG/1
300 CAPSULE ORAL 3 TIMES DAILY
Status: DISCONTINUED | OUTPATIENT
Start: 2018-01-03 | End: 2018-01-03

## 2018-01-03 RX ORDER — CARBAMAZEPINE 200 MG/1
200 TABLET ORAL 3 TIMES DAILY
Status: DISCONTINUED | OUTPATIENT
Start: 2018-01-03 | End: 2018-01-04 | Stop reason: HOSPADM

## 2018-01-03 RX ORDER — GABAPENTIN 300 MG/1
300 CAPSULE ORAL 3 TIMES DAILY
Status: DISCONTINUED | OUTPATIENT
Start: 2018-01-03 | End: 2018-01-04 | Stop reason: HOSPADM

## 2018-01-03 RX ORDER — LEVALBUTEROL 1.25 MG/.5ML
1.25 SOLUTION, CONCENTRATE RESPIRATORY (INHALATION)
Status: COMPLETED | OUTPATIENT
Start: 2018-01-03 | End: 2018-01-03

## 2018-01-03 RX ORDER — PREDNISONE 20 MG/1
40 TABLET ORAL
Status: DISCONTINUED | OUTPATIENT
Start: 2018-01-03 | End: 2018-01-03

## 2018-01-03 RX ORDER — FUROSEMIDE 40 MG/1
40 TABLET ORAL DAILY
Status: DISCONTINUED | OUTPATIENT
Start: 2018-01-04 | End: 2018-01-04 | Stop reason: HOSPADM

## 2018-01-03 RX ORDER — METOPROLOL TARTRATE 50 MG/1
100 TABLET ORAL 2 TIMES DAILY
Status: DISCONTINUED | OUTPATIENT
Start: 2018-01-03 | End: 2018-01-04 | Stop reason: HOSPADM

## 2018-01-03 RX ORDER — HYDRALAZINE HYDROCHLORIDE 25 MG/1
100 TABLET, FILM COATED ORAL EVERY 8 HOURS
Status: DISCONTINUED | OUTPATIENT
Start: 2018-01-03 | End: 2018-01-04 | Stop reason: HOSPADM

## 2018-01-03 RX ORDER — VERAPAMIL HYDROCHLORIDE 180 MG/1
360 TABLET, FILM COATED, EXTENDED RELEASE ORAL DAILY
Status: DISCONTINUED | OUTPATIENT
Start: 2018-01-04 | End: 2018-01-04 | Stop reason: HOSPADM

## 2018-01-03 RX ORDER — AZITHROMYCIN 250 MG/1
250 TABLET, FILM COATED ORAL DAILY
Status: DISCONTINUED | OUTPATIENT
Start: 2018-01-04 | End: 2018-01-04 | Stop reason: HOSPADM

## 2018-01-03 RX ORDER — GUAIFENESIN 600 MG/1
600 TABLET, EXTENDED RELEASE ORAL 2 TIMES DAILY
Status: DISCONTINUED | OUTPATIENT
Start: 2018-01-03 | End: 2018-01-04

## 2018-01-03 RX ORDER — METHYLPREDNISOLONE SOD SUCC 125 MG
80 VIAL (EA) INJECTION EVERY 8 HOURS
Status: DISCONTINUED | OUTPATIENT
Start: 2018-01-03 | End: 2018-01-04 | Stop reason: HOSPADM

## 2018-01-03 RX ORDER — ENOXAPARIN SODIUM 100 MG/ML
40 INJECTION SUBCUTANEOUS EVERY 24 HOURS
Status: DISCONTINUED | OUTPATIENT
Start: 2018-01-03 | End: 2018-01-03

## 2018-01-03 RX ORDER — ASPIRIN 81 MG/1
81 TABLET ORAL DAILY
Status: DISCONTINUED | OUTPATIENT
Start: 2018-01-04 | End: 2018-01-04 | Stop reason: HOSPADM

## 2018-01-03 RX ORDER — HYDRALAZINE HYDROCHLORIDE 25 MG/1
100 TABLET, FILM COATED ORAL EVERY 8 HOURS
Status: DISCONTINUED | OUTPATIENT
Start: 2018-01-03 | End: 2018-01-03

## 2018-01-03 RX ORDER — ACETAMINOPHEN 325 MG/1
650 TABLET ORAL EVERY 8 HOURS PRN
Status: DISCONTINUED | OUTPATIENT
Start: 2018-01-03 | End: 2018-01-03

## 2018-01-03 RX ORDER — CLONIDINE HYDROCHLORIDE 0.1 MG/1
0.3 TABLET ORAL 3 TIMES DAILY
Status: DISCONTINUED | OUTPATIENT
Start: 2018-01-03 | End: 2018-01-04 | Stop reason: HOSPADM

## 2018-01-03 RX ORDER — ROSUVASTATIN CALCIUM 10 MG/1
20 TABLET, COATED ORAL DAILY
Status: DISCONTINUED | OUTPATIENT
Start: 2018-01-04 | End: 2018-01-04 | Stop reason: HOSPADM

## 2018-01-03 RX ORDER — LEVALBUTEROL 1.25 MG/.5ML
1.25 SOLUTION, CONCENTRATE RESPIRATORY (INHALATION) EVERY 4 HOURS
Status: DISCONTINUED | OUTPATIENT
Start: 2018-01-04 | End: 2018-01-04

## 2018-01-03 RX ORDER — CLONIDINE HYDROCHLORIDE 0.1 MG/1
0.3 TABLET ORAL 3 TIMES DAILY
Status: DISCONTINUED | OUTPATIENT
Start: 2018-01-03 | End: 2018-01-03

## 2018-01-03 RX ORDER — ACETAMINOPHEN 325 MG/1
650 TABLET ORAL EVERY 6 HOURS PRN
Status: DISCONTINUED | OUTPATIENT
Start: 2018-01-03 | End: 2018-01-04 | Stop reason: HOSPADM

## 2018-01-03 RX ORDER — DEXAMETHASONE SODIUM PHOSPHATE 4 MG/ML
8 INJECTION, SOLUTION INTRA-ARTICULAR; INTRALESIONAL; INTRAMUSCULAR; INTRAVENOUS; SOFT TISSUE
Status: COMPLETED | OUTPATIENT
Start: 2018-01-03 | End: 2018-01-03

## 2018-01-03 RX ORDER — AZITHROMYCIN 250 MG/1
500 TABLET, FILM COATED ORAL ONCE
Status: COMPLETED | OUTPATIENT
Start: 2018-01-03 | End: 2018-01-03

## 2018-01-03 RX ADMIN — HYDRALAZINE HYDROCHLORIDE 100 MG: 25 TABLET ORAL at 10:01

## 2018-01-03 RX ADMIN — CLONIDINE HYDROCHLORIDE 0.3 MG: 0.1 TABLET ORAL at 07:01

## 2018-01-03 RX ADMIN — ENOXAPARIN SODIUM 40 MG: 100 INJECTION SUBCUTANEOUS at 07:01

## 2018-01-03 RX ADMIN — NITROGLYCERIN 1 INCH: 20 OINTMENT TOPICAL at 09:01

## 2018-01-03 RX ADMIN — AZITHROMYCIN 500 MG: 250 TABLET, FILM COATED ORAL at 07:01

## 2018-01-03 RX ADMIN — CARBAMAZEPINE 200 MG: 200 TABLET ORAL at 10:01

## 2018-01-03 RX ADMIN — LEVALBUTEROL 1.25 MG: 1.25 SOLUTION, CONCENTRATE RESPIRATORY (INHALATION) at 01:01

## 2018-01-03 RX ADMIN — HYDRALAZINE HYDROCHLORIDE 100 MG: 25 TABLET ORAL at 06:01

## 2018-01-03 RX ADMIN — GABAPENTIN 300 MG: 300 CAPSULE ORAL at 10:01

## 2018-01-03 RX ADMIN — DEXAMETHASONE SODIUM PHOSPHATE 8 MG: 4 INJECTION, SOLUTION INTRAMUSCULAR; INTRAVENOUS at 02:01

## 2018-01-03 RX ADMIN — METHYLPREDNISOLONE SODIUM SUCCINATE 80 MG: 125 INJECTION, POWDER, FOR SOLUTION INTRAMUSCULAR; INTRAVENOUS at 09:01

## 2018-01-03 RX ADMIN — GUAIFENESIN 600 MG: 600 TABLET, EXTENDED RELEASE ORAL at 09:01

## 2018-01-03 RX ADMIN — METOPROLOL TARTRATE 100 MG: 50 TABLET ORAL at 07:01

## 2018-01-03 NOTE — HPI
82 y.o. female with COPD, obesity hypoventilation syndrome, CAD s/p PCI, AVS, pulmonary hypertension, CKD stage III, hypertension, and hyperlipidemia presents with a complaint of acutely worsening shortness of breath and cough beginning yesterday.  Cough is not productive and associated with some mild abdominal and back soreness, recent sick contact grandson who has a cold.  No alleviating or exacerbating factors, no attempted self treatment.  She has been taking and tolerating her home medications as prescribed. Not on home O2.  Denies fever, chills, chest pain, palpitations, swelling, orthopnea, PND, dysuria, frequency, urgency, nausea, vomiting, diarrhea, bloody or dark stools.  ED evaluation significant for Flu negative, EKG without evidence of acute ischemia, troponin negative, , chest xray without acute abnormality.  Treated with supplemental oxygen, nebs, and steroids without satisfactory improvement.  Placed in observation for further evaluation and treatment.

## 2018-01-03 NOTE — ASSESSMENT & PLAN NOTE
BMI Body mass index is 32.45 kg/m².  Patient counseled on risks obesity imparts on overall health. Urged toward diet and lifestyle modifications to aid in weight reduction.

## 2018-01-03 NOTE — ASSESSMENT & PLAN NOTE
Moderate to severe on last echo in Oct 2017. BNP and troponin are lower than her usual baseline and there is no evidence of pulmonary edema on exam or chest xray.

## 2018-01-03 NOTE — ED TRIAGE NOTES
Pt presents to ED by private vehicle c/o sob and wheezing since last night.  States she tried using her inhaler, but it didn't help her.  Hasn't taken any of her medications today.  Hx of COPD, CHF, HTN.

## 2018-01-03 NOTE — ASSESSMENT & PLAN NOTE
Diffuse expiratory wheeze and increased work of breathing.  Symptoms most consistent with COPD exacerbation. COPD exacerbation: severity = severe  is not on home oxygen therapy.  -O2 to keep sats >88%  -corticosteroids  -nebs  -azithromycin   -PRN BPAP

## 2018-01-03 NOTE — ASSESSMENT & PLAN NOTE
Followed by Dr. Sultana, Patient H/H stable and consistent with baseline. No evidence acute bleeding or indication for transfusion at this time.

## 2018-01-03 NOTE — ASSESSMENT & PLAN NOTE
Most recent estimated PA pressure 53.41 in Oct 2017, continue home medications and respiratory support.

## 2018-01-03 NOTE — ASSESSMENT & PLAN NOTE
Stable, no acute issues. Follows with Dr. Bland, Urology. No invasive intervention recommended at this time.

## 2018-01-03 NOTE — ED NOTES
Per MD pt ambulated and took approximately 20 steps and her sats dropped to 92% and HR elevated to 96.  MD notified

## 2018-01-03 NOTE — ASSESSMENT & PLAN NOTE
Stable, no chest pain, troponin negative, EKG without evidence of acute ischemia, continue home medications.  Last echo and stress test in Oct 2017.

## 2018-01-03 NOTE — ED NOTES
Pt instructed about plan of care: ecg (immediately shown to MD), chest xray, and blood work (which will take about 1.5-2hrs to process).

## 2018-01-04 VITALS
DIASTOLIC BLOOD PRESSURE: 74 MMHG | HEART RATE: 70 BPM | OXYGEN SATURATION: 94 % | SYSTOLIC BLOOD PRESSURE: 161 MMHG | BODY MASS INDEX: 32.44 KG/M2 | RESPIRATION RATE: 18 BRPM | HEIGHT: 65 IN | WEIGHT: 194.69 LBS | TEMPERATURE: 99 F

## 2018-01-04 PROBLEM — J20.1 ACUTE BRONCHITIS DUE TO HAEMOPHILUS INFLUENZAE: Status: ACTIVE | Noted: 2018-01-04

## 2018-01-04 LAB
ANION GAP SERPL CALC-SCNC: 8 MMOL/L
BASOPHILS # BLD AUTO: 0 K/UL
BASOPHILS NFR BLD: 0 %
BUN SERPL-MCNC: 20 MG/DL
CALCIUM SERPL-MCNC: 8.8 MG/DL
CHLORIDE SERPL-SCNC: 107 MMOL/L
CO2 SERPL-SCNC: 24 MMOL/L
CREAT SERPL-MCNC: 1 MG/DL
D DIMER PPP IA.FEU-MCNC: 0.7 MG/L FEU
DIFFERENTIAL METHOD: ABNORMAL
EOSINOPHIL # BLD AUTO: 0 K/UL
EOSINOPHIL NFR BLD: 0 %
ERYTHROCYTE [DISTWIDTH] IN BLOOD BY AUTOMATED COUNT: 14.5 %
EST. GFR  (AFRICAN AMERICAN): >60 ML/MIN/1.73 M^2
EST. GFR  (NON AFRICAN AMERICAN): 53 ML/MIN/1.73 M^2
GLUCOSE SERPL-MCNC: 127 MG/DL
HCT VFR BLD AUTO: 27.3 %
HGB BLD-MCNC: 9 G/DL
LYMPHOCYTES # BLD AUTO: 0.6 K/UL
LYMPHOCYTES NFR BLD: 25.4 %
MCH RBC QN AUTO: 29.4 PG
MCHC RBC AUTO-ENTMCNC: 33 G/DL
MCV RBC AUTO: 89 FL
MONOCYTES # BLD AUTO: 0.1 K/UL
MONOCYTES NFR BLD: 5.6 %
NEUTROPHILS # BLD AUTO: 1.7 K/UL
NEUTROPHILS NFR BLD: 69 %
PLATELET # BLD AUTO: 208 K/UL
PMV BLD AUTO: 10.1 FL
POTASSIUM SERPL-SCNC: 3.8 MMOL/L
RBC # BLD AUTO: 3.06 M/UL
SODIUM SERPL-SCNC: 139 MMOL/L
WBC # BLD AUTO: 2.52 K/UL

## 2018-01-04 PROCEDURE — 63600175 PHARM REV CODE 636 W HCPCS: Performed by: EMERGENCY MEDICINE

## 2018-01-04 PROCEDURE — 25000003 PHARM REV CODE 250: Performed by: HOSPITALIST

## 2018-01-04 PROCEDURE — 96374 THER/PROPH/DIAG INJ IV PUSH: CPT

## 2018-01-04 PROCEDURE — 85025 COMPLETE CBC W/AUTO DIFF WBC: CPT

## 2018-01-04 PROCEDURE — 25000003 PHARM REV CODE 250: Performed by: NURSE PRACTITIONER

## 2018-01-04 PROCEDURE — 80048 BASIC METABOLIC PNL TOTAL CA: CPT

## 2018-01-04 PROCEDURE — 94640 AIRWAY INHALATION TREATMENT: CPT

## 2018-01-04 PROCEDURE — 36415 COLL VENOUS BLD VENIPUNCTURE: CPT

## 2018-01-04 PROCEDURE — 96375 TX/PRO/DX INJ NEW DRUG ADDON: CPT

## 2018-01-04 PROCEDURE — 27000221 HC OXYGEN, UP TO 24 HOURS

## 2018-01-04 PROCEDURE — 94761 N-INVAS EAR/PLS OXIMETRY MLT: CPT

## 2018-01-04 PROCEDURE — 25000242 PHARM REV CODE 250 ALT 637 W/ HCPCS: Performed by: HOSPITALIST

## 2018-01-04 PROCEDURE — 25000242 PHARM REV CODE 250 ALT 637 W/ HCPCS: Performed by: NURSE PRACTITIONER

## 2018-01-04 PROCEDURE — 85379 FIBRIN DEGRADATION QUANT: CPT

## 2018-01-04 PROCEDURE — G0378 HOSPITAL OBSERVATION PER HR: HCPCS

## 2018-01-04 PROCEDURE — 25000003 PHARM REV CODE 250: Performed by: EMERGENCY MEDICINE

## 2018-01-04 PROCEDURE — 25000242 PHARM REV CODE 250 ALT 637 W/ HCPCS: Performed by: EMERGENCY MEDICINE

## 2018-01-04 PROCEDURE — 25000003 PHARM REV CODE 250: Performed by: INTERNAL MEDICINE

## 2018-01-04 PROCEDURE — 96376 TX/PRO/DX INJ SAME DRUG ADON: CPT

## 2018-01-04 RX ORDER — GUAIFENESIN 100 MG/5ML
200 SOLUTION ORAL EVERY 4 HOURS PRN
Status: DISCONTINUED | OUTPATIENT
Start: 2018-01-04 | End: 2018-01-04 | Stop reason: HOSPADM

## 2018-01-04 RX ORDER — METHYLPREDNISOLONE 4 MG/1
TABLET ORAL
Qty: 1 PACKAGE | Refills: 0 | Status: SHIPPED | OUTPATIENT
Start: 2018-01-04 | End: 2018-01-04

## 2018-01-04 RX ORDER — AZITHROMYCIN 250 MG/1
TABLET, FILM COATED ORAL
Qty: 7 TABLET | Refills: 0 | Status: SHIPPED | OUTPATIENT
Start: 2018-01-04 | End: 2018-02-06

## 2018-01-04 RX ORDER — CETIRIZINE HYDROCHLORIDE 10 MG/1
10 TABLET ORAL DAILY
Refills: 0 | Status: ON HOLD | COMMUNITY
Start: 2018-01-04 | End: 2020-09-20 | Stop reason: HOSPADM

## 2018-01-04 RX ORDER — METHYLPREDNISOLONE 4 MG/1
TABLET ORAL
Qty: 1 PACKAGE | Refills: 0 | Status: SHIPPED | OUTPATIENT
Start: 2018-01-04 | End: 2018-01-12 | Stop reason: ALTCHOICE

## 2018-01-04 RX ORDER — CETIRIZINE HYDROCHLORIDE 10 MG/1
10 TABLET ORAL DAILY
Status: DISCONTINUED | OUTPATIENT
Start: 2018-01-04 | End: 2018-01-04 | Stop reason: HOSPADM

## 2018-01-04 RX ORDER — AZITHROMYCIN 250 MG/1
TABLET, FILM COATED ORAL
Qty: 7 TABLET | Refills: 0 | Status: SHIPPED | OUTPATIENT
Start: 2018-01-04 | End: 2018-01-04

## 2018-01-04 RX ORDER — IPRATROPIUM BROMIDE AND ALBUTEROL SULFATE 2.5; .5 MG/3ML; MG/3ML
3 SOLUTION RESPIRATORY (INHALATION) EVERY 4 HOURS
Status: DISCONTINUED | OUTPATIENT
Start: 2018-01-04 | End: 2018-01-04 | Stop reason: HOSPADM

## 2018-01-04 RX ORDER — LABETALOL HYDROCHLORIDE 5 MG/ML
10 INJECTION, SOLUTION INTRAVENOUS ONCE
Status: COMPLETED | OUTPATIENT
Start: 2018-01-04 | End: 2018-01-04

## 2018-01-04 RX ORDER — LEVALBUTEROL 1.25 MG/.5ML
1.25 SOLUTION, CONCENTRATE RESPIRATORY (INHALATION)
Status: DISCONTINUED | OUTPATIENT
Start: 2018-01-04 | End: 2018-01-04

## 2018-01-04 RX ADMIN — AZITHROMYCIN 250 MG: 250 TABLET, FILM COATED ORAL at 10:01

## 2018-01-04 RX ADMIN — CETIRIZINE HYDROCHLORIDE 10 MG: 10 TABLET, FILM COATED ORAL at 04:01

## 2018-01-04 RX ADMIN — LEVALBUTEROL 1.25 MG: 1.25 SOLUTION, CONCENTRATE RESPIRATORY (INHALATION) at 11:01

## 2018-01-04 RX ADMIN — LEVALBUTEROL 1.25 MG: 1.25 SOLUTION, CONCENTRATE RESPIRATORY (INHALATION) at 01:01

## 2018-01-04 RX ADMIN — ASPIRIN 81 MG: 81 TABLET, COATED ORAL at 10:01

## 2018-01-04 RX ADMIN — VERAPAMIL HYDROCHLORIDE 360 MG: 180 TABLET, FILM COATED, EXTENDED RELEASE ORAL at 10:01

## 2018-01-04 RX ADMIN — METOPROLOL TARTRATE 100 MG: 50 TABLET ORAL at 10:01

## 2018-01-04 RX ADMIN — CLONIDINE HYDROCHLORIDE 0.3 MG: 0.1 TABLET ORAL at 04:01

## 2018-01-04 RX ADMIN — GABAPENTIN 300 MG: 300 CAPSULE ORAL at 05:01

## 2018-01-04 RX ADMIN — METHYLPREDNISOLONE SODIUM SUCCINATE 80 MG: 125 INJECTION, POWDER, FOR SOLUTION INTRAMUSCULAR; INTRAVENOUS at 04:01

## 2018-01-04 RX ADMIN — CARBAMAZEPINE 200 MG: 200 TABLET ORAL at 05:01

## 2018-01-04 RX ADMIN — CLONIDINE HYDROCHLORIDE 0.3 MG: 0.1 TABLET ORAL at 05:01

## 2018-01-04 RX ADMIN — ROSUVASTATIN CALCIUM 20 MG: 10 TABLET, FILM COATED ORAL at 10:01

## 2018-01-04 RX ADMIN — HYDRALAZINE HYDROCHLORIDE 100 MG: 25 TABLET ORAL at 04:01

## 2018-01-04 RX ADMIN — CARBAMAZEPINE 200 MG: 200 TABLET ORAL at 04:01

## 2018-01-04 RX ADMIN — HYDRALAZINE HYDROCHLORIDE 100 MG: 25 TABLET ORAL at 05:01

## 2018-01-04 RX ADMIN — GABAPENTIN 300 MG: 300 CAPSULE ORAL at 04:01

## 2018-01-04 RX ADMIN — LABETALOL HYDROCHLORIDE 10 MG: 5 INJECTION, SOLUTION INTRAVENOUS at 04:01

## 2018-01-04 RX ADMIN — IPRATROPIUM BROMIDE 0.5 MG: 0.5 SOLUTION RESPIRATORY (INHALATION) at 08:01

## 2018-01-04 RX ADMIN — LEVALBUTEROL 1.25 MG: 1.25 SOLUTION, CONCENTRATE RESPIRATORY (INHALATION) at 08:01

## 2018-01-04 RX ADMIN — METHYLPREDNISOLONE SODIUM SUCCINATE 80 MG: 125 INJECTION, POWDER, FOR SOLUTION INTRAMUSCULAR; INTRAVENOUS at 06:01

## 2018-01-04 RX ADMIN — IPRATROPIUM BROMIDE 0.5 MG: 0.5 SOLUTION RESPIRATORY (INHALATION) at 01:01

## 2018-01-04 RX ADMIN — GUAIFENESIN 200 MG: 200 SOLUTION ORAL at 04:01

## 2018-01-04 RX ADMIN — IPRATROPIUM BROMIDE AND ALBUTEROL SULFATE 3 ML: .5; 3 SOLUTION RESPIRATORY (INHALATION) at 04:01

## 2018-01-04 RX ADMIN — FUROSEMIDE 40 MG: 40 TABLET ORAL at 10:01

## 2018-01-04 RX ADMIN — ACETAMINOPHEN 650 MG: 325 TABLET ORAL at 04:01

## 2018-01-04 NOTE — SUBJECTIVE & OBJECTIVE
Past Medical History:   Diagnosis Date    Anemia     Anticoagulant long-term use     Aortic stenosis     Arthritis     lower back    Asthma     CAD (coronary artery disease) 4/24/2015    Carpal tunnel syndrome on both sides     Cataract     CHF (congestive heart failure) 5/2013    COPD (chronic obstructive pulmonary disease)     Depression     DVT (deep venous thrombosis)     Hyperlipidemia     Hypertension     Pulmonary HTN     TMJ (temporomandibular joint disorder)        Past Surgical History:   Procedure Laterality Date    BACK SURGERY      cardiac stents      CARPAL TUNNEL RELEASE      Right/Left    EYE SURGERY      cataract extraction    HYSTERECTOMY      Partial    JOINT REPLACEMENT  2009    Left hip    POLYPECTOMY      x9    TEMPOROMANDIBULAR JOINT SURGERY         Review of patient's allergies indicates:   Allergen Reactions    Doxycycline hyclate Diarrhea and Nausea And Vomiting    Singulair [montelukast]      Stomach pain, Muscle pain, Cough      Penicillins      Other reaction(s): Anaphylaxis    Ventolin [albuterol sulfate] Other (See Comments)     Severely elevated blood pressure    Latex, natural rubber Rash       No current facility-administered medications on file prior to encounter.      Current Outpatient Prescriptions on File Prior to Encounter   Medication Sig    aspirin (ECOTRIN) 81 MG EC tablet Take 81 mg by mouth once daily.    cloNIDine (CATAPRES) 0.3 MG tablet Take 1 tablet (0.3 mg total) by mouth 3 (three) times daily.    furosemide (LASIX) 40 MG tablet TAKE ONE TABLET BY MOUTH EVERY DAY AS NEEDED FOR SHORTNESS OF BREATH or leg swelling    gabapentin (NEURONTIN) 300 MG capsule Take 1 capsule (300 mg total) by mouth 3 (three) times daily.    hydrALAZINE (APRESOLINE) 100 MG tablet ONE TABLET BY MOUTH THREE TIMES DAILY    latanoprost 0.005 % ophthalmic solution Place 1 drop into both eyes nightly.    metoprolol tartrate (LOPRESSOR) 100 MG tablet ONE TABLET BY  MOUTH TWICE DAILY    rosuvastatin (CRESTOR) 20 MG tablet ONE TABLET BY MOUTH EVERY EVENING    verapamil (VERELAN) 360 MG C24P ONE CAPSULE BY MOUTH once a day    acetaminophen (TYLENOL) 325 MG tablet Take 2 tablets (650 mg total) by mouth every 6 (six) hours as needed for Pain or Temperature greater than (100.4).    azelastine (ASTELIN) 137 mcg (0.1 %) nasal spray 1 spray (137 mcg total) by Nasal route 2 (two) times daily.    carbamazepine (TEGRETOL) 200 mg tablet Take 1 tablet (200 mg total) by mouth 3 (three) times daily.    clotrimazole-betamethasone 1-0.05% (LOTRISONE) cream 2 (two) times daily. Apply to affected area    fluticasone (FLOVENT HFA) 110 mcg/actuation inhaler Inhale 1 puff into the lungs every 12 (twelve) hours.    nitroGLYCERIN (NITROSTAT) 0.4 MG SL tablet Place 0.4 mg under the tongue every 5 (five) minutes as needed for Chest pain.    ondansetron (ZOFRAN-ODT) 8 MG TbDL Take 1 tablet (8 mg total) by mouth every 6 (six) hours as needed (Nausea).    walker (ULTRA-LIGHT ROLLATOR) Misc One rollator, size large.     Family History     Problem Relation (Age of Onset)    Cancer Son    Heart disease Mother    Hypertension Daughter        Social History Main Topics    Smoking status: Never Smoker    Smokeless tobacco: Never Used    Alcohol use No    Drug use: No    Sexual activity: No     Review of Systems   Constitutional: Negative for chills, fatigue and fever.   HENT: Negative for congestion, rhinorrhea and sore throat.    Eyes: Negative for photophobia and visual disturbance.   Respiratory: Positive for cough, shortness of breath and wheezing.    Cardiovascular: Negative for chest pain, palpitations and leg swelling.   Gastrointestinal: Negative for abdominal pain, blood in stool, diarrhea, nausea and vomiting.   Genitourinary: Negative for dysuria, frequency and urgency.   Skin: Negative for pallor, rash and wound.   Neurological: Negative for tremors, weakness, light-headedness and  headaches.   Psychiatric/Behavioral: Negative for confusion and decreased concentration.     Objective:     Vital Signs (Most Recent):  Temp: 99.2 °F (37.3 °C) (01/03/18 2047)  Pulse: 98 (01/03/18 2047)  Resp: 20 (01/03/18 2047)  BP: (!) 190/86 (01/03/18 2153)  SpO2: 99 % (01/03/18 2047) Vital Signs (24h Range):  Temp:  [97.9 °F (36.6 °C)-99.2 °F (37.3 °C)] 99.2 °F (37.3 °C)  Pulse:  [64-98] 98  Resp:  [20-22] 20  SpO2:  [91 %-99 %] 99 %  BP: (142-230)/() 190/86     Weight: 88.2 kg (194 lb 7.1 oz)  Body mass index is 32.36 kg/m².    Physical Exam   Constitutional: She is oriented to person, place, and time. She appears well-developed and well-nourished. No distress.   HENT:   Head: Normocephalic and atraumatic.   Right Ear: External ear normal.   Left Ear: External ear normal.   Nose: Nose normal.   Mouth/Throat: Oropharynx is clear and moist.   Eyes: Conjunctivae and EOM are normal. Pupils are equal, round, and reactive to light.   Neck: Normal range of motion. Neck supple.   Cardiovascular: Normal rate, regular rhythm and intact distal pulses.    Pulmonary/Chest: Accessory muscle usage present. No respiratory distress. She has wheezes (diffuse expiratory).   Abdominal: Soft. Bowel sounds are normal. She exhibits no distension. There is no tenderness.   No palpable hepatomegaly or splenomegaly    Musculoskeletal: Normal range of motion. She exhibits no edema or tenderness.   Lymphadenopathy:     She has no cervical adenopathy.   Neurological: She is alert and oriented to person, place, and time.   Skin: Skin is warm and dry.   Psychiatric: She has a normal mood and affect. Thought content normal.   Nursing note and vitals reviewed.        CRANIAL NERVES     CN III, IV, VI   Pupils are equal, round, and reactive to light.  Extraocular motions are normal.        Significant Labs: All pertinent labs within the past 24 hours have been reviewed.    Significant Imaging: I have reviewed and interpreted all pertinent  imaging results/findings within the past 24 hours.

## 2018-01-04 NOTE — PLAN OF CARE
01/04/18 1531   Final Note   Assessment Type Final Discharge Note   Discharge Disposition Home   Hospital Follow Up  Appt(s) scheduled? Yes   Discharge plans and expectations educations in teach back method with documentation complete? Yes   Right Care Referral Info   Post Acute Recommendation No Care     Follow-up Information     Rodger Camarena MD On 1/12/2018.    Specialty:  Family Medicine  Why:  Outpatient Services PCP Follow-Up Friday at 12:00 PM.  Contact information:  [address] [facility] [address]  [place] [address] [phone]

## 2018-01-04 NOTE — PROGRESS NOTES
Follow-up Information     Rodger Camarena MD On 1/12/2018.    Specialty:  Family Medicine  Why:  Outpatient Services PCP Follow-Up Friday at 12:00 PM.  Contact information:  [place] [address] [phone]                   PLEASE BRING TO ALL FOLLOW UP APPOINTMENTS:   A COPY YOUR DISCHARGE INSTRUCTIONS, Any new MEDICINES YOU ARE CURRENTLY TAKING IN THEIR ORIGINAL BOTTLES  And IDENTIFICATION AND INSURANCE CARD     **PLEASE ARRIVE 15 MINUTES AHEAD OF SCHEDULED APPOINTMENT TIME   ++PLEASE CALL 24 HOURS IN ADVANCE IF YOU MUST RESCHEDULE YOUR APPOINTMENT DAY AND/OR TIME     [facility]    WRITTEN HEALTHCARE AND DISCHARGE INFORMATION                        Help at Home           [phone]  After discharge for assistance [facility] On Call Nurse Care Line 24/7  Assistance    Things You are responsible For To Manage Your Care At Home:  1.    Getting your prescriptions filled   2.    Taking your medications as directed, DO NOT MISS ANY DOSES!  3.    Going to your follow-up doctor appointment. This is important because it  allow the doctor to monitor your progress and determine if  any changes need to made to your treatment plan.     Thank you for choosing [facility] for your care.  Please answer any calls you may receive from [facility] we want to continue to support you as you manage your healthcare needs. [facility] is happy to have the opportunity to serve you.     Sincerely,  Your [facility] Team,  Marleni TAN; Transition Navigator [phone]

## 2018-01-04 NOTE — ED PROVIDER NOTES
"Encounter Date: 1/3/2018       History     Chief Complaint   Patient presents with    Shortness of Breath     "I have bronchitis and asthma and I've been couging all night; complains of bilateral flank pain started last night which she attributes to coughing; hx of CHF and COPD     Patient complaining of shortness of breath.  Cough starting yesterday.  No fever.  No production to the cough.  History of COPD and CHF.  Denies chest pain.  Denies fever.          Review of patient's allergies indicates:   Allergen Reactions    Doxycycline hyclate Diarrhea and Nausea And Vomiting    Singulair [montelukast]      Stomach pain, Muscle pain, Cough      Latex, natural rubber     Penicillins      Other reaction(s): Anaphylaxis    Ventolin  [albuterol sulfate] Other (See Comments)     Past Medical History:   Diagnosis Date    Anemia     Anticoagulant long-term use     Aortic stenosis     Arthritis     lower back    Asthma     CAD (coronary artery disease) 4/24/2015    Carpal tunnel syndrome on both sides     Cataract     CHF (congestive heart failure) 5/2013    COPD (chronic obstructive pulmonary disease)     Depression     DVT (deep venous thrombosis)     Hyperlipidemia     Hypertension     Pulmonary HTN     TMJ (temporomandibular joint disorder)      Past Surgical History:   Procedure Laterality Date    BACK SURGERY      cardiac stents      CARPAL TUNNEL RELEASE      Right/Left    HYSTERECTOMY      Partial    JOINT REPLACEMENT  2009    Left hip    TEMPOROMANDIBULAR JOINT SURGERY       Family History   Problem Relation Age of Onset    Heart disease Mother     Hypertension Daughter     Cancer Son     Breast cancer Neg Hx     Colon cancer Neg Hx     Ovarian cancer Neg Hx      Social History   Substance Use Topics    Smoking status: Never Smoker    Smokeless tobacco: Never Used    Alcohol use No     Review of Systems   Constitutional: Negative for fever.   HENT: Negative for sore throat.  "   Eyes: Negative for visual disturbance.   Respiratory: Positive for cough, shortness of breath and wheezing.    Cardiovascular: Negative for chest pain.   Gastrointestinal: Negative for abdominal pain, constipation, diarrhea, nausea and vomiting.   Genitourinary: Negative for difficulty urinating.   Musculoskeletal: Negative for back pain.   Skin: Negative for rash.   Neurological: Negative for headaches.       Physical Exam     Initial Vitals [01/03/18 1143]   BP Pulse Resp Temp SpO2   (!) 205/93 66 20 98.3 °F (36.8 °C) 97 %      MAP       130.33         Physical Exam    Nursing note and vitals reviewed.  Constitutional: She appears well-developed and well-nourished. She appears distressed.   Eyes: EOM are normal. Pupils are equal, round, and reactive to light.   Neck: Normal range of motion. Neck supple. No thyromegaly present. No JVD present.   Cardiovascular: Normal rate, regular rhythm, normal heart sounds and intact distal pulses. Exam reveals no gallop and no friction rub.    No murmur heard.  Pulmonary/Chest: She has wheezes. She has no rhonchi. She has no rales. She exhibits no tenderness.   Increased work of breathing   Abdominal: Soft. Bowel sounds are normal. She exhibits no distension. There is no tenderness. There is no rebound and no guarding.   Musculoskeletal: Normal range of motion. She exhibits no edema or tenderness.   Neurological: She is alert and oriented to person, place, and time. She has normal strength.   Skin: Skin is warm and dry.         ED Course   Procedures  Labs Reviewed   CBC W/ AUTO DIFFERENTIAL - Abnormal; Notable for the following:        Result Value    RBC 3.29 (*)     Hemoglobin 9.3 (*)     Hematocrit 29.4 (*)     MCHC 31.6 (*)     All other components within normal limits   COMPREHENSIVE METABOLIC PANEL - Abnormal; Notable for the following:     eGFR if  55 (*)     eGFR if non  48 (*)     All other components within normal limits   B-TYPE  NATRIURETIC PEPTIDE - Abnormal; Notable for the following:      (*)     All other components within normal limits   TROPONIN I   PROTIME-INR   INFLUENZA A AND B ANTIGEN     EKG Readings: (Independently Interpreted)   Initial Reading: No STEMI. Rhythm: Normal Sinus Rhythm. Heart Rate: 66. Conduction: Bifasicular.     ECG Results          EKG 12-lead (Final result)  Result time 01/03/18 18:39:38    Final result by Interface, Lab In [facility] (01/03/18 18:39:38)                 Narrative:    Test Reason :   Blood Pressue  Vent. Rate : 066 BPM     Atrial Rate : 066 BPM     P-R Int : 188 ms          QRS Dur : 162 ms      QT Int : 472 ms       P-R-T Axes : 081 -64 081 degrees     QTc Int : 494 ms    Normal sinus rhythm with sinus arrhythmia  Right bundle branch block  Left anterior fascicular block   Bifascicular block  LVH with repolarization abnormality  Cannot rule out Septal infarct ,age undetermined  Abnormal ECG  When compared with ECG of 12-OCT-2017 13:11,  Significant changes have occurred  Confirmed by Tony Carmen MD ([id number]) on 1/3/2018 6:39:31 PM    Referred By: DEEP   SELF           Confirmed By:Tony Carmen MD                            X-Rays:   Independently Interpreted Readings:   Chest X-Ray: Normal heart size.  No infiltrates.  No acute abnormalities. Left hemidiaphragm elevation       after breathing treatments patient still wheezing and tight.  Ambulated in the emergency department but only able to go 20 feet and then became acutely dyspneic.  Will bring in the hospital for further treatment.                 ED Course      Clinical Impression:   The primary encounter diagnosis was COPD (chronic obstructive pulmonary disease) with acute bronchitis. A diagnosis of URI, acute was also pertinent to this visit.                           Perry Drummond MD  01/03/18 1931

## 2018-01-04 NOTE — H&P
"[facility] Hospital Medicine  History & Physical    Patient Name: Cindy Lyman  MRN: [id number]  Admission Date: 1/3/2018  Attending Physician: Masha Cooper MD   Primary Care Provider: Rodger Camarena MD         Patient information was obtained from patient, relative(s), past medical records and ER records.     Subjective:     Principal Problem:COPD (chronic obstructive pulmonary disease) with acute bronchitis    Chief Complaint:   Chief Complaint   Patient presents with    Shortness of Breath     "I have bronchitis and asthma and I've been couging all night; complains of bilateral flank pain started last night which she attributes to coughing; hx of CHF and COPD        HPI: 82 y.o. female with COPD, obesity hypoventilation syndrome, CAD s/p PCI, AVS, pulmonary hypertension, CKD stage III, hypertension, and hyperlipidemia presents with a complaint of acutely worsening shortness of breath and cough beginning yesterday.  Cough is not productive and associated with some mild abdominal and back soreness, recent sick contact grandson who has a cold.  No alleviating or exacerbating factors, no attempted self treatment.  She has been taking and tolerating her home medications as prescribed. Not on home O2.  Denies fever, chills, chest pain, palpitations, swelling, orthopnea, PND, dysuria, frequency, urgency, nausea, vomiting, diarrhea, bloody or dark stools.  ED evaluation significant for Flu negative, EKG without evidence of acute ischemia, troponin negative, , chest xray without acute abnormality.  Treated with supplemental oxygen, nebs, and steroids without satisfactory improvement.  Placed in observation for further evaluation and treatment.    Past Medical History:   Diagnosis Date    Anemia     Anticoagulant long-term use     Aortic stenosis     Arthritis     lower back    Asthma     CAD (coronary artery disease) 4/24/2015    Carpal tunnel syndrome on both sides     " Cataract     CHF (congestive heart failure) 5/2013    COPD (chronic obstructive pulmonary disease)     Depression     DVT (deep venous thrombosis)     Hyperlipidemia     Hypertension     Pulmonary HTN     TMJ (temporomandibular joint disorder)        Past Surgical History:   Procedure Laterality Date    BACK SURGERY      cardiac stents      CARPAL TUNNEL RELEASE      Right/Left    EYE SURGERY      cataract extraction    HYSTERECTOMY      Partial    JOINT REPLACEMENT  2009    Left hip    POLYPECTOMY      x9    TEMPOROMANDIBULAR JOINT SURGERY         Review of patient's allergies indicates:   Allergen Reactions    Doxycycline hyclate Diarrhea and Nausea And Vomiting    Singulair [montelukast]      Stomach pain, Muscle pain, Cough      Penicillins      Other reaction(s): Anaphylaxis    Ventolin [albuterol sulfate] Other (See Comments)     Severely elevated blood pressure    Latex, natural rubber Rash       No current facility-administered medications on file prior to encounter.      Current Outpatient Prescriptions on File Prior to Encounter   Medication Sig    aspirin (ECOTRIN) 81 MG EC tablet Take 81 mg by mouth once daily.    cloNIDine (CATAPRES) 0.3 MG tablet Take 1 tablet (0.3 mg total) by mouth 3 (three) times daily.    furosemide (LASIX) 40 MG tablet TAKE ONE TABLET BY MOUTH EVERY DAY AS NEEDED FOR SHORTNESS OF BREATH or leg swelling    gabapentin (NEURONTIN) 300 MG capsule Take 1 capsule (300 mg total) by mouth 3 (three) times daily.    hydrALAZINE (APRESOLINE) 100 MG tablet ONE TABLET BY MOUTH THREE TIMES DAILY    latanoprost 0.005 % ophthalmic solution Place 1 drop into both eyes nightly.    metoprolol tartrate (LOPRESSOR) 100 MG tablet ONE TABLET BY MOUTH TWICE DAILY    rosuvastatin (CRESTOR) 20 MG tablet ONE TABLET BY MOUTH EVERY EVENING    verapamil (VERELAN) 360 MG C24P ONE CAPSULE BY MOUTH once a day    acetaminophen (TYLENOL) 325 MG tablet Take 2 tablets (650 mg total) by  mouth every 6 (six) hours as needed for Pain or Temperature greater than (100.4).    azelastine (ASTELIN) 137 mcg (0.1 %) nasal spray 1 spray (137 mcg total) by Nasal route 2 (two) times daily.    carbamazepine (TEGRETOL) 200 mg tablet Take 1 tablet (200 mg total) by mouth 3 (three) times daily.    clotrimazole-betamethasone 1-0.05% (LOTRISONE) cream 2 (two) times daily. Apply to affected area    fluticasone (FLOVENT HFA) 110 mcg/actuation inhaler Inhale 1 puff into the lungs every 12 (twelve) hours.    nitroGLYCERIN (NITROSTAT) 0.4 MG SL tablet Place 0.4 mg under the tongue every 5 (five) minutes as needed for Chest pain.    ondansetron (ZOFRAN-ODT) 8 MG TbDL Take 1 tablet (8 mg total) by mouth every 6 (six) hours as needed (Nausea).    walker (ULTRA-LIGHT ROLLATOR) Misc One rollator, size large.     Family History     Problem Relation (Age of Onset)    Cancer Son    Heart disease Mother    Hypertension Daughter        Social History Main Topics    Smoking status: Never Smoker    Smokeless tobacco: Never Used    Alcohol use No    Drug use: No    Sexual activity: No     Review of Systems   Constitutional: Negative for chills, fatigue and fever.   HENT: Negative for congestion, rhinorrhea and sore throat.    Eyes: Negative for photophobia and visual disturbance.   Respiratory: Positive for cough, shortness of breath and wheezing.    Cardiovascular: Negative for chest pain, palpitations and leg swelling.   Gastrointestinal: Negative for abdominal pain, blood in stool, diarrhea, nausea and vomiting.   Genitourinary: Negative for dysuria, frequency and urgency.   Skin: Negative for pallor, rash and wound.   Neurological: Negative for tremors, weakness, light-headedness and headaches.   Psychiatric/Behavioral: Negative for confusion and decreased concentration.     Objective:     Vital Signs (Most Recent):  Temp: 99.2 °F (37.3 °C) (01/03/18 2047)  Pulse: 98 (01/03/18 2047)  Resp: 20 (01/03/18 2047)  BP: (!)  190/86 (01/03/18 2153)  SpO2: 99 % (01/03/18 2047) Vital Signs (24h Range):  Temp:  [97.9 °F (36.6 °C)-99.2 °F (37.3 °C)] 99.2 °F (37.3 °C)  Pulse:  [64-98] 98  Resp:  [20-22] 20  SpO2:  [91 %-99 %] 99 %  BP: (142-230)/() 190/86     Weight: 88.2 kg (194 lb 7.1 oz)  Body mass index is 32.36 kg/m².    Physical Exam   Constitutional: She is oriented to person, place, and time. She appears well-developed and well-nourished. No distress.   HENT:   Head: Normocephalic and atraumatic.   Right Ear: External ear normal.   Left Ear: External ear normal.   Nose: Nose normal.   Mouth/Throat: Oropharynx is clear and moist.   Eyes: Conjunctivae and EOM are normal. Pupils are equal, round, and reactive to light.   Neck: Normal range of motion. Neck supple.   Cardiovascular: Normal rate, regular rhythm and intact distal pulses.    Pulmonary/Chest: Accessory muscle usage present. No respiratory distress. She has wheezes (diffuse expiratory).   Abdominal: Soft. Bowel sounds are normal. She exhibits no distension. There is no tenderness.   No palpable hepatomegaly or splenomegaly    Musculoskeletal: Normal range of motion. She exhibits no edema or tenderness.   Lymphadenopathy:     She has no cervical adenopathy.   Neurological: She is alert and oriented to person, place, and time.   Skin: Skin is warm and dry.   Psychiatric: She has a normal mood and affect. Thought content normal.   Nursing note and vitals reviewed.        CRANIAL NERVES     CN III, IV, VI   Pupils are equal, round, and reactive to light.  Extraocular motions are normal.        Significant Labs: All pertinent labs within the past 24 hours have been reviewed.    Significant Imaging: I have reviewed and interpreted all pertinent imaging results/findings within the past 24 hours.    Assessment/Plan:     * COPD (chronic obstructive pulmonary disease) with acute bronchitis    Diffuse expiratory wheeze and increased work of breathing.  Symptoms most consistent with  COPD exacerbation. COPD exacerbation: severity = severe  is not on home oxygen therapy.  -O2 to keep sats >88%  -corticosteroids  -nebs  -azithromycin   -PRN BPAP        Obesity hypoventilation syndrome    Likely contributory, management as above.        Anemia of chronic disease    Followed by Dr. Sultana, Patient H/H stable and consistent with baseline. No evidence acute bleeding or indication for transfusion at this time.         Pulmonary hypertension    Most recent estimated PA pressure 53.41 in Oct 2017, continue home medications and respiratory support.        Nonrheumatic aortic valve stenosis    Moderate to severe on last echo in Oct 2017. BNP and troponin are lower than her usual baseline and there is no evidence of pulmonary edema on exam or chest xray.        Coronary artery disease involving native coronary artery without angina pectoris    Stable, no chest pain, troponin negative, EKG without evidence of acute ischemia, continue home medications.  Last echo and stress test in Oct 2017.        CAD s/p PCI    As above.        Presence of drug coated stent in LAD coronary artery    As above.        Hyperlipidemia    Well controlled, continue statin.        Essential hypertension    Well controlled, continue home clonidine, hydralazine, verapamil, and metoprolol        Class 1 obesity with serious comorbidity and body mass index (BMI) of 32.0 to 32.9 in adult    BMI Body mass index is 32.45 kg/m².  Patient counseled on risks obesity imparts on overall health. Urged toward diet and lifestyle modifications to aid in weight reduction.         Stage 3 chronic kidney disease    Stable, at baseline, no acute issues. Avoid nephrotoxic agents.  Strict I/O's.        Hyperdense renal cyst    Stable, no acute issues. Follows with Dr. Bland, Urology. No invasive intervention recommended at this time.          VTE Risk Mitigation         Ordered     enoxaparin injection 40 mg  Daily     Route:  Subcutaneous         01/03/18 2100     Medium Risk of VTE  Once      01/03/18 2100     Place sequential compression device  Until discontinued      01/03/18 2100     Place LAUREANO hose  Until discontinued      01/03/18 2100        Aleksandr Dobson Jr., APRN, [facility]-BC  Hospitalist - Department of Hospital Medicine  [facility] [address] [place] [address]  Office #: [phone]; Pager #: [phone]

## 2018-01-04 NOTE — HOSPITAL COURSE
Patient improved with IV steroids, duo nebs and ABX. Last walked with RA sat 95%. Suspected acute bronchitis, flu negative, no fever or leukocytosis. Home with Zpak and medrol dose pack. Allergy to albuterol/xopenex.

## 2018-01-04 NOTE — DISCHARGE SUMMARY
[facility] Hospital Medicine  Discharge Summary      Patient Name: Cindy Lyman  MRN: [id number]  Admission Date: 1/3/2018  Hospital Length of Stay: 0 days  Discharge Date and Time:  01/04/2018 3:22 PM  Attending Physician: David Cooper MD   Discharging Provider: MARINA Michelle  Primary Care Provider: Rodger Camarena MD      HPI:   82 y.o. female with COPD, obesity hypoventilation syndrome, CAD s/p PCI, AVS, pulmonary hypertension, CKD stage III, hypertension, and hyperlipidemia presents with a complaint of acutely worsening shortness of breath and cough beginning yesterday.  Cough is not productive and associated with some mild abdominal and back soreness, recent sick contact grandson who has a cold.  No alleviating or exacerbating factors, no attempted self treatment.  She has been taking and tolerating her home medications as prescribed. Not on home O2.  Denies fever, chills, chest pain, palpitations, swelling, orthopnea, PND, dysuria, frequency, urgency, nausea, vomiting, diarrhea, bloody or dark stools.  ED evaluation significant for Flu negative, EKG without evidence of acute ischemia, troponin negative, , chest xray without acute abnormality.  Treated with supplemental oxygen, nebs, and steroids without satisfactory improvement.  Placed in observation for further evaluation and treatment.    * No surgery found *      Hospital Course:   Patient improved with IV steroids, duo nebs and ABX. Last walked with RA sat 95%. Suspected acute bronchitis, flu negative, no fever or leukocytosis. Home with Zpak and medrol dose pack. Allergy to albuterol/xopenex.     Consults:   Consults         Status Ordering Provider     Inpatient consult to Respiratory Care  Once     Provider:  (Not yet assigned)    Acknowledged ANNAMARIA THOMPSON JR     IP consult to case management  Once     Provider:  (Not yet assigned)    Acknowledged DAVID COOPER new Assessment & Plan notes  have been filed under this hospital service since the last note was generated.  Service: Hospital Medicine    Final Active Diagnoses:    Diagnosis Date Noted POA    PRINCIPAL PROBLEM:  Acute bronchitis due to Haemophilus influenzae [J20.1] 01/04/2018 Yes    Essential hypertension [I10] 06/10/2016 Yes     Chronic    Nonrheumatic aortic valve stenosis [I35.0] 11/22/2013 Yes     Chronic    Hyperlipidemia [E78.5]  Yes     Chronic    Pulmonary hypertension [I27.20] 09/28/2012 Yes     Chronic    COPD (chronic obstructive pulmonary disease) with acute bronchitis [J44.0, J20.9] 01/03/2018 Yes    Hyperdense renal cyst [N28.1] 10/26/2017 Not Applicable     Chronic    Obesity hypoventilation syndrome [E66.2] 07/14/2017 Yes     Chronic    Stage 3 chronic kidney disease [N18.3] 07/08/2017 Yes     Chronic      Problems Resolved During this Admission:    Diagnosis Date Noted Date Resolved POA       Discharged Condition: stable    Disposition: Home or Self Care    Follow Up:  Follow-up Information     Rodger Camarena MD On 1/12/2018.    Specialty:  Family Medicine  Why:  Outpatient Services PCP Follow-Up Friday at 12:00 PM.  [facility] information[facility]  [facility] [place] [address] [phone]                 Patient Instructions:     Diet Low Sodium, 2gm     Activity as tolerated         Significant Diagnostic Studies: Labs:   BMP:   Recent Labs  Lab 01/03/18  1155 01/04/18  0647   GLU 97 127*    139   K 3.9 3.8    107   CO2 25 24   BUN 21 20   CREATININE 1.1 1.0   CALCIUM 8.8 8.8   , CBC   Recent Labs  Lab 01/03/18  1155 01/04/18  0647   WBC 4.12 2.52*   HGB 9.3* 9.0*   HCT 29.4* 27.3*    208   , INR   Lab Results   Component Value Date    INR 1.0 01/03/2018    INR 1.0 07/14/2017    INR 1.0 07/08/2017   , Lipid Panel   Lab Results   Component Value Date    CHOL 184 11/15/2017    HDL 49 11/15/2017    LDLCALC 114.2 11/15/2017    TRIG 104 11/15/2017    CHOLHDL 26.6 11/15/2017   , Troponin   Recent Labs  Lab  01/03/18  1155   TROPONINI 0.017    and A1C:   Recent Labs  Lab 11/15/17  1025   HGBA1C 5.1       Pending Diagnostic Studies:     Procedure Component Value Units Date/Time    D dimer, quantitative [[id number]] Collected:  01/04/18 1435    Order Status:  Sent Lab Status:  In process Updated:  01/04/18 1435    Specimen:  Blood from Blood          Medications:  Reconciled Home Medications:   Current Discharge Medication List      START taking these medications    Details   azithromycin (ZITHROMAX Z-LYNN) 250 MG tablet 500 mg (2 tabs X 1) the 250 mg (1 tab daily) X 5 days  Qty: 7 tablet, Refills: 0      cetirizine (ZYRTEC) 10 MG tablet Take 1 tablet (10 mg total) by mouth once daily.  Refills: 0      methylPREDNISolone (MEDROL, LYNN,) 4 mg tablet use as directed  Qty: 1 Package, Refills: 0         CONTINUE these medications which have NOT CHANGED    Details   aspirin (ECOTRIN) 81 MG EC tablet Take 81 mg by mouth once daily.      cloNIDine (CATAPRES) 0.3 MG tablet Take 1 tablet (0.3 mg total) by mouth 3 (three) times daily.  Qty: 270 tablet, Refills: 1    Associated Diagnoses: Essential hypertension      furosemide (LASIX) 40 MG tablet TAKE ONE TABLET BY MOUTH EVERY DAY AS NEEDED FOR SHORTNESS OF BREATH or leg swelling  Qty: 90 tablet, Refills: 1    Associated Diagnoses: Essential hypertension      gabapentin (NEURONTIN) 300 MG capsule Take 1 capsule (300 mg total) by mouth 3 (three) times daily.  Qty: 270 capsule, Refills: 1    Associated Diagnoses: Right sciatic nerve pain      hydrALAZINE (APRESOLINE) 100 MG tablet ONE TABLET BY MOUTH THREE TIMES DAILY  Qty: 270 tablet, Refills: 0      latanoprost 0.005 % ophthalmic solution Place 1 drop into both eyes nightly.  Refills: 5      metoprolol tartrate (LOPRESSOR) 100 MG tablet ONE TABLET BY MOUTH TWICE DAILY  Qty: 180 tablet, Refills: 1      rosuvastatin (CRESTOR) 20 MG tablet ONE TABLET BY MOUTH EVERY EVENING  Qty: 90 tablet, Refills: 1    Associated Diagnoses:  Hyperlipidemia      verapamil (VERELAN) 360 MG C24P ONE CAPSULE BY MOUTH once a day  Qty: 90 capsule, Refills: 3      acetaminophen (TYLENOL) 325 MG tablet Take 2 tablets (650 mg total) by mouth every 6 (six) hours as needed for Pain or Temperature greater than (100.4).  Refills: 0      azelastine (ASTELIN) 137 mcg (0.1 %) nasal spray 1 spray (137 mcg total) by Nasal route 2 (two) times daily.  Qty: 30 mL, Refills: 0    Associated Diagnoses: Allergic rhinitis, unspecified allergic rhinitis type      carbamazepine (TEGRETOL) 200 mg tablet Take 1 tablet (200 mg total) by mouth 3 (three) times daily.  Qty: 270 tablet, Refills: 1    Associated Diagnoses: TMJ (temporomandibular joint disorder)      clotrimazole-betamethasone 1-0.05% (LOTRISONE) cream 2 (two) times daily. Apply to affected area  Refills: 2      fluticasone (FLOVENT HFA) 110 mcg/actuation inhaler Inhale 1 puff into the lungs every 12 (twelve) hours.  Qty: 1 g, Refills: 2    Associated Diagnoses: Mild intermittent asthma without complication      nitroGLYCERIN (NITROSTAT) 0.4 MG SL tablet Place 0.4 mg under the tongue every 5 (five) minutes as needed for Chest pain.      ondansetron (ZOFRAN-ODT) 8 MG TbDL Take 1 tablet (8 mg total) by mouth every 6 (six) hours as needed (Nausea).  Qty: 10 tablet, Refills: 0      walker (ULTRA-LIGHT ROLLATOR) Misc One rollator, size large.  Qty: 1 each, Refills: 0             Indwelling Lines/Drains at time of discharge:   Lines/Drains/Airways          No matching active lines, drains, or airways          Time spent on the discharge of patient: 45 minutes  Patient was seen and examined on the date of discharge and determined to be suitable for discharge.       BERENICE Ray, FNP-C  Hospitalist - Department of Hospital Medicine  [address] [place] [address]  Office [phone]; Pager [phone]

## 2018-01-04 NOTE — DISCHARGE INSTRUCTIONS
.  What Is Acute Bronchitis?  Acute bronchitis is when the airways in your lungs (bronchial tubes) become red and swollen (inflamed). It is usually caused by a viral infection. But it can also occur because of a bacteria or allergen. Symptoms include a cough that produces yellow or greenish mucus and can last for days or sometimes weeks.  Inside healthy lungs    Air travels in and out of the lungs through the airways. The linings of these airways produce sticky mucus. This mucus traps particles that enter the lungs. Tiny structures called cilia then sweep the particles out of the airways.     Healthy airway: Airways are normally open. Air moves in and out easily.      Healthy cilia: Tiny, hairlike cilia sweep mucus and particles up and out of the airways.   Lungs with bronchitis  Bronchitis often occurs with a cold or the flu virus. The airways become inflamed (red and swollen). There is a deep hacking cough from the extra mucus. Other symptoms may include:  · Wheezing  · Chest discomfort  · Shortness of breath  · Mild fever  A second infection, this time due to bacteria, may then occur. And airways irritated by allergens or smoke are more likely to get infected.        Inflamed airway: Inflammation and extra mucus narrow the airway, causing shortness of breath.      Impaired cilia: Extra mucus impairs cilia, causing congestion and wheezing. Smoking makes the problem worse.   Making a diagnosis  A physical exam, health history, and certain tests help your healthcare provider make the diagnosis.  Health history  Your healthcare provider will ask you about your symptoms.  The exam  Your provider listens to your chest for signs of congestion. He or she may also check your ears, nose, and throat.  Possible tests  · A sputum test for bacteria. This requires a sample of mucus from your lungs.  · A nasal or throat swab. This tests to see if you have a bacterial infection.  · A chest X-ray. This is done if your healthcare  provider thinks you have pneumonia.  · Tests to check for an underlying condition. Other tests may be done to check for things such as allergies, asthma, or COPD (chronic obstructive pulmonary disease). You may need to see a specialist for more lung function testing.  Treating a cough  The main treatment for bronchitis is easing symptoms. Avoiding smoke, allergens, and other things that trigger coughing can often help. If the infection is bacterial, you may be given antibiotics. During the illness, it's important to get plenty of sleep. To ease symptoms:  · Dont smoke. Also avoid secondhand smoke.  · Use a humidifier. Or try breathing in steam from a hot shower. This may help loosen mucus.  · Drink a lot of water and juice. They can soothe the throat and may help thin mucus.  · Sit up or use extra pillows when in bed. This helps to lessen coughing and congestion.  · Ask your provider about using medicine. Ask about using cough medicine, pain and fever medicine, or a decongestant.  Antibiotics  Most cases of bronchitis are caused by cold or flu viruses. They dont need antibiotics to treat them, even if your mucus is thick and green or yellow. Antibiotics dont treat viral illness and antibiotics have not been shown to have any benefit in cases of acute bronchitis. Taking antibiotics when they are not needed increases your risk of getting an infection later that is antibiotic-resistant. Antibiotics can also cause severe cases of diarrhea that require other antibiotics to treat.  It is important that you accept your healthcare provider's opinion to not use antibiotics. Your provider will prescribe antibiotics if the infection is caused by bacteria. If they are prescribed:  · Take all of the medicine. Take the medicine until it is used up, even if symptoms have improved. If you dont, the bronchitis may come back.  · Take the medicines as directed. For instance, some medicines should be taken with food.  · Ask about  side effects. Ask your provider or pharmacist what side effects are common, and what to do about them.  Follow-up care  You should see your provider again in 2 to 3 weeks. By this time, symptoms should have improved. An infection that lasts longer may mean you have a more serious problem.  Prevention  · Avoid tobacco smoke. If you smoke, quit. Stay away from smoky places. Ask friends and family not to smoke around you, or in your home or car.  · Get checked for allergies.  · Ask your provider about getting a yearly flu shot. Also ask about pneumococcal or pneumonia shots.  · Wash your hands often. This helps reduce the chance of picking up viruses that cause colds and flu.  Call your healthcare provider if:  · Symptoms worsen, or you have new symptoms  · Breathing problems worsen or  become severe  · Symptoms dont get better within a week, or within 3 days of taking antibiotics   Date Last Reviewed: 2/1/2017  © 2457-3964 [employer], [employer]. [address] [place] [address], [place] [address]. All rights reserved. This information is not intended as a substitute for professional medical care. Always follow your healthcare professional's instructions.

## 2018-01-04 NOTE — PLAN OF CARE
Problem: Patient Care Overview  Goal: Plan of Care Review   01/03/18 2112   Coping/Psychosocial   Plan Of Care Reviewed With patient   Pt remained free of falls during current shift. Denied pain and did not receive any prn pain medications. IV steroid was administered and scheduled breathing treatments. Plan of care and fall precautions reviewed with pt and verbalized understanding. Bed locked, lowered, SR up x2 and call light placed within reach.

## 2018-01-04 NOTE — NURSING
Pt received from ED assisted by transport via wheelchair. Granddaughter at pt's side. Ambulated from wheelchair to bed and bed locked, lowered, SR up x2. Vitals obtained and documented. Pt is AAO x4. Resp are even, unlabored, diminished with expiratory wheezes and has a non productive cough on 2LNC.  S1, s2 heart tones are present upon ausculation. Abd is rounded, active x4 and reports having last BM on 1/3. Skin is clean, dry and intact. 22g PIV to RH saline locked. Pt denied pain at current time and is in NAD. Assessment completed and documented. See doc flow sheet. Plan of care reviewed with pt and pt verbalized understanding. Call light placed within reach. Will continue to monitor pt closely.

## 2018-01-10 RX ORDER — ALBUTEROL SULFATE 0.83 MG/ML
2.5 SOLUTION RESPIRATORY (INHALATION) EVERY 6 HOURS PRN
Refills: 0 | OUTPATIENT
Start: 2018-01-10 | End: 2019-01-10

## 2018-01-10 RX ORDER — ALBUTEROL SULFATE 0.83 MG/ML
2.5 SOLUTION RESPIRATORY (INHALATION) EVERY 6 HOURS PRN
Qty: 1 BOX | Refills: 3 | Status: SHIPPED | OUTPATIENT
Start: 2018-01-10 | End: 2018-05-21

## 2018-01-10 NOTE — TELEPHONE ENCOUNTER
----- Message from Rasta Schaefer sent at 1/10/2018  9:54 AM CST -----  Contact: Katey  Called to request Rx for albuterol to be sent to [facility]. Pt was just released from hospital & is currently using family member's albuterol. Darrin can be reached @ [phone].

## 2018-01-12 ENCOUNTER — OFFICE VISIT (OUTPATIENT)
Dept: FAMILY MEDICINE | Facility: CLINIC | Age: 83
End: 2018-01-12
Payer: MEDICARE

## 2018-01-12 VITALS
TEMPERATURE: 98 F | HEIGHT: 65 IN | RESPIRATION RATE: 12 BRPM | DIASTOLIC BLOOD PRESSURE: 60 MMHG | OXYGEN SATURATION: 94 % | BODY MASS INDEX: 34.05 KG/M2 | SYSTOLIC BLOOD PRESSURE: 122 MMHG | WEIGHT: 204.38 LBS | HEART RATE: 71 BPM

## 2018-01-12 DIAGNOSIS — J44.9 CHRONIC OBSTRUCTIVE PULMONARY DISEASE, UNSPECIFIED COPD TYPE: Primary | ICD-10-CM

## 2018-01-12 DIAGNOSIS — J20.9 COPD (CHRONIC OBSTRUCTIVE PULMONARY DISEASE) WITH ACUTE BRONCHITIS: ICD-10-CM

## 2018-01-12 DIAGNOSIS — J44.0 COPD (CHRONIC OBSTRUCTIVE PULMONARY DISEASE) WITH ACUTE BRONCHITIS: ICD-10-CM

## 2018-01-12 PROCEDURE — 99999 PR PBB SHADOW E&M-EST. PATIENT-LVL IV: CPT | Mod: PBBFAC,,, | Performed by: FAMILY MEDICINE

## 2018-01-12 PROCEDURE — 99213 OFFICE O/P EST LOW 20 MIN: CPT | Mod: 25,S$GLB,, | Performed by: FAMILY MEDICINE

## 2018-01-12 PROCEDURE — 94640 AIRWAY INHALATION TREATMENT: CPT | Mod: S$GLB,,, | Performed by: FAMILY MEDICINE

## 2018-01-12 RX ORDER — CODEINE PHOSPHATE AND GUAIFENESIN 10; 100 MG/5ML; MG/5ML
5 SOLUTION ORAL 3 TIMES DAILY PRN
Qty: 300 ML | Refills: 0 | Status: SHIPPED | OUTPATIENT
Start: 2018-01-12 | End: 2018-01-22

## 2018-01-12 RX ORDER — PREDNISONE 20 MG/1
40 TABLET ORAL DAILY
Qty: 10 TABLET | Refills: 0 | Status: SHIPPED | OUTPATIENT
Start: 2018-01-12 | End: 2018-01-17

## 2018-01-12 RX ORDER — ALBUTEROL SULFATE 0.83 MG/ML
2.5 SOLUTION RESPIRATORY (INHALATION)
Status: COMPLETED | OUTPATIENT
Start: 2018-01-12 | End: 2018-01-12

## 2018-01-12 RX ORDER — PREDNISONE 20 MG/1
40 TABLET ORAL DAILY
Qty: 10 TABLET | Refills: 0 | Status: SHIPPED | OUTPATIENT
Start: 2018-01-12 | End: 2018-01-12 | Stop reason: SDUPTHER

## 2018-01-12 RX ORDER — BUDESONIDE 0.25 MG/2ML
0.25 INHALANT ORAL DAILY
Qty: 30 ML | Refills: 0 | Status: SHIPPED | OUTPATIENT
Start: 2018-01-12 | End: 2018-04-11

## 2018-01-12 RX ADMIN — ALBUTEROL SULFATE 2.5 MG: 0.83 SOLUTION RESPIRATORY (INHALATION) at 10:01

## 2018-01-12 NOTE — PROGRESS NOTES
Transitional Care Note  Subjective:       Patient ID: Cindy Lyman is a 82 y.o. female.  Chief Complaint: Hospital Follow Up (COPD)    Family and/or Caretaker present at visit?  No.  Diagnostic tests reviewed/disposition: No diagnosic tests pending after this hospitalization.  Disease/illness education: COPD  Home health/community services discussion/referrals: Patient does not have home health established from hospital visit.  They do not need home health.  If needed, we will set up home health for the patient.   Establishment or re-establishment of referral orders for community resources: No other necessary community resources.   Discussion with other health care providers: No discussion with other health care providers necessary.   Patient presenting for follow up after being admitted for COPD exacerbation.  Patient has never smoked, but has had trouble with bronchitis in the past.  She was tested for influenza and pneumonia and those were negative.  She states that she does feel better, but continues to get short of breath and wheeze.  She has had a mild cough as well that is occasionally productive.  No cough induced emesis or hemoptysis.  She states that she has been using her nebulizer and inhaler as prescribed.       Review of Systems   Constitutional: Positive for fatigue.   HENT: Negative for congestion, drooling, facial swelling, sinus pressure and trouble swallowing.    Eyes: Negative for pain, discharge and itching.   Respiratory: Positive for cough, shortness of breath and wheezing. Negative for apnea, choking, chest tightness and stridor.    Cardiovascular: Negative.    Gastrointestinal: Negative.    Genitourinary: Negative.    Musculoskeletal: Positive for arthralgias.   Neurological: Negative for dizziness, seizures and weakness.       Objective:      Physical Exam   Constitutional: She is oriented to person, place, and time. She appears well-developed and well-nourished.   HENT:   Head:  Normocephalic and atraumatic.   Eyes: EOM are normal. Pupils are equal, round, and reactive to light.   Neck: Normal range of motion. Neck supple.   Cardiovascular: Normal rate, regular rhythm and normal heart sounds.    Pulmonary/Chest: Effort normal. No respiratory distress. She has wheezes. She has no rales. She exhibits no tenderness.   Abdominal: Soft. Bowel sounds are normal.   Musculoskeletal: She exhibits no deformity.   Neurological: She is alert and oriented to person, place, and time.   Skin: Skin is warm and dry.   Psychiatric: She has a normal mood and affect.       Assessment:       1. Chronic obstructive pulmonary disease, unspecified COPD type    2. COPD (chronic obstructive pulmonary disease) with acute bronchitis        Plan:       1.  Continue albuterol nebs as prescribed  2.  She has completed steroid pack and zpak  3.  Follow up as needed  4.  Will start on pulmicort nebs and stop flovent for now  5.  She is to go to the ED should symptoms worsen over the weekend      Rodger Camarena MD

## 2018-01-23 ENCOUNTER — TELEPHONE (OUTPATIENT)
Dept: SURGERY | Facility: CLINIC | Age: 83
End: 2018-01-23

## 2018-01-23 NOTE — TELEPHONE ENCOUNTER
----- Message from Jennifer Garcia sent at 1/23/2018  1:36 PM CST -----  Contact: Pt.Self   Pt.States she Would like to speak to nurse in reference to Scheduling Appt. With referral  Please call Pt. back @ [phone] Thanks :)

## 2018-01-25 ENCOUNTER — TELEPHONE (OUTPATIENT)
Dept: SURGERY | Facility: CLINIC | Age: 83
End: 2018-01-25

## 2018-01-25 NOTE — TELEPHONE ENCOUNTER
----- Message from Johan Haque sent at 1/25/2018 11:41 AM CST -----  Contact: Sharon//Dr Shara Arreguin states that (s)he needs to speak with nurse in ref to getting the pt scheduled//please call back at [phone]//thank you

## 2018-01-30 ENCOUNTER — HOSPITAL ENCOUNTER (OUTPATIENT)
Dept: RADIOLOGY | Facility: HOSPITAL | Age: 83
Discharge: HOME | End: 2018-01-30
Attending: INTERNAL MEDICINE
Payer: MEDICARE

## 2018-01-30 ENCOUNTER — TELEPHONE (OUTPATIENT)
Dept: GASTROENTEROLOGY | Facility: CLINIC | Age: 83
End: 2018-01-30

## 2018-01-30 DIAGNOSIS — K86.3 CYST AND PSEUDOCYST OF PANCREAS: ICD-10-CM

## 2018-01-30 DIAGNOSIS — K86.2 CYST AND PSEUDOCYST OF PANCREAS: ICD-10-CM

## 2018-01-30 PROCEDURE — 74177 CT ABD & PELVIS W/CONTRAST: CPT | Mod: 26,,, | Performed by: RADIOLOGY

## 2018-01-30 PROCEDURE — 25500020 PHARM REV CODE 255: Performed by: INTERNAL MEDICINE

## 2018-01-30 PROCEDURE — 74177 CT ABD & PELVIS W/CONTRAST: CPT | Mod: TC

## 2018-01-30 RX ADMIN — IOHEXOL 100 ML: 350 INJECTION, SOLUTION INTRAVENOUS at 10:01

## 2018-01-30 RX ADMIN — IOHEXOL 30 ML: 300 INJECTION, SOLUTION INTRAVENOUS at 10:01

## 2018-02-06 ENCOUNTER — OFFICE VISIT (OUTPATIENT)
Dept: CARDIOLOGY | Facility: CLINIC | Age: 83
End: 2018-02-06
Payer: MEDICARE

## 2018-02-06 VITALS
DIASTOLIC BLOOD PRESSURE: 73 MMHG | BODY MASS INDEX: 33.65 KG/M2 | RESPIRATION RATE: 15 BRPM | HEART RATE: 66 BPM | SYSTOLIC BLOOD PRESSURE: 160 MMHG | WEIGHT: 201.94 LBS | HEIGHT: 65 IN | OXYGEN SATURATION: 100 %

## 2018-02-06 DIAGNOSIS — E78.5 HYPERLIPIDEMIA, UNSPECIFIED HYPERLIPIDEMIA TYPE: Chronic | ICD-10-CM

## 2018-02-06 DIAGNOSIS — Z95.5 PRESENCE OF DRUG COATED STENT IN LAD CORONARY ARTERY: Chronic | ICD-10-CM

## 2018-02-06 DIAGNOSIS — I25.83 CORONARY ARTERY DISEASE DUE TO LIPID RICH PLAQUE: Chronic | ICD-10-CM

## 2018-02-06 DIAGNOSIS — I27.20 PULMONARY HYPERTENSION: Primary | Chronic | ICD-10-CM

## 2018-02-06 DIAGNOSIS — I25.10 CORONARY ARTERY DISEASE DUE TO LIPID RICH PLAQUE: Chronic | ICD-10-CM

## 2018-02-06 DIAGNOSIS — J44.9 CHRONIC OBSTRUCTIVE PULMONARY DISEASE, UNSPECIFIED COPD TYPE: ICD-10-CM

## 2018-02-06 DIAGNOSIS — R06.02 SHORTNESS OF BREATH: ICD-10-CM

## 2018-02-06 DIAGNOSIS — I50.42 CHRONIC COMBINED SYSTOLIC AND DIASTOLIC HEART FAILURE: Chronic | ICD-10-CM

## 2018-02-06 DIAGNOSIS — I35.0 NONRHEUMATIC AORTIC VALVE STENOSIS: Chronic | ICD-10-CM

## 2018-02-06 PROCEDURE — 3008F BODY MASS INDEX DOCD: CPT | Mod: S$GLB,,, | Performed by: INTERNAL MEDICINE

## 2018-02-06 PROCEDURE — 99214 OFFICE O/P EST MOD 30 MIN: CPT | Mod: S$GLB,,, | Performed by: INTERNAL MEDICINE

## 2018-02-06 PROCEDURE — 1126F AMNT PAIN NOTED NONE PRSNT: CPT | Mod: S$GLB,,, | Performed by: INTERNAL MEDICINE

## 2018-02-06 PROCEDURE — 99999 PR PBB SHADOW E&M-EST. PATIENT-LVL III: CPT | Mod: PBBFAC,,, | Performed by: INTERNAL MEDICINE

## 2018-02-06 PROCEDURE — 99499 UNLISTED E&M SERVICE: CPT | Mod: S$GLB,,, | Performed by: INTERNAL MEDICINE

## 2018-02-06 PROCEDURE — 1159F MED LIST DOCD IN RCRD: CPT | Mod: S$GLB,,, | Performed by: INTERNAL MEDICINE

## 2018-02-06 NOTE — PROGRESS NOTES
Subjective:    Patient ID:  Cindy Lyman is a 82 y.o. female who presents for follow-up of Shortness of Breath      HPI     CAD - stent LAD 4/2015 - moderate Cx disease, Diastolic CHF  AS - moderate to severe, HTN, COPD, Hx DVT, anemia     Echo 10/10/17    1 - Normal left ventricular systolic function (EF 55-60%).     2 - Concentric hypertrophy.     3 - Severe left atrial enlargement.     4 - Normal right ventricular systolic function .     5 - Pulmonary hypertension. The estimated PA systolic pressure is 53 mmHg.     6 - Moderate to severe aortic stenosis, CLEMENT = 0.76 cm2, peak velocity = 3.95 m/s, mean gradient = 35 mmHg.     7 - Mild mitral stenosis, MVA = 1.95 cm2.      Stress test 10/10/17  LVEF: 63 %  Impression: NORMAL MYOCARDIAL PERFUSION  1. The perfusion scan is free of evidence for myocardial ischemia or injury.   2. There is a mild to moderate intensity fixed defect in the anterolateral wall of the left ventricle, secondary to breast attenuation.   3. Resting wall motion is physiologic.   4. Resting LV function is normal.   5. The ventricular volumes are normal at rest and stress.   6. The extracardiac distribution of radioactivity is normal.   7. When compared to the previous study from 07/10/2017, no change.    Admitted 1/3/18  82 y.o. female with COPD, obesity hypoventilation syndrome, CAD s/p PCI, AVS, pulmonary hypertension, CKD stage III, hypertension, and hyperlipidemia presents with a complaint of acutely worsening shortness of breath and cough beginning yesterday.  Cough is not productive and associated with some mild abdominal and back soreness, recent sick contact grandson who has a cold.  No alleviating or exacerbating factors, no attempted self treatment.  She has been taking and tolerating her home medications as prescribed. Not on home O2.  Denies fever, chills, chest pain, palpitations, swelling, orthopnea, PND, dysuria, frequency, urgency, nausea, vomiting, diarrhea, bloody or  dark stools.  ED evaluation significant for Flu negative, EKG without evidence of acute ischemia, troponin negative, , chest xray without acute abnormality.  Treated with supplemental oxygen, nebs, and steroids without satisfactory improvement.  Placed in observation for further evaluation and treatment.    Patient improved with IV steroids, duo nebs and ABX. Last walked with RA sat 95%. Suspected acute bronchitis, flu negative, no fever or leukocytosis. Home with Zpak and medrol dose pack. Allergy to albuterol/xopenex.     Stable GALVAN  Walks with a walker  Denies CP    Review of Systems   Constitution: Negative for decreased appetite.   HENT: Negative for ear discharge.    Eyes: Negative for blurred vision.   Respiratory: Negative for hemoptysis.    Endocrine: Negative for polyphagia.   Hematologic/Lymphatic: Negative for adenopathy.   Skin: Negative for color change.   Musculoskeletal: Negative for joint swelling.   Neurological: Negative for brief paralysis.   Psychiatric/Behavioral: Negative for hallucinations.        Objective:    Physical Exam   Constitutional: She is oriented to person, place, and time. She appears well-developed and well-nourished.   HENT:   Head: Normocephalic and atraumatic.   Eyes: Conjunctivae are normal. Pupils are equal, round, and reactive to light.   Neck: Normal range of motion. Neck supple.   Cardiovascular: Normal rate and intact distal pulses.    Murmur heard.   Early systolic murmur is present with a grade of 2/6   Pulmonary/Chest: Effort normal and breath sounds normal.   Abdominal: Soft. Bowel sounds are normal.   Musculoskeletal: Normal range of motion.   Neurological: She is alert and oriented to person, place, and time.   Skin: Skin is warm and dry.         Assessment:       1. Pulmonary hypertension    2. Chronic obstructive pulmonary disease, unspecified COPD type    3. Nonrheumatic aortic valve stenosis    4. Coronary artery disease due to lipid rich plaque    5.  Presence of drug coated stent in LAD coronary artery    6. Hyperlipidemia, unspecified hyperlipidemia type    7. Chronic combined systolic and diastolic heart failure    8. Shortness of breath         Plan:       Ov 3 months with echo - if AS becomes severe will refer to T[facility]R clinic

## 2018-02-16 ENCOUNTER — LAB VISIT (OUTPATIENT)
Dept: LAB | Facility: HOSPITAL | Age: 83
End: 2018-02-16
Attending: INTERNAL MEDICINE
Payer: MEDICARE

## 2018-02-16 DIAGNOSIS — D63.8 ANEMIA OF CHRONIC DISEASE: Chronic | ICD-10-CM

## 2018-02-16 LAB
BASOPHILS # BLD AUTO: 0.03 K/UL
BASOPHILS NFR BLD: 0.8 %
DIFFERENTIAL METHOD: ABNORMAL
EOSINOPHIL # BLD AUTO: 0.1 K/UL
EOSINOPHIL NFR BLD: 1.8 %
ERYTHROCYTE [DISTWIDTH] IN BLOOD BY AUTOMATED COUNT: 14.3 %
FERRITIN SERPL-MCNC: 66 NG/ML
HCT VFR BLD AUTO: 29.4 %
HGB BLD-MCNC: 9.1 G/DL
IMM GRANULOCYTES # BLD AUTO: 0 K/UL
IMM GRANULOCYTES NFR BLD AUTO: 0 %
IRON SERPL-MCNC: 69 UG/DL
LYMPHOCYTES # BLD AUTO: 1.6 K/UL
LYMPHOCYTES NFR BLD: 40.7 %
MCH RBC QN AUTO: 28.3 PG
MCHC RBC AUTO-ENTMCNC: 31 G/DL
MCV RBC AUTO: 91 FL
MONOCYTES # BLD AUTO: 0.4 K/UL
MONOCYTES NFR BLD: 10.7 %
NEUTROPHILS # BLD AUTO: 1.8 K/UL
NEUTROPHILS NFR BLD: 46 %
NRBC BLD-RTO: 0 /100 WBC
PLATELET # BLD AUTO: 265 K/UL
PMV BLD AUTO: 10.7 FL
RBC # BLD AUTO: 3.22 M/UL
SATURATED IRON: 20 %
TOTAL IRON BINDING CAPACITY: 348 UG/DL
TRANSFERRIN SERPL-MCNC: 235 MG/DL
WBC # BLD AUTO: 3.93 K/UL

## 2018-02-16 PROCEDURE — 82728 ASSAY OF FERRITIN: CPT

## 2018-02-16 PROCEDURE — 83540 ASSAY OF IRON: CPT

## 2018-02-16 PROCEDURE — 36415 COLL VENOUS BLD VENIPUNCTURE: CPT | Mod: PO

## 2018-02-16 PROCEDURE — 85025 COMPLETE CBC W/AUTO DIFF WBC: CPT

## 2018-02-22 ENCOUNTER — OFFICE VISIT (OUTPATIENT)
Dept: HEMATOLOGY/ONCOLOGY | Facility: CLINIC | Age: 83
End: 2018-02-22
Payer: MEDICARE

## 2018-02-22 VITALS
OXYGEN SATURATION: 96 % | DIASTOLIC BLOOD PRESSURE: 68 MMHG | SYSTOLIC BLOOD PRESSURE: 148 MMHG | TEMPERATURE: 99 F | HEART RATE: 71 BPM | BODY MASS INDEX: 34.11 KG/M2 | WEIGHT: 205 LBS

## 2018-02-22 DIAGNOSIS — K31.89 GASTRIC MASS: ICD-10-CM

## 2018-02-22 DIAGNOSIS — J20.9 COPD (CHRONIC OBSTRUCTIVE PULMONARY DISEASE) WITH ACUTE BRONCHITIS: ICD-10-CM

## 2018-02-22 DIAGNOSIS — N18.30 STAGE 3 CHRONIC KIDNEY DISEASE: Chronic | ICD-10-CM

## 2018-02-22 DIAGNOSIS — D63.8 ANEMIA OF CHRONIC DISEASE: Primary | Chronic | ICD-10-CM

## 2018-02-22 DIAGNOSIS — J44.0 COPD (CHRONIC OBSTRUCTIVE PULMONARY DISEASE) WITH ACUTE BRONCHITIS: ICD-10-CM

## 2018-02-22 PROCEDURE — 99999 PR PBB SHADOW E&M-EST. PATIENT-LVL IV: CPT | Mod: PBBFAC,,, | Performed by: INTERNAL MEDICINE

## 2018-02-22 PROCEDURE — 3008F BODY MASS INDEX DOCD: CPT | Mod: S$GLB,,, | Performed by: INTERNAL MEDICINE

## 2018-02-22 PROCEDURE — 1159F MED LIST DOCD IN RCRD: CPT | Mod: S$GLB,,, | Performed by: INTERNAL MEDICINE

## 2018-02-22 PROCEDURE — 99213 OFFICE O/P EST LOW 20 MIN: CPT | Mod: S$GLB,,, | Performed by: INTERNAL MEDICINE

## 2018-02-22 PROCEDURE — 1125F AMNT PAIN NOTED PAIN PRSNT: CPT | Mod: S$GLB,,, | Performed by: INTERNAL MEDICINE

## 2018-02-22 NOTE — PROGRESS NOTES
"Subjective:       Patient ID: Cindy Lyman is a 82 y.o. female.    Chief Complaint: Follow-up    Diagnosis: Anemia of chronic disease  HPI      She has  past medical history of CAD, asthma, CHF, COPD, depression, hypertension, thyroid disease, seen today for f/u for anemia of chronic disease. Bone marrow biopsy neg for hematological malignancy    Pt hospitalized July 2017 for abd pain.  she was found to be septic with fever and tachycardia (no leukocytosis). She was treated for a UTI and followed up with her PCP. She had persistent RUQ abdominal pain .  Followed by Surgery and underwentnd was referred to general surgery but has not yet seen them. H   US abdomen showed biliary sludge and cholelithiasis but no definite sonographic evidence to suggest acute cholecystitis. MRCP showed, "Markedly distended gallbladder with innumerable dependent gallstones.  There has been development of pericholecystic fluid near the fundus of the gallbladder that could reflect the sequela of cholecystitis" as well as a mild to moderate pericardial effusion. Plavix held in anticipation of cholecystectomy. NST 7/10/2017 stable. Low-intermediate cardiovascular risk for surgery. Laproscopic cholecystectomy 7/13/17. Intraoperative angiogram showed a retained common bile duct stone. Post cholecystectomy, patient failed extubation, was re-intubated then extubated   . Liver enzymes and TBil, have  improved, suggesting patient may have passed the stone seen in [facility]. GI had initially considered ERCP, but will treat conservatively as LFT's normalizing  SHe reports she underwent 1 urpbc transfusion during hospitalization    Intolerant of Fe supp     No new issues today   She continues with chronic arthalgias and  back pain-stable  She ambulates with assistance of walker  Mild GALVAN-stable, chronic  No melena,hematochezia or change in bowel habits      Pt followed by GI , Dr. 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    CT w/out evidence of LNs or distant mets  She underwent EUS at [facility] - 2cm-2.5cm friable, polypoid mass at cardia/proximal gastric body.  Pt referred to Dr. Andrew for gastric mass -eval for ESD of lesion  Follow-up with Dr. Andrew planned 2/27/2017   Colonoscopy 5 yrs ago  [facility] - unremarkable    She is followed by Cardiology for CHF.    Hx of RLE DVT  S/p anticoagulation completed 11/2015     She is followed by Pulm for COPD    Cbc 2/16/2018  Wbc 3900 Hb 9.1g/dl Hct 29.4 % and plt ct 265k     Bone Marrow biopsy 5/26/2016  Hypercellular marrow for age, 50-70%, with trilineage hematopoiesis, see comment  --Erythroid hyperplasia  --Mild plasmacytosis, 5-10%, polytypic by in situ hybridization studies, see comment  --No increase in blasts  --Adequate megakaryocytes  --Decreased stainable iron  --Mild reticulin fibrosis  COMMENT: Concomitantly submitted flow cytometric analysis detects no abnormal hematopoietic population. Bcells are polyclonal with a subset of hematogones detected, and T cells are immunophenotypically unremarkable.  The blast gate is not increased.Although there is a mild plasmacytosis, by in situ hybridization studies, this appears polytypic. Correlate clinicallyand with any available cytogenetic and molecular studies    Plasma cell proliferative disorder, FISH:  An insufficient number of plasma cells were observed using cytoplasmic immunoglobulin staining method .This result does not exclude the presence of a plasma cell proliferative disorder."    Congo red stain neg    PAST MEDICAL HISTORY:  Aortic stenosis, arthritis, asthma, CAD, carpal tunnel, cataracts, CHF, COPD, depression, hyperlipidemia, hypertension, pulmonary hypertension, thyroid disease, TMJ.    PAST SURGICAL HISTORY:  Partial hysterectomy, carpal tunnel release, eye surgery, left hip replacement, back surgery, TMJ.            Review of Systems   Constitutional: Negative " for appetite change, fatigue and unexpected weight change.   HENT: Negative for hearing loss and nosebleeds.    Eyes: Negative for visual disturbance.   Respiratory: Positive for shortness of breath. Negative for cough and chest tightness.    Cardiovascular: Negative for chest pain and leg swelling.   Gastrointestinal: Negative for abdominal pain, blood in stool, constipation, diarrhea, nausea and vomiting.   Genitourinary: Negative for flank pain and hematuria.   Musculoskeletal: Positive for arthralgias and back pain. Negative for gait problem, joint swelling and neck pain.   Skin:        No petechiae, ecchymoses   Neurological: Negative for dizziness, light-headedness, numbness and headaches.   Hematological: Negative for adenopathy. Does not bruise/bleed easily.         Lab Results   Component Value Date    WBC 3.93 02/16/2018    HGB 9.1 (L) 02/16/2018    HCT 29.4 (L) 02/16/2018    MCV 91 02/16/2018     02/16/2018         Objective:       Vitals:    02/22/18 1300   BP: (!) 148/68   BP Location: Left arm   Patient Position: Sitting   BP Method: Medium (Manual)   Pulse: 71   Temp: 98.8 °F (37.1 °C)   TempSrc: Oral   SpO2: 96%   Weight: 93 kg (205 lb)       Physical Exam   Constitutional: She is oriented to person, place, and time. She appears well-developed and well-nourished.   HENT:   Head: Normocephalic.   Mouth/Throat: Oropharynx is clear and moist. No oropharyngeal exudate.   Eyes: Conjunctivae and lids are normal. Pupils are equal, round, and reactive to light. No scleral icterus.   Neck: Normal range of motion. Neck supple. No thyromegaly present.   Cardiovascular: Normal rate and regular rhythm.    Murmur heard.  Pulmonary/Chest: Breath sounds normal. She has no wheezes. She has no rales.   Abdominal: Soft. Bowel sounds are normal. She exhibits no distension and no mass. There is no hepatosplenomegaly. There is no tenderness. There is no rebound and no guarding.   Musculoskeletal: Normal range of  motion. She exhibits edema ( 1+ RLE edema). She exhibits no tenderness.   Lymphadenopathy:     She has no cervical adenopathy.     She has no axillary adenopathy.        Right: No supraclavicular adenopathy present.        Left: No supraclavicular adenopathy present.   Neurological: She is alert and oriented to person, place, and time. No cranial nerve deficit. Coordination normal.   Skin: Skin is warm and dry. No ecchymosis, no petechiae and no rash noted. No erythema.   Psychiatric: She has a normal mood and affect.         Results for MARIE TURNER (MRN [id number]) as of 6/15/2017 13:23   Ref. Range 6/10/2016 11:08 1/9/2017 13:46 6/12/2017 10:52   [place] Free Light Chains Latest Ref Range: 0.33 - 1.94 mg/dL 4.01 (H) 6.29 (H) 4.62 (H)   Lambda Free Light Chains Latest Ref Range: 0.57 - 2.63 mg/dL 1.68 2.93 (H) 1.63   Kappa/Lambda FLC Ratio Latest Ref Range: 0.26 - 1.65  2.39 (H) 2.15 (H) 2.83 (H)                 Lab Results   Component Value Date    WBC 3.93 02/16/2018    HGB 9.1 (L) 02/16/2018    HCT 29.4 (L) 02/16/2018    MCV 91 02/16/2018     02/16/2018       Lab Results   Component Value Date    IRON 69 02/16/2018    TIBC 348 02/16/2018    FERRITIN 66 02/16/2018     Bone Marrow biopsy 5/26/2016  Hypercellular marrow for age, 50-70%, with trilineage hematopoiesis, see comment  --Erythroid hyperplasia  --Mild plasmacytosis, 5-10%, polytypic by in situ hybridization studies, see comment  --No increase in blasts  --Adequate megakaryocytes  --Decreased stainable iron  --Mild reticulin fibrosis  COMMENT: Concomitantly submitted flow cytometric analysis detects no abnormal hematopoietic population. Bcells are polyclonal with a subset of hematogones detected, and T cells are immunophenotypically unremarkable.  The blast gate is not increased.Although there is a mild plasmacytosis, by in situ hybridization studies, this appears polytypic. 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 Anemia of chronic disease    2. Stage 3 chronic kidney disease    3. COPD (chronic obstructive pulmonary disease) with acute bronchitis    4. Gastric mass        Plan:   1-4 Pt clinically stable  Hb 9.1  g/dl -stable   Hb remains 9 g/dl range   Colonoscopy 5 yrs ago [facility]- unremarkable  S/p extensive hematological workup including bmbx- neg for hematological malignancy  No active  bleeding   Follow-up with Dr. Andrew for gastric mass planned 2/27/2018  Follow-up with PCP for med mgmt  Cont to monitor       F/u  2mo with cbc, Fe studies prior to f/u( or sooner if problems should arise in the interim)     CC: Jeremiah Reeves M.D.        "

## 2018-02-27 ENCOUNTER — OFFICE VISIT (OUTPATIENT)
Dept: GASTROENTEROLOGY | Facility: CLINIC | Age: 83
End: 2018-02-27
Payer: MEDICARE

## 2018-02-27 VITALS
HEART RATE: 65 BPM | HEIGHT: 65 IN | DIASTOLIC BLOOD PRESSURE: 74 MMHG | BODY MASS INDEX: 34.16 KG/M2 | SYSTOLIC BLOOD PRESSURE: 161 MMHG | WEIGHT: 205 LBS

## 2018-02-27 DIAGNOSIS — K31.89 GASTRIC MASS: Primary | ICD-10-CM

## 2018-02-27 PROCEDURE — 99204 OFFICE O/P NEW MOD 45 MIN: CPT | Mod: S$GLB,,, | Performed by: INTERNAL MEDICINE

## 2018-02-27 PROCEDURE — 1159F MED LIST DOCD IN RCRD: CPT | Mod: S$GLB,,, | Performed by: INTERNAL MEDICINE

## 2018-02-27 PROCEDURE — 3008F BODY MASS INDEX DOCD: CPT | Mod: S$GLB,,, | Performed by: INTERNAL MEDICINE

## 2018-02-27 PROCEDURE — 1126F AMNT PAIN NOTED NONE PRSNT: CPT | Mod: S$GLB,,, | Performed by: INTERNAL MEDICINE

## 2018-02-27 PROCEDURE — 99999 PR PBB SHADOW E&M-EST. PATIENT-LVL III: CPT | Mod: PBBFAC,,, | Performed by: INTERNAL MEDICINE

## 2018-02-27 NOTE — PROGRESS NOTES
[facility] Note    Reason for Visit:  The encounter diagnosis was Gastric mass.    PCP: Rodger Camarena   [address] [place] [address] [address]VD [address] [facility]    HPI:  This is a 82 y.o. female here for evaluation of gastric mass.     EUS 11/27/2018        Pathology showed HGD/CIS    It was recommended that the patient was evaluated for possible ESD and Surgical opinion with Dr Cordero. Unfortunately the patient did not follow the recommendations. The patient is here today for further discussion. They don't want to proceed with surgical option but are open to a Surgical consultation. Denied any blood in the stool, appetite is good.      ROS:  Constitutional: No fevers, chills  CV: No chest pain or palpitations  Pulm: No cough, No shortness of breath  GI: see HPI    Medical History:  has a past medical history of Anemia; Anticoagulant long-term use; Aortic stenosis; Arthritis; Asthma; CAD (coronary artery disease) (4/24/2015); Carpal tunnel syndrome on both sides; Cataract; CHF (congestive heart failure) (5/2013); COPD (chronic obstructive pulmonary disease); Depression; DVT (deep venous thrombosis); Hyperlipidemia; Hypertension; Pulmonary HTN; and TMJ (temporomandibular joint disorder).    Surgical History:  has a past surgical history that includes Hysterectomy; Carpal tunnel release; Back surgery; Temporomandibular joint surgery; cardiac stents; Joint replacement (2009); Eye surgery; and Polypectomy.    Family History: family history includes Cancer in her son; Heart disease in her mother; Hypertension in her daughter..     Social History:  reports that she has never smoked. She has never used smokeless tobacco. She reports that she does not drink alcohol or use drugs.    Review of patient's allergies indicates:   Allergen Reactions    Doxycycline hyclate Diarrhea and Nausea And Vomiting    Singulair [montelukast]      Stomach pain, Muscle pain, Cough      Penicillins      Other  "reaction(s): Anaphylaxis    Ventolin [albuterol sulfate] Other (See Comments)     Severely elevated blood pressure    Latex, natural rubber Rash         Objective Findings:    Vital Signs:  BP (!) 161/74   Pulse 65   Ht 5' 5" (1.651 m)   Wt 93 kg (205 lb 0.4 oz)   BMI 34.12 kg/m²   Body mass index is 34.12 kg/m².    Physical Exam:  General Appearance: Well appearing in no acute distress  HEENT:   Normocephalic, without obvious abnormality.  Sclera anicteric, no conjunctival pallor, Lips, mucosa, and tongue pink and dry; teeth and gums normal  NECK:  Supple, no lymphadenopathy noted  Lungs: CTA bilaterally in anterior and posterior fields, no wheezes, no crackles.  Heart:  Regular rate and rhythm, S1, S2 normal, no murmurs heard  Abdomen: Soft, non tender, non distended with positive bowel sounds in all four quadrants. No hepatosplenomegaly, ascites, or mass  Extremities: no edema  Neurologic: Grossly normal      Labs:  Lab Results   Component Value Date    WBC 3.93 02/16/2018    HGB 9.1 (L) 02/16/2018    HCT 29.4 (L) 02/16/2018     02/16/2018    CHOL 184 11/15/2017    TRIG 104 11/15/2017    HDL 49 11/15/2017    ALT 14 01/03/2018    AST 13 01/03/2018     01/04/2018    K 3.8 01/04/2018     01/04/2018    CREATININE 1.0 01/04/2018    BUN 20 01/04/2018    CO2 24 01/04/2018    TSH 1.716 08/19/2013    INR 1.0 01/03/2018    HGBA1C 5.1 11/15/2017              Assessment:  1. Gastric mass       Evidence of HGD/CIS  We discussed endoscopic resection therapies including ESD/EMR. They will not like to proceed with surgery as a primary intervention but understand and will proceed with surgery if a surgical complication occur during the endoscopic resection. They are in agreement to see Surgical Oncology in preparation for the endoscopic resection. We will obtain a preoperative evaluation given comorbidities. The patient and daughter understand that we will proceed with restaging prior to endoscopic " therapy to assess progression and feasibility of the resection.  Recommendations:  1. Schedule EUS with possible ESD. Need preoperative evaluation.  2. Appointment with Surgical Oncology for discussion of surgical options    The impression and plan was discussed in detail with the patient and family. All questions have been answered and the patient voices understanding of our plan at this point. The risk of the procedure was discussed in detail which includes but not limited to bleeding, infection, perforation in some cases requiring surgery with its spectrum of complications.       Order summary:  No orders of the defined types were placed in this encounter.      Thank you so much for allowing me to participate in the care of Cindy Andrew MD

## 2018-02-27 NOTE — LETTER
February 27, 2018      Medhat Olmedo MD  [address] [place] [address] [address]  [address]  [facility]  [place] [address]           [facility]  [address]  [place] [address]  Phone: [phone]  Fax: [phone]          Patient: Cindy Lyman   MR Number: [id number]   YOB: 1935   Date of Visit: 2/27/2018       Dear Dr. Medhat Olmedo:    Thank you for referring Cindy Lyman to me for evaluation. Attached you will find relevant portions of my assessment and plan of care.    If you have questions, please do not hesitate to call me. I look forward to following Cindy Lyman along with you.    Sincerely,    Socrates Andrew MD    Enclosure  CC:  No Recipients    If you would like to receive this communication electronically, please contact [email or web address] or [phone] to request more information on [employer] Link access.    For providers and/or their staff who would like to refer a patient to [facility], please contact us through our one-stop-shop provider referral line, [facility], at [phone].    If you feel you have received this communication in error or would no longer like to receive these types of communications, please e-mail [email or web address]

## 2018-03-16 ENCOUNTER — TELEPHONE (OUTPATIENT)
Dept: GASTROENTEROLOGY | Facility: CLINIC | Age: 83
End: 2018-03-16

## 2018-03-16 DIAGNOSIS — M26.609 TMJ (TEMPOROMANDIBULAR JOINT DISORDER): ICD-10-CM

## 2018-03-16 RX ORDER — CARBAMAZEPINE 200 MG/1
200 TABLET ORAL 3 TIMES DAILY
Qty: 270 TABLET | Refills: 1 | Status: ON HOLD | OUTPATIENT
Start: 2018-03-16 | End: 2018-10-21 | Stop reason: HOSPADM

## 2018-03-16 NOTE — TELEPHONE ENCOUNTER
----- Message from Marleni Iglesias sent at 3/16/2018 12:03 PM CDT -----  Contact: [employer] [phone]  carBAMazepine (TEGRETOL) 200 mg tablet is reacting with the verapamil (VERELAN) 360 MG C24P and the pharmacy wants to know if the pt is supposed to be taking these 2 prescriptions together Please call  at your earliest convenience.  Thanks !

## 2018-03-16 NOTE — TELEPHONE ENCOUNTER
Spoke with patient. EUS scheduled for 4/17 at 9a. Reviewed prep instructions. Ms Lyman verbalized understanding.

## 2018-03-19 ENCOUNTER — TELEPHONE (OUTPATIENT)
Dept: GASTROENTEROLOGY | Facility: CLINIC | Age: 83
End: 2018-03-19

## 2018-03-19 NOTE — TELEPHONE ENCOUNTER
----- Message from Nelsy Rebollar sent at 3/19/2018 10:20 AM CDT -----  Contact: Self- [phone]  Kamran- pt called to speak with Sanaz- would like to reschedule the time for her upcoming procedure on 4/17- please call pt back at [phone]

## 2018-03-20 ENCOUNTER — TELEPHONE (OUTPATIENT)
Dept: GASTROENTEROLOGY | Facility: CLINIC | Age: 83
End: 2018-03-20

## 2018-03-20 NOTE — TELEPHONE ENCOUNTER
----- Message from Carla Miles MA sent at 3/20/2018 10:03 AM CDT -----  Contact: self  [phone]  States she is returning your call from Monday, 3-19-18.

## 2018-03-23 ENCOUNTER — TELEPHONE (OUTPATIENT)
Dept: FAMILY MEDICINE | Facility: CLINIC | Age: 83
End: 2018-03-23

## 2018-03-23 NOTE — TELEPHONE ENCOUNTER
Spoke with Jane richmond re: pt. Taking Verapamil and Tegratol. Wanted to make sure Dr. Camarena was aware. After checking with Dr. Camarena,  Told her patient has been taking it for over a year and Dr. Camarena is aware.

## 2018-03-23 NOTE — TELEPHONE ENCOUNTER
----- Message from Georges Moser sent at 3/23/2018 11:05 AM CDT -----  Contact: DEmanetchDAYTON [employer] [phone]  Pharmacy called on behalf of the pt. 1 of the pt's medication: verapamil (VERELAN) 360 MG C24P interacts with another and they wanted to see if the doctor was aware. Please call at your earliest convenience.

## 2018-04-03 ENCOUNTER — TELEPHONE (OUTPATIENT)
Dept: SURGERY | Facility: CLINIC | Age: 83
End: 2018-04-03

## 2018-04-04 RX ORDER — HYDRALAZINE HYDROCHLORIDE 100 MG/1
TABLET, FILM COATED ORAL
Qty: 270 TABLET | Refills: 0 | Status: SHIPPED | OUTPATIENT
Start: 2018-04-04 | End: 2018-08-28 | Stop reason: SDUPTHER

## 2018-04-04 RX ORDER — METOPROLOL TARTRATE 100 MG/1
TABLET ORAL
Qty: 180 TABLET | Refills: 1 | Status: SHIPPED | OUTPATIENT
Start: 2018-04-04 | End: 2018-12-21 | Stop reason: SDUPTHER

## 2018-04-11 ENCOUNTER — INITIAL CONSULT (OUTPATIENT)
Dept: SURGERY | Facility: CLINIC | Age: 83
End: 2018-04-11
Payer: MEDICARE

## 2018-04-11 ENCOUNTER — TELEPHONE (OUTPATIENT)
Dept: ENDOSCOPY | Facility: HOSPITAL | Age: 83
End: 2018-04-11

## 2018-04-11 VITALS
HEIGHT: 65 IN | SYSTOLIC BLOOD PRESSURE: 174 MMHG | TEMPERATURE: 98 F | WEIGHT: 203.5 LBS | HEART RATE: 66 BPM | DIASTOLIC BLOOD PRESSURE: 80 MMHG | BODY MASS INDEX: 33.91 KG/M2

## 2018-04-11 DIAGNOSIS — K31.7 GASTRIC POLYP: ICD-10-CM

## 2018-04-11 PROCEDURE — 3077F SYST BP >= 140 MM HG: CPT | Mod: CPTII,S$GLB,, | Performed by: SURGERY

## 2018-04-11 PROCEDURE — 3079F DIAST BP 80-89 MM HG: CPT | Mod: CPTII,S$GLB,, | Performed by: SURGERY

## 2018-04-11 PROCEDURE — 99999 PR PBB SHADOW E&M-EST. PATIENT-LVL III: CPT | Mod: PBBFAC,,, | Performed by: SURGERY

## 2018-04-11 PROCEDURE — 99204 OFFICE O/P NEW MOD 45 MIN: CPT | Mod: S$GLB,,, | Performed by: SURGERY

## 2018-04-11 RX ORDER — FLUTICASONE PROPIONATE 220 UG/1
1 AEROSOL, METERED RESPIRATORY (INHALATION) 2 TIMES DAILY
COMMUNITY
End: 2018-06-12 | Stop reason: SDUPTHER

## 2018-04-11 NOTE — LETTER
April 11, 2018      Socrates Andrew MD  [address]  [facility] [address]           [facility]  [address]  [facility] [address]  Phone: [phone]          Patient: Cindy Lyman   MR Number: [id number]   YOB: 1935   Date of Visit: 4/11/2018       Dear Dr. Socrates Andrew:    Thank you for referring Cindy Lyman to me for evaluation. Attached you will find relevant portions of my assessment and plan of care.    If you have questions, please do not hesitate to call me. I look forward to following Cindy Lyman along with you.    Sincerely,    Dejon Cordero MD    Enclosure  CC:  No Recipients    If you would like to receive this communication electronically, please contact [email or web address] or [phone] to request more information on [employer] Link access.    For providers and/or their staff who would like to refer a patient to [facility], please contact us through our one-stop-shop provider referral line, [facility], at [phone].    If you feel you have received this communication in error or would no longer like to receive these types of communications, please e-mail [email or web address]

## 2018-04-11 NOTE — TELEPHONE ENCOUNTER
Called about UEUS/UESD scheduled 4/25/18 at 1300.  Left voicemail with patient and daughter, Kady, with call back number for questions. Instructions mailed to patient.  Spoke with Meron in Anesthesia Dept about anesthesia doing a pre-op evaluation per Dr Socrates Andrew's request.

## 2018-04-11 NOTE — PROGRESS NOTES
[facility] Onc  General Surgery  History & Physical      Subjective:     Chief Complaint: gastric mass    History of Present Illness:  Patient is a 82 y.o. female with Hx of CAD, asthma, diastolic CHF, COPD, depression, hypertension, thyroid disease, and gastric mass who presents to clinic today for surgical evaluation of removal of gastric mass.    Ms. Lyman states that she has been asymptomatic. Has been followed by GI for gastric polyps when they found this mass that was not resectable. Currently planning on EUS with ESD but would like to have surgical oncology on call if needed. She denies fevers, chills, changes to bowel or bladder habits, weight loss, malaise. She is followed by cardiology for CHF and CAD.    Surgical history significant for lap titus (7/13/17)      Current Outpatient Prescriptions on File Prior to Visit   Medication Sig    acetaminophen (TYLENOL) 325 MG tablet Take 2 tablets (650 mg total) by mouth every 6 (six) hours as needed for Pain or Temperature greater than (100.4).    albuterol (PROVENTIL) 2.5 mg /3 mL (0.083 %) nebulizer solution Take 3 mLs (2.5 mg total) by nebulization every 6 (six) hours as needed for Wheezing. Rescue    aspirin (ECOTRIN) 81 MG EC tablet Take 81 mg by mouth once daily.    carBAMazepine (TEGRETOL) 200 mg tablet Take 1 tablet (200 mg total) by mouth 3 (three) times daily.    cetirizine (ZYRTEC) 10 MG tablet Take 1 tablet (10 mg total) by mouth once daily.    cloNIDine (CATAPRES) 0.3 MG tablet Take 1 tablet (0.3 mg total) by mouth 3 (three) times daily.    clotrimazole-betamethasone 1-0.05% (LOTRISONE) cream 2 (two) times daily. Apply to affected area    furosemide (LASIX) 40 MG tablet TAKE ONE TABLET BY MOUTH EVERY DAY AS NEEDED FOR SHORTNESS OF BREATH or leg swelling    gabapentin (NEURONTIN) 300 MG capsule Take 1 capsule (300 mg total) by mouth 3 (three) times daily.    hydrALAZINE (APRESOLINE) 100 MG tablet ONE TABLET BY MOUTH THREE TIMES  DAILY    metoprolol tartrate (LOPRESSOR) 100 MG tablet ONE TABLET BY MOUTH TWICE DAILY    nitroGLYCERIN (NITROSTAT) 0.4 MG SL tablet Place 0.4 mg under the tongue every 5 (five) minutes as needed for Chest pain.    rosuvastatin (CRESTOR) 20 MG tablet ONE TABLET BY MOUTH EVERY EVENING    verapamil (VERELAN) 360 MG C24P ONE CAPSULE BY MOUTH once a day    walker (ULTRA-LIGHT ROLLATOR) Misc One rollator, size large.    azelastine (ASTELIN) 137 mcg (0.1 %) nasal spray 1 spray (137 mcg total) by Nasal route 2 (two) times daily.    [DISCONTINUED] budesonide (PULMICORT) 0.25 mg/2 mL nebulizer solution Take 2 mLs (0.25 mg total) by nebulization once daily. Controller     No current facility-administered medications on file prior to visit.        Review of patient's allergies indicates:   Allergen Reactions    Doxycycline hyclate Diarrhea and Nausea And Vomiting    Singulair [montelukast]      Stomach pain, Muscle pain, Cough      Penicillins      Other reaction(s): Anaphylaxis    Ventolin [albuterol sulfate] Other (See Comments)     Severely elevated blood pressure    Latex, natural rubber Rash       Past Medical History:   Diagnosis Date    Anemia     Anticoagulant long-term use     Aortic stenosis     Arthritis     lower back    Asthma     CAD (coronary artery disease) 4/24/2015    Carpal tunnel syndrome on both sides     Cataract     CHF (congestive heart failure) 5/2013    COPD (chronic obstructive pulmonary disease)     Depression     DVT (deep venous thrombosis)     Hyperlipidemia     Hypertension     Pulmonary HTN     TMJ (temporomandibular joint disorder)      Past Surgical History:   Procedure Laterality Date    BACK SURGERY      cardiac stents      CARPAL TUNNEL RELEASE      Right/Left    EYE SURGERY      cataract extraction    HYSTERECTOMY      Partial    JOINT REPLACEMENT  2009    Left hip    POLYPECTOMY      x9    TEMPOROMANDIBULAR JOINT SURGERY       Family History      Problem Relation (Age of Onset)    Cancer Son    Heart disease Mother    Hypertension Daughter        Social History Main Topics    Smoking status: Never Smoker    Smokeless tobacco: Never Used    Alcohol use No    Drug use: No    Sexual activity: No     Review of Systems   Constitutional: Negative for chills and fever.   Respiratory: Negative for chest tightness and shortness of breath.    Cardiovascular: Negative for chest pain.   Gastrointestinal: Negative for abdominal distention, abdominal pain, constipation, diarrhea, nausea and vomiting.   Genitourinary: Negative for difficulty urinating.       Objective:     Vital Signs (Most Recent):  Temp: 97.6 °F (36.4 °C) (18 1120)  Pulse: 66 (18 1120)  BP: (!) 174/80 (18)     Weight: 92.3 kg (203 lb 7.8 oz)  Body mass index is 33.86 kg/m².    Physical Exam   Constitutional: She is oriented to person, place, and time. She appears well-developed and well-nourished.   HENT:   Head: Normocephalic and atraumatic.   Cardiovascular: Normal rate.    Pulmonary/Chest: Effort normal.   Abdominal: Soft. She exhibits no distension. There is no tenderness.   Neurological: She is alert and oriented to person, place, and time.   Skin: Skin is warm and dry.   Vitals reviewed.    Imagin/3/18 Ct:  Subcentimeter cystic pancreatic head lesion, unchanged from the 17 CT exam.    Complex right upper pole renal lesion, previously described as likely hemorrhagic cyst, unchanged.    Mild periportal and periaortic lymphadenopathy, unchanged.    Small pericardial effusion.    Prior cholecystectomy and hysterectomy.    Moderate amount of scattered calcified atherosclerotic disease.    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  Situation discussed with patient and her daughter. We will serve as surgical 'backup' for Dr Andrew when he attempts ESD of the adenomatous polyp of the gastric cardia. If a perforation occurs, she will need emergency open surgery to repair it. She understands.

## 2018-04-17 ENCOUNTER — ANESTHESIA EVENT (OUTPATIENT)
Dept: ENDOSCOPY | Facility: HOSPITAL | Age: 83
End: 2018-04-17
Payer: MEDICARE

## 2018-04-17 ENCOUNTER — TELEPHONE (OUTPATIENT)
Dept: CARDIOLOGY | Facility: HOSPITAL | Age: 83
End: 2018-04-17

## 2018-04-17 NOTE — ANESTHESIA PREPROCEDURE EVALUATION
04/17/2018  Cindy Lyman is a 82 y.o., female with gastric mass presenting for surgery ESD/EUS.  She has  past medical history of CAD, asthma, CHF,pulm HTN (PA press 53) COPD, depression, hypertension, thyroid disease, anemia of chronic disease    Echo 10/10/17    CONCLUSIONS     1 - Normal left ventricular systolic function (EF 55-60%).     2 - Concentric hypertrophy.     3 - Severe left atrial enlargement.     4 - Normal right ventricular systolic function .     5 - Pulmonary hypertension. The estimated PA systolic pressure is 53 mmHg.     6 - Moderate to severe aortic stenosis, CLEMENT = 0.76 cm2, peak velocity = 3.95 m/s, mean gradient = 35 mmHg.     7 - Mild mitral stenosis, MVA = 1.95 cm2.       Past Medical History:   Diagnosis Date    Anemia     Anticoagulant long-term use     Aortic stenosis     Arthritis     lower back    Asthma     CAD (coronary artery disease) 4/24/2015    Carpal tunnel syndrome on both sides     Cataract     CHF (congestive heart failure) 5/2013    COPD (chronic obstructive pulmonary disease)     Depression     DVT (deep venous thrombosis)     Hyperlipidemia     Hypertension     Pulmonary HTN     TMJ (temporomandibular joint disorder)        Past Surgical History:   Procedure Laterality Date    BACK SURGERY      cardiac stents      CARPAL TUNNEL RELEASE      Right/Left    EYE SURGERY      cataract extraction    HYSTERECTOMY      Partial    JOINT REPLACEMENT  2009    Left hip    POLYPECTOMY      x9    TEMPOROMANDIBULAR JOINT SURGERY             Anesthesia Evaluation    I have reviewed the Patient Summary Reports.    I have reviewed the Nursing Notes.   I have reviewed the Medications.     Review of Systems  Cardiovascular:   Hypertension CAD   CHF Recently seen by cardiology in Feb 2018.  Requested optimizaton/clearance in regards to her low risk  general anesthetic procedure ESD/EUS       Physical Exam  General:  Obesity, Well nourished    Airway/Jaw/Neck:  Airway Findings: Mouth Opening: Normal Tongue: Large  General Airway Assessment: Adult, Average  Mallampati: III  TM Distance: Normal, at least 6 cm  edentulous    Dental:  Dental Findings: Edentulous   Chest/Lungs:  Chest/Lungs Findings: Clear to auscultation     Heart/Vascular:  Heart Findings: Rate: Normal  Rhythm: Regular Rhythm  Heart Murmur  Systolic Heart Murmur Grade: Grade II        Mental Status:  Mental Status Findings: Normal    Patient cleared by cardiology Dr. Carmen (per telephone note 4/17/18) at moderate risk.      Anesthesia Plan  Type of Anesthesia, risks & benefits discussed:  Anesthesia Type:  general  Patient's Preference:   Intra-op Monitoring Plan: standard ASA monitors  Intra-op Monitoring Plan Comments:   Post Op Pain Control Plan: multimodal analgesia  Post Op Pain Control Plan Comments:   Induction:   IV  Beta Blocker:         Informed Consent: Patient understands risks and agrees with Anesthesia plan.  Questions answered. Anesthesia consent signed with patient.  ASA Score: 3     Day of Surgery Review of History & Physical:    H&P update referred to the surgeon.         Ready For Surgery From Anesthesia Perspective.

## 2018-04-17 NOTE — TELEPHONE ENCOUNTER
----- Message from Meron Watters, RN sent at 4/17/2018  9:44 AM CDT -----  Regarding: Pre-Op Optimization/Clearance for GI Procedure 4-25-18  Good morning,     Dr. Kamran HALL asked that Anesthesia review the medical history of Cindy Lyman in preparation for a low risk General Anesthesia GI procedure scheduled for 4-25-18.  She is having a Endoscopic Ultrasound and Submucosal Dissection.  Dr. Pereira (Anesthesia) reviewed her medical history and is asking if Dr. Carmen is able to clear her for the procedure by chart or if she will need to be seen for Cardiology Clearance???  She saw Dr. Carmen and had a 2-D Echo on 2-6-18.                   Thanks, Meron PETERSON  Pre-Op Center  Ext. [phone]

## 2018-04-19 ENCOUNTER — LAB VISIT (OUTPATIENT)
Dept: LAB | Facility: HOSPITAL | Age: 83
End: 2018-04-19
Attending: INTERNAL MEDICINE
Payer: MEDICARE

## 2018-04-19 DIAGNOSIS — D63.8 ANEMIA OF CHRONIC DISEASE: Chronic | ICD-10-CM

## 2018-04-19 LAB
BASOPHILS # BLD AUTO: 0.03 K/UL
BASOPHILS NFR BLD: 0.7 %
DIFFERENTIAL METHOD: ABNORMAL
EOSINOPHIL # BLD AUTO: 0 K/UL
EOSINOPHIL NFR BLD: 0.9 %
ERYTHROCYTE [DISTWIDTH] IN BLOOD BY AUTOMATED COUNT: 14.7 %
FERRITIN SERPL-MCNC: 66 NG/ML
HCT VFR BLD AUTO: 32.5 %
HGB BLD-MCNC: 10 G/DL
IMM GRANULOCYTES # BLD AUTO: 0.01 K/UL
IMM GRANULOCYTES NFR BLD AUTO: 0.2 %
IRON SERPL-MCNC: 54 UG/DL
LYMPHOCYTES # BLD AUTO: 1.3 K/UL
LYMPHOCYTES NFR BLD: 30.2 %
MCH RBC QN AUTO: 28.5 PG
MCHC RBC AUTO-ENTMCNC: 30.8 G/DL
MCV RBC AUTO: 93 FL
MONOCYTES # BLD AUTO: 0.4 K/UL
MONOCYTES NFR BLD: 8.4 %
NEUTROPHILS # BLD AUTO: 2.6 K/UL
NEUTROPHILS NFR BLD: 59.6 %
NRBC BLD-RTO: 0 /100 WBC
PLATELET # BLD AUTO: 258 K/UL
PMV BLD AUTO: 10.7 FL
RBC # BLD AUTO: 3.51 M/UL
SATURATED IRON: 14 %
TOTAL IRON BINDING CAPACITY: 377 UG/DL
TRANSFERRIN SERPL-MCNC: 255 MG/DL
WBC # BLD AUTO: 4.4 K/UL

## 2018-04-19 PROCEDURE — 82728 ASSAY OF FERRITIN: CPT

## 2018-04-19 PROCEDURE — 36415 COLL VENOUS BLD VENIPUNCTURE: CPT | Mod: PO

## 2018-04-19 PROCEDURE — 85025 COMPLETE CBC W/AUTO DIFF WBC: CPT

## 2018-04-19 PROCEDURE — 83540 ASSAY OF IRON: CPT

## 2018-04-24 ENCOUNTER — TELEPHONE (OUTPATIENT)
Dept: ENDOSCOPY | Facility: HOSPITAL | Age: 83
End: 2018-04-24

## 2018-04-24 ENCOUNTER — OFFICE VISIT (OUTPATIENT)
Dept: HEMATOLOGY/ONCOLOGY | Facility: CLINIC | Age: 83
End: 2018-04-24
Payer: MEDICARE

## 2018-04-24 VITALS
SYSTOLIC BLOOD PRESSURE: 142 MMHG | TEMPERATURE: 99 F | HEIGHT: 65 IN | HEART RATE: 68 BPM | BODY MASS INDEX: 33.57 KG/M2 | DIASTOLIC BLOOD PRESSURE: 72 MMHG | OXYGEN SATURATION: 96 % | WEIGHT: 201.5 LBS

## 2018-04-24 DIAGNOSIS — D63.8 ANEMIA OF CHRONIC DISEASE: Primary | Chronic | ICD-10-CM

## 2018-04-24 DIAGNOSIS — N18.9 CHRONIC KIDNEY DISEASE, UNSPECIFIED CKD STAGE: ICD-10-CM

## 2018-04-24 DIAGNOSIS — K31.89 GASTRIC MASS: ICD-10-CM

## 2018-04-24 PROCEDURE — 99999 PR PBB SHADOW E&M-EST. PATIENT-LVL V: CPT | Mod: PBBFAC,,, | Performed by: INTERNAL MEDICINE

## 2018-04-24 PROCEDURE — 99213 OFFICE O/P EST LOW 20 MIN: CPT | Mod: S$GLB,,, | Performed by: INTERNAL MEDICINE

## 2018-04-24 PROCEDURE — 99499 UNLISTED E&M SERVICE: CPT | Mod: S$GLB,,, | Performed by: INTERNAL MEDICINE

## 2018-04-24 NOTE — PROGRESS NOTES
"Subjective:       Patient ID: Cindy Lyman is a 82 y.o. female.    Chief Complaint: Follow-up    Diagnosis: Anemia of chronic disease  HPI      She has  past medical history of CAD, asthma, CHF, COPD, depression, hypertension, thyroid disease, seen today for f/u for anemia of chronic disease. Bone marrow biopsy neg for hematological malignancy    Pt hospitalized July 2017 for abd pain.  she was found to be septic with fever and tachycardia (no leukocytosis). She was treated for a UTI and followed up with her PCP. She had persistent RUQ abdominal pain .  Followed by Surgery and underwentnd was referred to general surgery but has not yet seen them. H   US abdomen showed biliary sludge and cholelithiasis but no definite sonographic evidence to suggest acute cholecystitis. MRCP showed, "Markedly distended gallbladder with innumerable dependent gallstones.  There has been development of pericholecystic fluid near the fundus of the gallbladder that could reflect the sequela of cholecystitis" as well as a mild to moderate pericardial effusion. Plavix held in anticipation of cholecystectomy. NST 7/10/2017 stable. Low-intermediate cardiovascular risk for surgery. Laproscopic cholecystectomy 7/13/17. Intraoperative angiogram showed a retained common bile duct stone. Post cholecystectomy, patient failed extubation, was re-intubated then extubated   . Liver enzymes and TBil, have  improved, suggesting patient may have passed the stone seen in CBD. GI had initially considered ERCP, but will treat conservatively as LFT's normalizing  SHe reports she underwent 1 urpbc transfusion during hospitalization    Intolerant of Fe supp     Today, she is doing well  No new issues   Chronic arthalgias and  back pain-stable  She ambulates with assistance of walker  Mild GALVAN-stable, chronic  No melena,hematochezia or change in bowel habits      Pt followed by GI , Dr. 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    CT w/out evidence of LNs or distant mets  She underwent EUS at [facility] - 2cm-2.5cm friable, polypoid mass at cardia/proximal gastric body.  Pt referred to Dr. 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    Hx of RLE DVT  S/p anticoagulation completed 11/2015     She is followed by Pulm for COPD    Cbc      Wbc 4400Hb 10 g/dl Hct 32. 5 % and plt ct 258k     Bone Marrow biopsy 5/26/2016  Hypercellular marrow for age, 50-70%, with trilineage hematopoiesis, see comment  --Erythroid hyperplasia  --Mild plasmacytosis, 5-10%, polytypic by in situ hybridization studies, see comment  --No increase in blasts  --Adequate megakaryocytes  --Decreased stainable iron  --Mild reticulin fibrosis  COMMENT: Concomitantly submitted flow cytometric analysis detects no abnormal hematopoietic population. Bcells are polyclonal with a subset of hematogones detected, and T cells are immunophenotypically unremarkable.  The blast gate is not increased.Although there is a mild plasmacytosis, by in situ hybridization studies, this appears polytypic. Correlate clinicallyand with any available cytogenetic and molecular studies    Plasma cell proliferative disorder, FISH:  An insufficient number of plasma cells were observed using cytoplasmic immunoglobulin staining method .This result does not exclude the presence of a plasma cell proliferative disorder."    Congo red stain neg    PAST MEDICAL HISTORY:  Aortic stenosis, arthritis, asthma, CAD, carpal tunnel, cataracts, CHF, COPD, depression, hyperlipidemia, hypertension, pulmonary hypertension, thyroid disease, TMJ.    PAST SURGICAL HISTORY:  Partial " "hysterectomy, carpal tunnel release, eye surgery, left hip replacement, back surgery, TMJ.            Review of Systems   Constitutional: Negative for appetite change, fatigue and unexpected weight change.   HENT: Negative for hearing loss and nosebleeds.    Eyes: Negative for visual disturbance.   Respiratory: Positive for shortness of breath. Negative for cough and chest tightness.    Cardiovascular: Negative for chest pain and leg swelling.   Gastrointestinal: Negative for abdominal pain, blood in stool, constipation, diarrhea, nausea and vomiting.   Genitourinary: Negative for flank pain and hematuria.   Musculoskeletal: Positive for arthralgias and back pain. Negative for gait problem, joint swelling and neck pain.   Skin:        No petechiae, ecchymoses   Neurological: Negative for dizziness, light-headedness, numbness and headaches.   Hematological: Negative for adenopathy. Does not bruise/bleed easily.         Lab Results   Component Value Date    WBC 4.40 04/19/2018    HGB 10.0 (L) 04/19/2018    HCT 32.5 (L) 04/19/2018    MCV 93 04/19/2018     04/19/2018         Objective:       Vitals:    04/24/18 1400 04/24/18 1403 04/24/18 1407   BP: (!) 188/81 (!) 201/86 (!) 142/72   BP Location: Left arm Right arm Right arm   Patient Position: Sitting Sitting Sitting   BP Method: Medium (Automatic) Medium (Automatic) Medium (Automatic)   Pulse: 68     Temp: 98.8 °F (37.1 °C)     TempSrc: Oral     SpO2: 96%     Weight: 91.4 kg (201 lb 8 oz)     Height: 5' 5" (1.651 m)         Physical Exam   Constitutional: She is oriented to person, place, and time. She appears well-developed and well-nourished.   HENT:   Head: Normocephalic.   Mouth/Throat: Oropharynx is clear and moist. No oropharyngeal exudate.   Eyes: Conjunctivae and lids are normal. Pupils are equal, round, and reactive to light. No scleral icterus.   Neck: Normal range of motion. Neck supple. No thyromegaly present.   Cardiovascular: Normal rate and " regular rhythm.    Murmur heard.  Pulmonary/Chest: Breath sounds normal. She has no wheezes. She has no rales.   Abdominal: Soft. Bowel sounds are normal. She exhibits no distension and no mass. There is no hepatosplenomegaly. There is no tenderness. There is no rebound and no guarding.   Musculoskeletal: Normal range of motion. She exhibits edema ( 1+ RLE edema). She exhibits no tenderness.   Lymphadenopathy:     She has no cervical adenopathy.     She has no axillary adenopathy.        Right: No supraclavicular adenopathy present.        Left: No supraclavicular adenopathy present.   Neurological: She is alert and oriented to person, place, and time. No cranial nerve deficit. Coordination normal.   Skin: Skin is warm and dry. No ecchymosis, no petechiae and no rash noted. No erythema.   Psychiatric: She has a normal mood and affect.         Results for MARIE TURNER (MRN [id number]) as of 6/15/2017 13:23   Ref. Range 6/10/2016 11:08 1/9/2017 13:46 6/12/2017 10:52   Gerber Free Light Chains Latest Ref Range: 0.33 - 1.94 mg/dL 4.01 (H) 6.29 (H) 4.62 (H)   Lambda Free Light Chains Latest Ref Range: 0.57 - 2.63 mg/dL 1.68 2.93 (H) 1.63   Kappa/Lambda FLC Ratio Latest Ref Range: 0.26 - 1.65  2.39 (H) 2.15 (H) 2.83 (H)                 Lab Results   Component Value Date    WBC 4.40 04/19/2018    HGB 10.0 (L) 04/19/2018    HCT 32.5 (L) 04/19/2018    MCV 93 04/19/2018     04/19/2018       Lab Results   Component Value Date    IRON 54 04/19/2018    TIBC 377 04/19/2018    FERRITIN 66 04/19/2018     Bone Marrow biopsy 5/26/2016  Hypercellular marrow for age, 50-70%, with trilineage hematopoiesis, see comment  --Erythroid hyperplasia  --Mild plasmacytosis, 5-10%, polytypic by in situ hybridization studies, see comment  --No increase in blasts  --Adequate megakaryocytes  --Decreased stainable iron  --Mild reticulin fibrosis  COMMENT: Concomitantly submitted flow cytometric analysis detects no abnormal hematopoietic  "population. Bcells are polyclonal with a subset of hematogones detected, and T cells are immunophenotypically unremarkable.  The blast gate is not increased.Although there is a mild plasmacytosis, by in situ hybridization studies, this appears polytypic. 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 Anemia of chronic disease    2. Chronic kidney disease, unspecified CKD stage    3. Gastric mass        Plan:   1-3 Pt clinically stable  Hb 10  g/dl -stable   Hb remains 10 g/dl range   Colonoscopy 5 yrs ago [facility]- unremarkable  S/p extensive hematological workup including bmbx- neg for hematological malignancy  No active  bleeding   Follow-up with Surgery   Follow-up with PCP for med mgmt  Cont to monitor      F/u  2mo with cbc, Fe studies prior to f/u( or sooner if problems should arise in the interim)     CC: Jeremiah Reeves M.D.        "

## 2018-04-24 NOTE — TELEPHONE ENCOUNTER
Per request of Dr Socrates Andrew, spoke with patient and instructed her to follow full liquid diet today in preparation for UEUS/UESD scheduled tomorrow afternoon.

## 2018-04-25 ENCOUNTER — ANESTHESIA (OUTPATIENT)
Dept: ENDOSCOPY | Facility: HOSPITAL | Age: 83
End: 2018-04-25
Payer: MEDICARE

## 2018-04-25 ENCOUNTER — HOSPITAL ENCOUNTER (OUTPATIENT)
Facility: HOSPITAL | Age: 83
Discharge: HOME | End: 2018-04-25
Attending: INTERNAL MEDICINE | Admitting: INTERNAL MEDICINE
Payer: MEDICARE

## 2018-04-25 ENCOUNTER — SURGERY (OUTPATIENT)
Age: 83
End: 2018-04-25

## 2018-04-25 VITALS
HEIGHT: 65 IN | OXYGEN SATURATION: 100 % | HEART RATE: 60 BPM | DIASTOLIC BLOOD PRESSURE: 78 MMHG | SYSTOLIC BLOOD PRESSURE: 195 MMHG | WEIGHT: 201 LBS | RESPIRATION RATE: 17 BRPM | TEMPERATURE: 98 F | BODY MASS INDEX: 33.49 KG/M2

## 2018-04-25 DIAGNOSIS — K31.7 GASTRIC POLYP: Primary | ICD-10-CM

## 2018-04-25 PROCEDURE — 37000009 HC ANESTHESIA EA ADD 15 MINS: Performed by: INTERNAL MEDICINE

## 2018-04-25 PROCEDURE — 43259 EGD US EXAM DUODENUM/JEJUNUM: CPT | Mod: ,,, | Performed by: INTERNAL MEDICINE

## 2018-04-25 PROCEDURE — 43259 EGD US EXAM DUODENUM/JEJUNUM: CPT | Performed by: INTERNAL MEDICINE

## 2018-04-25 PROCEDURE — D9220A PRA ANESTHESIA: Mod: ANES,,, | Performed by: ANESTHESIOLOGY

## 2018-04-25 PROCEDURE — 37000008 HC ANESTHESIA 1ST 15 MINUTES: Performed by: INTERNAL MEDICINE

## 2018-04-25 PROCEDURE — 25000003 PHARM REV CODE 250: Performed by: NURSE ANESTHETIST, CERTIFIED REGISTERED

## 2018-04-25 PROCEDURE — D9220A PRA ANESTHESIA: Mod: CRNA,,, | Performed by: NURSE ANESTHETIST, CERTIFIED REGISTERED

## 2018-04-25 PROCEDURE — 25000003 PHARM REV CODE 250: Performed by: INTERNAL MEDICINE

## 2018-04-25 PROCEDURE — 63600175 PHARM REV CODE 636 W HCPCS: Performed by: NURSE ANESTHETIST, CERTIFIED REGISTERED

## 2018-04-25 RX ORDER — SODIUM CHLORIDE 9 MG/ML
INJECTION, SOLUTION INTRAVENOUS CONTINUOUS
Status: DISCONTINUED | OUTPATIENT
Start: 2018-04-25 | End: 2018-04-25 | Stop reason: HOSPADM

## 2018-04-25 RX ORDER — PROPOFOL 10 MG/ML
VIAL (ML) INTRAVENOUS
Status: DISCONTINUED | OUTPATIENT
Start: 2018-04-25 | End: 2018-04-25

## 2018-04-25 RX ORDER — PROPOFOL 10 MG/ML
VIAL (ML) INTRAVENOUS CONTINUOUS PRN
Status: DISCONTINUED | OUTPATIENT
Start: 2018-04-25 | End: 2018-04-25

## 2018-04-25 RX ORDER — HYDROMORPHONE HYDROCHLORIDE 1 MG/ML
0.2 INJECTION, SOLUTION INTRAMUSCULAR; INTRAVENOUS; SUBCUTANEOUS EVERY 5 MIN PRN
Status: DISCONTINUED | OUTPATIENT
Start: 2018-04-25 | End: 2018-04-25 | Stop reason: HOSPADM

## 2018-04-25 RX ORDER — SODIUM CHLORIDE 0.9 % (FLUSH) 0.9 %
3 SYRINGE (ML) INJECTION
Status: DISCONTINUED | OUTPATIENT
Start: 2018-04-25 | End: 2018-04-25 | Stop reason: HOSPADM

## 2018-04-25 RX ORDER — ETOMIDATE 2 MG/ML
INJECTION INTRAVENOUS
Status: DISCONTINUED | OUTPATIENT
Start: 2018-04-25 | End: 2018-04-25

## 2018-04-25 RX ADMIN — ETOMIDATE 25 MG: 2 INJECTION, SOLUTION INTRAVENOUS at 01:04

## 2018-04-25 RX ADMIN — PROPOFOL 50 MG: 10 INJECTION, EMULSION INTRAVENOUS at 01:04

## 2018-04-25 RX ADMIN — SODIUM CHLORIDE: 0.9 INJECTION, SOLUTION INTRAVENOUS at 12:04

## 2018-04-25 RX ADMIN — ETOMIDATE 10 MG: 2 INJECTION, SOLUTION INTRAVENOUS at 01:04

## 2018-04-25 RX ADMIN — PROPOFOL 125 MCG/KG/MIN: 10 INJECTION, EMULSION INTRAVENOUS at 01:04

## 2018-04-25 NOTE — DISCHARGE SUMMARY
Discharge Summary/Instructions after an Endoscopic Procedure    Patient Name: Cindy Lyman  Patient MRN: [id number]  Patient YOB: 1935 Wednesday, April 25, 2018  Socrates Andrew MD    RESTRICTIONS:  During your procedure today, you received medications for sedation.  These medications may affect your judgment, balance and coordination.  Therefore, for 24 hours, you have the following restrictions:     - DO NOT drive a car, operate machinery, make legal/financial decisions, sign important papers or drink alcohol.      ACTIVITY:  The following day: return to full activity including work, except no heavy lifting, straining or running for 3 days if polyps were removed.    DIET:  Eat and drink normally unless instructed otherwise.     TREATMENT FOR COMMON SIDE EFFECTS:  - Mild abdominal pain, nausea, belching, bloating or excessive gas:  rest, eat lightly and use a heating pad.  - Sore Throat: treat with throat lozenges and/or gargle with warm salt water.  - Because air was used during the procedure, expelling large amounts of air from your rectum or belching is normal.  - If a bowel prep was taken, you may not have a bowel movement for 1-3 days.  This is normal.      SYMPTOMS TO WATCH FOR AND REPORT TO YOUR PHYSICIAN:  1. Abdominal pain or bloating, other than gas cramps.  2. Chest pain.  3. Back pain.  4. Signs of infection such as: chills or fever occurring within 24 hours after the procedure.  5. Rectal bleeding, which would show as bright red, maroon, or black stools. (A tablespoon of blood from the rectum is not serious, especially if hemorrhoids are present.)  6. Vomiting.  7. Weakness or dizziness.      GO DIRECTLY TO THE NEAREST EMERGENCY ROOM IF YOU HAVE ANY OF THE FOLLOWING:     Difficulty breathing              Chills and/or fever over 101 F   Persistent vomiting and/or vomiting blood   Severe abdominal pain   Severe chest pain   Black, tarry stools   Bleeding- more than one tablespoon   Any  other symptom or condition that you feel may need urgent attention    Your doctor recommends these additional instructions:  If any biopsies were taken, your doctors clinic will contact you in 1 to 2 weeks with any results.    - Discharge patient to home (ambulatory).   - We will discussed options with Dr Cordero.  - The findings and recommendations were discussed with the patient's family.    For questions, problems or results please call your physician - Socrates Andrew MD at Work:  [phone].    [facility], EMERGENCY ROOM PHONE NUMBER: [phone]    IF A COMPLICATION OR EMERGENCY SITUATION ARISES AND YOU ARE UNABLE TO REACH YOUR PHYSICIAN - GO DIRECTLY TO THE EMERGENCY ROOM.

## 2018-04-25 NOTE — PROVATION PATIENT INSTRUCTIONS
Discharge Summary/Instructions after an Endoscopic Procedure  Patient Name: Cindy Lyman  Patient MRN: [id number]  Patient YOB: 1935 Wednesday, April 25, 2018  Socrates Andrew MD  RESTRICTIONS:  During your procedure today, you received medications for sedation.  These   medications may affect your judgment, balance and coordination.  Therefore,   for 24 hours, you have the following restrictions:   - DO NOT drive a car, operate machinery, make legal/financial decisions,   sign important papers or drink alcohol.    ACTIVITY:  The following day: return to full activity including work, except no heavy   lifting, straining or running for 3 days if polyps were removed.  DIET:  Eat and drink normally unless instructed otherwise.     TREATMENT FOR COMMON SIDE EFFECTS:  - Mild abdominal pain, nausea, belching, bloating or excessive gas:  rest,   eat lightly and use a heating pad.  - Sore Throat: treat with throat lozenges and/or gargle with warm salt   water.  - Because air was used during the procedure, expelling large amounts of air   from your rectum or belching is normal.  - If a bowel prep was taken, you may not have a bowel movement for 1-3 days.    This is normal.  SYMPTOMS TO WATCH FOR AND REPORT TO YOUR PHYSICIAN:  1. Abdominal pain or bloating, other than gas cramps.  2. Chest pain.  3. Back pain.  4. Signs of infection such as: chills or fever occurring within 24 hours   after the procedure.  5. Rectal bleeding, which would show as bright red, maroon, or black stools.   (A tablespoon of blood from the rectum is not serious, especially if   hemorrhoids are present.)  6. Vomiting.  7. Weakness or dizziness.  GO DIRECTLY TO THE NEAREST EMERGENCY ROOM IF YOU HAVE ANY OF THE FOLLOWING:      Difficulty breathing              Chills and/or fever over 101 F   Persistent vomiting and/or vomiting blood   Severe abdominal pain   Severe chest pain   Black, tarry stools   Bleeding- more than one  tablespoon   Any other symptom or condition that you feel may need urgent attention  Your doctor recommends these additional instructions:  If any biopsies were taken, your doctors clinic will contact you in 1 to 2   weeks with any results.  - Discharge patient to home (ambulatory).   - We will discussed options with Dr Cordero.  - The findings and recommendations were discussed with the patient's   family.  For questions, problems or results please call your physician - Socrates Andrew MD at Work:  [phone].  [facility], EMERGENCY ROOM PHONE NUMBER: [phone]  IF A COMPLICATION OR EMERGENCY SITUATION ARISES AND YOU ARE UNABLE TO REACH   YOUR PHYSICIAN - GO DIRECTLY TO THE EMERGENCY ROOM.  Socrates Andrew MD  4/25/2018 2:10:28 PM  This report has been verified and signed electronically.

## 2018-04-25 NOTE — PROGRESS NOTES
Patient tolerating P.O. Without diffiiculty. No complaints of pain. After visit summary provided. Discharge education and teaching provided. Patient and son verbalized understanding.  IV removed. Patient requesting to speak to MD about test results.

## 2018-04-25 NOTE — H&P
History & Physical - Short Stay  Gastroenterology      SUBJECTIVE:     Procedure: EGD and EUS    Chief Complaint/Indication for Procedure: Gastric mass    History of Present Illness:  Patient is a 82 y.o. female presents with evidence of a gastric mass with HGD and carcinoma in situ here for EUS and possible endoscopic resection.   PTA Medications   Medication Sig    acetaminophen (TYLENOL) 325 MG tablet Take 2 tablets (650 mg total) by mouth every 6 (six) hours as needed for Pain or Temperature greater than (100.4).    albuterol (PROVENTIL) 2.5 mg /3 mL (0.083 %) nebulizer solution Take 3 mLs (2.5 mg total) by nebulization every 6 (six) hours as needed for Wheezing. Rescue    aspirin (ECOTRIN) 81 MG EC tablet Take 81 mg by mouth once daily.    carBAMazepine (TEGRETOL) 200 mg tablet Take 1 tablet (200 mg total) by mouth 3 (three) times daily.    cloNIDine (CATAPRES) 0.3 MG tablet Take 1 tablet (0.3 mg total) by mouth 3 (three) times daily.    furosemide (LASIX) 40 MG tablet TAKE ONE TABLET BY MOUTH EVERY DAY AS NEEDED FOR SHORTNESS OF BREATH or leg swelling    gabapentin (NEURONTIN) 300 MG capsule Take 1 capsule (300 mg total) by mouth 3 (three) times daily.    hydrALAZINE (APRESOLINE) 100 MG tablet ONE TABLET BY MOUTH THREE TIMES DAILY    metoprolol tartrate (LOPRESSOR) 100 MG tablet ONE TABLET BY MOUTH TWICE DAILY    rosuvastatin (CRESTOR) 20 MG tablet ONE TABLET BY MOUTH EVERY EVENING    verapamil (VERELAN) 360 MG C24P ONE CAPSULE BY MOUTH once a day    azelastine (ASTELIN) 137 mcg (0.1 %) nasal spray 1 spray (137 mcg total) by Nasal route 2 (two) times daily.    cetirizine (ZYRTEC) 10 MG tablet Take 1 tablet (10 mg total) by mouth once daily.    clotrimazole-betamethasone 1-0.05% (LOTRISONE) cream 2 (two) times daily. Apply to affected area    fluticasone (FLOVENT HFA) 220 mcg/actuation inhaler Inhale 1 puff into the lungs 2 (two) times daily. Controller    nitroGLYCERIN (NITROSTAT) 0.4 MG SL  tablet Place 0.4 mg under the tongue every 5 (five) minutes as needed for Chest pain.    walker (ULTRA-LIGHT ROLLATOR) Misc One rollator, size large.       Review of patient's allergies indicates:   Allergen Reactions    Doxycycline hyclate Diarrhea and Nausea And Vomiting    Singulair [montelukast]      Stomach pain, Muscle pain, Cough      Penicillins      Other reaction(s): Anaphylaxis    Ventolin [albuterol sulfate] Other (See Comments)     Severely elevated blood pressure    Latex, natural rubber Rash        Past Medical History:   Diagnosis Date    Anemia     Anticoagulant long-term use     Aortic stenosis     Arthritis     lower back    Asthma     CAD (coronary artery disease) 4/24/2015    Carpal tunnel syndrome on both sides     Cataract     CHF (congestive heart failure) 5/2013    COPD (chronic obstructive pulmonary disease)     Depression     DVT (deep venous thrombosis)     Hyperlipidemia     Hypertension     Pulmonary HTN     TMJ (temporomandibular joint disorder)      Past Surgical History:   Procedure Laterality Date    BACK SURGERY      cardiac stents      CARPAL TUNNEL RELEASE      Right/Left    EYE SURGERY      cataract extraction    HYSTERECTOMY      Partial    JOINT REPLACEMENT  2009    Left hip    POLYPECTOMY      x9    TEMPOROMANDIBULAR JOINT SURGERY       Family History   Problem Relation Age of Onset    Heart disease Mother     Hypertension Daughter     Cancer Son     Breast cancer Neg Hx     Colon cancer Neg Hx     Ovarian cancer Neg Hx     Esophageal cancer Neg Hx      Social History   Substance Use Topics    Smoking status: Never Smoker    Smokeless tobacco: Never Used    Alcohol use No       Review of Systems:  Constitutional: no fever or chills  Respiratory: no cough or shortness of breath  Cardiovascular: no chest pain or palpitations  Gastrointestinal: no nausea or vomiting, no abdominal pain or change in bowel habits    OBJECTIVE:     Vital Signs  (Most Recent)       Physical Exam:  General: well developed, well nourished  Lungs:  clear to auscultation bilaterally and normal respiratory effort  Heart: regular rate, S1, S2 normal  Abdomen: soft, non-tender non-distented; bowel sounds normal; no masses,  no organomegaly    Laboratory  CBC:   Recent Labs  Lab 04/19/18  1245   WBC 4.40   RBC 3.51*   HGB 10.0*   HCT 32.5*      MCV 93   MCH 28.5   MCHC 30.8*     CMP: No results for input(s): GLU, CALCIUM, ALBUMIN, PROT, NA, K, CO2, CL, BUN, CREATININE, ALKPHOS, ALT, AST, BILITOT in the last 168 hours.  Coagulation: No results for input(s): LABPROT, INR, APTT in the last 168 hours.      Diagnostic Results:      ASSESSMENT/PLAN:     Gastric mass    Plan: EGD, EUS and possible ESD/EMR    Anesthesia Plan: General    ASA Grade: ASA 3 - Patient with moderate systemic disease with functional limitations      The impression and plan was discussed in detail with the patient and family. All questions have been answered and the patient voices understanding of our plan at this point. The risk of the procedure was discussed in detail which includes but not limited to bleeding, infection, perforation in some cases requiring surgery with its spectrum of complications.

## 2018-04-25 NOTE — TRANSFER OF CARE
"Anesthesia Transfer of Care Note    Patient: Cindy Lyman    Procedure(s) Performed: Procedure(s) (LRB):  ULTRASOUND-ENDOSCOPIC-UPPER (N/A)    Patient location: PACU    Anesthesia Type: general    Transport from OR: Transported from OR on 2-3 L/min O2 by NC with adequate spontaneous ventilation    Post pain: adequate analgesia    Post assessment: no apparent anesthetic complications    Post vital signs: stable    Level of consciousness: awake    Nausea/Vomiting: no nausea/vomiting    Complications: none    Transfer of care protocol was followed      Last vitals:   Visit Vitals  BP (!) 191/90 (Patient Position: Lying)   Pulse 71   Temp 36.7 °C (98.1 °F) (Temporal)   Resp 16   Ht 5' 5" (1.651 m)   Wt 91.2 kg (201 lb)   SpO2 100%   Breastfeeding? No   BMI 33.45 kg/m²     "

## 2018-04-26 NOTE — ANESTHESIA POSTPROCEDURE EVALUATION
"Anesthesia Post Evaluation    Patient: Cindy Lyman    Procedure(s) Performed: Procedure(s) (LRB):  ULTRASOUND-ENDOSCOPIC-UPPER (N/A)    Final Anesthesia Type: general  Patient location during evaluation: PACU  Patient participation: Yes- Able to Participate  Level of consciousness: awake and alert and oriented  Post-procedure vital signs: reviewed and stable  Pain management: adequate  Airway patency: patent  PONV status at discharge: No PONV  Anesthetic complications: no      Cardiovascular status: blood pressure returned to baseline and hemodynamically stable  Respiratory status: unassisted and spontaneous ventilation  Hydration status: euvolemic  Follow-up not needed.        Visit Vitals  BP (!) 195/78 (BP Location: Left arm, Patient Position: Lying)   Pulse 60   Temp 36.6 °C (97.9 °F) (Temporal)   Resp 17   Ht 5' 5" (1.651 m)   Wt 91.2 kg (201 lb)   SpO2 100%   Breastfeeding? No   BMI 33.45 kg/m²       Pain/Malvin Score: Pain Assessment Performed: Yes (4/25/2018 12:10 PM)  Presence of Pain: denies (4/25/2018  2:20 PM)      "

## 2018-04-27 ENCOUNTER — TREATMENT PLANNING (OUTPATIENT)
Dept: SURGERY | Facility: CLINIC | Age: 83
End: 2018-04-27

## 2018-04-27 ENCOUNTER — TELEPHONE (OUTPATIENT)
Dept: GASTROENTEROLOGY | Facility: CLINIC | Age: 83
End: 2018-04-27

## 2018-04-27 NOTE — TELEPHONE ENCOUNTER
----- Message from Socrates Andrew MD sent at 4/27/2018  1:27 PM CDT -----  Please let the patient know.  Socrates Andrew MD  ----- Message -----  From: Dejon Cordero MD  Sent: 4/27/2018   1:07 PM  To: Socrates Andrew MD, Valery Ang RN    Great thought. Let me see her back in the office and discuss and if she agrees, will set it up.   Neeru, can you schedule her back to see me to discuss?    ----- Message -----  From: Socrates Andrew MD  Sent: 4/26/2018   2:21 PM  To: MD Dejon Coburn,    You saw this patient before. She is a 82 y-o lady with a gastric mass with biopsies positive with HGD/CIS. I was planning to do an ESD on her yesterday but the EUS is suspicious for T2 lesion. She did not want typical gastric cancer surgery before but I wonder if she can be a candidate for endoscopic full thickness resection with laparoscopic closure.     Looking forward to your input,  Socrates

## 2018-05-08 ENCOUNTER — HOSPITAL ENCOUNTER (OUTPATIENT)
Dept: CARDIOLOGY | Facility: HOSPITAL | Age: 83
Discharge: HOME | End: 2018-05-08
Attending: INTERNAL MEDICINE
Payer: MEDICARE

## 2018-05-08 DIAGNOSIS — I27.20 PULMONARY HYPERTENSION: Chronic | ICD-10-CM

## 2018-05-08 DIAGNOSIS — J44.9 CHRONIC OBSTRUCTIVE PULMONARY DISEASE, UNSPECIFIED COPD TYPE: ICD-10-CM

## 2018-05-08 DIAGNOSIS — R06.02 SHORTNESS OF BREATH: ICD-10-CM

## 2018-05-08 DIAGNOSIS — I35.0 NONRHEUMATIC AORTIC VALVE STENOSIS: Chronic | ICD-10-CM

## 2018-05-08 LAB
AORTIC VALVE STENOSIS: ABNORMAL
ESTIMATED PA SYSTOLIC PRESSURE: 60.76
MITRAL VALVE REGURGITATION: ABNORMAL
RETIRED EF AND QEF - SEE NOTES: 60 (ref 55–65)
TRICUSPID VALVE REGURGITATION: ABNORMAL

## 2018-05-08 PROCEDURE — 93306 TTE W/DOPPLER COMPLETE: CPT | Mod: 26,,, | Performed by: INTERNAL MEDICINE

## 2018-05-08 PROCEDURE — 93306 TTE W/DOPPLER COMPLETE: CPT

## 2018-05-10 ENCOUNTER — TELEPHONE (OUTPATIENT)
Dept: SURGERY | Facility: CLINIC | Age: 83
End: 2018-05-10

## 2018-05-14 ENCOUNTER — OFFICE VISIT (OUTPATIENT)
Dept: CARDIOLOGY | Facility: CLINIC | Age: 83
End: 2018-05-14
Payer: MEDICARE

## 2018-05-14 VITALS
RESPIRATION RATE: 15 BRPM | DIASTOLIC BLOOD PRESSURE: 79 MMHG | SYSTOLIC BLOOD PRESSURE: 183 MMHG | OXYGEN SATURATION: 98 % | BODY MASS INDEX: 33.69 KG/M2 | HEART RATE: 65 BPM | WEIGHT: 202.19 LBS | HEIGHT: 65 IN

## 2018-05-14 DIAGNOSIS — I25.10 CORONARY ARTERY DISEASE DUE TO LIPID RICH PLAQUE: Chronic | ICD-10-CM

## 2018-05-14 DIAGNOSIS — I35.0 NONRHEUMATIC AORTIC VALVE STENOSIS: Chronic | ICD-10-CM

## 2018-05-14 DIAGNOSIS — I27.20 PULMONARY HYPERTENSION: Primary | Chronic | ICD-10-CM

## 2018-05-14 DIAGNOSIS — R06.02 SHORTNESS OF BREATH: ICD-10-CM

## 2018-05-14 DIAGNOSIS — I25.83 CORONARY ARTERY DISEASE DUE TO LIPID RICH PLAQUE: Chronic | ICD-10-CM

## 2018-05-14 DIAGNOSIS — J44.9 CHRONIC OBSTRUCTIVE PULMONARY DISEASE, UNSPECIFIED COPD TYPE: ICD-10-CM

## 2018-05-14 DIAGNOSIS — E78.5 HYPERLIPIDEMIA, UNSPECIFIED HYPERLIPIDEMIA TYPE: Chronic | ICD-10-CM

## 2018-05-14 PROCEDURE — 99499 UNLISTED E&M SERVICE: CPT | Mod: S$GLB,,, | Performed by: INTERNAL MEDICINE

## 2018-05-14 PROCEDURE — 3078F DIAST BP <80 MM HG: CPT | Mod: CPTII,S$GLB,, | Performed by: INTERNAL MEDICINE

## 2018-05-14 PROCEDURE — 3077F SYST BP >= 140 MM HG: CPT | Mod: CPTII,S$GLB,, | Performed by: INTERNAL MEDICINE

## 2018-05-14 PROCEDURE — 99214 OFFICE O/P EST MOD 30 MIN: CPT | Mod: S$GLB,,, | Performed by: INTERNAL MEDICINE

## 2018-05-14 PROCEDURE — 99999 PR PBB SHADOW E&M-EST. PATIENT-LVL III: CPT | Mod: PBBFAC,,, | Performed by: INTERNAL MEDICINE

## 2018-05-15 ENCOUNTER — TELEPHONE (OUTPATIENT)
Dept: SURGERY | Facility: CLINIC | Age: 83
End: 2018-05-15

## 2018-05-15 DIAGNOSIS — E78.49 OTHER HYPERLIPIDEMIA: ICD-10-CM

## 2018-05-15 RX ORDER — ROSUVASTATIN CALCIUM 20 MG/1
TABLET, COATED ORAL
Qty: 90 TABLET | Refills: 1 | Status: SHIPPED | OUTPATIENT
Start: 2018-05-15 | End: 2019-04-16

## 2018-05-17 ENCOUNTER — HOSPITAL ENCOUNTER (EMERGENCY)
Facility: HOSPITAL | Age: 83
Discharge: HOME | End: 2018-05-17
Attending: EMERGENCY MEDICINE
Payer: MEDICARE

## 2018-05-17 VITALS
HEART RATE: 56 BPM | TEMPERATURE: 98 F | WEIGHT: 202 LBS | SYSTOLIC BLOOD PRESSURE: 127 MMHG | HEIGHT: 65 IN | OXYGEN SATURATION: 95 % | RESPIRATION RATE: 16 BRPM | DIASTOLIC BLOOD PRESSURE: 60 MMHG | BODY MASS INDEX: 33.66 KG/M2

## 2018-05-17 DIAGNOSIS — R06.02 SHORTNESS OF BREATH: Primary | ICD-10-CM

## 2018-05-17 DIAGNOSIS — I35.0 AORTIC VALVE STENOSIS, ETIOLOGY OF CARDIAC VALVE DISEASE UNSPECIFIED: ICD-10-CM

## 2018-05-17 DIAGNOSIS — R07.9 CHEST PAIN, UNSPECIFIED TYPE: ICD-10-CM

## 2018-05-17 LAB
ALBUMIN SERPL BCP-MCNC: 3.4 G/DL
ALP SERPL-CCNC: 72 U/L
ALT SERPL W/O P-5'-P-CCNC: 11 U/L
ANION GAP SERPL CALC-SCNC: 9 MMOL/L
APTT BLDCRRT: 23.1 SEC
AST SERPL-CCNC: 13 U/L
BASOPHILS # BLD AUTO: 0.01 K/UL
BASOPHILS NFR BLD: 0.3 %
BILIRUB SERPL-MCNC: 0.3 MG/DL
BILIRUB UR QL STRIP: NEGATIVE
BNP SERPL-MCNC: 579 PG/ML
BUN SERPL-MCNC: 17 MG/DL
CALCIUM SERPL-MCNC: 8.7 MG/DL
CARBAMAZEPINE SERPL-MCNC: 7 UG/ML
CHLORIDE SERPL-SCNC: 109 MMOL/L
CLARITY UR: CLEAR
CO2 SERPL-SCNC: 27 MMOL/L
COLOR UR: NORMAL
CREAT SERPL-MCNC: 1.1 MG/DL
DIFFERENTIAL METHOD: ABNORMAL
EOSINOPHIL # BLD AUTO: 0.1 K/UL
EOSINOPHIL NFR BLD: 2.1 %
ERYTHROCYTE [DISTWIDTH] IN BLOOD BY AUTOMATED COUNT: 15.1 %
EST. GFR  (AFRICAN AMERICAN): 55 ML/MIN/1.73 M^2
EST. GFR  (NON AFRICAN AMERICAN): 48 ML/MIN/1.73 M^2
GLUCOSE SERPL-MCNC: 106 MG/DL
GLUCOSE UR QL STRIP: NEGATIVE
HCT VFR BLD AUTO: 29.1 %
HGB BLD-MCNC: 9.3 G/DL
HGB UR QL STRIP: NEGATIVE
INR PPP: 1
KETONES UR QL STRIP: NEGATIVE
LEUKOCYTE ESTERASE UR QL STRIP: NEGATIVE
LYMPHOCYTES # BLD AUTO: 1.5 K/UL
LYMPHOCYTES NFR BLD: 40.7 %
MAGNESIUM SERPL-MCNC: 2.2 MG/DL
MCH RBC QN AUTO: 28.9 PG
MCHC RBC AUTO-ENTMCNC: 32 G/DL
MCV RBC AUTO: 90 FL
MONOCYTES # BLD AUTO: 0.4 K/UL
MONOCYTES NFR BLD: 10.4 %
NEUTROPHILS # BLD AUTO: 1.8 K/UL
NEUTROPHILS NFR BLD: 46.5 %
NITRITE UR QL STRIP: NEGATIVE
PH UR STRIP: 7 [PH] (ref 5–8)
PLATELET # BLD AUTO: 209 K/UL
PMV BLD AUTO: 10.3 FL
POTASSIUM SERPL-SCNC: 3.8 MMOL/L
PROT SERPL-MCNC: 6.2 G/DL
PROT UR QL STRIP: NEGATIVE
PROTHROMBIN TIME: 10.4 SEC
RBC # BLD AUTO: 3.22 M/UL
SODIUM SERPL-SCNC: 145 MMOL/L
SP GR UR STRIP: 1.01 (ref 1–1.03)
TROPONIN I SERPL DL<=0.01 NG/ML-MCNC: 0.02 NG/ML
TROPONIN I SERPL DL<=0.01 NG/ML-MCNC: 0.03 NG/ML
URN SPEC COLLECT METH UR: NORMAL
UROBILINOGEN UR STRIP-ACNC: NEGATIVE EU/DL
WBC # BLD AUTO: 3.76 K/UL

## 2018-05-17 PROCEDURE — 85025 COMPLETE CBC W/AUTO DIFF WBC: CPT

## 2018-05-17 PROCEDURE — 85730 THROMBOPLASTIN TIME PARTIAL: CPT

## 2018-05-17 PROCEDURE — 80156 ASSAY CARBAMAZEPINE TOTAL: CPT

## 2018-05-17 PROCEDURE — 84484 ASSAY OF TROPONIN QUANT: CPT | Mod: 91

## 2018-05-17 PROCEDURE — 96375 TX/PRO/DX INJ NEW DRUG ADDON: CPT

## 2018-05-17 PROCEDURE — 63600175 PHARM REV CODE 636 W HCPCS: Performed by: EMERGENCY MEDICINE

## 2018-05-17 PROCEDURE — 81003 URINALYSIS AUTO W/O SCOPE: CPT

## 2018-05-17 PROCEDURE — 85610 PROTHROMBIN TIME: CPT

## 2018-05-17 PROCEDURE — 83735 ASSAY OF MAGNESIUM: CPT

## 2018-05-17 PROCEDURE — 96374 THER/PROPH/DIAG INJ IV PUSH: CPT

## 2018-05-17 PROCEDURE — 25000003 PHARM REV CODE 250: Performed by: EMERGENCY MEDICINE

## 2018-05-17 PROCEDURE — 80053 COMPREHEN METABOLIC PANEL: CPT

## 2018-05-17 PROCEDURE — 99285 EMERGENCY DEPT VISIT HI MDM: CPT | Mod: 25

## 2018-05-17 PROCEDURE — 93005 ELECTROCARDIOGRAM TRACING: CPT

## 2018-05-17 PROCEDURE — 83880 ASSAY OF NATRIURETIC PEPTIDE: CPT

## 2018-05-17 PROCEDURE — 96376 TX/PRO/DX INJ SAME DRUG ADON: CPT

## 2018-05-17 PROCEDURE — 93010 ELECTROCARDIOGRAM REPORT: CPT | Mod: ,,, | Performed by: INTERNAL MEDICINE

## 2018-05-17 RX ORDER — CLONIDINE HYDROCHLORIDE 0.1 MG/1
0.3 TABLET ORAL
Status: COMPLETED | OUTPATIENT
Start: 2018-05-17 | End: 2018-05-17

## 2018-05-17 RX ORDER — LABETALOL HYDROCHLORIDE 5 MG/ML
40 INJECTION, SOLUTION INTRAVENOUS
Status: COMPLETED | OUTPATIENT
Start: 2018-05-17 | End: 2018-05-17

## 2018-05-17 RX ORDER — LABETALOL HYDROCHLORIDE 5 MG/ML
20 INJECTION, SOLUTION INTRAVENOUS
Status: COMPLETED | OUTPATIENT
Start: 2018-05-17 | End: 2018-05-17

## 2018-05-17 RX ORDER — LORAZEPAM 2 MG/ML
0.5 INJECTION INTRAMUSCULAR
Status: COMPLETED | OUTPATIENT
Start: 2018-05-17 | End: 2018-05-17

## 2018-05-17 RX ORDER — HYDRALAZINE HYDROCHLORIDE 25 MG/1
100 TABLET, FILM COATED ORAL
Status: COMPLETED | OUTPATIENT
Start: 2018-05-17 | End: 2018-05-17

## 2018-05-17 RX ORDER — HYDRALAZINE HYDROCHLORIDE 20 MG/ML
20 INJECTION INTRAMUSCULAR; INTRAVENOUS
Status: DISCONTINUED | OUTPATIENT
Start: 2018-05-17 | End: 2018-05-17

## 2018-05-17 RX ORDER — FUROSEMIDE 10 MG/ML
80 INJECTION INTRAMUSCULAR; INTRAVENOUS
Status: COMPLETED | OUTPATIENT
Start: 2018-05-17 | End: 2018-05-17

## 2018-05-17 RX ORDER — METOPROLOL TARTRATE 50 MG/1
100 TABLET ORAL
Status: COMPLETED | OUTPATIENT
Start: 2018-05-17 | End: 2018-05-17

## 2018-05-17 RX ORDER — HYDRALAZINE HYDROCHLORIDE 20 MG/ML
20 INJECTION INTRAMUSCULAR; INTRAVENOUS
Status: COMPLETED | OUTPATIENT
Start: 2018-05-17 | End: 2018-05-17

## 2018-05-17 RX ADMIN — FUROSEMIDE 80 MG: 10 INJECTION, SOLUTION INTRAMUSCULAR; INTRAVENOUS at 06:05

## 2018-05-17 RX ADMIN — LABETALOL HYDROCHLORIDE 20 MG: 5 INJECTION, SOLUTION INTRAVENOUS at 02:05

## 2018-05-17 RX ADMIN — LABETALOL HYDROCHLORIDE 40 MG: 5 INJECTION, SOLUTION INTRAVENOUS at 06:05

## 2018-05-17 RX ADMIN — METOPROLOL TARTRATE 100 MG: 50 TABLET ORAL at 12:05

## 2018-05-17 RX ADMIN — HYDRALAZINE HYDROCHLORIDE 20 MG: 20 INJECTION INTRAMUSCULAR; INTRAVENOUS at 07:05

## 2018-05-17 RX ADMIN — LABETALOL HYDROCHLORIDE 40 MG: 5 INJECTION, SOLUTION INTRAVENOUS at 07:05

## 2018-05-17 RX ADMIN — LABETALOL HYDROCHLORIDE 20 MG: 5 INJECTION, SOLUTION INTRAVENOUS at 12:05

## 2018-05-17 RX ADMIN — HYDRALAZINE HYDROCHLORIDE 100 MG: 25 TABLET ORAL at 06:05

## 2018-05-17 RX ADMIN — LORAZEPAM 0.5 MG: 2 INJECTION, SOLUTION INTRAMUSCULAR; INTRAVENOUS at 12:05

## 2018-05-17 RX ADMIN — CLONIDINE HYDROCHLORIDE 0.3 MG: 0.1 TABLET ORAL at 06:05

## 2018-05-17 RX ADMIN — LABETALOL HYDROCHLORIDE 40 MG: 5 INJECTION, SOLUTION INTRAVENOUS at 03:05

## 2018-05-17 NOTE — ED TRIAGE NOTES
"Patient stated that when she woke up this morning she was weak and SOB. She stated "This doesn't feel like an asthma attack or my COPD". Blood pressure on arrival is high. Stated that she ate 2 hotdogs for dinner last night. O2 2l NC in use on arrival via EMS. Stated she saw her heart Dr on Monday and he told her that she would need a heart valve replacement. Patient c/o pain to the left side of her head. 8/10 pain. No medications taken this morning for pain.  "

## 2018-05-17 NOTE — ED NOTES
Family for pt.  Please call with updated info.  Hallsondra Quiñonez (son) [phone]  Bushra Olson (daughter) [phone]  Rachel (daughter) [phone]  Radhacristopher Chilelkins (daughter) [phone]

## 2018-05-17 NOTE — ED NOTES
Patient up to bedside with O2 sat of 99%. Ambulated down villaseñor and to the bathroom. O2 sat dropped to 94% on exertion. Back to baseline when at rest. Audible SOB noted but lungs clear to auscultation.

## 2018-05-17 NOTE — ED NOTES
Removed from the bed pan pt. Stated I went about 3 times.  Bad in pan pt. Cleaned and repositioned for comfort.

## 2018-05-17 NOTE — ED PROVIDER NOTES
Encounter Date: 5/17/2018    SCRIBE #1 NOTE: I, Christo Zazueta, am scribing for, and in the presence of,  Dejon Higginbotham MD. I have scribed the following portions of the note - Other sections scribed: HPI and ROS.       History     Chief Complaint   Patient presents with    Fatigue     fatigue, generalized weakness and SOB that started this AM. Denies CP today but reports yesterday.      CC: Fatigue    HPI:  This 82 y.o F with presents to the ED c/o acute onset of constant and severe (8/10) SOB and fatigue which began this AM. The pt also reports an episode of L side chest pain yesterday, which lasted approximately 20 minutes. The pt's chest pain is worse with deep breaths. The pt is not currently experiencing chest pain. The pt was last seen by her cardiologist 5/14/18, and was told she must go to [facility] for a valve replacement. The pt also reports a left temporal headache. The pt was complaint with her medications this AM, but did not take her metoprolol. The pt denies fever, chills, otalgia, abdominal pain, emesis, diarrhea and dysuria.The pt attempted tx with aspirin.     PMHx: Anemia; Anticoagulant long-term use; Aortic stenosis; Arthritis; Asthma; CAD; Carpal tunnel syndrome on both sides; Cataract; CHF; COPD; Depression; DVT ; Hyperlipidemia; Hypertension; Pulmonary HTN; and TMJ.         The history is provided by the patient. No  was used.     Review of patient's allergies indicates:   Allergen Reactions    Doxycycline hyclate Diarrhea and Nausea And Vomiting    Singulair [montelukast]      Stomach pain, Muscle pain, Cough      Penicillins      Other reaction(s): Anaphylaxis    Ventolin [albuterol sulfate] Other (See Comments)     Severely elevated blood pressure    Latex, natural rubber Rash     Past Medical History:   Diagnosis Date    Anemia     Anticoagulant long-term use     Aortic stenosis     Arthritis     lower back    Asthma     CAD (coronary artery  disease) 4/24/2015    Carpal tunnel syndrome on both sides     Cataract     CHF (congestive heart failure) 5/2013    COPD (chronic obstructive pulmonary disease)     Depression     DVT (deep venous thrombosis)     Hyperlipidemia     Hypertension     Pulmonary HTN     TMJ (temporomandibular joint disorder)      Past Surgical History:   Procedure Laterality Date    BACK SURGERY      cardiac stents      CARPAL TUNNEL RELEASE      Right/Left    EYE SURGERY      cataract extraction    HYSTERECTOMY      Partial    JOINT REPLACEMENT  2009    Left hip    POLYPECTOMY      x9    TEMPOROMANDIBULAR JOINT SURGERY       Family History   Problem Relation Age of Onset    Heart disease Mother     Hypertension Daughter     Cancer Son     Breast cancer Neg Hx     Colon cancer Neg Hx     Ovarian cancer Neg Hx     Esophageal cancer Neg Hx      Social History   Substance Use Topics    Smoking status: Never Smoker    Smokeless tobacco: Never Used    Alcohol use No     Review of Systems   Constitutional: Positive for fatigue. Negative for chills, diaphoresis and fever.   HENT: Negative for rhinorrhea and sore throat.    Eyes: Negative for redness.   Respiratory: Positive for shortness of breath. Negative for cough.    Cardiovascular: Positive for chest pain (L side).   Gastrointestinal: Negative for abdominal pain, diarrhea, nausea and vomiting.   Genitourinary: Negative for dysuria, frequency and urgency.   Musculoskeletal: Negative for back pain and neck pain.   Skin: Negative for rash.   Neurological: Positive for weakness and headaches (left temporal).   Psychiatric/Behavioral: The patient is not nervous/anxious.        Physical Exam     Initial Vitals [05/17/18 1134]   BP Pulse Resp Temp SpO2   (!) 246/122 74 (!) 28 97.9 °F (36.6 °C) 100 %      MAP       163.33         Physical Exam  The patient was examined specifically for the following:   General:No significant distress, Good color, Warm and dry. Head  and neck:Scalp atraumatic, Neck supple. Neurological:Appropriate conversation, Gross motor deficits. Eyes:Conjugate gaze, Clear corneas. ENT: No epistaxis. Cardiac: Regular rate and rhythm, Grossly normal heart tones. Pulmonary: Wheezing, Rales. Gastrointestinal: Abdominal tenderness, Abdominal distention. Musculoskeletal: Extremity deformity, Apparent pain with range of motion of the joints. Skin: Rash.   The findings on examination were normal except for the following:  The patient has a blood pressure of 246/122.  The lungs are remarkable for clear breath sounds. Heart tones are remarkable for a systolic high-pitched murmur.  There is no pedal edema.  The patient does not appear to be any respiratory distress.  ED Course   Procedures  Labs Reviewed   B-TYPE NATRIURETIC PEPTIDE - Abnormal; Notable for the following:        Result Value     (*)     All other components within normal limits   COMPREHENSIVE METABOLIC PANEL - Abnormal; Notable for the following:     Albumin 3.4 (*)     eGFR if  55 (*)     eGFR if non  48 (*)     All other components within normal limits   CBC W/ AUTO DIFFERENTIAL - Abnormal; Notable for the following:     WBC 3.76 (*)     RBC 3.22 (*)     Hemoglobin 9.3 (*)     Hematocrit 29.1 (*)     RDW 15.1 (*)     All other components within normal limits   MAGNESIUM   TROPONIN I   PROTIME-INR   APTT   URINALYSIS   CARBAMAZEPINE LEVEL, TOTAL   TROPONIN I     EKG Readings: (Independently Interpreted)   This patient is in a sinus rhythm with a heart rate of 63.  There borderline wide QRS complexes.  This patient has a right bundle branch block.  There are nonspecific ST segment and T-wave changes.  There is no definite evidence of acute myocardial infarction or malignant arrhythmia.       X-Rays:   Independently Interpreted Readings:   Other Readings:  Chest x-ray fails to reveal pneumothorax pneumonia pleural effusion.    Medical decision making:  Given the  above this patient presented to the emergency room with chest pain from yesterday.  Her troponin was negative twice.  Consultation was obtained with Dr. Waterman who felt that the patient could be safely discharged once we achieved blood pressure control.  The patient's blood pressure is now 135/65.  The patient's blood pressure was difficult to control.  We restarted her on her oral medicines.  I will discharge her to follow up with Cardiology on Monday to repair her aortic stenosis lesion.  I doubt pulmonary embolus.  I doubt myocardial infarction.  There is no pneumothorax or pulmonary edema on x-ray.  Patient has a slightly elevated BNP.  Patient is comfortable and relaxed at 7:35 p.m.               Scribe Attestation:   Scribe #1: I performed the above scribed service and the documentation accurately describes the services I performed. I attest to the accuracy of the note.    Attending Attestation:           Physician Attestation for Scribe:  Physician Attestation Statement for Scribe #1: I, Dejon Higignbotham MD, reviewed documentation, as scribed by Christo Zazueta in my presence, and it is both accurate and complete.                    Clinical Impression:   The primary encounter diagnosis was Shortness of breath. Diagnoses of Aortic valve stenosis, etiology of cardiac valve disease unspecified and Chest pain, unspecified type were also pertinent to this visit.                           Dejon Higginbotham MD  05/17/18 1938

## 2018-05-18 NOTE — DISCHARGE INSTRUCTIONS
Please return immediately if you get worse or if new problems develop.  Please see your cardiologist in next 48 hr.  Please continue usual medicines.   Rest.   User Lasix tomorrow morning.  Do not miss any doses of her blood pressure medicines.  Do not miss her appointment on Monday to have the cardiologist look at your aortic valve.

## 2018-05-18 NOTE — ED NOTES
Called Radha Swartz, daughter and notified her of pt's discharge. States that she is on her way.

## 2018-05-21 ENCOUNTER — HOSPITAL ENCOUNTER (OUTPATIENT)
Dept: PULMONOLOGY | Facility: CLINIC | Age: 83
Discharge: HOME | End: 2018-05-21
Payer: MEDICARE

## 2018-05-21 ENCOUNTER — INITIAL CONSULT (OUTPATIENT)
Dept: CARDIOLOGY | Facility: CLINIC | Age: 83
End: 2018-05-21
Payer: MEDICARE

## 2018-05-21 VITALS — WEIGHT: 200 LBS | BODY MASS INDEX: 33.32 KG/M2 | HEIGHT: 65 IN

## 2018-05-21 VITALS
WEIGHT: 200.38 LBS | OXYGEN SATURATION: 97 % | DIASTOLIC BLOOD PRESSURE: 67 MMHG | HEART RATE: 63 BPM | BODY MASS INDEX: 33.38 KG/M2 | HEIGHT: 65 IN | SYSTOLIC BLOOD PRESSURE: 147 MMHG

## 2018-05-21 DIAGNOSIS — I25.10 CORONARY ARTERY DISEASE DUE TO LIPID RICH PLAQUE: Primary | Chronic | ICD-10-CM

## 2018-05-21 DIAGNOSIS — I25.10 CORONARY ARTERY DISEASE INVOLVING NATIVE CORONARY ARTERY WITHOUT ANGINA PECTORIS, UNSPECIFIED WHETHER NATIVE OR TRANSPLANTED HEART: Chronic | ICD-10-CM

## 2018-05-21 DIAGNOSIS — I35.0 NODULAR CALCIFIC AORTIC VALVE STENOSIS: ICD-10-CM

## 2018-05-21 DIAGNOSIS — N18.30 STAGE 3 CHRONIC KIDNEY DISEASE: Chronic | ICD-10-CM

## 2018-05-21 DIAGNOSIS — I25.83 CORONARY ARTERY DISEASE DUE TO LIPID RICH PLAQUE: Primary | Chronic | ICD-10-CM

## 2018-05-21 DIAGNOSIS — I10 ESSENTIAL HYPERTENSION: Chronic | ICD-10-CM

## 2018-05-21 DIAGNOSIS — I35.0 NONRHEUMATIC AORTIC VALVE STENOSIS: Chronic | ICD-10-CM

## 2018-05-21 DIAGNOSIS — I50.32 CHRONIC DIASTOLIC CHF (CONGESTIVE HEART FAILURE): ICD-10-CM

## 2018-05-21 DIAGNOSIS — I50.42 CHRONIC COMBINED SYSTOLIC AND DIASTOLIC HEART FAILURE: Chronic | ICD-10-CM

## 2018-05-21 DIAGNOSIS — I35.0 NODULAR CALCIFIC AORTIC VALVE STENOSIS: Primary | ICD-10-CM

## 2018-05-21 LAB
PRE FEV1 FVC: 79
PRE FEV1: 1.39
PRE FVC: 1.75
PREDICTED FEV1 FVC: 76
PREDICTED FEV1: 2.1
PREDICTED FVC: 2.79

## 2018-05-21 PROCEDURE — 99215 OFFICE O/P EST HI 40 MIN: CPT | Mod: S$GLB,,, | Performed by: INTERNAL MEDICINE

## 2018-05-21 PROCEDURE — 94618 PULMONARY STRESS TESTING: CPT | Mod: S$GLB,,, | Performed by: INTERNAL MEDICINE

## 2018-05-21 PROCEDURE — 3077F SYST BP >= 140 MM HG: CPT | Mod: CPTII,S$GLB,, | Performed by: INTERNAL MEDICINE

## 2018-05-21 PROCEDURE — 99999 PR PBB SHADOW E&M-EST. PATIENT-LVL III: CPT | Mod: PBBFAC,,, | Performed by: INTERNAL MEDICINE

## 2018-05-21 PROCEDURE — 3078F DIAST BP <80 MM HG: CPT | Mod: CPTII,S$GLB,, | Performed by: INTERNAL MEDICINE

## 2018-05-21 PROCEDURE — 94010 BREATHING CAPACITY TEST: CPT | Mod: 59,S$GLB,, | Performed by: INTERNAL MEDICINE

## 2018-05-21 PROCEDURE — 94729 DIFFUSING CAPACITY: CPT | Mod: S$GLB,,, | Performed by: INTERNAL MEDICINE

## 2018-05-21 RX ORDER — SODIUM CHLORIDE 9 MG/ML
INJECTION, SOLUTION INTRAVENOUS CONTINUOUS
Status: CANCELLED | OUTPATIENT
Start: 2018-05-21

## 2018-05-21 RX ORDER — CLOPIDOGREL BISULFATE 75 MG/1
75 TABLET ORAL DAILY
Qty: 30 TABLET | Refills: 11 | Status: SHIPPED | OUTPATIENT
Start: 2018-05-21 | End: 2021-01-07 | Stop reason: SDUPTHER

## 2018-05-21 RX ORDER — DIPHENHYDRAMINE HCL 25 MG
50 CAPSULE ORAL ONCE
Status: CANCELLED | OUTPATIENT
Start: 2018-05-21 | End: 2018-05-21

## 2018-05-21 NOTE — PROGRESS NOTES
Subjective:    Patient ID:  Cindy Lyman is a 82 y.o. female who presents for evaluation of severe AS    Reason for referral: Severe AS  Referring physician: Dr Carmen    HPI  Patient is an 82 year old female with PMH sx for:   CAD - stent LAD 4/2015 - moderate Cx disease   Pulmonary HTN   Diastolic CHF   AS - severe   HTN   COPD   Hx DVT   CKD 3 1.1 - 1.8  Who is referred by Dr. Carmen for evaluation of severe AS.  As per patient she has had dyspnea on exertion x 2 years with significant worsening over the past 6 months.  She cannot climb up a flight of stairs without becoming extremely dyspneic.  On thurs she had an episode of chest heaviness after lifting clothes and doing laundry.  In ER she still had chest pain, and her BP was 250 systolic, pain resolved with decrease in her BP.  Troponins were negative.  She does occasionally get angina with exertion.  Resolves with rest.          Cindy Lyman is a 82 y.o. female referred by Dr Carmen for evaluation of severe AS (NYHA Class II sx).    The patient has undergone the following TAVR work-up:   ECHO (Date 5/8/2018): CLEMENT= 0.82 cm2, MG= 44mmHg, Peak Duncan= 4 m/s, EF= 60-65%.   LHC (Date ):   STS: 3.6%   Frailty: 3/4   Iliacs are > on R and >on L   LVOT area by CTA is cm2 ( mm X  mm) and Avg Diameter is per Dr   Incidental findings on CT:  CT Surgery risk assessment: risk, per   Rhythm issues: NSR, RBBB, LAFB  PFTs: FEV1 % predicted, DLCO % predicted.  Comorbidities: COPD, diastolic HF      Cindy Lyman is a mm  valve candidate via  access.        Review of Systems   Constitution: Negative for decreased appetite, weakness, malaise/fatigue, night sweats and weight gain.   HENT: Negative for hoarse voice.    Eyes: Negative for vision loss in left eye and vision loss in right eye.   Cardiovascular: Negative for chest pain, claudication, dyspnea on exertion, irregular heartbeat, leg swelling and near-syncope.   Respiratory: Positive for  "shortness of breath. Negative for cough, hemoptysis, sleep disturbances due to breathing, snoring, sputum production and wheezing.    Endocrine: Negative for cold intolerance and heat intolerance.   Musculoskeletal: Negative for arthritis, back pain, falls, gout, muscle cramps and muscle weakness.   Gastrointestinal: Negative for bloating and abdominal pain.   Neurological: Negative for brief paralysis and difficulty with concentration.   Psychiatric/Behavioral: Negative for depression and hallucinations.        Objective:  BP (!) 147/67 (BP Location: Left arm, Patient Position: Sitting, BP Method: Large (Automatic))   Pulse 63   Ht 5' 5" (1.651 m)   Wt 90.9 kg (200 lb 6.4 oz)   SpO2 97%   BMI 33.35 kg/m²       Physical Exam   Constitutional: She is oriented to person, place, and time. She appears well-developed and well-nourished.   HENT:   Head: Normocephalic and atraumatic.   Eyes: Conjunctivae are normal. Pupils are equal, round, and reactive to light. Right eye exhibits no discharge. Left eye exhibits no discharge. No scleral icterus.   Neck: Normal range of motion. Neck supple. No JVD present.   Cardiovascular: Normal rate, regular rhythm, normal heart sounds and intact distal pulses.  Exam reveals no gallop and no friction rub.    No murmur heard.  Pulmonary/Chest: Effort normal and breath sounds normal. No respiratory distress. She has no wheezes. She has no rales. She exhibits no tenderness.   Abdominal: Soft. Bowel sounds are normal. She exhibits no distension. There is no tenderness.   Musculoskeletal: Normal range of motion. She exhibits no edema or tenderness.   Neurological: She is alert and oriented to person, place, and time. No cranial nerve deficit. Coordination normal.   Skin: Skin is warm and dry. No rash noted. No erythema.   Psychiatric: She has a normal mood and affect. Her behavior is normal. Judgment and thought content normal.         Assessment:       1. Severe AS    Cindy Sterling " Nura is a 82 y.o. female referred by Dr Carmen for evaluation of severe AS (NYHA Class III sx).    The patient has undergone the following TAVR work-up:   ECHO (Date 5/8/2018): CLEMENT= 0.82 cm2, MG= 44mmHg, Peak Duncan= 4 m/s, EF= 60-65%.   LHC (Date ):   STS: 3.6%   Frailty: 3/4   Iliacs are > on R and >on L   LVOT area by CTA is cm2 ( mm X  mm) and Avg Diameter is per Dr   Incidental findings on CT:  CT Surgery risk assessment: risk, per Dr  Rhythm issues: NSR, RBBB, LAFB  PFTs: FEV1 % predicted, DLCO % predicted.  Comorbidities: COPD, diastolic HF     2. Chronic combined systolic and diastolic heart failure    3. 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 Stage 3 chronic kidney disease    5. Nonrheumatic aortic valve stenosis    6. Coronary artery disease involving native coronary artery without angina pectoris, unspecified whether native or transplanted heart    7. Essential hypertension         Plan:       LHC via right CFA access +/- PCI  Patient understands risks and benefits of procedure and gives consent.  Patient needs EP eval for RBBB and LAFB  Patient needs outpt CT scan on different day then angio  CTS consult  PFT's    Sultana Weaver MD  Interventional Cardiology    Staff:  I have personally taken the history and examined this patient and agree with the fellow's note as stated above and amended it accordingly :-) This patient is likely going to be considered high risk due to age, 3/4 frailty, female gender, and BMI of 33, however, would appreciate Dr. Lee and Lara's opinion.  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

## 2018-05-21 NOTE — PROCEDURES
Cindy Lyman is a 82 y.o.  female patient, who presents for a 6 minute walk test ordered by MD. Maddy.  The diagnosis is Aortic Valve Disorder.  The patient's BMI is 33.4 kg/m2.  Predicted distance (lower limit of normal) is 203.18 meters.      Test Results:    The test was completed with stops.  The patient stopped 3 time for a total of 243 seconds.  The total time walked was 117 seconds.  During walking, the patient reported:  Dyspnea. The patient used no assistive devices  during testing.     05/21/2018---------Distance: 60.96 meters (200 feet)     O2 Sat % Supplemental Oxygen Heart Rate Blood Pressure Aniya Scale   Pre-exercise  (Resting) 99 % Room Air 54 bpm 179/78 mmHg 4   During Exercise 97 % Room Air 64 bpm 174/85 mmHg 5-6   Post-exercise  (Recovery) 99 % Room Air  62 bpm   mmHg       Recovery Time: 120 seconds    Performing nurse/tech: MARLON Brandon.      PREVIOUS STUDY:   The patient has not had a previous study.      CLINICAL INTERPRETATION:  Six minute walk distance is 60.96 meters (200 feet) with heavy dyspnea.  During exercise, there was no significant desaturation while breathing room air.  Both blood pressure and heart rate remained stable with walking.  Bradycardia and Hypertension was present prior to exercise.  The patient did not report non-pulmonary symptoms during exercise.  Severe exercise impairment is likely due to cardiovascular causes.  The patient did not complete the study, walking 117 seconds of the 360 second test.  No previous study performed.  Based upon age and body mass index, exercise capacity is less than predicted.

## 2018-05-21 NOTE — LETTER
May 21, 2018      Tony Carmen MD  [address]  [address]  [place] [address]           [facility]  [address]  [facility] [address]  Phone: [phone]          Patient: Cindy Lyman   MR Number: [id number]   YOB: 1935   Date of Visit: 5/21/2018       Dear Dr. Tony Carmen:    Thank you for referring Cindy Lyman to me for evaluation. Attached you will find relevant portions of my assessment and plan of care.    If you have questions, please do not hesitate to call me. I look forward to following Cindy Lyman along with you.    Sincerely,    Corey Castañeda MD    Enclosure  CC:  No Recipients    If you would like to receive this communication electronically, please contact [email or web address] or [phone] to request more information on [employer] Link access.    For providers and/or their staff who would like to refer a patient to [facility], please contact us through our one-stop-shop provider referral line, [facility], at [phone].    If you feel you have received this communication in error or would no longer like to receive these types of communications, please e-mail [email or web address]

## 2018-05-23 ENCOUNTER — HOSPITAL ENCOUNTER (OUTPATIENT)
Dept: RADIOLOGY | Facility: HOSPITAL | Age: 83
Discharge: HOME | End: 2018-05-23
Attending: INTERNAL MEDICINE
Payer: MEDICARE

## 2018-05-23 DIAGNOSIS — I35.0 NODULAR CALCIFIC AORTIC VALVE STENOSIS: ICD-10-CM

## 2018-05-23 PROCEDURE — 71275 CT ANGIOGRAPHY CHEST: CPT | Mod: TC

## 2018-05-23 PROCEDURE — 74174 CTA ABD&PLVS W/CONTRAST: CPT | Mod: 26,,, | Performed by: RADIOLOGY

## 2018-05-23 PROCEDURE — 71275 CT ANGIOGRAPHY CHEST: CPT | Mod: 26,,, | Performed by: RADIOLOGY

## 2018-05-23 PROCEDURE — 25500020 PHARM REV CODE 255: Performed by: INTERNAL MEDICINE

## 2018-05-23 RX ADMIN — IOHEXOL 100 ML: 350 INJECTION, SOLUTION INTRAVENOUS at 01:05

## 2018-05-29 ENCOUNTER — OFFICE VISIT (OUTPATIENT)
Dept: CARDIOTHORACIC SURGERY | Facility: CLINIC | Age: 83
End: 2018-05-29
Payer: MEDICARE

## 2018-05-29 ENCOUNTER — INITIAL CONSULT (OUTPATIENT)
Dept: ELECTROPHYSIOLOGY | Facility: CLINIC | Age: 83
End: 2018-05-29
Payer: MEDICARE

## 2018-05-29 ENCOUNTER — HOSPITAL ENCOUNTER (OUTPATIENT)
Dept: CARDIOLOGY | Facility: CLINIC | Age: 83
Discharge: HOME | End: 2018-05-29
Payer: MEDICARE

## 2018-05-29 VITALS
HEIGHT: 65 IN | BODY MASS INDEX: 33.21 KG/M2 | DIASTOLIC BLOOD PRESSURE: 82 MMHG | SYSTOLIC BLOOD PRESSURE: 168 MMHG | HEART RATE: 62 BPM | WEIGHT: 199.31 LBS

## 2018-05-29 VITALS
TEMPERATURE: 99 F | WEIGHT: 199.19 LBS | HEART RATE: 56 BPM | BODY MASS INDEX: 33.19 KG/M2 | HEIGHT: 65 IN | SYSTOLIC BLOOD PRESSURE: 173 MMHG | DIASTOLIC BLOOD PRESSURE: 72 MMHG

## 2018-05-29 DIAGNOSIS — Z95.5 PRESENCE OF DRUG COATED STENT IN LAD CORONARY ARTERY: Chronic | ICD-10-CM

## 2018-05-29 DIAGNOSIS — I35.0 NONRHEUMATIC AORTIC VALVE STENOSIS: Primary | Chronic | ICD-10-CM

## 2018-05-29 DIAGNOSIS — I45.2 BIFASCICULAR BLOCK: ICD-10-CM

## 2018-05-29 DIAGNOSIS — I35.0 AORTIC VALVE STENOSIS, ETIOLOGY OF CARDIAC VALVE DISEASE UNSPECIFIED: Primary | ICD-10-CM

## 2018-05-29 DIAGNOSIS — I35.0 NONRHEUMATIC AORTIC VALVE STENOSIS: Chronic | ICD-10-CM

## 2018-05-29 DIAGNOSIS — I10 ESSENTIAL HYPERTENSION: Chronic | ICD-10-CM

## 2018-05-29 PROCEDURE — 3079F DIAST BP 80-89 MM HG: CPT | Mod: CPTII,S$GLB,, | Performed by: INTERNAL MEDICINE

## 2018-05-29 PROCEDURE — 3077F SYST BP >= 140 MM HG: CPT | Mod: CPTII,S$GLB,, | Performed by: INTERNAL MEDICINE

## 2018-05-29 PROCEDURE — 99999 PR PBB SHADOW E&M-EST. PATIENT-LVL III: CPT | Mod: PBBFAC,,, | Performed by: THORACIC SURGERY (CARDIOTHORACIC VASCULAR SURGERY)

## 2018-05-29 PROCEDURE — 99999 PR PBB SHADOW E&M-EST. PATIENT-LVL IV: CPT | Mod: PBBFAC,,, | Performed by: INTERNAL MEDICINE

## 2018-05-29 PROCEDURE — 99499 UNLISTED E&M SERVICE: CPT | Mod: S$GLB,,, | Performed by: INTERNAL MEDICINE

## 2018-05-29 PROCEDURE — 99205 OFFICE O/P NEW HI 60 MIN: CPT | Mod: S$GLB,,, | Performed by: THORACIC SURGERY (CARDIOTHORACIC VASCULAR SURGERY)

## 2018-05-29 PROCEDURE — 99499 UNLISTED E&M SERVICE: CPT | Mod: S$GLB,,, | Performed by: THORACIC SURGERY (CARDIOTHORACIC VASCULAR SURGERY)

## 2018-05-29 PROCEDURE — 3078F DIAST BP <80 MM HG: CPT | Mod: CPTII,S$GLB,, | Performed by: THORACIC SURGERY (CARDIOTHORACIC VASCULAR SURGERY)

## 2018-05-29 PROCEDURE — 3077F SYST BP >= 140 MM HG: CPT | Mod: CPTII,S$GLB,, | Performed by: THORACIC SURGERY (CARDIOTHORACIC VASCULAR SURGERY)

## 2018-05-29 PROCEDURE — 99205 OFFICE O/P NEW HI 60 MIN: CPT | Mod: S$GLB,,, | Performed by: INTERNAL MEDICINE

## 2018-05-29 NOTE — LETTER
May 29, 2018      Corey Castañeda MD  [address]  [facility] [address]           [facility] - Cardiovascular Surg  [address]  [facility] [address]  Phone: [phone]          Patient: Cindy Lyman   MR Number: [id number]   YOB: 1935   Date of Visit: 5/29/2018       Dear Dr. Corey Castañeda:    Thank you for referring Cindy Lyman to me for evaluation. Attached you will find relevant portions of my assessment and plan of care.    If you have questions, please do not hesitate to call me. I look forward to following Cindy Lyman along with you.    Sincerely,    Tu Bermudez MD    Enclosure  CC:  No Recipients    If you would like to receive this communication electronically, please contact [email or web address] or [phone] to request more information on [employer] Link access.    For providers and/or their staff who would like to refer a patient to [facility], please contact us through our one-stop-shop provider referral line, [facility], at [phone].    If you feel you have received this communication in error or would no longer like to receive these types of communications, please e-mail [email or web address]

## 2018-05-29 NOTE — LETTER
May 29, 2018      Corey Castañeda MD  [address]  [facility] [address]           [facility] - Arrhythmia  [address]  [facility] [address]  Phone: [phone]  Fax: [phone]          Patient: Cindy Lyman   MR Number: [id number]   YOB: 1935   Date of Visit: 5/29/2018       Dear Dr. Corey Castañeda:    Thank you for referring Cindy Lyman to me for evaluation. Attached you will find relevant portions of my assessment and plan of care.    If you have questions, please do not hesitate to call me. I look forward to following Cindy Lyman along with you.    Sincerely,    Aleksandr Diego MD    Enclosure  CC:  No Recipients    If you would like to receive this communication electronically, please contact [email or web address] or [phone] to request more information on [employer] Link access.    For providers and/or their staff who would like to refer a patient to [facility], please contact us through our one-stop-shop provider referral line, [facility], at [phone].    If you feel you have received this communication in error or would no longer like to receive these types of communications, please e-mail [email or web address]

## 2018-05-29 NOTE — PROGRESS NOTES
Subjective:    Patient ID:  Cindy Lyman is a 82 y.o. female who presents for evaluation of bifascicular block    Referring Cardiologist: Yuan Castañeda MD  Primary Cardiologist: Tony Carmen MD    HPI  I had the pleasure of seeing Ms. Lyman In our electrophysiology clinic in pre-op consultation prior to TAVR for her conduction system disease. As you are aware she is a pleasant 82 year-old woman with hypertension, diastolic heart failure, coronary artery disease, COPD, CKD stage III and symptomatic severe aortic stenosis. She has no history of syncope.    ECHO CONCLUSIONS     1 - Normal left ventricular systolic function (EF 60-65%).     2 - Concentric hypertrophy.     3 - Biatrial enlargement.     4 - Pulmonary hypertension. The estimated PA systolic pressure is 61 mmHg.     5 - Severe aortic valve stenosis (CLEMENT 0.82 cm2, AVAi 0.41 cm2/m2, peak aortic jet velocity 4.0 m/s,MG 44 mmHg, ).     6 - Trivial mitral regurgitation.     7 - Mild tricuspid regurgitation.     My interpretation of today's in clinic ECG is sinus rhythm with RBBB/LAFB. IN normal    Review of Systems   Constitution: Negative for fever, weakness and malaise/fatigue.   HENT: Negative for congestion and sore throat.    Cardiovascular: Positive for dyspnea on exertion. Negative for chest pain, irregular heartbeat, near-syncope, orthopnea, palpitations, paroxysmal nocturnal dyspnea and syncope.   Respiratory: Negative for cough and shortness of breath.    Hematologic/Lymphatic: Negative for bleeding problem. Does not bruise/bleed easily.   Gastrointestinal: Negative for bloating and abdominal pain.   Neurological: Negative for excessive daytime sleepiness, dizziness and focal weakness.        Objective:    Physical Exam   Constitutional: She is oriented to person, place, and time. She appears well-developed and well-nourished. No distress.   HENT:   Head: Normocephalic and atraumatic.   Eyes: Conjunctivae are normal. Right eye exhibits no  discharge. Left eye exhibits no discharge.   Neck: Neck supple. No JVD present.   Cardiovascular: Normal rate and regular rhythm.    Murmur heard.   Harsh crescendo mid to late systolic murmur is present with a grade of 3/6  at the upper right sternal border radiating to the neck  Pulmonary/Chest: Effort normal and breath sounds normal. No respiratory distress. She has no wheezes. She has no rales.   Abdominal: Soft. Bowel sounds are normal. She exhibits no distension. There is no tenderness.   Musculoskeletal: She exhibits no edema.   Neurological: She is alert and oriented to person, place, and time.   Skin: Skin is warm and dry. She is not diaphoretic.   Psychiatric: She has a normal mood and affect. Her behavior is normal. Judgment and thought content normal.   Vitals reviewed.        Assessment:       1. Nonrheumatic aortic valve stenosis    2. Presence of drug coated stent in LAD coronary artery    3. Essential hypertension    4. Bifascicular block         Plan:       In summary, Ms. Lyman is a pleasant 82 year-old woman with hypertension, diastolic heart failure, coronary artery disease, COPD, CKD stage III and symptomatic severe aortic stenosis. She has chronic bifascicular block and will be undergoing TAVR this week. Discussed she has a high risk of complete AV block and needing a pacemaker. We discussed the alternatives, benefits and risks of the procedure including pain, infection, bleeding, injury to lung causing pneumothorax requiring tube placement, injury to heart valves, puncture of the heart leading to pericardial effusion or tamponade requiring tube drainage, heart attack, stroke and death. She and her daughter understand. All questions answered. Consent signed in case PPM is needed.    Thank you for allowing me to participate in the care of this patient. Please do not hesitate to call me with any questions or concerns.      Aleksandr Diego MD, PhD  Cardiac Electrophysiology

## 2018-05-29 NOTE — PROGRESS NOTES
Subjective:      Patient ID: Cindy Lyman is a 82 y.o. female.    Chief Complaint: No chief complaint on file.      HPI:  Cindy Lyman is a 82 y.o. female who presents for cohort status as part of TAVR evaluation. PMHx severe AS,  CAD - stent LAD 4/2015 - moderate Cx disease, Pulmonary HTN, Diastolic CHF, HTN, COPD, HX DVT, CKD III (Cr. 1.1-1.8). Her BMI is 33. Patient states she has had dyspnea on exertion x 2 years with significant worsening over the past 6 months.  She cannot climb up a flight of stairs without becoming extremely dyspneic. Hx of one episode of chest heaviness after lifting clothes and doing laundry that prompted her to go to the ER. Chest pain, and her BP was 250 systolic, pain resolved with decrease in her BP.  She does occasionally get angina with exertion.  Resolves with rest.     Family and social history reviewed. Daughter presents for apt.     Review of patient's allergies indicates:   Allergen Reactions    Doxycycline hyclate Diarrhea and Nausea And Vomiting    Singulair [montelukast]      Stomach pain, Muscle pain, Cough      Penicillins      Other reaction(s): Anaphylaxis    Ventolin [albuterol sulfate] Other (See Comments)     Severely elevated blood pressure    Latex, natural rubber Rash     Past Medical History:   Diagnosis Date    Anemia     Anticoagulant long-term use     Aortic stenosis     Arthritis     lower back    Asthma     CAD (coronary artery disease) 4/24/2015    Carpal tunnel syndrome on both sides     Cataract     CHF (congestive heart failure) 5/2013    COPD (chronic obstructive pulmonary disease)     Depression     DVT (deep venous thrombosis)     Hyperlipidemia     Hypertension     Pulmonary HTN     TMJ (temporomandibular joint disorder)      Past Surgical History:   Procedure Laterality Date    BACK SURGERY      cardiac stents      CARPAL TUNNEL RELEASE      Right/Left    EYE SURGERY      cataract extraction     HYSTERECTOMY      Partial    JOINT REPLACEMENT  2009    Left hip    POLYPECTOMY      x9    TEMPOROMANDIBULAR JOINT SURGERY       Family History     Problem Relation (Age of Onset)    Cancer Son    Heart disease Mother    Hypertension Daughter        Social History     Social History    Marital status:      Spouse name: N/A    Number of children: N/A    Years of education: N/A     Occupational History    Not on file.     Social History Main Topics    Smoking status: Never Smoker    Smokeless tobacco: Never Used    Alcohol use No    Drug use: No    Sexual activity: No     Other Topics Concern    Not on file     Social History Narrative    No narrative on file       Current medications Reviewed    Review of Systems   Constitutional: Negative for activity change, appetite change, fatigue and fever.   HENT: Negative for congestion, dental problem, sneezing and sore throat.    Eyes: Negative for pain, discharge and visual disturbance.   Respiratory: Positive for shortness of breath. Negative for cough and wheezing.    Cardiovascular: Negative for chest pain, palpitations and leg swelling.   Gastrointestinal: Negative for abdominal distention, abdominal pain, constipation, diarrhea, nausea and vomiting.   Endocrine: Negative for polydipsia, polyphagia and polyuria.   Genitourinary: Negative for dysuria, frequency and urgency.   Musculoskeletal: Negative for back pain and gait problem.   Skin: Negative for rash and wound.   Neurological: Negative for dizziness, seizures, syncope and headaches.   Hematological: Does not bruise/bleed easily.   Psychiatric/Behavioral: Negative for agitation and confusion. The patient is not nervous/anxious.      Objective:   Physical Exam   Constitutional: She is oriented to person, place, and time. She appears well-developed and well-nourished.   HENT:   Head: Normocephalic and atraumatic.   Eyes: Pupils are equal, round, and reactive to light.   Neck: Normal range of  motion. Neck supple.   Cardiovascular: Normal rate and regular rhythm.    Murmur heard.  Pulmonary/Chest: Effort normal and breath sounds normal.   Abdominal: Soft. Bowel sounds are normal.   Musculoskeletal: Normal range of motion.   Neurological: She is alert and oriented to person, place, and time.   Skin: Skin is warm and dry.   Psychiatric: She has a normal mood and affect. Her behavior is normal.       Diagnostic Results:     2D ECHO 5/8/18   1 - Normal left ventricular systolic function (EF 60-65%).     2 - Concentric hypertrophy.     3 - Biatrial enlargement.     4 - Pulmonary hypertension. The estimated PA systolic pressure is 61 mmHg.     5 - Severe aortic valve stenosis (CLEMENT 0.82 cm2, AVAi 0.41 cm2/m2, peak aortic jet velocity 4.0 m/s,MG 44 mmHg, ).     6 - Trivial mitral regurgitation.     7 - Mild tricuspid regurgitation.     · ECHO (Date 5/8/2018): CLEMENT= 0.82 cm2, MG= 44mmHg, Peak Duncan= 4 m/s, EF= 60-65%.   · LHC (Date ):   · STS: 3.6%   · Frailty: 3/4   · Iliacs are > on R and >on L   · LVOT area by CTA is cm2 ( mm X  mm) and Avg Diameter is per Dr   · Incidental findings on CT:  · CT Surgery risk assessment: risk, per Dr  · Rhythm issues: NSR, RBBB, LAFB  · PFTs: FEV1 % predicted, DLCO % predicted.  · Comorbidities: COPD, diastolic HF    Assessment:    82 y.o. female who presents for cohort status as part of TAVR evaluation. PMHx severe AS,  CAD - stent LAD 4/2015 - moderate Cx disease, Pulmonary HTN, Diastolic CHF, HTN, COPD, HX DVT, CKD III (Cr. 1.1-1.8).  Plan:       CTS Attending Note:    I have personally taken the history and examined this patient and agree with the NP's note as stated above. Given comorbid conditions and frailty, recommend LIANET.

## 2018-05-30 ENCOUNTER — DOCUMENTATION ONLY (OUTPATIENT)
Dept: CARDIOLOGY | Facility: CLINIC | Age: 83
End: 2018-05-30

## 2018-05-31 DIAGNOSIS — M54.31 RIGHT SCIATIC NERVE PAIN: ICD-10-CM

## 2018-05-31 RX ORDER — GABAPENTIN 300 MG/1
CAPSULE ORAL
Qty: 270 CAPSULE | Refills: 0 | Status: SHIPPED | OUTPATIENT
Start: 2018-05-31 | End: 2018-08-28 | Stop reason: SDUPTHER

## 2018-06-01 ENCOUNTER — HOSPITAL ENCOUNTER (OUTPATIENT)
Facility: HOSPITAL | Age: 83
Discharge: HOME | End: 2018-06-01
Attending: INTERNAL MEDICINE | Admitting: INTERNAL MEDICINE
Payer: MEDICARE

## 2018-06-01 VITALS
WEIGHT: 193 LBS | HEIGHT: 65 IN | RESPIRATION RATE: 18 BRPM | HEART RATE: 52 BPM | TEMPERATURE: 97 F | BODY MASS INDEX: 32.15 KG/M2 | OXYGEN SATURATION: 100 % | SYSTOLIC BLOOD PRESSURE: 160 MMHG | DIASTOLIC BLOOD PRESSURE: 72 MMHG

## 2018-06-01 DIAGNOSIS — I25.83 CORONARY ARTERY DISEASE DUE TO LIPID RICH PLAQUE: Chronic | ICD-10-CM

## 2018-06-01 DIAGNOSIS — Z98.61 POSTSURGICAL PERCUTANEOUS TRANSLUMINAL CORONARY ANGIOPLASTY STATUS: Primary | ICD-10-CM

## 2018-06-01 DIAGNOSIS — I25.10 CORONARY ARTERY DISEASE DUE TO LIPID RICH PLAQUE: Chronic | ICD-10-CM

## 2018-06-01 DIAGNOSIS — I35.0 NONRHEUMATIC AORTIC VALVE STENOSIS: ICD-10-CM

## 2018-06-01 LAB
ABO + RH BLD: NORMAL
ANION GAP SERPL CALC-SCNC: 9 MMOL/L
BASOPHILS # BLD AUTO: 0.03 K/UL
BASOPHILS NFR BLD: 0.6 %
BLD GP AB SCN CELLS X3 SERPL QL: NORMAL
BUN SERPL-MCNC: 31 MG/DL
CALCIUM SERPL-MCNC: 8.8 MG/DL
CHLORIDE SERPL-SCNC: 108 MMOL/L
CO2 SERPL-SCNC: 25 MMOL/L
CORONARY STENOSIS: ABNORMAL
CORONARY STENT: YES
CREAT SERPL-MCNC: 1.7 MG/DL
DIFFERENTIAL METHOD: ABNORMAL
EOSINOPHIL # BLD AUTO: 0.1 K/UL
EOSINOPHIL NFR BLD: 1.9 %
ERYTHROCYTE [DISTWIDTH] IN BLOOD BY AUTOMATED COUNT: 14.9 %
EST. GFR  (AFRICAN AMERICAN): 32.4 ML/MIN/1.73 M^2
EST. GFR  (NON AFRICAN AMERICAN): 28.1 ML/MIN/1.73 M^2
GLUCOSE SERPL-MCNC: 115 MG/DL
HCT VFR BLD AUTO: 31.9 %
HGB BLD-MCNC: 10.3 G/DL
IMM GRANULOCYTES # BLD AUTO: 0.01 K/UL
IMM GRANULOCYTES NFR BLD AUTO: 0.2 %
LYMPHOCYTES # BLD AUTO: 1.3 K/UL
LYMPHOCYTES NFR BLD: 27 %
MCH RBC QN AUTO: 29.1 PG
MCHC RBC AUTO-ENTMCNC: 32.3 G/DL
MCV RBC AUTO: 90 FL
MONOCYTES # BLD AUTO: 0.5 K/UL
MONOCYTES NFR BLD: 10.7 %
NEUTROPHILS # BLD AUTO: 2.8 K/UL
NEUTROPHILS NFR BLD: 59.6 %
NRBC BLD-RTO: 0 /100 WBC
PLATELET # BLD AUTO: 269 K/UL
PLATELET RESPONSE PLAVIX: 128 PRU
PMV BLD AUTO: 10.8 FL
POTASSIUM SERPL-SCNC: 3.9 MMOL/L
RBC # BLD AUTO: 3.54 M/UL
SODIUM SERPL-SCNC: 142 MMOL/L
WBC # BLD AUTO: 4.67 K/UL

## 2018-06-01 PROCEDURE — 93454 CORONARY ARTERY ANGIO S&I: CPT | Mod: 26,59,, | Performed by: INTERNAL MEDICINE

## 2018-06-01 PROCEDURE — 25000003 PHARM REV CODE 250: Performed by: INTERNAL MEDICINE

## 2018-06-01 PROCEDURE — 85025 COMPLETE CBC W/AUTO DIFF WBC: CPT

## 2018-06-01 PROCEDURE — 93005 ELECTROCARDIOGRAM TRACING: CPT | Mod: 59

## 2018-06-01 PROCEDURE — 86850 RBC ANTIBODY SCREEN: CPT

## 2018-06-01 PROCEDURE — 25000003 PHARM REV CODE 250

## 2018-06-01 PROCEDURE — 27000014 CATH LAB PROCEDURE

## 2018-06-01 PROCEDURE — 85576 BLOOD PLATELET AGGREGATION: CPT

## 2018-06-01 PROCEDURE — 93010 ELECTROCARDIOGRAM REPORT: CPT | Mod: 76,,, | Performed by: INTERNAL MEDICINE

## 2018-06-01 PROCEDURE — 93010 ELECTROCARDIOGRAM REPORT: CPT | Mod: ,,, | Performed by: INTERNAL MEDICINE

## 2018-06-01 PROCEDURE — 63600175 PHARM REV CODE 636 W HCPCS

## 2018-06-01 PROCEDURE — 92928 PRQ TCAT PLMT NTRAC ST 1 LES: CPT | Mod: RC,,, | Performed by: INTERNAL MEDICINE

## 2018-06-01 PROCEDURE — S0077 INJECTION, CLINDAMYCIN PHOSP: HCPCS

## 2018-06-01 PROCEDURE — 99152 MOD SED SAME PHYS/QHP 5/>YRS: CPT | Mod: ,,, | Performed by: INTERNAL MEDICINE

## 2018-06-01 PROCEDURE — 80048 BASIC METABOLIC PNL TOTAL CA: CPT

## 2018-06-01 PROCEDURE — C1894 INTRO/SHEATH, NON-LASER: HCPCS

## 2018-06-01 RX ORDER — CLONIDINE HYDROCHLORIDE 0.1 MG/1
0.3 TABLET ORAL 3 TIMES DAILY
Status: DISCONTINUED | OUTPATIENT
Start: 2018-06-01 | End: 2018-06-01 | Stop reason: HOSPADM

## 2018-06-01 RX ORDER — HYDRALAZINE HYDROCHLORIDE 50 MG/1
100 TABLET, FILM COATED ORAL EVERY 8 HOURS
Status: DISCONTINUED | OUTPATIENT
Start: 2018-06-01 | End: 2018-06-01 | Stop reason: HOSPADM

## 2018-06-01 RX ORDER — ASPIRIN 81 MG/1
81 TABLET ORAL DAILY
Status: DISCONTINUED | OUTPATIENT
Start: 2018-06-02 | End: 2018-06-01 | Stop reason: HOSPADM

## 2018-06-01 RX ORDER — CLOPIDOGREL BISULFATE 75 MG/1
75 TABLET ORAL DAILY
Status: DISCONTINUED | OUTPATIENT
Start: 2018-06-02 | End: 2018-06-01 | Stop reason: HOSPADM

## 2018-06-01 RX ORDER — VERAPAMIL HYDROCHLORIDE 120 MG/1
240 TABLET, FILM COATED, EXTENDED RELEASE ORAL DAILY
Status: DISCONTINUED | OUTPATIENT
Start: 2018-06-02 | End: 2018-06-01 | Stop reason: HOSPADM

## 2018-06-01 RX ORDER — DEXTROSE MONOHYDRATE AND SODIUM CHLORIDE 5; .45 G/100ML; G/100ML
INJECTION, SOLUTION INTRAVENOUS CONTINUOUS
Status: ACTIVE | OUTPATIENT
Start: 2018-06-01 | End: 2018-06-01

## 2018-06-01 RX ORDER — ROSUVASTATIN CALCIUM 20 MG/1
20 TABLET, COATED ORAL DAILY
Status: DISCONTINUED | OUTPATIENT
Start: 2018-06-01 | End: 2018-06-01 | Stop reason: HOSPADM

## 2018-06-01 RX ORDER — CLOPIDOGREL BISULFATE 75 MG/1
75 TABLET ORAL DAILY
Qty: 30 TABLET | Refills: 1 | Status: SHIPPED | OUTPATIENT
Start: 2018-06-02 | End: 2018-07-25 | Stop reason: SDUPTHER

## 2018-06-01 RX ORDER — CARBAMAZEPINE 200 MG/1
200 TABLET ORAL 3 TIMES DAILY
Status: DISCONTINUED | OUTPATIENT
Start: 2018-06-01 | End: 2018-06-01 | Stop reason: HOSPADM

## 2018-06-01 RX ORDER — DIPHENHYDRAMINE HCL 50 MG
50 CAPSULE ORAL ONCE
Status: COMPLETED | OUTPATIENT
Start: 2018-06-01 | End: 2018-06-01

## 2018-06-01 RX ORDER — METOPROLOL TARTRATE 100 MG/1
100 TABLET ORAL 2 TIMES DAILY
Status: DISCONTINUED | OUTPATIENT
Start: 2018-06-01 | End: 2018-06-01 | Stop reason: HOSPADM

## 2018-06-01 RX ORDER — SODIUM CHLORIDE 9 MG/ML
INJECTION, SOLUTION INTRAVENOUS CONTINUOUS
Status: DISCONTINUED | OUTPATIENT
Start: 2018-06-01 | End: 2018-06-01 | Stop reason: HOSPADM

## 2018-06-01 RX ADMIN — METOPROLOL TARTRATE 100 MG: 100 TABLET ORAL at 12:06

## 2018-06-01 RX ADMIN — ROSUVASTATIN CALCIUM 20 MG: 20 TABLET, FILM COATED ORAL at 12:06

## 2018-06-01 RX ADMIN — DIPHENHYDRAMINE HYDROCHLORIDE 50 MG: 50 CAPSULE ORAL at 09:06

## 2018-06-01 RX ADMIN — CLONIDINE HYDROCHLORIDE 0.3 MG: 0.1 TABLET ORAL at 02:06

## 2018-06-01 RX ADMIN — SODIUM CHLORIDE: 0.9 INJECTION, SOLUTION INTRAVENOUS at 09:06

## 2018-06-01 RX ADMIN — HYDRALAZINE HYDROCHLORIDE 100 MG: 50 TABLET ORAL at 02:06

## 2018-06-01 NOTE — H&P (VIEW-ONLY)
Subjective:    Patient ID:  Cindy Lyman is a 82 y.o. female who presents for evaluation of severe AS    Reason for referral: Severe AS  Referring physician: Dr Carmen    HPI  Patient is an 82 year old female with PMH sx for:   CAD - stent LAD 4/2015 - moderate Cx disease   Pulmonary HTN   Diastolic CHF   AS - severe   HTN   COPD   Hx DVT   CKD 3 1.1 - 1.8  Who is referred by Dr. Carmen for evaluation of severe AS.  As per patient she has had dyspnea on exertion x 2 years with significant worsening over the past 6 months.  She cannot climb up a flight of stairs without becoming extremely dyspneic.  On thurs she had an episode of chest heaviness after lifting clothes and doing laundry.  In ER she still had chest pain, and her BP was 250 systolic, pain resolved with decrease in her BP.  Troponins were negative.  She does occasionally get angina with exertion.  Resolves with rest.          Cindy Lyman is a 82 y.o. female referred by Dr Carmen for evaluation of severe AS (NYHA Class II sx).    The patient has undergone the following TAVR work-up:   ECHO (Date 5/8/2018): CLEMENT= 0.82 cm2, MG= 44mmHg, Peak Duncan= 4 m/s, EF= 60-65%.   LHC (Date ):   STS: 3.6%   Frailty: 3/4   Iliacs are > on R and >on L   LVOT area by CTA is cm2 ( mm X  mm) and Avg Diameter is per Dr   Incidental findings on CT:  CT Surgery risk assessment: risk, per   Rhythm issues: NSR, RBBB, LAFB  PFTs: FEV1 % predicted, DLCO % predicted.  Comorbidities: COPD, diastolic HF      Cindy Lyman is a mm  valve candidate via  access.        Review of Systems   Constitution: Negative for decreased appetite, weakness, malaise/fatigue, night sweats and weight gain.   HENT: Negative for hoarse voice.    Eyes: Negative for vision loss in left eye and vision loss in right eye.   Cardiovascular: Negative for chest pain, claudication, dyspnea on exertion, irregular heartbeat, leg swelling and near-syncope.   Respiratory: Positive for  "shortness of breath. Negative for cough, hemoptysis, sleep disturbances due to breathing, snoring, sputum production and wheezing.    Endocrine: Negative for cold intolerance and heat intolerance.   Musculoskeletal: Negative for arthritis, back pain, falls, gout, muscle cramps and muscle weakness.   Gastrointestinal: Negative for bloating and abdominal pain.   Neurological: Negative for brief paralysis and difficulty with concentration.   Psychiatric/Behavioral: Negative for depression and hallucinations.        Objective:  BP (!) 147/67 (BP Location: Left arm, Patient Position: Sitting, BP Method: Large (Automatic))   Pulse 63   Ht 5' 5" (1.651 m)   Wt 90.9 kg (200 lb 6.4 oz)   SpO2 97%   BMI 33.35 kg/m²       Physical Exam   Constitutional: She is oriented to person, place, and time. She appears well-developed and well-nourished.   HENT:   Head: Normocephalic and atraumatic.   Eyes: Conjunctivae are normal. Pupils are equal, round, and reactive to light. Right eye exhibits no discharge. Left eye exhibits no discharge. No scleral icterus.   Neck: Normal range of motion. Neck supple. No JVD present.   Cardiovascular: Normal rate, regular rhythm, normal heart sounds and intact distal pulses.  Exam reveals no gallop and no friction rub.    No murmur heard.  Pulmonary/Chest: Effort normal and breath sounds normal. No respiratory distress. She has no wheezes. She has no rales. She exhibits no tenderness.   Abdominal: Soft. Bowel sounds are normal. She exhibits no distension. There is no tenderness.   Musculoskeletal: Normal range of motion. She exhibits no edema or tenderness.   Neurological: She is alert and oriented to person, place, and time. No cranial nerve deficit. Coordination normal.   Skin: Skin is warm and dry. No rash noted. No erythema.   Psychiatric: She has a normal mood and affect. Her behavior is normal. Judgment and thought content normal.         Assessment:       1. Severe AS    Cindy Sterling " Nura is a 82 y.o. female referred by Dr Carmen for evaluation of severe AS (NYHA Class III sx).    The patient has undergone the following TAVR work-up:   ECHO (Date 5/8/2018): CLEMENT= 0.82 cm2, MG= 44mmHg, Peak Duncan= 4 m/s, EF= 60-65%.   LHC (Date ):   STS: 3.6%   Frailty: 3/4   Iliacs are > on R and >on L   LVOT area by CTA is cm2 ( mm X  mm) and Avg Diameter is per Dr   Incidental findings on CT:  CT Surgery risk assessment: risk, per Dr  Rhythm issues: NSR, RBBB, LAFB  PFTs: FEV1 % predicted, DLCO % predicted.  Comorbidities: COPD, diastolic HF     2. Chronic combined systolic and diastolic heart failure    3. 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 Stage 3 chronic kidney disease    5. Nonrheumatic aortic valve stenosis    6. Coronary artery disease involving native coronary artery without angina pectoris, unspecified whether native or transplanted heart    7. Essential hypertension         Plan:       LHC via right CFA access +/- PCI  Patient understands risks and benefits of procedure and gives consent.  Patient needs EP eval for RBBB and LAFB  Patient needs outpt CT scan on different day then angio  CTS consult  PFT's    Sultana Weaver MD  Interventional Cardiology    Staff:  I have personally taken the history and examined this patient and agree with the fellow's note as stated above and amended it accordingly :-) This patient is likely going to be considered high risk due to age, 3/4 frailty, female gender, and BMI of 33, however, would appreciate Dr. Lee and Lraa's opinion.  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

## 2018-06-01 NOTE — CONSULTS
"Cardiac Rehab     Cindy Lyman   [id number]   6/1/2018       Activity taught: Yes    Cardiac Rehab Phase Taught: Phase I & II    Risk Factors-Modifiable: nutrition, hypertension, obesity, sedentary lifestyle, stress, exercise    Risk Factors-Non modifiable: age, race    Teaching Method: Verbal, Written and Living with Heart Disease book.    Understanding/Response: Pt verbalized understanding, all questions answered. Pt understands their cardiac rehab order is good for 12 months.  Pt lives in [place] and would prefer to go to [facility].     Comments: S/P PTCA. Discussed cardiac rehab and risk factor modification. Team to refer patient to [facility] Cardiac Rehab Phase II. Educational materials were used in the process and given to the patient. They included "Your Guide to Living with Heart Disease", Phase Two Cardiac Rehabilitation information along with a sample Mediterranean diet.The patient expressed understanding of the teaching and expressed desire to take a role in modifying the risk factors when they return home.    MILKA Figueroa RN  Cardiac Rehab Nurse      "

## 2018-06-01 NOTE — INTERVAL H&P NOTE
The patient has been examined and the H&P has been reviewed:    I concur with the findings and no changes have occurred since H&P was written.     Cindy Lyman is a 82 y.o. female referred by Dr Carmen for evaluation of severe AS (NYHA Class III sx).     The patient has undergone the following TAVR work-up:   · ECHO (Date 5/8/2018): CLEMENT= 0.82 cm2, MG= 44mmHg, Peak Duncan= 4 m/s, EF= 60-65%.   · LHC (Date 6/1/18):   · STS: 3.6%   · Frailty: 3/4   · Iliacs are > on R and >on L   · LVOT area by CTA is cm2 ( mm X  mm) and Avg Diameter is per    · Incidental findings on CT:  · CT Surgery risk assessment: High Risk per Parrino  · Rhythm issues: NSR, RBBB, LAFB  · PFTs: FEV1 % predicted, DLCO % predicted.  · Comorbidities: COPD, diastolic HF    CT done, pending our read    Anesthesia/Surgery risks, benefits and alternative options discussed and understood by patient/family.      Active Hospital Problems    Diagnosis  POA    Coronary artery disease due to lipid rich plaque [I25.10, I25.83]  Yes      Resolved Hospital Problems    Diagnosis Date Resolved POA   No resolved problems to display.

## 2018-06-01 NOTE — SIGNIFICANT EVENT
Reason for referral: Severe AS  Referring physician: Dr Carmen     HPI  Patient is an 82 year old female with PMH sx for:              CAD - stent LAD 4/2015 - moderate Cx disease              Pulmonary HTN              Diastolic CHF              AS - severe              HTN              COPD              Hx DVT              CKD 3 1.1 - 1.8  Who is referred by Dr. Carmen for evaluation of severe AS.  As per patient she has had dyspnea on exertion x 2 years with significant worsening over the past 6 months.  She cannot climb up a flight of stairs without becoming extremely dyspneic.  On thurs she had an episode of chest heaviness after lifting clothes and doing laundry.  In ER she still had chest pain, and her BP was 250 systolic, pain resolved with decrease in her BP.  Troponins were negative.  She does occasionally get angina with exertion.  Resolves with rest.            Cindy Lyman is a 82 y.o. female referred by Dr Carmen for evaluation of severe AS (NYHA Class II sx).     The patient has undergone the following TAVR work-up:   · ECHO (Date 5/8/2018): CLEMENT= 0.82 cm2, MG= 44mmHg, Peak Duncan= 4 m/s, EF= 60-65%.   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  · Comorbidities: COPD, diastolic HF        Cindy Lyman is a 29 mm  Evolut valve candidate via  high Right TF access.

## 2018-06-01 NOTE — DISCHARGE SUMMARY
[facility]  Interventional Cardiology  Discharge Summary      Patient Name: Cindy Lyman  MRN: [id number]  Admission Date: 6/1/2018  Hospital Length of Stay: 0 days  Discharge Date and Time:  06/01/2018 2:29 PM  Attending Physician: Corey Castañeda MD  Discharging Provider: Sultana Weaver MD  Primary Care Physician: Rodger Camarena MD    HPI: Patient is an 82 year old female with PMH sx for:              CAD - stent LAD 4/2015 - moderate Cx disease              Pulmonary HTN              Diastolic CHF              AS - severe              HTN              COPD              Hx DVT              CKD 3 1.1 - 1.8  Who is referred by Dr. Carmen for evaluation of severe AS.  As per patient she has had dyspnea on exertion x 2 years with significant worsening over the past 6 months.  She cannot climb up a flight of stairs without becoming extremely dyspneic.  On thurs she had an episode of chest heaviness after lifting clothes and doing laundry.  In ER she still had chest pain, and her BP was 250 systolic, pain resolved with decrease in her BP.  Troponins were negative.  She does occasionally get angina with exertion.  Resolves with rest.  LHC significant for ostial RCA lesion.            Cindy Lyman is a 82 y.o. female referred by Dr Carmen for evaluation of severe AS (NYHA Class II sx).     The patient has undergone the following TAVR work-up:   · ECHO (Date 5/8/2018): CLEMENT= 0.82 cm2, MG= 44mmHg, Peak Duncan= 4 m/s, EF= 60-65%.   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  · Comorbidities: COPD, diastolic HF        Cindy Lyman is a 29 mm Evolut valve candidate via  high Right TF access.       Procedure(s) (LRB):  Left heart cath (Left)     Indwelling Lines/Drains at time of discharge:  Lines/Drains/Airways          No matching active lines, drains, or airways          Hospital Course PCI of ostial and proximal RCA.  See cath report for full details.        Significant Diagnostic Studies: Labs:   BMP:   Recent Labs  Lab 06/01/18  0816   *      K 3.9      CO2 25   BUN 31*   CREATININE 1.7*   CALCIUM 8.8       Pending Diagnostic Studies:     None        Final Active Diagnoses:    Diagnosis Date Noted POA    PRINCIPAL PROBLEM:  Coronary artery disease due to lipid rich plaque [I25.10, I25.83] 06/01/2018 Yes    Nonrheumatic aortic valve stenosis [I35.0] 11/22/2013 Yes     Chronic      Problems Resolved During this Admission:    Diagnosis Date Noted Date Resolved POA       Discharged Condition: good    Follow Up: cardiac diet.  No heavy lifting.  F/u for TAVR will call next week with a date.    Patient Instructions:   No discharge procedures on file.  Medications:  Reconciled Home Medications:      Medication List      CHANGE how you take these medications    * clopidogrel 75 mg tablet  Commonly known as:  PLAVIX  Take 1 tablet (75 mg total) by mouth once daily.  What changed:  Another medication with the same name was added. Make sure you understand how and when to take each.     * clopidogrel 75 mg tablet  Commonly known as:  PLAVIX  Take 1 tablet (75 mg total) by mouth once daily.  Start taking on:  6/2/2018  What changed:  You were already taking a medication with the same name, and this  prescription was added. Make sure you understand how and when to take each.        * This list has 2 medication(s) that are the same as other medications prescribed for you. Read the directions carefully, and ask your doctor or other care provider to review them with you.            CONTINUE taking these medications    acetaminophen 325 MG tablet  Commonly known as:  TYLENOL  Take 2 tablets (650 mg total) by mouth every 6 (six) hours as needed for Pain or Temperature greater than (100.4).     aspirin 81 MG EC tablet  Commonly known as:  ECOTRIN  Take 81 mg by mouth once daily.     azelastine 137 mcg (0.1 %) nasal spray  Commonly known as:  ASTELIN  1 spray (137 mcg total) by Nasal route 2 (two) times daily.     carBAMazepine 200 mg tablet  Commonly known as:  TEGRETOL  Take 1 tablet (200 mg total) by mouth 3 (three) times daily.     cetirizine 10 MG tablet  Commonly known as:  ZYRTEC  Take 1 tablet (10 mg total) by mouth once daily.     cloNIDine 0.3 MG tablet  Commonly known as:  CATAPRES  Take 1 tablet (0.3 mg total) by mouth 3 (three) times daily.     fluticasone 220 mcg/actuation inhaler  Commonly known as:  FLOVENT HFA  Inhale 1 puff into the lungs 2 (two) times daily. Controller     furosemide 40 MG tablet  Commonly known as:  LASIX  TAKE ONE TABLET BY MOUTH EVERY DAY AS NEEDED FOR SHORTNESS OF BREATH or leg swelling     gabapentin 300 MG capsule  Commonly known as:  NEURONTIN  ONE CAPSULE BY MOUTH THREE TIMES DAILY     hydrALAZINE 100 MG tablet  Commonly known as:  APRESOLINE  ONE TABLET BY MOUTH THREE TIMES DAILY     metoprolol tartrate 100 MG tablet  Commonly known as:  LOPRESSOR  ONE TABLET BY MOUTH TWICE DAILY     nitroGLYCERIN 0.4 MG SL tablet  Commonly known as:  NITROSTAT  Place 0.4 mg under the tongue every 5 (five) minutes as needed for Chest pain.     rosuvastatin 20 MG tablet  Commonly known as:  CRESTOR  ONE TABLET BY MOUTH EVERY EVENING     verapamil 360 MG C24p  Commonly known as:  VERELAN  ONE  CAPSULE BY MOUTH once a day     walker Misc  Commonly known as:  ULTRA-LIGHT ROLLATOR  One rollator, size large.            Time spent on the discharge of patient: 50 minutes    Sultana Weaver MD  Interventional Cardiology  [facility]

## 2018-06-01 NOTE — PLAN OF CARE
Patient discharged per MD orders. Instructions given on medications, wound care, activity, signs of infection, when to call MD, and follow up appointments. Pt verbalized understanding. Telemetry and PIV removed. Patient and family used wc off unit.

## 2018-06-06 DIAGNOSIS — I35.0 NODULAR CALCIFIC AORTIC VALVE STENOSIS: Primary | ICD-10-CM

## 2018-06-06 DIAGNOSIS — N18.9 CHRONIC KIDNEY DISEASE, UNSPECIFIED CKD STAGE: ICD-10-CM

## 2018-06-06 RX ORDER — DIPHENHYDRAMINE HCL 25 MG
50 CAPSULE ORAL ONCE
Status: CANCELLED | OUTPATIENT
Start: 2018-06-06 | End: 2018-06-06

## 2018-06-06 RX ORDER — DEXTROSE MONOHYDRATE AND SODIUM CHLORIDE 5; .45 G/100ML; G/100ML
INJECTION, SOLUTION INTRAVENOUS CONTINUOUS
Status: CANCELLED | OUTPATIENT
Start: 2018-06-06

## 2018-06-07 ENCOUNTER — CLINICAL SUPPORT (OUTPATIENT)
Dept: CARDIOLOGY | Facility: CLINIC | Age: 83
End: 2018-06-07
Attending: INTERNAL MEDICINE
Payer: MEDICARE

## 2018-06-07 ENCOUNTER — OFFICE VISIT (OUTPATIENT)
Dept: CARDIOLOGY | Facility: CLINIC | Age: 83
End: 2018-06-07
Payer: MEDICARE

## 2018-06-07 VITALS
HEART RATE: 68 BPM | WEIGHT: 200.19 LBS | BODY MASS INDEX: 33.35 KG/M2 | SYSTOLIC BLOOD PRESSURE: 151 MMHG | HEIGHT: 65 IN | DIASTOLIC BLOOD PRESSURE: 77 MMHG | OXYGEN SATURATION: 96 %

## 2018-06-07 DIAGNOSIS — I82.411 DEEP VEIN THROMBOSIS (DVT) OF FEMORAL VEIN OF RIGHT LOWER EXTREMITY, UNSPECIFIED CHRONICITY: Primary | ICD-10-CM

## 2018-06-07 DIAGNOSIS — R52 PAIN: ICD-10-CM

## 2018-06-07 DIAGNOSIS — R52 PAIN: Primary | ICD-10-CM

## 2018-06-07 PROCEDURE — 99999 PR PBB SHADOW E&M-EST. PATIENT-LVL III: CPT | Mod: PBBFAC,,, | Performed by: INTERNAL MEDICINE

## 2018-06-07 PROCEDURE — 99999 PR PBB SHADOW E&M-EST. PATIENT-LVL I: CPT | Mod: PBBFAC,,,

## 2018-06-07 PROCEDURE — 93971 EXTREMITY STUDY: CPT | Mod: S$GLB,,, | Performed by: INTERNAL MEDICINE

## 2018-06-07 PROCEDURE — 93926 LOWER EXTREMITY STUDY: CPT | Mod: S$GLB,,, | Performed by: INTERNAL MEDICINE

## 2018-06-07 PROCEDURE — 99024 POSTOP FOLLOW-UP VISIT: CPT | Mod: S$GLB,,, | Performed by: INTERNAL MEDICINE

## 2018-06-07 PROCEDURE — 99499 UNLISTED E&M SERVICE: CPT | Mod: S$GLB,,, | Performed by: NURSE PRACTITIONER

## 2018-06-07 NOTE — PROGRESS NOTES
Subjective:    Patient ID:  Cindy Lyman is a 82 y.o. female who presents for evaluation of Groin Pain      HPI  Ms. Lyman is a 81 yo lady presenting s/p coronary angiogram (6/1/2018) as part of TAVR work-up. Coronary Angiogram revealed CAD with successful ostial and proximal RCA PCI with BMS which was performed via RCFA access closed with perclose. Her R groin pain started this morning suddenly when she woke up. She denies a hematoma over her R groin until this morning. She also has posterior knee tenderness which is worse than her groin pain. She has a hx of DVT for which she was on coumadin but has been off of it for several months. She has been ambulating daily. R DP and PT pulses are 2+.        Review of Systems   Constitution: Negative for chills, diaphoresis, fever, weakness, weight gain and weight loss.   HENT: Negative for sore throat.    Eyes: Negative for blurred vision, vision loss in left eye, vision loss in right eye and visual disturbance.   Cardiovascular: Negative for chest pain, claudication, dyspnea on exertion, leg swelling, near-syncope, orthopnea, palpitations, paroxysmal nocturnal dyspnea and syncope.        R fem tenderness  R posterior knee pain    Respiratory: Negative for cough, hemoptysis, shortness of breath, sputum production and wheezing.    Endocrine: Negative for cold intolerance and heat intolerance.   Hematologic/Lymphatic: Negative for adenopathy. Does not bruise/bleed easily.   Skin: Negative for rash.   Musculoskeletal: Negative for falls, muscle weakness and myalgias.   Gastrointestinal: Negative for abdominal pain, change in bowel habit, constipation, diarrhea, melena and nausea.   Genitourinary: Negative for bladder incontinence.   Neurological: Negative for dizziness, focal weakness, headaches, light-headedness and numbness.   Psychiatric/Behavioral: Negative for altered mental status.        Past Medical History:   Diagnosis Date    Anemia     Anticoagulant  long-term use     Aortic stenosis     Arthritis     lower back    Asthma     CAD (coronary artery disease) 4/24/2015    Carpal tunnel syndrome on both sides     Cataract     CHF (congestive heart failure) 5/2013    COPD (chronic obstructive pulmonary disease)     Depression     DVT (deep venous thrombosis)     Hyperlipidemia     Hypertension     Pulmonary HTN     TMJ (temporomandibular joint disorder)      Current Outpatient Prescriptions on File Prior to Visit   Medication Sig Dispense Refill    acetaminophen (TYLENOL) 325 MG tablet Take 2 tablets (650 mg total) by mouth every 6 (six) hours as needed for Pain or Temperature greater than (100.4).  0    aspirin (ECOTRIN) 81 MG EC tablet Take 81 mg by mouth once daily.      azelastine (ASTELIN) 137 mcg (0.1 %) nasal spray 1 spray (137 mcg total) by Nasal route 2 (two) times daily. 30 mL 0    carBAMazepine (TEGRETOL) 200 mg tablet Take 1 tablet (200 mg total) by mouth 3 (three) times daily. 270 tablet 1    cetirizine (ZYRTEC) 10 MG tablet Take 1 tablet (10 mg total) by mouth once daily.  0    cloNIDine (CATAPRES) 0.3 MG tablet Take 1 tablet (0.3 mg total) by mouth 3 (three) times daily. 270 tablet 1    clopidogrel (PLAVIX) 75 mg tablet Take 1 tablet (75 mg total) by mouth once daily. 30 tablet 11    clopidogrel (PLAVIX) 75 mg tablet Take 1 tablet (75 mg total) by mouth once daily. 30 tablet 1    fluticasone (FLOVENT HFA) 220 mcg/actuation inhaler Inhale 1 puff into the lungs 2 (two) times daily. Controller      furosemide (LASIX) 40 MG tablet TAKE ONE TABLET BY MOUTH EVERY DAY AS NEEDED FOR SHORTNESS OF BREATH or leg swelling 90 tablet 1    gabapentin (NEURONTIN) 300 MG capsule ONE CAPSULE BY MOUTH THREE TIMES DAILY 270 capsule 0    hydrALAZINE (APRESOLINE) 100 MG tablet ONE TABLET BY MOUTH THREE TIMES DAILY 270 tablet 0    metoprolol tartrate (LOPRESSOR) 100 MG tablet ONE TABLET BY MOUTH TWICE DAILY 180 tablet 1    nitroGLYCERIN (NITROSTAT)  "0.4 MG SL tablet Place 0.4 mg under the tongue every 5 (five) minutes as needed for Chest pain.      rosuvastatin (CRESTOR) 20 MG tablet ONE TABLET BY MOUTH EVERY EVENING 90 tablet 1    verapamil (VERELAN) 360 MG C24P ONE CAPSULE BY MOUTH once a day 90 capsule 3    walker (ULTRA-LIGHT ROLLATOR) Misc One rollator, size large. 1 each 0     No current facility-administered medications on file prior to visit.      Vitals:    06/07/18 1124 06/07/18 1125   BP: (!) 195/80 (!) 151/77   BP Location: Right arm Left arm   Patient Position: Sitting Sitting   BP Method: Large (Automatic) Large (Automatic)   Pulse: 68    SpO2: 96%    Weight: 90.8 kg (200 lb 2.8 oz)    Height: 5' 5" (1.651 m)      Body mass index is 33.31 kg/m².       Objective:    Physical Exam   Constitutional: She is oriented to person, place, and time. She appears well-developed and well-nourished. No distress.   HENT:   Head: Normocephalic and atraumatic.   Mouth/Throat: Oropharynx is clear and moist.   Eyes: Conjunctivae and EOM are normal. Pupils are equal, round, and reactive to light. No scleral icterus.   Neck: Neck supple. No JVD present. No tracheal deviation present.   Cardiovascular: Normal rate and regular rhythm.  Exam reveals no gallop and no friction rub.    Murmur heard.  R LE DP/PT pulses 2+   Pulmonary/Chest: Effort normal and breath sounds normal. No respiratory distress. She has no wheezes. She has no rales. She exhibits no tenderness.   Abdominal: Soft. Bowel sounds are normal. She exhibits no distension. There is no hepatosplenomegaly. There is no tenderness.   R groin hematoma    Musculoskeletal: She exhibits no edema or tenderness.   Neurological: She is alert and oriented to person, place, and time.   Skin: Skin is warm and dry. No rash noted. No erythema.   Psychiatric: She has a normal mood and affect. Her behavior is normal.         Assessment:       Right leg DVT  2 years ago, was on coumadin  AC stopped several months ago "     Plan:       US this afternoon to r/o pseudo and DVT  Continue DAPT with ASA/Plavix

## 2018-06-12 RX ORDER — FLUTICASONE PROPIONATE 220 UG/1
1 AEROSOL, METERED RESPIRATORY (INHALATION) 2 TIMES DAILY
Qty: 12 G | Refills: 2 | Status: ON HOLD | OUTPATIENT
Start: 2018-06-12 | End: 2020-09-20 | Stop reason: HOSPADM

## 2018-06-18 DIAGNOSIS — J30.9 ALLERGIC RHINITIS: ICD-10-CM

## 2018-06-18 RX ORDER — FLUTICASONE PROPIONATE 50 MCG
SPRAY, SUSPENSION (ML) NASAL
Qty: 16 G | Refills: 5 | Status: SHIPPED | OUTPATIENT
Start: 2018-06-18 | End: 2019-04-16

## 2018-07-02 ENCOUNTER — OFFICE VISIT (OUTPATIENT)
Dept: CARDIOLOGY | Facility: CLINIC | Age: 83
DRG: 266 | End: 2018-07-02
Payer: MEDICARE

## 2018-07-02 VITALS
BODY MASS INDEX: 37.07 KG/M2 | DIASTOLIC BLOOD PRESSURE: 92 MMHG | HEIGHT: 64 IN | OXYGEN SATURATION: 98 % | WEIGHT: 217.13 LBS | HEART RATE: 71 BPM | SYSTOLIC BLOOD PRESSURE: 206 MMHG

## 2018-07-02 DIAGNOSIS — I25.10 CORONARY ARTERY DISEASE INVOLVING NATIVE CORONARY ARTERY WITHOUT ANGINA PECTORIS, UNSPECIFIED WHETHER NATIVE OR TRANSPLANTED HEART: Chronic | ICD-10-CM

## 2018-07-02 DIAGNOSIS — I35.0 NODULAR CALCIFIC AORTIC VALVE STENOSIS: ICD-10-CM

## 2018-07-02 DIAGNOSIS — E78.5 HYPERLIPIDEMIA, UNSPECIFIED HYPERLIPIDEMIA TYPE: Chronic | ICD-10-CM

## 2018-07-02 PROCEDURE — 3077F SYST BP >= 140 MM HG: CPT | Mod: CPTII,S$GLB,, | Performed by: INTERNAL MEDICINE

## 2018-07-02 PROCEDURE — 3078F DIAST BP <80 MM HG: CPT | Mod: CPTII,S$GLB,, | Performed by: INTERNAL MEDICINE

## 2018-07-02 PROCEDURE — 99999 PR PBB SHADOW E&M-EST. PATIENT-LVL IV: CPT | Mod: PBBFAC,,,

## 2018-07-02 PROCEDURE — 99215 OFFICE O/P EST HI 40 MIN: CPT | Mod: S$GLB,,, | Performed by: INTERNAL MEDICINE

## 2018-07-02 NOTE — ASSESSMENT & PLAN NOTE
The patient has undergone the following TAVR work-up:   · ECHO (Date 5/8/2018): CLEMENT= 0.82 cm2, MG= 44mmHg, Peak Duncan= 4 m/s, EF= 60-65%.   · LHC (Date 6/1/2018): non-obstructive CAD, s/p BMS of ostial RCA and prox RCA  · STS: 4.4%   · Frailty: 2/4   · Iliacs are >6.8  on R and >5.07 on L may have to dilate RCI in two places with 7x4 balloon   · LVOT area by CTA is 3.95 cm2 ( 25.1mm X  21.9 mm) and Avg Diameter is 22.4 per Dr Castañeda  · Incidental findings on CT: Stable left upper lobe calcified granuloma, Left hepatic lobe hypodensity, which is too small to characterize, Stable  subcentimeter pancreatic head cyst, better evaluated on CT abdomen pelvis 01/30/2018, 1.3 cm right kidney class 2 Bosniak cyst  · CT Surgery risk assessment: risk, per Dr Bermudez who recommends LIANET  · Rhythm issues: NSR, RBBB, LAFB  · PFTs: FEV1 66% predicted, DLCO 96% predicted.  · Comorbidities: COPD, diastolic HF        Cindy Lyman is a 29 mm Evolut valve candidate via  high Right TF access, Will need RCIA dilation at two places with 7 x 40 balloon.

## 2018-07-02 NOTE — PROGRESS NOTES
Subjective:    Patient ID:  Cindy Lyman is a 82 y.o. female who presents for follow-up of Severe Aortic Stenosis.     HPI       Patient is an 82 year old female with PMH sx for:              CAD - stent LAD 4/2015 and ostial/prox RCA BMS 6/1/2018              Pulmonary HTN - sPAP 61 mmHG              Diastolic CHF              AS - severe              HTN              COPD              Hx DVT              CKD 3 1.1 - 1.8  Who was referred by Dr. Carmen for evaluation of severe AS and is being planned for RTF 29 nnm Evolut.  As per patient she has had dyspnea on exertion x 2 years with significant worsening over the past 6 months.  She cannot climb up a flight of stairs without becoming extremely dyspneic.  On thurs she had an episode of chest heaviness after lifting clothes and doing laundry.  In ER she still had chest pain, and her BP was 250 systolic, pain resolved with decrease in her BP.  Troponins were negative.  She does occasionally get angina with exertion.  Resolves with rest.  LHC significant for ostial RCA lesion.            Cindy Lyman is a 82 y.o. female referred by Dr Carmen for evaluation of severe AS (NYHA Class III sx).     The patient has undergone the following TAVR work-up:   · ECHO (Date 5/8/2018): CLEMENT= 0.82 cm2, MG= 44mmHg, Peak Duncan= 4 m/s, EF= 60-65%.   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 High risk for CHB so evaluated by Dr. Diego on 5/29/18  · PFTs: FEV1 66% predicted, DLCO 96% predicted.  · Comorbidities: COPD, diastolic HF        Cindy Lyman is a 29 mm Evolut valve candidate via  high Right TF access     Review of Systems   Constitution: Negative for chills, decreased appetite, fever, weakness, night sweats, weight gain and weight loss.   HENT: Negative for congestion, hoarse voice, stridor and tinnitus.    Eyes: Negative for blurred vision, pain and visual disturbance.   Cardiovascular: Positive for dyspnea on exertion and orthopnea. Negative for chest pain, claudication, irregular heartbeat, leg swelling, near-syncope, palpitations, paroxysmal nocturnal dyspnea and syncope.   Respiratory: Negative for cough, hemoptysis, shortness of breath, snoring and wheezing.    Endocrine: Negative for cold intolerance, heat intolerance and polyuria.   Hematologic/Lymphatic: Negative for bleeding problem. Does not bruise/bleed easily.   Skin: Negative for color change and rash.   Musculoskeletal: Negative for arthritis, back pain, joint pain, muscle cramps, myalgias and stiffness.   Gastrointestinal: Negative for abdominal pain, anorexia, constipation, diarrhea, dysphagia, heartburn, hemorrhoids, melena, nausea and vomiting.   Genitourinary: Negative for frequency, hematuria, hesitancy, nocturia and urgency.   Neurological: Negative for difficulty with concentration, dizziness, focal weakness, headaches, light-headedness, numbness, seizures, tremors and vertigo.   Psychiatric/Behavioral: Negative for altered mental status and depression. The patient does not have insomnia.    Allergic/Immunologic: Negative for hives.      Objective:    Physical Exam   Constitutional: She appears well-developed and well-nourished.   HENT:   Head: Normocephalic and atraumatic.   Right Ear: External ear normal.   Left Ear: External ear normal.   Eyes: Conjunctivae  "and EOM are normal. Pupils are equal, round, and reactive to light.   Neck: Normal range of motion. Neck supple. No JVD present. No thyromegaly present.   Cardiovascular: Normal rate, regular rhythm, S1 normal, S2 normal and intact distal pulses.  Exam reveals no gallop, no S3 and no friction rub.    Murmur heard.  Pulses:       Radial pulses are 2+ on the right side, and 2+ on the left side.        Femoral pulses are 2+ on the right side, and 2+ on the left side.       Dorsalis pedis pulses are 1+ on the right side, and 1+ on the left side.        Posterior tibial pulses are 1+ on the right side, and 1+ on the left side.   BAKARI Early Peaking   Pulmonary/Chest: Effort normal. No stridor. She has no wheezes. She has no rales. She exhibits no tenderness.   Abdominal: Soft. Bowel sounds are normal. She exhibits no distension. There is no tenderness. There is no rebound and no guarding.   Musculoskeletal: Normal range of motion. She exhibits no edema or tenderness.   Psychiatric: She has a normal mood and affect.      BP (!) 206/92 (BP Location: Left arm, Patient Position: Sitting, BP Method: Large (Automatic))   Pulse 71   Ht 5' 4" (1.626 m)   Wt 98.5 kg (217 lb 2.5 oz)   SpO2 98%   BMI 37.27 kg/m²      Assessment/Plan:      Nodular calcific aortic valve stenosis  The patient has undergone the following TAVR work-up:   · ECHO (Date 5/8/2018): CLEMENT= 0.82 cm2, MG= 44mmHg, Peak Duncan= 4 m/s, EF= 60-65%.   · LHC (Date 6/1/2018): non-obstructive CAD, s/p BMS of ostial RCA and prox RCA  · STS: 4.4%   · Frailty: 2/4   · Iliacs are >6.8  on R and >5.07 on L may have to dilate RCI in two places with 7x4 balloon   · LVOT area by CTA is 3.95 cm2 ( 25.1mm X  21.9 mm) and Avg Diameter is 22.4 per Dr Castañeda  · Incidental findings on CT: Stable left upper lobe calcified granuloma, Left hepatic lobe hypodensity, which is too small to characterize, Stable  subcentimeter pancreatic head cyst, better evaluated on CT abdomen pelvis " 01/30/2018, 1.3 cm right kidney class 2 Bosniak cyst  · CT Surgery risk assessment:  Dr Bermudez recommended LIANET due to co morbidities and frailty.   · Rhythm issues: NSR, RBBB, LAFB. High risk for CHB so evaluated by Dr. Diego on 5/29/18  · PFTs: FEV1 66% predicted, DLCO 96% predicted.  · Comorbidities: COPD, diastolic HF, CKD         Cindy Lyman is a 29 mm Evolut valve candidate via  high Right TF access, Will need RCIA dilation at two places with 7 x 40 balloon.      Coronary artery disease involving native coronary artery without angina pectoris  S/p ostial RCA PCI on 6/1/18. Continue medical management      Hyperlipidemia  Continue with statin therapy        Hira Black MD [facility]  Interventional Cardiology  Structural/Valvular heart disease  [phone]

## 2018-07-02 NOTE — PROGRESS NOTES
Subjective:    Patient ID:  Cindy Lyman is a 82 y.o. female who presents for follow-up of Severe Aortic Stenosis.    HPI      Patient is an 82 year old female with PMH sx for:              CAD - stent LAD 4/2015 and ostial/prox RCA BMS 6/1/2018              Pulmonary HTN - sPAP 61 mmHG              Diastolic CHF              AS - severe              HTN              COPD              Hx DVT              CKD 3 1.1 - 1.8  Who was referred by Dr. Carmen for evaluation of severe AS and is being planned for RTF 29 nnm Evolut.  As per patient she has had dyspnea on exertion x 2 years with significant worsening over the past 6 months.  She cannot climb up a flight of stairs without becoming extremely dyspneic.  On thurs she had an episode of chest heaviness after lifting clothes and doing laundry.  In ER she still had chest pain, and her BP was 250 systolic, pain resolved with decrease in her BP.  Troponins were negative.  She does occasionally get angina with exertion.  Resolves with rest.  LHC significant for ostial RCA lesion.            Cindy Lyman is a 82 y.o. female referred by Dr Carmen for evaluation of severe AS (NYHA Class III sx).     The patient has undergone the following TAVR work-up:   · ECHO (Date 5/8/2018): CLEMENT= 0.82 cm2, MG= 44mmHg, Peak Duncan= 4 m/s, EF= 60-65%.   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 High risk for CHB so evaluated by Dr. Diego on 5/29/18  · PFTs: FEV1 66% predicted, DLCO 96% predicted.  · Comorbidities: COPD, diastolic HF        Cindy Lyman is a 29 mm Evolut valve candidate via  high Right TF access    Review of Systems   Constitution: Negative for chills, decreased appetite, fever, weakness, night sweats, weight gain and weight loss.   HENT: Negative for congestion, hoarse voice, stridor and tinnitus.    Eyes: Negative for blurred vision, pain and visual disturbance.   Cardiovascular: Positive for dyspnea on exertion and orthopnea. Negative for chest pain, claudication, irregular heartbeat, leg swelling, near-syncope, palpitations, paroxysmal nocturnal dyspnea and syncope.   Respiratory: Negative for cough, hemoptysis, shortness of breath, snoring and wheezing.    Endocrine: Negative for cold intolerance, heat intolerance and polyuria.   Hematologic/Lymphatic: Negative for bleeding problem. Does not bruise/bleed easily.   Skin: Negative for color change and rash.   Musculoskeletal: Negative for arthritis, back pain, joint pain, muscle cramps, myalgias and stiffness.   Gastrointestinal: Negative for abdominal pain, anorexia, constipation, diarrhea, dysphagia, heartburn, hemorrhoids, melena, nausea and vomiting.   Genitourinary: Negative for frequency, hematuria, hesitancy, nocturia and urgency.   Neurological: Negative for difficulty with concentration, dizziness, focal weakness, headaches, light-headedness, numbness, seizures, tremors and vertigo.   Psychiatric/Behavioral: Negative for altered mental status and depression. The patient does not have insomnia.    Allergic/Immunologic: Negative for hives.        Objective:    Physical Exam   Constitutional: She appears well-developed and well-nourished.   HENT:   Head: Normocephalic and atraumatic.   Right Ear: External ear normal.   Left Ear: External ear normal.   Eyes: Conjunctivae  "and EOM are normal. Pupils are equal, round, and reactive to light.   Neck: Normal range of motion. Neck supple. No JVD present. No thyromegaly present.   Cardiovascular: Normal rate, regular rhythm, S1 normal, S2 normal and intact distal pulses.  Exam reveals no gallop, no S3 and no friction rub.    Murmur heard.  Pulses:       Radial pulses are 2+ on the right side, and 2+ on the left side.        Femoral pulses are 2+ on the right side, and 2+ on the left side.       Dorsalis pedis pulses are 1+ on the right side, and 1+ on the left side.        Posterior tibial pulses are 1+ on the right side, and 1+ on the left side.   BAKARI Early Peaking   Pulmonary/Chest: Effort normal. No stridor. She has no wheezes. She has no rales. She exhibits no tenderness.   Abdominal: Soft. Bowel sounds are normal. She exhibits no distension. There is no tenderness. There is no rebound and no guarding.   Musculoskeletal: Normal range of motion. She exhibits no edema or tenderness.   Psychiatric: She has a normal mood and affect.     BP (!) 206/92 (BP Location: Left arm, Patient Position: Sitting, BP Method: Large (Automatic))   Pulse 71   Ht 5' 4" (1.626 m)   Wt 98.5 kg (217 lb 2.5 oz)   SpO2 98%   BMI 37.27 kg/m²        Assessment/Plan:     Nodular calcific aortic valve stenosis  The patient has undergone the following TAVR work-up:   · ECHO (Date 5/8/2018): CLEMENT= 0.82 cm2, MG= 44mmHg, Peak Duncan= 4 m/s, EF= 60-65%.   · LHC (Date 6/1/2018): non-obstructive CAD, s/p BMS of ostial RCA and prox RCA  · STS: 4.4%   · Frailty: 2/4   · Iliacs are >6.8  on R and >5.07 on L may have to dilate RCI in two places with 7x4 balloon   · LVOT area by CTA is 3.95 cm2 ( 25.1mm X  21.9 mm) and Avg Diameter is 22.4 per Dr Castañeda  · Incidental findings on CT: Stable left upper lobe calcified granuloma, Left hepatic lobe hypodensity, which is too small to characterize, Stable  subcentimeter pancreatic head cyst, better evaluated on CT abdomen pelvis " 01/30/2018, 1.3 cm right kidney class 2 Bosniak cyst  · CT Surgery risk assessment:  Dr Bermudez recommended LIANET due to co morbidities and frailty.   · Rhythm issues: NSR, RBBB, LAFB. High risk for CHB so evaluated by Dr. Diego on 5/29/18  · PFTs: FEV1 66% predicted, DLCO 96% predicted.  · Comorbidities: COPD, diastolic HF, CKD         Cindy Lyman is a 29 mm Evolut valve candidate via  high Right TF access, Will need RCIA dilation at two places with 7 x 40 balloon.     Coronary artery disease involving native coronary artery without angina pectoris  S/p ostial RCA PCI on 6/1/18. Continue medical management     Hyperlipidemia  Continue with statin therapy      Hira Black MD [facility]  Interventional Cardiology  Structural/Valvular heart disease  [phone]

## 2018-07-03 ENCOUNTER — ANESTHESIA EVENT (OUTPATIENT)
Dept: MEDSURG UNIT | Facility: HOSPITAL | Age: 83
DRG: 266 | End: 2018-07-03
Payer: MEDICARE

## 2018-07-05 ENCOUNTER — HOSPITAL ENCOUNTER (INPATIENT)
Facility: HOSPITAL | Age: 83
LOS: 1 days | Discharge: HOME | DRG: 266 | End: 2018-07-06
Attending: INTERNAL MEDICINE | Admitting: INTERNAL MEDICINE
Payer: MEDICARE

## 2018-07-05 ENCOUNTER — ANESTHESIA (OUTPATIENT)
Dept: MEDSURG UNIT | Facility: HOSPITAL | Age: 83
DRG: 266 | End: 2018-07-05
Payer: MEDICARE

## 2018-07-05 ENCOUNTER — ANESTHESIA EVENT (OUTPATIENT)
Dept: MEDSURG UNIT | Facility: HOSPITAL | Age: 83
DRG: 266 | End: 2018-07-05
Payer: MEDICARE

## 2018-07-05 DIAGNOSIS — I45.9 HEART BLOCK: ICD-10-CM

## 2018-07-05 DIAGNOSIS — E78.5 HYPERLIPIDEMIA: ICD-10-CM

## 2018-07-05 DIAGNOSIS — I35.0 NONRHEUMATIC AORTIC VALVE STENOSIS: Primary | Chronic | ICD-10-CM

## 2018-07-05 DIAGNOSIS — I44.2 ATRIOVENTRICULAR BLOCK, COMPLETE: ICD-10-CM

## 2018-07-05 DIAGNOSIS — I49.9 ARRHYTHMIA: ICD-10-CM

## 2018-07-05 DIAGNOSIS — I35.0 NODULAR CALCIFIC AORTIC VALVE STENOSIS: ICD-10-CM

## 2018-07-05 PROBLEM — Z95.2 S/P TAVR (TRANSCATHETER AORTIC VALVE REPLACEMENT): Status: ACTIVE | Noted: 2018-07-05

## 2018-07-05 LAB
ABO + RH BLD: NORMAL
ANION GAP SERPL CALC-SCNC: 11 MMOL/L
BLD GP AB SCN CELLS X3 SERPL QL: NORMAL
BUN SERPL-MCNC: 27 MG/DL
CALCIUM SERPL-MCNC: 8.8 MG/DL
CHLORIDE SERPL-SCNC: 108 MMOL/L
CO2 SERPL-SCNC: 24 MMOL/L
CREAT SERPL-MCNC: 1.1 MG/DL
EST. GFR  (AFRICAN AMERICAN): 54.8 ML/MIN/1.73 M^2
EST. GFR  (NON AFRICAN AMERICAN): 47.5 ML/MIN/1.73 M^2
GLUCOSE SERPL-MCNC: 108 MG/DL
POTASSIUM SERPL-SCNC: 3.6 MMOL/L
SODIUM SERPL-SCNC: 143 MMOL/L

## 2018-07-05 PROCEDURE — S5010 5% DEXTROSE AND 0.45% SALINE: HCPCS | Performed by: INTERNAL MEDICINE

## 2018-07-05 PROCEDURE — 33210 INSERT ELECTRD/PM CATH SNGL: CPT | Mod: 59,Q0,, | Performed by: ANESTHESIOLOGY

## 2018-07-05 PROCEDURE — 63600175 PHARM REV CODE 636 W HCPCS: Performed by: ANESTHESIOLOGY

## 2018-07-05 PROCEDURE — S0077 INJECTION, CLINDAMYCIN PHOSP: HCPCS | Performed by: ANESTHESIOLOGY

## 2018-07-05 PROCEDURE — S0077 INJECTION, CLINDAMYCIN PHOSP: HCPCS | Performed by: INTERNAL MEDICINE

## 2018-07-05 PROCEDURE — S0077 INJECTION, CLINDAMYCIN PHOSP: HCPCS

## 2018-07-05 PROCEDURE — 33361 REPLACE AORTIC VALVE PERQ: CPT | Mod: 62,Q0,, | Performed by: THORACIC SURGERY (CARDIOTHORACIC VASCULAR SURGERY)

## 2018-07-05 PROCEDURE — 25000003 PHARM REV CODE 250: Performed by: INTERNAL MEDICINE

## 2018-07-05 PROCEDURE — D9220A PRA ANESTHESIA: Mod: Q0,,, | Performed by: ANESTHESIOLOGY

## 2018-07-05 PROCEDURE — 27100006 EP LAB PROCEDURE

## 2018-07-05 PROCEDURE — P9016 RBC LEUKOCYTES REDUCED: HCPCS

## 2018-07-05 PROCEDURE — 63600175 PHARM REV CODE 636 W HCPCS

## 2018-07-05 PROCEDURE — 99223 1ST HOSP IP/OBS HIGH 75: CPT | Mod: 57,,, | Performed by: INTERNAL MEDICINE

## 2018-07-05 PROCEDURE — 25000003 PHARM REV CODE 250

## 2018-07-05 PROCEDURE — A4216 STERILE WATER/SALINE, 10 ML: HCPCS | Performed by: NURSE ANESTHETIST, CERTIFIED REGISTERED

## 2018-07-05 PROCEDURE — 37000009 HC ANESTHESIA EA ADD 15 MINS: Performed by: INTERNAL MEDICINE

## 2018-07-05 PROCEDURE — 0JH606Z INSERTION OF PACEMAKER, DUAL CHAMBER INTO CHEST SUBCUTANEOUS TISSUE AND FASCIA, OPEN APPROACH: ICD-10-PCS | Performed by: INTERNAL MEDICINE

## 2018-07-05 PROCEDURE — 047C3ZZ DILATION OF RIGHT COMMON ILIAC ARTERY, PERCUTANEOUS APPROACH: ICD-10-PCS | Performed by: INTERNAL MEDICINE

## 2018-07-05 PROCEDURE — 93010 ELECTROCARDIOGRAM REPORT: CPT | Mod: ,,, | Performed by: INTERNAL MEDICINE

## 2018-07-05 PROCEDURE — 25000003 PHARM REV CODE 250: Performed by: ANESTHESIOLOGY

## 2018-07-05 PROCEDURE — D9220A PRA ANESTHESIA: Mod: ANES,,, | Performed by: ANESTHESIOLOGY

## 2018-07-05 PROCEDURE — 02HK3JZ INSERTION OF PACEMAKER LEAD INTO RIGHT VENTRICLE, PERCUTANEOUS APPROACH: ICD-10-PCS | Performed by: INTERNAL MEDICINE

## 2018-07-05 PROCEDURE — 02H63JZ INSERTION OF PACEMAKER LEAD INTO RIGHT ATRIUM, PERCUTANEOUS APPROACH: ICD-10-PCS | Performed by: INTERNAL MEDICINE

## 2018-07-05 PROCEDURE — 27000014 CATH LAB PROCEDURE

## 2018-07-05 PROCEDURE — 25000003 PHARM REV CODE 250: Performed by: NURSE ANESTHETIST, CERTIFIED REGISTERED

## 2018-07-05 PROCEDURE — 85025 COMPLETE CBC W/AUTO DIFF WBC: CPT

## 2018-07-05 PROCEDURE — 33361 REPLACE AORTIC VALVE PERQ: CPT

## 2018-07-05 PROCEDURE — 86920 COMPATIBILITY TEST SPIN: CPT

## 2018-07-05 PROCEDURE — 27000239 HC STAND-BY BYPASS PUMP

## 2018-07-05 PROCEDURE — A4216 STERILE WATER/SALINE, 10 ML: HCPCS | Performed by: ANESTHESIOLOGY

## 2018-07-05 PROCEDURE — 37000008 HC ANESTHESIA 1ST 15 MINUTES: Performed by: INTERNAL MEDICINE

## 2018-07-05 PROCEDURE — D9220A PRA ANESTHESIA: Mod: CRNA,,, | Performed by: NURSE ANESTHETIST, CERTIFIED REGISTERED

## 2018-07-05 PROCEDURE — 80048 BASIC METABOLIC PNL TOTAL CA: CPT

## 2018-07-05 PROCEDURE — 93005 ELECTROCARDIOGRAM TRACING: CPT

## 2018-07-05 PROCEDURE — 93010 ELECTROCARDIOGRAM REPORT: CPT | Mod: 77,59,, | Performed by: INTERNAL MEDICINE

## 2018-07-05 PROCEDURE — 5A1213Z PERFORMANCE OF CARDIAC PACING, INTERMITTENT: ICD-10-PCS | Performed by: INTERNAL MEDICINE

## 2018-07-05 PROCEDURE — 25000003 PHARM REV CODE 250: Performed by: STUDENT IN AN ORGANIZED HEALTH CARE EDUCATION/TRAINING PROGRAM

## 2018-07-05 PROCEDURE — 33208 INSRT HEART PM ATRIAL & VENT: CPT | Mod: KX,,, | Performed by: INTERNAL MEDICINE

## 2018-07-05 PROCEDURE — 63600175 PHARM REV CODE 636 W HCPCS: Performed by: NURSE ANESTHETIST, CERTIFIED REGISTERED

## 2018-07-05 PROCEDURE — 94761 N-INVAS EAR/PLS OXIMETRY MLT: CPT

## 2018-07-05 PROCEDURE — 36620 INSERTION CATHETER ARTERY: CPT | Mod: 59,Q0,, | Performed by: ANESTHESIOLOGY

## 2018-07-05 PROCEDURE — 86850 RBC ANTIBODY SCREEN: CPT

## 2018-07-05 PROCEDURE — 33361 REPLACE AORTIC VALVE PERQ: CPT | Mod: 62,,, | Performed by: INTERNAL MEDICINE

## 2018-07-05 PROCEDURE — 02RF38Z REPLACEMENT OF AORTIC VALVE WITH ZOOPLASTIC TISSUE, PERCUTANEOUS APPROACH: ICD-10-PCS | Performed by: INTERNAL MEDICINE

## 2018-07-05 PROCEDURE — 63600175 PHARM REV CODE 636 W HCPCS: Performed by: STUDENT IN AN ORGANIZED HEALTH CARE EDUCATION/TRAINING PROGRAM

## 2018-07-05 PROCEDURE — 27000221 HC OXYGEN, UP TO 24 HOURS

## 2018-07-05 PROCEDURE — C1894 INTRO/SHEATH, NON-LASER: HCPCS

## 2018-07-05 PROCEDURE — 27000191 HC C-V MONITORING

## 2018-07-05 PROCEDURE — 20000000 HC ICU ROOM

## 2018-07-05 RX ORDER — CLINDAMYCIN PHOSPHATE 900 MG/50ML
INJECTION, SOLUTION INTRAVENOUS
Status: DISCONTINUED | OUTPATIENT
Start: 2018-07-05 | End: 2018-07-05

## 2018-07-05 RX ORDER — SODIUM CHLORIDE 0.9 % (FLUSH) 0.9 %
3 SYRINGE (ML) INJECTION
Status: CANCELLED | OUTPATIENT
Start: 2018-07-05

## 2018-07-05 RX ORDER — PROTAMINE SULFATE 10 MG/ML
INJECTION, SOLUTION INTRAVENOUS
Status: DISCONTINUED | OUTPATIENT
Start: 2018-07-05 | End: 2018-07-05

## 2018-07-05 RX ORDER — CARBAMAZEPINE 200 MG/1
200 TABLET ORAL 3 TIMES DAILY
Status: DISCONTINUED | OUTPATIENT
Start: 2018-07-05 | End: 2018-07-06 | Stop reason: HOSPADM

## 2018-07-05 RX ORDER — NICARDIPINE HYDROCHLORIDE 0.2 MG/ML
1 INJECTION INTRAVENOUS CONTINUOUS
Status: DISCONTINUED | OUTPATIENT
Start: 2018-07-05 | End: 2018-07-06

## 2018-07-05 RX ORDER — CLINDAMYCIN PHOSPHATE 900 MG/50ML
900 INJECTION, SOLUTION INTRAVENOUS
Status: DISPENSED | OUTPATIENT
Start: 2018-07-05 | End: 2018-07-06

## 2018-07-05 RX ORDER — PROPOFOL 10 MG/ML
VIAL (ML) INTRAVENOUS
Status: DISCONTINUED | OUTPATIENT
Start: 2018-07-05 | End: 2018-07-05

## 2018-07-05 RX ORDER — METOPROLOL TARTRATE 50 MG/1
50 TABLET ORAL 2 TIMES DAILY
Status: DISCONTINUED | OUTPATIENT
Start: 2018-07-06 | End: 2018-07-06 | Stop reason: HOSPADM

## 2018-07-05 RX ORDER — POTASSIUM CHLORIDE 20 MEQ/15ML
40 SOLUTION ORAL
Status: DISCONTINUED | OUTPATIENT
Start: 2018-07-05 | End: 2018-07-06 | Stop reason: HOSPADM

## 2018-07-05 RX ORDER — FLUTICASONE PROPIONATE 50 MCG
2 SPRAY, SUSPENSION (ML) NASAL DAILY
Status: DISCONTINUED | OUTPATIENT
Start: 2018-07-06 | End: 2018-07-06 | Stop reason: HOSPADM

## 2018-07-05 RX ORDER — CLOPIDOGREL BISULFATE 75 MG/1
75 TABLET ORAL DAILY
Status: DISCONTINUED | OUTPATIENT
Start: 2018-07-05 | End: 2018-07-06 | Stop reason: HOSPADM

## 2018-07-05 RX ORDER — VANCOMYCIN HCL IN 5 % DEXTROSE 1G/250ML
1000 PLASTIC BAG, INJECTION (ML) INTRAVENOUS ONCE
Status: COMPLETED | OUTPATIENT
Start: 2018-07-05 | End: 2018-07-05

## 2018-07-05 RX ORDER — ROSUVASTATIN CALCIUM 20 MG/1
20 TABLET, COATED ORAL NIGHTLY
Status: DISCONTINUED | OUTPATIENT
Start: 2018-07-05 | End: 2018-07-06 | Stop reason: HOSPADM

## 2018-07-05 RX ORDER — DIPHENHYDRAMINE HCL 50 MG
50 CAPSULE ORAL ONCE
Status: COMPLETED | OUTPATIENT
Start: 2018-07-05 | End: 2018-07-05

## 2018-07-05 RX ORDER — POTASSIUM CHLORIDE 20 MEQ/15ML
60 SOLUTION ORAL
Status: DISCONTINUED | OUTPATIENT
Start: 2018-07-05 | End: 2018-07-06 | Stop reason: HOSPADM

## 2018-07-05 RX ORDER — LANOLIN ALCOHOL/MO/W.PET/CERES
800 CREAM (GRAM) TOPICAL
Status: DISCONTINUED | OUTPATIENT
Start: 2018-07-05 | End: 2018-07-06 | Stop reason: HOSPADM

## 2018-07-05 RX ORDER — SULFAMETHOXAZOLE AND TRIMETHOPRIM 800; 160 MG/1; MG/1
1 TABLET ORAL 2 TIMES DAILY
Qty: 10 TABLET | Refills: 0 | Status: SHIPPED | OUTPATIENT
Start: 2018-07-06 | End: 2018-07-11

## 2018-07-05 RX ORDER — GABAPENTIN 300 MG/1
300 CAPSULE ORAL 3 TIMES DAILY
Status: DISCONTINUED | OUTPATIENT
Start: 2018-07-05 | End: 2018-07-06 | Stop reason: HOSPADM

## 2018-07-05 RX ORDER — SODIUM,POTASSIUM PHOSPHATES 280-250MG
2 POWDER IN PACKET (EA) ORAL
Status: DISCONTINUED | OUTPATIENT
Start: 2018-07-05 | End: 2018-07-06 | Stop reason: HOSPADM

## 2018-07-05 RX ORDER — ACETAMINOPHEN 325 MG/1
650 TABLET ORAL EVERY 4 HOURS PRN
Status: DISCONTINUED | OUTPATIENT
Start: 2018-07-05 | End: 2018-07-06 | Stop reason: HOSPADM

## 2018-07-05 RX ORDER — NICARDIPINE HYDROCHLORIDE 0.2 MG/ML
INJECTION INTRAVENOUS CONTINUOUS PRN
Status: DISCONTINUED | OUTPATIENT
Start: 2018-07-05 | End: 2018-07-05

## 2018-07-05 RX ORDER — ONDANSETRON 8 MG/1
8 TABLET, ORALLY DISINTEGRATING ORAL EVERY 8 HOURS PRN
Status: DISCONTINUED | OUTPATIENT
Start: 2018-07-05 | End: 2018-07-06 | Stop reason: HOSPADM

## 2018-07-05 RX ORDER — SODIUM CHLORIDE 9 MG/ML
INJECTION, SOLUTION INTRAVENOUS CONTINUOUS PRN
Status: DISCONTINUED | OUTPATIENT
Start: 2018-07-05 | End: 2018-07-05

## 2018-07-05 RX ORDER — CLONIDINE HYDROCHLORIDE 0.1 MG/1
0.3 TABLET ORAL 3 TIMES DAILY
Status: DISCONTINUED | OUTPATIENT
Start: 2018-07-06 | End: 2018-07-06 | Stop reason: HOSPADM

## 2018-07-05 RX ORDER — ASPIRIN 81 MG/1
81 TABLET ORAL DAILY
Status: DISCONTINUED | OUTPATIENT
Start: 2018-07-05 | End: 2018-07-06 | Stop reason: HOSPADM

## 2018-07-05 RX ORDER — HYDROCODONE BITARTRATE AND ACETAMINOPHEN 500; 5 MG/1; MG/1
TABLET ORAL
Status: DISCONTINUED | OUTPATIENT
Start: 2018-07-05 | End: 2018-07-05

## 2018-07-05 RX ORDER — DEXTROSE MONOHYDRATE AND SODIUM CHLORIDE 5; .45 G/100ML; G/100ML
INJECTION, SOLUTION INTRAVENOUS CONTINUOUS
Status: DISCONTINUED | OUTPATIENT
Start: 2018-07-05 | End: 2018-07-05

## 2018-07-05 RX ORDER — SODIUM CHLORIDE 9 MG/ML
INJECTION, SOLUTION INTRAVENOUS CONTINUOUS
Status: ACTIVE | OUTPATIENT
Start: 2018-07-05 | End: 2018-07-05

## 2018-07-05 RX ORDER — CETIRIZINE HYDROCHLORIDE 10 MG/1
10 TABLET ORAL DAILY
Status: DISCONTINUED | OUTPATIENT
Start: 2018-07-05 | End: 2018-07-06 | Stop reason: HOSPADM

## 2018-07-05 RX ORDER — HEPARIN SODIUM 1000 [USP'U]/ML
INJECTION, SOLUTION INTRAVENOUS; SUBCUTANEOUS
Status: DISCONTINUED | OUTPATIENT
Start: 2018-07-05 | End: 2018-07-05

## 2018-07-05 RX ADMIN — GABAPENTIN 300 MG: 300 CAPSULE ORAL at 03:07

## 2018-07-05 RX ADMIN — CARBAMAZEPINE 200 MG: 200 TABLET ORAL at 09:07

## 2018-07-05 RX ADMIN — ROSUVASTATIN CALCIUM 20 MG: 20 TABLET, FILM COATED ORAL at 09:07

## 2018-07-05 RX ADMIN — CLINDAMYCIN PHOSPHATE 900 MG: 18 INJECTION, SOLUTION INTRAVENOUS at 10:07

## 2018-07-05 RX ADMIN — DIPHENHYDRAMINE HYDROCHLORIDE 50 MG: 50 CAPSULE ORAL at 09:07

## 2018-07-05 RX ADMIN — DEXTROSE AND SODIUM CHLORIDE: 5; .45 INJECTION, SOLUTION INTRAVENOUS at 09:07

## 2018-07-05 RX ADMIN — GABAPENTIN 300 MG: 300 CAPSULE ORAL at 09:07

## 2018-07-05 RX ADMIN — NICARDIPINE HYDROCHLORIDE 3 MG/HR: 0.2 INJECTION, SOLUTION INTRAVENOUS at 08:07

## 2018-07-05 RX ADMIN — DEXMEDETOMIDINE HYDROCHLORIDE 1 MCG: 100 INJECTION, SOLUTION, CONCENTRATE INTRAVENOUS at 01:07

## 2018-07-05 RX ADMIN — NICARDIPINE HYDROCHLORIDE 5 MG/HR: 0.2 INJECTION, SOLUTION INTRAVENOUS at 12:07

## 2018-07-05 RX ADMIN — VANCOMYCIN HYDROCHLORIDE 1000 MG: 1 INJECTION, POWDER, LYOPHILIZED, FOR SOLUTION INTRAVENOUS at 01:07

## 2018-07-05 RX ADMIN — PROTAMINE SULFATE 100 MG: 10 INJECTION, SOLUTION INTRAVENOUS at 10:07

## 2018-07-05 RX ADMIN — HEPARIN SODIUM 10000 UNITS: 1000 INJECTION INTRAVENOUS; SUBCUTANEOUS at 10:07

## 2018-07-05 RX ADMIN — HEPARIN SODIUM 2000 UNITS: 1000 INJECTION INTRAVENOUS; SUBCUTANEOUS at 10:07

## 2018-07-05 RX ADMIN — CLINDAMYCIN IN 5 PERCENT DEXTROSE 900 MG: 18 INJECTION, SOLUTION INTRAVENOUS at 05:07

## 2018-07-05 RX ADMIN — PROPOFOL 10 MG: 10 INJECTION, EMULSION INTRAVENOUS at 01:07

## 2018-07-05 RX ADMIN — VANCOMYCIN HYDROCHLORIDE 1000 MG: 1 INJECTION, POWDER, LYOPHILIZED, FOR SOLUTION INTRAVENOUS at 09:07

## 2018-07-05 RX ADMIN — DEXMEDETOMIDINE HYDROCHLORIDE 1 MCG/KG/HR: 100 INJECTION, SOLUTION, CONCENTRATE INTRAVENOUS at 09:07

## 2018-07-05 RX ADMIN — CARBAMAZEPINE 200 MG: 200 TABLET ORAL at 03:07

## 2018-07-05 RX ADMIN — SODIUM CHLORIDE: 0.9 INJECTION, SOLUTION INTRAVENOUS at 09:07

## 2018-07-05 RX ADMIN — NICARDIPINE HYDROCHLORIDE 5 MG/HR: 0.2 INJECTION, SOLUTION INTRAVENOUS at 10:07

## 2018-07-05 NOTE — BRIEF OP NOTE
"Post EP Procedure Note  Referring Physician: Corey Castañeda MD  Procedure: INSERTION, CARDIAC PACEMAKER, DUAL CHAMBER (N/A)         See full report for further details    Intervention:   Dual chamber biotronik pacemaker implanted.    Post Cath Exam:   BP (!) 176/74 (BP Location: Left arm, Patient Position: Lying)   Pulse 69   Temp 98.2 °F (36.8 °C) (Oral)   Resp (!) 27   Ht 5' 4" (1.626 m)   Wt 91.2 kg (201 lb)   SpO2 96%   Breastfeeding? No   BMI 34.50 kg/m²     Recommendations:   - CXR post implant   -Vancomycin 1g Once at 10pm  -Five day course of bactrim DS BID from tomorrow   -Tylenol for pain   -1 week follow up in Device clinic and 3 months follow up with Dr. Mik Avendano DO  Cardiology Fellow PGY-5     "

## 2018-07-05 NOTE — SUBJECTIVE & OBJECTIVE
Past Medical History:   Diagnosis Date    Anemia     Anticoagulant long-term use     Aortic stenosis     Arthritis     lower back    Asthma     CAD (coronary artery disease) 4/24/2015    Carpal tunnel syndrome on both sides     Cataract     CHF (congestive heart failure) 5/2013    COPD (chronic obstructive pulmonary disease)     Depression     DVT (deep venous thrombosis)     Hyperlipidemia     Hypertension     Pulmonary HTN     TMJ (temporomandibular joint disorder)        Past Surgical History:   Procedure Laterality Date    BACK SURGERY      cardiac stents      CARPAL TUNNEL RELEASE      Right/Left    EYE SURGERY      cataract extraction    HYSTERECTOMY      Partial    JOINT REPLACEMENT  2009    Left hip    POLYPECTOMY      x9    TEMPOROMANDIBULAR JOINT SURGERY         Review of patient's allergies indicates:   Allergen Reactions    Doxycycline hyclate Diarrhea and Nausea And Vomiting    Singulair [montelukast]      Stomach pain, Muscle pain, Cough      Penicillins      Other reaction(s): Anaphylaxis    Ventolin [albuterol sulfate] Other (See Comments)     Severely elevated blood pressure    Latex, natural rubber Rash       No current facility-administered medications on file prior to encounter.      Current Outpatient Prescriptions on File Prior to Encounter   Medication Sig    acetaminophen (TYLENOL) 325 MG tablet Take 2 tablets (650 mg total) by mouth every 6 (six) hours as needed for Pain or Temperature greater than (100.4).    aspirin (ECOTRIN) 81 MG EC tablet Take 81 mg by mouth once daily.    carBAMazepine (TEGRETOL) 200 mg tablet Take 1 tablet (200 mg total) by mouth 3 (three) times daily.    cloNIDine (CATAPRES) 0.3 MG tablet Take 1 tablet (0.3 mg total) by mouth 3 (three) times daily.    clopidogrel (PLAVIX) 75 mg tablet Take 1 tablet (75 mg total) by mouth once daily.    furosemide (LASIX) 40 MG tablet TAKE ONE TABLET BY MOUTH EVERY DAY AS NEEDED FOR SHORTNESS OF  BREATH or leg swelling    gabapentin (NEURONTIN) 300 MG capsule ONE CAPSULE BY MOUTH THREE TIMES DAILY    hydrALAZINE (APRESOLINE) 100 MG tablet ONE TABLET BY MOUTH THREE TIMES DAILY    metoprolol tartrate (LOPRESSOR) 100 MG tablet ONE TABLET BY MOUTH TWICE DAILY    rosuvastatin (CRESTOR) 20 MG tablet ONE TABLET BY MOUTH EVERY EVENING    verapamil (VERELAN) 360 MG C24P ONE CAPSULE BY MOUTH once a day    azelastine (ASTELIN) 137 mcg (0.1 %) nasal spray 1 spray (137 mcg total) by Nasal route 2 (two) times daily.    cetirizine (ZYRTEC) 10 MG tablet Take 1 tablet (10 mg total) by mouth once daily.    clopidogrel (PLAVIX) 75 mg tablet Take 1 tablet (75 mg total) by mouth once daily.    nitroGLYCERIN (NITROSTAT) 0.4 MG SL tablet Place 0.4 mg under the tongue every 5 (five) minutes as needed for Chest pain.    walker (ULTRA-LIGHT ROLLATOR) Misc One rollator, size large.     Family History     Problem Relation (Age of Onset)    Cancer Son    Heart disease Mother    Hypertension Daughter        Social History Main Topics    Smoking status: Never Smoker    Smokeless tobacco: Never Used    Alcohol use No    Drug use: No    Sexual activity: No     Review of Systems   Unable to perform ROS: acuity of condition     Objective:     Vital Signs (Most Recent):  Temp: 98.2 °F (36.8 °C) (07/05/18 0850)  Pulse: 69 (07/05/18 1200)  Resp: (!) 27 (07/05/18 1200)  BP: (!) 176/74 (07/05/18 0851)  SpO2: 96 % (07/05/18 1200) Vital Signs (24h Range):  Temp:  [98.2 °F (36.8 °C)] 98.2 °F (36.8 °C)  Pulse:  [65-69] 69  Resp:  [18-27] 27  SpO2:  [96 %-100 %] 96 %  BP: (170-176)/(74) 176/74     Weight: 91.2 kg (201 lb)  Body mass index is 34.5 kg/m².    SpO2: 96 %  O2 Device (Oxygen Therapy): nasal cannula    Physical Exam   Constitutional: She is oriented to person, place, and time. She appears well-developed and well-nourished. No distress.   HENT:   Head: Normocephalic and atraumatic.   Eyes: EOM are normal. Pupils are equal,  round, and reactive to light.   Neck: Normal range of motion. No JVD present.   Cardiovascular: Normal rate, regular rhythm and intact distal pulses.  Exam reveals no gallop and no friction rub.    Murmur (Grave II/VI systolic) heard.  Pulmonary/Chest: Effort normal and breath sounds normal. No respiratory distress. She has no wheezes. She has no rales.   Abdominal: Soft. Bowel sounds are normal. She exhibits no distension. There is no tenderness.   Musculoskeletal: Normal range of motion. She exhibits edema (trace bilateral lower extremity edema to the knees).   Neurological: She is alert and oriented to person, place, and time.   Skin: Skin is warm. No rash noted. She is not diaphoretic.   Psychiatric: She has a normal mood and affect. Her behavior is normal.       Significant Labs:     Recent Results (from the past 24 hour(s))   Basic metabolic panel    Collection Time: 07/05/18  8:25 AM   Result Value Ref Range    Sodium 143 136 - 145 mmol/L    Potassium 3.6 3.5 - 5.1 mmol/L    Chloride 108 95 - 110 mmol/L    CO2 24 23 - 29 mmol/L    Glucose 108 70 - 110 mg/dL    BUN, Bld 27 (H) 8 - 23 mg/dL    Creatinine 1.1 0.5 - 1.4 mg/dL    Calcium 8.8 8.7 - 10.5 mg/dL    Anion Gap 11 8 - 16 mmol/L    eGFR if African American 54.8 (A) >60 mL/min/1.73 m^2    eGFR if non  47.5 (A) >60 mL/min/1.73 m^2   Type & Screen    Collection Time: 07/05/18  8:26 AM   Result Value Ref Range    Group & Rh A POS     Indirect Surekha NEG    Prepare RBC 2 Units; LOW H&H         Will hang and transfuse during procedure    Collection Time: 07/05/18  8:26 AM   Result Value Ref Range    UNIT NUMBER [id number]     PRODUCT CODE D0228D27     DISPENSE STATUS ISSUED     CODING SYSTEM CTXF099     Unit Blood Type Code 6200     Unit Blood Type A POS     Unit Expiration [id number]     UNIT NUMBER [id number]     PRODUCT CODE P3407D42     DISPENSE STATUS ISSUED     CODING SYSTEM AJPT567     Unit Blood Type Code 6200     Unit Blood Type  A POS     Unit Expiration [id number]          Significant Imaging:     CXR    Narrative     EXAMINATION:  XR CHEST AP PORTABLE    CLINICAL HISTORY:  chest pain;    TECHNIQUE:  Single frontal view of the chest was performed.    COMPARISON:  Chest 01/03/2018    FINDINGS:  There is again nonspecific elevation of the left hemidiaphragm without significant change from the previous study.  Enlargement of the cardiac silhouette remains without significant change.  Mediastinum is unremarkable.  There is no tracheal abnormality.    There is no lobar consolidations or pleural effusion or pneumothorax.    There is DJD of the shoulder joints bilaterally   Impression       1. Enlargement of the cardiac silhouette as above.  2. No acute pulmonary processes.      Electronically signed by: Vazquez Dubois MD  Date: 05/17/2018  Time: 13:07

## 2018-07-05 NOTE — HPI
Patient is an 82 year old female with PMH sx for:              CAD - stent LAD 4/2015 and ostial/prox RCA BMS 6/1/2018              Pulmonary HTN - sPAP 61 mmHG              Diastolic CHF              AS - severe              HTN              COPD              Hx DVT              CKD 3 1.1 - 1.8  Who was referred by Dr. Carmen for evaluation of severe AS and is being planned for RTF 29 nnm Evolut.  As per patient she has had dyspnea on exertion x 2 years with significant worsening over the past 6 months.  She cannot climb up a flight of stairs without becoming extremely dyspneic.  On thurs she had an episode of chest heaviness after lifting clothes and doing laundry.  In ER she still had chest pain, and her BP was 250 systolic, pain resolved with decrease in her BP.  Troponins were negative.  She does occasionally get angina with exertion.  Resolves with rest.  LHC significant for ostial RCA lesion.            Cindy Lyman is a 82 y.o. female referred by Dr Carmen for evaluation of severe AS (NYHA Class III sx).     The patient has undergone the following TAVR work-up:   · ECHO (Date 5/8/2018): CLEMENT= 0.82 cm2, MG= 44mmHg, Peak Duncan= 4 m/s, EF= 60-65%.   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 High risk for CHB so evaluated by Dr. Diego on 5/29/18  · PFTs: FEV1 66% predicted, DLCO  96% predicted.  · Comorbidities: COPD, diastolic HF        Cindy Chefransisco is a 29 mm Evolut valve candidate via  high Right TF access

## 2018-07-05 NOTE — H&P (VIEW-ONLY)
Subjective:    Patient ID:  Cindy Lyman is a 82 y.o. female who presents for follow-up of Severe Aortic Stenosis.     HPI       Patient is an 82 year old female with PMH sx for:              CAD - stent LAD 4/2015 and ostial/prox RCA BMS 6/1/2018              Pulmonary HTN - sPAP 61 mmHG              Diastolic CHF              AS - severe              HTN              COPD              Hx DVT              CKD 3 1.1 - 1.8  Who was referred by Dr. Carmen for evaluation of severe AS and is being planned for RTF 29 nnm Evolut.  As per patient she has had dyspnea on exertion x 2 years with significant worsening over the past 6 months.  She cannot climb up a flight of stairs without becoming extremely dyspneic.  On thurs she had an episode of chest heaviness after lifting clothes and doing laundry.  In ER she still had chest pain, and her BP was 250 systolic, pain resolved with decrease in her BP.  Troponins were negative.  She does occasionally get angina with exertion.  Resolves with rest.  LHC significant for ostial RCA lesion.            Cindy Lyman is a 82 y.o. female referred by Dr Carmen for evaluation of severe AS (NYHA Class III sx).     The patient has undergone the following TAVR work-up:   · ECHO (Date 5/8/2018): CLEMENT= 0.82 cm2, MG= 44mmHg, Peak Duncan= 4 m/s, EF= 60-65%.   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 High risk for CHB so evaluated by Dr. Diego on 5/29/18  · PFTs: FEV1 66% predicted, DLCO 96% predicted.  · Comorbidities: COPD, diastolic HF        Cindy Lyman is a 29 mm Evolut valve candidate via  high Right TF access     Review of Systems   Constitution: Negative for chills, decreased appetite, fever, weakness, night sweats, weight gain and weight loss.   HENT: Negative for congestion, hoarse voice, stridor and tinnitus.    Eyes: Negative for blurred vision, pain and visual disturbance.   Cardiovascular: Positive for dyspnea on exertion and orthopnea. Negative for chest pain, claudication, irregular heartbeat, leg swelling, near-syncope, palpitations, paroxysmal nocturnal dyspnea and syncope.   Respiratory: Negative for cough, hemoptysis, shortness of breath, snoring and wheezing.    Endocrine: Negative for cold intolerance, heat intolerance and polyuria.   Hematologic/Lymphatic: Negative for bleeding problem. Does not bruise/bleed easily.   Skin: Negative for color change and rash.   Musculoskeletal: Negative for arthritis, back pain, joint pain, muscle cramps, myalgias and stiffness.   Gastrointestinal: Negative for abdominal pain, anorexia, constipation, diarrhea, dysphagia, heartburn, hemorrhoids, melena, nausea and vomiting.   Genitourinary: Negative for frequency, hematuria, hesitancy, nocturia and urgency.   Neurological: Negative for difficulty with concentration, dizziness, focal weakness, headaches, light-headedness, numbness, seizures, tremors and vertigo.   Psychiatric/Behavioral: Negative for altered mental status and depression. The patient does not have insomnia.    Allergic/Immunologic: Negative for hives.      Objective:    Physical Exam   Constitutional: She appears well-developed and well-nourished.   HENT:   Head: Normocephalic and atraumatic.   Right Ear: External ear normal.   Left Ear: External ear normal.   Eyes: Conjunctivae  "and EOM are normal. Pupils are equal, round, and reactive to light.   Neck: Normal range of motion. Neck supple. No JVD present. No thyromegaly present.   Cardiovascular: Normal rate, regular rhythm, S1 normal, S2 normal and intact distal pulses.  Exam reveals no gallop, no S3 and no friction rub.    Murmur heard.  Pulses:       Radial pulses are 2+ on the right side, and 2+ on the left side.        Femoral pulses are 2+ on the right side, and 2+ on the left side.       Dorsalis pedis pulses are 1+ on the right side, and 1+ on the left side.        Posterior tibial pulses are 1+ on the right side, and 1+ on the left side.   BAKARI Early Peaking   Pulmonary/Chest: Effort normal. No stridor. She has no wheezes. She has no rales. She exhibits no tenderness.   Abdominal: Soft. Bowel sounds are normal. She exhibits no distension. There is no tenderness. There is no rebound and no guarding.   Musculoskeletal: Normal range of motion. She exhibits no edema or tenderness.   Psychiatric: She has a normal mood and affect.      BP (!) 206/92 (BP Location: Left arm, Patient Position: Sitting, BP Method: Large (Automatic))   Pulse 71   Ht 5' 4" (1.626 m)   Wt 98.5 kg (217 lb 2.5 oz)   SpO2 98%   BMI 37.27 kg/m²      Assessment/Plan:      Nodular calcific aortic valve stenosis  The patient has undergone the following TAVR work-up:   · ECHO (Date 5/8/2018): CLEMENT= 0.82 cm2, MG= 44mmHg, Peak Duncan= 4 m/s, EF= 60-65%.   · LHC (Date 6/1/2018): non-obstructive CAD, s/p BMS of ostial RCA and prox RCA  · STS: 4.4%   · Frailty: 2/4   · Iliacs are >6.8  on R and >5.07 on L may have to dilate RCI in two places with 7x4 balloon   · LVOT area by CTA is 3.95 cm2 ( 25.1mm X  21.9 mm) and Avg Diameter is 22.4 per Dr Castañeda  · Incidental findings on CT: Stable left upper lobe calcified granuloma, Left hepatic lobe hypodensity, which is too small to characterize, Stable  subcentimeter pancreatic head cyst, better evaluated on CT abdomen pelvis " 01/30/2018, 1.3 cm right kidney class 2 Bosniak cyst  · CT Surgery risk assessment:  Dr Bermudez recommended LIANET due to co morbidities and frailty.   · Rhythm issues: NSR, RBBB, LAFB. High risk for CHB so evaluated by Dr. Diego on 5/29/18  · PFTs: FEV1 66% predicted, DLCO 96% predicted.  · Comorbidities: COPD, diastolic HF, CKD         Cindy Lyman is a 29 mm Evolut valve candidate via  high Right TF access, Will need RCIA dilation at two places with 7 x 40 balloon.      Coronary artery disease involving native coronary artery without angina pectoris  S/p ostial RCA PCI on 6/1/18. Continue medical management      Hyperlipidemia  Continue with statin therapy        iHra Black MD [facility]  Interventional Cardiology  Structural/Valvular heart disease  [phone]

## 2018-07-05 NOTE — HPI
Patie82 year old female with severe AS s/p RTF 29 mm Evolut. Baseline ekg RBBB with LAFB.  Post op EKG: Ventricular paced rhythm for complete heart block  TVP in place      CAD - stent LAD 4/2015 and ostial/prox RCA BMS 6/1/2018  ECHO (Date 5/8/2018): CLEMENT= 0.82 cm2, MG= 44mmHg, Peak Duncan= 4 m/s, EF= 60-65%. LHC (Date 6/1/2018): non-obstructive CAD, s/p BMS of ostial RCA and prox RCA

## 2018-07-05 NOTE — ASSESSMENT & PLAN NOTE
Transfused 2 un prbc prior to TAVR due to Hgb 8.3 upon admission   Post transfusion H&H 8.6/27.6, repeat pending this am

## 2018-07-05 NOTE — ANESTHESIA PROCEDURE NOTES
Arterial    Diagnosis: AS    Patient location during procedure: done in OR  Procedure start time: 7/5/2018 9:26 AM  Timeout: 7/5/2018 9:25 AM  Procedure end time: 7/5/2018 9:40 AM  Staffing  Anesthesiologist: ARISTEO LOVELL  Resident/CRNA: CLEMENTE WALKER  Performed: resident/CRNA   Anesthesiologist was present at the time of the procedure.  Preanesthetic Checklist  Completed: patient identified, site marked, surgical consent, pre-op evaluation, timeout performed, IV checked, risks and benefits discussed, monitors and equipment checked and anesthesia consent givenArterial  Skin Prep: chlorhexidine gluconate  Local Infiltration: lidocaine  Orientation: right  Location: radial  Catheter Size: 20 G  Catheter placement by Ultrasound guidance. Heme positive aspiration all ports.  Vessel Caliber: small, patent, compressibility normal  Vascular Doppler:  not done  Needle advanced into vessel with real time Ultrasound guidance.  Guidewire confirmed in vessel.  Sterile sheath used.Insertion Attempts: 3  Assessment  Dressing: secured with tape and tegaderm  Patient: Tolerated well

## 2018-07-05 NOTE — PROGRESS NOTES
Called for CHB post TAVR. Will plan on DC PM. Consent previously signed by the patient in clinic. Full consult note follow.

## 2018-07-05 NOTE — ASSESSMENT & PLAN NOTE
Successful RTF 29 Evolut with RCI PTA to enable delivery of the valve delivery system.    Severe, unexplained anemia, worse today.  Tranfused for Hbg 8 pre TAVR.    On DAPT with ASA/Plavix

## 2018-07-05 NOTE — ANESTHESIA PROCEDURE NOTES
[place]    Diagnosis: AS  Patient location during procedure: done in OR  Procedure start time: 7/5/2018 9:41 AM  Timeout: 7/5/2018 9:40 AM  Procedure end time: 7/5/2018 9:50 AM  Staffing  Anesthesiologist: ARISTEO LOVELL  Resident/CRNA: CLEMENTE WALKER  Performed: resident/CRNA   Anesthesiologist was present at the time of the procedure.  Preanesthetic Checklist  Completed: patient identified, site marked, surgical consent, pre-op evaluation, timeout performed, IV checked, risks and benefits discussed, monitors and equipment checked and anesthesia consent given  [place]  Skin Prep: chlorhexidine gluconate  Local Infiltration: lidocaine  Location: right,  internal jugular vein  Vessel Caliber: large, patent, compressibility normal  Vascular Doppler:  not done  Introducer: 7 Fr, manometry not used.  Device: transvenous pacing catheter, fluoroscopy used.   Catheter Size: 7 Fr  Catheter placement by yes. Heme positive aspiration all ports. PAC floated with balloon up not wedgedSterile sheath usedInsertion Attempts: 1  Indication: intravenous therapy, transvenous pacing, hemodynamic monitoring  Ultrasound Guidance  Needle advanced into vessel with real time Ultrasound guidance.  Guidewire confirmed in vessel.  Sterile sheath used.  Assessment  Central Line Bundle Protocol followed. Hand hygiene before procedure, surgical cap worn, surgical mask worn, sterile surgical gloves worn, large sterile drape used.  Verification: chest x-ray, ultrasound and blood return  Dressing: secured with tape and tegaderm and sutured in place and taped  Patient: Tolerated Well

## 2018-07-05 NOTE — INTERVAL H&P NOTE
The patient has been examined and the H&P has been reviewed:    I concur with the findings and no changes have occurred since H&P was written.     Cindy Lyman is a 29 mm Evolut valve candidate via high Right TF access, Will need RCIA dilation at two places with 7 x 40 balloon.   ASA and Plavix already administered today    Anesthesia/Surgery risks, benefits and alternative options discussed and understood by patient/family.          Active Hospital Problems    Diagnosis  POA    Nodular calcific aortic valve stenosis [I35.0]  Yes      Resolved Hospital Problems    Diagnosis Date Resolved POA   No resolved problems to display.

## 2018-07-05 NOTE — ASSESSMENT & PLAN NOTE
S/p TAVR now with complete heart block  This was a complication that was anticipated prior to surgery, risks and benefits of permanent pacemaker implantation had been discussed with the patient and informed consent obtained and in the chart  Pre-op EKG showing RBBB and LAFB  Patient noted to have CHB prior to transfer to ICU, TVP placed, functioning well  Post-op EKG showing ventricular paced rhythm  Proceed with permanent pacemaker implantation  Monitor overnight in ICU

## 2018-07-05 NOTE — PLAN OF CARE
Problem: Patient Care Overview  Goal: Plan of Care Review  Outcome: Ongoing (interventions implemented as appropriate)  Patient back from pacemaker put in left upper chest wall. Has sling and swath.

## 2018-07-05 NOTE — TRANSFER OF CARE
"Anesthesia Transfer of Care Note    Patient: Cindy Lyman    Procedure(s) Performed: Procedure(s) (LRB):  INSERTION, CARDIAC PACEMAKER, DUAL CHAMBER (N/A)    Patient location: ICU    Anesthesia Type: general    Transport from OR: Transported from OR on room air with adequate spontaneous ventilation    Post pain: adequate analgesia    Post assessment: no apparent anesthetic complications    Post vital signs: stable    Level of consciousness: awake, alert and oriented    Nausea/Vomiting: no nausea/vomiting    Complications: none    Transfer of care protocol was followed      Last vitals:   Visit Vitals  BP (!) 176/74 (BP Location: Left arm, Patient Position: Lying)   Pulse 69   Temp 36.8 °C (98.2 °F) (Oral)   Resp (!) 27   Ht 5' 4" (1.626 m)   Wt 91.2 kg (201 lb)   SpO2 96%   Breastfeeding? No   BMI 34.50 kg/m²     "

## 2018-07-05 NOTE — ANESTHESIA PREPROCEDURE EVALUATION
07/05/2018  Cindy Lyman is a 82 y.o., female s/p LIANET, now in complete heart block.  Has hx of CHF, CAD (on plavix), recent PCI, HTN, asthma, pulmonary HTN.  EF 60-65%, PA pressures 60mmHg.    Past Medical History:   Diagnosis Date    Anemia     Anticoagulant long-term use     Aortic stenosis     Arthritis     lower back    Asthma     CAD (coronary artery disease) 4/24/2015    Carpal tunnel syndrome on both sides     Cataract     CHF (congestive heart failure) 5/2013    COPD (chronic obstructive pulmonary disease)     Depression     DVT (deep venous thrombosis)     Hyperlipidemia     Hypertension     Pulmonary HTN     TMJ (temporomandibular joint disorder)      Lab Results   Component Value Date    WBC 4.94 07/02/2018    HGB 8.3 (L) 07/02/2018    HCT 26.6 (L) 07/02/2018    MCV 93 07/02/2018     07/02/2018       Chemistry        Component Value Date/Time     07/05/2018 0825    K 3.6 07/05/2018 0825     07/05/2018 0825    CO2 24 07/05/2018 0825    BUN 27 (H) 07/05/2018 0825    CREATININE 1.1 07/05/2018 0825     07/05/2018 0825        Component Value Date/Time    CALCIUM 8.8 07/05/2018 0825    ALKPHOS 72 05/17/2018 1410    AST 13 05/17/2018 1410    ALT 11 05/17/2018 1410    BILITOT 0.3 05/17/2018 1410    ESTGFRAFRICA 54.8 (A) 07/05/2018 0825    EGFRNONAA 47.5 (A) 07/05/2018 0825        Lab Results   Component Value Date    INR 1.0 07/02/2018    INR 1.0 05/17/2018    INR 1.0 01/03/2018         Pre-op Assessment    I have reviewed the Patient Summary Reports.      I have reviewed the Medications.     Review of Systems  Anesthesia Hx:  No problems with previous Anesthesia    Cardiovascular:   Exercise tolerance: poor Hypertension CAD  CABG/stent Dysrhythmias  CHF Recently stented.  Ambulates with walker   Pulmonary:   COPD Shortness of breath Occasional SOB. Uses  inhalers and breathing exercises   Neurological:   Neuromuscular Disease,        Physical Exam  General:  Obesity    Airway/Jaw/Neck:  Airway Findings: Mouth Opening: Normal Tongue: Normal  General Airway Assessment: Adult  Mallampati: II  TM Distance: Normal, at least 6 cm  Jaw/Neck Findings:  Neck ROM: Normal ROM      Dental:  Dental Findings: Edentulous   Chest/Lungs:  Chest/Lungs Findings: Clear to auscultation, Normal Respiratory Rate     Heart/Vascular:  Heart Findings: Rate: Normal  Rhythm: Regular Rhythm  Sounds: Normal  Heart Murmur  Systolic  Systolic Heart Murmur Grade: Grade III        Mental Status:  Mental Status Findings:  Cooperative, Alert and Oriented         Anesthesia Plan  Type of Anesthesia, risks & benefits discussed:  Anesthesia Type:  MAC, general  Patient's Preference:   Intra-op Monitoring Plan: standard ASA monitors, central line and arterial line  Intra-op Monitoring Plan Comments:   Post Op Pain Control Plan: multimodal analgesia  Post Op Pain Control Plan Comments:   Induction:   IV  Beta Blocker:  Patient is on a Beta-Blocker and has received one dose within the past 24 hours (No further documentation required).       Informed Consent: Patient understands risks and agrees with Anesthesia plan.  Questions answered. Anesthesia consent signed with patient.  ASA Score: 4     Day of Surgery Review of History & Physical:    H&P update referred to the provider.     Anesthesia Plan Notes: Due to earlier sedation for LIANET procedure, will proceed with dual physician emergency consent.  Patient has no underlying rhythm and therefore we must proceed to ensure her safety and not rely on a transvenous pacemaker.        Ready For Surgery From Anesthesia Perspective.

## 2018-07-05 NOTE — HOSPITAL COURSE
Ms. Lyman was admitted and underwent successful placement of a 29 mm Evolut TAVR via RTF access with RCI PTA to enable delivery of the valve delivery system. Prior to TAVR she was transfued 2 un prbc due to chronic anemia with Hgb of 8.3 prior to admission. Post TAVR she developed CHB and EP was consulted with TVP in place. She was taken to the CCU in stable condition. She underwent EPS with PPM placement with her TVP removed without complication. She remained stable overnight. The following morning, she required diuresis for acute on chronic diastolic heart failure. Her repeat H&H was 9.4/28.7. That afternoon, she ambulated in the [facility] without difficulty. She was eager to go home. It was felt she was stable for discharge. She will be discharged on DAPT with ASA/Plavix along with post PPM Abx for infection prophylaxis per Dr. Houser.

## 2018-07-05 NOTE — TRANSFER OF CARE
"Anesthesia Transfer of Care Note    Patient: Cindy Lyman    Procedure(s) Performed: Procedure(s) (LRB):  Replacement-valve-aortic (N/A)    Patient location: PACU    Anesthesia Type: general    Transport from OR: Transported from OR on 100% O2 by closed face mask with adequate spontaneous ventilation. Continuous ECG monitoring in transport. Continuous SpO2 monitoring in transport. Continuos invasive BP monitoring in transport    Post pain: adequate analgesia    Post assessment: no apparent anesthetic complications    Post vital signs: stable    Level of consciousness: awake, alert and oriented    Nausea/Vomiting: no nausea/vomiting    Complications: none          Last vitals:   Visit Vitals  BP (!) 176/74 (BP Location: Left arm, Patient Position: Lying)   Pulse 65   Temp 36.8 °C (98.2 °F) (Oral)   Resp 18   Ht 5' 4" (1.626 m)   Wt 91.2 kg (201 lb)   SpO2 98%   Breastfeeding? No   BMI 34.50 kg/m²     "

## 2018-07-05 NOTE — ANESTHESIA PREPROCEDURE EVALUATION
07/05/2018  Cindy Lyman is a 82 y.o., female with aortic stenosis here for LIANET.  Has hx of CHF, CAD (on plavix), recent PCI, HTN, asthma, pulmonary HTN.  EF 60-65%, PA pressures 60mmHg.    Past Medical History:   Diagnosis Date    Anemia     Anticoagulant long-term use     Aortic stenosis     Arthritis     lower back    Asthma     CAD (coronary artery disease) 4/24/2015    Carpal tunnel syndrome on both sides     Cataract     CHF (congestive heart failure) 5/2013    COPD (chronic obstructive pulmonary disease)     Depression     DVT (deep venous thrombosis)     Hyperlipidemia     Hypertension     Pulmonary HTN     TMJ (temporomandibular joint disorder)      Lab Results   Component Value Date    WBC 4.94 07/02/2018    HGB 8.3 (L) 07/02/2018    HCT 26.6 (L) 07/02/2018    MCV 93 07/02/2018     07/02/2018       Chemistry        Component Value Date/Time     07/02/2018 1234    K 4.0 07/02/2018 1234     07/02/2018 1234    CO2 27 07/02/2018 1234    BUN 19 07/02/2018 1234    CREATININE 1.1 07/02/2018 1234     07/02/2018 1234        Component Value Date/Time    CALCIUM 9.0 07/02/2018 1234    ALKPHOS 72 05/17/2018 1410    AST 13 05/17/2018 1410    ALT 11 05/17/2018 1410    BILITOT 0.3 05/17/2018 1410    ESTGFRAFRICA 54.8 (A) 07/02/2018 1234    EGFRNONAA 47.5 (A) 07/02/2018 1234        Lab Results   Component Value Date    INR 1.0 07/02/2018    INR 1.0 05/17/2018    INR 1.0 01/03/2018         Anesthesia Evaluation    I have reviewed the Patient Summary Reports.     I have reviewed the Medications.     Review of Systems  Anesthesia Hx:  No problems with previous Anesthesia    Cardiovascular:   Exercise tolerance: poor Hypertension CAD  CABG/stent Dysrhythmias  CHF Recently stented.  Ambulates with walker   Pulmonary:   COPD Shortness of breath Occasional SOB. Uses  inhalers and breathing exercises   Neurological:   Neuromuscular Disease,        Physical Exam  General:  Obesity    Airway/Jaw/Neck:  Airway Findings: Mouth Opening: Normal Tongue: Normal  General Airway Assessment: Adult  Mallampati: II  TM Distance: Normal, at least 6 cm  Jaw/Neck Findings:  Neck ROM: Normal ROM      Dental:  Dental Findings: Edentulous   Chest/Lungs:  Chest/Lungs Findings: Clear to auscultation, Normal Respiratory Rate     Heart/Vascular:  Heart Findings: Rate: Normal  Rhythm: Regular Rhythm  Sounds: Normal  Heart Murmur  Systolic  Systolic Heart Murmur Grade: Grade III        Mental Status:  Mental Status Findings:  Cooperative, Alert and Oriented         Anesthesia Plan  Type of Anesthesia, risks & benefits discussed:  Anesthesia Type:  MAC, general  Patient's Preference:   Intra-op Monitoring Plan: standard ASA monitors, central line and arterial line  Intra-op Monitoring Plan Comments:   Post Op Pain Control Plan: multimodal analgesia  Post Op Pain Control Plan Comments:   Induction:   IV  Beta Blocker:  Patient is on a Beta-Blocker and has received one dose within the past 24 hours (No further documentation required).       Informed Consent: Patient understands risks and agrees with Anesthesia plan.  Questions answered. Anesthesia consent signed with patient.  ASA Score: 4     Day of Surgery Review of History & Physical:    H&P update referred to the provider.         Ready For Surgery From Anesthesia Perspective.

## 2018-07-05 NOTE — CONSULTS
[facility]  Cardiac Electrophysiology  Consult Note    Admission Date: 7/5/2018  Code Status: Full Code   Attending Provider: Corey Castañeda MD  Consulting Provider: Shruthi Bernstein MD  Principal Problem:Nodular calcific aortic valve stenosis    Inpatient consult to Electrophysiology  Consult performed by: SHRUTHI BERNSTEIN  Consult ordered by: RENAE TERRY        Subjective:     Chief Complaint: s/p TAVR    HPI:   Patie82 year old female with severe AS s/p RTF 29 mm Evolut. Baseline ekg RBBB with LAFB.  Post op EKG: Ventricular paced rhythm for complete heart block  TVP in place      CAD - stent LAD 4/2015 and ostial/prox RCA BMS 6/1/2018  ECHO (Date 5/8/2018): CLEMENT= 0.82 cm2, MG= 44mmHg, Peak Duncan= 4 m/s, EF= 60-65%. LHC (Date 6/1/2018): non-obstructive CAD, s/p BMS of ostial RCA and prox RCA    Past Medical History:   Diagnosis Date    Anemia     Anticoagulant long-term use     Aortic stenosis     Arthritis     lower back    Asthma     CAD (coronary artery disease) 4/24/2015    Carpal tunnel syndrome on both sides     Cataract     CHF (congestive heart failure) 5/2013    COPD (chronic obstructive pulmonary disease)     Depression     DVT (deep venous thrombosis)     Hyperlipidemia     Hypertension     Pulmonary HTN     TMJ (temporomandibular joint disorder)        Past Surgical History:   Procedure Laterality Date    BACK SURGERY      cardiac stents      CARPAL TUNNEL RELEASE      Right/Left    EYE SURGERY      cataract extraction    HYSTERECTOMY      Partial    JOINT REPLACEMENT  2009    Left hip    POLYPECTOMY      x9    TEMPOROMANDIBULAR JOINT SURGERY         Review of patient's allergies indicates:   Allergen Reactions    Doxycycline hyclate Diarrhea and Nausea And Vomiting    Singulair [montelukast]      Stomach pain, Muscle pain, Cough      Penicillins      Other reaction(s): Anaphylaxis    Ventolin [albuterol sulfate] Other (See Comments)     Severely  elevated blood pressure    Latex, natural rubber Rash       No current facility-administered medications on file prior to encounter.      Current Outpatient Prescriptions on File Prior to Encounter   Medication Sig    acetaminophen (TYLENOL) 325 MG tablet Take 2 tablets (650 mg total) by mouth every 6 (six) hours as needed for Pain or Temperature greater than (100.4).    aspirin (ECOTRIN) 81 MG EC tablet Take 81 mg by mouth once daily.    carBAMazepine (TEGRETOL) 200 mg tablet Take 1 tablet (200 mg total) by mouth 3 (three) times daily.    cloNIDine (CATAPRES) 0.3 MG tablet Take 1 tablet (0.3 mg total) by mouth 3 (three) times daily.    clopidogrel (PLAVIX) 75 mg tablet Take 1 tablet (75 mg total) by mouth once daily.    furosemide (LASIX) 40 MG tablet TAKE ONE TABLET BY MOUTH EVERY DAY AS NEEDED FOR SHORTNESS OF BREATH or leg swelling    gabapentin (NEURONTIN) 300 MG capsule ONE CAPSULE BY MOUTH THREE TIMES DAILY    hydrALAZINE (APRESOLINE) 100 MG tablet ONE TABLET BY MOUTH THREE TIMES DAILY    metoprolol tartrate (LOPRESSOR) 100 MG tablet ONE TABLET BY MOUTH TWICE DAILY    rosuvastatin (CRESTOR) 20 MG tablet ONE TABLET BY MOUTH EVERY EVENING    verapamil (VERELAN) 360 MG C24P ONE CAPSULE BY MOUTH once a day    azelastine (ASTELIN) 137 mcg (0.1 %) nasal spray 1 spray (137 mcg total) by Nasal route 2 (two) times daily.    cetirizine (ZYRTEC) 10 MG tablet Take 1 tablet (10 mg total) by mouth once daily.    clopidogrel (PLAVIX) 75 mg tablet Take 1 tablet (75 mg total) by mouth once daily.    nitroGLYCERIN (NITROSTAT) 0.4 MG SL tablet Place 0.4 mg under the tongue every 5 (five) minutes as needed for Chest pain.    walker (ULTRA-LIGHT ROLLATOR) Misc One rollator, size large.     Family History     Problem Relation (Age of Onset)    Cancer Son    Heart disease Mother    Hypertension Daughter        Social History Main Topics    Smoking status: Never Smoker    Smokeless tobacco: Never Used    Alcohol  use No    Drug use: No    Sexual activity: No     Review of Systems   Unable to perform ROS: acuity of condition     Objective:     Vital Signs (Most Recent):  Temp: 98.2 °F (36.8 °C) (07/05/18 0850)  Pulse: 69 (07/05/18 1200)  Resp: (!) 27 (07/05/18 1200)  BP: (!) 176/74 (07/05/18 0851)  SpO2: 96 % (07/05/18 1200) Vital Signs (24h Range):  Temp:  [98.2 °F (36.8 °C)] 98.2 °F (36.8 °C)  Pulse:  [65-69] 69  Resp:  [18-27] 27  SpO2:  [96 %-100 %] 96 %  BP: (170-176)/(74) 176/74     Weight: 91.2 kg (201 lb)  Body mass index is 34.5 kg/m².    SpO2: 96 %  O2 Device (Oxygen Therapy): nasal cannula    Physical Exam   Constitutional: She is oriented to person, place, and time. She appears well-developed and well-nourished. No distress.   HENT:   Head: Normocephalic and atraumatic.   Eyes: EOM are normal. Pupils are equal, round, and reactive to light.   Neck: Normal range of motion. No JVD present.   Cardiovascular: Normal rate, regular rhythm and intact distal pulses.  Exam reveals no gallop and no friction rub.    Murmur (Grave II/VI systolic) heard.  Pulmonary/Chest: Effort normal and breath sounds normal. No respiratory distress. She has no wheezes. She has no rales.   Abdominal: Soft. Bowel sounds are normal. She exhibits no distension. There is no tenderness.   Musculoskeletal: Normal range of motion. She exhibits edema (trace bilateral lower extremity edema to the knees).   Neurological: She is alert and oriented to person, place, and time.   Skin: Skin is warm. No rash noted. She is not diaphoretic.   Psychiatric: She has a normal mood and affect. Her behavior is normal.       Significant Labs:     Recent Results (from the past 24 hour(s))   Basic metabolic panel    Collection Time: 07/05/18  8:25 AM   Result Value Ref Range    Sodium 143 136 - 145 mmol/L    Potassium 3.6 3.5 - 5.1 mmol/L    Chloride 108 95 - 110 mmol/L    CO2 24 23 - 29 mmol/L    Glucose 108 70 - 110 mg/dL    BUN, Bld 27 (H) 8 - 23 mg/dL     Creatinine 1.1 0.5 - 1.4 mg/dL    Calcium 8.8 8.7 - 10.5 mg/dL    Anion Gap 11 8 - 16 mmol/L    eGFR if African American 54.8 (A) >60 mL/min/1.73 m^2    eGFR if non  47.5 (A) >60 mL/min/1.73 m^2   Type & Screen    Collection Time: 07/05/18  8:26 AM   Result Value Ref Range    Group & Rh A POS     Indirect Surekha NEG    Prepare RBC 2 Units; LOW H&H         Will hang and transfuse during procedure    Collection Time: 07/05/18  8:26 AM   Result Value Ref Range    UNIT NUMBER [id number]     PRODUCT CODE D8740B95     DISPENSE STATUS ISSUED     CODING SYSTEM FEUL891     Unit Blood Type Code 6200     Unit Blood Type A POS     Unit Expiration [id number]     UNIT NUMBER [id number]     PRODUCT CODE N7661R49     DISPENSE STATUS ISSUED     CODING SYSTEM CBQG921     Unit Blood Type Code 6200     Unit Blood Type A POS     Unit Expiration [id number]          Significant Imaging:     CXR    Narrative     EXAMINATION:  XR CHEST AP PORTABLE    CLINICAL HISTORY:  chest pain;    TECHNIQUE:  Single frontal view of the chest was performed.    COMPARISON:  Chest 01/03/2018    FINDINGS:  There is again nonspecific elevation of the left hemidiaphragm without significant change from the previous study.  Enlargement of the cardiac silhouette remains without significant change.  Mediastinum is unremarkable.  There is no tracheal abnormality.    There is no lobar consolidations or pleural effusion or pneumothorax.    There is DJD of the shoulder joints bilaterally   Impression       1. Enlargement of the cardiac silhouette as above.  2. No acute pulmonary processes.      Electronically signed by: Vazquez Dubois MD  Date: 05/17/2018  Time: 13:07         Assessment and Plan:     S/P TAVR (transcatheter aortic valve replacement)    S/p TAVR now with complete heart block  This was a complication that was anticipated prior to surgery, risks and benefits of permanent pacemaker implantation had been discussed with the patient  and informed consent obtained and in the chart  Pre-op EKG showing RBBB and LAFB  Patient noted to have CHB prior to transfer to ICU, TVP placed, functioning well  Post-op EKG showing ventricular paced rhythm  Proceed with permanent pacemaker implantation  Monitor overnight in ICU            Thank you for your consult. I will follow-up with patient. Please contact us if you have any additional questions.    Natan Gong MD  Cardiac Electrophysiology  [facility]

## 2018-07-05 NOTE — PLAN OF CARE
Hgb today is 8.  Has been 9-10 for the past six months.  Although this is not a big change we will transfuse 2U PRBC's concurrent with her TAVR today because of expected blood loss and probabillty of needed a PPM.

## 2018-07-06 VITALS
TEMPERATURE: 98 F | OXYGEN SATURATION: 98 % | DIASTOLIC BLOOD PRESSURE: 71 MMHG | HEIGHT: 64 IN | SYSTOLIC BLOOD PRESSURE: 167 MMHG | RESPIRATION RATE: 27 BRPM | WEIGHT: 213.38 LBS | HEART RATE: 70 BPM | BODY MASS INDEX: 36.43 KG/M2

## 2018-07-06 DIAGNOSIS — I35.0 NODULAR CALCIFIC AORTIC VALVE STENOSIS: Primary | ICD-10-CM

## 2018-07-06 PROBLEM — I44.2 COMPLETE HEART BLOCK: Status: ACTIVE | Noted: 2018-07-06

## 2018-07-06 LAB
ANION GAP SERPL CALC-SCNC: 10 MMOL/L
BASOPHILS # BLD AUTO: 0.01 K/UL
BASOPHILS # BLD AUTO: 0.03 K/UL
BASOPHILS NFR BLD: 0.2 %
BASOPHILS NFR BLD: 0.4 %
BUN SERPL-MCNC: 17 MG/DL
CALCIUM SERPL-MCNC: 8.1 MG/DL
CHLORIDE SERPL-SCNC: 109 MMOL/L
CO2 SERPL-SCNC: 22 MMOL/L
CREAT SERPL-MCNC: 0.8 MG/DL
DIFFERENTIAL METHOD: ABNORMAL
DIFFERENTIAL METHOD: ABNORMAL
EOSINOPHIL # BLD AUTO: 0.1 K/UL
EOSINOPHIL # BLD AUTO: 0.2 K/UL
EOSINOPHIL NFR BLD: 2.4 %
EOSINOPHIL NFR BLD: 2.5 %
ERYTHROCYTE [DISTWIDTH] IN BLOOD BY AUTOMATED COUNT: 14.8 %
ERYTHROCYTE [DISTWIDTH] IN BLOOD BY AUTOMATED COUNT: 14.8 %
ERYTHROCYTE [DISTWIDTH] IN BLOOD BY AUTOMATED COUNT: 14.9 %
EST. GFR  (AFRICAN AMERICAN): >60 ML/MIN/1.73 M^2
EST. GFR  (NON AFRICAN AMERICAN): >60 ML/MIN/1.73 M^2
GLUCOSE SERPL-MCNC: 107 MG/DL
GLUCOSE SERPL-MCNC: 134 MG/DL (ref 70–110)
HCO3 UR-SCNC: 23.9 MMOL/L (ref 24–28)
HCT VFR BLD AUTO: 27.6 %
HCT VFR BLD AUTO: 28.7 %
HCT VFR BLD AUTO: 28.7 %
HCT VFR BLD CALC: 27 %PCV (ref 36–54)
HGB BLD-MCNC: 8.6 G/DL
HGB BLD-MCNC: 9.4 G/DL
HGB BLD-MCNC: 9.4 G/DL
IMM GRANULOCYTES # BLD AUTO: 0.01 K/UL
IMM GRANULOCYTES # BLD AUTO: 0.03 K/UL
IMM GRANULOCYTES NFR BLD AUTO: 0.2 %
IMM GRANULOCYTES NFR BLD AUTO: 0.4 %
LYMPHOCYTES # BLD AUTO: 0.5 K/UL
LYMPHOCYTES # BLD AUTO: 0.8 K/UL
LYMPHOCYTES NFR BLD: 11.5 %
LYMPHOCYTES NFR BLD: 9.4 %
MAGNESIUM SERPL-MCNC: 1.8 MG/DL
MCH RBC QN AUTO: 29 PG
MCH RBC QN AUTO: 29.3 PG
MCH RBC QN AUTO: 29.3 PG
MCHC RBC AUTO-ENTMCNC: 31.2 G/DL
MCHC RBC AUTO-ENTMCNC: 32.8 G/DL
MCHC RBC AUTO-ENTMCNC: 32.8 G/DL
MCV RBC AUTO: 89 FL
MCV RBC AUTO: 89 FL
MCV RBC AUTO: 93 FL
MONOCYTES # BLD AUTO: 0.5 K/UL
MONOCYTES # BLD AUTO: 0.8 K/UL
MONOCYTES NFR BLD: 11.7 %
MONOCYTES NFR BLD: 8.7 %
NEUTROPHILS # BLD AUTO: 4.5 K/UL
NEUTROPHILS # BLD AUTO: 5.3 K/UL
NEUTROPHILS NFR BLD: 73.5 %
NEUTROPHILS NFR BLD: 79.1 %
NRBC BLD-RTO: 0 /100 WBC
NRBC BLD-RTO: 0 /100 WBC
PCO2 BLDA: 38.6 MMHG (ref 35–45)
PH SMN: 7.4 [PH] (ref 7.35–7.45)
PLATELET # BLD AUTO: 155 K/UL
PLATELET # BLD AUTO: 162 K/UL
PLATELET # BLD AUTO: 162 K/UL
PMV BLD AUTO: 10.1 FL
PMV BLD AUTO: 10.3 FL
PMV BLD AUTO: 10.3 FL
PO2 BLDA: 119 MMHG (ref 80–100)
POC ACTIVATED CLOTTING TIME K: 103 SEC (ref 74–137)
POC ACTIVATED CLOTTING TIME K: 224 SEC (ref 74–137)
POC BE: -1 MMOL/L
POC IONIZED CALCIUM: 1.09 MMOL/L (ref 1.06–1.42)
POC SATURATED O2: 99 % (ref 95–100)
POC TCO2: 25 MMOL/L (ref 23–27)
POTASSIUM BLD-SCNC: 3.3 MMOL/L (ref 3.5–5.1)
POTASSIUM SERPL-SCNC: 3.6 MMOL/L
RBC # BLD AUTO: 2.97 M/UL
RBC # BLD AUTO: 3.21 M/UL
RBC # BLD AUTO: 3.21 M/UL
SAMPLE: ABNORMAL
SAMPLE: ABNORMAL
SAMPLE: NORMAL
SODIUM BLD-SCNC: 142 MMOL/L (ref 136–145)
SODIUM SERPL-SCNC: 141 MMOL/L
WBC # BLD AUTO: 5.73 K/UL
WBC # BLD AUTO: 7.1 K/UL
WBC # BLD AUTO: 7.1 K/UL

## 2018-07-06 PROCEDURE — 63600175 PHARM REV CODE 636 W HCPCS: Performed by: NURSE PRACTITIONER

## 2018-07-06 PROCEDURE — S0077 INJECTION, CLINDAMYCIN PHOSP: HCPCS | Performed by: INTERNAL MEDICINE

## 2018-07-06 PROCEDURE — 83735 ASSAY OF MAGNESIUM: CPT

## 2018-07-06 PROCEDURE — 99024 POSTOP FOLLOW-UP VISIT: CPT | Mod: GC,,, | Performed by: INTERNAL MEDICINE

## 2018-07-06 PROCEDURE — 85025 COMPLETE CBC W/AUTO DIFF WBC: CPT

## 2018-07-06 PROCEDURE — 25000242 PHARM REV CODE 250 ALT 637 W/ HCPCS: Performed by: INTERNAL MEDICINE

## 2018-07-06 PROCEDURE — 99232 SBSQ HOSP IP/OBS MODERATE 35: CPT | Mod: ,,, | Performed by: INTERNAL MEDICINE

## 2018-07-06 PROCEDURE — 25000003 PHARM REV CODE 250: Performed by: STUDENT IN AN ORGANIZED HEALTH CARE EDUCATION/TRAINING PROGRAM

## 2018-07-06 PROCEDURE — 25000003 PHARM REV CODE 250: Performed by: INTERNAL MEDICINE

## 2018-07-06 PROCEDURE — 99239 HOSP IP/OBS DSCHRG MGMT >30: CPT | Mod: ,,, | Performed by: INTERNAL MEDICINE

## 2018-07-06 PROCEDURE — 80048 BASIC METABOLIC PNL TOTAL CA: CPT

## 2018-07-06 RX ORDER — FUROSEMIDE 10 MG/ML
20 INJECTION INTRAMUSCULAR; INTRAVENOUS ONCE
Status: COMPLETED | OUTPATIENT
Start: 2018-07-06 | End: 2018-07-06

## 2018-07-06 RX ORDER — SULFAMETHOXAZOLE AND TRIMETHOPRIM 800; 160 MG/1; MG/1
1 TABLET ORAL 2 TIMES DAILY
Qty: 10 TABLET | Refills: 0 | Status: SHIPPED | OUTPATIENT
Start: 2018-07-06 | End: 2018-07-11

## 2018-07-06 RX ADMIN — ASPIRIN 81 MG: 81 TABLET, COATED ORAL at 08:07

## 2018-07-06 RX ADMIN — POTASSIUM CHLORIDE 40 MEQ: 20 SOLUTION ORAL at 05:07

## 2018-07-06 RX ADMIN — FLUTICASONE PROPIONATE 100 MCG: 50 SPRAY, METERED NASAL at 08:07

## 2018-07-06 RX ADMIN — ACETAMINOPHEN 650 MG: 325 TABLET ORAL at 02:07

## 2018-07-06 RX ADMIN — ACETAMINOPHEN 650 MG: 325 TABLET ORAL at 09:07

## 2018-07-06 RX ADMIN — CLINDAMYCIN IN 5 PERCENT DEXTROSE 900 MG: 18 INJECTION, SOLUTION INTRAVENOUS at 03:07

## 2018-07-06 RX ADMIN — Medication 800 MG: at 05:07

## 2018-07-06 RX ADMIN — FUROSEMIDE 20 MG: 10 INJECTION, SOLUTION INTRAMUSCULAR; INTRAVENOUS at 10:07

## 2018-07-06 RX ADMIN — CLOPIDOGREL 75 MG: 75 TABLET, FILM COATED ORAL at 08:07

## 2018-07-06 RX ADMIN — METOPROLOL TARTRATE 50 MG: 50 TABLET ORAL at 08:07

## 2018-07-06 RX ADMIN — GABAPENTIN 300 MG: 300 CAPSULE ORAL at 08:07

## 2018-07-06 RX ADMIN — ONDANSETRON 8 MG: 8 TABLET, ORALLY DISINTEGRATING ORAL at 08:07

## 2018-07-06 RX ADMIN — CARBAMAZEPINE 200 MG: 200 TABLET ORAL at 08:07

## 2018-07-06 RX ADMIN — CETIRIZINE HYDROCHLORIDE 10 MG: 10 TABLET, FILM COATED ORAL at 08:07

## 2018-07-06 RX ADMIN — Medication 800 MG: at 09:07

## 2018-07-06 RX ADMIN — CLONIDINE HYDROCHLORIDE 0.3 MG: 0.1 TABLET ORAL at 08:07

## 2018-07-06 NOTE — ASSESSMENT & PLAN NOTE
CVP~ 8 during TAVR  Clinically volume overloaded on exam  Diurese with Lasix IV x1 and resume home Lasix PO upon discharge

## 2018-07-06 NOTE — PROGRESS NOTES
[facility]  Interventional Cardiology  Progress Note    Patient Name: Cindy Lyman  MRN: [id number]  Admission Date: 7/5/2018  Hospital Length of Stay: 1 days  Code Status: Full Code   Attending Physician: Hira eLrner MD   Primary Care Physician: Rodger Camarena MD  Principal Problem:Nodular calcific aortic valve stenosis    Subjective:     Interval History: Pt with chronic anemia requiring transfusion of 2 un prbc upon admission prior to TAVR due to Hgb of 8.3 and baseline Hgb ~ 10. She is now s/p 29mm Evolut TAVR via RTF access. CHB post TAVR with EP consulted per protocol and TVP in place. Patient taken for PPM yesterday by Dr. Houser. No acute events reported overnight. LUE in sling. Clinically volume overloaded due to acute on chronic diastolic heart failure, will diurese with Lasix IV x1. Post transfusion H&H 8.6/27.6, repeat CBC pending this am.     Objective:     Vital Signs (Most Recent):  Temp: 99.2 °F (37.3 °C) (07/06/18 0701)  Pulse: 80 (07/06/18 0800)  Resp: (!) 28 (07/06/18 0800)  BP: (!) 176/74 (07/05/18 0851)  SpO2: 96 % (07/06/18 0800) Vital Signs (24h Range):  Temp:  [97.7 °F (36.5 °C)-100.3 °F (37.9 °C)] 99.2 °F (37.3 °C)  Pulse:  [69-83] 80  Resp:  [16-34] 28  SpO2:  [94 %-100 %] 96 %  Arterial Line BP: (139-178)/(40-64) 176/64     Weight: 96.8 kg (213 lb 6.5 oz)  Body mass index is 36.63 kg/m².    SpO2: 96 %  O2 Device (Oxygen Therapy): room air      Intake/Output Summary (Last 24 hours) at 07/06/18 0939  Last data filed at 07/06/18 0937   Gross per 24 hour   Intake          1120.44 ml   Output              775 ml   Net           345.44 ml       Lines/Drains/Airways     Central Venous Catheter Line                 Introducer 07/05/18 1954 right internal jugular less than 1 day          Arterial Line                 Arterial Line 07/05/18 0926 Right Radial 1 day          Peripheral Intravenous Line                 Peripheral IV - Single Lumen 07/05/18 0900 Left  Forearm 1 day                Physical Exam   Constitutional: She is oriented to person, place, and time. She appears well-developed and well-nourished. No distress.   HENT:   Head: Normocephalic and atraumatic.   Mouth/Throat: Oropharynx is clear and moist.   Eyes: Conjunctivae and EOM are normal. Pupils are equal, round, and reactive to light. No scleral icterus.   Neck: Neck supple. No JVD present. No tracheal deviation present.   Cardiovascular: Normal rate and regular rhythm.  Exam reveals no gallop and no friction rub.    Murmur heard.  Pulmonary/Chest: Effort normal. No respiratory distress. She has no wheezes. She has rales. She exhibits no tenderness.   L chest PPM site covered with dressing c/d/i  LUE in sling    Abdominal: Soft. Bowel sounds are normal. She exhibits no distension. There is no hepatosplenomegaly. There is no tenderness.   Musculoskeletal: She exhibits no edema or tenderness.   Neurological: She is alert and oriented to person, place, and time.   Skin: Skin is warm and dry. No rash noted. No erythema.   Psychiatric: She has a normal mood and affect. Her behavior is normal.       Significant Labs:   BMP:   Recent Labs  Lab 07/05/18  0825 07/06/18  0301    107    141   K 3.6 3.6    109   CO2 24 22*   BUN 27* 17   CREATININE 1.1 0.8   CALCIUM 8.8 8.1*   MG  --  1.8   , CMP   Recent Labs  Lab 07/05/18  0825 07/06/18  0301    141   K 3.6 3.6    109   CO2 24 22*    107   BUN 27* 17   CREATININE 1.1 0.8   CALCIUM 8.8 8.1*   ANIONGAP 11 10   ESTGFRAFRICA 54.8* >60.0   EGFRNONAA 47.5* >60.0    and CBC   Recent Labs  Lab 07/05/18  2346   WBC 5.73   HGB 8.6*   HCT 27.6*            Assessment and Plan:     Patient is a 82 y.o. female presenting with:    * Nodular calcific aortic valve stenosis      Successful RTF 29 Evolut with RCI PTA to enable delivery of the valve delivery system.    Severe, unexplained anemia, worse today.  Tranfused for Hbg 8 pre TAVR.     On DAPT with ASA/Plavix         Complete heart block    S/p PPM  Continue Bactrim DS x5 days for infection prophylaxis         Acute on chronic diastolic heart failure    CVP~ 8 during TAVR  Clinically volume overloaded on exam  Diurese with Lasix IV x1 and resume home Lasix PO upon discharge         Coronary artery disease involving native coronary artery without angina pectoris    S/p ostial RCA PCI on 6/1/18  Continue medical management   On ASA/plavix, BB, statin         CAD s/p PCI    S/p ostial RCA PCI on 6/1/18  Continue medical management   On ASA/plavix, BB, statin         Anemia of chronic disease    Transfused 2 un prbc prior to TAVR due to Hgb 8.3 upon admission   Post transfusion H&H 8.6/27.6, repeat pending this am         Nonrheumatic aortic valve stenosis      Successful RTF 29 Evolut with RCI PTA to enable delivery of the valve delivery system.    Severe, unexplained anemia, worse today.  Tranfused for Hbg 8 pre TAVR.    Will be on DAPT with ASA/Plavix post TAVR         Hyperlipidemia    Continue with statin therapy            VTE Risk Mitigation         Ordered     IP VTE LOW RISK PATIENT  Once      07/06/18 0815      PLAN:  -Diuresis with Lasix IV x1 acute on chronic diastolic heart failure  -s/p PPM, Abx ordered per EP recs   -Ambulate with PT   -Consider d/c home this afternoon if stable   -Repeat CBC in process       Lianne Vieyra NP  Interventional Cardiology  [facility]

## 2018-07-06 NOTE — ANESTHESIA POSTPROCEDURE EVALUATION
"Anesthesia Post Evaluation    Patient: Cindy Lyman    Procedure(s) Performed: Procedure(s) (LRB):  Replacement-valve-aortic (N/A)    Final Anesthesia Type: general  Patient location during evaluation: telemetry step down  Patient participation: Yes- Able to Participate  Level of consciousness: awake and alert and oriented  Post-procedure vital signs: reviewed and stable  Pain management: adequate  Airway patency: patent  PONV status at discharge: No PONV  Anesthetic complications: no      Cardiovascular status: blood pressure returned to baseline and hemodynamically stable  Respiratory status: spontaneous ventilation and nasal cannula  Hydration status: euvolemic  Follow-up not needed.        Visit Vitals  BP (!) 176/74 (BP Location: Left arm, Patient Position: Lying)   Pulse 78   Temp 37.3 °C (99.2 °F) (Oral)   Resp (!) 31   Ht 5' 4" (1.626 m)   Wt 96.8 kg (213 lb 6.5 oz)   SpO2 (!) 94%   Breastfeeding? No   BMI 36.63 kg/m²       Pain/Malvin Score: Pain Assessment Performed: Yes (7/6/2018  5:01 AM)  Presence of Pain: denies (7/6/2018  5:01 AM)  Pain Rating Prior to Med Admin: 8 (7/6/2018  2:50 AM)      "

## 2018-07-06 NOTE — SUBJECTIVE & OBJECTIVE
Interval History: The patient reports feeling well today after receiving successful TAVR and PPM implantation yesterday. She reports no concerns or symptoms this morning. Denies chest pain, palpitations, dyspnea or light headedness.    Review of Systems   Constitution: Negative for chills, decreased appetite, diaphoresis, fever, night sweats, weight gain and weight loss.   HENT: Negative for congestion, nosebleeds and tinnitus.    Eyes: Negative for blurred vision and photophobia.   Cardiovascular: Negative for chest pain, claudication, cyanosis, dyspnea on exertion, irregular heartbeat, leg swelling, near-syncope, orthopnea, palpitations, paroxysmal nocturnal dyspnea and syncope.   Respiratory: Negative for cough, hemoptysis, shortness of breath and wheezing.    Skin: Negative for color change and rash.   Musculoskeletal: Negative for back pain and falls.   Gastrointestinal: Negative for abdominal pain, constipation, diarrhea, nausea and vomiting.   Genitourinary: Negative for dysuria and hematuria.   Neurological: Negative for focal weakness, numbness and seizures.   Psychiatric/Behavioral: Negative for altered mental status. The patient is not nervous/anxious.      Objective:     Vital Signs (Most Recent):  Temp: 99.2 °F (37.3 °C) (07/06/18 0701)  Pulse: 69 (07/06/18 1000)  Resp: (!) 24 (07/06/18 1000)  BP: (!) 176/74 (07/05/18 0851)  SpO2: 95 % (07/06/18 1000) Vital Signs (24h Range):  Temp:  [97.7 °F (36.5 °C)-100.3 °F (37.9 °C)] 99.2 °F (37.3 °C)  Pulse:  [69-83] 69  Resp:  [16-34] 24  SpO2:  [93 %-100 %] 95 %  Arterial Line BP: (139-178)/(40-66) 141/61     Weight: 96.8 kg (213 lb 6.5 oz)  Body mass index is 36.63 kg/m².     SpO2: 95 %  O2 Device (Oxygen Therapy): room air    Physical Exam   Constitutional: She is oriented to person, place, and time. She appears well-developed and well-nourished. No distress.   HENT:   Head: Normocephalic and atraumatic.   Eyes: EOM are normal. Pupils are equal, round, and  reactive to light.   Neck: Normal range of motion. No JVD present.   Cardiovascular: Normal rate, regular rhythm and intact distal pulses.  Exam reveals no gallop and no friction rub.    Murmur (Grave II/VI systolic) heard.  Pulmonary/Chest: Effort normal and breath sounds normal. No respiratory distress. She has no wheezes. She has no rales.   Abdominal: Soft. Bowel sounds are normal. She exhibits no distension. There is no tenderness.   Musculoskeletal: Normal range of motion. She exhibits no edema.   Neurological: She is alert and oriented to person, place, and time.   Skin: Skin is warm. No rash noted. She is not diaphoretic.   Psychiatric: She has a normal mood and affect. Her behavior is normal.       Significant Labs:     Recent Results (from the past 24 hour(s))   ISTAT ACT-K    Collection Time: 07/05/18 11:03 AM   Result Value Ref Range    POC ACTIVATED CLOTTING TIME K 103 74 - 137 sec    Sample ARTERIAL    ISTAT PROCEDURE    Collection Time: 07/05/18 11:09 AM   Result Value Ref Range    POC PH 7.400 7.35 - 7.45    POC PCO2 38.6 35 - 45 mmHg    POC PO2 119 (H) 80 - 100 mmHg    POC HCO3 23.9 (L) 24 - 28 mmol/L    POC BE -1 -2 to 2 mmol/L    POC SATURATED O2 99 95 - 100 %    POC Glucose 134 (H) 70 - 110 mg/dL    POC Sodium 142 136 - 145 mmol/L    POC Potassium 3.3 (L) 3.5 - 5.1 mmol/L    POC TCO2 25 23 - 27 mmol/L    POC Ionized Calcium 1.09 1.06 - 1.42 mmol/L    POC Hematocrit 27 (L) 36 - 54 %PCV    Sample ARTERIAL    CBC auto differential    Collection Time: 07/05/18 11:46 PM   Result Value Ref Range    WBC 5.73 3.90 - 12.70 K/uL    RBC 2.97 (L) 4.00 - 5.40 M/uL    Hemoglobin 8.6 (L) 12.0 - 16.0 g/dL    Hematocrit 27.6 (L) 37.0 - 48.5 %    MCV 93 82 - 98 fL    MCH 29.0 27.0 - 31.0 pg    MCHC 31.2 (L) 32.0 - 36.0 g/dL    RDW 14.9 (H) 11.5 - 14.5 %    Platelets 155 150 - 350 K/uL    MPV 10.1 9.2 - 12.9 fL    Immature Granulocytes 0.2 0.0 - 0.5 %    Gran # (ANC) 4.5 1.8 - 7.7 K/uL    Immature Grans (Abs) 0.01  0.00 - 0.04 K/uL    Lymph # 0.5 (L) 1.0 - 4.8 K/uL    Mono # 0.5 0.3 - 1.0 K/uL    Eos # 0.1 0.0 - 0.5 K/uL    Baso # 0.01 0.00 - 0.20 K/uL    nRBC 0 0 /100 WBC    Gran% 79.1 (H) 38.0 - 73.0 %    Lymph% 9.4 (L) 18.0 - 48.0 %    Mono% 8.7 4.0 - 15.0 %    Eosinophil% 2.4 0.0 - 8.0 %    Basophil% 0.2 0.0 - 1.9 %    Differential Method Automated    Basic metabolic panel    Collection Time: 07/06/18  3:01 AM   Result Value Ref Range    Sodium 141 136 - 145 mmol/L    Potassium 3.6 3.5 - 5.1 mmol/L    Chloride 109 95 - 110 mmol/L    CO2 22 (L) 23 - 29 mmol/L    Glucose 107 70 - 110 mg/dL    BUN, Bld 17 8 - 23 mg/dL    Creatinine 0.8 0.5 - 1.4 mg/dL    Calcium 8.1 (L) 8.7 - 10.5 mg/dL    Anion Gap 10 8 - 16 mmol/L    eGFR if African American >60.0 >60 mL/min/1.73 m^2    eGFR if non African American >60.0 >60 mL/min/1.73 m^2   Magnesium    Collection Time: 07/06/18  3:01 AM   Result Value Ref Range    Magnesium 1.8 1.6 - 2.6 mg/dL   CBC Without Differential    Collection Time: 07/06/18  9:25 AM   Result Value Ref Range    WBC 7.10 3.90 - 12.70 K/uL    RBC 3.21 (L) 4.00 - 5.40 M/uL    Hemoglobin 9.4 (L) 12.0 - 16.0 g/dL    Hematocrit 28.7 (L) 37.0 - 48.5 %    MCV 89 82 - 98 fL    MCH 29.3 27.0 - 31.0 pg    MCHC 32.8 32.0 - 36.0 g/dL    RDW 14.8 (H) 11.5 - 14.5 %    Platelets 162 150 - 350 K/uL    MPV 10.3 9.2 - 12.9 fL         Significant Imaging:     CXR    Narrative     EXAMINATION:  XR CHEST AP PORTABLE    CLINICAL HISTORY:  pacemaker implant;    FINDINGS:  There is a pacer.  There is cardiomegaly.  There may be mild CHF.   Impression       See above      Electronically signed by: Aram Carmona MD  Date: 07/05/2018  Time: 16:03

## 2018-07-06 NOTE — DISCHARGE SUMMARY
[facility]  Interventional Cardiology  Discharge Summary      Patient Name: Cindy Lyman  MRN: [id number]  Admission Date: 7/5/2018  Hospital Length of Stay: 1 days  Discharge Date and Time:  07/06/2018 11:03 AM  Attending Physician: Hira Lerner MD  Discharging Provider: Lianne Vieyra NP  Primary Care Physician: Rodger Camarena MD    HPI:   Patient is an 82 year old female with PMH sx for:              CAD - stent LAD 4/2015 and ostial/prox RCA BMS 6/1/2018              Pulmonary HTN - sPAP 61 mmHG              Diastolic CHF              AS - severe              HTN              COPD              Hx DVT              CKD 3 1.1 - 1.8  Who was referred by Dr. Carmen for evaluation of severe AS and is being planned for RTF 29 nnm Evolut.  As per patient she has had dyspnea on exertion x 2 years with significant worsening over the past 6 months.  She cannot climb up a flight of stairs without becoming extremely dyspneic.  On thurs she had an episode of chest heaviness after lifting clothes and doing laundry.  In ER she still had chest pain, and her BP was 250 systolic, pain resolved with decrease in her BP.  Troponins were negative.  She does occasionally get angina with exertion.  Resolves with rest.  LHC significant for ostial RCA lesion.            Cindy Lyman is a 82 y.o. female referred by Dr Carmen for evaluation of severe AS (NYHA Class III sx).     The patient has undergone the following TAVR work-up:   · ECHO (Date 5/8/2018): CLEMENT= 0.82 cm2, MG= 44mmHg, Peak Duncan= 4 m/s, EF= 60-65%.   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 High risk for CHB so evaluated by Dr. Diego on 5/29/18  · PFTs: FEV1 66% predicted, DLCO 96% predicted.  · Comorbidities: COPD, diastolic HF        Cindy Lyman is a 29 mm Evolut valve candidate via  high Right TF access       Procedure(s) (LRB):  INSERTION, CARDIAC PACEMAKER, DUAL CHAMBER (N/A)     Indwelling Lines/Drains at time of discharge:  Lines/Drains/Airways     Central Venous Catheter Line                 Introducer 07/05/18 1954 right internal jugular less than 1 day                Hospital Course:  Ms. Lyman was admitted and underwent successful placement of a 29 mm Evolut TAVR via RTF access with RCI PTA to enable delivery of the valve delivery system. Prior to TAVR she was transfued 2 un prbc due to chronic anemia with Hgb of 8.3 prior to admission. Post TAVR she developed CHB and EP was consulted with TVP in place. She was taken to the CCU in stable condition. She underwent EPS with PPM placement with her TVP removed without complication. She remained stable overnight. The following morning, she required diuresis for acute on chronic diastolic heart failure. Her repeat H&H was 9.4/28.7. That afternoon, she ambulated in the [facility] without difficulty. She was eager to go home. It was felt she was stable for discharge. She will be discharged on DAPT with ASA/Plavix along with post PPM Abx for infection prophylaxis per Dr. Houser.       Consults         Status Ordering Provider     Inpatient consult to Electrophysiology  Once     Provider:  (Not yet assigned)    Completed RENAE TERRY          Significant Diagnostic Studies: Labs:   BMP:   Recent Labs  Lab 07/05/18  0825 07/06/18  0301    107     141   K 3.6 3.6    109   CO2 24 22*   BUN 27* 17   CREATININE 1.1 0.8   CALCIUM 8.8 8.1*   MG  --  1.8   , CMP   Recent Labs  Lab 07/05/18  0825 07/06/18  0301    141   K 3.6 3.6    109   CO2 24 22*    107   BUN 27* 17   CREATININE 1.1 0.8   CALCIUM 8.8 8.1*   ANIONGAP 11 10   ESTGFRAFRICA 54.8* >60.0   EGFRNONAA 47.5* >60.0    and CBC   Recent Labs  Lab 07/05/18  2346 07/06/18  0925   WBC 5.73 7.10   HGB 8.6* 9.4*   HCT 27.6* 28.7*    162       Pending Diagnostic Studies:     Procedure Component Value Units Date/Time    EKG 12-LEAD on arrival to floor [[id number]]     Order Status:  Sent Lab Status:  No result     EKG 12-lead [[id number]]     Order Status:  Sent Lab Status:  No result     EKG 12-lead [[id number]]     Order Status:  Sent Lab Status:  No result         * Nodular calcific aortic valve stenosis      Successful RTF 29 Evolut with RCI PTA to enable delivery of the valve delivery system.    Severe, unexplained anemia, worse today.  Tranfused for Hbg 8 pre TAVR.    On DAPT with ASA/Plavix         Complete heart block    S/p PPM  Continue Bactrim DS x5 days for infection prophylaxis   F/u with Dr. Houser as scheduled         Acute on chronic diastolic heart failure    CVP~ 8 during TAVR  Clinically volume overloaded on exam  Diurese with Lasix IV x1 and resume home Lasix PO upon discharge         Coronary artery disease involving native coronary artery without angina pectoris    S/p ostial RCA PCI on 6/1/18  Continue medical management   On ASA/plavix, BB, statin         CAD s/p PCI    S/p ostial RCA PCI on 6/1/18  Continue medical management   On ASA/plavix, BB, statin         Anemia of chronic disease    Transfused 2 un prbc prior to TAVR due to Hgb 8.3 upon admission   Post transfusion H&H 9.4/28.7        Nonrheumatic aortic valve stenosis      Successful RTF 29 Evolut with RCI PTA to enable delivery of the valve delivery system.    Severe, unexplained anemia, worse  today.  Tranfused for Hbg 8 pre TAVR.    Will be on DAPT with ASA/Plavix post TAVR         Hyperlipidemia    Continue with statin therapy            Discharged Condition: stable    Follow Up: F/u in 1 month     Diet: low sodium cardiac diet     Activity: Do not lift >5lbs for 5 days post TAVR     Patient Instructions:     Call MD for:  temperature >100.4     Call MD for:  persistent nausea and vomiting or diarrhea     Call MD for:  severe uncontrolled pain     Call MD for:  redness, tenderness, or signs of infection (pain, swelling, redness, odor or green/yellow discharge around incision site)     Call MD for:  difficulty breathing or increased cough     Call MD for:  severe persistent headache     Call MD for:  worsening rash     Call MD for:  persistent dizziness, light-headedness, or visual disturbances     Call MD for:  increased confusion or weakness     No dressing needed     No driving until:   Order Comments: 6 weeks if using LUE to make turns     Lifting restrictions   Order Comments: Do not lift >5lbs for 5 days post TAVR     Other restrictions (specify):   Order Comments: Do not lift LUE above shoulder height for 6 weeks  Wear sling continuously x 48 hrs   Sleep in sling x 6 weeks     Full code       Medications:  Reconciled Home Medications:      Medication List      START taking these medications    * sulfamethoxazole-trimethoprim 800-160mg 800-160 mg Tab  Commonly known as:  BACTRIM DS  Take 1 tablet by mouth 2 (two) times daily. for 5 days     * sulfamethoxazole-trimethoprim 800-160mg 800-160 mg Tab  Commonly known as:  BACTRIM DS  Take 1 tablet by mouth 2 (two) times daily. for 5 days        * This list has 2 medication(s) that are the same as other medications prescribed for you. Read the directions carefully, and ask your doctor or other care provider to review them with you.            CONTINUE taking these medications    acetaminophen 325 MG tablet  Commonly known as:  TYLENOL  Take 2 tablets  (650 mg total) by mouth every 6 (six) hours as needed for Pain or Temperature greater than (100.4).     aspirin 81 MG EC tablet  Commonly known as:  ECOTRIN  Take 81 mg by mouth once daily.     azelastine 137 mcg (0.1 %) nasal spray  Commonly known as:  ASTELIN  1 spray (137 mcg total) by Nasal route 2 (two) times daily.     carBAMazepine 200 mg tablet  Commonly known as:  TEGRETOL  Take 1 tablet (200 mg total) by mouth 3 (three) times daily.     cetirizine 10 MG tablet  Commonly known as:  ZYRTEC  Take 1 tablet (10 mg total) by mouth once daily.     cloNIDine 0.3 MG tablet  Commonly known as:  CATAPRES  Take 1 tablet (0.3 mg total) by mouth 3 (three) times daily.     * clopidogrel 75 mg tablet  Commonly known as:  PLAVIX  Take 1 tablet (75 mg total) by mouth once daily.     * clopidogrel 75 mg tablet  Commonly known as:  PLAVIX  Take 1 tablet (75 mg total) by mouth once daily.     * fluticasone 220 mcg/actuation inhaler  Commonly known as:  FLOVENT HFA  Inhale 1 puff into the lungs 2 (two) times daily. Controller     * fluticasone 50 mcg/actuation nasal spray  Commonly known as:  FLONASE  TWO SPRAYS IN EACH NOSTRIL ONCE DAILY     furosemide 40 MG tablet  Commonly known as:  LASIX  TAKE ONE TABLET BY MOUTH EVERY DAY AS NEEDED FOR SHORTNESS OF BREATH or leg swelling     gabapentin 300 MG capsule  Commonly known as:  NEURONTIN  ONE CAPSULE BY MOUTH THREE TIMES DAILY     hydrALAZINE 100 MG tablet  Commonly known as:  APRESOLINE  ONE TABLET BY MOUTH THREE TIMES DAILY     metoprolol tartrate 100 MG tablet  Commonly known as:  LOPRESSOR  ONE TABLET BY MOUTH TWICE DAILY     nitroGLYCERIN 0.4 MG SL tablet  Commonly known as:  NITROSTAT  Place 0.4 mg under the tongue every 5 (five) minutes as needed for Chest pain.     rosuvastatin 20 MG tablet  Commonly known as:  CRESTOR  ONE TABLET BY MOUTH EVERY EVENING     verapamil 360 MG C24p  Commonly known as:  VERELAN  ONE CAPSULE BY MOUTH once a day     walker Misc  Commonly known  as:  ULTRA-LIGHT ROLLATOR  One rollator, size large.        * This list has 4 medication(s) that are the same as other medications prescribed for you. Read the directions carefully, and ask your doctor or other care provider to review them with you.                Time spent on the discharge of patient: 33 minutes    Lianne Vieyra NP  Interventional Cardiology  [facility]

## 2018-07-06 NOTE — PROGRESS NOTES
All lines and IVs removed per hospital protocol, all catheter tips intact. Discharge instructions reviewed with pt and family member at bedside, all questions addressed and answered. Discharge paperwork reviewing home medications, instructions, and appointments given to patient. AV card given to patient along with physician contact cards. Pt transported out via wheelchair and discharged home.

## 2018-07-06 NOTE — PROGRESS NOTES
[facility]  Cardiac Electrophysiology  Progress Note    Admission Date: 7/5/2018  Code Status: Full Code   Attending Physician: Hira Lerner MD   Expected Discharge Date:   Principal Problem:Nodular calcific aortic valve stenosis    Subjective:     Interval History: S/p DC biotronik pacemaker. Telemetry V-paced, lower rate is 70 bpm.     Review of Systems   All other systems reviewed and are negative.    Objective:     Vital Signs (Most Recent):  Temp: 100.3 °F (37.9 °C) (07/06/18 0250)  Pulse: 83 (07/06/18 0600)  Resp: (!) 28 (07/06/18 0600)  BP: (!) 176/74 (07/05/18 0851)  SpO2: 96 % (07/06/18 0600) Vital Signs (24h Range):  Temp:  [97.7 °F (36.5 °C)-100.3 °F (37.9 °C)] 100.3 °F (37.9 °C)  Pulse:  [65-83] 83  Resp:  [16-34] 28  SpO2:  [94 %-100 %] 96 %  BP: (170-176)/(74) 176/74  Arterial Line BP: (139-176)/(40-61) 173/61     Weight: 96.8 kg (213 lb 6.5 oz)  Body mass index is 36.63 kg/m².     SpO2: 96 %  O2 Device (Oxygen Therapy): room air    Physical Exam   Constitutional: She is oriented to person, place, and time. She appears well-developed and well-nourished.   HENT:   Head: Normocephalic and atraumatic.   Eyes: No scleral icterus.   Cardiovascular: Normal rate and regular rhythm.    Pulmonary/Chest: Effort normal and breath sounds normal. No respiratory distress. She has no wheezes. She has no rales.   Neurological: She is alert and oriented to person, place, and time.   Skin: Skin is warm.   Psychiatric: She has a normal mood and affect.       Significant Labs: All pertinent lab results from the last 24 hours have been reviewed.    Significant Imaging: CXR no pneumothorax    Assessment and Plan:     Complete heart block    83 y/o F with baseline RBBB, s/p TAVR, subsequent complete heart block, S/p biotronik dual chamber pacemaker.      - Sling to left arm - wear for 48 hours, then only at night for 6 weeks.  - NO HEPARIN PRODUCTS  - Bactrim DS BID for five days   - No lifting  left elbow above shoulder height  - No lifting over 5 pounds  - No driving for 1 week and for 4 weeks if patient uses left arm to make turns  - Patient may shower in 48 hours, do not let beam of shower hit site directly and no scrubbing in area  - Follow up in device clinic in 1 week and with Dr. Houser  in 3 months                      Lyn Avendano MD  Cardiac Electrophysiology  [facility]

## 2018-07-06 NOTE — PLAN OF CARE
Problem: Patient Care Overview  Goal: Plan of Care Review  POC reviewed with pt. Post TAVR PPM in place intermittent pacing. Unable to titrate cardene off; BP labile . UO adequate. K and Mg replaced in AM as per PRN meds.  TMAX 100.3 tylenol given. No acute events during shift WCTM.

## 2018-07-06 NOTE — ANESTHESIA POSTPROCEDURE EVALUATION
"Anesthesia Post Evaluation    Patient: Cindy Lyman    Procedure(s) Performed: Procedure(s) (LRB):  INSERTION, CARDIAC PACEMAKER, DUAL CHAMBER (N/A)    Final Anesthesia Type: general  Patient location during evaluation: telemetry step down  Patient participation: Yes- Able to Participate  Level of consciousness: awake and alert and oriented  Post-procedure vital signs: reviewed and stable  Pain management: adequate  Airway patency: patent  PONV status at discharge: No PONV  Anesthetic complications: no      Cardiovascular status: blood pressure returned to baseline and hemodynamically stable  Respiratory status: unassisted and spontaneous ventilation  Hydration status: euvolemic  Follow-up not needed.        Visit Vitals  BP (!) 176/74 (BP Location: Left arm, Patient Position: Lying)   Pulse 78   Temp 37.3 °C (99.2 °F) (Oral)   Resp (!) 31   Ht 5' 4" (1.626 m)   Wt 96.8 kg (213 lb 6.5 oz)   SpO2 (!) 94%   Breastfeeding? No   BMI 36.63 kg/m²       Pain/Malvin Score: Pain Assessment Performed: Yes (7/6/2018  5:01 AM)  Presence of Pain: denies (7/6/2018  5:01 AM)  Pain Rating Prior to Med Admin: 8 (7/6/2018  2:50 AM)      "

## 2018-07-06 NOTE — ASSESSMENT & PLAN NOTE
83 y/o F with baseline RBBB, s/p TAVR, subsequent complete heart block, S/p biotronik dual chamber pacemaker.      - Sling to left arm - wear for 48 hours, then only at night for 6 weeks.  - NO HEPARIN PRODUCTS  - Bactrim DS BID for five days   - No lifting left elbow above shoulder height  - No lifting over 5 pounds  - No driving for 1 week and for 4 weeks if patient uses left arm to make turns  - Patient may shower in 48 hours, do not let beam of shower hit site directly and no scrubbing in area  - Follow up in device clinic in 1 week and with Dr. Houser  in 3 months

## 2018-07-06 NOTE — ASSESSMENT & PLAN NOTE
S/p PPM  Continue Bactrim DS x5 days for infection prophylaxis   F/u with Dr. Houser as scheduled

## 2018-07-06 NOTE — SUBJECTIVE & OBJECTIVE
Interval History: \S/p DC biotronik pacemaker. Telemetry V-paced, lower rate is 70 bpm.     Review of Systems   All other systems reviewed and are negative.    Objective:     Vital Signs (Most Recent):  Temp: 100.3 °F (37.9 °C) (07/06/18 0250)  Pulse: 83 (07/06/18 0600)  Resp: (!) 28 (07/06/18 0600)  BP: (!) 176/74 (07/05/18 0851)  SpO2: 96 % (07/06/18 0600) Vital Signs (24h Range):  Temp:  [97.7 °F (36.5 °C)-100.3 °F (37.9 °C)] 100.3 °F (37.9 °C)  Pulse:  [65-83] 83  Resp:  [16-34] 28  SpO2:  [94 %-100 %] 96 %  BP: (170-176)/(74) 176/74  Arterial Line BP: (139-176)/(40-61) 173/61     Weight: 96.8 kg (213 lb 6.5 oz)  Body mass index is 36.63 kg/m².     SpO2: 96 %  O2 Device (Oxygen Therapy): room air    Physical Exam   Constitutional: She is oriented to person, place, and time. She appears well-developed and well-nourished.   HENT:   Head: Normocephalic and atraumatic.   Eyes: No scleral icterus.   Cardiovascular: Normal rate and regular rhythm.    Pulmonary/Chest: Effort normal and breath sounds normal. No respiratory distress. She has no wheezes. She has no rales.   Neurological: She is alert and oriented to person, place, and time.   Skin: Skin is warm.   Psychiatric: She has a normal mood and affect.       Significant Labs: All pertinent lab results from the last 24 hours have been reviewed.    Significant Imaging: CXR no pneumothorax

## 2018-07-06 NOTE — DISCHARGE INSTRUCTIONS
Weigh yourself on the same scale every morning. If you gain more than 3 lbs in 1 day or more than 5 lbs in 1 week, or if you experience worsening shortness of breath, swelling in your feet or legs, or difficulty laying flat, take an extra Lasix pill until you reach your normal weight or your symptoms have resolved. If you have any questions, you can call our office at [phone].     You will need to take a single dose of antibiotics prior to certain dental procedures, colonoscopies, or bladder procedures. We can call this prescription in for you or the physician performing the procedure can call it in for you. If you have questions about whether or not a procedure will require antibiotics, you can call our office. Always let your physician know that you have an artificial heart valve.       Instructions from heart rhythm team  - We have implanted a pacemaker/defibrillator on this admission, please take the following precautions in the days/weeks following your procedure  - Wear your sling for the first 24 hours after your discharge and avoid getting your wound wet.  - You may shower in 48 hours, but do not let beam of shower hit site directly and no scrubbing in area  - For the first 6 weeks, while your implanted leads become permanently fixed, we ask that you avoid raising your left elbow above your shoulder and lifting greater than 5-10 lbs, and strongly urge that you wear your sling at night time during that time while you sleep to avoid excessive movement.  - We will schedule a visit to our wound clinic 1 week after your procedure for close follow up, in the interim period please keep your wound as clean & dry as possible, If you notice any discharge,worsening tenderness or discomfort from the area please call our clinic ASAP.  - Please avoid lifting more then 5 lbs with the involved side.  - No driving for 1 week and for 4 weeks if patient uses left arm to make turns.   - You may use over the counter  medications for pain relief, if that is not sufficient your physician may have prescribed you additional pain medication-take as instructed  - Please resume your home medication.   - Follow with Dr. Houser in 3 months post procedure, we will call you with an appointment.

## 2018-07-06 NOTE — PROGRESS NOTES
[facility]  Cardiac Electrophysiology  Progress Note    Admission Date: 7/5/2018  Code Status: Full Code   Attending Physician: Hira Lerner MD   Expected Discharge Date:   Principal Problem:Nodular calcific aortic valve stenosis    Subjective:     Interval History: The patient reports feeling well today after receiving successful TAVR and PPM implantation yesterday. She reports no concerns or symptoms this morning. Denies chest pain, palpitations, dyspnea or light headedness.    Review of Systems   Constitution: Negative for chills, decreased appetite, diaphoresis, fever, night sweats, weight gain and weight loss.   HENT: Negative for congestion, nosebleeds and tinnitus.    Eyes: Negative for blurred vision and photophobia.   Cardiovascular: Negative for chest pain, claudication, cyanosis, dyspnea on exertion, irregular heartbeat, leg swelling, near-syncope, orthopnea, palpitations, paroxysmal nocturnal dyspnea and syncope.   Respiratory: Negative for cough, hemoptysis, shortness of breath and wheezing.    Skin: Negative for color change and rash.   Musculoskeletal: Negative for back pain and falls.   Gastrointestinal: Negative for abdominal pain, constipation, diarrhea, nausea and vomiting.   Genitourinary: Negative for dysuria and hematuria.   Neurological: Negative for focal weakness, numbness and seizures.   Psychiatric/Behavioral: Negative for altered mental status. The patient is not nervous/anxious.      Objective:     Vital Signs (Most Recent):  Temp: 99.2 °F (37.3 °C) (07/06/18 0701)  Pulse: 69 (07/06/18 1000)  Resp: (!) 24 (07/06/18 1000)  BP: (!) 176/74 (07/05/18 0851)  SpO2: 95 % (07/06/18 1000) Vital Signs (24h Range):  Temp:  [97.7 °F (36.5 °C)-100.3 °F (37.9 °C)] 99.2 °F (37.3 °C)  Pulse:  [69-83] 69  Resp:  [16-34] 24  SpO2:  [93 %-100 %] 95 %  Arterial Line BP: (139-178)/(40-66) 141/61     Weight: 96.8 kg (213 lb 6.5 oz)  Body mass index is 36.63 kg/m².     SpO2: 95 %  O2  Device (Oxygen Therapy): room air    Physical Exam   Constitutional: She is oriented to person, place, and time. She appears well-developed and well-nourished. No distress.   HENT:   Head: Normocephalic and atraumatic.   Eyes: EOM are normal. Pupils are equal, round, and reactive to light.   Neck: Normal range of motion. No JVD present.   Cardiovascular: Normal rate, regular rhythm and intact distal pulses.  Exam reveals no gallop and no friction rub.    Murmur (Grave II/VI systolic) heard.  Pulmonary/Chest: Effort normal and breath sounds normal. No respiratory distress. She has no wheezes. She has no rales.   Abdominal: Soft. Bowel sounds are normal. She exhibits no distension. There is no tenderness.   Musculoskeletal: Normal range of motion. She exhibits no edema.   Neurological: She is alert and oriented to person, place, and time.   Skin: Skin is warm. No rash noted. She is not diaphoretic.   Psychiatric: She has a normal mood and affect. Her behavior is normal.       Significant Labs:     Recent Results (from the past 24 hour(s))   ISTAT ACT-K    Collection Time: 07/05/18 11:03 AM   Result Value Ref Range    POC ACTIVATED CLOTTING TIME K 103 74 - 137 sec    Sample ARTERIAL    ISTAT PROCEDURE    Collection Time: 07/05/18 11:09 AM   Result Value Ref Range    POC PH 7.400 7.35 - 7.45    POC PCO2 38.6 35 - 45 mmHg    POC PO2 119 (H) 80 - 100 mmHg    POC HCO3 23.9 (L) 24 - 28 mmol/L    POC BE -1 -2 to 2 mmol/L    POC SATURATED O2 99 95 - 100 %    POC Glucose 134 (H) 70 - 110 mg/dL    POC Sodium 142 136 - 145 mmol/L    POC Potassium 3.3 (L) 3.5 - 5.1 mmol/L    POC TCO2 25 23 - 27 mmol/L    POC Ionized Calcium 1.09 1.06 - 1.42 mmol/L    POC Hematocrit 27 (L) 36 - 54 %PCV    Sample ARTERIAL    CBC auto differential    Collection Time: 07/05/18 11:46 PM   Result Value Ref Range    WBC 5.73 3.90 - 12.70 K/uL    RBC 2.97 (L) 4.00 - 5.40 M/uL    Hemoglobin 8.6 (L) 12.0 - 16.0 g/dL    Hematocrit 27.6 (L) 37.0 - 48.5 %     MCV 93 82 - 98 fL    MCH 29.0 27.0 - 31.0 pg    MCHC 31.2 (L) 32.0 - 36.0 g/dL    RDW 14.9 (H) 11.5 - 14.5 %    Platelets 155 150 - 350 K/uL    MPV 10.1 9.2 - 12.9 fL    Immature Granulocytes 0.2 0.0 - 0.5 %    Gran # (ANC) 4.5 1.8 - 7.7 K/uL    Immature Grans (Abs) 0.01 0.00 - 0.04 K/uL    Lymph # 0.5 (L) 1.0 - 4.8 K/uL    Mono # 0.5 0.3 - 1.0 K/uL    Eos # 0.1 0.0 - 0.5 K/uL    Baso # 0.01 0.00 - 0.20 K/uL    nRBC 0 0 /100 WBC    Gran% 79.1 (H) 38.0 - 73.0 %    Lymph% 9.4 (L) 18.0 - 48.0 %    Mono% 8.7 4.0 - 15.0 %    Eosinophil% 2.4 0.0 - 8.0 %    Basophil% 0.2 0.0 - 1.9 %    Differential Method Automated    Basic metabolic panel    Collection Time: 07/06/18  3:01 AM   Result Value Ref Range    Sodium 141 136 - 145 mmol/L    Potassium 3.6 3.5 - 5.1 mmol/L    Chloride 109 95 - 110 mmol/L    CO2 22 (L) 23 - 29 mmol/L    Glucose 107 70 - 110 mg/dL    BUN, Bld 17 8 - 23 mg/dL    Creatinine 0.8 0.5 - 1.4 mg/dL    Calcium 8.1 (L) 8.7 - 10.5 mg/dL    Anion Gap 10 8 - 16 mmol/L    eGFR if African American >60.0 >60 mL/min/1.73 m^2    eGFR if non African American >60.0 >60 mL/min/1.73 m^2   Magnesium    Collection Time: 07/06/18  3:01 AM   Result Value Ref Range    Magnesium 1.8 1.6 - 2.6 mg/dL   CBC Without Differential    Collection Time: 07/06/18  9:25 AM   Result Value Ref Range    WBC 7.10 3.90 - 12.70 K/uL    RBC 3.21 (L) 4.00 - 5.40 M/uL    Hemoglobin 9.4 (L) 12.0 - 16.0 g/dL    Hematocrit 28.7 (L) 37.0 - 48.5 %    MCV 89 82 - 98 fL    MCH 29.3 27.0 - 31.0 pg    MCHC 32.8 32.0 - 36.0 g/dL    RDW 14.8 (H) 11.5 - 14.5 %    Platelets 162 150 - 350 K/uL    MPV 10.3 9.2 - 12.9 fL         Significant Imaging:     CXR    Narrative     EXAMINATION:  XR CHEST AP PORTABLE    CLINICAL HISTORY:  pacemaker implant;    FINDINGS:  There is a pacer.  There is cardiomegaly.  There may be mild CHF.   Impression       See above      Electronically signed by: Aram Carmona MD  Date: 07/05/2018  Time: 16:03         Assessment and  Plan:     Complete heart block    81 y/o F with baseline RBBB, s/p TAVR, subsequent complete heart block, S/p biotronik dual chamber pacemaker.    V-paced rhythm, pacemaker functioning well  Tmax 100.3 overnight, no signs of infection  Continue antibiotics for 5 days    - Sling to left arm - wear for 48 hours, then only at night for 6 weeks.  - NO HEPARIN PRODUCTS  - Bactrim DS BID for five days   - No lifting left elbow above shoulder height  - No lifting over 5 pounds  - No driving for 1 week and for 4 weeks if patient uses left arm to make turns  - Patient may shower in 48 hours, do not let beam of shower hit site directly and no scrubbing in area  - Follow up in device clinic in 1 week and with Dr. Houser  in 3 months            Natan Gong MD  Cardiac Electrophysiology  [facility]

## 2018-07-06 NOTE — SUBJECTIVE & OBJECTIVE
Interval History: Pt with chronic anemia requiring transfusion of 2 un prbc upon admission prior to TAVR due to Hgb of 8.3 and baseline Hgb ~ 10. She is now s/p 29mm Evolut TAVR via RTF access. CHB post TAVR with EP consulted per protocol and TVP in place. Patient taken for PPM yesterday by Dr. Houser. No acute events reported overnight. LUE in sling. Clinically volume overloaded due to acute on chronic diastolic heart failure, will diurese with Lasix IV x1. Post transfusion H&H 8.6/27.6, repeat CBC pending this am.     Objective:     Vital Signs (Most Recent):  Temp: 99.2 °F (37.3 °C) (07/06/18 0701)  Pulse: 80 (07/06/18 0800)  Resp: (!) 28 (07/06/18 0800)  BP: (!) 176/74 (07/05/18 0851)  SpO2: 96 % (07/06/18 0800) Vital Signs (24h Range):  Temp:  [97.7 °F (36.5 °C)-100.3 °F (37.9 °C)] 99.2 °F (37.3 °C)  Pulse:  [69-83] 80  Resp:  [16-34] 28  SpO2:  [94 %-100 %] 96 %  Arterial Line BP: (139-178)/(40-64) 176/64     Weight: 96.8 kg (213 lb 6.5 oz)  Body mass index is 36.63 kg/m².    SpO2: 96 %  O2 Device (Oxygen Therapy): room air      Intake/Output Summary (Last 24 hours) at 07/06/18 0939  Last data filed at 07/06/18 0937   Gross per 24 hour   Intake          1120.44 ml   Output              775 ml   Net           345.44 ml       Lines/Drains/Airways     Central Venous Catheter Line                 Introducer 07/05/18 1954 right internal jugular less than 1 day          Arterial Line                 Arterial Line 07/05/18 0926 Right Radial 1 day          Peripheral Intravenous Line                 Peripheral IV - Single Lumen 07/05/18 0900 Left Forearm 1 day                Physical Exam   Constitutional: She is oriented to person, place, and time. She appears well-developed and well-nourished. No distress.   HENT:   Head: Normocephalic and atraumatic.   Mouth/Throat: Oropharynx is clear and moist.   Eyes: Conjunctivae and EOM are normal. Pupils are equal, round, and reactive to light. No scleral icterus.   Neck:  Neck supple. No JVD present. No tracheal deviation present.   Cardiovascular: Normal rate and regular rhythm.  Exam reveals no gallop and no friction rub.    Murmur heard.  Pulmonary/Chest: Effort normal. No respiratory distress. She has no wheezes. She has rales. She exhibits no tenderness.   L chest PPM site covered with dressing c/d/i  LUE in sling    Abdominal: Soft. Bowel sounds are normal. She exhibits no distension. There is no hepatosplenomegaly. There is no tenderness.   Musculoskeletal: She exhibits no edema or tenderness.   Neurological: She is alert and oriented to person, place, and time.   Skin: Skin is warm and dry. No rash noted. No erythema.   Psychiatric: She has a normal mood and affect. Her behavior is normal.       Significant Labs:   BMP:   Recent Labs  Lab 07/05/18  0825 07/06/18  0301    107    141   K 3.6 3.6    109   CO2 24 22*   BUN 27* 17   CREATININE 1.1 0.8   CALCIUM 8.8 8.1*   MG  --  1.8   , CMP   Recent Labs  Lab 07/05/18  0825 07/06/18  0301    141   K 3.6 3.6    109   CO2 24 22*    107   BUN 27* 17   CREATININE 1.1 0.8   CALCIUM 8.8 8.1*   ANIONGAP 11 10   ESTGFRAFRICA 54.8* >60.0   EGFRNONAA 47.5* >60.0    and CBC   Recent Labs  Lab 07/05/18  2346   WBC 5.73   HGB 8.6*   HCT 27.6*

## 2018-07-06 NOTE — ASSESSMENT & PLAN NOTE
Successful RTF 29 Evolut with [employer] PTA to enable delivery of the valve delivery system.    Severe, unexplained anemia, worse today.  Tranfused for Hbg 8 pre TAVR.    Will be on DAPT with ASA/Plavix post TAVR

## 2018-07-09 LAB
BLD PROD TYP BPU: NORMAL
BLD PROD TYP BPU: NORMAL
BLOOD UNIT EXPIRATION DATE: NORMAL
BLOOD UNIT EXPIRATION DATE: NORMAL
BLOOD UNIT TYPE CODE: 6200
BLOOD UNIT TYPE CODE: 6200
BLOOD UNIT TYPE: NORMAL
BLOOD UNIT TYPE: NORMAL
CODING SYSTEM: NORMAL
CODING SYSTEM: NORMAL
DISPENSE STATUS: NORMAL
DISPENSE STATUS: NORMAL
NUM UNITS TRANS PACKED RBC: NORMAL
NUM UNITS TRANS PACKED RBC: NORMAL

## 2018-07-10 NOTE — PHYSICIAN QUERY
PT Name: Cindy Lyman  MR #: [id number]     Physician Query Form - Documentation Clarification      CDS/: Qiana Rosenthal RN, CCDS             Contact information: [email or web address]    This form is a permanent document in the medical record.     Query Date: July 10, 2018    By submitting this query, we are merely seeking further clarification of documentation. Please utilize your independent clinical judgment when addressing the question(s) below.    The Medical record reflects the following:    Supporting Clinical Findings Location in Medical Record     Pulmonary HTN - sPAP 61 mmHG    · Comorbidities: COPD, diastolic HF, CKD      7/2 h/p   severe and inoperable AS..   Successful RTF 29 Evolut with [employer] PTA to      7/5 cath note                                                                            Doctor, Please specify diagnosis or diagnoses associated with above clinical findings.    Provider Use Only          Please specify the type of Pulmonary Hypertension:    [     ] Primary pulmonary hypertension (Group 1)  [  X  ] Pulmonary hypertension due to Left  heart disease (Group 2)  [     ] Pulmonary hypertension due to Lung disease and hypoxia (Group 3)  [     ] Pulmonary hypertension, unspecified   [     ] Other: ___________________                                                                                                                       [  ] Clinically undetermined

## 2018-07-12 ENCOUNTER — CLINICAL SUPPORT (OUTPATIENT)
Dept: ELECTROPHYSIOLOGY | Facility: CLINIC | Age: 83
End: 2018-07-12
Payer: MEDICARE

## 2018-07-12 DIAGNOSIS — I44.2 CHB (COMPLETE HEART BLOCK): ICD-10-CM

## 2018-07-12 DIAGNOSIS — Z95.0 CARDIAC PACEMAKER IN SITU: Primary | ICD-10-CM

## 2018-07-12 DIAGNOSIS — I10 ESSENTIAL HYPERTENSION: ICD-10-CM

## 2018-07-12 DIAGNOSIS — Z95.0 CARDIAC PACEMAKER IN SITU: ICD-10-CM

## 2018-07-12 PROCEDURE — 93280 PM DEVICE PROGR EVAL DUAL: CPT | Mod: S$GLB,,, | Performed by: INTERNAL MEDICINE

## 2018-07-12 RX ORDER — FUROSEMIDE 40 MG/1
TABLET ORAL
Qty: 90 TABLET | Refills: 1 | Status: ON HOLD | OUTPATIENT
Start: 2018-07-12 | End: 2018-10-21 | Stop reason: SDUPTHER

## 2018-07-20 ENCOUNTER — LAB VISIT (OUTPATIENT)
Dept: LAB | Facility: HOSPITAL | Age: 83
End: 2018-07-20
Attending: INTERNAL MEDICINE
Payer: MEDICARE

## 2018-07-20 DIAGNOSIS — D63.8 ANEMIA OF CHRONIC DISEASE: Chronic | ICD-10-CM

## 2018-07-20 LAB
BASOPHILS # BLD AUTO: 0.04 K/UL
BASOPHILS NFR BLD: 0.9 %
DIFFERENTIAL METHOD: ABNORMAL
EOSINOPHIL # BLD AUTO: 0.2 K/UL
EOSINOPHIL NFR BLD: 4.7 %
ERYTHROCYTE [DISTWIDTH] IN BLOOD BY AUTOMATED COUNT: 15.9 %
FERRITIN SERPL-MCNC: 138 NG/ML
HCT VFR BLD AUTO: 30.1 %
HGB BLD-MCNC: 9.2 G/DL
IMM GRANULOCYTES # BLD AUTO: 0.01 K/UL
IMM GRANULOCYTES NFR BLD AUTO: 0.2 %
IRON SERPL-MCNC: 57 UG/DL
LYMPHOCYTES # BLD AUTO: 1 K/UL
LYMPHOCYTES NFR BLD: 23.4 %
MCH RBC QN AUTO: 29.4 PG
MCHC RBC AUTO-ENTMCNC: 30.6 G/DL
MCV RBC AUTO: 96 FL
MONOCYTES # BLD AUTO: 0.3 K/UL
MONOCYTES NFR BLD: 7.4 %
NEUTROPHILS # BLD AUTO: 2.8 K/UL
NEUTROPHILS NFR BLD: 63.4 %
NRBC BLD-RTO: 0 /100 WBC
PLATELET # BLD AUTO: 361 K/UL
PMV BLD AUTO: 10.2 FL
RBC # BLD AUTO: 3.13 M/UL
SATURATED IRON: 17 %
TOTAL IRON BINDING CAPACITY: 339 UG/DL
TRANSFERRIN SERPL-MCNC: 229 MG/DL
WBC # BLD AUTO: 4.45 K/UL

## 2018-07-20 PROCEDURE — 82728 ASSAY OF FERRITIN: CPT

## 2018-07-20 PROCEDURE — 85025 COMPLETE CBC W/AUTO DIFF WBC: CPT

## 2018-07-20 PROCEDURE — 83540 ASSAY OF IRON: CPT

## 2018-07-20 PROCEDURE — 36415 COLL VENOUS BLD VENIPUNCTURE: CPT | Mod: PO

## 2018-07-23 ENCOUNTER — TELEPHONE (OUTPATIENT)
Dept: FAMILY MEDICINE | Facility: CLINIC | Age: 83
End: 2018-07-23

## 2018-07-23 NOTE — TELEPHONE ENCOUNTER
----- Message from Yasmin Neves sent at 7/23/2018 10:21 AM CDT -----  Contact: Becca/daughter  Becca is requesting a copy of pt's past appointment reminders to present in court. Please contact her at [phone].    Thanks

## 2018-07-25 ENCOUNTER — OFFICE VISIT (OUTPATIENT)
Dept: HEMATOLOGY/ONCOLOGY | Facility: CLINIC | Age: 83
End: 2018-07-25
Payer: MEDICARE

## 2018-07-25 VITALS
SYSTOLIC BLOOD PRESSURE: 152 MMHG | WEIGHT: 200.31 LBS | HEIGHT: 64 IN | TEMPERATURE: 99 F | BODY MASS INDEX: 34.2 KG/M2 | OXYGEN SATURATION: 96 % | DIASTOLIC BLOOD PRESSURE: 64 MMHG | HEART RATE: 70 BPM

## 2018-07-25 DIAGNOSIS — K31.89 GASTRIC MASS: ICD-10-CM

## 2018-07-25 DIAGNOSIS — N18.30 STAGE 3 CHRONIC KIDNEY DISEASE: Chronic | ICD-10-CM

## 2018-07-25 DIAGNOSIS — D63.8 ANEMIA OF CHRONIC DISEASE: Primary | ICD-10-CM

## 2018-07-25 PROCEDURE — 3078F DIAST BP <80 MM HG: CPT | Mod: CPTII,S$GLB,, | Performed by: INTERNAL MEDICINE

## 2018-07-25 PROCEDURE — 99213 OFFICE O/P EST LOW 20 MIN: CPT | Mod: S$GLB,,, | Performed by: INTERNAL MEDICINE

## 2018-07-25 PROCEDURE — 3077F SYST BP >= 140 MM HG: CPT | Mod: CPTII,S$GLB,, | Performed by: INTERNAL MEDICINE

## 2018-07-25 PROCEDURE — 99999 PR PBB SHADOW E&M-EST. PATIENT-LVL V: CPT | Mod: PBBFAC,,, | Performed by: INTERNAL MEDICINE

## 2018-07-25 NOTE — PROGRESS NOTES
"Subjective:       Patient ID: Cindy Lyman is a 82 y.o. female.    Chief Complaint: Follow-up    Diagnosis: Anemia of chronic disease  HPI      She has  past medical history of CAD, asthma, CHF, COPD, depression, hypertension, thyroid disease, seen today for f/u for anemia of chronic disease. Bone marrow biopsy neg for hematological malignancy    Pt hospitalized July 2017 for abd pain.  she was found to be septic with fever and tachycardia (no leukocytosis). She was treated for a UTI and followed up with her PCP. She had persistent RUQ abdominal pain .  Followed by Surgery and underwentnd was referred to general surgery but has not yet seen them. H   US abdomen showed biliary sludge and cholelithiasis but no definite sonographic evidence to suggest acute cholecystitis. MRCP showed, "Markedly distended gallbladder with innumerable dependent gallstones.  There has been development of pericholecystic fluid near the fundus of the gallbladder that could reflect the sequela of cholecystitis" as well as a mild to moderate pericardial effusion. Plavix held in anticipation of cholecystectomy. NST 7/10/2017 stable. Low-intermediate cardiovascular risk for surgery. Laproscopic cholecystectomy 7/13/17. Intraoperative angiogram showed a retained common bile duct stone. Post cholecystectomy, patient failed extubation, was re-intubated then extubated   . Liver enzymes and TBil, have  improved, suggesting patient may have passed the stone seen in [facility]. GI had initially considered ERCP, but will treat conservatively as LFT's normalizing  SHe reports she underwent 1 urpbc transfusion during hospitalization    Intolerant of Fe supp     Today, she is doing well  She recently underwent successful placement of  TAVR she was transfued 2 un prbc due to chronic anemia with Hgb of 8.3 prior to admission. Post TAVR she developed CHB and EP was consulted with TVP in place.  She underwent EPS with PPM placement   Pt much less " "fatigued  She " feels like a new person"   She no longer ambulates with walker  She now ambulates with cane     Chronic arthalgias and  back pain-stable      No melena,hematochezia or change in bowel habits      Pt followed by GI , Dr. Olmedo for wt loss, gastric polyps, pancreas cyst   Pt recently underwent EGD - proximal gastric CIS within a polypoid lesions.    CT w/out evidence of LNs or distant mets  She underwent EUS at [facility] - 2cm-2.5cm friable, polypoid mass at cardia/proximal gastric body.  Pt referred to Dr. 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    Hx of RLE DVT  S/p anticoagulation completed 11/2015     She is followed by Pulm for COPD    She un    referred by Dr. Carmen for evaluation of severe AS and is being planned for RTF 29 nnm Evolut.  As per patient she has had dyspnea on exertion x 2 years with significant worsening over the past 6 months.  She cannot climb up a flight of stairs without becoming extremely dyspneic.  On thurs she had an episode of chest heaviness after lifting clothes and doing laundry.  In ER she   Prior to TAVR she was transfued 2 un prbc due to chronic anemia with Hgb of 8.3 prior to admission. Post TAVR she developed CHB and EP was consulted with TVP in place. She was taken to the CCU in stable condition. She underwent EPS with PPM placement with her TVP removed without complication. She remained stable overnight. The following morning, she required diuresis for acute on chronic diastolic heart failure. Her repeat H&H was 9.4/28.      Cbc      Wbc 4450 Hb 9.2 g/dl Hct 30.1  % and plt ct 361k     Bone Marrow biopsy 5/26/2016  Hypercellular marrow for age, 50-70%, with trilineage hematopoiesis, see " "comment  --Erythroid hyperplasia  --Mild plasmacytosis, 5-10%, polytypic by in situ hybridization studies, see comment  --No increase in blasts  --Adequate megakaryocytes  --Decreased stainable iron  --Mild reticulin fibrosis  COMMENT: Concomitantly submitted flow cytometric analysis detects no abnormal hematopoietic population. Bcells are polyclonal with a subset of hematogones detected, and T cells are immunophenotypically unremarkable.  The blast gate is not increased.Although there is a mild plasmacytosis, by in situ hybridization studies, this appears polytypic. Correlate clinicallyand with any available cytogenetic and molecular studies    Plasma cell proliferative disorder, FISH:  An insufficient number of plasma cells were observed using cytoplasmic immunoglobulin staining method .This result does not exclude the presence of a plasma cell proliferative disorder."    [place] red stain neg    PAST MEDICAL HISTORY:  Aortic stenosis, arthritis, asthma, CAD, carpal tunnel, cataracts, CHF, COPD, depression, hyperlipidemia, hypertension, pulmonary hypertension, thyroid disease, TMJ.    PAST SURGICAL HISTORY:  Partial hysterectomy, carpal tunnel release, eye surgery, left hip replacement, back surgery, TMJ.            Review of Systems   Constitutional: Negative for appetite change, fatigue and unexpected weight change.   HENT: Negative for hearing loss and nosebleeds.    Eyes: Negative for visual disturbance.   Respiratory: Negative for cough, chest tightness and shortness of breath.    Cardiovascular: Negative for chest pain and leg swelling.   Gastrointestinal: Negative for abdominal pain, blood in stool, constipation, diarrhea, nausea and vomiting.   Genitourinary: Negative for flank pain and hematuria.   Musculoskeletal: Positive for arthralgias and back pain. Negative for gait problem, joint swelling and neck pain.   Skin:        No petechiae, ecchymoses   Neurological: Negative for dizziness, light-headedness, " "numbness and headaches.   Hematological: Negative for adenopathy. Does not bruise/bleed easily.         Lab Results   Component Value Date    WBC 4.45 07/20/2018    HGB 9.2 (L) 07/20/2018    HCT 30.1 (L) 07/20/2018    MCV 96 07/20/2018     (H) 07/20/2018         Objective:       Vitals:    07/25/18 1317 07/25/18 1323   BP: (!) 158/72 (!) 152/64   BP Location: Left arm Right arm   Patient Position: Sitting Sitting   BP Method: Large (Automatic) Large (Manual)   Pulse: 70    Temp: 98.5 °F (36.9 °C)    TempSrc: Oral    SpO2: 96%    Weight: 90.9 kg (200 lb 4.6 oz)    Height: 5' 4" (1.626 m)        Physical Exam   Constitutional: She is oriented to person, place, and time. She appears well-developed and well-nourished.   HENT:   Head: Normocephalic.   Mouth/Throat: Oropharynx is clear and moist. No oropharyngeal exudate.   Eyes: Conjunctivae and lids are normal. Pupils are equal, round, and reactive to light. No scleral icterus.   Neck: Normal range of motion. Neck supple. No thyromegaly present.   Cardiovascular: Normal rate and regular rhythm.    Murmur heard.  Pulmonary/Chest: Breath sounds normal. She has no wheezes. She has no rales.   Abdominal: Soft. Bowel sounds are normal. She exhibits no distension and no mass. There is no hepatosplenomegaly. There is no tenderness. There is no rebound and no guarding.   Musculoskeletal: Normal range of motion. She exhibits edema ( 1+ RLE edema). She exhibits no tenderness.   Lymphadenopathy:     She has no cervical adenopathy.     She has no axillary adenopathy.        Right: No supraclavicular adenopathy present.        Left: No supraclavicular adenopathy present.   Neurological: She is alert and oriented to person, place, and time. No cranial nerve deficit. Coordination normal.   Skin: Skin is warm and dry. No ecchymosis, no petechiae and no rash noted. No erythema.   Psychiatric: She has a normal mood and affect.         Results for MARIE TURNER (MRN " "[id number]) as of 6/15/2017 13:23   Ref. Range 6/10/2016 11:08 1/9/2017 13:46 6/12/2017 10:52   Powderly Free Light Chains Latest Ref Range: 0.33 - 1.94 mg/dL 4.01 (H) 6.29 (H) 4.62 (H)   Lambda Free Light Chains Latest Ref Range: 0.57 - 2.63 mg/dL 1.68 2.93 (H) 1.63   Kappa/Lambda FLC Ratio Latest Ref Range: 0.26 - 1.65  2.39 (H) 2.15 (H) 2.83 (H)                 Lab Results   Component Value Date    WBC 4.45 07/20/2018    HGB 9.2 (L) 07/20/2018    HCT 30.1 (L) 07/20/2018    MCV 96 07/20/2018     (H) 07/20/2018       Lab Results   Component Value Date    IRON 57 07/20/2018    TIBC 339 07/20/2018    FERRITIN 138 07/20/2018     Bone Marrow biopsy 5/26/2016  Hypercellular marrow for age, 50-70%, with trilineage hematopoiesis, see comment  --Erythroid hyperplasia  --Mild plasmacytosis, 5-10%, polytypic by in situ hybridization studies, see comment  --No increase in blasts  --Adequate megakaryocytes  --Decreased stainable iron  --Mild reticulin fibrosis  COMMENT: Concomitantly submitted flow cytometric analysis detects no abnormal hematopoietic population. Bcells are polyclonal with a subset of hematogones detected, and T cells are immunophenotypically unremarkable.  The blast gate is not increased.Although there is a mild plasmacytosis, by in situ hybridization studies, this appears polytypic. 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 Anemia of chronic disease    2. Gastric mass    3. Stage 3 chronic kidney disease        Plan:   1-3 Pt clinically stable  Hb 9.2   g/dl -stable   Fe parameters Ok " except for borderline Fe sats  Colonoscopy 5 yrs ago [facility] [place]- unremarkable  S/p extensive hematological workup including bmbx- neg for hematological malignancy  No active  bleeding   Follow-up with PCP for med mgmt  Cont to monitor      F/u  2mo with cbc, Fe studies prior to f/u( or sooner if problems should arise in the interim)     CC: Jeremiah Reeves M.D.

## 2018-07-26 ENCOUNTER — TELEPHONE (OUTPATIENT)
Dept: SURGERY | Facility: CLINIC | Age: 83
End: 2018-07-26

## 2018-07-26 NOTE — OP NOTE
DATE OF PROCEDURE: 07/05/2018    ATTENDING SURGEONS: FRENCH Pérez MD, and Tu Bermudez M.D.    PREOPERATIVE DIAGNOSES:  1. Severe aortic stenosis.    POSTOPERATIVE DIAGNOSES:  1. Severe aortic stenosis      OPERATION PERFORMED: Transcatheter aortic valve insertion via right transfemoral   approach using a 29 mm Medtronic Evolut valve    ANESTHESIA: General.    ESTIMATED BLOOD LOSS: 10 mL.    BRIEF HISTORY: This patient is a 82-year-old woman with severe aortic stenosis.  The patient has had progressive dyspnea on exertion. This prompted a thoughtful and thorough evaluation, which demonstrated severe aortic stenosis. Given the comorbid conditions, a transcatheter valve insertion was recommended. The patient now presents for transcatheter aortic valve insertion.    PROCEDURE: After obtaining informed and written consent, the patient was   brought to the cath lab and placed on the cath table in supine position. After   induction of adequate anesthesia, a transvenous pacing wire was placed.   Bilateral femoral arterial and femoral venous access was obtained.  A wire was advanced across the aortic valve. It was exchanged for stiff wire, and  a 20 mm [place] balloon was placed. Under rapid ventricular pacing, balloon valvuloplasty was performed. The balloon was then withdrawn, and a 29 mm Evolut valve was advanced to the level of the aortic annulus. Once the team was satisfied that the valve was in proper position, it was deployed. Excellent positioning was obtained. Post-procedure echo demonstrated no aortic insufficiency. The femoral access sites were controlled using percutaneous techniques. The patient tolerated the procedure well, there were no complications. At the conclusion of the case, sponge and instrument counts were correct.

## 2018-07-26 NOTE — TELEPHONE ENCOUNTER
----- Message from Radha Link RN sent at 7/26/2018  1:49 PM CDT -----  Can someone please assist?  ----- Message -----  From: Kitty Sultana MD  Sent: 7/26/2018   8:42 AM  To: Radha Link RN    Pt followed by Dr. Cordero for possible surg intervention for gastric mass  Last visit 4/2018    Please confirm with Dr. Cordero's nurse pt has f/u     Thanks,    [facility]

## 2018-08-10 ENCOUNTER — OFFICE VISIT (OUTPATIENT)
Dept: CARDIOLOGY | Facility: CLINIC | Age: 83
End: 2018-08-10
Payer: MEDICARE

## 2018-08-10 ENCOUNTER — LAB VISIT (OUTPATIENT)
Dept: LAB | Facility: HOSPITAL | Age: 83
End: 2018-08-10
Attending: INTERNAL MEDICINE
Payer: MEDICARE

## 2018-08-10 ENCOUNTER — HOSPITAL ENCOUNTER (OUTPATIENT)
Dept: CARDIOLOGY | Facility: CLINIC | Age: 83
Discharge: HOME | End: 2018-08-10
Attending: INTERNAL MEDICINE
Payer: MEDICARE

## 2018-08-10 VITALS
HEIGHT: 64 IN | SYSTOLIC BLOOD PRESSURE: 183 MMHG | DIASTOLIC BLOOD PRESSURE: 81 MMHG | WEIGHT: 203.69 LBS | BODY MASS INDEX: 34.77 KG/M2 | HEART RATE: 74 BPM | OXYGEN SATURATION: 98 %

## 2018-08-10 DIAGNOSIS — I10 ESSENTIAL HYPERTENSION: Chronic | ICD-10-CM

## 2018-08-10 DIAGNOSIS — I44.2 COMPLETE HEART BLOCK: ICD-10-CM

## 2018-08-10 DIAGNOSIS — I25.10 CORONARY ARTERY DISEASE INVOLVING NATIVE CORONARY ARTERY OF NATIVE HEART WITHOUT ANGINA PECTORIS: Chronic | ICD-10-CM

## 2018-08-10 DIAGNOSIS — I50.32 CHRONIC DIASTOLIC HEART FAILURE: ICD-10-CM

## 2018-08-10 DIAGNOSIS — I35.0 NODULAR CALCIFIC AORTIC VALVE STENOSIS: ICD-10-CM

## 2018-08-10 DIAGNOSIS — I70.0 ATHEROSCLEROSIS OF AORTA: ICD-10-CM

## 2018-08-10 DIAGNOSIS — Z95.2 S/P TAVR (TRANSCATHETER AORTIC VALVE REPLACEMENT): Primary | ICD-10-CM

## 2018-08-10 DIAGNOSIS — N18.30 STAGE 3 CHRONIC KIDNEY DISEASE: Chronic | ICD-10-CM

## 2018-08-10 DIAGNOSIS — E78.5 HYPERLIPIDEMIA, UNSPECIFIED HYPERLIPIDEMIA TYPE: Chronic | ICD-10-CM

## 2018-08-10 LAB
AORTIC VALVE REGURGITATION: ABNORMAL
CREAT SERPL-MCNC: 1.2 MG/DL
ERYTHROCYTE [DISTWIDTH] IN BLOOD BY AUTOMATED COUNT: 14.9 %
EST. GFR  (AFRICAN AMERICAN): 49.3 ML/MIN/1.73 M^2
EST. GFR  (NON AFRICAN AMERICAN): 42.8 ML/MIN/1.73 M^2
ESTIMATED PA SYSTOLIC PRESSURE: 51.72
GLOBAL PERICARDIAL EFFUSION: ABNORMAL
HCT VFR BLD AUTO: 30.5 %
HGB BLD-MCNC: 9.5 G/DL
MCH RBC QN AUTO: 29.4 PG
MCHC RBC AUTO-ENTMCNC: 31.1 G/DL
MCV RBC AUTO: 94 FL
PLATELET # BLD AUTO: 212 K/UL
PMV BLD AUTO: 10.1 FL
RBC # BLD AUTO: 3.23 M/UL
RETIRED EF AND QEF - SEE NOTES: 60 (ref 55–65)
TRICUSPID VALVE REGURGITATION: ABNORMAL
WBC # BLD AUTO: 5.28 K/UL

## 2018-08-10 PROCEDURE — 82565 ASSAY OF CREATININE: CPT

## 2018-08-10 PROCEDURE — 36415 COLL VENOUS BLD VENIPUNCTURE: CPT

## 2018-08-10 PROCEDURE — 99999 PR PBB SHADOW E&M-EST. PATIENT-LVL IV: CPT | Mod: PBBFAC,,, | Performed by: INTERNAL MEDICINE

## 2018-08-10 PROCEDURE — 99499 UNLISTED E&M SERVICE: CPT | Mod: S$GLB,,, | Performed by: PHYSICIAN ASSISTANT

## 2018-08-10 PROCEDURE — 85027 COMPLETE CBC AUTOMATED: CPT

## 2018-08-10 PROCEDURE — 3079F DIAST BP 80-89 MM HG: CPT | Mod: CPTII,S$GLB,, | Performed by: INTERNAL MEDICINE

## 2018-08-10 PROCEDURE — 3077F SYST BP >= 140 MM HG: CPT | Mod: CPTII,S$GLB,, | Performed by: INTERNAL MEDICINE

## 2018-08-10 PROCEDURE — 99214 OFFICE O/P EST MOD 30 MIN: CPT | Mod: S$GLB,,, | Performed by: INTERNAL MEDICINE

## 2018-08-10 PROCEDURE — 93306 TTE W/DOPPLER COMPLETE: CPT | Mod: S$GLB,,, | Performed by: INTERNAL MEDICINE

## 2018-08-10 NOTE — ASSESSMENT & PLAN NOTE
Successful RTF 29 Evolut with RCI PTA to enable delivery of the valve delivery system.  On DAPT with ASA/Plavix

## 2018-08-10 NOTE — PROGRESS NOTES
Subjective:    Patient ID:  Cindy Lyman is a 82 y.o. female who presents for follow-up of TAVR    Referring: Dr. José Carmen    HPI  Ms. Lyman presents for 1 mon f/u s/p 29mm Evolut TAVR via R TF access. She has done very well since her TAVR and she is without complaints today. She was able to walk into clinic today with her walker; prior to her TAVR she had to use a wheelchair. She denies CP, PND, orthopnea, or LE edema.  She tries to be active during the day, but she is limited by neuropathy and the chronic sleepiness from her neurontin. She is scheduled to see Dr. Carmen on the 14th.     NYHA Class II, CCS Class     Review of Systems   Constitution: Negative for chills, diaphoresis, fever, weakness, weight gain and weight loss.   HENT: Negative for sore throat.    Eyes: Negative for blurred vision, vision loss in left eye, vision loss in right eye and visual disturbance.   Cardiovascular: Negative for chest pain, claudication, dyspnea on exertion, leg swelling, near-syncope, orthopnea, palpitations, paroxysmal nocturnal dyspnea and syncope.   Respiratory: Negative for cough, hemoptysis, shortness of breath, sputum production and wheezing.    Endocrine: Negative for cold intolerance and heat intolerance.   Hematologic/Lymphatic: Negative for adenopathy. Does not bruise/bleed easily.   Skin: Negative for rash.   Musculoskeletal: Negative for falls, muscle weakness and myalgias.   Gastrointestinal: Negative for abdominal pain, change in bowel habit, constipation, diarrhea, melena and nausea.   Genitourinary: Negative for bladder incontinence.   Neurological: Negative for dizziness, focal weakness, headaches, light-headedness and numbness.   Psychiatric/Behavioral: Negative for altered mental status.         Vitals:    08/10/18 0908 08/10/18 0912   BP: (!) 175/80 (!) 183/81   BP Location: Right arm Left arm   Patient Position: Sitting Sitting   BP Method: Large (Automatic) Large (Automatic)   Pulse: 74  "74   SpO2: 98%    Weight: 92.4 kg (203 lb 11.3 oz)    Height: 5' 4" (1.626 m)    Body mass index is 34.97 kg/m².    Objective:    Physical Exam   Constitutional: She is oriented to person, place, and time. She appears well-developed and well-nourished. No distress.   HENT:   Head: Normocephalic and atraumatic.   Mouth/Throat: Oropharynx is clear and moist.   Eyes: Conjunctivae and EOM are normal. Pupils are equal, round, and reactive to light. No scleral icterus.   Neck: Neck supple. No JVD present. No tracheal deviation present.   Cardiovascular: Normal rate and regular rhythm.  Exam reveals no gallop and no friction rub.    No murmur heard.  Pulmonary/Chest: Effort normal and breath sounds normal. No respiratory distress. She has no wheezes. She has no rales. She exhibits no tenderness.   Abdominal: Soft. Bowel sounds are normal. She exhibits no distension. There is no hepatosplenomegaly. There is no tenderness.   Musculoskeletal: She exhibits no edema or tenderness.   Neurological: She is alert and oriented to person, place, and time.   Skin: Skin is warm and dry. No rash noted. No erythema.   Psychiatric: She has a normal mood and affect. Her behavior is normal.        Assessment:       S/P TAVR (transcatheter aortic valve replacement)  Successful RTF 29 Evolut with RCI PTA to enable delivery of the valve delivery system.  On DAPT with ASA/Plavix     Chronic diastolic heart failure  Well compensated clinically at this time.   On Lasix daily.     Essential hypertension  Uncontrolled.   Will defer management to Dr. Carmen.     Coronary artery disease involving native coronary artery without angina pectoris  S/p ostial RCA BMS on 6/1/18  Continue medical management   On ASA/plavix, BB, statin.     Complete heart block  S/p PPM  Follows with Dr. Houser     Hyperlipidemia  Continue with statin therapy    Stage 3 chronic kidney disease  Stable.  Creatinine 1.2, GFR 49.3    Atherosclerosis of aorta  Seen on TAVR " CTA.  On ASA and statin.        Plan:       ASA indefinitely. OK to stop Plavix 6 months post-TAVR.   SBEP for life.   F/U in 1 year (July 2019) with labs and echo.

## 2018-08-14 ENCOUNTER — OFFICE VISIT (OUTPATIENT)
Dept: CARDIOLOGY | Facility: CLINIC | Age: 83
End: 2018-08-14
Payer: MEDICARE

## 2018-08-14 VITALS
WEIGHT: 199.94 LBS | OXYGEN SATURATION: 99 % | HEIGHT: 65 IN | DIASTOLIC BLOOD PRESSURE: 84 MMHG | SYSTOLIC BLOOD PRESSURE: 187 MMHG | HEART RATE: 70 BPM | BODY MASS INDEX: 33.31 KG/M2

## 2018-08-14 DIAGNOSIS — I35.0 NONRHEUMATIC AORTIC VALVE STENOSIS: Chronic | ICD-10-CM

## 2018-08-14 DIAGNOSIS — E78.5 HYPERLIPIDEMIA, UNSPECIFIED HYPERLIPIDEMIA TYPE: Chronic | ICD-10-CM

## 2018-08-14 DIAGNOSIS — I10 ESSENTIAL HYPERTENSION: Chronic | ICD-10-CM

## 2018-08-14 DIAGNOSIS — I25.83 CORONARY ARTERY DISEASE DUE TO LIPID RICH PLAQUE: Chronic | ICD-10-CM

## 2018-08-14 DIAGNOSIS — I10 HTN (HYPERTENSION): ICD-10-CM

## 2018-08-14 DIAGNOSIS — J44.9 CHRONIC OBSTRUCTIVE PULMONARY DISEASE, UNSPECIFIED COPD TYPE: ICD-10-CM

## 2018-08-14 DIAGNOSIS — Z95.2 S/P TAVR (TRANSCATHETER AORTIC VALVE REPLACEMENT): ICD-10-CM

## 2018-08-14 DIAGNOSIS — I27.20 PULMONARY HYPERTENSION: Primary | Chronic | ICD-10-CM

## 2018-08-14 DIAGNOSIS — I25.10 CORONARY ARTERY DISEASE DUE TO LIPID RICH PLAQUE: Chronic | ICD-10-CM

## 2018-08-14 PROCEDURE — 99499 UNLISTED E&M SERVICE: CPT | Mod: S$GLB,,, | Performed by: INTERNAL MEDICINE

## 2018-08-14 PROCEDURE — 3079F DIAST BP 80-89 MM HG: CPT | Mod: CPTII,S$GLB,, | Performed by: INTERNAL MEDICINE

## 2018-08-14 PROCEDURE — 3077F SYST BP >= 140 MM HG: CPT | Mod: CPTII,S$GLB,, | Performed by: INTERNAL MEDICINE

## 2018-08-14 PROCEDURE — 99999 PR PBB SHADOW E&M-EST. PATIENT-LVL V: CPT | Mod: PBBFAC,,, | Performed by: INTERNAL MEDICINE

## 2018-08-14 PROCEDURE — 93000 ELECTROCARDIOGRAM COMPLETE: CPT | Mod: S$GLB,,, | Performed by: INTERNAL MEDICINE

## 2018-08-14 PROCEDURE — 99214 OFFICE O/P EST MOD 30 MIN: CPT | Mod: S$GLB,,, | Performed by: INTERNAL MEDICINE

## 2018-08-14 NOTE — PROGRESS NOTES
Subjective:    Patient ID:  Cindy Lyman is a 82 y.o. female who presents for follow-up of Coronary Artery Disease      HPI     CAD - stent LAD 4/2015, BMS to RCA 6/1/18 - moderate Cx disease, Diastolic CHF  AS - s/p TAVR 7/5/18, Biotronik PPM 7/5/18 for CHB HTN, COPD, Hx DVT, anemia     Echo 8/10/18    1 - Normal left ventricular systolic function (EF 60-65%).     2 - No wall motion abnormalities.     3 - Concentric hypertrophy.     4 - Biatrial enlargement.     5 - Right ventricular enlargement with normal systolic function.     6 - Pulmonary hypertension. The estimated PA systolic pressure is 52 mmHg.     7 - S/P transcatheter AVR, CLEMENT = 2.58 cm2, AVAi = 1.32 cm2/m2, peak velocity = 1.7 m/s, mean gradient = 8 mmHg.     8 - Mild paravalvular aortic regurgitation.     9 - Mild tricuspid regurgitation.     10 - Trivial pericardial effusion.     11 - S/p 29mm Evolut TF TAVR.     [facility] 6/1/18       - Left Main Coronary Artery:             The LM has luminal irregularities. There is GERMAIN 3 flow.     - Left Anterior Descending Artery:             The ostial LAD has a 60% stenosis. There is GERMAIN 3 flow.     - Left Circumflex Artery:             The mid LCX has a 60% stenosis. There is GERMAIN 3 flow.     - Right Coronary Artery:             The ostial RCA has a 99% stenosis. There is GERMAIN 3 flow.             The proximal RCA has a 80% stenosis. There is GERMAIN 3 flow.     - Common Femoral Artery:             The right CFA has luminal irregularities. Access closure with perclose was very difficult due to inability to catch needles with perclose device.      Intervention          Ostial RCA:              The lesion was successfully intervened. Post-stenosis of 0%, post-GERMAIN 3 flow and TMP grade 3. The vessel was accessed natively.  The following items were used: 2.0MM 15MM [place] Balloon and 2.5X12 Mini Vision Stent.       Proximal RCA:              The lesion was successfully intervened. Post-stenosis of 0%, post-GERMAIN 3  flow and TMP grade 3. The vessel was accessed natively.  The following items were used: 2.5MM 12MM [place] Balloon, Stent Integrity 2.5 X 8 and 3.0MM 8MM [place] Balloon.     Saw cardiology PA 8/10/18  Ms. Lyman presents for 1 mon f/u s/p 29mm Evolut TAVR via R TF access. She has done very well since her TAVR and she is without complaints today. She was able to walk into clinic today with her walker; prior to her TAVR she had to use a wheelchair. She denies CP, PND, orthopnea, or LE edema.  She tries to be active during the day, but she is limited by neuropathy and the chronic sleepiness from her neurontin. She is scheduled to see Dr. Carmen on the 14th.     BP elevated today but better controlled by home readings  EKG A-sensed V-paced  Feels more energy and less SOB s/p TAVR          Review of Systems   Constitution: Negative for decreased appetite.   HENT: Negative for ear discharge.    Eyes: Negative for blurred vision.   Respiratory: Negative for hemoptysis.    Endocrine: Negative for polyphagia.   Hematologic/Lymphatic: Negative for adenopathy.   Skin: Negative for color change.   Musculoskeletal: Negative for joint swelling.   Neurological: Negative for brief paralysis.   Psychiatric/Behavioral: Negative for hallucinations.        Objective:    Physical Exam   Constitutional: She is oriented to person, place, and time. She appears well-developed and well-nourished.   HENT:   Head: Normocephalic and atraumatic.   Eyes: Conjunctivae are normal. Pupils are equal, round, and reactive to light.   Neck: Normal range of motion. Neck supple.   Cardiovascular: Normal rate and intact distal pulses.   Murmur heard.   Early systolic murmur is present with a grade of 2/6.  Pulmonary/Chest: Effort normal and breath sounds normal.   Abdominal: Soft. Bowel sounds are normal.   Musculoskeletal: Normal range of motion.   Neurological: She is alert and oriented to person, place, and time.   Skin: Skin is warm and dry.          Assessment:       1. Pulmonary hypertension    2. Chronic obstructive pulmonary disease, unspecified COPD type    3. Nonrheumatic aortic valve stenosis    4. Hyperlipidemia, unspecified hyperlipidemia type    5. Coronary artery disease due to lipid rich plaque    6. Essential hypertension    7. S/P TAVR (transcatheter aortic valve replacement)         Plan:       Continue Rx  OV 3 months

## 2018-08-20 ENCOUNTER — HOSPITAL ENCOUNTER (EMERGENCY)
Facility: HOSPITAL | Age: 83
Discharge: HOME | End: 2018-08-20
Attending: EMERGENCY MEDICINE
Payer: MEDICARE

## 2018-08-20 VITALS
WEIGHT: 205 LBS | RESPIRATION RATE: 18 BRPM | BODY MASS INDEX: 34.16 KG/M2 | SYSTOLIC BLOOD PRESSURE: 183 MMHG | TEMPERATURE: 99 F | OXYGEN SATURATION: 100 % | HEIGHT: 65 IN | HEART RATE: 69 BPM | DIASTOLIC BLOOD PRESSURE: 84 MMHG

## 2018-08-20 DIAGNOSIS — E86.0 DEHYDRATION: ICD-10-CM

## 2018-08-20 DIAGNOSIS — R55 NEAR SYNCOPE: ICD-10-CM

## 2018-08-20 DIAGNOSIS — R42 DIZZINESS: Primary | ICD-10-CM

## 2018-08-20 LAB
ALBUMIN SERPL BCP-MCNC: 3.4 G/DL
ALP SERPL-CCNC: 69 U/L
ALT SERPL W/O P-5'-P-CCNC: 11 U/L
ANION GAP SERPL CALC-SCNC: 6 MMOL/L
AST SERPL-CCNC: 12 U/L
BASOPHILS # BLD AUTO: 0.02 K/UL
BASOPHILS NFR BLD: 0.5 %
BILIRUB SERPL-MCNC: 0.3 MG/DL
BILIRUB UR QL STRIP: NEGATIVE
BUN SERPL-MCNC: 26 MG/DL
CALCIUM SERPL-MCNC: 8.7 MG/DL
CHLORIDE SERPL-SCNC: 113 MMOL/L
CLARITY UR: CLEAR
CO2 SERPL-SCNC: 26 MMOL/L
COLOR UR: YELLOW
CREAT SERPL-MCNC: 1.1 MG/DL
DIFFERENTIAL METHOD: ABNORMAL
EOSINOPHIL # BLD AUTO: 0.2 K/UL
EOSINOPHIL NFR BLD: 4.5 %
ERYTHROCYTE [DISTWIDTH] IN BLOOD BY AUTOMATED COUNT: 14.9 %
EST. GFR  (AFRICAN AMERICAN): 55 ML/MIN/1.73 M^2
EST. GFR  (NON AFRICAN AMERICAN): 48 ML/MIN/1.73 M^2
GLUCOSE SERPL-MCNC: 96 MG/DL
GLUCOSE UR QL STRIP: NEGATIVE
HCT VFR BLD AUTO: 28.7 %
HGB BLD-MCNC: 9.2 G/DL
HGB UR QL STRIP: NEGATIVE
INR PPP: 1
KETONES UR QL STRIP: NEGATIVE
LEUKOCYTE ESTERASE UR QL STRIP: NEGATIVE
LYMPHOCYTES # BLD AUTO: 1.6 K/UL
LYMPHOCYTES NFR BLD: 38.6 %
MCH RBC QN AUTO: 28.9 PG
MCHC RBC AUTO-ENTMCNC: 32.1 G/DL
MCV RBC AUTO: 90 FL
MONOCYTES # BLD AUTO: 0.5 K/UL
MONOCYTES NFR BLD: 11.7 %
NEUTROPHILS # BLD AUTO: 1.9 K/UL
NEUTROPHILS NFR BLD: 44.7 %
NITRITE UR QL STRIP: NEGATIVE
PH UR STRIP: 5 [PH] (ref 5–8)
PLATELET # BLD AUTO: 206 K/UL
PMV BLD AUTO: 9.7 FL
POTASSIUM SERPL-SCNC: 3.7 MMOL/L
PROT SERPL-MCNC: 6.5 G/DL
PROT UR QL STRIP: NEGATIVE
PROTHROMBIN TIME: 10.2 SEC
RBC # BLD AUTO: 3.18 M/UL
SODIUM SERPL-SCNC: 145 MMOL/L
SP GR UR STRIP: 1.01 (ref 1–1.03)
URN SPEC COLLECT METH UR: NORMAL
UROBILINOGEN UR STRIP-ACNC: NEGATIVE EU/DL
WBC # BLD AUTO: 4.2 K/UL

## 2018-08-20 PROCEDURE — 25000003 PHARM REV CODE 250: Performed by: EMERGENCY MEDICINE

## 2018-08-20 PROCEDURE — 85610 PROTHROMBIN TIME: CPT

## 2018-08-20 PROCEDURE — 81003 URINALYSIS AUTO W/O SCOPE: CPT

## 2018-08-20 PROCEDURE — 93005 ELECTROCARDIOGRAM TRACING: CPT

## 2018-08-20 PROCEDURE — 96374 THER/PROPH/DIAG INJ IV PUSH: CPT

## 2018-08-20 PROCEDURE — 80053 COMPREHEN METABOLIC PANEL: CPT

## 2018-08-20 PROCEDURE — 93010 ELECTROCARDIOGRAM REPORT: CPT | Mod: ,,, | Performed by: INTERNAL MEDICINE

## 2018-08-20 PROCEDURE — 85025 COMPLETE CBC W/AUTO DIFF WBC: CPT

## 2018-08-20 PROCEDURE — 63600175 PHARM REV CODE 636 W HCPCS: Performed by: EMERGENCY MEDICINE

## 2018-08-20 PROCEDURE — 96361 HYDRATE IV INFUSION ADD-ON: CPT

## 2018-08-20 PROCEDURE — 99284 EMERGENCY DEPT VISIT MOD MDM: CPT

## 2018-08-20 RX ORDER — CLONIDINE HYDROCHLORIDE 0.1 MG/1
0.1 TABLET ORAL
Status: COMPLETED | OUTPATIENT
Start: 2018-08-20 | End: 2018-08-20

## 2018-08-20 RX ORDER — HYDRALAZINE HYDROCHLORIDE 20 MG/ML
10 INJECTION INTRAMUSCULAR; INTRAVENOUS
Status: COMPLETED | OUTPATIENT
Start: 2018-08-20 | End: 2018-08-20

## 2018-08-20 RX ORDER — BUTALBITAL, ACETAMINOPHEN AND CAFFEINE 50; 325; 40 MG/1; MG/1; MG/1
1 TABLET ORAL
Status: COMPLETED | OUTPATIENT
Start: 2018-08-20 | End: 2018-08-20

## 2018-08-20 RX ORDER — LABETALOL HYDROCHLORIDE 5 MG/ML
10 INJECTION, SOLUTION INTRAVENOUS
Status: DISCONTINUED | OUTPATIENT
Start: 2018-08-20 | End: 2018-08-20

## 2018-08-20 RX ADMIN — SODIUM CHLORIDE 500 ML: 0.9 INJECTION, SOLUTION INTRAVENOUS at 08:08

## 2018-08-20 RX ADMIN — BUTALBITAL, ACETAMINOPHEN AND CAFFEINE 1 TABLET: 50; 325; 40 TABLET ORAL at 11:08

## 2018-08-20 RX ADMIN — CLONIDINE HYDROCHLORIDE 0.1 MG: 0.1 TABLET ORAL at 09:08

## 2018-08-20 RX ADMIN — HYDRALAZINE HYDROCHLORIDE 10 MG: 20 INJECTION INTRAMUSCULAR; INTRAVENOUS at 10:08

## 2018-08-20 NOTE — ED TRIAGE NOTES
82 y.o. Female presents to the ED with chief complaint of dizziness. Patient states this AM, she woke up with dizzness and generalized weakness. Patient states she had a pace maker placed earlier this year on July 5th. Patient also states she had a valve replacement procedure as well. Patient denies pain, but note generalized weakness is constant. Patient resting in bed in NAD. Side rails up x2.

## 2018-08-20 NOTE — ED PROVIDER NOTES
Encounter Date: 8/20/2018    SCRIBE #1 NOTE: I, Rob Pizarro, am scribing for, and in the presence of,  Sammy Alejandre MD. I have scribed the following portions of the note - Other sections scribed: HPI, ROS.       History     Chief Complaint   Patient presents with    Dizziness     Pt reports feeling dizzy with facial numbness when she woke up this morning.      CC: Dizziness    HPI: 82 male with hx of anemia, long-term anticoagulant use, arthritis, asthma, COPD, CHF, CAD, DVT, depression, HTN, hyperlipidemia, and TMJ presents for emergent consideration of dizziness and numbness that began today after waking up. Pt describes the numbness to be bilaterally in her face, hands, and legs. Pt's daughter also notes that the pt has had some odor to her urine. Pt reports pacemaker placement and prolapse valve repair on 7/5/18. Pt denies dysuria, SOB, chest pain, bloody stool, fever, chills, n/v/d, HA, or abdominal pain.          Review of patient's allergies indicates:   Allergen Reactions    Doxycycline hyclate Diarrhea and Nausea And Vomiting    Singulair [montelukast]      Stomach pain, Muscle pain, Cough      Penicillins      Other reaction(s): Anaphylaxis    Ventolin [albuterol sulfate] Other (See Comments)     Severely elevated blood pressure    Latex, natural rubber Rash     Past Medical History:   Diagnosis Date    Anemia     Anticoagulant long-term use     Aortic stenosis     Arthritis     lower back    Asthma     CAD (coronary artery disease) 4/24/2015    Carpal tunnel syndrome on both sides     Cataract     CHF (congestive heart failure) 5/2013    COPD (chronic obstructive pulmonary disease)     Depression     DVT (deep venous thrombosis)     Hyperlipidemia     Hypertension     Pulmonary HTN     TMJ (temporomandibular joint disorder)      Past Surgical History:   Procedure Laterality Date    BACK SURGERY      cardiac stents      CARDIAC VALVE SURGERY      CARPAL TUNNEL RELEASE       Right/Left    EYE SURGERY      cataract extraction    HYSTERECTOMY      Partial    JOINT REPLACEMENT  2009    Left hip    POLYPECTOMY      x9    TEMPOROMANDIBULAR JOINT SURGERY       Family History   Problem Relation Age of Onset    Heart disease Mother     Hypertension Daughter     Cancer Son     Breast cancer Neg Hx     Colon cancer Neg Hx     Ovarian cancer Neg Hx     Esophageal cancer Neg Hx      Social History     Tobacco Use    Smoking status: Never Smoker    Smokeless tobacco: Never Used   Substance Use Topics    Alcohol use: No    Drug use: No     Review of Systems   Constitutional: Negative for chills and fever.   HENT: Negative for congestion, ear pain, rhinorrhea and sore throat.    Eyes: Negative for pain.   Respiratory: Negative for shortness of breath.    Cardiovascular: Negative for chest pain.   Gastrointestinal: Negative for abdominal pain, anal bleeding, blood in stool, diarrhea, nausea and vomiting.   Genitourinary: Negative for dysuria, flank pain, urgency and vaginal bleeding.   Musculoskeletal: Negative for back pain and neck pain.   Skin: Negative for rash.   Neurological: Positive for dizziness and numbness. Negative for tremors, seizures, syncope, speech difficulty and weakness.        NO LOC   Hematological: Does not bruise/bleed easily.   All other systems reviewed and are negative.      Physical Exam     Initial Vitals [08/20/18 1846]   BP Pulse Resp Temp SpO2   (!) 187/81 70 18 99.1 °F (37.3 °C) 98 %      MAP       --         Physical Exam    Vitals reviewed.  Constitutional: She appears well-developed and well-nourished.   HENT:   Head: Normocephalic and atraumatic.   Nose: Nose normal.   Mouth/Throat: No oropharyngeal exudate.   Eyes: EOM are normal. Pupils are equal, round, and reactive to light.   Neck: Normal range of motion. Neck supple. No JVD present.   Cardiovascular: Regular rhythm and normal heart sounds. Exam reveals no gallop and no friction rub.    No murmur  heard.  Pulmonary/Chest: Breath sounds normal. No stridor. No respiratory distress. She has no wheezes. She has no rhonchi. She has no rales. She exhibits no tenderness.   Abdominal: Soft. Bowel sounds are normal. She exhibits no distension and no mass. There is no tenderness. There is no rebound and no guarding.   Musculoskeletal: Normal range of motion. She exhibits no edema or tenderness.   Neurological: She is alert and oriented to person, place, and time. She has normal strength. No sensory deficit.   Skin: Skin is warm and dry.   Psychiatric: She has a normal mood and affect. Thought content normal.         ED Course   Procedures  Labs Reviewed   CBC W/ AUTO DIFFERENTIAL - Abnormal; Notable for the following components:       Result Value    RBC 3.18 (*)     Hemoglobin 9.2 (*)     Hematocrit 28.7 (*)     RDW 14.9 (*)     All other components within normal limits   COMPREHENSIVE METABOLIC PANEL - Abnormal; Notable for the following components:    Chloride 113 (*)     BUN, Bld 26 (*)     Albumin 3.4 (*)     Anion Gap 6 (*)     eGFR if  55 (*)     eGFR if non  48 (*)     All other components within normal limits   URINALYSIS   PROTIME-INR     EKG Readings: (Independently Interpreted)   Initial Reading: No STEMI. Conduction: LBBB.       Imaging Results          CT Head Without Contrast (Final result)  Result time 08/20/18 19:24:09    Final result by James Abbott MD (08/20/18 19:24:09)                 Impression:      No acute large vascular territory infarct or intracranial hemorrhage identified.  Further evaluation/follow-up as warranted.    Mild senescent changes as above.    Empty sella configuration.      Electronically signed by: James Abbott MD  Date:    08/20/2018  Time:    19:24             Narrative:    EXAMINATION:  CT HEAD WITHOUT CONTRAST    CLINICAL HISTORY:  Dizziness;    TECHNIQUE:  Low dose axial CT images obtained throughout the head without intravenous contrast.  Sagittal and coronal reconstructions were performed.    COMPARISON:  Head CT 05/05/2009 and MRI brain 05/06/2009    FINDINGS:  Intracranial compartment:    Ventricles and sulci are normal in size for age without evidence of hydrocephalus. No extra-axial blood or fluid collections.  Empty sella configuration, nonspecific but can be seen with benign intracranial hypertension.    The periventricular white matter mild chronic small vessel ischemic change.  No definite new focal areas of abnormal parenchymal attenuation.  No parenchymal mass, hemorrhage, edema or major vascular distribution infarct.  Bilateral basal ganglia and skull base vascular calcifications are noted.    Skull/extracranial contents (limited evaluation): No fracture. Mastoid air cells and paranasal sinuses are essentially clear.  Prior surgery to the bilateral ocular lenses.                              X-Rays:   Independently Interpreted Readings:   Head CT: No hemorrhage.  No acute stroke.     Medical Decision Making:   History:   Old Medical Records: I decided to obtain old medical records.  Initial Assessment:   Medical decision-making:    The patient received a medical screening exam. If performed, the EKG was independently evaluated by me and is pending final cardiology evaluation.  If performed, all radiographic studies were independently evaluated by me and are pending final radiology evaluation. If labs were ordered, they were reviewed. Vital signs are independently assessed by me.  If performed, the pulse oximetry was independently evaluated by me.  I decided to obtain the patient's past medical record.  If available, I reviewed the patient's past medical record, including most recent labs and radiology reports.    ED Management:  This is an emergent evaluation for patient complaining of dizziness.    The differential diagnosis includes intracranial hemorrhage, stroke, mass lesion, central vertigo, benign positional vertigo, infection,  dehydration, electrolyte abnormality, orthostatic hypotension, and anemia.     I decided to obtain and review the old medical record.    The patient's orthostatic vital signs were in normal and was not any more symptomatic than at baseline.  Labs were normal except for mild for dehydration with a pre-renal azotemia. NO electrolyte abnormality.  The patient's urine is not infected.  Doubt pneumonia.   CT is negative for intracranial hemorrhage, stroke or edgar mass lesion. Symptoms do no match stoke pattern.   The patient's EKG does not show any signs of acute ischemia or arrhythmia.   H/H is stable.    Pt Reports symptom improvement.         The patient will be discharged the following medications:    The results and physical exam findings were reviewed with the patient. Pt agrees with assessment, disposition and treatment plan and has no further questions or complaints at this time.      SLIM Alejandre M.D. 8:49 PM 8/20/2018              Scribe Attestation:   Scribe #1: I performed the above scribed service and the documentation accurately describes the services I performed. I attest to the accuracy of the note.    Attending Attestation:           Physician Attestation for Scribe:  Physician Attestation Statement for Scribe #1: I, Sammy Alejandre MD, reviewed documentation, as scribed by Rob Pizarro in my presence, and it is both accurate and complete.                    Clinical Impression:   The primary encounter diagnosis was Dizziness. Diagnoses of Near syncope and Dehydration were also pertinent to this visit.                             Sammy Alejandre MD  08/20/18 5902

## 2018-08-21 NOTE — ED NOTES
Pt's discharge was delayed while fluids were infusing. Pt's BP became elevated and stayed elevated after being medicated with Clonidine. Dr. Alejandre aware and ordered hydrazaline. Pt medicated, will continue to monitor pt's vitals. Pt's daughter has gone home but is awaiting call to come and pick pt up to take her home upon discharge.

## 2018-08-21 NOTE — DISCHARGE INSTRUCTIONS
"Return to the Emergency Department of any acute worsening of your symptoms or for any other concern.     You should return to the ED for fever/chills, shortness of breath, chest pain, weakness or "passing out".     Pt should take all medications as prescribed.    Pt should follow up with PCP as soon as possible.    The risks associated with not taking your medications as prescribed and not following up with your Primary Care doctor or sub specialist includes worsening of your condition, pain, disability, loss of function or livelihood, and death      SLIM Alejandre M.D. 8:43 PM 8/20/2018      Our goal in the emergency department is to always give you outstanding care and exceptional service. You may receive a survey by mail or e-mail in the next week regarding your experience in our ED. We would greatly appreciate your completing and returning the survey. Your feedback provides us with a way to recognize our staff who give very good care and it helps us learn how to improve when your experience was below our aspiration of excellence.     "

## 2018-08-24 ENCOUNTER — LAB VISIT (OUTPATIENT)
Dept: LAB | Facility: HOSPITAL | Age: 83
End: 2018-08-24
Attending: FAMILY MEDICINE
Payer: MEDICARE

## 2018-08-24 ENCOUNTER — OFFICE VISIT (OUTPATIENT)
Dept: FAMILY MEDICINE | Facility: CLINIC | Age: 83
End: 2018-08-24
Payer: MEDICARE

## 2018-08-24 VITALS
DIASTOLIC BLOOD PRESSURE: 84 MMHG | TEMPERATURE: 99 F | HEART RATE: 70 BPM | OXYGEN SATURATION: 96 % | SYSTOLIC BLOOD PRESSURE: 174 MMHG

## 2018-08-24 DIAGNOSIS — E66.2 OBESITY HYPOVENTILATION SYNDROME: Chronic | ICD-10-CM

## 2018-08-24 DIAGNOSIS — D64.9 ANEMIA, UNSPECIFIED TYPE: ICD-10-CM

## 2018-08-24 DIAGNOSIS — R42 DIZZINESS: Primary | ICD-10-CM

## 2018-08-24 DIAGNOSIS — N18.9 CHRONIC KIDNEY DISEASE, UNSPECIFIED CKD STAGE: ICD-10-CM

## 2018-08-24 DIAGNOSIS — R42 DIZZINESS: ICD-10-CM

## 2018-08-24 LAB
ALBUMIN SERPL BCP-MCNC: 3.5 G/DL
ALP SERPL-CCNC: 77 U/L
ALT SERPL W/O P-5'-P-CCNC: 10 U/L
ANION GAP SERPL CALC-SCNC: 8 MMOL/L
AST SERPL-CCNC: 12 U/L
BASOPHILS # BLD AUTO: 0.01 K/UL
BASOPHILS NFR BLD: 0.2 %
BILIRUB SERPL-MCNC: 0.3 MG/DL
BUN SERPL-MCNC: 24 MG/DL
CALCIUM SERPL-MCNC: 8.6 MG/DL
CHLORIDE SERPL-SCNC: 107 MMOL/L
CO2 SERPL-SCNC: 26 MMOL/L
CREAT SERPL-MCNC: 1.2 MG/DL
DIFFERENTIAL METHOD: ABNORMAL
EOSINOPHIL # BLD AUTO: 0.1 K/UL
EOSINOPHIL NFR BLD: 2.8 %
ERYTHROCYTE [DISTWIDTH] IN BLOOD BY AUTOMATED COUNT: 14.9 %
EST. GFR  (AFRICAN AMERICAN): 49 ML/MIN/1.73 M^2
EST. GFR  (NON AFRICAN AMERICAN): 43 ML/MIN/1.73 M^2
GLUCOSE SERPL-MCNC: 99 MG/DL
HCT VFR BLD AUTO: 28.9 %
HGB BLD-MCNC: 9.1 G/DL
IRON SERPL-MCNC: 34 UG/DL
LYMPHOCYTES # BLD AUTO: 1.5 K/UL
LYMPHOCYTES NFR BLD: 32.3 %
MCH RBC QN AUTO: 28.7 PG
MCHC RBC AUTO-ENTMCNC: 31.5 G/DL
MCV RBC AUTO: 91 FL
MONOCYTES # BLD AUTO: 0.5 K/UL
MONOCYTES NFR BLD: 10.6 %
NEUTROPHILS # BLD AUTO: 2.5 K/UL
NEUTROPHILS NFR BLD: 53.9 %
PLATELET # BLD AUTO: 218 K/UL
PMV BLD AUTO: 10.4 FL
POTASSIUM SERPL-SCNC: 4.2 MMOL/L
PROT SERPL-MCNC: 6.6 G/DL
RBC # BLD AUTO: 3.17 M/UL
SATURATED IRON: 9 %
SODIUM SERPL-SCNC: 141 MMOL/L
TOTAL IRON BINDING CAPACITY: 361 UG/DL
TRANSFERRIN SERPL-MCNC: 244 MG/DL
WBC # BLD AUTO: 4.64 K/UL

## 2018-08-24 PROCEDURE — 3077F SYST BP >= 140 MM HG: CPT | Mod: CPTII,S$GLB,, | Performed by: FAMILY MEDICINE

## 2018-08-24 PROCEDURE — 99214 OFFICE O/P EST MOD 30 MIN: CPT | Mod: S$GLB,,, | Performed by: FAMILY MEDICINE

## 2018-08-24 PROCEDURE — 85025 COMPLETE CBC W/AUTO DIFF WBC: CPT

## 2018-08-24 PROCEDURE — 80053 COMPREHEN METABOLIC PANEL: CPT

## 2018-08-24 PROCEDURE — 99999 PR PBB SHADOW E&M-EST. PATIENT-LVL III: CPT | Mod: PBBFAC,,, | Performed by: FAMILY MEDICINE

## 2018-08-24 PROCEDURE — 36415 COLL VENOUS BLD VENIPUNCTURE: CPT | Mod: PN

## 2018-08-24 PROCEDURE — 83540 ASSAY OF IRON: CPT

## 2018-08-24 PROCEDURE — 3079F DIAST BP 80-89 MM HG: CPT | Mod: CPTII,S$GLB,, | Performed by: FAMILY MEDICINE

## 2018-08-24 NOTE — PROGRESS NOTES
Routine Office Visit    Patient Name: Cindy Lyman    : 1935  MRN: [id number]    Subjective:  Cindy is a 82 y.o. female who presents today for:    1. Dizziness and numbness  Patient presenting today for dizziness and numbness since 20 when she went to the ED.  She states that she feels fatigued as well.  There has been no numbness around her mouth and in both lower legs (below the knee).  No weakness.  She states that since January she has had a heart valve put in as well as pacemaker.  She has not had any chest pain.  There is no shortness of breath at this time, but she admits to having episodes of shortness of breath in the past.    Past Medical History  Past Medical History:   Diagnosis Date    Anemia     Anticoagulant long-term use     Aortic stenosis     Arthritis     lower back    Asthma     CAD (coronary artery disease) 2015    Carpal tunnel syndrome on both sides     Cataract     CHF (congestive heart failure) 2013    COPD (chronic obstructive pulmonary disease)     Depression     DVT (deep venous thrombosis)     Hyperlipidemia     Hypertension     Pulmonary HTN     TMJ (temporomandibular joint disorder)        Past Surgical History  Past Surgical History:   Procedure Laterality Date    BACK SURGERY      cardiac stents      CARDIAC VALVE SURGERY      CARPAL TUNNEL RELEASE      Right/Left    EYE SURGERY      cataract extraction    HYSTERECTOMY      Partial    JOINT REPLACEMENT  2009    Left hip    POLYPECTOMY      x9    TEMPOROMANDIBULAR JOINT SURGERY         Family History  Family History   Problem Relation Age of Onset    Heart disease Mother     Hypertension Daughter     Cancer Son     Breast cancer Neg Hx     Colon cancer Neg Hx     Ovarian cancer Neg Hx     Esophageal cancer Neg Hx        Social History  Social History     Socioeconomic History    Marital status:      Spouse name: Not on file    Number of children: Not on file     Years of education: Not on file    Highest education level: Not on file   Social Needs    Financial resource strain: Not on file    Food insecurity - worry: Not on file    Food insecurity - inability: Not on file    Transportation needs - medical: Not on file    Transportation needs - non-medical: Not on file   Occupational History    Not on file   Tobacco Use    Smoking status: Never Smoker    Smokeless tobacco: Never Used   Substance and Sexual Activity    Alcohol use: No    Drug use: No    Sexual activity: No   Other Topics Concern    Not on file   Social History Narrative    Not on file       Current Medications  Current Outpatient Medications on File Prior to Visit   Medication Sig Dispense Refill    acetaminophen (TYLENOL) 325 MG tablet Take 2 tablets (650 mg total) by mouth every 6 (six) hours as needed for Pain or Temperature greater than (100.4).  0    aspirin (ECOTRIN) 81 MG EC tablet Take 81 mg by mouth once daily.      azelastine (ASTELIN) 137 mcg (0.1 %) nasal spray 1 spray (137 mcg total) by Nasal route 2 (two) times daily. 30 mL 0    carBAMazepine (TEGRETOL) 200 mg tablet Take 1 tablet (200 mg total) by mouth 3 (three) times daily. 270 tablet 1    cetirizine (ZYRTEC) 10 MG tablet Take 1 tablet (10 mg total) by mouth once daily.  0    cloNIDine (CATAPRES) 0.3 MG tablet Take 1 tablet (0.3 mg total) by mouth 3 (three) times daily. 270 tablet 1    clopidogrel (PLAVIX) 75 mg tablet Take 1 tablet (75 mg total) by mouth once daily. 30 tablet 11    fluticasone (FLONASE) 50 mcg/actuation nasal spray TWO SPRAYS IN EACH NOSTRIL ONCE DAILY 16 g 5    fluticasone (FLOVENT HFA) 220 mcg/actuation inhaler Inhale 1 puff into the lungs 2 (two) times daily. Controller 12 g 2    furosemide (LASIX) 40 MG tablet TAKE ONE TABLET BY MOUTH EVERY DAY AS NEEDED FOR SHORTNESS OF BREATH or leg swelling 90 tablet 1    gabapentin (NEURONTIN) 300 MG capsule ONE CAPSULE BY MOUTH THREE TIMES DAILY 270 capsule 0     hydrALAZINE (APRESOLINE) 100 MG tablet ONE TABLET BY MOUTH THREE TIMES DAILY 270 tablet 0    metoprolol tartrate (LOPRESSOR) 100 MG tablet ONE TABLET BY MOUTH TWICE DAILY 180 tablet 1    nitroGLYCERIN (NITROSTAT) 0.4 MG SL tablet Place 0.4 mg under the tongue every 5 (five) minutes as needed for Chest pain.      rosuvastatin (CRESTOR) 20 MG tablet ONE TABLET BY MOUTH EVERY EVENING 90 tablet 1    verapamil (VERELAN) 360 MG C24P ONE CAPSULE BY MOUTH once a day 90 capsule 3    walker (ULTRA-LIGHT ROLLATOR) Misc One rollator, size large. 1 each 0     No current facility-administered medications on file prior to visit.        Allergies   Review of patient's allergies indicates:   Allergen Reactions    Doxycycline hyclate Diarrhea and Nausea And Vomiting    Singulair [montelukast]      Stomach pain, Muscle pain, Cough      Penicillins      Other reaction(s): Anaphylaxis    Ventolin [albuterol sulfate] Other (See Comments)     Severely elevated blood pressure    Latex, natural rubber Rash       Review of Systems (Pertinent positives)  Review of Systems   Constitutional: Positive for malaise/fatigue.   HENT: Negative.    Eyes: Negative.    Respiratory: Negative.    Cardiovascular: Negative.    Gastrointestinal: Negative.    Skin: Negative.    Neurological: Positive for dizziness and sensory change.         BP (!) 174/84 (BP Location: Right arm, Patient Position: Sitting, BP Method: Medium (Manual))   Pulse 70   Temp 98.7 °F (37.1 °C) (Oral)   SpO2 96%     GENERAL APPEARANCE: in no apparent distress and well developed and well nourished  HEENT: PERRLA EOMI, Sclera clear, anicteric, Oropharynx clear, no lesions, Neck supple with midline trachea  NECK: normal, supple, no adenopathy, thyroid normal in size  RESPIRATORY: appears well, vitals normal, no respiratory distress, acyanotic, normal RR, chest clear, no wheezing, crepitations, rhonchi, normal symmetric air entry  HEART: regular rate and rhythm, S1, S2  normal, no murmur, click, rub or gallop.    ABDOMEN: abdomen is soft without tenderness, no masses, no hernias, no organomegaly, no rebound, no guarding. Suprapubic tenderness absent. No CVA tenderness.  NEUROLOGIC: normal without focal findings, CN II-XII are intact.    SKIN: no rashes, no wounds, no other lesions  PSYCH: Alert, oriented x 3, thought content appropriate, speech normal, pleasant and cooperative, good eye contact, well groomed    Assessment/Plan:  Cindy Lyman is a 82 y.o. female who presents today for :    Diagnoses and all orders for this visit:    Dizziness  -     CBC auto differential; Future  -     Comprehensive metabolic panel; Future  -     Iron and TIBC; Future    Obesity hypoventilation syndrome    Anemia, unspecified type    Chronic kidney disease, unspecified CKD stage   -     Iron and TIBC; Future      1.  Labs to be done today  2.  Follow up if no improvement soon  3.  Call with any concerns      Rodger Camarena MD

## 2018-08-28 DIAGNOSIS — M54.31 RIGHT SCIATIC NERVE PAIN: ICD-10-CM

## 2018-08-28 RX ORDER — GABAPENTIN 300 MG/1
CAPSULE ORAL
Qty: 270 CAPSULE | Refills: 0 | Status: SHIPPED | OUTPATIENT
Start: 2018-08-28 | End: 2019-01-05 | Stop reason: SDUPTHER

## 2018-08-28 RX ORDER — HYDRALAZINE HYDROCHLORIDE 100 MG/1
TABLET, FILM COATED ORAL
Qty: 270 TABLET | Refills: 0 | Status: SHIPPED | OUTPATIENT
Start: 2018-08-28 | End: 2019-04-16

## 2018-08-29 ENCOUNTER — OFFICE VISIT (OUTPATIENT)
Dept: SURGERY | Facility: CLINIC | Age: 83
End: 2018-08-29
Payer: MEDICARE

## 2018-08-29 ENCOUNTER — TELEPHONE (OUTPATIENT)
Dept: GASTROENTEROLOGY | Facility: CLINIC | Age: 83
End: 2018-08-29

## 2018-08-29 VITALS
HEART RATE: 69 BPM | BODY MASS INDEX: 33.79 KG/M2 | HEIGHT: 65 IN | WEIGHT: 202.81 LBS | DIASTOLIC BLOOD PRESSURE: 77 MMHG | TEMPERATURE: 97 F | SYSTOLIC BLOOD PRESSURE: 160 MMHG

## 2018-08-29 DIAGNOSIS — K31.7 GASTRIC POLYP: Primary | ICD-10-CM

## 2018-08-29 DIAGNOSIS — K31.9 GASTRIC LESION: Primary | ICD-10-CM

## 2018-08-29 PROCEDURE — 99213 OFFICE O/P EST LOW 20 MIN: CPT | Mod: S$GLB,,, | Performed by: SURGERY

## 2018-08-29 PROCEDURE — 99999 PR PBB SHADOW E&M-EST. PATIENT-LVL III: CPT | Mod: PBBFAC,,, | Performed by: SURGERY

## 2018-08-29 PROCEDURE — 3077F SYST BP >= 140 MM HG: CPT | Mod: CPTII,S$GLB,, | Performed by: SURGERY

## 2018-08-29 PROCEDURE — 3078F DIAST BP <80 MM HG: CPT | Mod: CPTII,S$GLB,, | Performed by: SURGERY

## 2018-08-29 NOTE — LETTER
August 29, 2018        Socrates Andrew MD  [address]  [facility] [address]             [facility]  [address]  [facility] [address]  Phone: [phone]   Patient: Cindy Lyman   MR Number: [id number]   YOB: 1935   Date of Visit: 8/29/2018       Dear Dr. Andrew:    Thank you for referring Cindy Lyman to me for evaluation. Attached you will find relevant portions of my assessment and plan of care.    If you have questions, please do not hesitate to call me. I look forward to following Cindy Lyman along with you.    Sincerely,      Dejon Cordero MD            CC  No Recipients    Enclosure

## 2018-08-29 NOTE — PROGRESS NOTES
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  Her last EUS was in April.  Possible T2 leasion with invasion to the muscularis propria.  Pt presents after she underwent extensive cardiac work.  She has had a TVAR that was complicated by complete heart block and had a pacer placed.  Since her pacer and TVAR she has been feeling much better.  Her dyspnea has signifiacntly improved as has her functional status.  She is mostly limited now by her OA.      Pt presents to discuss ongoing therapy of this gastric mass    Vitals:    08/29/18 0949   BP: (!) 160/77   Pulse: 69   Temp: 97.1 °F (36.2 °C)     Abd soft, NT, ND  Palpable pacer     A/P:   3 cm gastric mass with HGD  Last discussion was for endoscopic submucosal dissection with Dr. Andrew with possible lap closure if a gastrotomy was made  Will discuss with him.  Pt is very hesitant to proceed with open surgery and will discuss options with her family as well.    She has done well after her TAVR and pacemaker placement. Will confer with Dr Andrew to re-set the plan for management of her proximal gastric polyp:    ---ESD with surgical standby, or  ---collaborative endoscopic resection and robotic-assisted closure?    Will let Mrs Lyman know after I have talked to Dr Andrew.

## 2018-09-04 ENCOUNTER — TELEPHONE (OUTPATIENT)
Dept: ELECTROPHYSIOLOGY | Facility: CLINIC | Age: 83
End: 2018-09-04

## 2018-09-04 ENCOUNTER — TELEPHONE (OUTPATIENT)
Dept: CARDIOLOGY | Facility: CLINIC | Age: 83
End: 2018-09-04

## 2018-09-04 NOTE — TELEPHONE ENCOUNTER
Left vm to call back   ef    ----- Message from Vandana Lewis sent at 9/4/2018 12:18 PM CDT -----  Contact: self  [phone]  Pt is requesting to speak to you regarding she is feeling dizzy. Ple call pt [phone]. Thanks.....Patricia

## 2018-09-04 NOTE — TELEPHONE ENCOUNTER
Patient called on hospital [facility]. Jose has been dizzy the past 2 weeks and not feeling well. Will speak w/ [employer] tech and get back w/ her. Patient verbalized understanding

## 2018-09-07 ENCOUNTER — TELEPHONE (OUTPATIENT)
Dept: GASTROENTEROLOGY | Facility: CLINIC | Age: 83
End: 2018-09-07

## 2018-09-11 ENCOUNTER — TELEPHONE (OUTPATIENT)
Dept: GASTROENTEROLOGY | Facility: CLINIC | Age: 83
End: 2018-09-11

## 2018-09-11 NOTE — TELEPHONE ENCOUNTER
Spoke with patient in regards to scheduling EGD/EUS. She stated that she did not want to schedule at this time.

## 2018-09-11 NOTE — TELEPHONE ENCOUNTER
Message   Received: Today   Message Contents   MD Aidee Reddy MA   Caller: Unspecified (Today,  8:23 AM)             Can we see her in clinic with her family to discuss?   R      Will call her to discuss

## 2018-09-17 ENCOUNTER — TELEPHONE (OUTPATIENT)
Dept: ENDOSCOPY | Facility: HOSPITAL | Age: 83
End: 2018-09-17

## 2018-09-17 NOTE — TELEPHONE ENCOUNTER
Spoke with patient. Offered her a clinic appointment to come and discuss endoscopic procedures. She declined appointment. She said she had other issues at this time. She does have the office number and will call back if she wants to schedule.

## 2018-09-30 ENCOUNTER — HOSPITAL ENCOUNTER (INPATIENT)
Facility: HOSPITAL | Age: 83
LOS: 21 days | Discharge: HOSPICE | DRG: 208 | End: 2018-10-21
Attending: EMERGENCY MEDICINE | Admitting: EMERGENCY MEDICINE
Payer: MEDICARE

## 2018-09-30 DIAGNOSIS — R07.9 CHEST PAIN: ICD-10-CM

## 2018-09-30 DIAGNOSIS — Z51.5 PALLIATIVE CARE ENCOUNTER: ICD-10-CM

## 2018-09-30 DIAGNOSIS — E87.20 ACIDOSIS: ICD-10-CM

## 2018-09-30 DIAGNOSIS — R63.30 FEEDING DIFFICULTIES: ICD-10-CM

## 2018-09-30 DIAGNOSIS — J81.0 ACUTE PULMONARY EDEMA: Primary | ICD-10-CM

## 2018-09-30 DIAGNOSIS — I25.10 CORONARY ARTERY DISEASE INVOLVING NATIVE CORONARY ARTERY OF NATIVE HEART WITHOUT ANGINA PECTORIS: Chronic | ICD-10-CM

## 2018-09-30 DIAGNOSIS — Z45.2 ENCOUNTER FOR CENTRAL LINE PLACEMENT: ICD-10-CM

## 2018-09-30 DIAGNOSIS — R94.31 ABNORMAL ECG: ICD-10-CM

## 2018-09-30 DIAGNOSIS — I10 ESSENTIAL HYPERTENSION: ICD-10-CM

## 2018-09-30 DIAGNOSIS — N17.9 AKI (ACUTE KIDNEY INJURY): ICD-10-CM

## 2018-09-30 DIAGNOSIS — I46.9 CARDIAC ARREST: ICD-10-CM

## 2018-09-30 PROBLEM — J96.01 ACUTE HYPOXEMIC RESPIRATORY FAILURE: Status: ACTIVE | Noted: 2018-09-30

## 2018-09-30 PROBLEM — J44.9 COPD (CHRONIC OBSTRUCTIVE PULMONARY DISEASE): Status: RESOLVED | Noted: 2017-09-15 | Resolved: 2018-09-30

## 2018-09-30 PROBLEM — J20.9 COPD (CHRONIC OBSTRUCTIVE PULMONARY DISEASE) WITH ACUTE BRONCHITIS: Status: RESOLVED | Noted: 2018-01-03 | Resolved: 2018-09-30

## 2018-09-30 PROBLEM — J44.0 COPD (CHRONIC OBSTRUCTIVE PULMONARY DISEASE) WITH ACUTE BRONCHITIS: Status: RESOLVED | Noted: 2018-01-03 | Resolved: 2018-09-30

## 2018-09-30 LAB
ABO + RH BLD: NORMAL
ALBUMIN SERPL BCP-MCNC: 3.7 G/DL
ALLENS TEST: ABNORMAL
ALLENS TEST: ABNORMAL
ALP SERPL-CCNC: 96 U/L
ALT SERPL W/O P-5'-P-CCNC: 31 U/L
ANION GAP SERPL CALC-SCNC: 18 MMOL/L
APTT BLDCRRT: 23.3 SEC
AST SERPL-CCNC: 35 U/L
BACTERIA #/AREA URNS HPF: ABNORMAL /HPF
BASOPHILS # BLD AUTO: 0.02 K/UL
BASOPHILS NFR BLD: 0.2 %
BILIRUB SERPL-MCNC: 0.3 MG/DL
BILIRUB UR QL STRIP: NEGATIVE
BLD GP AB SCN CELLS X3 SERPL QL: NORMAL
BNP SERPL-MCNC: 621 PG/ML
BUN SERPL-MCNC: 17 MG/DL
CALCIUM SERPL-MCNC: 9.1 MG/DL
CHLORIDE SERPL-SCNC: 109 MMOL/L
CLARITY UR: ABNORMAL
CO2 SERPL-SCNC: 15 MMOL/L
COLOR UR: YELLOW
CREAT SERPL-MCNC: 1.3 MG/DL
DELSYS: ABNORMAL
DIFFERENTIAL METHOD: ABNORMAL
EOSINOPHIL # BLD AUTO: 0.3 K/UL
EOSINOPHIL NFR BLD: 3.1 %
ERYTHROCYTE [DISTWIDTH] IN BLOOD BY AUTOMATED COUNT: 15.7 %
ERYTHROCYTE [SEDIMENTATION RATE] IN BLOOD BY WESTERGREN METHOD: 22 MM/H
EST. GFR  (AFRICAN AMERICAN): 45 ML/MIN/1.73 M^2
EST. GFR  (NON AFRICAN AMERICAN): 39 ML/MIN/1.73 M^2
FIO2: 100
GLUCOSE SERPL-MCNC: 240 MG/DL
GLUCOSE UR QL STRIP: ABNORMAL
HCO3 UR-SCNC: 19.3 MMOL/L (ref 24–28)
HCO3 UR-SCNC: 23.8 MMOL/L (ref 24–28)
HCT VFR BLD AUTO: 32.5 %
HCT VFR BLD CALC: 33 %PCV (ref 36–54)
HGB BLD-MCNC: 10.1 G/DL
HGB UR QL STRIP: ABNORMAL
HYALINE CASTS #/AREA URNS LPF: 0 /LPF
INR PPP: 1
KETONES UR QL STRIP: NEGATIVE
LACTATE SERPL-SCNC: 1.5 MMOL/L
LACTATE SERPL-SCNC: 6.2 MMOL/L
LEUKOCYTE ESTERASE UR QL STRIP: NEGATIVE
LYMPHOCYTES # BLD AUTO: 4 K/UL
LYMPHOCYTES NFR BLD: 47.9 %
MCH RBC QN AUTO: 28.9 PG
MCHC RBC AUTO-ENTMCNC: 31.1 G/DL
MCV RBC AUTO: 93 FL
MICROSCOPIC COMMENT: ABNORMAL
MIN VOL: 10.9
MODE: ABNORMAL
MONOCYTES # BLD AUTO: 0.8 K/UL
MONOCYTES NFR BLD: 10 %
NEUTROPHILS # BLD AUTO: 3.3 K/UL
NEUTROPHILS NFR BLD: 38.8 %
NITRITE UR QL STRIP: NEGATIVE
PCO2 BLDA: 34.8 MMHG (ref 35–45)
PCO2 BLDA: 92.3 MMHG (ref 35–45)
PEEP: 5
PH SMN: 6.93 [PH] (ref 7.35–7.45)
PH SMN: 7.44 [PH] (ref 7.35–7.45)
PH UR STRIP: 6 [PH] (ref 5–8)
PIP: 30
PLATELET # BLD AUTO: 233 K/UL
PMV BLD AUTO: 9.7 FL
PO2 BLDA: 177 MMHG (ref 80–100)
PO2 BLDA: 37 MMHG (ref 40–60)
POC BE: -14 MMOL/L
POC BE: 0 MMOL/L
POC IONIZED CALCIUM: 1.19 MMOL/L (ref 1.06–1.42)
POC SATURATED O2: 100 % (ref 95–100)
POC SATURATED O2: 38 % (ref 95–100)
POC TCO2: 22 MMOL/L (ref 24–29)
POC TCO2: 25 MMOL/L (ref 23–27)
POCT GLUCOSE: 234 MG/DL (ref 70–110)
POTASSIUM BLD-SCNC: 3.7 MMOL/L (ref 3.5–5.1)
POTASSIUM SERPL-SCNC: 3.8 MMOL/L
PROT SERPL-MCNC: 7.1 G/DL
PROT UR QL STRIP: ABNORMAL
PROTHROMBIN TIME: 10.3 SEC
PROVIDER NOTIFIED: ABNORMAL
RBC # BLD AUTO: 3.5 M/UL
RBC #/AREA URNS HPF: >100 /HPF (ref 0–4)
SAMPLE: ABNORMAL
SAMPLE: ABNORMAL
SITE: ABNORMAL
SITE: ABNORMAL
SODIUM BLD-SCNC: 143 MMOL/L (ref 136–145)
SODIUM SERPL-SCNC: 142 MMOL/L
SP GR UR STRIP: 1.01 (ref 1–1.03)
SP02: 100
SQUAMOUS #/AREA URNS HPF: 10 /HPF
TROPONIN I SERPL DL<=0.01 NG/ML-MCNC: 0.02 NG/ML
TROPONIN I SERPL DL<=0.01 NG/ML-MCNC: 0.12 NG/ML
TROPONIN I SERPL DL<=0.01 NG/ML-MCNC: 0.14 NG/ML
URN SPEC COLLECT METH UR: ABNORMAL
UROBILINOGEN UR STRIP-ACNC: NEGATIVE EU/DL
VT: 500
WBC # BLD AUTO: 8.38 K/UL
WBC #/AREA URNS HPF: 0 /HPF (ref 0–5)
YEAST URNS QL MICRO: ABNORMAL

## 2018-09-30 PROCEDURE — S0028 INJECTION, FAMOTIDINE, 20 MG: HCPCS | Performed by: INTERNAL MEDICINE

## 2018-09-30 PROCEDURE — 25000003 PHARM REV CODE 250: Performed by: EMERGENCY MEDICINE

## 2018-09-30 PROCEDURE — 92950 HEART/LUNG RESUSCITATION CPR: CPT

## 2018-09-30 PROCEDURE — 36415 COLL VENOUS BLD VENIPUNCTURE: CPT

## 2018-09-30 PROCEDURE — 63600175 PHARM REV CODE 636 W HCPCS: Performed by: STUDENT IN AN ORGANIZED HEALTH CARE EDUCATION/TRAINING PROGRAM

## 2018-09-30 PROCEDURE — 63600175 PHARM REV CODE 636 W HCPCS: Performed by: EMERGENCY MEDICINE

## 2018-09-30 PROCEDURE — 85730 THROMBOPLASTIN TIME PARTIAL: CPT

## 2018-09-30 PROCEDURE — 84484 ASSAY OF TROPONIN QUANT: CPT | Mod: 91

## 2018-09-30 PROCEDURE — 87040 BLOOD CULTURE FOR BACTERIA: CPT | Mod: 59

## 2018-09-30 PROCEDURE — 96368 THER/DIAG CONCURRENT INF: CPT

## 2018-09-30 PROCEDURE — 36600 WITHDRAWAL OF ARTERIAL BLOOD: CPT

## 2018-09-30 PROCEDURE — 83880 ASSAY OF NATRIURETIC PEPTIDE: CPT

## 2018-09-30 PROCEDURE — 25000003 PHARM REV CODE 250: Performed by: STUDENT IN AN ORGANIZED HEALTH CARE EDUCATION/TRAINING PROGRAM

## 2018-09-30 PROCEDURE — 99291 CRITICAL CARE FIRST HOUR: CPT | Mod: ,,, | Performed by: INTERNAL MEDICINE

## 2018-09-30 PROCEDURE — 25000003 PHARM REV CODE 250: Performed by: INTERNAL MEDICINE

## 2018-09-30 PROCEDURE — S0073 INJECTION, AZTREONAM, 500 MG: HCPCS | Performed by: EMERGENCY MEDICINE

## 2018-09-30 PROCEDURE — 25500020 PHARM REV CODE 255: Performed by: EMERGENCY MEDICINE

## 2018-09-30 PROCEDURE — 36556 INSERT NON-TUNNEL CV CATH: CPT

## 2018-09-30 PROCEDURE — 81000 URINALYSIS NONAUTO W/SCOPE: CPT

## 2018-09-30 PROCEDURE — 02HV33Z INSERTION OF INFUSION DEVICE INTO SUPERIOR VENA CAVA, PERCUTANEOUS APPROACH: ICD-10-PCS | Performed by: EMERGENCY MEDICINE

## 2018-09-30 PROCEDURE — 93010 ELECTROCARDIOGRAM REPORT: CPT | Mod: ,,, | Performed by: INTERNAL MEDICINE

## 2018-09-30 PROCEDURE — 27000221 HC OXYGEN, UP TO 24 HOURS

## 2018-09-30 PROCEDURE — 99291 CRITICAL CARE FIRST HOUR: CPT | Mod: 25

## 2018-09-30 PROCEDURE — 80053 COMPREHEN METABOLIC PANEL: CPT

## 2018-09-30 PROCEDURE — 83605 ASSAY OF LACTIC ACID: CPT

## 2018-09-30 PROCEDURE — 5A1945Z RESPIRATORY VENTILATION, 24-96 CONSECUTIVE HOURS: ICD-10-PCS | Performed by: EMERGENCY MEDICINE

## 2018-09-30 PROCEDURE — 85610 PROTHROMBIN TIME: CPT

## 2018-09-30 PROCEDURE — 99900035 HC TECH TIME PER 15 MIN (STAT)

## 2018-09-30 PROCEDURE — 83605 ASSAY OF LACTIC ACID: CPT | Mod: 91

## 2018-09-30 PROCEDURE — 82962 GLUCOSE BLOOD TEST: CPT

## 2018-09-30 PROCEDURE — 94002 VENT MGMT INPAT INIT DAY: CPT

## 2018-09-30 PROCEDURE — 85025 COMPLETE CBC W/AUTO DIFF WBC: CPT

## 2018-09-30 PROCEDURE — 99900026 HC AIRWAY MAINTENANCE (STAT)

## 2018-09-30 PROCEDURE — 86901 BLOOD TYPING SEROLOGIC RH(D): CPT

## 2018-09-30 PROCEDURE — 93005 ELECTROCARDIOGRAM TRACING: CPT

## 2018-09-30 PROCEDURE — 63600175 PHARM REV CODE 636 W HCPCS: Performed by: INTERNAL MEDICINE

## 2018-09-30 PROCEDURE — 96366 THER/PROPH/DIAG IV INF ADDON: CPT

## 2018-09-30 PROCEDURE — 96365 THER/PROPH/DIAG IV INF INIT: CPT

## 2018-09-30 PROCEDURE — 0BH17EZ INSERTION OF ENDOTRACHEAL AIRWAY INTO TRACHEA, VIA NATURAL OR ARTIFICIAL OPENING: ICD-10-PCS | Performed by: EMERGENCY MEDICINE

## 2018-09-30 PROCEDURE — 31500 INSERT EMERGENCY AIRWAY: CPT

## 2018-09-30 PROCEDURE — 20000000 HC ICU ROOM

## 2018-09-30 PROCEDURE — 96375 TX/PRO/DX INJ NEW DRUG ADDON: CPT

## 2018-09-30 PROCEDURE — 82803 BLOOD GASES ANY COMBINATION: CPT

## 2018-09-30 RX ORDER — PROPOFOL 10 MG/ML
20 INJECTION, EMULSION INTRAVENOUS CONTINUOUS
Status: DISCONTINUED | OUTPATIENT
Start: 2018-09-30 | End: 2018-10-03

## 2018-09-30 RX ORDER — ENOXAPARIN SODIUM 100 MG/ML
40 INJECTION SUBCUTANEOUS EVERY 24 HOURS
Status: DISCONTINUED | OUTPATIENT
Start: 2018-09-30 | End: 2018-09-30 | Stop reason: SDUPTHER

## 2018-09-30 RX ORDER — CLOPIDOGREL BISULFATE 75 MG/1
75 TABLET ORAL DAILY
Status: DISCONTINUED | OUTPATIENT
Start: 2018-09-30 | End: 2018-10-01

## 2018-09-30 RX ORDER — HYDRALAZINE HYDROCHLORIDE 20 MG/ML
5 INJECTION INTRAMUSCULAR; INTRAVENOUS EVERY 8 HOURS PRN
Status: DISCONTINUED | OUTPATIENT
Start: 2018-09-30 | End: 2018-10-02

## 2018-09-30 RX ORDER — EPINEPHRINE 0.1 MG/ML
INJECTION INTRAVENOUS CODE/TRAUMA/SEDATION MEDICATION
Status: COMPLETED | OUTPATIENT
Start: 2018-09-30 | End: 2018-09-30

## 2018-09-30 RX ORDER — FAMOTIDINE 10 MG/ML
20 INJECTION INTRAVENOUS DAILY
Status: DISCONTINUED | OUTPATIENT
Start: 2018-09-30 | End: 2018-09-30 | Stop reason: SDUPTHER

## 2018-09-30 RX ORDER — FUROSEMIDE 10 MG/ML
40 INJECTION INTRAMUSCULAR; INTRAVENOUS
Status: COMPLETED | OUTPATIENT
Start: 2018-09-30 | End: 2018-09-30

## 2018-09-30 RX ORDER — PROPOFOL 10 MG/ML
5 INJECTION, EMULSION INTRAVENOUS CONTINUOUS
Status: DISCONTINUED | OUTPATIENT
Start: 2018-09-30 | End: 2018-09-30

## 2018-09-30 RX ORDER — FAMOTIDINE 10 MG/ML
20 INJECTION INTRAVENOUS DAILY
Status: DISCONTINUED | OUTPATIENT
Start: 2018-09-30 | End: 2018-10-03

## 2018-09-30 RX ORDER — SODIUM CHLORIDE 0.9 % (FLUSH) 0.9 %
3 SYRINGE (ML) INJECTION
Status: DISCONTINUED | OUTPATIENT
Start: 2018-09-30 | End: 2018-10-06

## 2018-09-30 RX ORDER — HYDRALAZINE HYDROCHLORIDE 25 MG/1
75 TABLET, FILM COATED ORAL EVERY 8 HOURS
Status: DISCONTINUED | OUTPATIENT
Start: 2018-09-30 | End: 2018-10-02

## 2018-09-30 RX ORDER — ASPIRIN 600 MG/1
300 SUPPOSITORY RECTAL
Status: COMPLETED | OUTPATIENT
Start: 2018-09-30 | End: 2018-09-30

## 2018-09-30 RX ORDER — FAMOTIDINE 10 MG/ML
20 INJECTION INTRAVENOUS 2 TIMES DAILY
Status: DISCONTINUED | OUTPATIENT
Start: 2018-09-30 | End: 2018-09-30

## 2018-09-30 RX ORDER — HEPARIN SODIUM 5000 [USP'U]/ML
5000 INJECTION, SOLUTION INTRAVENOUS; SUBCUTANEOUS EVERY 8 HOURS
Status: DISCONTINUED | OUTPATIENT
Start: 2018-09-30 | End: 2018-10-03

## 2018-09-30 RX ORDER — CHLORHEXIDINE GLUCONATE ORAL RINSE 1.2 MG/ML
15 SOLUTION DENTAL 2 TIMES DAILY
Status: DISCONTINUED | OUTPATIENT
Start: 2018-09-30 | End: 2018-10-03

## 2018-09-30 RX ADMIN — PROPOFOL 35 MCG/KG/MIN: 10 INJECTION, EMULSION INTRAVENOUS at 10:09

## 2018-09-30 RX ADMIN — FUROSEMIDE 40 MG: 10 INJECTION, SOLUTION INTRAMUSCULAR; INTRAVENOUS at 12:09

## 2018-09-30 RX ADMIN — CHLORHEXIDINE GLUCONATE 15 ML: 1.2 RINSE ORAL at 09:09

## 2018-09-30 RX ADMIN — IOHEXOL 75 ML: 350 INJECTION, SOLUTION INTRAVENOUS at 01:09

## 2018-09-30 RX ADMIN — HYDRALAZINE HYDROCHLORIDE 5 MG: 20 INJECTION INTRAMUSCULAR; INTRAVENOUS at 08:09

## 2018-09-30 RX ADMIN — PROPOFOL 20 MCG/KG/MIN: 10 INJECTION, EMULSION INTRAVENOUS at 02:09

## 2018-09-30 RX ADMIN — HYDRALAZINE HYDROCHLORIDE 75 MG: 25 TABLET ORAL at 09:09

## 2018-09-30 RX ADMIN — EPINEPHRINE 1 MG: 0.1 INJECTION, SOLUTION ENDOTRACHEAL; INTRACARDIAC; INTRAVENOUS at 09:09

## 2018-09-30 RX ADMIN — AZTREONAM 2 G: 2 INJECTION, POWDER, LYOPHILIZED, FOR SOLUTION INTRAMUSCULAR; INTRAVENOUS at 02:09

## 2018-09-30 RX ADMIN — AZTREONAM 2 G: 2 INJECTION, POWDER, LYOPHILIZED, FOR SOLUTION INTRAMUSCULAR; INTRAVENOUS at 07:09

## 2018-09-30 RX ADMIN — AZITHROMYCIN MONOHYDRATE 500 MG: 500 INJECTION, POWDER, LYOPHILIZED, FOR SOLUTION INTRAVENOUS at 12:09

## 2018-09-30 RX ADMIN — FAMOTIDINE 20 MG: 10 INJECTION, SOLUTION INTRAVENOUS at 02:09

## 2018-09-30 RX ADMIN — ASPIRIN 300 MG: 600 SUPPOSITORY RECTAL at 10:09

## 2018-09-30 RX ADMIN — HEPARIN SODIUM 5000 UNITS: 5000 INJECTION, SOLUTION INTRAVENOUS; SUBCUTANEOUS at 09:09

## 2018-09-30 RX ADMIN — PROPOFOL 20 MCG/KG/MIN: 10 INJECTION, EMULSION INTRAVENOUS at 10:09

## 2018-09-30 RX ADMIN — VANCOMYCIN HYDROCHLORIDE 1500 MG: 1 INJECTION, POWDER, LYOPHILIZED, FOR SOLUTION INTRAVENOUS at 12:09

## 2018-09-30 RX ADMIN — HEPARIN SODIUM 5000 UNITS: 5000 INJECTION, SOLUTION INTRAVENOUS; SUBCUTANEOUS at 02:09

## 2018-09-30 NOTE — CARE UPDATE
Patient transported to CT via transport ventilator, back to ER then ICU to room 277. Placed back on ventilator upon arrival to ICU with charted settings. All ventilator alarms on, set and functioning. ambu bag and mask at patient bedside at all times. Oral suction done upon arrival, patient repositioned at 30 degree angle. Will continue with ventilator management.

## 2018-09-30 NOTE — CARE UPDATE
Patient received via EMS. CPR initiated. Patient intubated with 7.0 ETT secured at 23cm to right side of lip and placed on servoi ventilator with charted settings. All ventilator alarms on, set and functioning. ambu bag and mask at bedside. VBG with electrolytes obtained per Dr. Velez. Results reported to Dr. Velez. Sputum culture obtained post intubation.

## 2018-09-30 NOTE — ASSESSMENT & PLAN NOTE
No evidence of hematologic disease on recent bone marrow.  Transfuse as required to maintain a hgb>7.

## 2018-09-30 NOTE — CONSULTS
[facility]  Pulmonology  Consult Note    Patient Name: Cindy Lyman  MRN: [id number]  Admission Date: 9/30/2018  Hospital Length of Stay: 0 days  Code Status: Prior  Attending Physician: Riya Velez MD  Primary Care Provider: Rodger Camarena MD   Principal Problem: <principal problem not specified>    Inpatient consult to Critical Care Medicine  Consult performed by: Nataliia Estrada MD  Consult ordered by: Riya Velez MD        Subjective:     HPI:  Ms. Lyman is an 82 year old woman with a history of HTN, hyperlipidemia, FIFI, intramucosal gastric carcinoma and aortic stenosis s/p TAVR with subsequent heart block requiring pacemaker who presented to the ED with worsening shortness of breath and upon arrival had a cardiac arrest.  ROSC was achieved after 1 round of epi. Patient was subsequently intubated and pulmonary was called for vent management.      No family was available at the time of my evaluation to provide further history. History was obtained from chart review.      Past Medical History:   Diagnosis Date    Anemia     Anticoagulant long-term use     Aortic stenosis     Arthritis     lower back    Asthma     CAD (coronary artery disease) 4/24/2015    Carpal tunnel syndrome on both sides     Cataract     CHF (congestive heart failure) 5/2013    COPD (chronic obstructive pulmonary disease)     Depression     DVT (deep venous thrombosis)     Hyperlipidemia     Hypertension     Pulmonary HTN     TMJ (temporomandibular joint disorder)        Past Surgical History:   Procedure Laterality Date    A-V CARDIAC PACEMAKER INSERTION N/A 7/5/2018    Procedure: INSERTION, CARDIAC PACEMAKER, DUAL CHAMBER;  Surgeon: Giancarlo Houser MD;  Location: [facility] CATH LAB;  Service: Cardiology;  Laterality: N/A;    AORTIC VALVE REPLACEMENT N/A 7/5/2018    Procedure: Replacement-valve-aortic;  Surgeon: Corey Castañeda MD;  Location: [facility] CATH LAB;  Service: Cardiology;   Laterality: N/A;    BACK SURGERY      BIOPSY-BONE MARROW N/A 5/26/2016    Performed by Rufino Ovalle MD at [facility] ENDO    cardiac stents      CARDIAC VALVE SURGERY      CARPAL TUNNEL RELEASE      Right/Left    CHOLECYSTECTOMY-LAPAROSCOPIC W/ CHOLANGIOGRAM N/A 7/13/2017    Performed by Luis Carlos Jarvis MD at [facility] OR    EYE SURGERY      cataract extraction    HYSTERECTOMY      Partial    INSERTION, CARDIAC PACEMAKER, DUAL CHAMBER N/A 7/5/2018    Performed by Giancarlo Houser MD at [facility] CATH LAB    JOINT REPLACEMENT  2009    Left hip    POLYPECTOMY      x9    Replacement-valve-aortic N/A 7/5/2018    Performed by Corey Castañeda MD at [facility] CATH LAB    TEMPOROMANDIBULAR JOINT SURGERY      ULTRASOUND-ENDOSCOPIC-UPPER N/A 4/25/2018    Performed by Socrates Andrew MD at [facility] ENDO (2ND FLR)       Review of patient's allergies indicates:   Allergen Reactions    Doxycycline hyclate Diarrhea and Nausea And Vomiting    Singulair [montelukast]      Stomach pain, Muscle pain, Cough      Penicillins      Other reaction(s): Anaphylaxis    Ventolin [albuterol sulfate] Other (See Comments)     Severely elevated blood pressure    Latex, natural rubber Rash       Family History     Problem Relation (Age of Onset)    Cancer Son    Heart disease Mother    Hypertension Daughter        Tobacco Use    Smoking status: Never Smoker    Smokeless tobacco: Never Used   Substance and Sexual Activity    Alcohol use: No    Drug use: No    Sexual activity: No         Review of Systems   Unable to perform ROS: Intubated     Objective:     Vital Signs (Most Recent):  Pulse: 79 (09/30/18 1200)  Resp: (!) 9 (09/30/18 1200)  BP: (!) 183/74 (09/30/18 1200)  SpO2: 100 % (09/30/18 1200) Vital Signs (24h Range):  Pulse:  [71-79] 79  Resp:  [9-35] 9  SpO2:  [100 %] 100 %  BP: (153-183)/(70-74) 183/74     Weight: 100 kg (220 lb 7.4 oz)  Body mass index is 36.69 kg/m².    No intake or output data in the 24 hours ending 09/30/18  1234    Physical Exam   Constitutional: She appears well-developed. She is intubated.   HENT:   Head: Normocephalic.   Eyes: Conjunctivae and EOM are normal. Pupils are equal, round, and reactive to light.   Neck: Neck supple. No tracheal deviation present.   Cardiovascular: Normal rate, regular rhythm and normal heart sounds.   No murmur heard.  Pulmonary/Chest: Effort normal and breath sounds normal. She is intubated. No respiratory distress. She has no wheezes. She has no rales.   Abdominal: Soft. Bowel sounds are normal. She exhibits no distension.   Musculoskeletal: She exhibits no edema.   Neurological:   Pupils are 2+ and responsive to light.  Cough, gag, and corneal reflex intact.  Patient responding to painful stimuli- withdrawing all four extremities.    Skin: Skin is warm and dry.   Vitals reviewed.      Vents:  Vent Mode: PRVC (09/30/18 1200)  Ventilator Initiated: Yes (09/30/18 1027)  Set Rate: 22 bmp (09/30/18 1200)  Vt Set: 500 mL (09/30/18 1200)  PEEP/CPAP: 5 cmH20 (09/30/18 1200)  Oxygen Concentration (%): 50 (09/30/18 1200)  Peak Airway Pressure: 26.8 cmH2O (09/30/18 1200)  Total Ve: 11.1 mL (09/30/18 1200)  F/VT Ratio<105 (RSBI): (!) 17.58 (09/30/18 1200)    Lines/Drains/Airways     Drain                 NG/OG Tube 09/30/18 1035 Mcdaniel sump 16 Fr. Center mouth;Left mouth;Right mouth less than 1 day         Urethral Catheter 09/30/18 1025 16 Fr. less than 1 day          Airway                 Airway - Non-Surgical 09/30/18 0959 Endotracheal Tube less than 1 day          Intraosseous Line                 Intraosseous Line 09/30/18 0959 Tibia less than 1 day          Peripheral Intravenous Line                 Peripheral IV - Single Lumen 09/30/18 1006 Left Antecubital less than 1 day                Significant Labs:    CBC/Anemia Profile:  Recent Labs   Lab  09/30/18   1000  09/30/18   1014   WBC  8.38   --    HGB  10.1*   --    HCT  32.5*  33*   PLT  233   --    MCV  93   --    RDW  15.7*   --          Chemistries:  Recent Labs   Lab  09/30/18   1000   NA  142   K  3.8   CL  109   CO2  15*   BUN  17   CREATININE  1.3   CALCIUM  9.1   ALBUMIN  3.7   PROT  7.1   BILITOT  0.3   ALKPHOS  96   ALT  31   AST  35     Lactic acid: 6.2    Results for MARIE TURNER (MRN [id number]) as of 9/30/2018 12:40   9/30/2018 10:14 9/30/2018 11:57   POC PH 6.929 (L) 7.444   POC PCO2 92.3 (H) 34.8 (L)   POC PO2 37 (LL) 177 (H)   POC HCO3 19.3 (L) 23.8 (L)   POC SATURATED O2 38 (L) 100   FiO2 venous 100         All pertinent labs within the past 24 hours have been reviewed.    Significant Imaging:   I have reviewed and interpreted all pertinent imaging results/findings within the past 24 hours.     CXR 9/30/18: patchy bilateral opacities consistent with pulmonary edema.  Unable to fully visualize costophrenic angles.    EKG: wide complex tachycardia with new RBBB    ECHO 8/10/18: EF=60-65%, biatrial enlargement, right ventricular enlargement with normal systolic function. PASP=52mmHg    PFTs:5/2018: no evidence of obstruction, mild restriction, preserved DLCO    Assessment/Plan:     Acute hypoxemic respiratory failure    Intubated post cardiac arrest.   Continue low tidal volume, lung protective ventilation.  400/16/50/5  Wean supplemental oxygen while maintaining an O2 Sat at or above 92%.  CXR is significant for patchy bilateral opacities-  Likely pulmonary edema, but cannot rule out infection without more information.  Agree with diuresis.  Would cover with empiric antibiotics while cultures mature.          Cardiac arrest    Reportedly PEA.  ROSC achieved after 1 round of epi.  Blood pressures stable without use of vasopressors.  Point of care ultrasound showed a hypertrophic LV with reasonable 'squeeze' and a plethoric IVC with no respiratory variation.  Recommend formal ECHO stat.  Maintain euthermia/avoid fever.            Stage 3 chronic kidney disease    Creatinine 1.3   Historically ranges from 1.0-1.7 per chart  review.  Monitor urine output.  Renally dose all medications.        Anemia of chronic disease    No evidence of hematologic disease on recent bone marrow.  Transfuse as required to maintain a hgb>7.              Thank you for your consult. I will sign off. Please contact us if you have any additional questions.     Critical Care Time: 60 minutes  Critical care was time spent personally by me on the following activities: evaluating this patient's organ dysfunction, development of treatment plan, discussing treatment plan with patient or surrogate and bedside caregivers, discussions with consultants, evaluation of patient's response to treatment, examination of patient, ordering and performing treatments and interventions, ordering and review of laboratory studies, ordering and review of radiographic studies, re-evaluation of patient's condition. This critical care time did not overlap with that of any other provider or involve time for any procedures.        Nataliia Estrada MD  Pulmonology  [facility]

## 2018-09-30 NOTE — ED TRIAGE NOTES
Pt pulled from car unresponsive. Brought to room 17. Family reports pt complaining about chest pain while on the way to [employer]. Around [facility] the patient became unresponsive and care giver drove straight to facility. CPR initiated from parking lot. PEA noted once placed on monitors and robbi placed on patient.

## 2018-09-30 NOTE — Clinical Note
134 ml injected throughout the case. 166 mL total wasted during the case. 300 mL total used in the case.

## 2018-09-30 NOTE — ASSESSMENT & PLAN NOTE
Creatinine 1.3   Historically ranges from 1.0-1.7 per chart review.  Monitor urine output.  Renally dose all medications.

## 2018-09-30 NOTE — Clinical Note
A dressing is applied to the right femoral artery puncture site. 4x4's and tegaderm applied to right groin, no bleeding/hematoma observed

## 2018-09-30 NOTE — CARE UPDATE
ETT pulled back 4cm and secured. 19cm at teeth/gums. Ventilator changes done due to updated orders. Patient is now on PRVC RATE 16, TIDAL VOLUME 400, PEEP +5, FI02 50%. Will continue to monitor.

## 2018-09-30 NOTE — PLAN OF CARE
Problem: Patient Care Overview  Goal: Plan of Care Review  Outcome: Ongoing (interventions implemented as appropriate)  Pt brought to ICU from ER post code. Pt intubated in ED. Propofol @ 25. Pt responds to pain, has positive gag reflex, and pupils are reactive but sluggish. U/O adequate via briceno. No BM this shift. Spoke with Meredith from [employer], she will come to check pacemaker tonight. Skin intact. Pt remains free from falls and injuries throughout shift.

## 2018-09-30 NOTE — Clinical Note
Angiography performed in the left coronary arteries. The vessel(s) were injected and visualized. Multiple views of the injected vessel(s) were visualized.

## 2018-09-30 NOTE — Clinical Note
Angiography performed in the right coronary arteries. The vessel(s) were injected and visualized. Multiple views of the injected vessel(s) were visualized.

## 2018-09-30 NOTE — ED NOTES
Pt transported to ICU with Cardiac Monitoring and respiratory in place. Family notified of move .

## 2018-09-30 NOTE — HPI
Ms. Lyman is an 82 year old woman with a history of HTN, hyperlipidemia, FIFI, intramucosal gastric carcinoma and aortic stenosis s/p TAVR with subsequent heart block requiring pacemaker who presented to the ED with worsening shortness of breath and upon arrival had a cardiac arrest.  ROSC was achieved after 1 round of epi. Patient was subsequently intubated and pulmonary was called for vent management.      No family was available at the time of my evaluation to provide further history. History was obtained from chart review.

## 2018-09-30 NOTE — Clinical Note
137 ml injected throughout the case. 163 mL total wasted during the case. 300 mL total used in the case.

## 2018-09-30 NOTE — CARE UPDATE
FI02 decreased 50% by pulmonary physician Dr. Estrada post ABG results.  Physician ordered tidal volume to be decreased. Confirmed changed of tidal volume to 450 ml with Dr. Estrada.

## 2018-09-30 NOTE — ASSESSMENT & PLAN NOTE
Reportedly PEA.  ROSC achieved after 1 round of epi.  Blood pressures stable without use of vasopressors.  Point of care ultrasound showed a hypertrophic LV with reasonable 'squeeze' and a plethoric IVC with no respiratory variation.  Recommend formal ECHO stat.  Maintain euthermia/avoid fever.

## 2018-09-30 NOTE — Clinical Note
Angiography of the  right femoral artery performed to evaluate for the placement of a closure device.

## 2018-09-30 NOTE — ED PROVIDER NOTES
Encounter Date: 9/30/2018    SCRIBE #1 NOTE: Dennis CHUNG, constantin scribing for, and in the presence of, Riya Velez MD. Other sections scribed: HPI/ROS.       History     Chief Complaint   Patient presents with    Cardiac Arrest     Pt pulseless in the car    82-year-old female arrives to the emergency department via private vehicle in cardiac arrest.  History is limited due to patient's critical status.  Per family members, she was on her way to [place] and started complaining of severe chest pain. Patient's family also reports that she has been feeling more short of breath over the past several days.  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  CPR was initiated inpatient vehicle and continued upon placement into resuscitation room.    Review of patient's allergies indicates:   Allergen Reactions    Doxycycline hyclate Diarrhea and Nausea And Vomiting    Singulair [montelukast]      Stomach pain, Muscle pain, Cough      Penicillins      Other reaction(s): Anaphylaxis    Ventolin [albuterol sulfate] Other (See Comments)     Severely elevated blood pressure    Latex, natural rubber Rash     Past Medical History:   Diagnosis Date    Anemia     Anticoagulant long-term use     Aortic stenosis     Arthritis     lower back    Asthma     CAD (coronary artery disease) 4/24/2015    Carpal tunnel syndrome on both sides     Cataract     CHF (congestive heart failure) 5/2013    COPD (chronic obstructive pulmonary disease)     Depression     DVT (deep venous thrombosis)     Hyperlipidemia     Hypertension     Pulmonary HTN     TMJ (temporomandibular joint disorder)      Past Surgical History:   Procedure Laterality Date    A-V CARDIAC PACEMAKER INSERTION N/A 7/5/2018    Procedure: INSERTION, CARDIAC PACEMAKER, DUAL CHAMBER;  Surgeon: Giancarlo Houser MD;  Location: [facility] CATH LAB;  Service: Cardiology;   Laterality: N/A;    AORTIC VALVE REPLACEMENT N/A 7/5/2018    Procedure: Replacement-valve-aortic;  Surgeon: Corey Castañeda MD;  Location: [facility] CATH LAB;  Service: Cardiology;  Laterality: N/A;    BACK SURGERY      BIOPSY-BONE MARROW N/A 5/26/2016    Performed by Rufino Ovalle MD at [facility] ENDO    cardiac stents      CARDIAC VALVE SURGERY      CARPAL TUNNEL RELEASE      Right/Left    CHOLECYSTECTOMY-LAPAROSCOPIC W/ CHOLANGIOGRAM N/A 7/13/2017    Performed by Luis Carlos Jarvis MD at [facility] OR    EYE SURGERY      cataract extraction    HYSTERECTOMY      Partial    INSERTION, CARDIAC PACEMAKER, DUAL CHAMBER N/A 7/5/2018    Performed by Giancarlo Houser MD at [facility] CATH LAB    JOINT REPLACEMENT  2009    Left hip    POLYPECTOMY      x9    Replacement-valve-aortic N/A 7/5/2018    Performed by Corey Castañeda MD at [facility] CATH LAB    TEMPOROMANDIBULAR JOINT SURGERY      ULTRASOUND-ENDOSCOPIC-UPPER N/A 4/25/2018    Performed by Socrates Andrew MD at [facility] ENDO (2ND FLR)     Family History   Problem Relation Age of Onset    Heart disease Mother     Hypertension Daughter     Cancer Son     Breast cancer Neg Hx     Colon cancer Neg Hx     Ovarian cancer Neg Hx     Esophageal cancer Neg Hx      Social History     Tobacco Use    Smoking status: Never Smoker    Smokeless tobacco: Never Used   Substance Use Topics    Alcohol use: No    Drug use: No     Review of Systems   Unable to perform ROS: Patient unresponsive       Physical Exam     Initial Vitals   BP Pulse Resp Temp SpO2   09/30/18 1023 09/30/18 1023 09/30/18 1023 09/30/18 1104 09/30/18 1023   (!) 153/70 71 12 98.6 °F (37 °C) 99 %      MAP       --                Physical Exam    Constitutional: She appears well-developed and well-nourished.   Unresponsive   Eyes:   Exophthalmos, pupils 6 mm nonreactive.  There is no corneal reflex.   Neck: Neck supple.   JVD is present   Cardiovascular:   Initially pulseless, apneic.  After ROSC, patient with  "2+ bilateral carotid pulses. Appears to have a scar to the left upper anterior chest and underlying pacemaker, the scar is well-healed.   Pulmonary/Chest:   Rhonchorous breath sounds heard throughout.   Abdominal: Soft. She exhibits no distension.   Musculoskeletal: She exhibits no edema (There is no peripheral pitting edema.).   Neurological:   Unresponsive.  No corneal reflex, pupils unresponsive.  Patient does have a gag reflex on putting in OG. Initial exam there is no withdrawal to pain in all 4 extremities after return of spontaneous circulation.   Skin: Skin is warm.   Psychiatric:   Patient unresponsive         ED Course   Central Line  Date/Time: 9/30/2018 2:03 PM  Performed by: Riya Velez MD  Consent Done: Emergent Situation  Time out: Immediately prior to procedure a "time out" was called to verify the correct patient, procedure, equipment, support staff and site/side marked as required.  Indications: med administration and vascular access  Anesthesia: local infiltration    Anesthesia:  Local Anesthetic: lidocaine 1% without epinephrine  Anesthetic total: 2 mL  Preparation: skin prepped with ChloraPrep  Skin prep agent dried: skin prep agent completely dried prior to procedure  Sterile barriers: all five maximum sterile barriers used - cap, mask, sterile gown, sterile gloves, and large sterile sheet  Hand hygiene: hand hygiene performed prior to central venous catheter insertion  Location details: right internal jugular  Catheter type: triple lumen  Catheter size: 7.5 Fr  Catheter Length: 10cm    Ultrasound guidance: yes  Vessel Caliber: large, compressibility normal  Needle advanced into vessel with real time Ultrasound guidance.  Guidewire confirmed in vessel.  Sterile sheath used.  Manometry: No   Number of attempts: 1  Assessment: placement verified by x-ray and no pneumothorax on x-ray  Complications: arrhythmia  Post-procedure: line sutured,  chlorhexidine patch,  sterile dressing applied " and blood return through all ports  Complications: No  Comments: Patient had tachycardia with a rate in the low 100s with guidewire inserted.  Guidewire was withdrawn slightly with improvement in heart rate.    Critical Care  Date/Time: 9/30/2018 2:05 PM  Performed by: Riya Velez MD  Authorized by: Riya Velez MD   Direct patient critical care time: 30 minutes  Additional history critical care time: 10 minutes  Ordering / reviewing critical care time: 10 minutes  Consulting other physicians critical care time: 10 minutes  Consult with family critical care time: 5 minutes  Total critical care time (exclusive of procedural time) : 65 minutes  Critical care was necessary to treat or prevent imminent or life-threatening deterioration of the following conditions: cardiac failure, metabolic crisis, sepsis, circulatory failure and respiratory failure.  Critical care was time spent personally by me on the following activities: development of treatment plan with patient or surrogate, discussions with consultants, examination of patient, evaluation of patient's response to treatment, obtaining history from patient or surrogate, ordering and performing treatments and interventions, ordering and review of laboratory studies, ordering and review of radiographic studies, pulse oximetry, re-evaluation of patient's condition, review of old charts, vascular access procedures and ventilator management.    Intubation  Date/Time: 9/30/2018 2:07 PM  Performed by: Riya Velez MD  Authorized by: Riya Velez MD   Consent Done: Emergent Situation  Indications: airway protection  Intubation method: direct  Patient status: unconscious  Preoxygenation: BVM  Pretreatment medications: none  Paralytic: none  Laryngoscope size: Sanches 3  Tube size: 7.0 mm  Tube type: cuffed  Number of attempts: 1  Cricoid pressure: yes  Cords visualized: yes  Post-procedure assessment: chest rise  Breath sounds: rales/crackles  Cuff  inflated: yes  ETT to lip: 23 cm  Tube secured with: ETT clarke  Chest x-ray interpreted by me.  Chest x-ray findings: endotracheal tube in appropriate position  Patient tolerance: Patient tolerated the procedure well with no immediate complications        Labs Reviewed   CBC W/ AUTO DIFFERENTIAL - Abnormal; Notable for the following components:       Result Value    RBC 3.50 (*)     Hemoglobin 10.1 (*)     Hematocrit 32.5 (*)     MCHC 31.1 (*)     RDW 15.7 (*)     All other components within normal limits   COMPREHENSIVE METABOLIC PANEL - Abnormal; Notable for the following components:    CO2 15 (*)     Glucose 240 (*)     Anion Gap 18 (*)     eGFR if  45 (*)     eGFR if non  39 (*)     All other components within normal limits   URINALYSIS, REFLEX TO URINE CULTURE - Abnormal; Notable for the following components:    Appearance, UA Cloudy (*)     Protein, UA 2+ (*)     Glucose, UA 3+ (*)     Occult Blood UA 2+ (*)     All other components within normal limits    Narrative:     Preferred Collection Type->Urine, Clean Catch   B-TYPE NATRIURETIC PEPTIDE - Abnormal; Notable for the following components:     (*)     All other components within normal limits   LACTIC ACID, PLASMA - Abnormal; Notable for the following components:    Lactate (Lactic Acid) 6.2 (*)     All other components within normal limits    Narrative:     LACTIC ACID critical result(s) called and verbal readback obtained   from BRANDO FALCON, 09/30/2018 11:30   URINALYSIS MICROSCOPIC - Abnormal; Notable for the following components:    RBC, UA >100 (*)     All other components within normal limits    Narrative:     Preferred Collection Type->Urine, Clean Catch   ISTAT PROCEDURE - Abnormal; Notable for the following components:    POC PH 6.929 (*)     POC PCO2 92.3 (*)     POC PO2 37 (*)     POC HCO3 19.3 (*)     POC SATURATED O2 38 (*)     POC TCO2 22 (*)     POC Hematocrit 33 (*)     All other components  within normal limits   POCT GLUCOSE - Abnormal; Notable for the following components:    POCT Glucose 234 (*)     All other components within normal limits   ISTAT PROCEDURE - Abnormal; Notable for the following components:    POC PCO2 34.8 (*)     POC PO2 177 (*)     POC HCO3 23.8 (*)     All other components within normal limits   CULTURE, BLOOD   CULTURE, BLOOD   PROTIME-INR   TROPONIN I   APTT   LACTIC ACID, PLASMA   TROPONIN I   TYPE & SCREEN     EKG Readings: (Independently Interpreted)   Initial Reading: No STEMI. Rhythm: Sinus Tachycardia.   Sinus tachycardia, wide QRS complex 170 milliseconds, bifascicular block, ST depressions in 1, aVL, V3, V4, V5, V6.  No ST elevation.  There are no T peaked T-waves.       Imaging Results          CTA Chest Non-Coronary (PE Study) (Edited Result - FINAL)  Result time 09/30/18 13:53:43    Addendum 1 of 1 by Gennaro Littel MD (09/30/18 13:53:43)      I discussed the findings with the nurse taking care of the patient, Brooke, in the ICU at approximately 12:52 on the date of the exam.  She acknowledged the findings.      Electronically signed by: Gennaro Little MD  Date:    09/30/2018  Time:    13:53               Final result by Gennaro Little MD (09/30/18 13:49:07)                 Impression:      No evidence of pulmonary arterial embolism.  Pulmonary edema with additional patchy bilateral opacities, right greater than left.  Findings may relate to atelectasis and/or aspiration with superimposed pneumonia also in the differential diagnosis.    The tip of the endotracheal tube terminates near the right mainstem bronchus.  This can be retracted several cm for more optimal positioning.  Cardiomegaly with small left pleural effusion and other findings as above.  This report was flagged in Epic as abnormal.      Electronically signed by: Gennaro Little MD  Date:    09/30/2018  Time:    13:49             Narrative:    EXAMINATION:  CTA CHEST NON CORONARY    CLINICAL HISTORY:  Chest  pain, acute, PE suspected, high pretest prob;    TECHNIQUE:  Low dose axial images, sagittal and coronal reformations were obtained from the thoracic inlet to the lung bases following the IV administration of 100 mL of Omnipaque 350.  Contrast timing was optimized to evaluate the pulmonary arteries.  MIP images were performed.    COMPARISON:  Chest x-ray from earlier the same day    FINDINGS:  Limited imaging through the upper abdomen demonstrates abdominal aortic atherosclerosis.  Review of bone windows demonstrates multilevel degenerative changes of the axial skeleton.    Heart size is increased with pericardial effusion.  Thoracic aorta contains plaque.  Postoperative changes of an aortic valve repair are seen.  No pathologically enlarged intrathoracic lymph nodes are seen.  There is elevation of the left hemidiaphragm with adjacent loop of colon seen at the level of the heart.    There are ground-glass opacities in a peribronchovascular distribution as can be seen with pulmonary edema.  In addition, there is a small left pleural effusion.  There are patchy opacities scattered throughout the lungs predominantly at the lung bases, right greater than left containing air bronchograms.  An endotracheal tube terminates near the right mainstem bronchus and can be retracted for more optimal positioning.  The esophagogastric tube is present within the stomach.    No filling defect or cut off of the pulmonary arterial vessels is seen to suggest the presence of a pulmonary arterial embolism.                               X-Ray Chest AP Portable (Final result)  Result time 09/30/18 12:15:21    Final result by Aram Carmona III, MD (09/30/18 12:15:21)                 Impression:      See above    Pulmonary edema.      Electronically signed by: Aram Carmona MD  Date:    09/30/2018  Time:    12:15             Narrative:    EXAMINATION:  XR CHEST AP PORTABLE    CLINICAL HISTORY:  Encounter for adjustment and management of  vascular access device    FINDINGS:  There is a valve repair.  There is a pacer, cardiomegaly, and moderate edema.  Tubes and lines are appropriate.                               X-Ray Chest AP Portable (Final result)  Result time 09/30/18 11:23:43    Final result by Gennaro Little MD (09/30/18 11:23:43)                 Impression:      As above.      Electronically signed by: Gennaro Little MD  Date:    09/30/2018  Time:    11:23             Narrative:    EXAMINATION:  XR CHEST AP PORTABLE    CLINICAL HISTORY:  OG tube;    TECHNIQUE:  Single frontal view of the chest was performed.    COMPARISON:  Radiograph from earlier the same day.    FINDINGS:  There has been interval insertion of an esophagogastric tube.  The distal tip is not well seen and appears to project off the field of view.  Dual lead left-sided pacemaker device is unchanged.  There is an endotracheal tube which appears unchanged given differences in technique.  Vascular stent projects about the heart.  EKG leads overlie chest obscuring detail.  Cardiomediastinal silhouette remains enlarged.  There are bilateral diffuse airspace opacities.  Differential includes edema or ARDS or potentially infection which is thought less likely.  Bilateral effusions are seen.  Remainder stable.                               X-Ray Chest AP Portable (Final result)  Result time 09/30/18 10:29:35    Final result by Gennaro Little MD (09/30/18 10:29:35)                 Impression:      As above      Electronically signed by: Gennaro Little MD  Date:    09/30/2018  Time:    10:29             Narrative:    EXAMINATION:  XR CHEST AP PORTABLE    CLINICAL HISTORY:  cardiac arrest;    TECHNIQUE:  Single frontal view of the chest was performed.    COMPARISON:  07/05/2018 chest x-ray    FINDINGS:  A dual lead left-sided pacemaker device is again seen.  The pacemaker appears more superiorly located than on the previous exam.  A stent graft projects about the mid chest.  Endotracheal  tube is present projecting approximately 2.9 cm above the jean marie.  EKG leads overlie the chest obscuring detail.  Cardiomediastinal silhouette remains enlarged.  There is diffuse haziness projecting about the lungs which can be seen with heart failure/edema.  Osseous structures are stable in appearance.                                 Medical Decision Making:   Initial Assessment:   82-year-old female presenting after cardiac arrest.  Complaint of shortness breath and chest pain prior to arrival, witnessed cardiac arrest approximately 5 min prior to arrival in the emergency department.  Chest compressions were started in the vehicle in which patient arrived, and continued upon arrival to a critical care bed in the emergency department.  Her initial rhythm was pea a.  IV and IO access was established, airway controlled, patient given 1 round of epinephrine with return of spontaneous circulation.  An i-STAT was drawn concerning for a pH of 6.9, her potassium was within acceptable limits. Differential includes hypoxic respiratory failure versus acute coronary syndrome versus acidosis due to decreased perfusion versus infection, also considered PE.  Workup initiated with labs including cardiac markers, blood cultures, lactic acid.  Chest x-ray on presentation concerning for bilateral pulmonary infiltrates, I suspect pulmonary edema due to a heart failure exacerbation.  Patient is covered with broad-spectrum antibiotics, she does have a penicillin allergy therefore aztreonam and azithromycin used in place of Zosyn.  I discussed this case with Dr. Black for as documented below, states patient not a good candidate to take to cath lab due to pH of 6.9.  I discussed this case with  critical care, she recommend a stat echocardiogram.  Agrees with CT PE, although PE less likely given ROSC after 1 round of epinephrine.  Central line inserted by myself, post central line insertion chest x-ray negative for  pneumothorax, the central line appears to be in appropriate position.  ED Management:  I discussed this case with Dr. Mello, will admit to ICU.  The patient has remained hemodynamically stable in the emergency department.  Bedside echocardiogram was performed that shows left ventricular hypertrophy, she appears to have a normal ejection fraction.  I have ordered a CT PE.  Patient is covered with broad-spectrum antibiotics.  Other:   I have discussed this case with another health care provider.            Scribe Attestation:   Scribe #1: I performed the above scribed service and the documentation accurately describes the services I performed. I attest to the accuracy of the note.    Attending Attestation:           Physician Attestation for Scribe:  Physician Attestation Statement for Scribe #1: I, Riya Velez MD, reviewed documentation, as scribed by Dennis Smalls in my presence, and it is both accurate and complete.                 ED Course as of Sep 30 1402   Sun Sep 30, 2018   1034 I discussed this case with Dr. Black who is on STEMI call.  We reviewed the EKG findings, namely the ST depressions in the anterolateral leads.  Given her severe acidosis, he recommends medically managing the patient in admission to the ICU.  Her blood pressure has remained stable since min, will initiate cooling protocol, will insert central line.  Family has been updated regarding patient's critical status, and I will keep them updated with new developments.  [LH]      ED Course User Index  [LH] Riya Velez MD     Clinical Impression:   The primary encounter diagnosis was Acute pulmonary edema. Diagnoses of Cardiac arrest, Encounter for central line placement, Acidosis, and Abnormal ECG were also pertinent to this visit.                             Riya Velez MD  09/30/18 [id number]

## 2018-09-30 NOTE — ASSESSMENT & PLAN NOTE
Intubated post cardiac arrest.   Continue low tidal volume, lung protective ventilation.  400/16/50/5  Wean supplemental oxygen while maintaining an O2 Sat at or above 92%.  CXR is significant for patchy bilateral opacities-  Likely pulmonary edema, but cannot rule out infection without more information.  Agree with diuresis.  Would cover with empiric antibiotics while cultures mature.

## 2018-09-30 NOTE — SUBJECTIVE & OBJECTIVE
Past Medical History:   Diagnosis Date    Anemia     Anticoagulant long-term use     Aortic stenosis     Arthritis     lower back    Asthma     CAD (coronary artery disease) 4/24/2015    Carpal tunnel syndrome on both sides     Cataract     CHF (congestive heart failure) 5/2013    COPD (chronic obstructive pulmonary disease)     Depression     DVT (deep venous thrombosis)     Hyperlipidemia     Hypertension     Pulmonary HTN     TMJ (temporomandibular joint disorder)        Past Surgical History:   Procedure Laterality Date    A-V CARDIAC PACEMAKER INSERTION N/A 7/5/2018    Procedure: INSERTION, CARDIAC PACEMAKER, DUAL CHAMBER;  Surgeon: Giancarlo Houser MD;  Location: [facility] CATH LAB;  Service: Cardiology;  Laterality: N/A;    AORTIC VALVE REPLACEMENT N/A 7/5/2018    Procedure: Replacement-valve-aortic;  Surgeon: Corey Castañeda MD;  Location: [facility] CATH LAB;  Service: Cardiology;  Laterality: N/A;    BACK SURGERY      BIOPSY-BONE MARROW N/A 5/26/2016    Performed by Rufino Ovalle MD at [facility] ENDO    cardiac stents      CARDIAC VALVE SURGERY      CARPAL TUNNEL RELEASE      Right/Left    CHOLECYSTECTOMY-LAPAROSCOPIC W/ CHOLANGIOGRAM N/A 7/13/2017    Performed by Luis Carlos Jarvis MD at [facility] OR    EYE SURGERY      cataract extraction    HYSTERECTOMY      Partial    INSERTION, CARDIAC PACEMAKER, DUAL CHAMBER N/A 7/5/2018    Performed by Giancarlo Houser MD at [facility] CATH LAB    JOINT REPLACEMENT  2009    Left hip    POLYPECTOMY      x9    Replacement-valve-aortic N/A 7/5/2018    Performed by Corey Castañeda MD at [facility] CATH LAB    TEMPOROMANDIBULAR JOINT SURGERY      ULTRASOUND-ENDOSCOPIC-UPPER N/A 4/25/2018    Performed by Socrates Andrew MD at [facility] ENDO (2ND FLR)       Review of patient's allergies indicates:   Allergen Reactions    Doxycycline hyclate Diarrhea and Nausea And Vomiting    Singulair [montelukast]      Stomach pain, Muscle pain, Cough      Penicillins       Other reaction(s): Anaphylaxis    Ventolin [albuterol sulfate] Other (See Comments)     Severely elevated blood pressure    Latex, natural rubber Rash       Family History     Problem Relation (Age of Onset)    Cancer Son    Heart disease Mother    Hypertension Daughter        Tobacco Use    Smoking status: Never Smoker    Smokeless tobacco: Never Used   Substance and Sexual Activity    Alcohol use: No    Drug use: No    Sexual activity: No         Review of Systems   Unable to perform ROS: Intubated     Objective:     Vital Signs (Most Recent):  Pulse: 79 (09/30/18 1200)  Resp: (!) 9 (09/30/18 1200)  BP: (!) 183/74 (09/30/18 1200)  SpO2: 100 % (09/30/18 1200) Vital Signs (24h Range):  Pulse:  [71-79] 79  Resp:  [9-35] 9  SpO2:  [100 %] 100 %  BP: (153-183)/(70-74) 183/74     Weight: 100 kg (220 lb 7.4 oz)  Body mass index is 36.69 kg/m².    No intake or output data in the 24 hours ending 09/30/18 1234    Physical Exam   Constitutional: She appears well-developed. She is intubated.   HENT:   Head: Normocephalic.   Eyes: Conjunctivae and EOM are normal. Pupils are equal, round, and reactive to light.   Neck: Neck supple. No tracheal deviation present.   Cardiovascular: Normal rate, regular rhythm and normal heart sounds.   No murmur heard.  Pulmonary/Chest: Effort normal and breath sounds normal. She is intubated. No respiratory distress. She has no wheezes. She has no rales.   Abdominal: Soft. Bowel sounds are normal. She exhibits no distension.   Musculoskeletal: She exhibits no edema.   Neurological:   Pupils are 2+ and responsive to light.  Cough, gag, and corneal reflex intact.  Patient responding to painful stimuli- withdrawing all four extremities.    Skin: Skin is warm and dry.   Vitals reviewed.      Vents:  Vent Mode: PRVC (09/30/18 1200)  Ventilator Initiated: Yes (09/30/18 1027)  Set Rate: 22 bmp (09/30/18 1200)  Vt Set: 500 mL (09/30/18 1200)  PEEP/CPAP: 5 cmH20 (09/30/18 1200)  Oxygen  Concentration (%): 50 (09/30/18 1200)  Peak Airway Pressure: 26.8 cmH2O (09/30/18 1200)  Total Ve: 11.1 mL (09/30/18 1200)  F/VT Ratio<105 (RSBI): (!) 17.58 (09/30/18 1200)    Lines/Drains/Airways     Drain                 NG/OG Tube 09/30/18 1035 [place] sump 16 Fr. Center mouth;Left mouth;Right mouth less than 1 day         Urethral Catheter 09/30/18 1025 16 Fr. less than 1 day          Airway                 Airway - Non-Surgical 09/30/18 0959 Endotracheal Tube less than 1 day          Intraosseous Line                 Intraosseous Line 09/30/18 0959 [place] less than 1 day          Peripheral Intravenous Line                 Peripheral IV - Single Lumen 09/30/18 1006 Left Antecubital less than 1 day                Significant Labs:    CBC/Anemia Profile:  Recent Labs   Lab  09/30/18   1000  09/30/18   1014   WBC  8.38   --    HGB  10.1*   --    HCT  32.5*  33*   PLT  233   --    MCV  93   --    RDW  15.7*   --         Chemistries:  Recent Labs   Lab  09/30/18   1000   NA  142   K  3.8   CL  109   CO2  15*   BUN  17   CREATININE  1.3   CALCIUM  9.1   ALBUMIN  3.7   PROT  7.1   BILITOT  0.3   ALKPHOS  96   ALT  31   AST  35     Lactic acid: 6.2    Results for MARIE TURNER (MRN [id number]) as of 9/30/2018 12:40   9/30/2018 10:14 9/30/2018 11:57   POC PH 6.929 (L) 7.444   POC PCO2 92.3 (H) 34.8 (L)   POC PO2 37 (LL) 177 (H)   POC HCO3 19.3 (L) 23.8 (L)   POC SATURATED O2 38 (L) 100   FiO2 venous 100         All pertinent labs within the past 24 hours have been reviewed.    Significant Imaging:   I have reviewed and interpreted all pertinent imaging results/findings within the past 24 hours.     CXR 9/30/18: patchy bilateral opacities consistent with pulmonary edema.  Unable to fully visualize costophrenic angles.    EKG: wide complex tachycardia with new RBBB    ECHO 8/10/18: EF=60-65%, biatrial enlargement, right ventricular enlargement with normal systolic function. PASP=52mmHg    PFTs:5/2018: no evidence of  obstruction, mild restriction, preserved DLCO

## 2018-10-01 PROBLEM — G93.41 ENCEPHALOPATHY, METABOLIC: Status: ACTIVE | Noted: 2018-10-01

## 2018-10-01 PROBLEM — Z95.0 PACEMAKER: Status: ACTIVE | Noted: 2018-10-01

## 2018-10-01 LAB
ALLENS TEST: ABNORMAL
ANION GAP SERPL CALC-SCNC: 11 MMOL/L
ASCENDING AORTA: 2.6 CM
AV MEAN GRADIENT: 8.63 MMHG
AV PEAK GRADIENT: 15.4 MMHG
AV VALVE AREA: 1.31 CM2
BASOPHILS # BLD AUTO: 0.01 K/UL
BASOPHILS NFR BLD: 0.1 %
BSA FOR ECHO PROCEDURE: 2.14 M2
BUN SERPL-MCNC: 22 MG/DL
CALCIUM SERPL-MCNC: 8.5 MG/DL
CHLORIDE SERPL-SCNC: 104 MMOL/L
CO2 SERPL-SCNC: 26 MMOL/L
CREAT SERPL-MCNC: 1.3 MG/DL
CV ECHO LV RWT: 0.5 CM
DELSYS: ABNORMAL
DIFFERENTIAL METHOD: ABNORMAL
DOP CALC AO PEAK VEL: 1.96 M/S
DOP CALC AO VTI: 43.81 CM
DOP CALC LVOT AREA: 3.11 CM2
DOP CALC LVOT DIAMETER: 1.99 CM
DOP CALC LVOT STROKE VOLUME: 57.57 CM3
DOP CALCLVOT PEAK VEL VTI: 18.52 CM
E WAVE DECELERATION TIME: 231.03 MSEC
E/A RATIO: 1.27
ECHO LV POSTERIOR WALL: 1.18 CM (ref 0.6–1.1)
EOSINOPHIL # BLD AUTO: 0 K/UL
EOSINOPHIL NFR BLD: 0.1 %
ERYTHROCYTE [DISTWIDTH] IN BLOOD BY AUTOMATED COUNT: 15.2 %
ERYTHROCYTE [SEDIMENTATION RATE] IN BLOOD BY WESTERGREN METHOD: 16 MM/H
EST. GFR  (AFRICAN AMERICAN): 45 ML/MIN/1.73 M^2
EST. GFR  (NON AFRICAN AMERICAN): 39 ML/MIN/1.73 M^2
FIO2: 50
FRACTIONAL SHORTENING: 13 % (ref 28–44)
GLUCOSE SERPL-MCNC: 105 MG/DL
HCO3 UR-SCNC: 29.7 MMOL/L (ref 24–28)
HCT VFR BLD AUTO: 25.9 %
HGB BLD-MCNC: 8.5 G/DL
INTERVENTRICULAR SEPTUM: 1.19 CM (ref 0.6–1.1)
IVRT: 0.1 MSEC
LA MAJOR: 6.04 CM
LA MINOR: 5.63 CM
LA WIDTH: 3.61 CM
LEFT ATRIUM SIZE: 4.06 CM
LEFT ATRIUM VOLUME INDEX: 33.9 ML/M2
LEFT ATRIUM VOLUME: 72.6 CM3
LEFT INTERNAL DIMENSION IN SYSTOLE: 4.14 CM (ref 2.1–4)
LEFT VENTRICLE MASS INDEX: 99.9 G/M2
LEFT VENTRICULAR INTERNAL DIMENSION IN DIASTOLE: 4.78 CM (ref 3.5–6)
LEFT VENTRICULAR MASS: 213.85 G
LV LATERAL E/E' RATIO: 37.25
LYMPHOCYTES # BLD AUTO: 1.2 K/UL
LYMPHOCYTES NFR BLD: 16.6 %
MCH RBC QN AUTO: 28.5 PG
MCHC RBC AUTO-ENTMCNC: 32.8 G/DL
MCV RBC AUTO: 87 FL
MIN VOL: 6.5
MODE: ABNORMAL
MONOCYTES # BLD AUTO: 0.5 K/UL
MONOCYTES NFR BLD: 7.1 %
MV PEAK A VEL: 1.17 M/S
MV PEAK E VEL: 1.49 M/S
MV STENOSIS PRESSURE HALF TIME: 67 MS
MV VALVE AREA P 1/2 METHOD: 3.28 CM2
NEUTROPHILS # BLD AUTO: 5.6 K/UL
NEUTROPHILS NFR BLD: 76.1 %
PCO2 BLDA: 40.6 MMHG (ref 35–45)
PEEP: 5
PH SMN: 7.47 [PH] (ref 7.35–7.45)
PIP: 25
PISA TR MAX VEL: 2.83 M/S
PLATELET # BLD AUTO: 204 K/UL
PMV BLD AUTO: 9.2 FL
PO2 BLDA: 191 MMHG (ref 80–100)
POC BE: 6 MMOL/L
POC SATURATED O2: 100 % (ref 95–100)
POC TCO2: 31 MMOL/L (ref 23–27)
POTASSIUM SERPL-SCNC: 4.1 MMOL/L
PV PEAK GRADIENT: 1.54 MMHG
PV PEAK VELOCITY: 0.62 CM/S
RA MAJOR: 5.28 CM
RA WIDTH: 5.14 CM
RBC # BLD AUTO: 2.98 M/UL
RIGHT VENTRICULAR END-DIASTOLIC DIMENSION: 3.91 CM
SAMPLE: ABNORMAL
SITE: ABNORMAL
SODIUM SERPL-SCNC: 141 MMOL/L
SP02: 100
TDI LATERAL: 0.04
TR MAX PG: 32.04 MMHG
TRICUSPID ANNULAR PLANE SYSTOLIC EXCURSION: 0.02 CM
TROPONIN I SERPL DL<=0.01 NG/ML-MCNC: 0.14 NG/ML
TROPONIN I SERPL DL<=0.01 NG/ML-MCNC: 0.18 NG/ML
VANCOMYCIN SERPL-MCNC: 9.2 UG/ML
VT: 400
WBC # BLD AUTO: 7.3 K/UL

## 2018-10-01 PROCEDURE — 63600175 PHARM REV CODE 636 W HCPCS: Performed by: INTERNAL MEDICINE

## 2018-10-01 PROCEDURE — 63600175 PHARM REV CODE 636 W HCPCS: Performed by: EMERGENCY MEDICINE

## 2018-10-01 PROCEDURE — 99900035 HC TECH TIME PER 15 MIN (STAT)

## 2018-10-01 PROCEDURE — 80202 ASSAY OF VANCOMYCIN: CPT

## 2018-10-01 PROCEDURE — 25000003 PHARM REV CODE 250: Performed by: INTERNAL MEDICINE

## 2018-10-01 PROCEDURE — S0028 INJECTION, FAMOTIDINE, 20 MG: HCPCS | Performed by: INTERNAL MEDICINE

## 2018-10-01 PROCEDURE — 25000003 PHARM REV CODE 250: Performed by: EMERGENCY MEDICINE

## 2018-10-01 PROCEDURE — 94761 N-INVAS EAR/PLS OXIMETRY MLT: CPT

## 2018-10-01 PROCEDURE — S0073 INJECTION, AZTREONAM, 500 MG: HCPCS | Performed by: EMERGENCY MEDICINE

## 2018-10-01 PROCEDURE — 80048 BASIC METABOLIC PNL TOTAL CA: CPT

## 2018-10-01 PROCEDURE — 87070 CULTURE OTHR SPECIMN AEROBIC: CPT

## 2018-10-01 PROCEDURE — 25000003 PHARM REV CODE 250: Performed by: HOSPITALIST

## 2018-10-01 PROCEDURE — 99291 CRITICAL CARE FIRST HOUR: CPT | Mod: ,,, | Performed by: INTERNAL MEDICINE

## 2018-10-01 PROCEDURE — 82803 BLOOD GASES ANY COMBINATION: CPT

## 2018-10-01 PROCEDURE — 84484 ASSAY OF TROPONIN QUANT: CPT | Mod: 91

## 2018-10-01 PROCEDURE — 25000003 PHARM REV CODE 250: Performed by: STUDENT IN AN ORGANIZED HEALTH CARE EDUCATION/TRAINING PROGRAM

## 2018-10-01 PROCEDURE — 99900026 HC AIRWAY MAINTENANCE (STAT)

## 2018-10-01 PROCEDURE — 85025 COMPLETE CBC W/AUTO DIFF WBC: CPT

## 2018-10-01 PROCEDURE — 31720 CLEARANCE OF AIRWAYS: CPT

## 2018-10-01 PROCEDURE — 20000000 HC ICU ROOM

## 2018-10-01 PROCEDURE — 94003 VENT MGMT INPAT SUBQ DAY: CPT

## 2018-10-01 PROCEDURE — 63600175 PHARM REV CODE 636 W HCPCS: Performed by: STUDENT IN AN ORGANIZED HEALTH CARE EDUCATION/TRAINING PROGRAM

## 2018-10-01 PROCEDURE — 87205 SMEAR GRAM STAIN: CPT

## 2018-10-01 PROCEDURE — 36600 WITHDRAWAL OF ARTERIAL BLOOD: CPT

## 2018-10-01 PROCEDURE — 36415 COLL VENOUS BLD VENIPUNCTURE: CPT

## 2018-10-01 PROCEDURE — 27000221 HC OXYGEN, UP TO 24 HOURS

## 2018-10-01 RX ORDER — CLONIDINE 0.3 MG/24H
1 PATCH, EXTENDED RELEASE TRANSDERMAL
Status: DISCONTINUED | OUTPATIENT
Start: 2018-10-01 | End: 2018-10-01

## 2018-10-01 RX ORDER — NAPROXEN SODIUM 220 MG/1
81 TABLET, FILM COATED ORAL DAILY
Status: DISCONTINUED | OUTPATIENT
Start: 2018-10-01 | End: 2018-10-03

## 2018-10-01 RX ORDER — FUROSEMIDE 10 MG/ML
40 INJECTION INTRAMUSCULAR; INTRAVENOUS 2 TIMES DAILY
Status: DISCONTINUED | OUTPATIENT
Start: 2018-10-01 | End: 2018-10-03

## 2018-10-01 RX ORDER — CLOPIDOGREL BISULFATE 75 MG/1
75 TABLET ORAL DAILY
Status: DISCONTINUED | OUTPATIENT
Start: 2018-10-01 | End: 2018-10-06

## 2018-10-01 RX ORDER — DEXMEDETOMIDINE HYDROCHLORIDE 4 UG/ML
0.2 INJECTION, SOLUTION INTRAVENOUS CONTINUOUS
Status: DISCONTINUED | OUTPATIENT
Start: 2018-10-01 | End: 2018-10-01

## 2018-10-01 RX ORDER — METOPROLOL TARTRATE 50 MG/1
50 TABLET ORAL 2 TIMES DAILY
Status: DISCONTINUED | OUTPATIENT
Start: 2018-10-01 | End: 2018-10-02

## 2018-10-01 RX ORDER — GLYCOPYRROLATE 0.2 MG/ML
0.1 INJECTION INTRAMUSCULAR; INTRAVENOUS 3 TIMES DAILY
Status: DISCONTINUED | OUTPATIENT
Start: 2018-10-01 | End: 2018-10-02

## 2018-10-01 RX ORDER — ACETAMINOPHEN 650 MG/20.3ML
650 LIQUID ORAL EVERY 4 HOURS PRN
Status: DISCONTINUED | OUTPATIENT
Start: 2018-10-01 | End: 2018-10-03

## 2018-10-01 RX ORDER — CLONIDINE HYDROCHLORIDE 0.1 MG/1
0.1 TABLET ORAL EVERY 6 HOURS PRN
Status: DISCONTINUED | OUTPATIENT
Start: 2018-10-01 | End: 2018-10-02

## 2018-10-01 RX ADMIN — PROPOFOL 40 MCG/KG/MIN: 10 INJECTION, EMULSION INTRAVENOUS at 07:10

## 2018-10-01 RX ADMIN — PROPOFOL 35 MCG/KG/MIN: 10 INJECTION, EMULSION INTRAVENOUS at 04:10

## 2018-10-01 RX ADMIN — HYDRALAZINE HYDROCHLORIDE 5 MG: 20 INJECTION INTRAMUSCULAR; INTRAVENOUS at 11:10

## 2018-10-01 RX ADMIN — GLYCOPYRROLATE 0.1 MG: 0.2 INJECTION INTRAMUSCULAR; INTRAVENOUS at 02:10

## 2018-10-01 RX ADMIN — ASPIRIN 81 MG 81 MG: 81 TABLET ORAL at 09:10

## 2018-10-01 RX ADMIN — AZTREONAM 2 G: 2 INJECTION, POWDER, LYOPHILIZED, FOR SOLUTION INTRAMUSCULAR; INTRAVENOUS at 12:10

## 2018-10-01 RX ADMIN — CLONIDINE HYDROCHLORIDE 0.1 MG: 0.1 TABLET ORAL at 04:10

## 2018-10-01 RX ADMIN — ACETAMINOPHEN 650 MG: 650 SOLUTION ORAL at 06:10

## 2018-10-01 RX ADMIN — CHLORHEXIDINE GLUCONATE 15 ML: 1.2 RINSE ORAL at 09:10

## 2018-10-01 RX ADMIN — AZTREONAM 2 G: 2 INJECTION, POWDER, LYOPHILIZED, FOR SOLUTION INTRAMUSCULAR; INTRAVENOUS at 01:10

## 2018-10-01 RX ADMIN — METOPROLOL TARTRATE 50 MG: 50 TABLET ORAL at 06:10

## 2018-10-01 RX ADMIN — HYDRALAZINE HYDROCHLORIDE 75 MG: 25 TABLET ORAL at 05:10

## 2018-10-01 RX ADMIN — HEPARIN SODIUM 5000 UNITS: 5000 INJECTION, SOLUTION INTRAVENOUS; SUBCUTANEOUS at 05:10

## 2018-10-01 RX ADMIN — HYDRALAZINE HYDROCHLORIDE 75 MG: 25 TABLET ORAL at 02:10

## 2018-10-01 RX ADMIN — PROPOFOL 30 MCG/KG/MIN: 10 INJECTION, EMULSION INTRAVENOUS at 04:10

## 2018-10-01 RX ADMIN — FAMOTIDINE 20 MG: 10 INJECTION, SOLUTION INTRAVENOUS at 09:10

## 2018-10-01 RX ADMIN — HEPARIN SODIUM 5000 UNITS: 5000 INJECTION, SOLUTION INTRAVENOUS; SUBCUTANEOUS at 02:10

## 2018-10-01 RX ADMIN — HYDRALAZINE HYDROCHLORIDE 75 MG: 25 TABLET ORAL at 09:10

## 2018-10-01 RX ADMIN — AZTREONAM 2 G: 2 INJECTION, POWDER, LYOPHILIZED, FOR SOLUTION INTRAMUSCULAR; INTRAVENOUS at 07:10

## 2018-10-01 RX ADMIN — VANCOMYCIN HYDROCHLORIDE 1500 MG: 1 INJECTION, POWDER, LYOPHILIZED, FOR SOLUTION INTRAVENOUS at 03:10

## 2018-10-01 RX ADMIN — FUROSEMIDE 40 MG: 10 INJECTION, SOLUTION INTRAMUSCULAR; INTRAVENOUS at 09:10

## 2018-10-01 RX ADMIN — CLONIDINE 1 PATCH: 0.3 PATCH TRANSDERMAL at 09:10

## 2018-10-01 RX ADMIN — HEPARIN SODIUM 5000 UNITS: 5000 INJECTION, SOLUTION INTRAVENOUS; SUBCUTANEOUS at 09:10

## 2018-10-01 RX ADMIN — AZTREONAM 2 G: 2 INJECTION, POWDER, LYOPHILIZED, FOR SOLUTION INTRAMUSCULAR; INTRAVENOUS at 06:10

## 2018-10-01 RX ADMIN — PROPOFOL 30 MCG/KG/MIN: 10 INJECTION, EMULSION INTRAVENOUS at 09:10

## 2018-10-01 RX ADMIN — GLYCOPYRROLATE 0.1 MG: 0.2 INJECTION INTRAMUSCULAR; INTRAVENOUS at 09:10

## 2018-10-01 RX ADMIN — FUROSEMIDE 40 MG: 10 INJECTION, SOLUTION INTRAMUSCULAR; INTRAVENOUS at 05:10

## 2018-10-01 RX ADMIN — DEXMEDETOMIDINE HYDROCHLORIDE 0.2 MCG/KG/HR: 4 INJECTION, SOLUTION INTRAVENOUS at 09:10

## 2018-10-01 RX ADMIN — CLOPIDOGREL BISULFATE 75 MG: 75 TABLET ORAL at 09:10

## 2018-10-01 NOTE — SUBJECTIVE & OBJECTIVE
Past Medical History:   Diagnosis Date    Anemia     Anticoagulant long-term use     Aortic stenosis     Arthritis     lower back    Asthma     CAD (coronary artery disease) 4/24/2015    Carpal tunnel syndrome on both sides     Cataract     CHF (congestive heart failure) 5/2013    COPD (chronic obstructive pulmonary disease)     Depression     DVT (deep venous thrombosis)     Hyperlipidemia     Hypertension     Pulmonary HTN     TMJ (temporomandibular joint disorder)        Past Surgical History:   Procedure Laterality Date    A-V CARDIAC PACEMAKER INSERTION N/A 7/5/2018    Procedure: INSERTION, CARDIAC PACEMAKER, DUAL CHAMBER;  Surgeon: Giancarlo Houser MD;  Location: [facility] CATH LAB;  Service: Cardiology;  Laterality: N/A;    AORTIC VALVE REPLACEMENT N/A 7/5/2018    Procedure: Replacement-valve-aortic;  Surgeon: Corey Castañeda MD;  Location: [facility] CATH LAB;  Service: Cardiology;  Laterality: N/A;    BACK SURGERY      BIOPSY-BONE MARROW N/A 5/26/2016    Performed by Rufino Ovalle MD at [facility] ENDO    cardiac stents      CARDIAC VALVE SURGERY      CARPAL TUNNEL RELEASE      Right/Left    CHOLECYSTECTOMY-LAPAROSCOPIC W/ CHOLANGIOGRAM N/A 7/13/2017    Performed by Luis Carlos Jarvis MD at [facility] OR    EYE SURGERY      cataract extraction    HYSTERECTOMY      Partial    INSERTION, CARDIAC PACEMAKER, DUAL CHAMBER N/A 7/5/2018    Performed by Giancarlo Houser MD at [facility] CATH LAB    JOINT REPLACEMENT  2009    Left hip    POLYPECTOMY      x9    Replacement-valve-aortic N/A 7/5/2018    Performed by Corey Castañeda MD at [facility] CATH LAB    TEMPOROMANDIBULAR JOINT SURGERY      ULTRASOUND-ENDOSCOPIC-UPPER N/A 4/25/2018    Performed by Socrates Andrew MD at [facility] ENDO (2ND FLR)       Review of patient's allergies indicates:   Allergen Reactions    Doxycycline hyclate Diarrhea and Nausea And Vomiting    Singulair [montelukast]      Stomach pain, Muscle pain, Cough      Penicillins       Other reaction(s): Anaphylaxis    Ventolin [albuterol sulfate] Other (See Comments)     Severely elevated blood pressure    Latex, natural rubber Rash       No current facility-administered medications on file prior to encounter.      Current Outpatient Medications on File Prior to Encounter   Medication Sig    acetaminophen (TYLENOL) 325 MG tablet Take 2 tablets (650 mg total) by mouth every 6 (six) hours as needed for Pain or Temperature greater than (100.4).    aspirin (ECOTRIN) 81 MG EC tablet Take 81 mg by mouth once daily.    azelastine (ASTELIN) 137 mcg (0.1 %) nasal spray 1 spray (137 mcg total) by Nasal route 2 (two) times daily.    carBAMazepine (TEGRETOL) 200 mg tablet Take 1 tablet (200 mg total) by mouth 3 (three) times daily.    cetirizine (ZYRTEC) 10 MG tablet Take 1 tablet (10 mg total) by mouth once daily.    cloNIDine (CATAPRES) 0.3 MG tablet Take 1 tablet (0.3 mg total) by mouth 3 (three) times daily.    clopidogrel (PLAVIX) 75 mg tablet Take 1 tablet (75 mg total) by mouth once daily.    fluticasone (FLONASE) 50 mcg/actuation nasal spray TWO SPRAYS IN EACH NOSTRIL ONCE DAILY    fluticasone (FLOVENT HFA) 220 mcg/actuation inhaler Inhale 1 puff into the lungs 2 (two) times daily. Controller    furosemide (LASIX) 40 MG tablet TAKE ONE TABLET BY MOUTH EVERY DAY AS NEEDED FOR SHORTNESS OF BREATH or leg swelling    gabapentin (NEURONTIN) 300 MG capsule ONE CAPSULE BY MOUTH THREE TIMES DAILY    hydrALAZINE (APRESOLINE) 100 MG tablet ONE TABLET BY MOUTH THREE TIMES DAILY    metoprolol tartrate (LOPRESSOR) 100 MG tablet ONE TABLET BY MOUTH TWICE DAILY    nitroGLYCERIN (NITROSTAT) 0.4 MG SL tablet Place 0.4 mg under the tongue every 5 (five) minutes as needed for Chest pain.    rosuvastatin (CRESTOR) 20 MG tablet ONE TABLET BY MOUTH EVERY EVENING    verapamil (VERELAN) 360 MG C24P ONE CAPSULE BY MOUTH once a day    walker (ULTRA-LIGHT ROLLATOR) Misc One rollator, size large.     Family  History     Problem Relation (Age of Onset)    Cancer Son    Heart disease Mother    Hypertension Daughter        Tobacco Use    Smoking status: Never Smoker    Smokeless tobacco: Never Used   Substance and Sexual Activity    Alcohol use: No    Drug use: No    Sexual activity: No     Review of Systems   Unable to perform ROS: Intubated     Objective:     Vital Signs (Most Recent):  Temp: 98 °F (36.7 °C) (09/30/18 2305)  Pulse: 69 (10/01/18 0000)  Resp: 16 (10/01/18 0000)  BP: 121/61 (10/01/18 0000)  SpO2: 100 % (10/01/18 0000) Vital Signs (24h Range):  Temp:  [95.9 °F (35.5 °C)-98.6 °F (37 °C)] 98 °F (36.7 °C)  Pulse:  [69-89] 69  Resp:  [12-33] 16  SpO2:  [94 %-100 %] 100 %  BP: (102-219)/() 121/61     Weight: 99.8 kg (220 lb)  Body mass index is 36.61 kg/m².    Physical Exam   Constitutional: She appears well-developed and well-nourished.   HENT:   Head: Normocephalic and atraumatic.   ETT in place    Eyes: Conjunctivae are normal. Pupils are equal, round, and reactive to light.   Neck: Neck supple. JVD present.   Cardiovascular: Normal rate and regular rhythm.   Pulmonary/Chest: She has no wheezes.   Scattered rhonchi    Abdominal: Soft. She exhibits no distension.   Musculoskeletal: She exhibits no edema or deformity.   Neurological:   Intubated sedated not alert    Skin: Skin is warm and dry. Capillary refill takes 2 to 3 seconds.         CRANIAL NERVES     CN III, IV, VI   Pupils are equal, round, and reactive to light.       Significant Labs: All pertinent labs within the past 24 hours have been reviewed.    Significant Imaging: I have reviewed all pertinent imaging results/findings within the past 24 hours.

## 2018-10-01 NOTE — CONSULTS
[facility]  Cardiology  Consult Note    Patient Name: Cindy Lyman  MRN: [id number]  Admission Date: 9/30/2018  Hospital Length of Stay: 1 days  Code Status: Full Code   Attending Provider: Viviane Mello MD   Consulting Provider: Tony Carmen MD  Primary Care Physician: Rodger Camarena MD  Principal Problem:Acute hypoxemic respiratory failure    Patient information was obtained from ER records.     Consults  Subjective:     Chief Complaint:  PEA arrest      HPI: 81 yo female with diastolic CHF, COPD, gastric cancer, aortic stenosis with complete heart block and s/p TAVR and pacemaker placed 7/2018 presented in cardiac arrest. Per ED notes, patient was on her way to [employer] with others and c/o chest pain. CPR was initiated in parked vehicle outside ED. One round of epinephrine given and got ROSC. Per family, she c/o worsening SOB over several days. On arrival pt was euthermic, hypertensive, elevated lactate and BNP. CXR suggest pulmonary edema though infection cannot be ruled out. Pt intubated and evaluated by pulmonology. Cardiology consulted. ECHO pending. Broad spectrum antibiotics initiated until cultures result and clinical course dictates.     On vent in ICU. Initially with PH 6.9 that rapidly corrected  Initial EKG   Interrogation of device by [employer] device - normal device function. No high rate episodes. 100% V paced    CAD - stent LAD 4/2015, BMS to RCA 6/1/18 - moderate Cx disease, Diastolic CHF  AS - s/p TAVR 7/5/18, Biotronik PPM 7/5/18 for CHB HTN, COPD, Hx DVT, anemia     Echo 8/10/18    1 - Normal left ventricular systolic function (EF 60-65%).     2 - No wall motion abnormalities.     3 - Concentric hypertrophy.     4 - Biatrial enlargement.     5 - Right ventricular enlargement with normal systolic function.     6 - Pulmonary hypertension. The estimated PA systolic pressure is 52 mmHg.     7 - S/P transcatheter AVR, CLEMENT = 2.58 cm2, AVAi = 1.32 cm2/m2, peak  velocity = 1.7 m/s, mean gradient = 8 mmHg.     8 - Mild paravalvular aortic regurgitation.     9 - Mild tricuspid regurgitation.     10 - Trivial pericardial effusion.     11 - S/p 29mm Evolut TF TAVR.      [facility] 6/1/18       - Left Main Coronary Artery:             The LM has luminal irregularities. There is GERMAIN 3 flow.     - Left Anterior Descending Artery:             The ostial LAD has a 60% stenosis. There is GERMAIN 3 flow.     - Left Circumflex Artery:             The mid LCX has a 60% stenosis. There is GERMAIN 3 flow.     - Right Coronary Artery:             The ostial RCA has a 99% stenosis. There is GERMAIN 3 flow.             The proximal RCA has a 80% stenosis. There is GERMAIN 3 flow.     - Common Femoral Artery:             The right CFA has luminal irregularities. Access closure with perclose was very difficult due to inability to catch needles with perclose device.      Intervention          Ostial RCA:              The lesion was successfully intervened. Post-stenosis of 0%, post-GERMAIN 3 flow and TMP grade 3. The vessel was accessed natively.  The following items were used: 2.0MM 15MM Rumsey Balloon and 2.5X12 Mini Vision Stent.       Proximal RCA:              The lesion was successfully intervened. Post-stenosis of 0%, post-GERMAIN 3 flow and TMP grade 3. The vessel was accessed natively.  The following items were used: 2.5MM 12MM Rumsey Balloon, Stent Integrity 2.5 X 8 and 3.0MM 8MM Rumsey Balloon.     Saw cardiology PA 8/10/18  Ms. Lyman presents for 1 mon f/u s/p 29mm Evolut TAVR via R TF access. She has done very well since her TAVR and she is without complaints today. She was able to walk into clinic today with her walker; prior to her TAVR she had to use a wheelchair. She denies CP, PND, orthopnea, or LE edema.  She tries to be active during the day, but she is limited by neuropathy and the chronic sleepiness from her neurontin. She is scheduled to see Dr. Carmen on the 14th.        Past Medical History:    Diagnosis Date    Anemia     Anticoagulant long-term use     Aortic stenosis     Arthritis     lower back    Asthma     CAD (coronary artery disease) 4/24/2015    Carpal tunnel syndrome on both sides     Cataract     CHF (congestive heart failure) 5/2013    COPD (chronic obstructive pulmonary disease)     Depression     DVT (deep venous thrombosis)     Hyperlipidemia     Hypertension     Pulmonary HTN     TMJ (temporomandibular joint disorder)        Past Surgical History:   Procedure Laterality Date    A-V CARDIAC PACEMAKER INSERTION N/A 7/5/2018    Procedure: INSERTION, CARDIAC PACEMAKER, DUAL CHAMBER;  Surgeon: Giancarlo Houser MD;  Location: [facility] CATH LAB;  Service: Cardiology;  Laterality: N/A;    AORTIC VALVE REPLACEMENT N/A 7/5/2018    Procedure: Replacement-valve-aortic;  Surgeon: Corey Castañeda MD;  Location: [facility] CATH LAB;  Service: Cardiology;  Laterality: N/A;    BACK SURGERY      BIOPSY-BONE MARROW N/A 5/26/2016    Performed by [facility] Ovalle [facility] at [facility] ENDO    cardiac stents      CARDIAC VALVE SURGERY      CARPAL TUNNEL RELEASE      Right/Left    CHOLECYSTECTOMY-LAPAROSCOPIC W/ CHOLANGIOGRAM N/A 7/13/2017    Performed by Luis Carlos Jarvis MD at [facility] OR    EYE SURGERY      cataract extraction    HYSTERECTOMY      Partial    INSERTION, CARDIAC PACEMAKER, DUAL CHAMBER N/A 7/5/2018    Performed by Giancarlo Houser MD at [facility] CATH LAB    JOINT REPLACEMENT  2009    Left hip    POLYPECTOMY      x9    Replacement-valve-aortic N/A 7/5/2018    Performed by Corey Castañeda MD at [facility] CATH LAB    TEMPOROMANDIBULAR JOINT SURGERY      ULTRASOUND-ENDOSCOPIC-UPPER N/A 4/25/2018    Performed by Socrates Andrew MD at [facility] ENDO (2ND FLR)       Review of patient's allergies indicates:   Allergen Reactions    Doxycycline hyclate Diarrhea and Nausea And Vomiting    Singulair [montelukast]      Stomach pain, Muscle pain, Cough      Penicillins      Other reaction(s):  Anaphylaxis    Ventolin [albuterol sulfate] Other (See Comments)     Severely elevated blood pressure    Latex, natural rubber Rash       No current facility-administered medications on file prior to encounter.      Current Outpatient Medications on File Prior to Encounter   Medication Sig    acetaminophen (TYLENOL) 325 MG tablet Take 2 tablets (650 mg total) by mouth every 6 (six) hours as needed for Pain or Temperature greater than (100.4).    aspirin (ECOTRIN) 81 MG EC tablet Take 81 mg by mouth once daily.    azelastine (ASTELIN) 137 mcg (0.1 %) nasal spray 1 spray (137 mcg total) by Nasal route 2 (two) times daily.    carBAMazepine (TEGRETOL) 200 mg tablet Take 1 tablet (200 mg total) by mouth 3 (three) times daily.    cetirizine (ZYRTEC) 10 MG tablet Take 1 tablet (10 mg total) by mouth once daily.    cloNIDine (CATAPRES) 0.3 MG tablet Take 1 tablet (0.3 mg total) by mouth 3 (three) times daily.    clopidogrel (PLAVIX) 75 mg tablet Take 1 tablet (75 mg total) by mouth once daily.    fluticasone (FLONASE) 50 mcg/actuation nasal spray TWO SPRAYS IN EACH NOSTRIL ONCE DAILY    fluticasone (FLOVENT HFA) 220 mcg/actuation inhaler Inhale 1 puff into the lungs 2 (two) times daily. Controller    furosemide (LASIX) 40 MG tablet TAKE ONE TABLET BY MOUTH EVERY DAY AS NEEDED FOR SHORTNESS OF BREATH or leg swelling    gabapentin (NEURONTIN) 300 MG capsule ONE CAPSULE BY MOUTH THREE TIMES DAILY    hydrALAZINE (APRESOLINE) 100 MG tablet ONE TABLET BY MOUTH THREE TIMES DAILY    metoprolol tartrate (LOPRESSOR) 100 MG tablet ONE TABLET BY MOUTH TWICE DAILY    nitroGLYCERIN (NITROSTAT) 0.4 MG SL tablet Place 0.4 mg under the tongue every 5 (five) minutes as needed for Chest pain.    rosuvastatin (CRESTOR) 20 MG tablet ONE TABLET BY MOUTH EVERY EVENING    verapamil (VERELAN) 360 MG C24P ONE CAPSULE BY MOUTH once a day    walker (ULTRA-LIGHT ROLLATOR) Misc One rollator, size large.     Family History     Problem  Relation (Age of Onset)    Cancer Son    Heart disease Mother    Hypertension Daughter        Tobacco Use    Smoking status: Never Smoker    Smokeless tobacco: Never Used   Substance and Sexual Activity    Alcohol use: No    Drug use: No    Sexual activity: No     Review of Systems   Unable to perform ROS: intubated     Objective:     Vital Signs (Most Recent):  Temp: 99.2 °F (37.3 °C) (10/01/18 0800)  Pulse: 71 (10/01/18 0805)  Resp: 11 (10/01/18 0805)  BP: (!) 151/67 (10/01/18 0805)  SpO2: 99 % (10/01/18 0805) Vital Signs (24h Range):  Temp:  [95.9 °F (35.5 °C)-99.2 °F (37.3 °C)] 99.2 °F (37.3 °C)  Pulse:  [] 71  Resp:  [9-48] 11  SpO2:  [94 %-100 %] 99 %  BP: ()/() 151/67     Weight: 99.8 kg (220 lb)  Body mass index is 36.61 kg/m².    SpO2: 99 %  O2 Device (Oxygen Therapy): ventilator      Intake/Output Summary (Last 24 hours) at 10/1/2018 0826  Last data filed at 10/1/2018 0704  Gross per 24 hour   Intake 757.95 ml   Output 4830 ml   Net -4072.05 ml       Lines/Drains/Airways     Central Venous Catheter Line                 Percutaneous Central Line Insertion/Assessment - triple lumen  09/30/18 1050 right internal jugular less than 1 day          Drain                 NG/OG Tube 09/30/18 1035 [place] sump 16 Fr. Center mouth;Left mouth;Right mouth less than 1 day         Urethral Catheter 09/30/18 1025 16 Fr. less than 1 day          Airway                 Airway - Non-Surgical 09/30/18 0959 Endotracheal Tube less than 1 day          Peripheral Intravenous Line                 Peripheral IV - Single Lumen 09/30/18 0600 Left Wrist 1 day                Physical Exam   Constitutional: She appears well-developed and well-nourished.   HENT:   Head: Normocephalic and atraumatic.   Eyes: Conjunctivae are normal. Pupils are equal, round, and reactive to light.   Neck: Normal range of motion. Neck supple.   Cardiovascular: Normal rate, normal heart sounds and intact distal pulses.   Pulmonary/Chest:  Effort normal and breath sounds normal.   Abdominal: Soft. Bowel sounds are normal.   Musculoskeletal: Normal range of motion.   Skin: Skin is warm and dry.       Significant Labs: All pertinent lab results from the last 24 hours have been reviewed.    Significant Imaging: Echocardiogram:   2D echo with color flow doppler:   Results for orders placed or performed during the hospital encounter of 08/10/18   2D echo with color flow doppler   Result Value Ref Range    EF 60 55 - 65    Aortic Valve Regurgitation MILD     Est. PA Systolic Pressure 51.72 (A)     Pericardial Effusion TRIVIAL     Tricuspid Valve Regurgitation MILD      Assessment and Plan:     Pacemaker    Biotronik. Interrogation with normal device function and no high rate episodes        Cardiac arrest    PEA on arrival with severe acidosis. EKG with . Normal PPM function with no high rate episodes on interrogation. Etiology for the arrest is unclear - possible respiratory. Repeat echo with good EF. Continue supportive care        Acute pulmonary edema    Diuresis and afterload reduction as tolerated        S/P TAVR (transcatheter aortic valve replacement)    Repeat echo        Stage 3 chronic kidney disease             Essential hypertension    HTN after arrest. Clonidine patch. Resume B-blocker when CHF improved        CAD s/p PCI    Mild troponin increase  -doubt ACS but will discuss repeat LHC when stable        Hyperlipidemia                 VTE Risk Mitigation (From admission, onward)        Ordered     IP VTE HIGH RISK PATIENT  Once      09/30/18 1359     heparin (porcine) injection 5,000 Units  Every 8 hours      09/30/18 1251          Thank you for your consult. I will follow-up with patient. Please contact us if you have any additional questions.    Tony Carmen MD  Cardiology   [facility]

## 2018-10-01 NOTE — ASSESSMENT & PLAN NOTE
Creatinine 1.3   Historically ranges from 1.0-1.7 per chart review.  Good urine output.  Renally dose all medications.

## 2018-10-01 NOTE — ASSESSMENT & PLAN NOTE
No indication for vasopressor or aggressive IVF  Monitor closely on IV lasix   Avoid nephrotoxins

## 2018-10-01 NOTE — ASSESSMENT & PLAN NOTE
Neurologically appeared intact sitting up in bed and trying to pull tube on precedex   Hemodynamically more stable on propofol   Neuro checks

## 2018-10-01 NOTE — H&P
[facility] Medicine  History & Physical    Patient Name: Cindy Lyman  MRN: [id number]  Admission Date: 9/30/2018  Attending Physician: Viviane Mello MD   Primary Care Provider: Rodger Camarena MD         Patient information was obtained from ER records.     Subjective:     Principal Problem:Acute hypoxemic respiratory failure    Chief Complaint:   Chief Complaint   Patient presents with    Cardiac Arrest     Pt pulseless in the car        HPI: 83 yo female with diastolic CHF, COPD, gastric cancer, aortic stenosis with complete heart block and s/p TAVR and pacemaker placed 7/2018 presented in cardiac arrest. Per ED notes, patient was on her way to [employer] with others and c/o chest pain. CPR was initiated in parked vehicle outside ED. One round of epinephrine given and got ROSC. Per family, she c/o worsening SOB over several days. On arrival pt was euthermic, hypertensive, elevated lactate and BNP. CXR suggest pulmonary edema though infection cannot be ruled out. Pt intubated and evaluated by pulmonology. Cardiology consulted. ECHO pending. Broad spectrum antibiotics initiated until cultures result and clinical course dictates.     Chest CTA negative for pulmonary emboli.     Per family, Pt is a full code.     Past Medical History:   Diagnosis Date    Anemia     Anticoagulant long-term use     Aortic stenosis     Arthritis     lower back    Asthma     CAD (coronary artery disease) 4/24/2015    Carpal tunnel syndrome on both sides     Cataract     CHF (congestive heart failure) 5/2013    COPD (chronic obstructive pulmonary disease)     Depression     DVT (deep venous thrombosis)     Hyperlipidemia     Hypertension     Pulmonary HTN     TMJ (temporomandibular joint disorder)        Past Surgical History:   Procedure Laterality Date    A-V CARDIAC PACEMAKER INSERTION N/A 7/5/2018    Procedure: INSERTION, CARDIAC PACEMAKER, DUAL CHAMBER;  Surgeon: Giancarlo BARRETT  MD Mik;  Location: [facility] CATH LAB;  Service: Cardiology;  Laterality: N/A;    AORTIC VALVE REPLACEMENT N/A 7/5/2018    Procedure: Replacement-valve-aortic;  Surgeon: Corey Castañeda MD;  Location: [facility] CATH LAB;  Service: Cardiology;  Laterality: N/A;    BACK SURGERY      BIOPSY-BONE MARROW N/A 5/26/2016    Performed by Rufino Ovalle MD at [facility] ENDO    cardiac stents      CARDIAC VALVE SURGERY      CARPAL TUNNEL RELEASE      Right/Left    CHOLECYSTECTOMY-LAPAROSCOPIC W/ CHOLANGIOGRAM N/A 7/13/2017    Performed by Luis Carlos Jarvis MD at [facility] OR    EYE SURGERY      cataract extraction    HYSTERECTOMY      Partial    INSERTION, CARDIAC PACEMAKER, DUAL CHAMBER N/A 7/5/2018    Performed by Giancarlo Houser MD at [facility] CATH [facility]    JOINT REPLACEMENT  2009    Left hip    POLYPECTOMY      x9    Replacement-valve-aortic N/A 7/5/2018    Performed by Corey Castañeda MD at [facility] CATH LAB    TEMPOROMANDIBULAR JOINT SURGERY      ULTRASOUND-ENDOSCOPIC-UPPER N/A 4/25/2018    Performed by Socrates Andrew MD at [facility] ENDO (2ND FLR)       Review of patient's allergies indicates:   Allergen Reactions    Doxycycline hyclate Diarrhea and Nausea And Vomiting    Singulair [montelukast]      Stomach pain, Muscle pain, Cough      Penicillins      Other reaction(s): Anaphylaxis    Ventolin [albuterol sulfate] Other (See Comments)     Severely elevated blood pressure    Latex, natural rubber Rash       No current facility-administered medications on file prior to encounter.      Current Outpatient Medications on File Prior to Encounter   Medication Sig    acetaminophen (TYLENOL) 325 MG tablet Take 2 tablets (650 mg total) by mouth every 6 (six) hours as needed for Pain or Temperature greater than (100.4).    aspirin (ECOTRIN) 81 MG EC tablet Take 81 mg by mouth once daily.    azelastine (ASTELIN) 137 mcg (0.1 %) nasal spray 1 spray (137 mcg total) by Nasal route 2 (two) times daily.    carBAMazepine  (TEGRETOL) 200 mg tablet Take 1 tablet (200 mg total) by mouth 3 (three) times daily.    cetirizine (ZYRTEC) 10 MG tablet Take 1 tablet (10 mg total) by mouth once daily.    cloNIDine (CATAPRES) 0.3 MG tablet Take 1 tablet (0.3 mg total) by mouth 3 (three) times daily.    clopidogrel (PLAVIX) 75 mg tablet Take 1 tablet (75 mg total) by mouth once daily.    fluticasone (FLONASE) 50 mcg/actuation nasal spray TWO SPRAYS IN EACH NOSTRIL ONCE DAILY    fluticasone (FLOVENT HFA) 220 mcg/actuation inhaler Inhale 1 puff into the lungs 2 (two) times daily. Controller    furosemide (LASIX) 40 MG tablet TAKE ONE TABLET BY MOUTH EVERY DAY AS NEEDED FOR SHORTNESS OF BREATH or leg swelling    gabapentin (NEURONTIN) 300 MG capsule ONE CAPSULE BY MOUTH THREE TIMES DAILY    hydrALAZINE (APRESOLINE) 100 MG tablet ONE TABLET BY MOUTH THREE TIMES DAILY    metoprolol tartrate (LOPRESSOR) 100 MG tablet ONE TABLET BY MOUTH TWICE DAILY    nitroGLYCERIN (NITROSTAT) 0.4 MG SL tablet Place 0.4 mg under the tongue every 5 (five) minutes as needed for Chest pain.    rosuvastatin (CRESTOR) 20 MG tablet ONE TABLET BY MOUTH EVERY EVENING    verapamil (VERELAN) 360 MG C24P ONE CAPSULE BY MOUTH once a day    walker (ULTRA-LIGHT ROLLATOR) Misc One rollator, size large.     Family History     Problem Relation (Age of Onset)    Cancer Son    Heart disease Mother    Hypertension Daughter        Tobacco Use    Smoking status: Never Smoker    Smokeless tobacco: Never Used   Substance and Sexual Activity    Alcohol use: No    Drug use: No    Sexual activity: No     Review of Systems   Unable to perform ROS: Intubated     Objective:     Vital Signs (Most Recent):  Temp: 98 °F (36.7 °C) (09/30/18 2305)  Pulse: 69 (10/01/18 0000)  Resp: 16 (10/01/18 0000)  BP: 121/61 (10/01/18 0000)  SpO2: 100 % (10/01/18 0000) Vital Signs (24h Range):  Temp:  [95.9 °F (35.5 °C)-98.6 °F (37 °C)] 98 °F (36.7 °C)  Pulse:  [69-89] 69  Resp:  [12-33] 16  SpO2:   [94 %-100 %] 100 %  BP: (102-219)/() 121/61     Weight: 99.8 kg (220 lb)  Body mass index is 36.61 kg/m².    Physical Exam   Constitutional: She appears well-developed and well-nourished.   HENT:   Head: Normocephalic and atraumatic.   ETT in place    Eyes: Conjunctivae are normal. Pupils are equal, round, and reactive to light.   Neck: Neck supple. JVD present.   Cardiovascular: Normal rate and regular rhythm.   Pulmonary/Chest: She has no wheezes.   Scattered rhonchi    Abdominal: Soft. She exhibits no distension.   Musculoskeletal: She exhibits no edema or deformity.   Neurological:   Intubated sedated not alert    Skin: Skin is warm and dry. Capillary refill takes 2 to 3 seconds.         CRANIAL NERVES     CN III, IV, VI   Pupils are equal, round, and reactive to light.       Significant Labs: All pertinent labs within the past 24 hours have been reviewed.    Significant Imaging: I have reviewed all pertinent imaging results/findings within the past 24 hours.    Assessment/Plan:     * Acute hypoxemic respiratory failure    Chest CTA r/o PE, likely due to decompensated CHF and possible underlying infection  Pulmonology consulted for vent management  Cardiology consulted - ECHo pending  Cultures pending  IV antibiotics              Cardiac arrest    Etiology still unclear, but PEA reported by first repsonders, quick ROSC with one round of epinephrine  Pt intubated on sedation and will have wean sedation for appropriate neurological exam, consult neurology if indicated   ECHO pending  Pace maker to be interrogated            Acute pulmonary edema    Pt given IV lasix with large amount of diuresis            Stage 3 chronic kidney disease    No indication for vasopressor or aggressive IVF  Monitor closely on IV lasix   Avoid nephrotoxins           Anemia of chronic disease    Monitor  Reviewed bone marrow biopsy 2017 - no malignancy             VTE Risk Mitigation (From admission, onward)        Ordered     IP  VTE HIGH RISK PATIENT  Once      09/30/18 1359     heparin (porcine) injection 5,000 Units  Every 8 hours      09/30/18 1251        Critical care time spent on the evaluation and treatment of severe organ dysfunction, review of pertinent labs and imaging studies, discussions with consulting providers and discussions with patient/family: 40 minutes.     Viviane Mello MD  Department of Hospital Medicine   [facility]

## 2018-10-01 NOTE — HPI
HPI: 81 yo female with diastolic CHF, COPD, gastric cancer, aortic stenosis with complete heart block and s/p TAVR and pacemaker placed 7/2018 presented in cardiac arrest. Per ED notes, patient was on her way to [employer] with others and c/o chest pain. CPR was initiated in parked vehicle outside ED. One round of epinephrine given and got ROSC. Per family, she c/o worsening SOB over several days. On arrival pt was euthermic, hypertensive, elevated lactate and BNP. CXR suggest pulmonary edema though infection cannot be ruled out. Pt intubated and evaluated by pulmonology. Cardiology consulted. ECHO pending. Broad spectrum antibiotics initiated until cultures result and clinical course dictates.     On vent in ICU. Initially with PH 6.9 that rapidly corrected  Initial EKG   Interrogation of device by [employer] device - normal device function. No high rate episodes. 100% V paced    CAD - stent LAD 4/2015, BMS to RCA 6/1/18 - moderate Cx disease, Diastolic CHF  AS - s/p TAVR 7/5/18, Biotronik PPM 7/5/18 for CHB HTN, COPD, Hx DVT, anemia     Echo 8/10/18    1 - Normal left ventricular systolic function (EF 60-65%).     2 - No wall motion abnormalities.     3 - Concentric hypertrophy.     4 - Biatrial enlargement.     5 - Right ventricular enlargement with normal systolic function.     6 - Pulmonary hypertension. The estimated PA systolic pressure is 52 mmHg.     7 - S/P transcatheter AVR, CLEMENT = 2.58 cm2, AVAi = 1.32 cm2/m2, peak velocity = 1.7 m/s, mean gradient = 8 mmHg.     8 - Mild paravalvular aortic regurgitation.     9 - Mild tricuspid regurgitation.     10 - Trivial pericardial effusion.     11 - S/p 29mm Evolut TF TAVR.      [facility] 6/1/18       - Left Main Coronary Artery:             The LM has luminal irregularities. There is GERMAIN 3 flow.     - Left Anterior Descending Artery:             The ostial LAD has a 60% stenosis. There is GERMAIN 3 flow.     - Left Circumflex Artery:             The mid LCX has a 60%  stenosis. There is GERMAIN 3 flow.     - Right Coronary Artery:             The ostial RCA has a 99% stenosis. There is GERMAIN 3 flow.             The proximal RCA has a 80% stenosis. There is GERMAIN 3 flow.     - Common Femoral Artery:             The right CFA has luminal irregularities. Access closure with perclose was very difficult due to inability to catch needles with perclose device.      Intervention          Ostial RCA:              The lesion was successfully intervened. Post-stenosis of 0%, post-GERMAIN 3 flow and TMP grade 3. The vessel was accessed natively.  The following items were used: 2.0MM 15MM [place] Balloon and 2.5X12 Mini Vision Stent.       Proximal RCA:              The lesion was successfully intervened. Post-stenosis of 0%, post-GERMAIN 3 flow and TMP grade 3. The vessel was accessed natively.  The following items were used: 2.5MM 12MM [place] Balloon, Stent Integrity 2.5 X 8 and 3.0MM 8MM [place] Balloon.     Saw cardiology PA 8/10/18  Ms. Lyman presents for 1 mon f/u s/p 29mm Evolut TAVR via R TF access. She has done very well since her TAVR and she is without complaints today. She was able to walk into clinic today with her walker; prior to her TAVR she had to use a wheelchair. She denies CP, PND, orthopnea, or LE edema.  She tries to be active during the day, but she is limited by neuropathy and the chronic sleepiness from her neurontin. She is scheduled to see Dr. Carmen on the 14th.

## 2018-10-01 NOTE — ASSESSMENT & PLAN NOTE
PEA on arrival with severe acidosis. EKG with . Normal PPM function with no high rate episodes on interrogation. Etiology for the arrest is unclear - possible respiratory. Repeat echo with good EF. Continue supportive care

## 2018-10-01 NOTE — ASSESSMENT & PLAN NOTE
Etiology still unclear, but PEA reported by first repsonders, quick ROSC with one round of epinephrine  Pt intubated on sedation and will have wean sedation for appropriate neurological exam, consult neurology if indicated   ECHO  -  +DD  Pacemaker interrogated

## 2018-10-01 NOTE — ASSESSMENT & PLAN NOTE
Reportedly PEA.  ROSC achieved after 1 round of epi.  Blood pressures stable without use of vasopressors.  Point of care ultrasound showed a hypertrophic LV with reasonable 'squeeze' and a plethoric IVC with no respiratory variation.  Repeat echo with normal ef per Dr. Carmen's note.

## 2018-10-01 NOTE — ASSESSMENT & PLAN NOTE
Etiology still unclear, but PEA reported by first repsonders, quick ROSC with one round of epinephrine  Pt intubated on sedation and will have wean sedation for appropriate neurological exam, consult neurology if indicated   ECHO pending  Pace maker to be interrogated

## 2018-10-01 NOTE — PROGRESS NOTES
0730  Held sedation for 5 minutes, pt became tachypneic RR ~25, restless, gag reflex present, cough present, withdrew to pain, opened eyes in response to voice, pupils equal, round, responsive to light, unable to follow commands.    0845 Held sedation with Dr. Sanchez.  Pt became tachypneic, tachycardic, gag reflex and cough present, unable to follow commands, withdrew from pain, opened eyes in response to voice.  Dr. Sanchez to order diuretics and precedex.    1000 Pt agitated on pressedex, propofol stopped, on Cpap.  Kamini RT returned pt to PRVC after RR increased to 35, -250, agitated, pulling at medical equipment.    1115 Pt BP 85/46, paused precedex, called and paged Dr. Sanchez.    1130 Spoke with Dr. Mello; ok to restart propofol, d/c pressedex.    1215 Removed clonidine patch due to .    1230  Ok to order random vanc per Dr. Mello.  Done.    1500 Ok to give vanc per Dr. Mello..  Done.    1730 Informed MD Mello re: temp 100.5, ordered tylenol, given at 1750.      1800  Pt free from injury, falls, and trauma throughout shift, on ventilator PRVC at 35%, RR 16, , PEEP 5 after failing CPAP trial this am, pt turned Q2.  PT ; spoke to Dr. Mello; she will restart betablocker.  Waiting for orders.  Bed in low locked position, in view of nurses' station.

## 2018-10-01 NOTE — PLAN OF CARE
Problem: Patient Care Overview  Goal: Plan of Care Review  Outcome: Ongoing (interventions implemented as appropriate)  Pt resting in bed sedated on vent. Pt BP elevated early in shift. PRN hydralazine ordered and scheduled PO hydralazine started. Pt BP stable at this time. Pt remains paced at ~69 bpm with no cardiac issues throughout shift. Pt UOP decreased since lasix administration but remaining >35 cc/hr. Pt afebrile throughout shift. Pt responding to pain but not following commands or having purposeful movement. Pt turned to prevent breakdown. No acute distress noted in pt at this time. Will continue to monitor pt closely and follow POC.

## 2018-10-01 NOTE — ASSESSMENT & PLAN NOTE
Chest CTA r/o PE, likely due to decompensated CHF and possible underlying infection  Pulmonology consulted for vent management  Cardiology consulted - ECHo :  · Left ventricle ejection fraction is mildly decreased at 45%  · Left ventricle shows concentric remodeling.  · RV systolic function is normal.  · Left atrium is mildly dilated.  · Right atrium is moderately dilated.  · Tricuspid valve shows mild regurgitation.  · Grade II (moderate) left ventricular diastolic dysfunction consistent with pseudonormalization.  · LA pressure is elevated.       Cultures - negative, sent for resp cultures today   IV antibiotics until cultures result, low grade temp concerning for possible aspiration pneumonia

## 2018-10-01 NOTE — HOSPITAL COURSE
83 yo female with diastolic CHF, COPD, gastric cancer, aortic stenosis with complete heart block and s/p TAVR and pacemaker placed 7/2018 presented in cardiac arrest. Per ED notes, patient was on her way to [employer] with others and c/o chest pain. CPR was initiated in parked vehicle outside ED. One round of epinephrine given and got ROSC. Per family, she c/o worsening SOB over several days. On arrival pt was euthermic, hypertensive, elevated lactate and BNP. CXR suggest pulmonary edema though infection cannot be ruled out. Pt was  intubated and evaluated by pulmonology. Cardiology consulted. Broad spectrum antibiotics initiated until cultures result and clinical course dictates. Abx deescalated,after no major growth in cultures,  Chest CTA negative for pulmonary emboli.   Per family, Pt is a full code.   Duo to severity of  medical condition ,palliative care was consulted and patient and family as agree with hospice placement..  She failed swallow study for multile time and initially was on NG tube feeding,GI was consulted for PEG tube placement,,S/P  PEG tube placement on 10.19.18,stared on tube feeding,was tolerating feeding.  patient has been discharged to inpatient Hospice.

## 2018-10-01 NOTE — PROGRESS NOTES
[facility] Medicine  Progress Note    Patient Name: Cindy Lyman  MRN: [id number]  Patient Class: IP- Inpatient   Admission Date: 9/30/2018  Length of Stay: 1 days  Attending Physician: Viviane Mello MD  Primary Care Provider: Rodger Camarena MD        Subjective:     Principal Problem:Acute hypoxemic respiratory failure    HPI:  83 yo female with diastolic CHF, COPD, gastric cancer, aortic stenosis with complete heart block and s/p TAVR and pacemaker placed 7/2018 presented in cardiac arrest. Per ED notes, patient was on her way to [employer] with others and c/o chest pain. CPR was initiated in parked vehicle outside ED. One round of epinephrine given and got ROSC. Per family, she c/o worsening SOB over several days. On arrival pt was euthermic, hypertensive, elevated lactate and BNP. CXR suggest pulmonary edema though infection cannot be ruled out. Pt intubated and evaluated by pulmonology. Cardiology consulted. ECHO pending. Broad spectrum antibiotics initiated until cultures result and clinical course dictates.     Chest CTA negative for pulmonary emboli.     Per family, Pt is a full code.     Hospital Course:  Pt admitted after PEA arrest and acute respiratory failure/acidosis. Acidosis corrected. Pulmonology assisting with vent management/weaning. Trial of precedex today but patient did not tolerate. Switched back to propofol. CPAP this am and now back on PVRC. Pacemaker interrogated and appears to be functioning fine. Diuresed well on lasix without change in renal function. Possible plan for LHC per cardiology. Hope for extubation tomorrow, if not tube feeds will start. Neurologically appears intact while sitting up in the bed.     Interval History: BP dropped and clonidine patch taken off, bradycardia, lots of OP secretions per nursing, glycopyrolate ordered, low grade temps today and cultures pending, on broad spectrum antibiotics    Review of Systems   Unable to perform  ROS: Intubated     Objective:     Vital Signs (Most Recent):  Temp: 99.8 °F (37.7 °C) (10/01/18 1537)  Pulse: 74 (10/01/18 1700)  Resp: 16 (10/01/18 1700)  BP: (!) 185/79 (10/01/18 1700)  SpO2: 100 % (10/01/18 1700) Vital Signs (24h Range):  Temp:  [97.2 °F (36.2 °C)-99.8 °F (37.7 °C)] 99.8 °F (37.7 °C)  Pulse:  [] 74  Resp:  [9-48] 16  SpO2:  [97 %-100 %] 100 %  BP: ()/() 185/79     Weight: 92.9 kg (204 lb 12.9 oz)(taken by RN on 10/1)  Body mass index is 34.08 kg/m².    Intake/Output Summary (Last 24 hours) at 10/1/2018 1754  Last data filed at 10/1/2018 1700  Gross per 24 hour   Intake 782.49 ml   Output 3900 ml   Net -3117.51 ml      Physical Exam   Constitutional: She appears well-developed and well-nourished.   HENT:   Head: Normocephalic and atraumatic.   ETT in place    Eyes: Conjunctivae are normal. Pupils are equal, round, and reactive to light.   Neck: Neck supple. JVD present.   Cardiovascular: Normal rate and regular rhythm.   Pulmonary/Chest: She has no wheezes.   Scattered rhonchi    Abdominal: Soft. She exhibits no distension.   Musculoskeletal: She exhibits no edema or deformity.   Neurological:   Intubated sedated not alert    Skin: Skin is warm and dry. Capillary refill takes 2 to 3 seconds.       Significant Labs:   BMP:   Recent Labs   Lab  10/01/18   0255   GLU  105   NA  141   K  4.1   CL  104   CO2  26   BUN  22   CREATININE  1.3   CALCIUM  8.5*     CBC:   Recent Labs   Lab  09/30/18   1000  09/30/18   1014  10/01/18   0255   WBC  8.38   --   7.30   HGB  10.1*   --   8.5*   HCT  32.5*  33*  25.9*   PLT  233   --   204     Cardiac Markers:   Recent Labs   Lab  09/30/18   1030   BNP  621*     Pathology Results  (Last 10 years)    None        Urine Culture: No results for input(s): LABURIN in the last 48 hours.    Significant Imaging: I have reviewed all pertinent imaging results/findings within the past 24 hours.    Assessment/Plan:      * Acute hypoxemic respiratory failure     Chest CTA r/o PE, likely due to decompensated CHF and possible underlying infection  Pulmonology consulted for vent management  Cardiology consulted - ECHo :  · Left ventricle ejection fraction is mildly decreased at 45%  · Left ventricle shows concentric remodeling.  · RV systolic function is normal.  · Left atrium is mildly dilated.  · Right atrium is moderately dilated.  · Tricuspid valve shows mild regurgitation.  · Grade II (moderate) left ventricular diastolic dysfunction consistent with pseudonormalization.  · LA pressure is elevated.       Cultures - negative, sent for resp cultures today   IV antibiotics until cultures result, low grade temp concerning for possible aspiration pneumonia               Encephalopathy, metabolic    Neurologically appeared intact sitting up in bed and trying to pull tube on precedex   Hemodynamically more stable on propofol   Neuro checks           Pacemaker    See above           Cardiac arrest    Etiology still unclear, but PEA reported by first repsonders, quick ROSC with one round of epinephrine  Pt intubated on sedation and will have wean sedation for appropriate neurological exam, consult neurology if indicated   ECHO  -  +DD  Pacemaker interrogated             Acute pulmonary edema    Pt given IV lasix with large amount of diuresis  CXr improving             S/P TAVR (transcatheter aortic valve replacement)    ECHO completed  Defer to cardiology           Stage 3 chronic kidney disease    No indication for vasopressor or aggressive IVF  Monitor closely on IV lasix   Avoid nephrotoxins           Essential hypertension    BP meds resumed  Clonidine patch changed to oral PRN         CAD s/p PCI    Aspirin, plavix and statin resumed   Cardiology following  Elevated troponin after cardiopulmonary arrest  - +/- [facility]         Anemia of chronic disease    Monitor  Reviewed bone marrow biopsy 2017 - no malignancy           Hyperlipidemia    Resume statin             VTE  Risk Mitigation (From admission, onward)        Ordered     IP VTE HIGH RISK PATIENT  Once      09/30/18 1359     heparin (porcine) injection 5,000 Units  Every 8 hours      09/30/18 1251          Critical care time spent on the evaluation and treatment of severe organ dysfunction, review of pertinent labs and imaging studies, discussions with consulting providers and discussions with patient/family: 35 minutes.    Viviane Mello MD  Department of Hospital Medicine   [facility]

## 2018-10-01 NOTE — PROGRESS NOTES
[facility]  Pulmonology  Progress Note    Patient Name: Cindy Lyman  MRN: [id number]  Admission Date: 9/30/2018  Hospital Length of Stay: 1 days  Code Status: Full Code  Attending Provider: Viviaen Mello MD  Primary Care Provider: Rodger Camarena MD   Principal Problem: Acute hypoxemic respiratory failure    Subjective:     HPI:  Ms. Lyman is an 82 year old woman with a history of HTN, hyperlipidemia, FIFI, intramucosal gastric carcinoma and aortic stenosis s/p TAVR with subsequent heart block requiring pacemaker who presented to the ED with worsening shortness of breath and upon arrival had a cardiac arrest.  ROSC was achieved after 1 round of epi. Patient was subsequently intubated and pulmonary was called for vent management.    No family was available at the time of my evaluation to provide further history. History was obtained from chart review.    Interval History: FiO2 requirement improve significantly overnight.  No acute issue.  Diuresed over 4 liters since admission    Objective:     Vital Signs (Most Recent):  Temp: 99.2 °F (37.3 °C) (10/01/18 0800)  Pulse: 71 (10/01/18 0805)  Resp: 11 (10/01/18 0805)  BP: (!) 151/67 (10/01/18 0805)  SpO2: 99 % (10/01/18 0805) Vital Signs (24h Range):  Temp:  [95.9 °F (35.5 °C)-99.2 °F (37.3 °C)] 99.2 °F (37.3 °C)  Pulse:  [] 71  Resp:  [9-48] 11  SpO2:  [94 %-100 %] 99 %  BP: ()/() 151/67     Weight: 92.9 kg (204 lb 12.9 oz)  Body mass index is 34.08 kg/m².      Intake/Output Summary (Last 24 hours) at 10/1/2018 0847  Last data filed at 10/1/2018 0800  Gross per 24 hour   Intake 781.95 ml   Output 4880 ml   Net -4098.05 ml       Physical Exam   Constitutional: She appears well-developed. She is intubated.   HENT:   Head: Normocephalic.   Eyes: Conjunctivae and EOM are normal. Pupils are equal, round, and reactive to light.   Neck: Neck supple. No tracheal deviation present.   Cardiovascular: Normal rate, regular rhythm  and normal heart sounds.   No murmur heard.  Pulmonary/Chest: Effort normal and breath sounds normal. She is intubated. No respiratory distress. She has no wheezes. She has no rales.   Abdominal: Soft. Bowel sounds are normal. She exhibits no distension.   Musculoskeletal: She exhibits no edema.   Neurological:   Pupils are 2+ and responsive to light.  Cough, gag, and corneal reflex intact.  Patient responding to painful stimuli- withdrawing all four extremities.    Skin: Skin is warm and dry.   Vitals reviewed.      Vents:  Vent Mode: PRVC (10/01/18 0805)  Ventilator Initiated: Yes (09/30/18 1027)  Set Rate: 16 bmp (10/01/18 0805)  Vt Set: 400 mL (10/01/18 0805)  PEEP/CPAP: 5 cmH20 (10/01/18 0805)  Oxygen Concentration (%): 35 (10/01/18 0805)  Peak Airway Pressure: 23.9 cmH2O (10/01/18 0805)  Total Ve: 6.5 mL (10/01/18 0805)  F/VT Ratio<105 (RSBI): (!) 27.23 (10/01/18 0805)    Lines/Drains/Airways     Central Venous Catheter Line                 Percutaneous Central Line Insertion/Assessment - triple lumen  09/30/18 1050 right internal jugular less than 1 day          Drain                 NG/OG Tube 09/30/18 1035 [place] sump 16 Fr. Center mouth;Left mouth;Right mouth less than 1 day         Urethral Catheter 09/30/18 1025 16 Fr. less than 1 day          Airway                 Airway - Non-Surgical 09/30/18 0959 Endotracheal Tube less than 1 day          Peripheral Intravenous Line                 Peripheral IV - Single Lumen 09/30/18 0600 Left Wrist 1 day                Significant Labs:    CBC/Anemia Profile:  Recent Labs   Lab  09/30/18   1000  09/30/18   1014  10/01/18   0255   WBC  8.38   --   7.30   HGB  10.1*   --   8.5*   HCT  32.5*  33*  25.9*   PLT  233   --   204   MCV  93   --   87   RDW  15.7*   --   15.2*        Chemistries:  Recent Labs   Lab  09/30/18   1000  10/01/18   0255   NA  142  141   K  3.8  4.1   CL  109  104   CO2  15*  26   BUN  17  22   CREATININE  1.3  1.3   CALCIUM  9.1  8.5*   ALBUMIN   3.7   --    PROT  7.1   --    BILITOT  0.3   --    ALKPHOS  96   --    ALT  31   --    AST  35   --      9/30/18 bnp 630s    ABGs:   Recent Labs   Lab  10/01/18   0543   PH  7.472*   PCO2  40.6   HCO3  29.7*   POCSATURATED  100   BE  6       Significant Imaging:  CT: I have reviewed all pertinent results/findings within the past 24 hours and my personal findings are:  9/30/18 no pe.  bilateral ggo and patchy consolidation in dependent region bilaterally.  CM with enlarged PA    Assessment/Plan:     * Acute hypoxemic respiratory failure    Intubated post cardiac arrest.   Continue low tidal volume, lung protective ventilation.  400/16/50/5  Wean supplemental oxygen while maintaining an O2 Sat at or above 92%.  CXR is significant for patchy bilateral opacities-  Likely pulmonary edema, but cannot rule out infection without more information.  Agree with diuresis.  Would cover with empiric antibiotics while cultures mature.  Would consider d-escalation in light of rapid clinical improvement and negative culture thus far.            Cardiac arrest    Reportedly PEA.  ROSC achieved after 1 round of epi.  Blood pressures stable without use of vasopressors.  Point of care ultrasound showed a hypertrophic LV with reasonable 'squeeze' and a plethoric IVC with no respiratory variation.  Repeat echo with normal ef per Dr. Carmen's note.            Acute pulmonary edema    S/p lasix while in ED yesterday.  Urine output is tapering down.  Will repeat cxr.  Will start atc lasix.          Stage 3 chronic kidney disease    Creatinine 1.3   Historically ranges from 1.0-1.7 per chart review.  Good urine output.  Renally dose all medications.               Marko Sanchez MD  Pulmonology  [facility]    Critical Care Time: 35  minutes  Critical secondary to respiratory failure       Critical care was time spent personally by me on the following activities: development of treatment plan with patient or surrogate and  bedside caregivers, discussions with consultants, evaluation of patient's response to treatment, examination of patient, ordering and performing treatments and interventions, ordering and review of laboratory studies, ordering and review of radiographic studies, pulse oximetry, re-evaluation of patient's condition.  This critical care time did not overlap with that of any other provider or involve time for any procedures.

## 2018-10-01 NOTE — SUBJECTIVE & OBJECTIVE
Interval History: BP dropped and clonidine patch taken off, bradycardia, lots of OP secretions per nursing, glycopyrolate ordered, low grade temps today and cultures pending, on broad spectrum antibiotics    Review of Systems   Unable to perform ROS: Intubated     Objective:     Vital Signs (Most Recent):  Temp: 99.8 °F (37.7 °C) (10/01/18 1537)  Pulse: 74 (10/01/18 1700)  Resp: 16 (10/01/18 1700)  BP: (!) 185/79 (10/01/18 1700)  SpO2: 100 % (10/01/18 1700) Vital Signs (24h Range):  Temp:  [97.2 °F (36.2 °C)-99.8 °F (37.7 °C)] 99.8 °F (37.7 °C)  Pulse:  [] 74  Resp:  [9-48] 16  SpO2:  [97 %-100 %] 100 %  BP: ()/() 185/79     Weight: 92.9 kg (204 lb 12.9 oz)(taken by RN on 10/1)  Body mass index is 34.08 kg/m².    Intake/Output Summary (Last 24 hours) at 10/1/2018 1754  Last data filed at 10/1/2018 1700  Gross per 24 hour   Intake 782.49 ml   Output 3900 ml   Net -3117.51 ml      Physical Exam   Constitutional: She appears well-developed and well-nourished.   HENT:   Head: Normocephalic and atraumatic.   ETT in place    Eyes: Conjunctivae are normal. Pupils are equal, round, and reactive to light.   Neck: Neck supple. JVD present.   Cardiovascular: Normal rate and regular rhythm.   Pulmonary/Chest: She has no wheezes.   Scattered rhonchi    Abdominal: Soft. She exhibits no distension.   Musculoskeletal: She exhibits no edema or deformity.   Neurological:   Intubated sedated not alert    Skin: Skin is warm and dry. Capillary refill takes 2 to 3 seconds.       Significant Labs:   BMP:   Recent Labs   Lab  10/01/18   0255   GLU  105   NA  141   K  4.1   CL  104   CO2  26   BUN  22   CREATININE  1.3   CALCIUM  8.5*     CBC:   Recent Labs   Lab  09/30/18   1000  09/30/18   1014  10/01/18   0255   WBC  8.38   --   7.30   HGB  10.1*   --   8.5*   HCT  32.5*  33*  25.9*   PLT  233   --   204     Cardiac Markers:   Recent Labs   Lab  09/30/18   1030   BNP  621*     Pathology Results  (Last 10 years)    None         Urine Culture: No results for input(s): LABURIN in the last 48 hours.    Significant Imaging: I have reviewed all pertinent imaging results/findings within the past 24 hours.

## 2018-10-01 NOTE — CONSULTS
"  [facility]  Adult Nutrition  Consult Note    SUMMARY     Recommendations    Recommendation/Intervention:   1. Consult for TF recs received; if unable to extubate pt w/in 24 hrs, rec TF initiation of Impact Peptide 1.5 @ 10ml/hr; adv as skinny'd to a goal rate of 45ml/hr to provide 1620 cals, 102 gms prot, & 832 mls free fl     2. Add'l free fl per MD discretion     Goals: TF initiation or diet advancement w/in 48 hrs  Nutrition Goal Status: new  Communication of RD Recs: reviewed with RN    Reason for Assessment    Reason for Assessment: consult  Diagnosis: (acute hypoxemic resp failure)  Relevant Medical History: CHF, CAD s/p stent, COPD, HLD, HTN  General Information Comments: Pt admitted s/p cardiac arrest. Being diuresed. Pt seen this a.m. Intubated, lightly sedated. Pt's son present & reports pt tried to follow a low Na diet pta but was not always compliant. Denies her having any chewing/swallowing issues. NFPE not performed as pt w/ no s/s of malnutrition.    Nutrition Discharge Planning: Unable to determine @ this time.    Nutrition Risk Screen    Nutrition Risk Screen: no indicators present    Nutrition/Diet History    Patient Reported Diet/Restrictions/Preferences: low salt(at times)  Typical Food/Fluid Intake: Adequate pta  Food Preferences: TAYE  Do you have any cultural, spiritual, Anabaptist conflicts, given your current situation?: none  Factors Affecting Nutritional Intake: on mechanical ventilation, NPO    Anthropometrics    Temp: 98.7 °F (37.1 °C)  Height: 5' 5" (165.1 cm)  Height (inches): 65 in  Weight Method: Bed Scale  Weight: 92.9 kg (204 lb 12.9 oz)(taken by RN on 10/1)  Weight (lb): 204.81 lb  Ideal Body Weight (IBW), Female: 125 lb  % Ideal Body Weight, Female (lb): 163.85 lb  BMI (Calculated): 34.2  BMI Grade: 30 - 34.9- obesity - grade I       Lab/Procedures/Meds    Pertinent Labs Reviewed: reviewed  Pertinent Medications Reviewed: reviewed  Pertinent Medications Comments: " propofol, famotidine, lasix    Physical Findings/Assessment    Overall Physical Appearance: on ventilator support, obese, nourished  Tubes: orogastric tube  Skin: intact    Estimated/Assessed Needs    Weight Used For Calorie Calculations: 92.9 kg (204 lb 12.9 oz)  Energy Calorie Requirements (kcal): 1658  Energy Need Method: [place] (modified)     Protein Requirements: 114(2gm/kg IBW)  Weight Used For Protein Calculations: 56.8 kg (125 lb 3.5 oz)(IBW)     Fluid Need Method: RDA Method  RDA Method (mL): 1658         Nutrition Prescription Ordered    Current Diet Order: NPO    Evaluation of Received Nutrient/Fluid Intake    Other Calories (kcal): 90(via propofol)  I/O: -4.7L  Energy Calories Required: not meeting needs  Protein Required: not meeting needs  Fluid Required: (Per MD)  Comments: LBM unknown; good uop  % Intake of Estimated Energy Needs: 0 - 25 %  % Meal Intake: NPO    Nutrition Risk    Level of Risk/Frequency of Follow-up: (F/u 2 x weekly)     Assessment and Plan    Nutrition Problem  Inadeqaute energy intake    Related to (etiology):   NPO w/ no alternate means of nutr in place    Signs and Symptoms (as evidenced by):   <85% of EEN/EPN being met    Interventions/Recommendations (treatment strategy):  See recs    Nutrition Diagnosis Status:   New         Monitor and Evaluation    Food and Nutrient Intake: energy intake, enteral nutrition intake  Food and Nutrient Adminstration: enteral and parenteral nutrition administration, diet order  Anthropometric Measurements: weight, weight change  Biochemical Data, Medical Tests and Procedures: electrolyte and renal panel, glucose/endocrine profile  Nutrition-Focused Physical Findings: overall appearance     Nutrition Follow-Up    RD Follow-up?: Yes

## 2018-10-01 NOTE — ASSESSMENT & PLAN NOTE
Chest CTA r/o PE, likely due to decompensated CHF and possible underlying infection  Pulmonology consulted for vent management  Cardiology consulted - ECHo pending  Cultures pending  IV antibiotics

## 2018-10-01 NOTE — SUBJECTIVE & OBJECTIVE
Past Medical History:   Diagnosis Date    Anemia     Anticoagulant long-term use     Aortic stenosis     Arthritis     lower back    Asthma     CAD (coronary artery disease) 4/24/2015    Carpal tunnel syndrome on both sides     Cataract     CHF (congestive heart failure) 5/2013    COPD (chronic obstructive pulmonary disease)     Depression     DVT (deep venous thrombosis)     Hyperlipidemia     Hypertension     Pulmonary HTN     TMJ (temporomandibular joint disorder)        Past Surgical History:   Procedure Laterality Date    A-V CARDIAC PACEMAKER INSERTION N/A 7/5/2018    Procedure: INSERTION, CARDIAC PACEMAKER, DUAL CHAMBER;  Surgeon: Giancarlo Houser MD;  Location: [facility] CATH LAB;  Service: Cardiology;  Laterality: N/A;    AORTIC VALVE REPLACEMENT N/A 7/5/2018    Procedure: Replacement-valve-aortic;  Surgeon: Corey Castañeda MD;  Location: [facility] CATH LAB;  Service: Cardiology;  Laterality: N/A;    BACK SURGERY      BIOPSY-BONE MARROW N/A 5/26/2016    Performed by [facility]uf[facility] Ovalle [facility] at [facility] ENDO    cardiac stents      CARDIAC VALVE SURGERY      CARPAL TUNNEL RELEASE      Right/Left    CHOLECYSTECTOMY-LAPAROSCOPIC W/ CHOLANGIOGRAM N/A 7/13/2017    Performed by [facility] Jarvis [facility] at [facility] OR    EYE SURGERY      cataract extraction    HYSTERECTOMY      Partial    INSERTION, CARDIAC PACEMAKER, DUAL CHAMBER N/A 7/5/2018    Performed by Giancarlo Houser MD at [facility] CATH LAB    JOINT REPLACEMENT  2009    Left hip    POLYPECTOMY      x9    Replacement-valve-aortic N/A 7/5/2018    Performed by Corey Castañeda MD at [facility] CATH LAB    TEMPOROMANDIBULAR JOINT SURGERY      ULTRASOUND-ENDOSCOPIC-UPPER N/A 4/25/2018    Performed by Socrates Andrew MD at [facility] ENDO (2ND FLR)       Review of patient's allergies indicates:   Allergen Reactions    Doxycycline hyclate Diarrhea and Nausea And Vomiting    Singulair [montelukast]      Stomach pain, Muscle pain, Cough      Penicillins       Other reaction(s): Anaphylaxis    Ventolin [albuterol sulfate] Other (See Comments)     Severely elevated blood pressure    Latex, natural rubber Rash       No current facility-administered medications on file prior to encounter.      Current Outpatient Medications on File Prior to Encounter   Medication Sig    acetaminophen (TYLENOL) 325 MG tablet Take 2 tablets (650 mg total) by mouth every 6 (six) hours as needed for Pain or Temperature greater than (100.4).    aspirin (ECOTRIN) 81 MG EC tablet Take 81 mg by mouth once daily.    azelastine (ASTELIN) 137 mcg (0.1 %) nasal spray 1 spray (137 mcg total) by Nasal route 2 (two) times daily.    carBAMazepine (TEGRETOL) 200 mg tablet Take 1 tablet (200 mg total) by mouth 3 (three) times daily.    cetirizine (ZYRTEC) 10 MG tablet Take 1 tablet (10 mg total) by mouth once daily.    cloNIDine (CATAPRES) 0.3 MG tablet Take 1 tablet (0.3 mg total) by mouth 3 (three) times daily.    clopidogrel (PLAVIX) 75 mg tablet Take 1 tablet (75 mg total) by mouth once daily.    fluticasone (FLONASE) 50 mcg/actuation nasal spray TWO SPRAYS IN EACH NOSTRIL ONCE DAILY    fluticasone (FLOVENT HFA) 220 mcg/actuation inhaler Inhale 1 puff into the lungs 2 (two) times daily. Controller    furosemide (LASIX) 40 MG tablet TAKE ONE TABLET BY MOUTH EVERY DAY AS NEEDED FOR SHORTNESS OF BREATH or leg swelling    gabapentin (NEURONTIN) 300 MG capsule ONE CAPSULE BY MOUTH THREE TIMES DAILY    hydrALAZINE (APRESOLINE) 100 MG tablet ONE TABLET BY MOUTH THREE TIMES DAILY    metoprolol tartrate (LOPRESSOR) 100 MG tablet ONE TABLET BY MOUTH TWICE DAILY    nitroGLYCERIN (NITROSTAT) 0.4 MG SL tablet Place 0.4 mg under the tongue every 5 (five) minutes as needed for Chest pain.    rosuvastatin (CRESTOR) 20 MG tablet ONE TABLET BY MOUTH EVERY EVENING    verapamil (VERELAN) 360 MG C24P ONE CAPSULE BY MOUTH once a day    walker (ULTRA-LIGHT ROLLATOR) Misc One rollator, size large.     Family  History     Problem Relation (Age of Onset)    Cancer Son    Heart disease Mother    Hypertension Daughter        Tobacco Use    Smoking status: Never Smoker    Smokeless tobacco: Never Used   Substance and Sexual Activity    Alcohol use: No    Drug use: No    Sexual activity: No     Review of Systems   Unable to perform ROS: intubated     Objective:     Vital Signs (Most Recent):  Temp: 99.2 °F (37.3 °C) (10/01/18 0800)  Pulse: 71 (10/01/18 0805)  Resp: 11 (10/01/18 0805)  BP: (!) 151/67 (10/01/18 0805)  SpO2: 99 % (10/01/18 0805) Vital Signs (24h Range):  Temp:  [95.9 °F (35.5 °C)-99.2 °F (37.3 °C)] 99.2 °F (37.3 °C)  Pulse:  [] 71  Resp:  [9-48] 11  SpO2:  [94 %-100 %] 99 %  BP: ()/() 151/67     Weight: 99.8 kg (220 lb)  Body mass index is 36.61 kg/m².    SpO2: 99 %  O2 Device (Oxygen Therapy): ventilator      Intake/Output Summary (Last 24 hours) at 10/1/2018 0826  Last data filed at 10/1/2018 0704  Gross per 24 hour   Intake 757.95 ml   Output 4830 ml   Net -4072.05 ml       Lines/Drains/Airways     Central Venous Catheter Line                 Percutaneous Central Line Insertion/Assessment - triple lumen  09/30/18 1050 right internal jugular less than 1 day          Drain                 NG/OG Tube 09/30/18 1035 [place] sump 16 Fr. Center mouth;Left mouth;Right mouth less than 1 day         Urethral Catheter 09/30/18 1025 16 Fr. less than 1 day          Airway                 Airway - Non-Surgical 09/30/18 0959 Endotracheal Tube less than 1 day          Peripheral Intravenous Line                 Peripheral IV - Single Lumen 09/30/18 0600 Left Wrist 1 day                Physical Exam   Constitutional: She appears well-developed and well-nourished.   HENT:   Head: Normocephalic and atraumatic.   Eyes: Conjunctivae are normal. Pupils are equal, round, and reactive to light.   Neck: Normal range of motion. Neck supple.   Cardiovascular: Normal rate, normal heart sounds and intact distal  pulses.   Pulmonary/Chest: Effort normal and breath sounds normal.   Abdominal: Soft. Bowel sounds are normal.   Musculoskeletal: Normal range of motion.   Skin: Skin is warm and dry.       Significant Labs: All pertinent lab results from the last 24 hours have been reviewed.    Significant Imaging: Echocardiogram:   2D echo with color flow doppler:   Results for orders placed or performed during the hospital encounter of 08/10/18   2D echo with color flow doppler   Result Value Ref Range    EF 60 55 - 65    Aortic Valve Regurgitation MILD     Est. PA Systolic Pressure 51.72 (A)     Pericardial Effusion TRIVIAL     Tricuspid Valve Regurgitation MILD

## 2018-10-01 NOTE — SUBJECTIVE & OBJECTIVE
HPI:  Ms. Lyman is an 82 year old woman with a history of HTN, hyperlipidemia, FIFI, intramucosal gastric carcinoma and aortic stenosis s/p TAVR with subsequent heart block requiring pacemaker who presented to the ED with worsening shortness of breath and upon arrival had a cardiac arrest.  ROSC was achieved after 1 round of epi. Patient was subsequently intubated and pulmonary was called for vent management.    No family was available at the time of my evaluation to provide further history. History was obtained from chart review.    Interval History: FiO2 requirement improve significantly overnight.  No acute issue.  Diuresed over 4 liters since admission    Objective:     Vital Signs (Most Recent):  Temp: 99.2 °F (37.3 °C) (10/01/18 0800)  Pulse: 71 (10/01/18 0805)  Resp: 11 (10/01/18 0805)  BP: (!) 151/67 (10/01/18 0805)  SpO2: 99 % (10/01/18 0805) Vital Signs (24h Range):  Temp:  [95.9 °F (35.5 °C)-99.2 °F (37.3 °C)] 99.2 °F (37.3 °C)  Pulse:  [] 71  Resp:  [9-48] 11  SpO2:  [94 %-100 %] 99 %  BP: ()/() 151/67     Weight: 92.9 kg (204 lb 12.9 oz)  Body mass index is 34.08 kg/m².      Intake/Output Summary (Last 24 hours) at 10/1/2018 0847  Last data filed at 10/1/2018 0800  Gross per 24 hour   Intake 781.95 ml   Output 4880 ml   Net -4098.05 ml       Physical Exam   Constitutional: She appears well-developed. She is intubated.   HENT:   Head: Normocephalic.   Eyes: Conjunctivae and EOM are normal. Pupils are equal, round, and reactive to light.   Neck: Neck supple. No tracheal deviation present.   Cardiovascular: Normal rate, regular rhythm and normal heart sounds.   No murmur heard.  Pulmonary/Chest: Effort normal and breath sounds normal. She is intubated. No respiratory distress. She has no wheezes. She has no rales.   Abdominal: Soft. Bowel sounds are normal. She exhibits no distension.   Musculoskeletal: She exhibits no edema.   Neurological:   Pupils are 2+ and responsive to  light.  Cough, gag, and corneal reflex intact.  Patient responding to painful stimuli- withdrawing all four extremities.    Skin: Skin is warm and dry.   Vitals reviewed.      Vents:  Vent Mode: PRVC (10/01/18 0805)  Ventilator Initiated: Yes (09/30/18 1027)  Set Rate: 16 bmp (10/01/18 0805)  Vt Set: 400 mL (10/01/18 0805)  PEEP/CPAP: 5 cmH20 (10/01/18 0805)  Oxygen Concentration (%): 35 (10/01/18 0805)  Peak Airway Pressure: 23.9 cmH2O (10/01/18 0805)  Total Ve: 6.5 mL (10/01/18 0805)  F/VT Ratio<105 (RSBI): (!) 27.23 (10/01/18 0805)    Lines/Drains/Airways     Central Venous Catheter Line                 Percutaneous Central Line Insertion/Assessment - triple lumen  09/30/18 1050 right internal jugular less than 1 day          Drain                 NG/OG Tube 09/30/18 1035 [place] sump 16 Fr. Center mouth;Left mouth;Right mouth less than 1 day         Urethral Catheter 09/30/18 1025 16 Fr. less than 1 day          Airway                 Airway - Non-Surgical 09/30/18 0959 Endotracheal Tube less than 1 day          Peripheral Intravenous Line                 Peripheral IV - Single Lumen 09/30/18 0600 Left Wrist 1 day                Significant Labs:    CBC/Anemia Profile:  Recent Labs   Lab  09/30/18   1000  09/30/18   1014  10/01/18   0255   WBC  8.38   --   7.30   HGB  10.1*   --   8.5*   HCT  32.5*  33*  25.9*   PLT  233   --   204   MCV  93   --   87   RDW  15.7*   --   15.2*        Chemistries:  Recent Labs   Lab  09/30/18   1000  10/01/18   0255   NA  142  141   K  3.8  4.1   CL  109  104   CO2  15*  26   BUN  17  22   CREATININE  1.3  1.3   CALCIUM  9.1  8.5*   ALBUMIN  3.7   --    PROT  7.1   --    BILITOT  0.3   --    ALKPHOS  96   --    ALT  31   --    AST  35   --      9/30/18 bnp 630s    ABGs:   Recent Labs   Lab  10/01/18   0543   PH  7.472*   PCO2  40.6   HCO3  29.7*   POCSATURATED  100   BE  6       Significant Imaging:  CT: I have reviewed all pertinent results/findings within the past 24 hours and my  personal findings are:  9/30/18 no pe.  bilateral ggo and patchy consolidation in dependent region bilaterally.  CM with enlarged PA

## 2018-10-01 NOTE — ASSESSMENT & PLAN NOTE
Aspirin, plavix and statin resumed   Cardiology following  Elevated troponin after cardiopulmonary arrest  - +/- [facility]

## 2018-10-01 NOTE — ASSESSMENT & PLAN NOTE
S/p lasix while in ED yesterday.  Urine output is tapering down.  Will repeat cxr.  Will start atc lasix.

## 2018-10-01 NOTE — PLAN OF CARE
Problem: Patient Care Overview  Goal: Plan of Care Review  Recommendations     Recommendation/Intervention:   1. Consult for TF recs received; if unable to extubate pt w/in 24 hrs, rec TF initiation of Impact Peptide 1.5 @ 10ml/hr; adv as skinny'd to a goal rate of 45ml/hr to provide 1620 cals, 102 gms prot, & 832 mls free fl     2. Add'l free fl per MD discretion      Goals: TF initiation or diet advancement w/in 48 hrs  Nutrition Goal Status: new

## 2018-10-01 NOTE — PROGRESS NOTES
Pt placed on trial 10:00a.m. And was tolerating well, however after approximately 30 minutes Pt RASS increased and RR greater than 35 for over 7 minutes.  RT place on previous settings per Dr. Sanchez verbal order with read back.

## 2018-10-01 NOTE — ASSESSMENT & PLAN NOTE
Intubated post cardiac arrest.   Continue low tidal volume, lung protective ventilation.  400/16/50/5  Wean supplemental oxygen while maintaining an O2 Sat at or above 92%.  CXR is significant for patchy bilateral opacities-  Likely pulmonary edema, but cannot rule out infection without more information.  Agree with diuresis.  Would cover with empiric antibiotics while cultures mature.  Would consider d-escalation in light of rapid clinical improvement and negative culture thus far.

## 2018-10-01 NOTE — PLAN OF CARE
Initial discharge planning assessment completed.  Three of patient's adult children at the bedside but not caregiver (Lesley Swartz).  Per daughter, Bushra Terrell, patient had been moderately independent prior to admit, e.g. able to dress self, bath, and feed self, drive short distances.  Patient lives with her daughter, Lesley Swartz.  Patient has a walker;  Per daughter, Bushra, patient would prefer morning appointment and they were not sure of which pharmacy patient prefers to use but provided [facility] until patient could advise of which she would prefer to use.  Per family, patient will have one person in her home to help (that would be the daughter that she lives with).  Siblings were not sure if anyone has POA.   Discharge Disposition:   Discharge planning will be on-going to develop a safe discharge plan.       10/01/18 6176   Discharge Assessment   Assessment Type Discharge Planning Assessment   Confirmed/corrected address and phone number on facesheet? Yes   Assessment information obtained from? Other  (Adult children at the bedside; daughter, Bushra Terrell.)   Communicated expected length of stay with patient/caregiver no   Prior to hospitilization cognitive status: Alert/Oriented   Prior to hospitalization functional status: Assistive Equipment;Independent   Current cognitive status: Unable to Assess   Current Functional Status: Needs Assistance   Facility Arrived From: home   Lives With child(radha), adult  (Daughter; Lesley Swartz; [phone])   Able to Return to Prior Arrangements unable to determine at this time (comments)   Is patient able to care for self after discharge? Unable to determine at this time (comments)   Who are your caregiver(s) and their phone number(s)? Lesley Swartz; daughter; [phone] (lives in home);   Rafael Quiñonez; son; [phone];    Patient's perception of discharge disposition admitted as an inpatient   Readmission Within The Last 30 Days no previous  admission in last 30 days   Patient currently being followed by outpatient case management? No   Patient currently receives any other outside agency services? No   Does the patient have transportation home? Yes   Transportation Available family or friend will provide   Dialysis Name and Scheduled days n/a   Does the patient receive services at the [facility]? No   Discharge Plan A (TBD)   Discharge Plan B (TBD)   Patient/Family In Agreement With Plan yes  (Adult children at the bedside during assessment.  Caregiver, Lesley, was not at the bedside during this assessment.  No sure if anyone has POA.  )   Micaela Melendez LMSW, ACARON-Sw, [facility]  10/01/2019

## 2018-10-01 NOTE — PROGRESS NOTES
FIO2 decreased to 35% post ABG results.   Results for MARIE TURNER (MRN [id number]) as of 10/1/2018 06:06   Ref. Range 10/1/2018 05:43   POC PH Latest Ref Range: 7.35 - 7.45  7.472 (H)   POC PCO2 Latest Ref Range: 35 - 45 mmHg 40.6   POC PO2 Latest Ref Range: 80 - 100 mmHg 191 (H)   POC BE Latest Ref Range: -2 to 2 mmol/L 6   POC HCO3 Latest Ref Range: 24 - 28 mmol/L 29.7 (H)   POC SATURATED O2 Latest Ref Range: 95 - 100 % 100   POC TCO2 Latest Ref Range: 23 - 27 mmol/L 31 (H)   FiO2 Unknown 50   Vt Unknown 400   PiP Unknown 25   PEEP Unknown 5   Sample Unknown ARTERIAL   DelSys Unknown Adult Vent   Allens Test Unknown Pass   Site Unknown RR   Mode Unknown AC/PRVC

## 2018-10-01 NOTE — EICU
Notified of /86    83 y/o F with a history of HTN, hyperlipidemia, FIFI, intramucosal gastric carcinoma and aortic stenosis s/p TAVR with subsequent heart block requiring pacemaker who presented to the ED with worsening shortness of breath and chest pain and upon arrival, while still in the vehicle, had a cardiac arrest reportedly PEA.  ROSC was achieved after 1 round of epi.    Patient takes several BP meds at home including clonidine    · Ordered hydralazine 5 mg IV q 8  · Will slowly resume home anti hypertensives

## 2018-10-01 NOTE — HPI
81 yo female with diastolic CHF, COPD, gastric cancer, aortic stenosis with complete heart block and s/p TAVR and pacemaker placed 7/2018 presented in cardiac arrest. Per ED notes, patient was on her way to [employer] with others and c/o chest pain. CPR was initiated in parked vehicle outside ED. One round of epinephrine given and got ROSC. Per family, she c/o worsening SOB over several days. On arrival pt was euthermic, hypertensive, elevated lactate and BNP. CXR suggest pulmonary edema though infection cannot be ruled out. Pt intubated and evaluated by pulmonology. Cardiology consulted. ECHO pending. Broad spectrum antibiotics initiated until cultures result and clinical course dictates.     Chest CTA negative for pulmonary emboli.     Per family, Pt is a full code.

## 2018-10-02 LAB
ALLENS TEST: ABNORMAL
ALLENS TEST: ABNORMAL
ANION GAP SERPL CALC-SCNC: 12 MMOL/L
BASOPHILS # BLD AUTO: 0.02 K/UL
BASOPHILS NFR BLD: 0.2 %
BUN SERPL-MCNC: 25 MG/DL
CALCIUM SERPL-MCNC: 8.7 MG/DL
CHLORIDE SERPL-SCNC: 102 MMOL/L
CO2 SERPL-SCNC: 26 MMOL/L
CREAT SERPL-MCNC: 1.3 MG/DL
DELSYS: ABNORMAL
DELSYS: ABNORMAL
DIFFERENTIAL METHOD: ABNORMAL
EOSINOPHIL # BLD AUTO: 0 K/UL
EOSINOPHIL NFR BLD: 0.4 %
ERYTHROCYTE [DISTWIDTH] IN BLOOD BY AUTOMATED COUNT: 15.5 %
ERYTHROCYTE [SEDIMENTATION RATE] IN BLOOD BY WESTERGREN METHOD: 16 MM/H
EST. GFR  (AFRICAN AMERICAN): 45 ML/MIN/1.73 M^2
EST. GFR  (NON AFRICAN AMERICAN): 39 ML/MIN/1.73 M^2
FIO2: 30
FIO2: 35
GLUCOSE SERPL-MCNC: 119 MG/DL
HCO3 UR-SCNC: 28.3 MMOL/L (ref 24–28)
HCO3 UR-SCNC: 28.7 MMOL/L (ref 24–28)
HCT VFR BLD AUTO: 28.4 %
HGB BLD-MCNC: 9.5 G/DL
LYMPHOCYTES # BLD AUTO: 1.2 K/UL
LYMPHOCYTES NFR BLD: 14.4 %
MCH RBC QN AUTO: 28.7 PG
MCHC RBC AUTO-ENTMCNC: 33.5 G/DL
MCV RBC AUTO: 86 FL
MIN VOL: 7
MIN VOL: 9.2
MODE: ABNORMAL
MODE: ABNORMAL
MONOCYTES # BLD AUTO: 0.6 K/UL
MONOCYTES NFR BLD: 7.6 %
NEUTROPHILS # BLD AUTO: 6.4 K/UL
NEUTROPHILS NFR BLD: 77.4 %
PCO2 BLDA: 39.1 MMHG (ref 35–45)
PCO2 BLDA: 40.3 MMHG (ref 35–45)
PEEP: 5
PEEP: 5
PH SMN: 7.45 [PH] (ref 7.35–7.45)
PH SMN: 7.47 [PH] (ref 7.35–7.45)
PIP: 28
PLATELET # BLD AUTO: 236 K/UL
PMV BLD AUTO: 9.9 FL
PO2 BLDA: 114 MMHG (ref 80–100)
PO2 BLDA: 126 MMHG (ref 80–100)
POC BE: 4 MMOL/L
POC BE: 5 MMOL/L
POC SATURATED O2: 99 % (ref 95–100)
POC SATURATED O2: 99 % (ref 95–100)
POC TCO2: 29 MMOL/L (ref 23–27)
POC TCO2: 30 MMOL/L (ref 23–27)
POTASSIUM SERPL-SCNC: 3.3 MMOL/L
PS: 10
RBC # BLD AUTO: 3.31 M/UL
SAMPLE: ABNORMAL
SAMPLE: ABNORMAL
SITE: ABNORMAL
SITE: ABNORMAL
SODIUM SERPL-SCNC: 140 MMOL/L
SP02: 98
SP02: 98
SPONT RATE: 31
VT: 400
WBC # BLD AUTO: 8.24 K/UL

## 2018-10-02 PROCEDURE — 80048 BASIC METABOLIC PNL TOTAL CA: CPT

## 2018-10-02 PROCEDURE — S0028 INJECTION, FAMOTIDINE, 20 MG: HCPCS | Performed by: INTERNAL MEDICINE

## 2018-10-02 PROCEDURE — 63600175 PHARM REV CODE 636 W HCPCS: Performed by: STUDENT IN AN ORGANIZED HEALTH CARE EDUCATION/TRAINING PROGRAM

## 2018-10-02 PROCEDURE — 99291 CRITICAL CARE FIRST HOUR: CPT | Mod: ,,, | Performed by: INTERNAL MEDICINE

## 2018-10-02 PROCEDURE — 20000000 HC ICU ROOM

## 2018-10-02 PROCEDURE — 63600175 PHARM REV CODE 636 W HCPCS: Performed by: EMERGENCY MEDICINE

## 2018-10-02 PROCEDURE — 25000003 PHARM REV CODE 250: Performed by: INTERNAL MEDICINE

## 2018-10-02 PROCEDURE — 63600175 PHARM REV CODE 636 W HCPCS: Performed by: INTERNAL MEDICINE

## 2018-10-02 PROCEDURE — 25000003 PHARM REV CODE 250: Performed by: STUDENT IN AN ORGANIZED HEALTH CARE EDUCATION/TRAINING PROGRAM

## 2018-10-02 PROCEDURE — 94761 N-INVAS EAR/PLS OXIMETRY MLT: CPT

## 2018-10-02 PROCEDURE — 36415 COLL VENOUS BLD VENIPUNCTURE: CPT

## 2018-10-02 PROCEDURE — 99900035 HC TECH TIME PER 15 MIN (STAT)

## 2018-10-02 PROCEDURE — 36600 WITHDRAWAL OF ARTERIAL BLOOD: CPT

## 2018-10-02 PROCEDURE — S0073 INJECTION, AZTREONAM, 500 MG: HCPCS | Performed by: EMERGENCY MEDICINE

## 2018-10-02 PROCEDURE — 85025 COMPLETE CBC W/AUTO DIFF WBC: CPT

## 2018-10-02 PROCEDURE — 82803 BLOOD GASES ANY COMBINATION: CPT

## 2018-10-02 PROCEDURE — 25000003 PHARM REV CODE 250: Performed by: EMERGENCY MEDICINE

## 2018-10-02 PROCEDURE — 25000003 PHARM REV CODE 250: Performed by: HOSPITALIST

## 2018-10-02 PROCEDURE — 99233 SBSQ HOSP IP/OBS HIGH 50: CPT | Mod: ,,, | Performed by: INTERNAL MEDICINE

## 2018-10-02 PROCEDURE — 63600175 PHARM REV CODE 636 W HCPCS: Performed by: HOSPITALIST

## 2018-10-02 RX ORDER — CLONIDINE HYDROCHLORIDE 0.1 MG/1
0.2 TABLET ORAL EVERY 6 HOURS PRN
Status: DISCONTINUED | OUTPATIENT
Start: 2018-10-02 | End: 2018-10-02

## 2018-10-02 RX ORDER — METOPROLOL TARTRATE 50 MG/1
100 TABLET ORAL 2 TIMES DAILY
Status: DISCONTINUED | OUTPATIENT
Start: 2018-10-02 | End: 2018-10-07

## 2018-10-02 RX ORDER — HYDRALAZINE HYDROCHLORIDE 25 MG/1
100 TABLET, FILM COATED ORAL EVERY 8 HOURS
Status: DISCONTINUED | OUTPATIENT
Start: 2018-10-02 | End: 2018-10-03

## 2018-10-02 RX ORDER — METOPROLOL TARTRATE 1 MG/ML
5 INJECTION, SOLUTION INTRAVENOUS EVERY 6 HOURS PRN
Status: DISCONTINUED | OUTPATIENT
Start: 2018-10-02 | End: 2018-10-02

## 2018-10-02 RX ORDER — METOPROLOL TARTRATE 1 MG/ML
5 INJECTION, SOLUTION INTRAVENOUS ONCE
Status: COMPLETED | OUTPATIENT
Start: 2018-10-02 | End: 2018-10-02

## 2018-10-02 RX ORDER — CLONIDINE HYDROCHLORIDE 0.1 MG/1
0.3 TABLET ORAL 3 TIMES DAILY
Status: DISCONTINUED | OUTPATIENT
Start: 2018-10-02 | End: 2018-10-02

## 2018-10-02 RX ORDER — HYDRALAZINE HYDROCHLORIDE 20 MG/ML
10 INJECTION INTRAMUSCULAR; INTRAVENOUS ONCE
Status: COMPLETED | OUTPATIENT
Start: 2018-10-02 | End: 2018-10-02

## 2018-10-02 RX ORDER — CLONIDINE HYDROCHLORIDE 0.1 MG/1
0.1 TABLET ORAL EVERY 6 HOURS PRN
Status: DISCONTINUED | OUTPATIENT
Start: 2018-10-02 | End: 2018-10-02

## 2018-10-02 RX ORDER — CLONIDINE 0.2 MG/24H
1 PATCH, EXTENDED RELEASE TRANSDERMAL
Status: DISCONTINUED | OUTPATIENT
Start: 2018-10-02 | End: 2018-10-03

## 2018-10-02 RX ORDER — HYDRALAZINE HYDROCHLORIDE 20 MG/ML
10 INJECTION INTRAMUSCULAR; INTRAVENOUS EVERY 4 HOURS PRN
Status: DISCONTINUED | OUTPATIENT
Start: 2018-10-02 | End: 2018-10-06

## 2018-10-02 RX ORDER — LISINOPRIL 5 MG/1
10 TABLET ORAL DAILY
Status: DISCONTINUED | OUTPATIENT
Start: 2018-10-03 | End: 2018-10-03

## 2018-10-02 RX ADMIN — CLOPIDOGREL BISULFATE 75 MG: 75 TABLET ORAL at 08:10

## 2018-10-02 RX ADMIN — PROPOFOL 35 MCG/KG/MIN: 10 INJECTION, EMULSION INTRAVENOUS at 07:10

## 2018-10-02 RX ADMIN — FUROSEMIDE 40 MG: 10 INJECTION, SOLUTION INTRAMUSCULAR; INTRAVENOUS at 06:10

## 2018-10-02 RX ADMIN — CHLORHEXIDINE GLUCONATE 15 ML: 1.2 RINSE ORAL at 08:10

## 2018-10-02 RX ADMIN — METOPROLOL TARTRATE 5 MG: 1 INJECTION, SOLUTION INTRAVENOUS at 11:10

## 2018-10-02 RX ADMIN — AZTREONAM 2 G: 2 INJECTION, POWDER, LYOPHILIZED, FOR SOLUTION INTRAMUSCULAR; INTRAVENOUS at 01:10

## 2018-10-02 RX ADMIN — HEPARIN SODIUM 5000 UNITS: 5000 INJECTION, SOLUTION INTRAVENOUS; SUBCUTANEOUS at 09:10

## 2018-10-02 RX ADMIN — CLONIDINE 1 PATCH: 0.2 PATCH TRANSDERMAL at 06:10

## 2018-10-02 RX ADMIN — HEPARIN SODIUM 5000 UNITS: 5000 INJECTION, SOLUTION INTRAVENOUS; SUBCUTANEOUS at 07:10

## 2018-10-02 RX ADMIN — METOPROLOL TARTRATE 5 MG: 1 INJECTION, SOLUTION INTRAVENOUS at 08:10

## 2018-10-02 RX ADMIN — VANCOMYCIN HYDROCHLORIDE 1500 MG: 1 INJECTION, POWDER, LYOPHILIZED, FOR SOLUTION INTRAVENOUS at 12:10

## 2018-10-02 RX ADMIN — FUROSEMIDE 40 MG: 10 INJECTION, SOLUTION INTRAMUSCULAR; INTRAVENOUS at 08:10

## 2018-10-02 RX ADMIN — HYDRALAZINE HYDROCHLORIDE 10 MG: 20 INJECTION INTRAMUSCULAR; INTRAVENOUS at 01:10

## 2018-10-02 RX ADMIN — HEPARIN SODIUM 5000 UNITS: 5000 INJECTION, SOLUTION INTRAVENOUS; SUBCUTANEOUS at 01:10

## 2018-10-02 RX ADMIN — AZTREONAM 2 G: 2 INJECTION, POWDER, LYOPHILIZED, FOR SOLUTION INTRAMUSCULAR; INTRAVENOUS at 07:10

## 2018-10-02 RX ADMIN — HYDRALAZINE HYDROCHLORIDE 100 MG: 25 TABLET ORAL at 09:10

## 2018-10-02 RX ADMIN — GLYCOPYRROLATE 0.1 MG: 0.2 INJECTION INTRAMUSCULAR; INTRAVENOUS at 08:10

## 2018-10-02 RX ADMIN — FAMOTIDINE 20 MG: 10 INJECTION, SOLUTION INTRAVENOUS at 08:10

## 2018-10-02 RX ADMIN — ASPIRIN 81 MG 81 MG: 81 TABLET ORAL at 08:10

## 2018-10-02 RX ADMIN — GLYCOPYRROLATE 0.1 MG: 0.2 INJECTION INTRAMUSCULAR; INTRAVENOUS at 03:10

## 2018-10-02 RX ADMIN — METOPROLOL TARTRATE 100 MG: 50 TABLET ORAL at 08:10

## 2018-10-02 RX ADMIN — HYDRALAZINE HYDROCHLORIDE 75 MG: 25 TABLET ORAL at 07:10

## 2018-10-02 RX ADMIN — HYDRALAZINE HYDROCHLORIDE 10 MG: 20 INJECTION INTRAMUSCULAR; INTRAVENOUS at 07:10

## 2018-10-02 RX ADMIN — HYDRALAZINE HYDROCHLORIDE 5 MG: 20 INJECTION INTRAMUSCULAR; INTRAVENOUS at 09:10

## 2018-10-02 RX ADMIN — METOPROLOL TARTRATE 50 MG: 50 TABLET ORAL at 08:10

## 2018-10-02 RX ADMIN — PROPOFOL 35 MCG/KG/MIN: 10 INJECTION, EMULSION INTRAVENOUS at 02:10

## 2018-10-02 NOTE — PROGRESS NOTES
0845: Notified MD Sanchez, new orders given one dose lopressor 5 mg.     0906: Per MD Sanchez pause propofol for SBT/SAT. Patient placed on CPAP.    0915: Notified MD Sanchez of RR 33, BP systolic of 211. New orders given per MD gave hydralazine.    1002: Respiratory at bedside to extubate.     1006: Patient extubated to 3L NC. O2 sats 99%. Patient tolerating well will continue to monitor. Restraints removed.     1209: Notified MD Wharton patient is unable to follow commands and is still drowsy. Unable to perform swallow study. PO meds were held and BP is 205/89.    1325: Notified MD Wharton of pinkish tinge urine in briceno and H/H of 9.5/28.4. Said OK to give heparin.

## 2018-10-02 NOTE — SUBJECTIVE & OBJECTIVE
Interval History: BP elevated this morning.  No acute issues overnight.    Review of Systems   Unable to perform ROS: Intubated     Objective:     Vital Signs (Most Recent):  Temp: 99.4 °F (37.4 °C) (10/02/18 0720)  Pulse: 92 (10/02/18 1030)  Resp: (!) 31 (10/02/18 1030)  BP: (!) 181/79 (10/02/18 1030)  SpO2: 99 % (10/02/18 1030) Vital Signs (24h Range):  Temp:  [98.1 °F (36.7 °C)-100.5 °F (38.1 °C)] 99.4 °F (37.4 °C)  Pulse:  [] 92  Resp:  [11-38] 31  SpO2:  [98 %-100 %] 99 %  BP: ()/() 181/79     Weight: 92.9 kg (204 lb 12.9 oz)(taken by RN on 10/1)  Body mass index is 34.08 kg/m².    Intake/Output Summary (Last 24 hours) at 10/2/2018 1051  Last data filed at 10/2/2018 1000  Gross per 24 hour   Intake 859.99 ml   Output 3185 ml   Net -2325.01 ml      Physical Exam   Constitutional: She appears well-developed and well-nourished.   HENT:   Head: Normocephalic and atraumatic.   ETT in place    Eyes: Conjunctivae are normal. Pupils are equal, round, and reactive to light.   Neck: Neck supple. JVD present.   Cardiovascular: Normal rate and regular rhythm.   Pulmonary/Chest: She has no wheezes.   Scattered rhonchi    Abdominal: Soft. She exhibits no distension.   Musculoskeletal: She exhibits no edema or deformity.   Neurological:   Intubated sedated not alert    Skin: Skin is warm and dry. Capillary refill takes 2 to 3 seconds.       Significant Labs:   BMP:   Recent Labs   Lab  10/02/18   0215   GLU  119*   NA  140   K  3.3*   CL  102   CO2  26   BUN  25*   CREATININE  1.3   CALCIUM  8.7     CBC:   Recent Labs   Lab  10/01/18   0255  10/02/18   0215   WBC  7.30  8.24   HGB  8.5*  9.5*   HCT  25.9*  28.4*   PLT  204  236       Significant Imaging: I have reviewed all pertinent imaging results/findings within the past 24 hours.

## 2018-10-02 NOTE — ASSESSMENT & PLAN NOTE
BP uncontrolled this morning.  Patient on home Clonidine that was stopped during this admit.  Possible rebound HTN.  Restart home Clonidine.

## 2018-10-02 NOTE — PROGRESS NOTES
Patient extubated to 3L nasal cannula. Sats 99%. No signs or symptoms of distress noted. Will continue to monitor.

## 2018-10-02 NOTE — SUBJECTIVE & OBJECTIVE
Interval History:     Review of Systems   Unable to perform ROS: intubated     Objective:     Vital Signs (Most Recent):  Temp: 98.5 °F (36.9 °C) (10/02/18 0400)  Pulse: 105 (10/02/18 0715)  Resp: 20 (10/02/18 0715)  BP: (!) 148/65 (10/02/18 0733)  SpO2: 100 % (10/02/18 0715) Vital Signs (24h Range):  Temp:  [98.1 °F (36.7 °C)-100.5 °F (38.1 °C)] 98.5 °F (36.9 °C)  Pulse:  [] 105  Resp:  [11-40] 20  SpO2:  [99 %-100 %] 100 %  BP: ()/() 148/65     Weight: 92.9 kg (204 lb 12.9 oz)(taken by RN on 10/1)  Body mass index is 34.08 kg/m².     SpO2: 100 %  O2 Device (Oxygen Therapy): ventilator      Intake/Output Summary (Last 24 hours) at 10/2/2018 0846  Last data filed at 10/2/2018 0732  Gross per 24 hour   Intake 769.99 ml   Output 2445 ml   Net -1675.01 ml       Lines/Drains/Airways     Central Venous Catheter Line                 Percutaneous Central Line Insertion/Assessment - triple lumen  09/30/18 1050 right internal jugular 1 day          Drain                 NG/OG Tube 09/30/18 1035 [place] sump 16 Fr. Center mouth;Left mouth;Right mouth 1 day         Urethral Catheter 09/30/18 1025 16 Fr. 1 day          Airway                 Airway - Non-Surgical 09/30/18 0959 Endotracheal Tube 1 day          Peripheral Intravenous Line                 Peripheral IV - Single Lumen 09/30/18 0600 Left Wrist 2 days                Physical Exam   Constitutional: She appears well-developed and well-nourished.   HENT:   Head: Normocephalic and atraumatic.   Eyes: Conjunctivae are normal. Pupils are equal, round, and reactive to light.   Neck: Normal range of motion. Neck supple.   Cardiovascular: Normal rate, normal heart sounds and intact distal pulses.   Pulmonary/Chest: Effort normal and breath sounds normal.   Abdominal: Soft. Bowel sounds are normal.   Musculoskeletal: Normal range of motion.   Skin: Skin is warm and dry.       Significant Labs: All pertinent lab results from the last 24 hours have been  reviewed.    Significant Imaging: Echocardiogram:   2D echo with color flow doppler:   Results for orders placed or performed during the hospital encounter of 08/10/18   2D echo with color flow doppler   Result Value Ref Range    EF 60 55 - 65    Aortic Valve Regurgitation MILD     Est. PA Systolic Pressure 51.72 (A)     Pericardial Effusion TRIVIAL     Tricuspid Valve Regurgitation MILD

## 2018-10-02 NOTE — CARE UPDATE
Pt remains in icu on vent settings prvc rr 16 vt 400 peep 5 fio2 35%.  7.0 ett taped 20cm at lip.  Pt is without distress at this time.  Ambu bag and mask is at hob.  All alarms are on and working.  Will continue to monitor.

## 2018-10-02 NOTE — ASSESSMENT & PLAN NOTE
Aspirin, plavix and statin resumed   Cardiology following  Elevated troponin after cardiopulmonary arrest  - +/- [facility]

## 2018-10-02 NOTE — ASSESSMENT & PLAN NOTE
Intubated post cardiac arrest.   Continue low tidal volume, lung protective ventilation.  400/16/50/5  Wean supplemental oxygen while maintaining an O2 Sat at or above 92%.  cxr improve with diuresis.  Agree with diuresis.  sbt today with plan to extubate.    Recommend descalation in light of rapid clinical improvement and negative culture thus far.

## 2018-10-02 NOTE — ASSESSMENT & PLAN NOTE
Creatinine 1.3   Historically ranges from 1.0-1.7 per chart review.  Good urine output.  Bun/creatinine holding steady despite diuresis.

## 2018-10-02 NOTE — SUBJECTIVE & OBJECTIVE
HPI:  Ms. Lyman is an 82 year old woman with a history of HTN, hyperlipidemia, FIFI, intramucosal gastric carcinoma and aortic stenosis s/p TAVR with subsequent heart block requiring pacemaker who presented to the ED with worsening shortness of breath and upon arrival had a cardiac arrest.  ROSC was achieved after 1 round of epi. Patient was subsequently intubated and pulmonary was called for vent management.    No family was available at the time of my evaluation to provide further history. History was obtained from chart review.    Interval History:  Failed sbt with tachycardia, tachypnea and hypertension.  Patient was switched from precedex to propofol due to hypotension.      Objective:     Vital Signs (Most Recent):  Temp: 99.4 °F (37.4 °C) (10/02/18 0720)  Pulse: (!) 117 (10/02/18 0845)  Resp: (!) 35 (10/02/18 0845)  BP: (!) 190/84 (10/02/18 0830)  SpO2: 100 % (10/02/18 0845) Vital Signs (24h Range):  Temp:  [98.1 °F (36.7 °C)-100.5 °F (38.1 °C)] 99.4 °F (37.4 °C)  Pulse:  [] 117  Resp:  [11-40] 35  SpO2:  [99 %-100 %] 100 %  BP: ()/() 190/84     Weight: 92.9 kg (204 lb 12.9 oz)(taken by RN on 10/1)  Body mass index is 34.08 kg/m².      Intake/Output Summary (Last 24 hours) at 10/2/2018 0854  Last data filed at 10/2/2018 0732  Gross per 24 hour   Intake 769.99 ml   Output 2445 ml   Net -1675.01 ml       Physical Exam   Constitutional: She appears well-developed. She is intubated.   HENT:   Head: Normocephalic.   Eyes: Conjunctivae and EOM are normal. Pupils are equal, round, and reactive to light.   Neck: Neck supple. No tracheal deviation present.   Cardiovascular: Normal rate, regular rhythm and normal heart sounds.   No murmur heard.  Pulmonary/Chest: Effort normal and breath sounds normal. She is intubated. No respiratory distress. She has no wheezes. She has no rales.   Abdominal: Soft. Bowel sounds are normal. She exhibits no distension.   Musculoskeletal: She exhibits no edema.    Neurological: She is alert.   Pupils are 2+ and responsive to light.  Cough, gag, and corneal reflex intact.  Moving all extremities.     Skin: Skin is warm and dry.   Vitals reviewed.      Vents:  Vent Mode: PRVC (10/02/18 0715)  Ventilator Initiated: Yes (09/30/18 1027)  Set Rate: 16 bmp (10/02/18 0715)  Vt Set: 400 mL (10/02/18 0715)  Pressure Support: 15 cmH20 (10/01/18 1034)  PEEP/CPAP: 5 cmH20 (10/02/18 0715)  Oxygen Concentration (%): 30 (10/02/18 0845)  Peak Airway Pressure: 22.9 cmH2O (10/02/18 0715)  Total Ve: 6.5 mL (10/02/18 0715)  F/VT Ratio<105 (RSBI): (!) 49.63 (10/02/18 0715)    Lines/Drains/Airways     Central Venous Catheter Line                 Percutaneous Central Line Insertion/Assessment - triple lumen  09/30/18 1050 right internal jugular 1 day          Drain                 NG/OG Tube 09/30/18 1035 [place] sump 16 Fr. Center mouth;Left mouth;Right mouth 1 day         Urethral Catheter 09/30/18 1025 16 Fr. 1 day          Airway                 Airway - Non-Surgical 09/30/18 0959 Endotracheal Tube 1 day          Peripheral Intravenous Line                 Peripheral IV - Single Lumen 09/30/18 0600 Left Wrist 2 days                Significant Labs:    CBC/Anemia Profile:  Recent Labs   Lab  09/30/18   1000  09/30/18   1014  10/01/18   0255  10/02/18   0215   WBC  8.38   --   7.30  8.24   HGB  10.1*   --   8.5*  9.5*   HCT  32.5*  33*  25.9*  28.4*   PLT  233   --   204  236   MCV  93   --   87  86   RDW  15.7*   --   15.2*  15.5*        Chemistries:  Recent Labs   Lab  09/30/18   1000  10/01/18   0255  10/02/18   0215   NA  142  141  140   K  3.8  4.1  3.3*   CL  109  104  102   CO2  15*  26  26   BUN  17  22  25*   CREATININE  1.3  1.3  1.3   CALCIUM  9.1  8.5*  8.7   ALBUMIN  3.7   --    --    PROT  7.1   --    --    BILITOT  0.3   --    --    ALKPHOS  96   --    --    ALT  31   --    --    AST  35   --    --      9/30/18 bnp 630s    AB[place]:   Recent Labs   Lab  10/02/18   0408   PH  7.453*    PCO2  40.3   HCO3  28.3*   POCSATURATED  99   BE  4       Significant Imaging:  CT: I have reviewed all pertinent results/findings within the past 24 hours and my personal findings are:  9/30/18 no pe.  bilateral ggo and patchy consolidation in dependent region bilaterally.  CM with enlarged PA     cxr 10/1/18 improvement in perihilar congestion when compared to 9/30/18 cxr.      Echo 9/30/18 EF 45%

## 2018-10-02 NOTE — ASSESSMENT & PLAN NOTE
Reportedly PEA.  ROSC achieved after 1 round of epi.  Blood pressures stable without use of vasopressors.  Point of care ultrasound showed a hypertrophic LV with reasonable 'squeeze' and a plethoric IVC with no respiratory variation.  Repeat echo with mildly decreased ef.

## 2018-10-02 NOTE — ASSESSMENT & PLAN NOTE
Diuresing well with lasix.  Need better bp control.  Currently on metoprolol.  Iv lopressor given.  D/w Dr. Carmen and Dio.

## 2018-10-02 NOTE — PROGRESS NOTES
Pt received on Servo I vent with settings as followed: PRVC 16/400/+5 and 30%. Size 7.0 ETT in place and secure at 20 cm at the lip. Ambu bag and mask at bedside and all alarms on and functioning. NARN. RT will continue to monitor patient status.

## 2018-10-02 NOTE — PROGRESS NOTES
[facility]  Pulmonology  Progress Note    Patient Name: Cindy Lyman  MRN: [id number]  Admission Date: 9/30/2018  Hospital Length of Stay: 2 days  Code Status: Full Code  Attending Provider: Grupo Wharton MD  Primary Care Provider: Rodger Camarena MD   Principal Problem: Acute hypoxemic respiratory failure    Subjective:     HPI:  Ms. Lyman is an 82 year old woman with a history of HTN, hyperlipidemia, FIFI, intramucosal gastric carcinoma and aortic stenosis s/p TAVR with subsequent heart block requiring pacemaker who presented to the ED with worsening shortness of breath and upon arrival had a cardiac arrest.  ROSC was achieved after 1 round of epi. Patient was subsequently intubated and pulmonary was called for vent management.    No family was available at the time of my evaluation to provide further history. History was obtained from chart review.    Interval History:  Failed sbt with tachycardia, tachypnea and hypertension.  Patient was switched from precedex to propofol due to hypotension.      Objective:     Vital Signs (Most Recent):  Temp: 99.4 °F (37.4 °C) (10/02/18 0720)  Pulse: (!) 117 (10/02/18 0845)  Resp: (!) 35 (10/02/18 0845)  BP: (!) 190/84 (10/02/18 0830)  SpO2: 100 % (10/02/18 0845) Vital Signs (24h Range):  Temp:  [98.1 °F (36.7 °C)-100.5 °F (38.1 °C)] 99.4 °F (37.4 °C)  Pulse:  [] 117  Resp:  [11-40] 35  SpO2:  [99 %-100 %] 100 %  BP: ()/() 190/84     Weight: 92.9 kg (204 lb 12.9 oz)(taken by RN on 10/1)  Body mass index is 34.08 kg/m².      Intake/Output Summary (Last 24 hours) at 10/2/2018 0854  Last data filed at 10/2/2018 0732  Gross per 24 hour   Intake 769.99 ml   Output 2445 ml   Net -1675.01 ml       Physical Exam   Constitutional: She appears well-developed. She is intubated.   HENT:   Head: Normocephalic.   Eyes: Conjunctivae and EOM are normal. Pupils are equal, round, and reactive to light.   Neck: Neck supple. No tracheal deviation  present.   Cardiovascular: Normal rate, regular rhythm and normal heart sounds.   No murmur heard.  Pulmonary/Chest: Effort normal and breath sounds normal. She is intubated. No respiratory distress. She has no wheezes. She has no rales.   Abdominal: Soft. Bowel sounds are normal. She exhibits no distension.   Musculoskeletal: She exhibits no edema.   Neurological: She is alert.   Pupils are 2+ and responsive to light.  Cough, gag, and corneal reflex intact.  Moving all extremities.     Skin: Skin is warm and dry.   Vitals reviewed.      Vents:  Vent Mode: PRVC (10/02/18 0715)  Ventilator Initiated: Yes (09/30/18 1027)  Set Rate: 16 bmp (10/02/18 0715)  Vt Set: 400 mL (10/02/18 0715)  Pressure Support: 15 cmH20 (10/01/18 1034)  PEEP/CPAP: 5 cmH20 (10/02/18 0715)  Oxygen Concentration (%): 30 (10/02/18 0845)  Peak Airway Pressure: 22.9 cmH2O (10/02/18 0715)  Total Ve: 6.5 mL (10/02/18 0715)  F/VT Ratio<105 (RSBI): (!) 49.63 (10/02/18 0715)    Lines/Drains/Airways     Central Venous Catheter Line                 Percutaneous Central Line Insertion/Assessment - triple lumen  09/30/18 1050 right internal jugular 1 day          Drain                 NG/OG Tube 09/30/18 1035 [place] sump 16 Fr. Center mouth;Left mouth;Right mouth 1 day         Urethral Catheter 09/30/18 1025 16 Fr. 1 day          Airway                 Airway - Non-Surgical 09/30/18 0959 Endotracheal Tube 1 day          Peripheral Intravenous Line                 Peripheral IV - Single Lumen 09/30/18 0600 Left Wrist 2 days                Significant Labs:    CBC/Anemia Profile:  Recent Labs   Lab  09/30/18   1000  09/30/18   1014  10/01/18   0255  10/02/18   0215   WBC  8.38   --   7.30  8.24   HGB  10.1*   --   8.5*  9.5*   HCT  32.5*  33*  25.9*  28.4*   PLT  233   --   204  236   MCV  93   --   87  86   RDW  15.7*   --   15.2*  15.5*        Chemistries:  Recent Labs   Lab  09/30/18   1000  10/01/18   0255  10/02/18   0215   NA  142  141  140   K  3.8   4.1  3.3*   CL  109  104  102   CO2  15*  26  26   BUN  17  22  25*   CREATININE  1.3  1.3  1.3   CALCIUM  9.1  8.5*  8.7   ALBUMIN  3.7   --    --    PROT  7.1   --    --    BILITOT  0.3   --    --    ALKPHOS  96   --    --    ALT  31   --    --    AST  35   --    --      9/30/18 bnp 630s    ABGs:   Recent Labs   Lab  10/02/18   0408   PH  7.453*   PCO2  40.3   HCO3  28.3*   POCSATURATED  99   BE  4       Significant Imaging:  CT: I have reviewed all pertinent results/findings within the past 24 hours and my personal findings are:  9/30/18 no pe.  bilateral ggo and patchy consolidation in dependent region bilaterally.  CM with enlarged PA     cxr 10/1/18 improvement in perihilar congestion when compared to 9/30/18 cxr.      Echo 9/30/18 EF 45%    Assessment/Plan:     * Acute hypoxemic respiratory failure    Intubated post cardiac arrest.   Continue low tidal volume, lung protective ventilation.  400/16/50/5  Wean supplemental oxygen while maintaining an O2 Sat at or above 92%.  cxr improve with diuresis.  Agree with diuresis.  sbt today with plan to extubate.    Recommend descalation in light of rapid clinical improvement and negative culture thus far.            Encephalopathy, metabolic    Moving all extremities.  Agitated off sedation.          Cardiac arrest    Reportedly PEA.  ROSC achieved after 1 round of epi.  Blood pressures stable without use of vasopressors.  Point of care ultrasound showed a hypertrophic LV with reasonable 'squeeze' and a plethoric IVC with no respiratory variation.  Repeat echo with mildly decreased ef.            Acute pulmonary edema    Diuresing well with lasix.  Need better bp control.  Currently on metoprolol.  Iv lopressor given.  D/w Dr. Carmen and Dio.          Stage 3 chronic kidney disease    Creatinine 1.3   Historically ranges from 1.0-1.7 per chart review.  Good urine output.  Bun/creatinine holding steady despite diuresis.                 Marko Sanchez,  MD  Pulmonology  [facility]  Critical Care Time: 35  minutes  Critical secondary to respiratory failure     Critical care was time spent personally by me on the following activities: development of treatment plan with patient or surrogate and bedside caregivers, discussions with consultants, evaluation of patient's response to treatment, examination of patient, ordering and performing treatments and interventions, ordering and review of laboratory studies, ordering and review of radiographic studies, pulse oximetry, re-evaluation of patient's condition.  This critical care time did not overlap with that of any other provider or involve time for any procedures.

## 2018-10-02 NOTE — ASSESSMENT & PLAN NOTE
PEA on arrival with severe acidosis. EKG with . Normal PPM function with no high rate episodes on interrogation. Etiology for the arrest is unclear - possible respiratory. Repeat echo with mildly decreased EF. Continue supportive care

## 2018-10-02 NOTE — SUBJECTIVE & OBJECTIVE
Interval History:     Review of Systems   Unable to perform ROS: intubated     Objective:     Vital Signs (Most Recent):  Temp: 99.4 °F (37.4 °C) (10/02/18 0720)  Pulse: (!) 117 (10/02/18 0845)  Resp: (!) 35 (10/02/18 0845)  BP: (!) 190/84 (10/02/18 0830)  SpO2: 100 % (10/02/18 0845) Vital Signs (24h Range):  Temp:  [98.1 °F (36.7 °C)-100.5 °F (38.1 °C)] 99.4 °F (37.4 °C)  Pulse:  [] 117  Resp:  [11-40] 35  SpO2:  [99 %-100 %] 100 %  BP: ()/() 190/84     Weight: 92.9 kg (204 lb 12.9 oz)(taken by RN on 10/1)  Body mass index is 34.08 kg/m².     SpO2: 100 %  O2 Device (Oxygen Therapy): ventilator      Intake/Output Summary (Last 24 hours) at 10/2/2018 0934  Last data filed at 10/2/2018 0900  Gross per 24 hour   Intake 878.99 ml   Output 2985 ml   Net -2106.01 ml       Lines/Drains/Airways     Central Venous Catheter Line                 Percutaneous Central Line Insertion/Assessment - triple lumen  09/30/18 1050 right internal jugular 1 day          Drain                 NG/OG Tube 09/30/18 1035 [place] sump 16 Fr. Center mouth;Left mouth;Right mouth 1 day         Urethral Catheter 09/30/18 1025 16 Fr. 1 day          Airway                 Airway - Non-Surgical 09/30/18 0959 Endotracheal Tube 1 day          Peripheral Intravenous Line                 Peripheral IV - Single Lumen 09/30/18 0600 Left Wrist 2 days                Physical Exam   Constitutional: She appears well-developed and well-nourished.   HENT:   Head: Normocephalic and atraumatic.   Eyes: Conjunctivae are normal. Pupils are equal, round, and reactive to light.   Neck: Normal range of motion. Neck supple.   Cardiovascular: Normal rate, normal heart sounds and intact distal pulses.   Pulmonary/Chest: Effort normal and breath sounds normal.   Abdominal: Soft. Bowel sounds are normal.   Musculoskeletal: Normal range of motion.   Skin: Skin is warm and dry.       Significant Labs: All pertinent lab results from the last 24 hours have been  reviewed.    Significant Imaging: Echocardiogram:   2D echo with color flow doppler:   Results for orders placed or performed during the hospital encounter of 08/10/18   2D echo with color flow doppler   Result Value Ref Range    EF 60 55 - 65    Aortic Valve Regurgitation MILD     Est. PA Systolic Pressure 51.72 (A)     Pericardial Effusion TRIVIAL     Tricuspid Valve Regurgitation MILD

## 2018-10-02 NOTE — PROGRESS NOTES
Results for MARIE TURNER (MRN [id number]) as of 10/2/2018 09:58   Ref. Range 10/2/2018 09:51   POC PH Latest Ref Range: 7.35 - 7.45  7.473 (H)   POC PCO2 Latest Ref Range: 35 - 45 mmHg 39.1   POC PO2 Latest Ref Range: 80 - 100 mmHg 114 (H)   POC BE Latest Ref Range: -2 to 2 mmol/L 5   POC HCO3 Latest Ref Range: 24 - 28 mmol/L 28.7 (H)   POC SATURATED O2 Latest Ref Range: 95 - 100 % 99   POC TCO2 Latest Ref Range: 23 - 27 mmol/L 30 (H)   FiO2 Unknown 30   PEEP Unknown 5   Sample Unknown ARTERIAL   DelSys Unknown Adult Vent   Allens Test Unknown Pass   Site Unknown RR   Mode Unknown CPAP

## 2018-10-02 NOTE — PROGRESS NOTES
[facility] Hospital Medicine  Progress Note    Patient Name: Cindy Lyman  MRN: [id number]  Patient Class: IP- Inpatient   Admission Date: 9/30/2018  Length of Stay: 2 days  Attending Physician: Grupo Wharton MD  Primary Care Provider: Rodger Camarena MD        Subjective:     Principal Problem:Acute hypoxemic respiratory failure    HPI:  83 yo female with diastolic CHF, COPD, gastric cancer, aortic stenosis with complete heart block and s/p TAVR and pacemaker placed 7/2018 presented in cardiac arrest. Per ED notes, patient was on her way to [place] with others and c/o chest pain. CPR was initiated in parked vehicle outside ED. One round of epinephrine given and got ROSC. Per family, she c/o worsening SOB over several days. On arrival pt was euthermic, hypertensive, elevated lactate and BNP. CXR suggest pulmonary edema though infection cannot be ruled out. Pt intubated and evaluated by pulmonology. Cardiology consulted. ECHO pending. Broad spectrum antibiotics initiated until cultures result and clinical course dictates.     Chest CTA negative for pulmonary emboli.     Per family, Pt is a full code.     Hospital Course:  Pt admitted after PEA arrest and acute respiratory failure/acidosis. Acidosis corrected. Pulmonology assisting with vent management/weaning. Trial of precedex today but patient did not tolerate. Switched back to propofol. CPAP this am and now back on PVRC. Pacemaker interrogated and appears to be functioning fine. Diuresed well on lasix without change in renal function. Possible plan for LHC per cardiology. Hope for extubation today.    Interval History: BP elevated this morning.  No acute issues overnight.    Review of Systems   Unable to perform ROS: Intubated     Objective:     Vital Signs (Most Recent):  Temp: 99.4 °F (37.4 °C) (10/02/18 0720)  Pulse: 92 (10/02/18 1030)  Resp: (!) 31 (10/02/18 1030)  BP: (!) 181/79 (10/02/18 1030)  SpO2: 99 % (10/02/18 1030) Vital  Signs (24h Range):  Temp:  [98.1 °F (36.7 °C)-100.5 °F (38.1 °C)] 99.4 °F (37.4 °C)  Pulse:  [] 92  Resp:  [11-38] 31  SpO2:  [98 %-100 %] 99 %  BP: ()/() 181/79     Weight: 92.9 kg (204 lb 12.9 oz)(taken by RN on 10/1)  Body mass index is 34.08 kg/m².    Intake/Output Summary (Last 24 hours) at 10/2/2018 1051  Last data filed at 10/2/2018 1000  Gross per 24 hour   Intake 859.99 ml   Output 3185 ml   Net -2325.01 ml      Physical Exam   Constitutional: She appears well-developed and well-nourished.   HENT:   Head: Normocephalic and atraumatic.   ETT in place    Eyes: Conjunctivae are normal. Pupils are equal, round, and reactive to light.   Neck: Neck supple. JVD present.   Cardiovascular: Normal rate and regular rhythm.   Pulmonary/Chest: She has no wheezes.   Scattered rhonchi    Abdominal: Soft. She exhibits no distension.   Musculoskeletal: She exhibits no edema or deformity.   Neurological:   Intubated sedated not alert    Skin: Skin is warm and dry. Capillary refill takes 2 to 3 seconds.       Significant Labs:   BMP:   Recent Labs   Lab  10/02/18   0215   GLU  119*   NA  140   K  3.3*   CL  102   CO2  26   BUN  25*   CREATININE  1.3   CALCIUM  8.7     CBC:   Recent Labs   Lab  10/01/18   0255  10/02/18   0215   WBC  7.30  8.24   HGB  8.5*  9.5*   HCT  25.9*  28.4*   PLT  204  236       Significant Imaging: I have reviewed all pertinent imaging results/findings within the past 24 hours.    Assessment/Plan:      * Acute hypoxemic respiratory failure    Chest CTA r/o PE, likely due to decompensated CHF and possible underlying infection  Pulmonology consulted for vent management  Cardiology consulted - [facility] :  · Left ventricle ejection fraction is mildly decreased at 45%  · Left ventricle shows concentric remodeling.  · RV systolic function is normal.  · Left atrium is mildly dilated.  · Right atrium is moderately dilated.  · Tricuspid valve shows mild regurgitation.  · Grade II (moderate) left  ventricular diastolic dysfunction consistent with pseudonormalization.  · LA pressure is elevated.   Cultures - negative, sent for resp cultures today   Appreciate Pulmonary input.  Hopefully extubation trial today.              Encephalopathy, metabolic    Neurologically appeared intact sitting up in bed and trying to pull tube on precedex   Hemodynamically more stable on propofol   Neuro checks           Pacemaker    See above           Cardiac arrest    Etiology still unclear, but PEA reported by first repsonders, quick ROSC with one round of epinephrine  Pt intubated on sedation and will have wean sedation for appropriate neurological exam, consult neurology if indicated   ECHO  -  +DD  Pacemaker interrogated             Acute pulmonary edema    Pt given IV lasix with large amount of diuresis  CXr improving             S/P TAVR (transcatheter aortic valve replacement)              Stage 3 chronic kidney disease    No indication for vasopressor or aggressive IVF  Monitor closely on IV lasix   Avoid nephrotoxins           Essential hypertension    BP uncontrolled this morning.  Patient on home Clonidine that was stopped during this admit.  Possible rebound HTN.  Restart home Clonidine.        CAD s/p PCI    Aspirin, plavix and statin resumed   Cardiology following  Elevated troponin after cardiopulmonary arrest  - +/- [facility]         Anemia of chronic disease    Monitor  Reviewed bone marrow biopsy 2017 - no malignancy   Stable         Hyperlipidemia    Resume statin             VTE Risk Mitigation (From admission, onward)        Ordered     IP VTE HIGH RISK PATIENT  Once      09/30/18 1359     heparin (porcine) injection 5,000 Units  Every 8 hours      09/30/18 1251          Critical care time spent on the evaluation and treatment of severe organ dysfunction, review of pertinent labs and imaging studies, discussions with consulting providers and discussions with patient/family: 50 minutes.    Grupo Wharton,  MD  Department of Hospital Medicine   [facility]

## 2018-10-02 NOTE — ASSESSMENT & PLAN NOTE
Chest CTA r/o PE, likely due to decompensated CHF and possible underlying infection  Pulmonology consulted for vent management  Cardiology consulted - [facility] :  · Left ventricle ejection fraction is mildly decreased at 45%  · Left ventricle shows concentric remodeling.  · RV systolic function is normal.  · Left atrium is mildly dilated.  · Right atrium is moderately dilated.  · Tricuspid valve shows mild regurgitation.  · Grade II (moderate) left ventricular diastolic dysfunction consistent with pseudonormalization.  · LA pressure is elevated.   Cultures - negative, sent for resp cultures today   Appreciate Pulmonary input.  Hopefully extubation trial today.

## 2018-10-02 NOTE — PROGRESS NOTES
[facility]  Cardiology  Progress Note    Patient Name: Cindy Lyman  MRN: [id number]  Admission Date: 9/30/2018  Hospital Length of Stay: 2 days  Code Status: Full Code   Attending Physician: Grupo Wharton MD   Primary Care Physician: Rodger Camarena MD  Expected Discharge Date:   Principal Problem:Acute hypoxemic respiratory failure    Subjective:     Hospital Course:   On Vent  BP poorly controlled    Echo 8/31/18  · Left ventricle ejection fraction is mildly decreased at 45%  · Left ventricle shows concentric remodeling.  · RV systolic function is normal.  · Left atrium is mildly dilated.  · Right atrium is moderately dilated.  · Tricuspid valve shows mild regurgitation.  · Grade II (moderate) left ventricular diastolic dysfunction consistent with pseudonormalization.  · LA pressure is elevated.         Interval History:     Review of Systems   Unable to perform ROS: intubated     Objective:     Vital Signs (Most Recent):  Temp: 99.4 °F (37.4 °C) (10/02/18 0720)  Pulse: (!) 117 (10/02/18 0845)  Resp: (!) 35 (10/02/18 0845)  BP: (!) 190/84 (10/02/18 0830)  SpO2: 100 % (10/02/18 0845) Vital Signs (24h Range):  Temp:  [98.1 °F (36.7 °C)-100.5 °F (38.1 °C)] 99.4 °F (37.4 °C)  Pulse:  [] 117  Resp:  [11-40] 35  SpO2:  [99 %-100 %] 100 %  BP: ()/() 190/84     Weight: 92.9 kg (204 lb 12.9 oz)(taken by RN on 10/1)  Body mass index is 34.08 kg/m².     SpO2: 100 %  O2 Device (Oxygen Therapy): ventilator      Intake/Output Summary (Last 24 hours) at 10/2/2018 0934  Last data filed at 10/2/2018 0900  Gross per 24 hour   Intake 878.99 ml   Output 2985 ml   Net -2106.01 ml       Lines/Drains/Airways     Central Venous Catheter Line                 Percutaneous Central Line Insertion/Assessment - triple lumen  09/30/18 1050 right internal jugular 1 day          Drain                 NG/OG Tube 09/30/18 1035 [place] sump 16 Fr. Center mouth;Left mouth;Right mouth 1 day          Urethral Catheter 09/30/18 1025 16 Fr. 1 day          Airway                 Airway - Non-Surgical 09/30/18 0959 Endotracheal Tube 1 day          Peripheral Intravenous Line                 Peripheral IV - Single Lumen 09/30/18 0600 Left Wrist 2 days                Physical Exam   Constitutional: She appears well-developed and well-nourished.   HENT:   Head: Normocephalic and atraumatic.   Eyes: Conjunctivae are normal. Pupils are equal, round, and reactive to light.   Neck: Normal range of motion. Neck supple.   Cardiovascular: Normal rate, normal heart sounds and intact distal pulses.   Pulmonary/Chest: Effort normal and breath sounds normal.   Abdominal: Soft. Bowel sounds are normal.   Musculoskeletal: Normal range of motion.   Skin: Skin is warm and dry.       Significant Labs: All pertinent lab results from the last 24 hours have been reviewed.    Significant Imaging: Echocardiogram:   2D echo with color flow doppler:   Results for orders placed or performed during the hospital encounter of 08/10/18   2D echo with color flow doppler   Result Value Ref Range    EF 60 55 - 65    Aortic Valve Regurgitation MILD     Est. PA Systolic Pressure 51.72 (A)     Pericardial Effusion TRIVIAL     Tricuspid Valve Regurgitation MILD      Assessment and Plan:     Brief HPI:     Pacemaker    Biotronik. Interrogation with normal device function and no high rate episodes        Cardiac arrest    PEA on arrival with severe acidosis. EKG with . Normal PPM function with no high rate episodes on interrogation. Etiology for the arrest is unclear - possible respiratory. Repeat echo with mildly decreased EF. Continue supportive care        Acute pulmonary edema    Diuresis and afterload reduction as tolerated        S/P TAVR (transcatheter aortic valve replacement)    Repeat echo        Stage 3 chronic kidney disease             Essential hypertension    HTN after arrest. Clonidine patch. Resume B-blocker when CHF improved         CAD s/p PCI    Mild troponin increase  -doubt ACS but will discuss repeat LHC when stable        Hyperlipidemia                 VTE Risk Mitigation (From admission, onward)        Ordered     IP VTE HIGH RISK PATIENT  Once      09/30/18 1359     heparin (porcine) injection 5,000 Units  Every 8 hours      09/30/18 1251          Tony Carmen MD  Cardiology  [facility]

## 2018-10-02 NOTE — HOSPITAL COURSE
10/6/18: ?aspirated  10/9/18: cath with nonobst CAD, patent RCA stent (TAVR noted)    Interval Hx: pt seen in ICU. No cp/sob.    Tele: AS- (pers rev)

## 2018-10-03 PROBLEM — R06.02 SHORTNESS OF BREATH: Status: RESOLVED | Noted: 2017-06-17 | Resolved: 2018-10-03

## 2018-10-03 PROBLEM — J20.1 ACUTE BRONCHITIS DUE TO HAEMOPHILUS INFLUENZAE: Status: RESOLVED | Noted: 2018-01-04 | Resolved: 2018-10-03

## 2018-10-03 LAB
ANION GAP SERPL CALC-SCNC: 12 MMOL/L
BACTERIA SPEC AEROBE CULT: NORMAL
BASOPHILS # BLD AUTO: 0.01 K/UL
BASOPHILS NFR BLD: 0.1 %
BUN SERPL-MCNC: 29 MG/DL
CALCIUM SERPL-MCNC: 9.4 MG/DL
CHLORIDE SERPL-SCNC: 102 MMOL/L
CK MB SERPL-MCNC: 5.5 NG/ML
CK MB SERPL-RTO: 3.5 %
CK SERPL-CCNC: 155 U/L
CO2 SERPL-SCNC: 27 MMOL/L
CREAT SERPL-MCNC: 1.1 MG/DL
DIFFERENTIAL METHOD: ABNORMAL
EOSINOPHIL # BLD AUTO: 0 K/UL
EOSINOPHIL NFR BLD: 0.1 %
ERYTHROCYTE [DISTWIDTH] IN BLOOD BY AUTOMATED COUNT: 15.5 %
EST. GFR  (AFRICAN AMERICAN): 55 ML/MIN/1.73 M^2
EST. GFR  (NON AFRICAN AMERICAN): 48 ML/MIN/1.73 M^2
GLUCOSE SERPL-MCNC: 127 MG/DL
GRAM STN SPEC: NORMAL
HCT VFR BLD AUTO: 28.8 %
HGB BLD-MCNC: 9.3 G/DL
LYMPHOCYTES # BLD AUTO: 1 K/UL
LYMPHOCYTES NFR BLD: 14 %
MCH RBC QN AUTO: 28 PG
MCHC RBC AUTO-ENTMCNC: 32.3 G/DL
MCV RBC AUTO: 87 FL
MONOCYTES # BLD AUTO: 0.5 K/UL
MONOCYTES NFR BLD: 7.2 %
NEUTROPHILS # BLD AUTO: 5.8 K/UL
NEUTROPHILS NFR BLD: 78.6 %
PLATELET # BLD AUTO: 221 K/UL
PMV BLD AUTO: 9.1 FL
POTASSIUM SERPL-SCNC: 3 MMOL/L
RBC # BLD AUTO: 3.32 M/UL
SODIUM SERPL-SCNC: 141 MMOL/L
TROPONIN I SERPL DL<=0.01 NG/ML-MCNC: 0.14 NG/ML
TROPONIN I SERPL DL<=0.01 NG/ML-MCNC: 0.16 NG/ML
WBC # BLD AUTO: 7.36 K/UL

## 2018-10-03 PROCEDURE — S0073 INJECTION, AZTREONAM, 500 MG: HCPCS | Performed by: EMERGENCY MEDICINE

## 2018-10-03 PROCEDURE — 25000003 PHARM REV CODE 250: Performed by: INTERNAL MEDICINE

## 2018-10-03 PROCEDURE — G8996 SWALLOW CURRENT STATUS: HCPCS | Mod: CJ

## 2018-10-03 PROCEDURE — 93005 ELECTROCARDIOGRAM TRACING: CPT

## 2018-10-03 PROCEDURE — 92610 EVALUATE SWALLOWING FUNCTION: CPT

## 2018-10-03 PROCEDURE — G8997 SWALLOW GOAL STATUS: HCPCS | Mod: CI

## 2018-10-03 PROCEDURE — 80048 BASIC METABOLIC PNL TOTAL CA: CPT

## 2018-10-03 PROCEDURE — 20000000 HC ICU ROOM

## 2018-10-03 PROCEDURE — 94640 AIRWAY INHALATION TREATMENT: CPT

## 2018-10-03 PROCEDURE — 82550 ASSAY OF CK (CPK): CPT

## 2018-10-03 PROCEDURE — 63600175 PHARM REV CODE 636 W HCPCS: Performed by: INTERNAL MEDICINE

## 2018-10-03 PROCEDURE — 36415 COLL VENOUS BLD VENIPUNCTURE: CPT

## 2018-10-03 PROCEDURE — 99233 SBSQ HOSP IP/OBS HIGH 50: CPT | Mod: ,,, | Performed by: INTERNAL MEDICINE

## 2018-10-03 PROCEDURE — S0028 INJECTION, FAMOTIDINE, 20 MG: HCPCS | Performed by: INTERNAL MEDICINE

## 2018-10-03 PROCEDURE — 93010 ELECTROCARDIOGRAM REPORT: CPT | Mod: ,,, | Performed by: INTERNAL MEDICINE

## 2018-10-03 PROCEDURE — 25000003 PHARM REV CODE 250: Performed by: HOSPITALIST

## 2018-10-03 PROCEDURE — 84484 ASSAY OF TROPONIN QUANT: CPT

## 2018-10-03 PROCEDURE — 25000003 PHARM REV CODE 250: Performed by: EMERGENCY MEDICINE

## 2018-10-03 PROCEDURE — 63600175 PHARM REV CODE 636 W HCPCS: Performed by: HOSPITALIST

## 2018-10-03 PROCEDURE — 85025 COMPLETE CBC W/AUTO DIFF WBC: CPT

## 2018-10-03 PROCEDURE — 82553 CREATINE MB FRACTION: CPT

## 2018-10-03 PROCEDURE — 25000242 PHARM REV CODE 250 ALT 637 W/ HCPCS: Performed by: HOSPITALIST

## 2018-10-03 PROCEDURE — 97535 SELF CARE MNGMENT TRAINING: CPT

## 2018-10-03 RX ORDER — LISINOPRIL 5 MG/1
10 TABLET ORAL DAILY
Status: DISCONTINUED | OUTPATIENT
Start: 2018-10-04 | End: 2018-10-04

## 2018-10-03 RX ORDER — GABAPENTIN 300 MG/1
300 CAPSULE ORAL 3 TIMES DAILY
Status: DISCONTINUED | OUTPATIENT
Start: 2018-10-03 | End: 2018-10-16

## 2018-10-03 RX ORDER — CETIRIZINE HYDROCHLORIDE 10 MG/1
10 TABLET ORAL DAILY
Status: DISCONTINUED | OUTPATIENT
Start: 2018-10-03 | End: 2018-10-16

## 2018-10-03 RX ORDER — ONDANSETRON 2 MG/ML
8 INJECTION INTRAMUSCULAR; INTRAVENOUS EVERY 6 HOURS PRN
Status: DISCONTINUED | OUTPATIENT
Start: 2018-10-03 | End: 2018-10-21 | Stop reason: HOSPADM

## 2018-10-03 RX ORDER — NITROGLYCERIN 0.4 MG/1
0.4 TABLET SUBLINGUAL EVERY 5 MIN PRN
Status: DISCONTINUED | OUTPATIENT
Start: 2018-10-03 | End: 2018-10-21 | Stop reason: HOSPADM

## 2018-10-03 RX ORDER — ENOXAPARIN SODIUM 100 MG/ML
40 INJECTION SUBCUTANEOUS EVERY 24 HOURS
Status: DISCONTINUED | OUTPATIENT
Start: 2018-10-03 | End: 2018-10-04

## 2018-10-03 RX ORDER — POLYETHYLENE GLYCOL 3350 17 G/17G
17 POWDER, FOR SOLUTION ORAL 2 TIMES DAILY PRN
Status: DISCONTINUED | OUTPATIENT
Start: 2018-10-03 | End: 2018-10-07

## 2018-10-03 RX ORDER — AZELASTINE 1 MG/ML
1 SPRAY, METERED NASAL 2 TIMES DAILY
Status: DISCONTINUED | OUTPATIENT
Start: 2018-10-03 | End: 2018-10-16

## 2018-10-03 RX ORDER — CLONIDINE HYDROCHLORIDE 0.1 MG/1
0.3 TABLET ORAL 3 TIMES DAILY
Status: DISCONTINUED | OUTPATIENT
Start: 2018-10-03 | End: 2018-10-04

## 2018-10-03 RX ORDER — NAPROXEN SODIUM 220 MG/1
81 TABLET, FILM COATED ORAL DAILY
Status: DISCONTINUED | OUTPATIENT
Start: 2018-10-04 | End: 2018-10-10

## 2018-10-03 RX ORDER — HYDRALAZINE HYDROCHLORIDE 25 MG/1
50 TABLET, FILM COATED ORAL EVERY 8 HOURS
Status: DISCONTINUED | OUTPATIENT
Start: 2018-10-04 | End: 2018-10-07

## 2018-10-03 RX ORDER — ACETAMINOPHEN 650 MG/20.3ML
650 LIQUID ORAL EVERY 4 HOURS PRN
Status: DISCONTINUED | OUTPATIENT
Start: 2018-10-03 | End: 2018-10-06

## 2018-10-03 RX ORDER — CARBAMAZEPINE 200 MG/1
200 TABLET ORAL 3 TIMES DAILY
Status: DISCONTINUED | OUTPATIENT
Start: 2018-10-03 | End: 2018-10-07

## 2018-10-03 RX ORDER — FLUTICASONE PROPIONATE 220 UG/1
1 AEROSOL, METERED RESPIRATORY (INHALATION) 2 TIMES DAILY
Status: DISCONTINUED | OUTPATIENT
Start: 2018-10-03 | End: 2018-10-16

## 2018-10-03 RX ORDER — SODIUM BICARBONATE 1 MEQ/ML
SYRINGE (ML) INTRAVENOUS
Status: DISPENSED
Start: 2018-10-03 | End: 2018-10-03

## 2018-10-03 RX ORDER — AMOXICILLIN 250 MG
1 CAPSULE ORAL 2 TIMES DAILY
Status: DISCONTINUED | OUTPATIENT
Start: 2018-10-03 | End: 2018-10-21 | Stop reason: HOSPADM

## 2018-10-03 RX ORDER — HYDRALAZINE HYDROCHLORIDE 25 MG/1
100 TABLET, FILM COATED ORAL EVERY 8 HOURS
Status: DISCONTINUED | OUTPATIENT
Start: 2018-10-03 | End: 2018-10-03

## 2018-10-03 RX ORDER — POTASSIUM CHLORIDE 20 MEQ/1
40 TABLET, EXTENDED RELEASE ORAL ONCE
Status: COMPLETED | OUTPATIENT
Start: 2018-10-03 | End: 2018-10-03

## 2018-10-03 RX ADMIN — AZTREONAM 2 G: 2 INJECTION, POWDER, LYOPHILIZED, FOR SOLUTION INTRAMUSCULAR; INTRAVENOUS at 06:10

## 2018-10-03 RX ADMIN — CLONIDINE HYDROCHLORIDE 0.3 MG: 0.1 TABLET ORAL at 09:10

## 2018-10-03 RX ADMIN — CHLORHEXIDINE GLUCONATE 15 ML: 1.2 RINSE ORAL at 10:10

## 2018-10-03 RX ADMIN — SODIUM CHLORIDE 250 ML: 0.9 INJECTION, SOLUTION INTRAVENOUS at 11:10

## 2018-10-03 RX ADMIN — HYDRALAZINE HYDROCHLORIDE 100 MG: 25 TABLET ORAL at 02:10

## 2018-10-03 RX ADMIN — POTASSIUM CHLORIDE 40 MEQ: 1500 TABLET, EXTENDED RELEASE ORAL at 02:10

## 2018-10-03 RX ADMIN — FUROSEMIDE 40 MG: 10 INJECTION, SOLUTION INTRAMUSCULAR; INTRAVENOUS at 10:10

## 2018-10-03 RX ADMIN — HYDRALAZINE HYDROCHLORIDE 100 MG: 25 TABLET ORAL at 06:10

## 2018-10-03 RX ADMIN — CETIRIZINE HYDROCHLORIDE 10 MG: 10 TABLET, FILM COATED ORAL at 02:10

## 2018-10-03 RX ADMIN — DOCUSATE SODIUM AND SENNOSIDES 1 TABLET: 8.6; 5 TABLET, FILM COATED ORAL at 09:10

## 2018-10-03 RX ADMIN — FLUTICASONE PROPIONATE 1 PUFF: 220 AEROSOL, METERED RESPIRATORY (INHALATION) at 07:10

## 2018-10-03 RX ADMIN — LISINOPRIL 10 MG: 5 TABLET ORAL at 11:10

## 2018-10-03 RX ADMIN — ASPIRIN 81 MG 81 MG: 81 TABLET ORAL at 11:10

## 2018-10-03 RX ADMIN — GABAPENTIN 300 MG: 300 CAPSULE ORAL at 09:10

## 2018-10-03 RX ADMIN — AZELASTINE HYDROCHLORIDE 137 MCG: 137 SPRAY, METERED NASAL at 09:10

## 2018-10-03 RX ADMIN — GABAPENTIN 300 MG: 300 CAPSULE ORAL at 02:10

## 2018-10-03 RX ADMIN — ONDANSETRON HYDROCHLORIDE 8 MG: 2 INJECTION INTRAMUSCULAR; INTRAVENOUS at 04:10

## 2018-10-03 RX ADMIN — NITROGLYCERIN 0.4 MG: 0.4 TABLET SUBLINGUAL at 04:10

## 2018-10-03 RX ADMIN — CARBAMAZEPINE 200 MG: 200 TABLET ORAL at 02:10

## 2018-10-03 RX ADMIN — METOPROLOL TARTRATE 100 MG: 50 TABLET ORAL at 09:10

## 2018-10-03 RX ADMIN — HEPARIN SODIUM 5000 UNITS: 5000 INJECTION, SOLUTION INTRAVENOUS; SUBCUTANEOUS at 06:10

## 2018-10-03 RX ADMIN — CARBAMAZEPINE 200 MG: 200 TABLET ORAL at 09:10

## 2018-10-03 RX ADMIN — METOPROLOL TARTRATE 100 MG: 50 TABLET ORAL at 11:10

## 2018-10-03 RX ADMIN — FAMOTIDINE 20 MG: 10 INJECTION, SOLUTION INTRAVENOUS at 10:10

## 2018-10-03 RX ADMIN — ENOXAPARIN SODIUM 40 MG: 40 INJECTION SUBCUTANEOUS at 04:10

## 2018-10-03 RX ADMIN — CLONIDINE HYDROCHLORIDE 0.3 MG: 0.1 TABLET ORAL at 03:10

## 2018-10-03 RX ADMIN — CLOPIDOGREL BISULFATE 75 MG: 75 TABLET ORAL at 11:10

## 2018-10-03 RX ADMIN — AZTREONAM 2 G: 2 INJECTION, POWDER, LYOPHILIZED, FOR SOLUTION INTRAMUSCULAR; INTRAVENOUS at 12:10

## 2018-10-03 NOTE — SUBJECTIVE & OBJECTIVE
Interval History:     Review of Systems   Unable to perform ROS: acuity of condition     Objective:     Vital Signs (Most Recent):  Temp: 98.8 °F (37.1 °C) (10/03/18 0715)  Pulse: 72 (10/03/18 0745)  Resp: (!) 26 (10/03/18 0745)  BP: (!) 165/71 (10/03/18 0700)  SpO2: 100 % (10/03/18 0745) Vital Signs (24h Range):  Temp:  [98.2 °F (36.8 °C)-99.1 °F (37.3 °C)] 98.8 °F (37.1 °C)  Pulse:  [] 72  Resp:  [12-36] 26  SpO2:  [94 %-100 %] 100 %  BP: (136-218)/() 165/71     Weight: 92.9 kg (204 lb 12.9 oz)(taken by RN on 10/1)  Body mass index is 34.08 kg/m².     SpO2: 100 %  O2 Device (Oxygen Therapy): nasal cannula      Intake/Output Summary (Last 24 hours) at 10/3/2018 0824  Last data filed at 10/3/2018 0700  Gross per 24 hour   Intake 746 ml   Output 2745 ml   Net -1999 ml       Lines/Drains/Airways     Central Venous Catheter Line                 Percutaneous Central Line Insertion/Assessment - triple lumen  09/30/18 1050 right internal jugular 2 days          Drain                 Urethral Catheter 09/30/18 1025 16 Fr. 2 days          Peripheral Intravenous Line                 Peripheral IV - Single Lumen 09/30/18 0600 Left Wrist 3 days                Physical Exam   Constitutional: She is oriented to person, place, and time. She appears well-developed and well-nourished.   HENT:   Head: Normocephalic and atraumatic.   Eyes: Conjunctivae are normal. Pupils are equal, round, and reactive to light.   Neck: Normal range of motion. Neck supple.   Cardiovascular: Normal rate, normal heart sounds and intact distal pulses.   Pulmonary/Chest: Effort normal. She has rhonchi.   Abdominal: Soft. Bowel sounds are normal.   Musculoskeletal: Normal range of motion.   Neurological: She is alert and oriented to person, place, and time.   Skin: Skin is warm and dry.       Significant Labs: All pertinent lab results from the last 24 hours have been reviewed.    Significant Imaging: Echocardiogram:   2D echo with color flow  doppler:   Results for orders placed or performed during the hospital encounter of 08/10/18   2D echo with color flow doppler   Result Value Ref Range    EF 60 55 - 65    Aortic Valve Regurgitation MILD     Est. PA Systolic Pressure 51.72 (A)     Pericardial Effusion TRIVIAL     Tricuspid Valve Regurgitation MILD

## 2018-10-03 NOTE — PT/OT/SLP EVAL
Speech Language Pathology Evaluation  Bedside Swallow    Patient Name:  Cindy Lyman   MRN:  [id number]  Admitting Diagnosis: Acute hypoxemic respiratory failure    Recommendations:                 General Recommendations:  Dysphagia therapy  Diet recommendations:  Mechanical soft, Thin   Aspiration Precautions: Alternating bites/sips, Eliminate distractions and Feed only when awake/alert   General Precautions: Standard, (mechanical soft)  Communication strategies:  provide increased time to answer    History:     Past Medical History:   Diagnosis Date    Anemia     Anticoagulant long-term use     Aortic stenosis     Arthritis     lower back    Asthma     CAD (coronary artery disease) 4/24/2015    Carpal tunnel syndrome on both sides     Cataract     CHF (congestive heart failure) 5/2013    COPD (chronic obstructive pulmonary disease)     Depression     DVT (deep venous thrombosis)     Hyperlipidemia     Hypertension     Pulmonary HTN     TMJ (temporomandibular joint disorder)        Past Surgical History:   Procedure Laterality Date    A-V CARDIAC PACEMAKER INSERTION N/A 7/5/2018    Procedure: INSERTION, CARDIAC PACEMAKER, DUAL CHAMBER;  Surgeon: Giancarlo Houser MD;  Location: [facility] CATH LAB;  Service: Cardiology;  Laterality: N/A;    AORTIC VALVE REPLACEMENT N/A 7/5/2018    Procedure: Replacement-valve-aortic;  Surgeon: Corey Castañeda MD;  Location: [facility] CATH LAB;  Service: Cardiology;  Laterality: N/A;    BACK SURGERY      BIOPSY-BONE MARROW N/A 5/26/2016    Performed by Rufino Ovalle MD at [facility] ENDO    cardiac stents      CARDIAC VALVE SURGERY      CARPAL TUNNEL RELEASE      Right/Left    CHOLECYSTECTOMY-LAPAROSCOPIC W/ CHOLANGIOGRAM N/A 7/13/2017    Performed by Luis Carlos Jarvis MD at [facility] OR    EYE SURGERY      cataract extraction    HYSTERECTOMY      Partial    INSERTION, CARDIAC PACEMAKER, DUAL CHAMBER N/A 7/5/2018    Performed by Giancarlo Houser MD at [facility] CATH  LAB    JOINT REPLACEMENT  2009    Left hip    POLYPECTOMY      x9    Replacement-valve-aortic N/A 7/5/2018    Performed by Corey Castañeda MD at [facility] CATH LAB    TEMPOROMANDIBULAR JOINT SURGERY      ULTRASOUND-ENDOSCOPIC-UPPER N/A 4/25/2018    Performed by Socrates Andrew MD at [facility] ENDO (2ND FLR)       Social History: Patient lives with family.    Prior Intubation HX:  Pt intubated 9/30 2/2 acute respiratory failure. Extubated 10/2.      Chest X-Rays:  Most of the airspace opacification seen in both lungs on prior exam has cleared with some still present at the bases.  Small pleural effusions are present.    Prior diet: Regular/thin. Pt eats regular solids without dentures.    Subjective     Pt awake/alert during swallow eval. Nsg reported pt just woke up and began talking right before ST arrived.  Patient goals: Pt stated her mouth is dry.    Pain/Comfort:  · Pain Rating 1: 0/10    Objective:     Oral Musculature Evaluation  · Oral Musculature: WFL  · Dentition: edentulous  · Mucosal Quality: good  · Mandibular Strength and Mobility: WFL  · Oral Labial Strength and Mobility: WFL  · Lingual Strength and Mobility: WFL  · Oral Musculature Comments: Structure and function WFL    Bedside Swallow Eval:   Consistencies Assessed:  · Thin liquids mutiple trials via spoon, cup, & straw  · Puree x5 trials  · Soft solids x 4 trials     Oral Phase:   · Prolonged mastication    Pharyngeal Phase:   · no overt clinical signs/symptoms of aspiration    Compensatory Strategies  · None    Treatment: Rec: ST to monitor diet tolerance and advance solids when safe and appropriate.    Assessment:     Cindy Lyman is a 82 y.o. female with  Cardiac arrest.  She presents with mild oral dysphagia c/b prolonged mastication. Pt reports eating at home without dentures. No overt s/s of aspiration with liquids or solids assessed.    Goals:   Multidisciplinary Problems     SLP Goals        Problem: SLP Goal    Goal Priority  Disciplines Outcome   SLP Goal    Medium SLP Ongoing (interventions implemented as appropriate)   Description:  STGs:  1. Pt will tolerate mechanical soft diet with thin liquids with no overt s/s of aspiration.   2. Pt will tolerate regular solids with thin liquids with no overt s/s of aspiration.                    Plan:     · Patient to be seen:  3 x/week   · Plan of Care expires:  10/12/18  · Plan of Care reviewed with:  patient, family   · SLP Follow-Up:  Yes       Discharge recommendations:  other (see comments)(TBD)   Barriers to Discharge:  None    Time Tracking:     SLP Treatment Date:   10/03/18  Speech Start Time:  1005  Speech Stop Time:  1040     Speech Total Time (min):  35 min    Billable Minutes: Eval Swallow and Oral Function 25 min and Seld Care/Home Management Training 10 min    Vane Moscoso CCC-SLP  10/03/2018

## 2018-10-03 NOTE — PLAN OF CARE
Problem: Patient Care Overview  Goal: Plan of Care Review  Outcome: Ongoing (interventions implemented as appropriate)  Patient remains in ICU on 3 L NC. Patient is awake and confused, only alert to self. Irwin in place with dark, pink tinge urine with sediment in it. PRN meds given to attempt to lower BP. Bed side swallow study not performed due to patient unable to follow commands. Patient is afebrile. No injuries, breakdowns, or falls. Precautions maintained for all.

## 2018-10-03 NOTE — PLAN OF CARE
Problem: Patient Care Overview  Goal: Plan of Care Review  Outcome: Ongoing (interventions implemented as appropriate)  Patients remains in ICU on 3 L NC. Patient is alert, follows simple commands, and disoriented only to situation. Patient is on mechanical soft diet no difficulty swallowing noted and is NPO at midnight for cardiac cath procedure. Consent in chart. Family present at bedside has been updated on plan of care. Patient was given PRN Zofran for reports of nausea and 2 Nitro sublingual for complaints of chest pain. Irwin in place with good urine output. Patient remains afebrile. No falls, injuries , or skin breakdown. Precautions maintained for all.

## 2018-10-03 NOTE — PLAN OF CARE
Problem: Patient Care Overview  Goal: Plan of Care Review  Outcome: Ongoing (interventions implemented as appropriate)  Pt restless. Mild aphasia noted. Repeats single words. Becoming more coherent as morning progresses. Irwin patent. Pinkish dark yellow urine with sediment. Keeps arms bent at elbow with posturing upwards most of the time. Takes pills with sips of water w/o diff. Pt noted to have wet cough frequently when sleeping. Remains free of injury and falls.

## 2018-10-03 NOTE — ASSESSMENT & PLAN NOTE
Creatinine 1.3   Historically ranges from 1.0-1.7 per chart review.  Good urine output.  Bun/creatinine holding steady.  Will scale back diuretic.

## 2018-10-03 NOTE — SUBJECTIVE & OBJECTIVE
Interval History: No complaints.    Review of Systems   HENT: Negative for ear discharge and ear pain.    Eyes: Negative for pain and itching.   Cardiovascular: Negative for chest pain and palpitations.   Neurological: Negative for seizures and syncope.     Objective:     Vital Signs (Most Recent):  Temp: 97.9 °F (36.6 °C) (10/03/18 1505)  Pulse: 75 (10/03/18 1500)  Resp: (!) 32 (10/03/18 1500)  BP: (!) 184/84 (10/03/18 1500)  SpO2: 97 % (10/03/18 1500) Vital Signs (24h Range):  Temp:  [97.9 °F (36.6 °C)-98.9 °F (37.2 °C)] 97.9 °F (36.6 °C)  Pulse:  [] 75  Resp:  [11-36] 32  SpO2:  [94 %-100 %] 97 %  BP: (132-218)/() 184/84     Weight: 92.9 kg (204 lb 12.9 oz)(taken by RN on 10/1)  Body mass index is 34.08 kg/m².    Intake/Output Summary (Last 24 hours) at 10/3/2018 1538  Last data filed at 10/3/2018 1500  Gross per 24 hour   Intake 375 ml   Output 2410 ml   Net -2035 ml      Physical Exam   Constitutional: She appears well-developed and well-nourished.   HENT:   Head: Normocephalic and atraumatic.   Eyes: Conjunctivae are normal. Pupils are equal, round, and reactive to light.   Neck: Neck supple.   Cardiovascular: Normal rate and regular rhythm.   Pulmonary/Chest: Effort normal. She has no wheezes.   Scattered rhonchi    Abdominal: Soft. She exhibits no distension.   Musculoskeletal: She exhibits no edema or deformity.   Neurological:   Awake, slow to respond, but answering questions and following simple commands.   Skin: Skin is warm and dry.       Significant Labs:   BMP:   Recent Labs   Lab  10/03/18   0230   GLU  127*   NA  141   K  3.0*   CL  102   CO2  27   BUN  29*   CREATININE  1.1   CALCIUM  9.4     CBC:   Recent Labs   Lab  10/02/18   0215  10/03/18   0230   WBC  8.24  7.36   HGB  9.5*  9.3*   HCT  28.4*  28.8*   PLT  236  682

## 2018-10-03 NOTE — PROGRESS NOTES
[facility]  Pulmonology  Progress Note    Patient Name: Cindy Lyman  MRN: [id number]  Admission Date: 9/30/2018  Hospital Length of Stay: 3 days  Code Status: Full Code  Attending Provider: Grupo Wharton MD  Primary Care Provider: Rodger Camarena MD   Principal Problem: Acute hypoxemic respiratory failure    Subjective:     Interval History: extubated without issue.      Objective:     Vital Signs (Most Recent):  Temp: 98.8 °F (37.1 °C) (10/03/18 0715)  Pulse: 71 (10/03/18 0915)  Resp: (!) 24 (10/03/18 0915)  BP: 132/62 (10/03/18 0900)  SpO2: 100 % (10/03/18 0915) Vital Signs (24h Range):  Temp:  [98.2 °F (36.8 °C)-99.1 °F (37.3 °C)] 98.8 °F (37.1 °C)  Pulse:  [] 71  Resp:  [12-36] 24  SpO2:  [94 %-100 %] 100 %  BP: (132-218)/() 132/62     Weight: 92.9 kg (204 lb 12.9 oz)(taken by RN on 10/1)  Body mass index is 34.08 kg/m².      Intake/Output Summary (Last 24 hours) at 10/3/2018 0918  Last data filed at 10/3/2018 0700  Gross per 24 hour   Intake 725 ml   Output 2495 ml   Net -1770 ml       Physical Exam   Constitutional: She is oriented to person, place, and time. She appears well-developed. She is not intubated.   HENT:   Head: Normocephalic.   Eyes: Conjunctivae and EOM are normal. Pupils are equal, round, and reactive to light.   Neck: Neck supple. No tracheal deviation present.   Cardiovascular: Normal rate, regular rhythm and normal heart sounds.   No murmur heard.  Pulmonary/Chest: Effort normal and breath sounds normal. She is not intubated. No respiratory distress. She has no wheezes. She has no rales.   Abdominal: Soft. Bowel sounds are normal. She exhibits no distension.   Musculoskeletal: She exhibits no edema.   Neurological: She is alert and oriented to person, place, and time.   Wax and wane attention   Skin: Skin is warm and dry.   Vitals reviewed.      Vents:  Vent Mode: PS/CPAP (10/02/18 0930)  Ventilator Initiated: Yes (09/30/18 1027)  Set Rate: 16 bmp  (10/02/18 0715)  Vt Set: 400 mL (10/02/18 0715)  Pressure Support: 10 cmH20 (10/02/18 0930)  PEEP/CPAP: 5 cmH20 (10/02/18 0930)  Oxygen Concentration (%): 30 (10/02/18 1000)  Peak Airway Pressure: 15.8 cmH2O (10/02/18 0930)  Total Ve: 10.9 mL (10/02/18 0930)  F/VT Ratio<105 (RSBI): 111.84 (10/02/18 0930)    Lines/Drains/Airways     Central Venous Catheter Line                 Percutaneous Central Line Insertion/Assessment - triple lumen  09/30/18 1050 right internal jugular 2 days          Drain                 Urethral Catheter 09/30/18 1025 16 Fr. 2 days          Peripheral Intravenous Line                 Peripheral IV - Single Lumen 09/30/18 0600 Left Wrist 3 days                Significant Labs:    CBC/Anemia Profile:  Recent Labs   Lab  10/02/18   0215  10/03/18   0230   WBC  8.24  7.36   HGB  9.5*  9.3*   HCT  28.4*  28.8*   PLT  236  221   MCV  86  87   RDW  15.5*  15.5*        Chemistries:  Recent Labs   Lab  10/02/18   0215  10/03/18   0230   NA  140  141   K  3.3*  3.0*   CL  102  102   CO2  26  27   BUN  25*  29*   CREATININE  1.3  1.1   CALCIUM  8.7  9.4       ABGs:   Recent Labs   Lab  10/02/18   0951   PH  7.473*   PCO2  39.1   HCO3  28.7*   POCSATURATED  99   BE  5       Significant Imaging:  none    Assessment/Plan:     * Acute hypoxemic respiratory failure    Intubated post cardiac arrest.   Extubated without issue.    Recommend discontinued antibiotics due to rapid improvement.            Encephalopathy, metabolic    Moving all extremities.  Wax and waned in attention may be sign of passive delerium.  Neuro inputs is reasonable.          Cardiac arrest    Reportedly PEA.  ROSC achieved after 1 round of epi.  Blood pressures stable without use of vasopressors.  Point of care ultrasound showed a hypertrophic LV with reasonable 'squeeze' and a plethoric IVC with no respiratory variation.  Repeat echo with mildly decreased ef.    Follow up with Dr. Carmen        Acute pulmonary edema    Diuresing well  with lasix.  bp improved with bb, ace        Stage 3 chronic kidney disease    Creatinine 1.3   Historically ranges from 1.0-1.7 per chart review.  Good urine output.  Bun/creatinine holding steady.  Will scale back diuretic.                 Marko Sanchez MD  Pulmonology  [facility] from pulmonary perspective for the floor.  I will be available upon request.

## 2018-10-03 NOTE — ASSESSMENT & PLAN NOTE
Aspirin, plavix and statin resumed   Cardiology following  Elevated troponin after cardiopulmonary arrest  [facility] in [place].

## 2018-10-03 NOTE — SUBJECTIVE & OBJECTIVE
Interval History: extubated without issue.      Objective:     Vital Signs (Most Recent):  Temp: 98.8 °F (37.1 °C) (10/03/18 0715)  Pulse: 71 (10/03/18 0915)  Resp: (!) 24 (10/03/18 0915)  BP: 132/62 (10/03/18 0900)  SpO2: 100 % (10/03/18 0915) Vital Signs (24h Range):  Temp:  [98.2 °F (36.8 °C)-99.1 °F (37.3 °C)] 98.8 °F (37.1 °C)  Pulse:  [] 71  Resp:  [12-36] 24  SpO2:  [94 %-100 %] 100 %  BP: (132-218)/() 132/62     Weight: 92.9 kg (204 lb 12.9 oz)(taken by RN on 10/1)  Body mass index is 34.08 kg/m².      Intake/Output Summary (Last 24 hours) at 10/3/2018 0918  Last data filed at 10/3/2018 0700  Gross per 24 hour   Intake 725 ml   Output 2495 ml   Net -1770 ml       Physical Exam   Constitutional: She is oriented to person, place, and time. She appears well-developed. She is not intubated.   HENT:   Head: Normocephalic.   Eyes: Conjunctivae and EOM are normal. Pupils are equal, round, and reactive to light.   Neck: Neck supple. No tracheal deviation present.   Cardiovascular: Normal rate, regular rhythm and normal heart sounds.   No murmur heard.  Pulmonary/Chest: Effort normal and breath sounds normal. She is not intubated. No respiratory distress. She has no wheezes. She has no rales.   Abdominal: Soft. Bowel sounds are normal. She exhibits no distension.   Musculoskeletal: She exhibits no edema.   Neurological: She is alert and oriented to person, place, and time.   Wax and wane attention   Skin: Skin is warm and dry.   Vitals reviewed.      Vents:  Vent Mode: PS/CPAP (10/02/18 0930)  Ventilator Initiated: Yes (09/30/18 1027)  Set Rate: 16 bmp (10/02/18 0715)  Vt Set: 400 mL (10/02/18 0715)  Pressure Support: 10 cmH20 (10/02/18 0930)  PEEP/CPAP: 5 cmH20 (10/02/18 0930)  Oxygen Concentration (%): 30 (10/02/18 1000)  Peak Airway Pressure: 15.8 cmH2O (10/02/18 0930)  Total Ve: 10.9 mL (10/02/18 0930)  F/VT Ratio<105 (RSBI): 111.84 (10/02/18 0930)    Lines/Drains/Airways     Central Venous Catheter  Line                 Percutaneous Central Line Insertion/Assessment - triple lumen  09/30/18 1050 right internal jugular 2 days          Drain                 Urethral Catheter 09/30/18 1025 16 Fr. 2 days          Peripheral Intravenous Line                 Peripheral IV - Single Lumen 09/30/18 0600 Left Wrist 3 days                Significant Labs:    CBC/Anemia Profile:  Recent Labs   Lab  10/02/18   0215  10/03/18   0230   WBC  8.24  7.36   HGB  9.5*  9.3*   HCT  28.4*  28.8*   PLT  236  221   MCV  86  87   RDW  15.5*  15.5*        Chemistries:  Recent Labs   Lab  10/02/18   0215  10/03/18   0230   NA  140  141   K  3.3*  3.0*   CL  102  102   CO2  26  27   BUN  25*  29*   CREATININE  1.3  1.1   CALCIUM  8.7  9.4       ABGs:   Recent Labs   Lab  10/02/18   0951   PH  7.473*   PCO2  39.1   HCO3  28.7*   POCSATURATED  99   BE  5       Significant Imaging:  none

## 2018-10-03 NOTE — ASSESSMENT & PLAN NOTE
Reportedly PEA.  ROSC achieved after 1 round of epi.  Blood pressures stable without use of vasopressors.  Point of care ultrasound showed a hypertrophic LV with reasonable 'squeeze' and a plethoric IVC with no respiratory variation.  Repeat echo with mildly decreased ef.    Follow up with Dr. Carmen

## 2018-10-03 NOTE — ASSESSMENT & PLAN NOTE
BP uncontrolled  Patient on home Clonidine that was stopped during this admit.  Possible rebound HTN.  Restarted home Clonidine.

## 2018-10-03 NOTE — ASSESSMENT & PLAN NOTE
Moving all extremities.  Wax and waned in attention may be sign of passive delerium.  Neuro inputs is reasonable.

## 2018-10-03 NOTE — ASSESSMENT & PLAN NOTE
Chest CTA r/o PE, likely due to decompensated CHF and possible underlying infection  Pulmonology consulted for vent management  Cardiology consulted - ECHo :  · Left ventricle ejection fraction is mildly decreased at 45%  · Left ventricle shows concentric remodeling.  · RV systolic function is normal.  · Left atrium is mildly dilated.  · Right atrium is moderately dilated.  · Tricuspid valve shows mild regurgitation.  · Grade II (moderate) left ventricular diastolic dysfunction consistent with pseudonormalization.  · LA pressure is elevated.   Cultures - negative, sent for resp cultures today   Appreciate Pulmonary input.  Extubated on 10/2.  Initially slow to respond, but improving.  Advance diet.  C in [place].

## 2018-10-03 NOTE — PROGRESS NOTES
[facility]  Cardiology  Progress Note    Patient Name: Cindy Lyman  MRN: [id number]  Admission Date: 9/30/2018  Hospital Length of Stay: 3 days  Code Status: Full Code   Attending Physician: Grupo Wharton MD   Primary Care Physician: Rodger Camarena MD  Expected Discharge Date:   Principal Problem:Acute hypoxemic respiratory failure    Subjective:     Hospital Course:   Extubated  Denies CP    Echo 8/31/18  · Left ventricle ejection fraction is mildly decreased at 45%  · Left ventricle shows concentric remodeling.  · RV systolic function is normal.  · Left atrium is mildly dilated.  · Right atrium is moderately dilated.  · Tricuspid valve shows mild regurgitation.  · Grade II (moderate) left ventricular diastolic dysfunction consistent with pseudonormalization.  · LA pressure is elevated.         Interval History:     Review of Systems   Unable to perform ROS: acuity of condition     Objective:     Vital Signs (Most Recent):  Temp: 98.8 °F (37.1 °C) (10/03/18 0715)  Pulse: 72 (10/03/18 0745)  Resp: (!) 26 (10/03/18 0745)  BP: (!) 165/71 (10/03/18 0700)  SpO2: 100 % (10/03/18 0745) Vital Signs (24h Range):  Temp:  [98.2 °F (36.8 °C)-99.1 °F (37.3 °C)] 98.8 °F (37.1 °C)  Pulse:  [] 72  Resp:  [12-36] 26  SpO2:  [94 %-100 %] 100 %  BP: (136-218)/() 165/71     Weight: 92.9 kg (204 lb 12.9 oz)(taken by RN on 10/1)  Body mass index is 34.08 kg/m².     SpO2: 100 %  O2 Device (Oxygen Therapy): nasal cannula      Intake/Output Summary (Last 24 hours) at 10/3/2018 0824  Last data filed at 10/3/2018 0700  Gross per 24 hour   Intake 746 ml   Output 2745 ml   Net -1999 ml       Lines/Drains/Airways     Central Venous Catheter Line                 Percutaneous Central Line Insertion/Assessment - triple lumen  09/30/18 1050 right internal jugular 2 days          Drain                 Urethral Catheter 09/30/18 1025 16 Fr. 2 days          Peripheral Intravenous Line                  Peripheral IV - Single Lumen 09/30/18 0600 Left Wrist 3 days                Physical Exam   Constitutional: She is oriented to person, place, and time. She appears well-developed and well-nourished.   HENT:   Head: Normocephalic and atraumatic.   Eyes: Conjunctivae are normal. Pupils are equal, round, and reactive to light.   Neck: Normal range of motion. Neck supple.   Cardiovascular: Normal rate, normal heart sounds and intact distal pulses.   Pulmonary/Chest: Effort normal. She has rhonchi.   Abdominal: Soft. Bowel sounds are normal.   Musculoskeletal: Normal range of motion.   Neurological: She is alert and oriented to person, place, and time.   Skin: Skin is warm and dry.       Significant Labs: All pertinent lab results from the last 24 hours have been reviewed.    Significant Imaging: Echocardiogram:   2D echo with color flow doppler:   Results for orders placed or performed during the hospital encounter of 08/10/18   2D echo with color flow doppler   Result Value Ref Range    EF 60 55 - 65    Aortic Valve Regurgitation MILD     Est. PA Systolic Pressure 51.72 (A)     Pericardial Effusion TRIVIAL     Tricuspid Valve Regurgitation MILD      Assessment and Plan:     Brief HPI:     Pacemaker    Biotronik. Interrogation with normal device function and no high rate episodes        Cardiac arrest    PEA on arrival with severe acidosis. EKG with . Normal PPM function with no high rate episodes on interrogation. Etiology for the arrest is unclear - possible respiratory. Repeat echo with mildly decreased EF. Continue supportive care        Acute pulmonary edema    Diuresis and afterload reduction as tolerated        S/P TAVR (transcatheter aortic valve replacement)    Repeat echo        Stage 3 chronic kidney disease             Essential hypertension    HTN after arrest. Clonidine patch. Resume B-blocker when CHF improved        CAD s/p PCI    Mild troponin increase  - [facility] in AM        Hyperlipidemia                  VTE Risk Mitigation (From admission, onward)        Ordered     IP VTE HIGH RISK PATIENT  Once      09/30/18 1359     heparin (porcine) injection 5,000 Units  Every 8 hours      09/30/18 1251          Tony Carmen MD  Cardiology  [facility]

## 2018-10-03 NOTE — PLAN OF CARE
Problem: SLP Goal  Goal: SLP Goal  STGs:  1. Pt will tolerate mechanical soft diet with thin liquids with no overt s/s of aspiration.   2. Pt will tolerate regular solids with thin liquids with no overt s/s of aspiration.  Outcome: Ongoing (interventions implemented as appropriate)  10/3 ST: Swallow study completed. Rec: Mechanical soft/ thin liquids, whole meds okay.Will follow to monitor diet tolerance. No overt s/s of aspiration observed. Vane Moscoso, CCC-SLP

## 2018-10-03 NOTE — ASSESSMENT & PLAN NOTE
Intubated post cardiac arrest.   Extubated without issue.    Recommend discontinued antibiotics due to rapid improvement.

## 2018-10-03 NOTE — PROGRESS NOTES
[facility] Hospital Medicine  Progress Note    Patient Name: Cindy Lyman  MRN: [id number]  Patient Class: IP- Inpatient   Admission Date: 9/30/2018  Length of Stay: 3 days  Attending Physician: Grupo Wharton MD  Primary Care Provider: Rodger Camarena MD        Subjective:     Principal Problem:Acute hypoxemic respiratory failure    HPI:  83 yo female with diastolic CHF, COPD, gastric cancer, aortic stenosis with complete heart block and s/p TAVR and pacemaker placed 7/2018 presented in cardiac arrest. Per ED notes, patient was on her way to [facility] with others and c/o chest pain. CPR was initiated in parked vehicle outside ED. One round of epinephrine given and got ROSC. Per family, she c/o worsening SOB over several days. On arrival pt was euthermic, hypertensive, elevated lactate and BNP. CXR suggest pulmonary edema though infection cannot be ruled out. Pt intubated and evaluated by pulmonology. Cardiology consulted. ECHO pending. Broad spectrum antibiotics initiated until cultures result and clinical course dictates.     Chest CTA negative for pulmonary emboli.     Per family, Pt is a full code.     Hospital Course:  Pt admitted after PEA arrest and acute respiratory failure/acidosis. Acidosis corrected. Pulmonology assisting with vent management/weaning. Trial of precedex today but patient did not tolerate. Switched back to propofol. CPAP this am and now back on PVRC. Pacemaker interrogated and appears to be functioning fine. Diuresed well on lasix without change in renal function. Possible plan for C per cardiology. Extubated on 10/2. Initially slow to respond, but mental status improving.    Interval History: No complaints.    Review of Systems   HENT: Negative for ear discharge and ear pain.    Eyes: Negative for pain and itching.   Cardiovascular: Negative for chest pain and palpitations.   Neurological: Negative for seizures and syncope.     Objective:     Vital Signs (Most  Recent):  Temp: 97.9 °F (36.6 °C) (10/03/18 1505)  Pulse: 75 (10/03/18 1500)  Resp: (!) 32 (10/03/18 1500)  BP: (!) 184/84 (10/03/18 1500)  SpO2: 97 % (10/03/18 1500) Vital Signs (24h Range):  Temp:  [97.9 °F (36.6 °C)-98.9 °F (37.2 °C)] 97.9 °F (36.6 °C)  Pulse:  [] 75  Resp:  [11-36] 32  SpO2:  [94 %-100 %] 97 %  BP: (132-218)/() 184/84     Weight: 92.9 kg (204 lb 12.9 oz)(taken by RN on 10/1)  Body mass index is 34.08 kg/m².    Intake/Output Summary (Last 24 hours) at 10/3/2018 1538  Last data filed at 10/3/2018 1500  Gross per 24 hour   Intake 375 ml   Output 2410 ml   Net -2035 ml      Physical Exam   Constitutional: She appears well-developed and well-nourished.   HENT:   Head: Normocephalic and atraumatic.   Eyes: Conjunctivae are normal. Pupils are equal, round, and reactive to light.   Neck: Neck supple.   Cardiovascular: Normal rate and regular rhythm.   Pulmonary/Chest: Effort normal. She has no wheezes.   Scattered rhonchi    Abdominal: Soft. She exhibits no distension.   Musculoskeletal: She exhibits no edema or deformity.   Neurological:   Awake, slow to respond, but answering questions and following simple commands.   Skin: Skin is warm and dry.       Significant Labs:   BMP:   Recent Labs   Lab  10/03/18   0230   GLU  127*   NA  141   K  3.0*   CL  102   CO2  27   BUN  29*   CREATININE  1.1   CALCIUM  9.4     CBC:   Recent Labs   Lab  10/02/18   0215  10/03/18   0230   WBC  8.24  7.36   HGB  9.5*  9.3*   HCT  28.4*  28.8*   PLT  236  221         Assessment/Plan:      * Acute hypoxemic respiratory failure    Chest CTA r/o PE, likely due to decompensated CHF and possible underlying infection  Pulmonology consulted for vent management  Cardiology consulted - ECHo :  · Left ventricle ejection fraction is mildly decreased at 45%  · Left ventricle shows concentric remodeling.  · RV systolic function is normal.  · Left atrium is mildly dilated.  · Right atrium is moderately dilated.  · Tricuspid  valve shows mild regurgitation.  · Grade II (moderate) left ventricular diastolic dysfunction consistent with pseudonormalization.  · LA pressure is elevated.   Cultures - negative, sent for resp cultures today   Appreciate Pulmonary input.  Extubated on 10/2.  Initially slow to respond, but improving.  Advance diet.  [facility] in [place].              Encephalopathy, metabolic    Neurologically appeared intact sitting up in bed and trying to pull tube on precedex   Hemodynamically more stable on propofol   Neuro checks           Pacemaker    See above           Cardiac arrest    Etiology still unclear, but PEA reported by first repsonders, quick ROSC with one round of epinephrine  Pt intubated on sedation and will have wean sedation for appropriate neurological exam, consult neurology if indicated   ECHO  -  +DD  Pacemaker interrogated             Acute pulmonary edema    Pt given IV lasix with large amount of diuresis  CXr improving             S/P TAVR (transcatheter aortic valve replacement)              Stage 3 chronic kidney disease    No indication for vasopressor or aggressive IVF  Monitor closely on IV lasix   Avoid nephrotoxins           Essential hypertension    BP uncontrolled  Patient on home Clonidine that was stopped during this admit.  Possible rebound HTN.  Restarted home Clonidine.        CAD s/p PCI    Aspirin, plavix and statin resumed   Cardiology following  Elevated troponin after cardiopulmonary arrest  [facility] in [place].        Anemia of chronic disease    Monitor  Reviewed bone marrow biopsy 2017 - no malignancy   Stable         Hyperlipidemia    Resume statin             VTE Risk Mitigation (From admission, onward)        Ordered     enoxaparin injection 40 mg  Daily      10/03/18 1249     IP VTE HIGH RISK PATIENT  Once      09/30/18 1359          Critical care time spent on the evaluation and treatment of severe organ dysfunction, review of pertinent labs and imaging studies, discussions with  consulting providers and discussions with patient/family: 45 minutes.    Grupo Wharton MD  Department of Hospital Medicine   [facility]

## 2018-10-03 NOTE — PROGRESS NOTES
"1535: Patient complains of midsternal chest pain. Patient unable to describe pain. Patient states "it hurts here" and pointed to location. / 86.     1550: RN spoke with Dr. Carmen via telephone. Gave new orders for EKG, cardiac enzymes, and nitro sublingual. Will continue to monitor.     "

## 2018-10-04 PROBLEM — G93.41 ENCEPHALOPATHY, METABOLIC: Status: RESOLVED | Noted: 2018-10-01 | Resolved: 2018-10-04

## 2018-10-04 LAB
ANION GAP SERPL CALC-SCNC: 10 MMOL/L
ANION GAP SERPL CALC-SCNC: 9 MMOL/L
APTT BLDCRRT: 28.3 SEC
BASOPHILS # BLD AUTO: 0.01 K/UL
BASOPHILS NFR BLD: 0.2 %
BUN SERPL-MCNC: 42 MG/DL
BUN SERPL-MCNC: 46 MG/DL
CALCIUM SERPL-MCNC: 8.7 MG/DL
CALCIUM SERPL-MCNC: 8.9 MG/DL
CHLORIDE SERPL-SCNC: 102 MMOL/L
CHLORIDE SERPL-SCNC: 103 MMOL/L
CO2 SERPL-SCNC: 28 MMOL/L
CO2 SERPL-SCNC: 29 MMOL/L
CREAT SERPL-MCNC: 1.9 MG/DL
CREAT SERPL-MCNC: 2.1 MG/DL
DIFFERENTIAL METHOD: ABNORMAL
EOSINOPHIL # BLD AUTO: 0.1 K/UL
EOSINOPHIL NFR BLD: 1.3 %
ERYTHROCYTE [DISTWIDTH] IN BLOOD BY AUTOMATED COUNT: 15.4 %
ERYTHROCYTE [DISTWIDTH] IN BLOOD BY AUTOMATED COUNT: 15.5 %
EST. GFR  (AFRICAN AMERICAN): 25 ML/MIN/1.73 M^2
EST. GFR  (AFRICAN AMERICAN): 28 ML/MIN/1.73 M^2
EST. GFR  (NON AFRICAN AMERICAN): 22 ML/MIN/1.73 M^2
EST. GFR  (NON AFRICAN AMERICAN): 25 ML/MIN/1.73 M^2
GLUCOSE SERPL-MCNC: 104 MG/DL
GLUCOSE SERPL-MCNC: 95 MG/DL
HCT VFR BLD AUTO: 25.6 %
HCT VFR BLD AUTO: 26.4 %
HGB BLD-MCNC: 8.3 G/DL
HGB BLD-MCNC: 8.5 G/DL
INR PPP: 1
LYMPHOCYTES # BLD AUTO: 1.6 K/UL
LYMPHOCYTES NFR BLD: 28.1 %
MCH RBC QN AUTO: 28.2 PG
MCH RBC QN AUTO: 28.5 PG
MCHC RBC AUTO-ENTMCNC: 32.2 G/DL
MCHC RBC AUTO-ENTMCNC: 32.4 G/DL
MCV RBC AUTO: 88 FL
MCV RBC AUTO: 88 FL
MONOCYTES # BLD AUTO: 0.6 K/UL
MONOCYTES NFR BLD: 11.1 %
NEUTROPHILS # BLD AUTO: 3.3 K/UL
NEUTROPHILS NFR BLD: 59.3 %
PLATELET # BLD AUTO: 206 K/UL
PLATELET # BLD AUTO: 207 K/UL
PMV BLD AUTO: 9.4 FL
PMV BLD AUTO: 9.5 FL
POTASSIUM SERPL-SCNC: 3.6 MMOL/L
POTASSIUM SERPL-SCNC: 3.6 MMOL/L
PROTHROMBIN TIME: 10.7 SEC
RBC # BLD AUTO: 2.91 M/UL
RBC # BLD AUTO: 3.01 M/UL
SODIUM SERPL-SCNC: 140 MMOL/L
SODIUM SERPL-SCNC: 141 MMOL/L
TROPONIN I SERPL DL<=0.01 NG/ML-MCNC: 0.2 NG/ML
WBC # BLD AUTO: 5.15 K/UL
WBC # BLD AUTO: 5.58 K/UL

## 2018-10-04 PROCEDURE — 27000221 HC OXYGEN, UP TO 24 HOURS

## 2018-10-04 PROCEDURE — 85025 COMPLETE CBC W/AUTO DIFF WBC: CPT

## 2018-10-04 PROCEDURE — 94761 N-INVAS EAR/PLS OXIMETRY MLT: CPT

## 2018-10-04 PROCEDURE — 80048 BASIC METABOLIC PNL TOTAL CA: CPT | Mod: 91

## 2018-10-04 PROCEDURE — 80048 BASIC METABOLIC PNL TOTAL CA: CPT

## 2018-10-04 PROCEDURE — 85027 COMPLETE CBC AUTOMATED: CPT

## 2018-10-04 PROCEDURE — 85730 THROMBOPLASTIN TIME PARTIAL: CPT

## 2018-10-04 PROCEDURE — 99233 SBSQ HOSP IP/OBS HIGH 50: CPT | Mod: GT,,, | Performed by: INTERNAL MEDICINE

## 2018-10-04 PROCEDURE — 94640 AIRWAY INHALATION TREATMENT: CPT

## 2018-10-04 PROCEDURE — 36415 COLL VENOUS BLD VENIPUNCTURE: CPT

## 2018-10-04 PROCEDURE — 85610 PROTHROMBIN TIME: CPT

## 2018-10-04 PROCEDURE — 84484 ASSAY OF TROPONIN QUANT: CPT

## 2018-10-04 PROCEDURE — 20000000 HC ICU ROOM

## 2018-10-04 PROCEDURE — 25000003 PHARM REV CODE 250: Performed by: INTERNAL MEDICINE

## 2018-10-04 PROCEDURE — 25000003 PHARM REV CODE 250: Performed by: HOSPITALIST

## 2018-10-04 RX ORDER — CLONIDINE HYDROCHLORIDE 0.1 MG/1
0.2 TABLET ORAL 3 TIMES DAILY
Status: DISCONTINUED | OUTPATIENT
Start: 2018-10-04 | End: 2018-10-06

## 2018-10-04 RX ORDER — ENOXAPARIN SODIUM 100 MG/ML
30 INJECTION SUBCUTANEOUS EVERY 24 HOURS
Status: DISCONTINUED | OUTPATIENT
Start: 2018-10-04 | End: 2018-10-05

## 2018-10-04 RX ORDER — SODIUM CHLORIDE 9 MG/ML
INJECTION, SOLUTION INTRAVENOUS CONTINUOUS
Status: DISCONTINUED | OUTPATIENT
Start: 2018-10-04 | End: 2018-10-06

## 2018-10-04 RX ORDER — ENOXAPARIN SODIUM 100 MG/ML
30 INJECTION SUBCUTANEOUS EVERY 24 HOURS
Status: DISCONTINUED | OUTPATIENT
Start: 2018-10-04 | End: 2018-10-04

## 2018-10-04 RX ADMIN — CARBAMAZEPINE 200 MG: 200 TABLET ORAL at 03:10

## 2018-10-04 RX ADMIN — FLUTICASONE PROPIONATE 1 PUFF: 220 AEROSOL, METERED RESPIRATORY (INHALATION) at 08:10

## 2018-10-04 RX ADMIN — ASPIRIN 81 MG CHEWABLE TABLET 81 MG: 81 TABLET CHEWABLE at 10:10

## 2018-10-04 RX ADMIN — GABAPENTIN 300 MG: 300 CAPSULE ORAL at 08:10

## 2018-10-04 RX ADMIN — FLUTICASONE PROPIONATE 1 PUFF: 220 AEROSOL, METERED RESPIRATORY (INHALATION) at 07:10

## 2018-10-04 RX ADMIN — SODIUM CHLORIDE: 0.9 INJECTION, SOLUTION INTRAVENOUS at 01:10

## 2018-10-04 RX ADMIN — AZELASTINE HYDROCHLORIDE 137 MCG: 137 SPRAY, METERED NASAL at 08:10

## 2018-10-04 RX ADMIN — CARBAMAZEPINE 200 MG: 200 TABLET ORAL at 10:10

## 2018-10-04 RX ADMIN — SODIUM CHLORIDE: 0.9 INJECTION, SOLUTION INTRAVENOUS at 06:10

## 2018-10-04 RX ADMIN — DOCUSATE SODIUM AND SENNOSIDES 1 TABLET: 8.6; 5 TABLET, FILM COATED ORAL at 08:10

## 2018-10-04 RX ADMIN — CARBAMAZEPINE 200 MG: 200 TABLET ORAL at 08:10

## 2018-10-04 RX ADMIN — CLOPIDOGREL BISULFATE 75 MG: 75 TABLET ORAL at 10:10

## 2018-10-04 RX ADMIN — GABAPENTIN 300 MG: 300 CAPSULE ORAL at 03:10

## 2018-10-04 RX ADMIN — METOPROLOL TARTRATE 100 MG: 50 TABLET ORAL at 10:10

## 2018-10-04 RX ADMIN — CETIRIZINE HYDROCHLORIDE 10 MG: 10 TABLET, FILM COATED ORAL at 10:10

## 2018-10-04 RX ADMIN — CLONIDINE HYDROCHLORIDE 0.2 MG: 0.1 TABLET ORAL at 03:10

## 2018-10-04 RX ADMIN — DOCUSATE SODIUM AND SENNOSIDES 1 TABLET: 8.6; 5 TABLET, FILM COATED ORAL at 10:10

## 2018-10-04 RX ADMIN — AZELASTINE HYDROCHLORIDE 137 MCG: 137 SPRAY, METERED NASAL at 10:10

## 2018-10-04 RX ADMIN — METOPROLOL TARTRATE 100 MG: 50 TABLET ORAL at 08:10

## 2018-10-04 RX ADMIN — GABAPENTIN 300 MG: 300 CAPSULE ORAL at 10:10

## 2018-10-04 NOTE — PROGRESS NOTES
1100: RN called Dr. Wharton to inform of no urine output since 0700. Per MD cancel transfer to telemetry. Patient will continue to be monitored in ICU.     1118: Performed bladder scan on patient for no urine output since 0700 and had no urine present. Briceno flushed per MD and had 15 mL of output.     1135: Informed Dr. Wharton of maroon colored output in briceno and on absorbant pad. Gave new orders to hold lovenox and begin SCDs. Will continue to monitor.

## 2018-10-04 NOTE — PLAN OF CARE
Problem: Patient Care Overview  Goal: Plan of Care Review  Recommendations     Recommendation/Intervention:   1. Boost Plus ordered TID to optimize nutr status    2. Diet texture per ST; cont to enc adequate meal intake daily  Goals: TF initiation or diet advancement w/in 48 hrs  Nutrition Goal Status: goal met

## 2018-10-04 NOTE — PROGRESS NOTES
" [facility]  Adult Nutrition  Progress Note    SUMMARY       Recommendations    Recommendation/Intervention:   1. Boost Plus ordered TID to optimize nutr status    2. Diet texture per ST; cont to enc adequate meal intake daily  Goals: TF initiation or diet advancement w/in 48 hrs  Nutrition Goal Status: goal met  Communication of RD Recs: reviewed with RN    Reason for Assessment    Reason for Assessment: RD follow-up  Diagnosis: (acute hypoxemic resp failure)  Relevant Medical History: CHF, CAD s/p stent, COPD, HLD, HTN  General Information Comments: Pt extubated 10/2; ST eval yest & diet adv'd. Pt seen this afternoon; lunch tray in room w/ ~25% consumed. Pt stated "I ate all that I could". NPO @ [place] for [facility] tmrw. Pt willing to try oral nutr suppl. RN agrees this would be beneficial to pt. NFPE again not performed as pt w/ well-nourished appearance & was eating well w/ no wt changes (aside from fluid 2/2 HF) pta. Pt was to step down to the floor today but tx has been cancelled 2/2 low uop & maroon colored output in briceno  Nutrition Discharge Planning: Adequate meal intake to meet nutr needs    Nutrition Risk Screen    Nutrition Risk Screen: no indicators present    Nutrition/Diet History    Patient Reported Diet/Restrictions/Preferences: low salt(at times)  Typical Food/Fluid Intake: Adequate pta  Food Preferences: None reported  Do you have any cultural, spiritual, Church conflicts, given your current situation?: none  Factors Affecting Nutritional Intake: decreased appetite    Anthropometrics    Temp: 97.8 °F (36.6 °C)  Height: 5' 5" (165.1 cm)  Height (inches): 65 in  Weight Method: Bed Scale  Weight: 92.9 kg (204 lb 12.9 oz)(taken by RN on 10/1)  Weight (lb): 204.81 lb  Ideal Body Weight (IBW), Female: 125 lb  % Ideal Body Weight, Female (lb): 163.85 lb  BMI (Calculated): 34.2  BMI Grade: 30 - 34.9- obesity - grade I       Lab/Procedures/Meds    Pertinent Labs Reviewed: reviewed  Pertinent " Labs Comments: BUN 46, Crt 2.1  Pertinent Medications Reviewed: reviewed  Pertinent Medications Comments: KCl, senna-docusate    Physical Findings/Assessment    Overall Physical Appearance: on oxygen therapy, obese  Tubes: (none)  Oral/Mouth Cavity: tooth/teeth missing  Skin: intact    Estimated/Assessed Needs    Weight Used For Calorie Calculations: 92.9 kg (204 lb 12.9 oz)  Energy Calorie Requirements (kcal): 1750  Energy Need Method: [place]-St Jeor(x 1.25 (PAL))     Protein Requirements: 93 gms (1gm/kg)  Weight Used For Protein Calculations: 92.9 kg (204 lb 12.9 oz)     Fluid Need Method: RDA Method  RDA Method (mL): 1750       Nutrition Prescription Ordered    Current Diet Order: Cardiac/Mech soft    Evaluation of Received Nutrient/Fluid Intake    Other Calories (kcal): 0  IV Fluid (mL): (NS @ 75ml/hr)  I/O: -712 mls x 24 hrs; -8.6L since admit  Energy Calories Required: not meeting needs  Protein Required: not meeting needs  Fluid Required: (Per MD)  Comments: LBM unknown; low uop  Tolerance: tolerating  % Intake of Estimated Energy Needs: 25 - 50 %  % Meal Intake: 25 - 50 %    Nutrition Risk    Level of Risk/Frequency of Follow-up: (F/u 2 x weekly)     Assessment and Plan    Nutrition Problem  Inadeqaute energy intake     Related to (etiology):   NPO w/ no alternate means of nutr in place     Signs and Symptoms (as evidenced by):   <85% of EEN/EPN being met     Interventions/Recommendations (treatment strategy):  See recs     Nutrition Diagnosis Status:   Improving as diet has been adv'd     Monitor and Evaluation    Food and Nutrient Intake: food and beverage intake, energy intake  Food and Nutrient Adminstration: diet order  Physical Activity and Function: nutrition-related ADLs and IADLs  Anthropometric Measurements: weight, weight change  Biochemical Data, Medical Tests and Procedures: electrolyte and renal panel, glucose/endocrine profile  Nutrition-Focused Physical Findings: overall appearance      Nutrition Follow-Up    RD Follow-up?: Yes

## 2018-10-04 NOTE — SUBJECTIVE & OBJECTIVE
Interval History: Slight hypotension overnight.  No new complaints.    Review of Systems   HENT: Negative for ear discharge and ear pain.    Eyes: Negative for pain and itching.   Cardiovascular: Negative for chest pain and palpitations.   Neurological: Negative for seizures and syncope.     Objective:     Vital Signs (Most Recent):  Temp: 97.7 °F (36.5 °C) (10/04/18 0315)  Pulse: 69 (10/04/18 0900)  Resp: 17 (10/04/18 0900)  BP: (!) 112/55 (10/04/18 0900)  SpO2: 100 % (10/04/18 0900) Vital Signs (24h Range):  Temp:  [97.7 °F (36.5 °C)-98.8 °F (37.1 °C)] 97.7 °F (36.5 °C)  Pulse:  [] 69  Resp:  [7-35] 17  SpO2:  [96 %-100 %] 100 %  BP: ()/() 112/55     Weight: 92.9 kg (204 lb 12.9 oz)(taken by RN on 10/1)  Body mass index is 34.08 kg/m².    Intake/Output Summary (Last 24 hours) at 10/4/2018 1000  Last data filed at 10/4/2018 0900  Gross per 24 hour   Intake 592.5 ml   Output 855 ml   Net -262.5 ml      Physical Exam   Constitutional: She appears well-developed and well-nourished.   HENT:   Head: Normocephalic and atraumatic.   Eyes: Conjunctivae are normal. Pupils are equal, round, and reactive to light.   Neck: Neck supple.   Cardiovascular: Normal rate and regular rhythm.   Pulmonary/Chest: Effort normal. She has no wheezes.   Scattered rhonchi    Abdominal: Soft. She exhibits no distension.   Musculoskeletal: She exhibits no edema or deformity.   Neurological:   Awake, slow to respond, but answering questions and following simple commands.   Skin: Skin is warm and dry.       Significant Labs:   BMP:   Recent Labs   Lab  10/04/18   0633   GLU  95   NA  141   K  3.6   CL  103   CO2  29   BUN  46*   CREATININE  2.1*   CALCIUM  8.9     CBC:   Recent Labs   Lab  10/03/18   0230  10/04/18   0307  10/04/18   0632   WBC  7.36  5.58  5.15   HGB  9.3*  8.5*  8.3*   HCT  28.8*  26.4*  25.6*   PLT  221  206  207

## 2018-10-04 NOTE — ASSESSMENT & PLAN NOTE
No indication for vasopressor or aggressive IVF  Monitor closely on IV lasix   Avoid nephrotoxins   ARF--holding Lasix and giving IVF hydration.

## 2018-10-04 NOTE — EICU
UO low   BP91/49,VA 74,RR 28    On camera sleeping     On lisinopril 10 mg daily,metoprolol 100mg q 12 ,hydralazine 100 mg q 8 hours,clonidine 0.3 mg tid    Plan:   ml once  Avoid further hypotension will reduce hydralazine to 50 mg q 8 hours  Attempt to keep SBP >100

## 2018-10-04 NOTE — PROGRESS NOTES
[facility] Hospital Medicine  Progress Note    Patient Name: Cindy Lyman  MRN: [id number]  Patient Class: IP- Inpatient   Admission Date: 9/30/2018  Length of Stay: 4 days  Attending Physician: Grupo Wharton MD  Primary Care Provider: Rodger Camarena MD        Subjective:     Principal Problem:Acute hypoxemic respiratory failure    HPI:  81 yo female with diastolic CHF, COPD, gastric cancer, aortic stenosis with complete heart block and s/p TAVR and pacemaker placed 7/2018 presented in cardiac arrest. Per ED notes, patient was on her way to [employer] with others and c/o chest pain. CPR was initiated in parked vehicle outside ED. One round of epinephrine given and got ROSC. Per family, she c/o worsening SOB over several days. On arrival pt was euthermic, hypertensive, elevated lactate and BNP. CXR suggest pulmonary edema though infection cannot be ruled out. Pt intubated and evaluated by pulmonology. Cardiology consulted. ECHO pending. Broad spectrum antibiotics initiated until cultures result and clinical course dictates.     Chest CTA negative for pulmonary emboli.     Per family, Pt is a full code.     Hospital Course:  Pt admitted after PEA arrest and acute respiratory failure/acidosis. Acidosis corrected. Pulmonology assisting with vent management/weaning. Trial of precedex today but patient did not tolerate. Switched back to propofol. CPAP this am and now back on PVRC. Pacemaker interrogated and appears to be functioning fine. Diuresed well on lasix without change in renal function. Possible plan for LHC per cardiology. Extubated on 10/2. Initially slow to respond, but mental status improving.  Planned LHC on 10/4 held secondary to rise in Creat.  Lasix stopped and getting IVF hydration.  Episodes of slight hypotension and BP medications adjusted.    Interval History: Slight hypotension overnight.  No new complaints.    Review of Systems   HENT: Negative for ear discharge and ear  pain.    Eyes: Negative for pain and itching.   Cardiovascular: Negative for chest pain and palpitations.   Neurological: Negative for seizures and syncope.     Objective:     Vital Signs (Most Recent):  Temp: 97.7 °F (36.5 °C) (10/04/18 0315)  Pulse: 69 (10/04/18 0900)  Resp: 17 (10/04/18 0900)  BP: (!) 112/55 (10/04/18 0900)  SpO2: 100 % (10/04/18 0900) Vital Signs (24h Range):  Temp:  [97.7 °F (36.5 °C)-98.8 °F (37.1 °C)] 97.7 °F (36.5 °C)  Pulse:  [] 69  Resp:  [7-35] 17  SpO2:  [96 %-100 %] 100 %  BP: ()/() 112/55     Weight: 92.9 kg (204 lb 12.9 oz)(taken by RN on 10/1)  Body mass index is 34.08 kg/m².    Intake/Output Summary (Last 24 hours) at 10/4/2018 1000  Last data filed at 10/4/2018 0900  Gross per 24 hour   Intake 592.5 ml   Output 855 ml   Net -262.5 ml      Physical Exam   Constitutional: She appears well-developed and well-nourished.   HENT:   Head: Normocephalic and atraumatic.   Eyes: Conjunctivae are normal. Pupils are equal, round, and reactive to light.   Neck: Neck supple.   Cardiovascular: Normal rate and regular rhythm.   Pulmonary/Chest: Effort normal. She has no wheezes.   Scattered rhonchi    Abdominal: Soft. She exhibits no distension.   Musculoskeletal: She exhibits no edema or deformity.   Neurological:   Awake, slow to respond, but answering questions and following simple commands.   Skin: Skin is warm and dry.       Significant Labs:   BMP:   Recent Labs   Lab  10/04/18   0633   GLU  95   NA  141   K  3.6   CL  103   CO2  29   BUN  46*   CREATININE  2.1*   CALCIUM  8.9     CBC:   Recent Labs   Lab  10/03/18   0230  10/04/18   0307  10/04/18   0632   WBC  7.36  5.58  5.15   HGB  9.3*  8.5*  8.3*   HCT  28.8*  26.4*  25.6*   PLT  221  206  207         Assessment/Plan:      * Acute hypoxemic respiratory failure    Chest CTA r/o PE, likely due to decompensated CHF and possible underlying infection  Pulmonology consulted for vent management  Cardiology consulted - [facility]  :  · Left ventricle ejection fraction is mildly decreased at 45%  · Left ventricle shows concentric remodeling.  · RV systolic function is normal.  · Left atrium is mildly dilated.  · Right atrium is moderately dilated.  · Tricuspid valve shows mild regurgitation.  · Grade II (moderate) left ventricular diastolic dysfunction consistent with pseudonormalization.  · LA pressure is elevated.   Cultures - negative, sent for resp cultures today   Appreciate Pulmonary input.  Extubated on 10/2.  Initially slow to respond, but improving.  Advance diet.  C cancelled today secondary to rise in Creat.  Holding Lasix and giving IVF hydration.              Cardiac arrest    Etiology still unclear, but PEA reported by first repsonders, quick ROSC with one round of epinephrine  Pt intubated on sedation and will have wean sedation for appropriate neurological exam, consult neurology if indicated   ECHO  -  +DD  Pacemaker interrogated             Pacemaker    See above           Acute pulmonary edema    Pt given IV lasix with large amount of diuresis  CXr improving   Holding Lasix secondary to rise in Creat.            S/P TAVR (transcatheter aortic valve replacement)              Stage 3 chronic kidney disease    No indication for vasopressor or aggressive IVF  Monitor closely on IV lasix   Avoid nephrotoxins   ARF--holding Lasix and giving IVF hydration.          Essential hypertension    BP uncontrolled  Patient on home Clonidine that was stopped during this admit.  Possible rebound HTN.  Restarted home Clonidine.  Slight hypotension overnight.  Adjusting medications and placing parameters.        CAD s/p PCI    Aspirin, plavix and statin resumed   Cardiology following  Elevated troponin after cardiopulmonary arrest  [facility] in [place].        Anemia of chronic disease    Monitor  Reviewed bone marrow biopsy 2017 - no malignancy   Stable         Hyperlipidemia    Resume statin             VTE Risk Mitigation (From admission,  onward)        Ordered     enoxaparin injection 30 mg  Daily      10/04/18 0839     IP VTE HIGH RISK PATIENT  Once      09/30/18 1359          Critical care time spent on the evaluation and treatment of severe organ dysfunction, review of pertinent labs and imaging studies, discussions with consulting providers and discussions with patient/family: 45 minutes.    Grupo Wharton MD  Department of Hospital Medicine   [facility]

## 2018-10-04 NOTE — ASSESSMENT & PLAN NOTE
Pt given IV lasix with large amount of diuresis  CXr improving   Holding Lasix secondary to rise in Creat.

## 2018-10-04 NOTE — PLAN OF CARE
Problem: Patient Care Overview  Goal: Individualization & Mutuality  Outcome: Ongoing (interventions implemented as appropriate)  Pt continues in ICU on 3L NC. Pt urine output dropped off overnight to approx 5 ml/hr. Bolus of 250 NS given per eICU MD. Pt BP labile overnight with low BP's when asleep. Pt also harder to arouse when BP is low. 6AM BP medication held. CVP monitoring initiated. No new injury or fall noted. Pt shows no signs or symptoms of distress.

## 2018-10-04 NOTE — ASSESSMENT & PLAN NOTE
Chest CTA r/o PE, likely due to decompensated CHF and possible underlying infection  Pulmonology consulted for vent management  Cardiology consulted - [facility] :  · Left ventricle ejection fraction is mildly decreased at 45%  · Left ventricle shows concentric remodeling.  · RV systolic function is normal.  · Left atrium is mildly dilated.  · Right atrium is moderately dilated.  · Tricuspid valve shows mild regurgitation.  · Grade II (moderate) left ventricular diastolic dysfunction consistent with pseudonormalization.  · LA pressure is elevated.   Cultures - negative, sent for resp cultures today   Appreciate Pulmonary input.  Extubated on 10/2.  Initially slow to respond, but improving.  Advance diet.  LHC cancelled today secondary to rise in Creat.  Holding Lasix and giving IVF hydration.

## 2018-10-04 NOTE — PLAN OF CARE
Problem: Patient Care Overview  Goal: Plan of Care Review  Outcome: Ongoing (interventions implemented as appropriate)  Patient remains in ICU on 1 L NC disoriented to situation. NS infusing at 75 mL/ hr. CVP monitoring was zeroed this AM value was 9. Cath lab procedure postponed to tomorrow 10/5/18 patient NPO at midnight. Family and patient has been updated on plan of care.Patient has hematuria and urine output has dropped to a total 32 mL for the shift. MD Wharton has been notified. Bladder scan done at 1118 and found 0 mL. Lovenox was held per MD Wharton. Nephrology has been consulted. Dietary evaluated patient and gave orders for boost supplement. Patient does not have much of an appetite. Irwin in place. Patient remains afebrile. No falls, injuries, or skin breakdown. Precautions maintained for all.

## 2018-10-04 NOTE — SUBJECTIVE & OBJECTIVE
Interval History:     Review of Systems   Unable to perform ROS: acuity of condition     Objective:     Vital Signs (Most Recent):  Temp: 97.7 °F (36.5 °C) (10/04/18 0315)  Pulse: 69 (10/04/18 0745)  Resp: 17 (10/04/18 0745)  BP: 135/63 (10/04/18 0721)  SpO2: 100 % (10/04/18 0745) Vital Signs (24h Range):  Temp:  [97.7 °F (36.5 °C)-98.8 °F (37.1 °C)] 97.7 °F (36.5 °C)  Pulse:  [] 69  Resp:  [7-35] 17  SpO2:  [96 %-100 %] 100 %  BP: ()/() 135/63     Weight: 92.9 kg (204 lb 12.9 oz)(taken by RN on 10/1)  Body mass index is 34.08 kg/m².     SpO2: 100 %  O2 Device (Oxygen Therapy): nasal cannula      Intake/Output Summary (Last 24 hours) at 10/4/2018 0752  Last data filed at 10/4/2018 0700  Gross per 24 hour   Intake 292.5 ml   Output 1005 ml   Net -712.5 ml       Lines/Drains/Airways     Central Venous Catheter Line                 Percutaneous Central Line Insertion/Assessment - triple lumen  09/30/18 1050 right internal jugular 3 days          Drain                 Urethral Catheter 09/30/18 1025 16 Fr. 3 days                Physical Exam   Constitutional: She is oriented to person, place, and time. She appears well-developed and well-nourished.   HENT:   Head: Normocephalic and atraumatic.   Eyes: Conjunctivae are normal. Pupils are equal, round, and reactive to light.   Neck: Normal range of motion. Neck supple.   Cardiovascular: Normal rate, normal heart sounds and intact distal pulses.   Pulmonary/Chest: Effort normal.   Abdominal: Soft. Bowel sounds are normal.   Musculoskeletal: Normal range of motion.   Neurological: She is alert and oriented to person, place, and time.   Skin: Skin is warm and dry.       Significant Labs: All pertinent lab results from the last 24 hours have been reviewed.    Significant Imaging: Echocardiogram:   2D echo with color flow doppler:   Results for orders placed or performed during the hospital encounter of 08/10/18   2D echo with color flow doppler   Result  Value Ref Range    EF 60 55 - 65    Aortic Valve Regurgitation MILD     Est. PA Systolic Pressure 51.72 (A)     Pericardial Effusion TRIVIAL     Tricuspid Valve Regurgitation MILD

## 2018-10-04 NOTE — PT/OT/SLP PROGRESS
Speech Language Pathology      Cindy Lyman  MRN: [id number]    Patient not seen today secondary to patient NPO for procedure and PEGGY for procedure. Will follow up next treatment date.    DIA Garrison, CCC-SLP   10/04/2018

## 2018-10-04 NOTE — CARE UPDATE
Transfer Note    Pt admitted after PEA arrest and acute respiratory failure/acidosis. Acidosis corrected. Pulmonology assisting with vent management/weaning. Trial of precedex but patient did not tolerate. Switched back to propofol. Pacemaker interrogated and appears to be functioning fine.  Respiratory failure probably secondary to fluid overload.  EF of 45% with diastolic dysfunction on echo.  Diuresed well on lasix with improvement of pulmonary status. Extubated on 10/2. Initially slow to respond, but mental status improving.  Planned LHC on 10/4 held secondary to rise in Creat.  Lasix stopped and getting IVF hydration.  Episodes of slight hypotension and BP medications adjusted.  Currently hemodynamically stable.  Plan for LHC in [place] if Creat improved.  PT/OT post cardiac work up.

## 2018-10-04 NOTE — PROGRESS NOTES
[facility]  Cardiology  Progress Note    Patient Name: Cindy Lyman  MRN: [id number]  Admission Date: 9/30/2018  Hospital Length of Stay: 4 days  Code Status: Full Code   Attending Physician: Grupo Wharton MD   Primary Care Physician: Rodger Camarena MD  Expected Discharge Date:   Principal Problem:Acute hypoxemic respiratory failure    Subjective:     Hospital Course:       Echo 8/31/18  · Left ventricle ejection fraction is mildly decreased at 45%  · Left ventricle shows concentric remodeling.  · RV systolic function is normal.  · Left atrium is mildly dilated.  · Right atrium is moderately dilated.  · Tricuspid valve shows mild regurgitation.  · Grade II (moderate) left ventricular diastolic dysfunction consistent with pseudonormalization.  · LA pressure is elevated.     Some CP yesterday  Troponin 0.2  Cr up to 2.1    Interval History:     Review of Systems   Unable to perform ROS: acuity of condition     Objective:     Vital Signs (Most Recent):  Temp: 97.7 °F (36.5 °C) (10/04/18 0315)  Pulse: 69 (10/04/18 0745)  Resp: 17 (10/04/18 0745)  BP: 135/63 (10/04/18 0721)  SpO2: 100 % (10/04/18 0745) Vital Signs (24h Range):  Temp:  [97.7 °F (36.5 °C)-98.8 °F (37.1 °C)] 97.7 °F (36.5 °C)  Pulse:  [] 69  Resp:  [7-35] 17  SpO2:  [96 %-100 %] 100 %  BP: ()/() 135/63     Weight: 92.9 kg (204 lb 12.9 oz)(taken by RN on 10/1)  Body mass index is 34.08 kg/m².     SpO2: 100 %  O2 Device (Oxygen Therapy): nasal cannula      Intake/Output Summary (Last 24 hours) at 10/4/2018 0752  Last data filed at 10/4/2018 0700  Gross per 24 hour   Intake 292.5 ml   Output 1005 ml   Net -712.5 ml       Lines/Drains/Airways     Central Venous Catheter Line                 Percutaneous Central Line Insertion/Assessment - triple lumen  09/30/18 1050 right internal jugular 3 days          Drain                 Urethral Catheter 09/30/18 1025 16 Fr. 3 days                Physical Exam    Constitutional: She is oriented to person, place, and time. She appears well-developed and well-nourished.   HENT:   Head: Normocephalic and atraumatic.   Eyes: Conjunctivae are normal. Pupils are equal, round, and reactive to light.   Neck: Normal range of motion. Neck supple.   Cardiovascular: Normal rate, normal heart sounds and intact distal pulses.   Pulmonary/Chest: Effort normal.   Abdominal: Soft. Bowel sounds are normal.   Musculoskeletal: Normal range of motion.   Neurological: She is alert and oriented to person, place, and time.   Skin: Skin is warm and dry.       Significant Labs: All pertinent lab results from the last 24 hours have been reviewed.    Significant Imaging: Echocardiogram:   2D echo with color flow doppler:   Results for orders placed or performed during the hospital encounter of 08/10/18   2D echo with color flow doppler   Result Value Ref Range    EF 60 55 - 65    Aortic Valve Regurgitation MILD     Est. PA Systolic Pressure 51.72 (A)     Pericardial Effusion TRIVIAL     Tricuspid Valve Regurgitation MILD      Assessment and Plan:     Brief HPI:     Pacemaker    Biotronik. Interrogation with normal device function and no high rate episodes        Cardiac arrest    PEA on arrival with severe acidosis. EKG with . Normal PPM function with no high rate episodes on interrogation. Etiology for the arrest is unclear - possible respiratory. Repeat echo with mildly decreased EF. Continue supportive care        Acute pulmonary edema    Diuresis and afterload reduction as tolerated        S/P TAVR (transcatheter aortic valve replacement)    Repeat echo        Stage 3 chronic kidney disease             Essential hypertension    HTN after arrest. Clonidine patch. Resume B-blocker when CHF improved        CAD s/p PCI    Mild troponin increase. Acute rise in Cr this AM - over-diuresed. Will hydrate today and plan for C in AM if Cr improved        Hyperlipidemia                 VTE Risk  Mitigation (From admission, onward)        Ordered     enoxaparin injection 40 mg  Daily      10/03/18 1249     IP VTE HIGH RISK PATIENT  Once      09/30/18 0846          Tony Carmen MD  Cardiology  [facility]

## 2018-10-04 NOTE — ASSESSMENT & PLAN NOTE
Mild troponin increase. Acute rise in Cr this AM - over-diuresed. Will hydrate today and plan for LHC in AM if Cr improved

## 2018-10-04 NOTE — ASSESSMENT & PLAN NOTE
BP uncontrolled  Patient on home Clonidine that was stopped during this admit.  Possible rebound HTN.  Restarted home Clonidine.  Slight hypotension overnight.  Adjusting medications and placing parameters.

## 2018-10-05 ENCOUNTER — TELEPHONE (OUTPATIENT)
Dept: ELECTROPHYSIOLOGY | Facility: CLINIC | Age: 83
End: 2018-10-05

## 2018-10-05 DIAGNOSIS — I45.9 HEART BLOCK: Primary | ICD-10-CM

## 2018-10-05 PROBLEM — R31.0 HEMATURIA, GROSS: Status: ACTIVE | Noted: 2018-10-05

## 2018-10-05 LAB
ANION GAP SERPL CALC-SCNC: 8 MMOL/L
BACTERIA #/AREA URNS HPF: ABNORMAL /HPF
BACTERIA BLD CULT: NORMAL
BACTERIA BLD CULT: NORMAL
BASOPHILS # BLD AUTO: 0.01 K/UL
BASOPHILS NFR BLD: 0.2 %
BILIRUB UR QL STRIP: NEGATIVE
BUN SERPL-MCNC: 54 MG/DL
CALCIUM SERPL-MCNC: 8 MG/DL
CHLORIDE SERPL-SCNC: 106 MMOL/L
CLARITY UR: ABNORMAL
CO2 SERPL-SCNC: 25 MMOL/L
COLOR UR: ABNORMAL
CREAT SERPL-MCNC: 2.1 MG/DL
CREAT UR-MCNC: 72.2 MG/DL
DIFFERENTIAL METHOD: ABNORMAL
EOSINOPHIL # BLD AUTO: 0.2 K/UL
EOSINOPHIL NFR BLD: 4.1 %
ERYTHROCYTE [DISTWIDTH] IN BLOOD BY AUTOMATED COUNT: 15.4 %
EST. GFR  (AFRICAN AMERICAN): 25 ML/MIN/1.73 M^2
EST. GFR  (NON AFRICAN AMERICAN): 22 ML/MIN/1.73 M^2
GLUCOSE SERPL-MCNC: 103 MG/DL
GLUCOSE UR QL STRIP: NEGATIVE
HCT VFR BLD AUTO: 23.7 %
HGB BLD-MCNC: 7.6 G/DL
HGB UR QL STRIP: ABNORMAL
HYALINE CASTS #/AREA URNS LPF: 0 /LPF
KETONES UR QL STRIP: NEGATIVE
LEUKOCYTE ESTERASE UR QL STRIP: NEGATIVE
LYMPHOCYTES # BLD AUTO: 1.3 K/UL
LYMPHOCYTES NFR BLD: 31.6 %
MCH RBC QN AUTO: 28.5 PG
MCHC RBC AUTO-ENTMCNC: 32.1 G/DL
MCV RBC AUTO: 89 FL
MICROSCOPIC COMMENT: ABNORMAL
MONOCYTES # BLD AUTO: 0.6 K/UL
MONOCYTES NFR BLD: 14.4 %
NEUTROPHILS # BLD AUTO: 2.1 K/UL
NEUTROPHILS NFR BLD: 49.7 %
NITRITE UR QL STRIP: NEGATIVE
PH UR STRIP: 6 [PH] (ref 5–8)
PLATELET # BLD AUTO: 206 K/UL
PMV BLD AUTO: 9.4 FL
POTASSIUM SERPL-SCNC: 3.6 MMOL/L
PROT UR QL STRIP: ABNORMAL
PROT UR-MCNC: 163 MG/DL
PROT/CREAT UR: 2.26 MG/G{CREAT}
RBC # BLD AUTO: 2.67 M/UL
RBC #/AREA URNS HPF: >100 /HPF (ref 0–4)
SODIUM SERPL-SCNC: 139 MMOL/L
SODIUM UR-SCNC: 46 MMOL/L
SP GR UR STRIP: 1.01 (ref 1–1.03)
URN SPEC COLLECT METH UR: ABNORMAL
UROBILINOGEN UR STRIP-ACNC: NEGATIVE EU/DL
WBC # BLD AUTO: 4.18 K/UL
WBC #/AREA URNS HPF: 0 /HPF (ref 0–5)

## 2018-10-05 PROCEDURE — 25000003 PHARM REV CODE 250: Performed by: HOSPITALIST

## 2018-10-05 PROCEDURE — 99291 CRITICAL CARE FIRST HOUR: CPT | Mod: ,,, | Performed by: INTERNAL MEDICINE

## 2018-10-05 PROCEDURE — 94761 N-INVAS EAR/PLS OXIMETRY MLT: CPT

## 2018-10-05 PROCEDURE — 80048 BASIC METABOLIC PNL TOTAL CA: CPT

## 2018-10-05 PROCEDURE — 81000 URINALYSIS NONAUTO W/SCOPE: CPT

## 2018-10-05 PROCEDURE — 25000003 PHARM REV CODE 250: Performed by: INTERNAL MEDICINE

## 2018-10-05 PROCEDURE — 27000221 HC OXYGEN, UP TO 24 HOURS

## 2018-10-05 PROCEDURE — 85025 COMPLETE CBC W/AUTO DIFF WBC: CPT

## 2018-10-05 PROCEDURE — 84156 ASSAY OF PROTEIN URINE: CPT

## 2018-10-05 PROCEDURE — 20000000 HC ICU ROOM

## 2018-10-05 PROCEDURE — 63600175 PHARM REV CODE 636 W HCPCS: Performed by: INTERNAL MEDICINE

## 2018-10-05 PROCEDURE — 84300 ASSAY OF URINE SODIUM: CPT

## 2018-10-05 PROCEDURE — 99222 1ST HOSP IP/OBS MODERATE 55: CPT | Mod: ,,, | Performed by: UROLOGY

## 2018-10-05 PROCEDURE — 94640 AIRWAY INHALATION TREATMENT: CPT

## 2018-10-05 RX ORDER — BENZONATATE 100 MG/1
200 CAPSULE ORAL 3 TIMES DAILY PRN
Status: DISCONTINUED | OUTPATIENT
Start: 2018-10-05 | End: 2018-10-11

## 2018-10-05 RX ADMIN — SODIUM CHLORIDE: 0.9 INJECTION, SOLUTION INTRAVENOUS at 03:10

## 2018-10-05 RX ADMIN — CARBAMAZEPINE 200 MG: 200 TABLET ORAL at 08:10

## 2018-10-05 RX ADMIN — SODIUM CHLORIDE: 0.9 INJECTION, SOLUTION INTRAVENOUS at 12:10

## 2018-10-05 RX ADMIN — GABAPENTIN 300 MG: 300 CAPSULE ORAL at 09:10

## 2018-10-05 RX ADMIN — GABAPENTIN 300 MG: 300 CAPSULE ORAL at 08:10

## 2018-10-05 RX ADMIN — AZELASTINE HYDROCHLORIDE 137 MCG: 137 SPRAY, METERED NASAL at 08:10

## 2018-10-05 RX ADMIN — FLUTICASONE PROPIONATE 1 PUFF: 220 AEROSOL, METERED RESPIRATORY (INHALATION) at 08:10

## 2018-10-05 RX ADMIN — ASPIRIN 81 MG CHEWABLE TABLET 81 MG: 81 TABLET CHEWABLE at 08:10

## 2018-10-05 RX ADMIN — GABAPENTIN 300 MG: 300 CAPSULE ORAL at 03:10

## 2018-10-05 RX ADMIN — CLOPIDOGREL BISULFATE 75 MG: 75 TABLET ORAL at 08:10

## 2018-10-05 RX ADMIN — CARBAMAZEPINE 200 MG: 200 TABLET ORAL at 03:10

## 2018-10-05 RX ADMIN — METOPROLOL TARTRATE 100 MG: 50 TABLET ORAL at 08:10

## 2018-10-05 RX ADMIN — METOPROLOL TARTRATE 100 MG: 50 TABLET ORAL at 09:10

## 2018-10-05 RX ADMIN — FLUTICASONE PROPIONATE 1 PUFF: 220 AEROSOL, METERED RESPIRATORY (INHALATION) at 11:10

## 2018-10-05 RX ADMIN — DOCUSATE SODIUM AND SENNOSIDES 1 TABLET: 8.6; 5 TABLET, FILM COATED ORAL at 08:10

## 2018-10-05 RX ADMIN — CETIRIZINE HYDROCHLORIDE 10 MG: 10 TABLET, FILM COATED ORAL at 08:10

## 2018-10-05 RX ADMIN — BENZONATATE 200 MG: 100 CAPSULE ORAL at 04:10

## 2018-10-05 RX ADMIN — AZELASTINE HYDROCHLORIDE 137 MCG: 137 SPRAY, METERED NASAL at 09:10

## 2018-10-05 RX ADMIN — DOCUSATE SODIUM AND SENNOSIDES 1 TABLET: 8.6; 5 TABLET, FILM COATED ORAL at 09:10

## 2018-10-05 RX ADMIN — HYDRALAZINE HYDROCHLORIDE 10 MG: 20 INJECTION INTRAMUSCULAR; INTRAVENOUS at 11:10

## 2018-10-05 RX ADMIN — CLONIDINE HYDROCHLORIDE 0.2 MG: 0.1 TABLET ORAL at 08:10

## 2018-10-05 RX ADMIN — CARBAMAZEPINE 200 MG: 200 TABLET ORAL at 09:10

## 2018-10-05 RX ADMIN — HYDRALAZINE HYDROCHLORIDE 50 MG: 25 TABLET ORAL at 03:10

## 2018-10-05 RX ADMIN — CLONIDINE HYDROCHLORIDE 0.2 MG: 0.1 TABLET ORAL at 03:10

## 2018-10-05 NOTE — ASSESSMENT & PLAN NOTE
Diuresis and afterload reduction as tolerated  Currently without UOP and creat persistently elevated (?CONCETTA from CTA chest)

## 2018-10-05 NOTE — PLAN OF CARE
Problem: Patient Care Overview  Goal: Plan of Care Review  Outcome: Ongoing (interventions implemented as appropriate)  Patient remains in ICU. VSS. Patient more alert, ate 100% supper last evening. She is very weak and did need to be feed. Urine remains maroon colored and output only 25ml this shift. Bun increased from yesterday to 54. H and H  7.6 and 23.7. Dr. Walls notified of lab results and patient lack of urine output. Nephrology was consulted yesterday and will see patient this am. Patient has been NPO since midnight for possible heart cath this am. Continues on NS at 75ml/hr.

## 2018-10-05 NOTE — CONSULTS
"                                 Renal ICU Consult    Date of Admission:  9/30/2018 10:00 AM    HPI: 81 y/o AA female with history of: diastolic CHF, COPD, gastric cancer, aortic stenosis with complete heart block and s/p TAVR and pacemaker placed 7/2018 presented to [facility] ED s/p cardiac arrest on 9/30/18.  Per ED notes: "patient was on her way to [facility] with others and c/o chest pain. CPR was initiated in parked vehicle outside ED. One round of epinephrine given and got ROSC".  Pte. Was intubated and admitted to ICU, a chest CTA was negative for P.E.  Initial treatment consisted of IV diuresis, pte. Was extubated on 10/2 and on 10/4  her diuretics were held due to increasing creatinine and decreased UO.  Consulted for oliguric EUGENE.    Past Medical History:   Diagnosis Date    Anemia     Anticoagulant long-term use     Aortic stenosis     Arthritis     lower back    Asthma     CAD (coronary artery disease) 4/24/2015    Carpal tunnel syndrome on both sides     Cataract     CHF (congestive heart failure) 5/2013    COPD (chronic obstructive pulmonary disease)     Depression     DVT (deep venous thrombosis)     Hyperlipidemia     Hypertension     Pulmonary HTN     TMJ (temporomandibular joint disorder)      Past Surgical History:   Procedure Laterality Date    A-V CARDIAC PACEMAKER INSERTION N/A 7/5/2018    Procedure: INSERTION, CARDIAC PACEMAKER, DUAL CHAMBER;  Surgeon: Giancarlo Houser MD;  Location: [facility] CATH LAB;  Service: Cardiology;  Laterality: N/A;    AORTIC VALVE REPLACEMENT N/A 7/5/2018    Procedure: Replacement-valve-aortic;  Surgeon: Corey Castañeda MD;  Location: [facility] CATH LAB;  Service: Cardiology;  Laterality: N/A;    BACK SURGERY      BIOPSY-BONE MARROW N/A 5/26/2016    Performed by [facility]ufino Ovalle [facility] at [facility]O    cardiac stents      CARDIAC VALVE SURGERY      CARPAL TUNNEL RELEASE      Right/Left    CHOLECYSTECTOMY-LAPAROSCOPIC W/ CHOLANGIOGRAM N/A 7/13/2017    Performed by " Luis Carlos Jarvis MD at [facility] OR    EYE SURGERY      cataract extraction    HYSTERECTOMY      Partial    INSERTION, CARDIAC PACEMAKER, DUAL CHAMBER N/A 7/5/2018    Performed by Giancarlo Houser MD at [facility] CATH LAB    JOINT REPLACEMENT  2009    Left hip    POLYPECTOMY      x9    Replacement-valve-aortic N/A 7/5/2018    Performed by Corey Castañeda MD at [facility] CATH LAB    TEMPOROMANDIBULAR JOINT SURGERY      ULTRASOUND-ENDOSCOPIC-UPPER N/A 4/25/2018    Performed by Socrates Andrew MD at [facility] ENDO (2ND FLR)     Review of patient's allergies indicates:   Allergen Reactions    Doxycycline hyclate Diarrhea and Nausea And Vomiting    Singulair [montelukast]      Stomach pain, Muscle pain, Cough      Penicillins      Other reaction(s): Anaphylaxis    Ventolin [albuterol sulfate] Other (See Comments)     Severely elevated blood pressure    Latex, natural rubber Rash         No current facility-administered medications on file prior to encounter.      Current Outpatient Medications on File Prior to Encounter   Medication Sig Dispense Refill    acetaminophen (TYLENOL) 325 MG tablet Take 2 tablets (650 mg total) by mouth every 6 (six) hours as needed for Pain or Temperature greater than (100.4).  0    aspirin (ECOTRIN) 81 MG EC tablet Take 81 mg by mouth once daily.      azelastine (ASTELIN) 137 mcg (0.1 %) nasal spray 1 spray (137 mcg total) by Nasal route 2 (two) times daily. 30 mL 0    carBAMazepine (TEGRETOL) 200 mg tablet Take 1 tablet (200 mg total) by mouth 3 (three) times daily. 270 tablet 1    cetirizine (ZYRTEC) 10 MG tablet Take 1 tablet (10 mg total) by mouth once daily.  0    cloNIDine (CATAPRES) 0.3 MG tablet Take 1 tablet (0.3 mg total) by mouth 3 (three) times daily. 270 tablet 1    clopidogrel (PLAVIX) 75 mg tablet Take 1 tablet (75 mg total) by mouth once daily. 30 tablet 11    fluticasone (FLONASE) 50 mcg/actuation nasal spray TWO SPRAYS IN EACH NOSTRIL ONCE DAILY 16 g 5     fluticasone (FLOVENT HFA) 220 mcg/actuation inhaler Inhale 1 puff into the lungs 2 (two) times daily. Controller 12 g 2    furosemide (LASIX) 40 MG tablet TAKE ONE TABLET BY MOUTH EVERY DAY AS NEEDED FOR SHORTNESS OF BREATH or leg swelling 90 tablet 1    gabapentin (NEURONTIN) 300 MG capsule ONE CAPSULE BY MOUTH THREE TIMES DAILY 270 capsule 0    hydrALAZINE (APRESOLINE) 100 MG tablet ONE TABLET BY MOUTH THREE TIMES DAILY 270 tablet 0    metoprolol tartrate (LOPRESSOR) 100 MG tablet ONE TABLET BY MOUTH TWICE DAILY 180 tablet 1    nitroGLYCERIN (NITROSTAT) 0.4 MG SL tablet Place 0.4 mg under the tongue every 5 (five) minutes as needed for Chest pain.      rosuvastatin (CRESTOR) 20 MG tablet ONE TABLET BY MOUTH EVERY EVENING 90 tablet 1    verapamil (VERELAN) 360 MG C24P ONE CAPSULE BY MOUTH once a day 90 capsule 3    walker (ULTRA-LIGHT ROLLATOR) Misc One rollator, size large. 1 each 0     Social History     Socioeconomic History    Marital status:      Spouse name: Not on file    Number of children: Not on file    Years of education: Not on file    Highest education level: Not on file   Social Needs    Financial resource strain: Not on file    Food insecurity - worry: Not on file    Food insecurity - inability: Not on file    Transportation needs - medical: Not on file    Transportation needs - non-medical: Not on file   Occupational History    Not on file   Tobacco Use    Smoking status: Never Smoker    Smokeless tobacco: Never Used   Substance and Sexual Activity    Alcohol use: No    Drug use: No    Sexual activity: No   Other Topics Concern    Not on file   Social History Narrative    Not on file     Family History   Problem Relation Age of Onset    Heart disease Mother     Hypertension Daughter     Cancer Son     Breast cancer Neg Hx     Colon cancer Neg Hx     Ovarian cancer Neg Hx     Esophageal cancer Neg Hx        ROS: unable to obtain at present, pte. Rather  somnolent    Physical Exam:    Vitals:    10/05/18 1142   BP:    Pulse: 69   Resp: 12   Temp:      I/O last 3 completed shifts:  In: 2720 [P.O.:240; I.V.:2230; IV Piggyback:250]  Out: 202 [Urine:202]  I/O this shift:  In: 93.8 [I.V.:93.8]  Out: 125 [Urine:125]      Constitutional: She appears well-developed, NAD  HENT: n/a  Neck: supple.   Cardiovascular: Normal rate, regular rhythm and normal heart sounds.   Pulmonary: Effort normal and breath sounds normal. Abdominal: Soft.   Neurological: rather somnolent, nods to questions    Laboratories:    Recent Labs   Lab  10/05/18   0242   WBC  4.18   RBC  2.67*   HGB  7.6*   HCT  23.7*   PLT  206   MCV  89   MCH  28.5   MCHC  32.1     Recent Labs   Lab  10/05/18   0242   CALCIUM  8.0*   NA  139   K  3.6   CO2  25   CL  106   BUN  54*   CREATININE  2.1*     Microbiology Results (last 7 days)     Procedure Component Value Units Date/Time    Blood culture #2 **CANNOT BE ORDERED STAT** [[id number]] Collected:  09/30/18 1113    Order Status:  Completed Specimen:  Blood from Peripheral, Forearm, Right Updated:  10/04/18 1303     Blood Culture, Routine No Growth to date     Blood Culture, Routine No Growth to date     Blood Culture, Routine No Growth to date     Blood Culture, Routine No Growth to date     Blood Culture, Routine No Growth to date    Blood Culture #1 **CANNOT BE ORDERED STAT** [[id number]] Collected:  09/30/18 1101    Order Status:  Completed Specimen:  Blood from Peripheral, Hand, Left Updated:  10/04/18 1303     Blood Culture, Routine No Growth to date     Blood Culture, Routine No Growth to date     Blood Culture, Routine No Growth to date     Blood Culture, Routine No Growth to date     Blood Culture, Routine No Growth to date    Culture, Respiratory with Gram Stain [[id number]] Collected:  10/01/18 1521    Order Status:  Completed Specimen:  Respiratory from Endotracheal Aspirate Updated:  10/03/18 0802     Respiratory Culture Normal respiratory kim     Gram  Stain (Respiratory) <10 epithelial cells per low power field.     Gram Stain (Respiratory) Moderate WBC's     Gram Stain (Respiratory) Few Gram positive cocci in pairs     Gram Stain (Respiratory) Rare Gram negative diplococci        Diagnostic Tests:  X-Ray Chest AP Portable [[id number]] Resulted: 10/01/18 1115   Order Status: Completed      FINDINGS:  Endotracheal tube is repositioned with tip about 6 cm above the jean marie.  Right central venous catheter and gastric tube remain in place.  Pacemaker is present on the left.  Heart remains mildly enlarged.  Aortic atherosclerosis and aortic valve stent are noted in the mediastinum.  The lungs are better expanded and aerated.  Most of the airspace opacification seen in both lungs on prior exam has cleared with some still present at the bases.  Small pleural effusions are present.  No pneumothorax.   Impression:       Endotracheal tube repositioned with tip above jean marie    Improving pulmonary edema.      Electronically signed by: Doug Rojas MD  Date: 10/01/2018  Time: 11:15     Assessment:    81 y/o female with Hx. AVR s/p TAVR and Pacer in July admitted with:    - PEA arrest  - Oliguric EUGENE et? Likely multifactorial due to arrest, IV contrast, overdiuresis etc.  - Macroscopic hematuria likely Irwin trauma  - Worsening anemia  - Hx. CAD with mild troponin increase  - Hx. HTN    Plan:    - Consider blood transfusion (will defer decision to Hospitalist)  - Renal US  - Irrigate Irwin  - IV n.s. At 75cc/h  - NO IV contrast for now  - f/u BMP and CBC    Family updated regarding above assessment and plan.

## 2018-10-05 NOTE — ASSESSMENT & PLAN NOTE
Chest CTA r/o PE, likely due to decompensated CHF and possible underlying infection  Pulmonology consulted for vent management  Cardiology consulted - ECHo :  · Left ventricle ejection fraction is mildly decreased at 45%  · Left ventricle shows concentric remodeling.  · RV systolic function is normal.  · Left atrium is mildly dilated.  · Right atrium is moderately dilated.  · Tricuspid valve shows mild regurgitation.  · Grade II (moderate) left ventricular diastolic dysfunction consistent with pseudonormalization.  · LA pressure is elevated.   Cultures - negative, sent for resp cultures today   Appreciate Pulmonary input.  Extubated on 10/2.  Initially slow to respond, but improving.  Advance diet.

## 2018-10-05 NOTE — SUBJECTIVE & OBJECTIVE
Review of Systems   Gastrointestinal: Negative for melena.   Genitourinary: Positive for hematuria.     Objective:     Vital Signs (Most Recent):  Temp: 98.4 °F (36.9 °C) (10/05/18 0715)  Pulse: 69 (10/05/18 0730)  Resp: 20 (10/05/18 0730)  BP: 124/60 (10/05/18 0730)  SpO2: 100 % (10/05/18 0730) Vital Signs (24h Range):  Temp:  [98 °F (36.7 °C)-98.6 °F (37 °C)] 98.4 °F (36.9 °C)  Pulse:  [69] 69  Resp:  [16-27] 20  SpO2:  [98 %-100 %] 100 %  BP: ()/(50-79) 124/60     Weight: 92.9 kg (204 lb 12.9 oz)(taken by RN on 10/1)  Body mass index is 34.08 kg/m².     SpO2: 100 %  O2 Device (Oxygen Therapy): nasal cannula      Intake/Output Summary (Last 24 hours) at 10/5/2018 1103  Last data filed at 10/5/2018 0600  Gross per 24 hour   Intake 1977.5 ml   Output 52 ml   Net 1925.5 ml       Lines/Drains/Airways     Central Venous Catheter Line                 Percutaneous Central Line Insertion/Assessment - triple lumen  09/30/18 1050 right internal jugular 5 days          Drain                 Urethral Catheter 09/30/18 1025 16 Fr. 5 days                Physical Exam   Constitutional: She is oriented to person, place, and time. She appears well-developed and well-nourished. No distress.   HENT:   Head: Normocephalic and atraumatic.   Mouth/Throat: No oropharyngeal exudate.   Eyes: Conjunctivae and EOM are normal. Pupils are equal, round, and reactive to light. No scleral icterus.   Neck: Normal range of motion. Neck supple. No JVD present. No tracheal deviation present. No thyromegaly present.   Cardiovascular: Normal rate, regular rhythm, S1 normal and S2 normal. Exam reveals no gallop and no friction rub.   No murmur heard.  Pulmonary/Chest: Effort normal and breath sounds normal. No respiratory distress. She has no wheezes. She has no rales. She exhibits no tenderness.   Abdominal: Soft. She exhibits no distension.   obese   Musculoskeletal: Normal range of motion. She exhibits no edema.   Neurological: She is alert  and oriented to person, place, and time. No cranial nerve deficit.   Skin: Skin is warm and dry. She is not diaphoretic.   Psychiatric: She has a normal mood and affect. Her behavior is normal. Judgment normal.       Current Medications:   aspirin  81 mg Oral Daily    azelastine  1 spray Nasal BID    carBAMazepine  200 mg Oral TID    cetirizine  10 mg Oral Daily    cloNIDine  0.2 mg Oral TID    clopidogrel  75 mg Per OG tube Daily    enoxaparin  30 mg Subcutaneous Daily    fluticasone  1 puff Inhalation BID    gabapentin  300 mg Oral TID    hydrALAZINE  50 mg Oral Q8H    metoprolol tartrate  100 mg Oral BID    senna-docusate 8.6-50 mg  1 tablet Oral BID      sodium chloride 0.9% 75 mL/hr at 10/05/18 0600     acetaminophen, benzonatate, hydrALAZINE, influenza, nitroGLYCERIN, ondansetron, polyethylene glycol, sodium chloride 0.9%    Laboratory:  CBC:  Recent Labs   Lab  10/02/18   0215  10/03/18   0230  10/04/18   0307  10/04/18   0632  10/05/18   0242   WHITE BLOOD CELL COUNT  8.24  7.36  5.58  5.15  4.18   HEMOGLOBIN  9.5 L  9.3 L  8.5 L  8.3 L  7.6 L   HEMATOCRIT  28.4 L  28.8 L  26.4 L  25.6 L  23.7 L   PLATELETS  236  221  206  207  206       CHEMISTRIES:  Recent Labs   Lab  05/17/18   1410   07/06/18   0301   10/02/18   0215  10/03/18   0230  10/04/18   0307  10/04/18   0633  10/05/18   0242   GLUCOSE  106   < >  107   < >  119 H  127 H  104  95  103   SODIUM  145   < >  141   < >  140  141  140  141  139   POTASSIUM  3.8   < >  3.6   < >  3.3 L  3.0 L  3.6  3.6  3.6   BUN BLD  17   < >  17   < >  25 H  29 H  42 H  46 H  54 H   CREATININE  1.1   < >  0.8   < >  1.3  1.1  1.9 H  2.1 H  2.1 H   EGFR IF   55 A   < >  >60.0   < >  45 A  55 A  28 A  25 A  25 A   EGFR IF NON-  48 A   < >  >60.0   < >  39 A  48 A  25 A  22 A  22 A   CALCIUM  8.7   < >  8.1 L   < >  8.7  9.4  8.7  8.9  8.0 L   MAGNESIUM  2.2   --   1.8   --    --    --    --    --    --     < > = values in  this interval not displayed.       CARDIAC BIOMARKERS:  Recent Labs   Lab  10/01/18   0255  10/01/18   0911  10/03/18   1557  10/03/18   2210  10/04/18   0307   CPK   --    --   155   --    --    CPK MB   --    --   5.5   --    --    TROPONIN I  0.183 H  0.137 H  0.138 H  0.163 H  0.204 H       COAGS:  Recent Labs   Lab  05/17/18   1410  07/02/18   1234  08/20/18   1929 09/30/18   1000  10/04/18   0633   INR  1.0  1.0  1.0  1.0  1.0       LIPIDS/LFTS:  Recent Labs   Lab  04/06/16   1055   06/11/16   0334   01/09/17   1346   11/15/17   1025  01/03/18   1155  05/17/18   1410  08/20/18   1929  08/24/18   1407  09/30/18   1000   CHOLESTEROL  252 H   --   182   --   215 H   --   184   --    --    --    --    --    TRIGLYCERIDES  126   --   69   --   139   --   104   --    --    --    --    --    HDL  50   --   44   --   40   --   49   --    --    --    --    --    LDL CHOLESTEROL  176.8 H   --   124.2   --   147.2   --   114.2   --    --    --    --    --    NON-HDL CHOLESTEROL  202   --   138   --   175   --   135   --    --    --    --    --    AST  13   < >   --    < >  16   < >  11  13  13  12  12  35   ALT  11   < >   --    < >  21   < >  9 L  14  11  11  10  31    < > = values in this interval not displayed.       BNP:  Recent Labs   Lab  06/16/17   1927  07/08/17   0322  01/03/18   1155  05/17/18   1410  09/30/18   1030   BNP  950 H  598 H  390 H  579 H  621 H       TSH:        Free T4:        Diagnostic Results:  CTA Chest 9/30/18  -No evidence of pulmonary arterial embolism.  Pulmonary edema with additional patchy bilateral opacities, right greater than left.  Findings may relate to atelectasis and/or aspiration with superimposed pneumonia also in the differential diagnosis.  -The tip of the endotracheal tube terminates near the right mainstem bronchus.  This can be retracted several cm for more optimal positioning.  Cardiomegaly with small left pleural effusion and other findings as above.  This report was  flagged in Epic as abnormal.    Echo: 9/30/18  · Left ventricle ejection fraction is mildly decreased at 45%  · Left ventricle shows concentric remodeling.  · RV systolic function is normal.  · Left atrium is mildly dilated.  · Right atrium is moderately dilated.  · Tricuspid valve shows mild regurgitation.  · Grade II (moderate) left ventricular diastolic dysfunction consistent with pseudonormalization.  · LA pressure is elevated.  · Aortic valve: There is a bioprosthetic valve present. There is no aortic insufficiency present. CLEMENT is 1.31 cm2; mean gradient is 8.63 mmHg; peak gradient is 15.40 mmHg; AV peak velocity is 1.96 m/s    Echo 8/10/18    1 - Normal left ventricular systolic function (EF 60-65%).     2 - No wall motion abnormalities.     3 - Concentric hypertrophy.     4 - Biatrial enlargement.     5 - Right ventricular enlargement with normal systolic function.     6 - Pulmonary hypertension. The estimated PA systolic pressure is 52 mmHg.     7 - S/P transcatheter AVR, CLEMENT = 2.58 cm2, AVAi = 1.32 cm2/m2, peak velocity = 1.7 m/s, mean gradient = 8 mmHg.     8 - Mild paravalvular aortic regurgitation.     9 - Mild tricuspid regurgitation.     10 - Trivial pericardial effusion.     11 - S/p 29mm Evolut TF TAVR.     TAVR/R ADELSO PTA 7/5/18    Successful RTF 29 Evolut with RCI PTA to enable delivery of the valve delivery system.    Severe, unexplained anemia, worse today.  Tranfused for Hbg 8 pre TAVR.    Cath: 6/1/18  D. Angiographic Results  Diagnostic:        Patient has a left dominant coronary artery.        The coronary vessels have luminal irregularities.        - Left Main Coronary Artery:             The LM has luminal irregularities. There is GERMAIN 3 flow.     - Left Anterior Descending Artery:             The ostial LAD has a 60% stenosis. There is GERMAIN 3 flow.     - Left Circumflex Artery:             The mid LCX has a 60% stenosis. There is GERMAIN 3 flow.     - Right Coronary Artery:             The  ostial RCA has a 99% stenosis. There is GERMAIN 3 flow.             The proximal RCA has a 80% stenosis. There is GERMAIN 3 flow.     - Common Femoral Artery:             The right CFA has luminal irregularities. Access closure with perclose was very difficult due to inability to catch needles with perclose device.  Intervention       Ostial RCA:              The lesion was successfully intervened. Post-stenosis of 0%, post-GERMAIN 3 flow and TMP grade 3. The vessel was accessed natively.  The following items were used: 2.0MM 15MM [place] Balloon and 2.5X12 Mini Vision Stent.       Proximal RCA:              The lesion was successfully intervened. Post-stenosis of 0%, post-GERMAIN 3 flow and TMP grade 3. The vessel was accessed natively.  The following items were used: 2.5MM 12MM [place] Balloon, Stent Integrity 2.5 X 8 and 3.0MM 8MM [place] Balloon.

## 2018-10-05 NOTE — HPI
83yo F admitted after cardiac arrest on 9/30/18. She remains in ICU. Urology is now consulted for gross hematuria. Denies hematuria prior to admission. She has briceno catheter in place. She is on Plavix and ASA.

## 2018-10-05 NOTE — ASSESSMENT & PLAN NOTE
ARF/CRI  Creat up to 2.1 with absent UOP  ?CONCETTA vs overdiuresis  Consider neph/urology consult

## 2018-10-05 NOTE — ASSESSMENT & PLAN NOTE
Mild troponin increase.   S/p RCA PCI prior to TAVR, intermed LAD/LCx stenoses noted.  Persistent rise in Cr this AM - ?over-diuresed vs CONCETTA from CTA.   Will hydrate +/- neph consult and plan for LHC when Cr improved.

## 2018-10-05 NOTE — PROGRESS NOTES
[facility]  Progress Note    Patient Name: Cindy Lyman  MRN: [id number]  Patient Class: IP- Inpatient   Admission Date: 9/30/2018  Length of Stay: 5 days  Attending Physician: Grupo Wharton MD  Primary Care Provider: Rodger Camarena MD        Subjective:     Principal Problem:Cardiac arrest    HPI:  83 yo female with diastolic CHF, COPD, gastric cancer, aortic stenosis with complete heart block and s/p TAVR and pacemaker placed 7/2018 presented in cardiac arrest. Per ED notes, patient was on her way to [place] with others and c/o chest pain. CPR was initiated in parked vehicle outside ED. One round of epinephrine given and got ROSC. Per family, she c/o worsening SOB over several days. On arrival pt was euthermic, hypertensive, elevated lactate and BNP. CXR suggest pulmonary edema though infection cannot be ruled out. Pt intubated and evaluated by pulmonology. Cardiology consulted. ECHO pending. Broad spectrum antibiotics initiated until cultures result and clinical course dictates.     Chest CTA negative for pulmonary emboli.     Per family, Pt is a full code.     Hospital Course:  Pt admitted after PEA arrest and acute respiratory failure/acidosis. Acidosis corrected. Pulmonology assisting with vent management/weaning. Trial of precedex today but patient did not tolerate. Switched back to propofol. CPAP this am and now back on PVRC. Pacemaker interrogated and appears to be functioning fine. Diuresed well on lasix without change in renal function. Possible plan for LHC per cardiology. Extubated on 10/2. Initially slow to respond, but mental status improving.  Planned LHC on 10/4 held secondary to rise in Creat.  Lasix stopped and getting IVF hydration.  Episodes of slight hypotension and BP medications adjusted.  Worsening urine output.  Urine also bloody.    Interval History: Poor urine output overnight.  No complaints.    Review of Systems   HENT: Negative for ear  discharge and ear pain.    Eyes: Negative for pain and itching.   Cardiovascular: Negative for chest pain and palpitations.   Neurological: Negative for seizures and syncope.     Objective:     Vital Signs (Most Recent):  Temp: 98.8 °F (37.1 °C) (10/05/18 1115)  Pulse: 69 (10/05/18 1230)  Resp: 17 (10/05/18 1230)  BP: (!) 119/56 (10/05/18 1230)  SpO2: 100 % (10/05/18 1230) Vital Signs (24h Range):  Temp:  [98 °F (36.7 °C)-98.8 °F (37.1 °C)] 98.8 °F (37.1 °C)  Pulse:  [69] 69  Resp:  [11-27] 17  SpO2:  [98 %-100 %] 100 %  BP: ()/(50-79) 119/56     Weight: 92.9 kg (204 lb 12.9 oz)(taken by RN on 10/1)  Body mass index is 34.08 kg/m².    Intake/Output Summary (Last 24 hours) at 10/5/2018 1314  Last data filed at 10/5/2018 1100  Gross per 24 hour   Intake 1771.25 ml   Output 177 ml   Net 1594.25 ml      Physical Exam   Constitutional: She appears well-developed and well-nourished.   HENT:   Head: Normocephalic and atraumatic.   Eyes: Conjunctivae are normal. Pupils are equal, round, and reactive to light.   Neck: Neck supple.   Cardiovascular: Normal rate and regular rhythm.   Pulmonary/Chest: Effort normal. She has no wheezes.   Scattered rhonchi    Abdominal: Soft. She exhibits no distension.   Genitourinary:   Genitourinary Comments: Bloody urine in briceno bag   Musculoskeletal: She exhibits no edema or deformity.   Neurological:   Awake, slow to respond, but answering questions and following simple commands.   Skin: Skin is warm and dry.       Significant Labs:   BMP:   Recent Labs   Lab  10/05/18   0242   GLU  103   NA  139   K  3.6   CL  106   CO2  25   BUN  54*   CREATININE  2.1*   CALCIUM  8.0*     CBC:   Recent Labs   Lab  10/04/18   0307  10/04/18   0632  10/05/18   0242   WBC  5.58  5.15  4.18   HGB  8.5*  8.3*  7.6*   HCT  26.4*  25.6*  23.7*   PLT  206  207  206         Assessment/Plan:      * Cardiac arrest    Etiology still unclear, but PEA reported by first repsonders, quick ROSC with one round of  epinephrine  Pt intubated on sedation and will have wean sedation for appropriate neurological exam, consult neurology if indicated   ECHO  -  +DD  Pacemaker interrogated             Acute hypoxemic respiratory failure    Chest CTA r/o PE, likely due to decompensated CHF and possible underlying infection  Pulmonology consulted for vent management  Cardiology consulted - [facility] :  · Left ventricle ejection fraction is mildly decreased at 45%  · Left ventricle shows concentric remodeling.  · RV systolic function is normal.  · Left atrium is mildly dilated.  · Right atrium is moderately dilated.  · Tricuspid valve shows mild regurgitation.  · Grade II (moderate) left ventricular diastolic dysfunction consistent with pseudonormalization.  · LA pressure is elevated.   Cultures - negative, sent for resp cultures today   Appreciate Pulmonary input.  Extubated on 10/2.  Initially slow to respond, but improving.  Advance diet.                Pacemaker    See above           Acute pulmonary edema    Pt given IV lasix with large amount of diuresis  CXr improving   Holding Lasix secondary to rise in Creat.            S/P TAVR (transcatheter aortic valve replacement)              Stage 3 chronic kidney disease    No indication for vasopressor or aggressive IVF  Monitor closely on IV lasix   Avoid nephrotoxins   ARF--holding Lasix and giving IVF hydration.  Creat stable, but urine output poor.  Nephrology consult.  Hematuria--hold Lovenox.  Consult Urology.          Essential hypertension    BP uncontrolled  Patient on home Clonidine that was stopped during this admit.  Possible rebound HTN.  Restarted home Clonidine.  Slight hypotension overnight.  Adjusting medications and placing parameters.        CAD s/p PCI    Aspirin, plavix and statin resumed   Cardiology following  Elevated troponin after cardiopulmonary arrest  [facility] [place].        Anemia of chronic disease    Monitor  Reviewed bone marrow biopsy 2017 - no malignancy    H/H slightly lower.  May need blood transfusion if lower tomorrow.        Hyperlipidemia    Resume statin             VTE Risk Mitigation (From admission, onward)        Ordered     Place sequential compression device  Until discontinued      10/05/18 1249     Place LAUREANO hose  Until discontinued      10/05/18 1249     IP VTE HIGH RISK PATIENT  Once      09/30/18 1359          Critical care time spent on the evaluation and treatment of severe organ dysfunction, review of pertinent labs and imaging studies, discussions with consulting providers and discussions with patient/family: 45 minutes.    Grupo Wharton MD  Department of Hospital Medicine   [facility]

## 2018-10-05 NOTE — ASSESSMENT & PLAN NOTE
Monitor  Reviewed bone marrow biopsy 2017 - no malignancy   H/H slightly lower.  May need blood transfusion if lower tomorrow.

## 2018-10-05 NOTE — SUBJECTIVE & OBJECTIVE
Interval History: Poor urine output overnight.  No complaints.    Review of Systems   HENT: Negative for ear discharge and ear pain.    Eyes: Negative for pain and itching.   Cardiovascular: Negative for chest pain and palpitations.   Neurological: Negative for seizures and syncope.     Objective:     Vital Signs (Most Recent):  Temp: 98.8 °F (37.1 °C) (10/05/18 1115)  Pulse: 69 (10/05/18 1230)  Resp: 17 (10/05/18 1230)  BP: (!) 119/56 (10/05/18 1230)  SpO2: 100 % (10/05/18 1230) Vital Signs (24h Range):  Temp:  [98 °F (36.7 °C)-98.8 °F (37.1 °C)] 98.8 °F (37.1 °C)  Pulse:  [69] 69  Resp:  [11-27] 17  SpO2:  [98 %-100 %] 100 %  BP: ()/(50-79) 119/56     Weight: 92.9 kg (204 lb 12.9 oz)(taken by RN on 10/1)  Body mass index is 34.08 kg/m².    Intake/Output Summary (Last 24 hours) at 10/5/2018 1314  Last data filed at 10/5/2018 1100  Gross per 24 hour   Intake 1771.25 ml   Output 177 ml   Net 1594.25 ml      Physical Exam   Constitutional: She appears well-developed and well-nourished.   HENT:   Head: Normocephalic and atraumatic.   Eyes: Conjunctivae are normal. Pupils are equal, round, and reactive to light.   Neck: Neck supple.   Cardiovascular: Normal rate and regular rhythm.   Pulmonary/Chest: Effort normal. She has no wheezes.   Scattered rhonchi    Abdominal: Soft. She exhibits no distension.   Genitourinary:   Genitourinary Comments: Bloody urine in briceno bag   Musculoskeletal: She exhibits no edema or deformity.   Neurological:   Awake, slow to respond, but answering questions and following simple commands.   Skin: Skin is warm and dry.       Significant Labs:   BMP:   Recent Labs   Lab  10/05/18   0242   GLU  103   NA  139   K  3.6   CL  106   CO2  25   BUN  54*   CREATININE  2.1*   CALCIUM  8.0*     CBC:   Recent Labs   Lab  10/04/18   0307  10/04/18   0632  10/05/18   0242   WBC  5.58  5.15  4.18   HGB  8.5*  8.3*  7.6*   HCT  26.4*  25.6*  23.7*   PLT  206  207  206

## 2018-10-05 NOTE — SUBJECTIVE & OBJECTIVE
Past Medical History:   Diagnosis Date    Anemia     Anticoagulant long-term use     Aortic stenosis     Arthritis     lower back    Asthma     CAD (coronary artery disease) 4/24/2015    Carpal tunnel syndrome on both sides     Cataract     CHF (congestive heart failure) 5/2013    COPD (chronic obstructive pulmonary disease)     Depression     DVT (deep venous thrombosis)     Hyperlipidemia     Hypertension     Pulmonary HTN     TMJ (temporomandibular joint disorder)        Past Surgical History:   Procedure Laterality Date    A-V CARDIAC PACEMAKER INSERTION N/A 7/5/2018    Procedure: INSERTION, CARDIAC PACEMAKER, DUAL CHAMBER;  Surgeon: Giancarlo Houser MD;  Location: [facility] CATH LAB;  Service: Cardiology;  Laterality: N/A;    AORTIC VALVE REPLACEMENT N/A 7/5/2018    Procedure: Replacement-valve-aortic;  Surgeon: Corey Castañeda MD;  Location: [facility] CATH LAB;  Service: Cardiology;  Laterality: N/A;    BACK SURGERY      BIOPSY-BONE MARROW N/A 5/26/2016    Performed by Rufino Ovalle MD at [facility] ENDO    cardiac stents      CARDIAC VALVE SURGERY      CARPAL TUNNEL RELEASE      Right/Left    CHOLECYSTECTOMY-LAPAROSCOPIC W/ CHOLANGIOGRAM N/A 7/13/2017    Performed by Luis Carlos Jarvis MD at [facility] OR    EYE SURGERY      cataract extraction    HYSTERECTOMY      Partial    INSERTION, CARDIAC PACEMAKER, DUAL CHAMBER N/A 7/5/2018    Performed by Giancarlo Houser MD at [facility] CATH LAB    JOINT REPLACEMENT  2009    Left hip    POLYPECTOMY      x9    Replacement-valve-aortic N/A 7/5/2018    Performed by Corey Castañeda MD at [facility] CATH LAB    TEMPOROMANDIBULAR JOINT SURGERY      ULTRASOUND-ENDOSCOPIC-UPPER N/A 4/25/2018    Performed by Socrates Andrew MD at [facility] ENDO (2ND FLR)       Review of patient's allergies indicates:   Allergen Reactions    Doxycycline hyclate Diarrhea and Nausea And Vomiting    Singulair [montelukast]      Stomach pain, Muscle pain, Cough      Penicillins       Other reaction(s): Anaphylaxis    Ventolin [albuterol sulfate] Other (See Comments)     Severely elevated blood pressure    Latex, natural rubber Rash       Family History     Problem Relation (Age of Onset)    Cancer Son    Heart disease Mother    Hypertension Daughter          Tobacco Use    Smoking status: Never Smoker    Smokeless tobacco: Never Used   Substance and Sexual Activity    Alcohol use: No    Drug use: No    Sexual activity: No       Review of Systems   Constitutional: Negative for appetite change, chills and fever.   HENT: Negative for congestion, sore throat and trouble swallowing.    Eyes: Negative for pain and itching.   Respiratory: Negative for cough and shortness of breath.    Cardiovascular: Negative for chest pain, palpitations and leg swelling.   Gastrointestinal: Negative for abdominal distention, abdominal pain, constipation, diarrhea, nausea and vomiting.   Genitourinary: Positive for hematuria.   Musculoskeletal: Negative for back pain, neck pain and neck stiffness.   Skin: Negative for rash and wound.   Neurological: Negative for dizziness and seizures.   Hematological: Negative for adenopathy. Does not bruise/bleed easily.   Psychiatric/Behavioral: Negative for confusion. The patient is not nervous/anxious.    All other systems reviewed and are negative.      Objective:     Temp:  [98 °F (36.7 °C)-98.8 °F (37.1 °C)] 98.8 °F (37.1 °C)  Pulse:  [69] 69  Resp:  [11-27] 17  SpO2:  [98 %-100 %] 100 %  BP: ()/(50-79) 119/56     Body mass index is 34.08 kg/m².    Date 10/05/18 0700 - 10/06/18 0659   Shift 5204-1569 1018-1232 2478-0070 24 Hour Total   INTAKE   I.V.(mL/kg) 93.8(1)   93.8(1)   Shift Total(mL/kg) 93.8(1)   93.8(1)   OUTPUT   Urine(mL/kg/hr) 125   125   Shift Total(mL/kg) 125(1.3)   125(1.3)   Weight (kg) 92.9 92.9 92.9 92.9          Drains     Drain                 Urethral Catheter 09/30/18 1025 16 Fr. 5 days                Physical Exam   Vitals  reviewed.  Constitutional: She is oriented to person, place, and time. She appears well-developed and well-nourished. No distress.   HENT:   Head: Normocephalic and atraumatic.   Neck: Normal range of motion.   Cardiovascular: Normal rate and regular rhythm.    Pulmonary/Chest: Effort normal and breath sounds normal. No respiratory distress.   Abdominal: Soft. She exhibits no distension and no mass. There is no tenderness. There is no rebound and no guarding.   Genitourinary:   Genitourinary Comments: Irwin - light red urine, thin, no clots   Musculoskeletal: Normal range of motion.   Neurological: She is alert and oriented to person, place, and time.   Skin: Skin is warm and dry. No rash noted. She is not diaphoretic. No erythema.     Psychiatric: She has a normal mood and affect. Her behavior is normal.       Significant Labs:    BMP:  Recent Labs   Lab  10/04/18   0307  10/04/18   0633  10/05/18   0242   NA  140  141  139   K  3.6  3.6  3.6   CL  102  103  106   CO2  28  29  25   BUN  42*  46*  54*   CREATININE  1.9*  2.1*  2.1*   CALCIUM  8.7  8.9  8.0*       CBC:  Recent Labs   Lab  10/04/18   0307  10/04/18   0632  10/05/18   0242   WBC  5.58  5.15  4.18   HGB  8.5*  8.3*  7.6*   HCT  26.4*  25.6*  23.7*   PLT  206  207  206       Blood Culture:   Recent Labs   Lab  09/30/18   1101  09/30/18   1113   LABBLOO  No growth after 5 days.  No growth after 5 days.     Urine Culture: No results for input(s): LABURIN in the last 168 hours.  Urine Studies:   Recent Labs   Lab  09/30/18   1037  10/05/18   1150   COLORU  Yellow  Red*   APPEARANCEUA  Cloudy*  Cloudy*   PHUR  6.0  6.0   SPECGRAV  1.010  1.015   PROTEINUA  2+*  2+*   GLUCUA  3+*  Negative   KETONESU  Negative  Negative   BILIRUBINUA  Negative  Negative   OCCULTUA  2+*  2+*   NITRITE  Negative  Negative   UROBILINOGEN  Negative  Negative   LEUKOCYTESUR  Negative  Negative   RBCUA  >100*  >100*   WBCUA  0  0   BACTERIA  None  None   SQUAMEPITHEL  10   --     HYALINECASTS  0  0       Significant Imaging:  All pertinent imaging results/findings from the past 24 hours have been reviewed.

## 2018-10-05 NOTE — CONSULTS
[facility]  Urology  Consult Note    Patient Name: Cindy Lyman  MRN: [id number]  Admission Date: 9/30/2018  Hospital Length of Stay: 5   Code Status: Full Code   Attending Provider: Grupo Wharton MD   Consulting Provider: Fernanda Bland MD  Primary Care Physician: Rodger Camarena MD  Principal Problem:Cardiac arrest    Inpatient consult to Urology  Consult performed by: Fernanda Bland MD  Consult ordered by: Grupo Wharton MD  Reason for consult: Gross hematuria  Assessment/Recommendations:  - UCx   - Likely related to briceno/anticoagulation   - Cystoscopy at outpatient          Subjective:     HPI:  81yo F admitted after cardiac arrest on 9/30/18. She remains in ICU. Urology is now consulted for gross hematuria. Denies hematuria prior to admission. She has briceno catheter in place. She is on Plavix and ASA.     Past Medical History:   Diagnosis Date    Anemia     Anticoagulant long-term use     Aortic stenosis     Arthritis     lower back    Asthma     CAD (coronary artery disease) 4/24/2015    Carpal tunnel syndrome on both sides     Cataract     CHF (congestive heart failure) 5/2013    COPD (chronic obstructive pulmonary disease)     Depression     DVT (deep venous thrombosis)     Hyperlipidemia     Hypertension     Pulmonary HTN     TMJ (temporomandibular joint disorder)        Past Surgical History:   Procedure Laterality Date    A-V CARDIAC PACEMAKER INSERTION N/A 7/5/2018    Procedure: INSERTION, CARDIAC PACEMAKER, DUAL CHAMBER;  Surgeon: Giancarlo Houser MD;  Location: [facility] CATH LAB;  Service: Cardiology;  Laterality: N/A;    AORTIC VALVE REPLACEMENT N/A 7/5/2018    Procedure: Replacement-valve-aortic;  Surgeon: Corey Castañeda MD;  Location: [facility] CATH LAB;  Service: Cardiology;  Laterality: N/A;    BACK SURGERY      BIOPSY-BONE MARROW N/A 5/26/2016    Performed by Ruf[facility]le [facility] at [facility] ENDO    cardiac stents      CARDIAC VALVE  SURGERY      CARPAL TUNNEL RELEASE      Right/Left    CHOLECYSTECTOMY-LAPAROSCOPIC W/ CHOLANGIOGRAM N/A 7/13/2017    Performed by [facility] at [facility] OR    EYE SURGERY      cataract extraction    HYSTERECTOMY      Partial    INSERTION, CARDIAC PACEMAKER, DUAL CHAMBER N/A 7/5/2018    Performed [facility]er [facility] at [facility] C[facility] LAB    JOINT REPLACEMENT  2009    Left hip    POLYPECTOMY      x9    Replacement-valve-aortic N/A 7/5/2018    Performed by [facility]y Castañeda MD at [facility] CATH LAB    TEMPOROMANDIBULAR JOINT SURGERY      ULTRASOUND-ENDOSCOPIC-UPPER N/A 4/25/2018    Performed by Socrates Andrew MD at [facility] ENDO (2ND FLR)       Review of patient's allergies indicates:   Allergen Reactions    Doxycycline hyclate Diarrhea and Nausea And Vomiting    Singulair [montelukast]      Stomach pain, Muscle pain, Cough      Penicillins      Other reaction(s): Anaphylaxis    Ventolin [albuterol sulfate] Other (See Comments)     Severely elevated blood pressure    Latex, natural rubber Rash       Family History     Problem Relation (Age of Onset)    Cancer Son    Heart disease Mother    Hypertension Daughter          Tobacco Use    Smoking status: Never Smoker    Smokeless tobacco: Never Used   Substance and Sexual Activity    Alcohol use: No    Drug use: No    Sexual activity: No       Review of Systems   Constitutional: Negative for appetite change, chills and fever.   HENT: Negative for congestion, sore throat and trouble swallowing.    Eyes: Negative for pain and itching.   Respiratory: Negative for cough and shortness of breath.    Cardiovascular: Negative for chest pain, palpitations and leg swelling.   Gastrointestinal: Negative for abdominal distention, abdominal pain, constipation, diarrhea, nausea and vomiting.   Genitourinary: Positive for hematuria.   Musculoskeletal: Negative for back pain, neck pain and neck stiffness.   Skin: Negative for rash and wound.   Neurological: Negative  for dizziness and seizures.   Hematological: Negative for adenopathy. Does not bruise/bleed easily.   Psychiatric/Behavioral: Negative for confusion. The patient is not nervous/anxious.    All other systems reviewed and are negative.      Objective:     Temp:  [98 °F (36.7 °C)-98.8 °F (37.1 °C)] 98.8 °F (37.1 °C)  Pulse:  [69] 69  Resp:  [11-27] 17  SpO2:  [98 %-100 %] 100 %  BP: ()/(50-79) 119/56     Body mass index is 34.08 kg/m².    Date 10/05/18 0700 - 10/06/18 0659   Shift 1088-3475 9295-3446 8916-9648 24 Hour Total   INTAKE   I.V.(mL/kg) 93.8(1)   93.8(1)   Shift Total(mL/kg) 93.8(1)   93.8(1)   OUTPUT   Urine(mL/kg/hr) 125   125   Shift Total(mL/kg) 125(1.3)   125(1.3)   Weight (kg) 92.9 92.9 92.9 92.9          Drains     Drain                 Urethral Catheter 09/30/18 1025 16 Fr. 5 days                Physical Exam   Vitals reviewed.  Constitutional: She is oriented to person, place, and time. She appears well-developed and well-nourished. No distress.   HENT:   Head: Normocephalic and atraumatic.   Neck: Normal range of motion.   Cardiovascular: Normal rate and regular rhythm.    Pulmonary/Chest: Effort normal and breath sounds normal. No respiratory distress.   Abdominal: Soft. She exhibits no distension and no mass. There is no tenderness. There is no rebound and no guarding.   Genitourinary:   Genitourinary Comments: Irwin - light red urine, thin, no clots   Musculoskeletal: Normal range of motion.   Neurological: She is alert and oriented to person, place, and time.   Skin: Skin is warm and dry. No rash noted. She is not diaphoretic. No erythema.     Psychiatric: She has a normal mood and affect. Her behavior is normal.       Significant Labs:    BMP:  Recent Labs   Lab  10/04/18   0307  10/04/18   0633  10/05/18   0242   NA  140  141  139   K  3.6  3.6  3.6   CL  102  103  106   CO2  28  29  25   BUN  42*  46*  54*   CREATININE  1.9*  2.1*  2.1*   CALCIUM  8.7  8.9  8.0*       CBC:  Recent Labs    Lab  10/04/18   0307  10/04/18   0632  10/05/18   0242   WBC  5.58  5.15  4.18   HGB  8.5*  8.3*  7.6*   HCT  26.4*  25.6*  23.7*   PLT  206  207  206       Blood Culture:   Recent Labs   Lab  09/30/18   1101  09/30/18   1113   LABBLOO  No growth after 5 days.  No growth after 5 days.     Urine Culture: No results for input(s): LABURIN in the last 168 hours.  Urine Studies:   Recent Labs   Lab  09/30/18   1037  10/05/18   1150   COLORU  Yellow  Red*   APPEARANCEUA  Cloudy*  Cloudy*   PHUR  6.0  6.0   SPECGRAV  1.010  1.015   PROTEINUA  2+*  2+*   GLUCUA  3+*  Negative   KETONESU  Negative  Negative   BILIRUBINUA  Negative  Negative   OCCULTUA  2+*  2+*   NITRITE  Negative  Negative   UROBILINOGEN  Negative  Negative   LEUKOCYTESUR  Negative  Negative   RBCUA  >100*  >100*   WBCUA  0  0   BACTERIA  None  None   SQUAMEPITHEL  10   --    HYALINECASTS  0  0       Significant Imaging:  All pertinent imaging results/findings from the past 24 hours have been reviewed.          Assessment and Plan:     Hematuria, gross     - Suspect related to briceno and anticoagulation   - Irrigate catheter PRN   - If clots develop, upsize catheter (22Fr). No need to do so now, draining well.    - Cystoscopy as outpatient.   - Recommend upper tract imaging (CT urogram) if Cr recovers - no need to do this urgently at this time, can do as outpatient if Cr normalizes.   - Send urine culture            VTE Risk Mitigation (From admission, onward)        Ordered     Place sequential compression device  Until discontinued      10/05/18 1249     Place LAUREANO hose  Until discontinued      10/05/18 1249     IP VTE HIGH RISK PATIENT  Once      09/30/18 1359          Thank you for your consult. I will follow-up with patient. Please contact us if you have any additional questions.    Fernanda Bland MD  Urology  [facility]

## 2018-10-05 NOTE — PROGRESS NOTES
[facility]  Cardiology  Progress Note    Patient Name: Cindy Lyman  MRN: [id number]  Admission Date: 9/30/2018  Hospital Length of Stay: 5 days  Code Status: Full Code   Attending Physician: Grupo Wharton MD   Primary Care Physician: Rodger Camarena MD  Expected Discharge Date:   Principal Problem:Cardiac arrest    Subjective:     Interval Hx: pt seen in ICU, case d/w Dr. Carmen.  Holding off cath until renal fxn improves.  Pt denies cp/sob.  No UOP per RN.    Tele: AV pacing (pers rev)        Review of Systems   Gastrointestinal: Negative for melena.   Genitourinary: Positive for hematuria.     Objective:     Vital Signs (Most Recent):  Temp: 98.4 °F (36.9 °C) (10/05/18 0715)  Pulse: 69 (10/05/18 0730)  Resp: 20 (10/05/18 0730)  BP: 124/60 (10/05/18 0730)  SpO2: 100 % (10/05/18 0730) Vital Signs (24h Range):  Temp:  [98 °F (36.7 °C)-98.6 °F (37 °C)] 98.4 °F (36.9 °C)  Pulse:  [69] 69  Resp:  [16-27] 20  SpO2:  [98 %-100 %] 100 %  BP: ()/(50-79) 124/60     Weight: 92.9 kg (204 lb 12.9 oz)(taken by RN on 10/1)  Body mass index is 34.08 kg/m².     SpO2: 100 %  O2 Device (Oxygen Therapy): nasal cannula      Intake/Output Summary (Last 24 hours) at 10/5/2018 1103  Last data filed at 10/5/2018 0600  Gross per 24 hour   Intake 1977.5 ml   Output 52 ml   Net 1925.5 ml       Lines/Drains/Airways     Central Venous Catheter Line                 Percutaneous Central Line Insertion/Assessment - triple lumen  09/30/18 1050 right internal jugular 5 days          Drain                 Urethral Catheter 09/30/18 1025 16 Fr. 5 days                Physical Exam   Constitutional: She is oriented to person, place, and time. She appears well-developed and well-nourished. No distress.   HENT:   Head: Normocephalic and atraumatic.   Mouth/Throat: No oropharyngeal exudate.   Eyes: Conjunctivae and EOM are normal. Pupils are equal, round, and reactive to light. No scleral icterus.   Neck: Normal range  of motion. Neck supple. No JVD present. No tracheal deviation present. No thyromegaly present.   Cardiovascular: Normal rate, regular rhythm, S1 normal and S2 normal. Exam reveals no gallop and no friction rub.   No murmur heard.  Pulmonary/Chest: Effort normal and breath sounds normal. No respiratory distress. She has no wheezes. She has no rales. She exhibits no tenderness.   Abdominal: Soft. She exhibits no distension.   obese   Musculoskeletal: Normal range of motion. She exhibits no edema.   Neurological: She is alert and oriented to person, place, and time. No cranial nerve deficit.   Skin: Skin is warm and dry. She is not diaphoretic.   Psychiatric: She has a normal mood and affect. Her behavior is normal. Judgment normal.       Current Medications:   aspirin  81 mg Oral Daily    azelastine  1 spray Nasal BID    carBAMazepine  200 mg Oral TID    cetirizine  10 mg Oral Daily    cloNIDine  0.2 mg Oral TID    clopidogrel  75 mg Per OG tube Daily    enoxaparin  30 mg Subcutaneous Daily    fluticasone  1 puff Inhalation BID    gabapentin  300 mg Oral TID    hydrALAZINE  50 mg Oral Q8H    metoprolol tartrate  100 mg Oral BID    senna-docusate 8.6-50 mg  1 tablet Oral BID      sodium chloride 0.9% 75 mL/hr at 10/05/18 0600     acetaminophen, benzonatate, hydrALAZINE, influenza, nitroGLYCERIN, ondansetron, polyethylene glycol, sodium chloride 0.9%    Laboratory:  CBC:  Recent Labs   Lab  10/02/18   0215  10/03/18   0230  10/04/18   0307  10/04/18   0632  10/05/18   0242   WHITE BLOOD CELL COUNT  8.24  7.36  5.58  5.15  4.18   HEMOGLOBIN  9.5 L  9.3 L  8.5 L  8.3 L  7.6 L   HEMATOCRIT  28.4 L  28.8 L  26.4 L  25.6 L  23.7 L   PLATELETS  236  221  206  207  206       CHEMISTRIES:  Recent Labs   Lab  05/17/18   1410   07/06/18   0301   10/02/18   0215  10/03/18   0230  10/04/18   0307  10/04/18   0633  10/05/18   0242   GLUCOSE  106   < >  107   < >  119 H  127 H  104  95  103   SODIUM  145   < >  141   < >   140  141  140  141  139   POTASSIUM  3.8   < >  3.6   < >  3.3 L  3.0 L  3.6  3.6  3.6   BUN BLD  17   < >  17   < >  25 H  29 H  42 H  46 H  54 H   CREATININE  1.1   < >  0.8   < >  1.3  1.1  1.9 H  2.1 H  2.1 H   EGFR IF   55 A   < >  >60.0   < >  45 A  55 A  28 A  25 A  25 A   EGFR IF NON-  48 A   < >  >60.0   < >  39 A  48 A  25 A  22 A  22 A   CALCIUM  8.7   < >  8.1 L   < >  8.7  9.4  8.7  8.9  8.0 L   MAGNESIUM  2.2   --   1.8   --    --    --    --    --    --     < > = values in this interval not displayed.       CARDIAC BIOMARKERS:  Recent Labs   Lab  10/01/18   0255  10/01/18   0911  10/03/18   1557  10/03/18   2210  10/04/18   0307   CPK   --    --   155   --    --    CPK MB   --    --   5.5   --    --    TROPONIN I  0.183 H  0.137 H  0.138 H  0.163 H  0.204 H       COAGS:  Recent Labs   Lab  05/17/18   1410  07/02/18   1234  08/20/18 1929 09/30/18   1000  10/04/18   0633   INR  1.0  1.0  1.0  1.0  1.0       LIPIDS/LFTS:  Recent Labs   Lab  04/06/16   1055   06/11/16   0334   01/09/17   1346   11/15/17   1025  01/03/18   1155  05/17/18   1410  08/20/18   1929  08/24/18   1407  09/30/18   1000   CHOLESTEROL  252 H   --   182   --   215 H   --   184   --    --    --    --    --    TRIGLYCERIDES  126   --   69   --   139   --   104   --    --    --    --    --    HDL  50   --   44   --   40   --   49   --    --    --    --    --    LDL CHOLESTEROL  176.8 H   --   124.2   --   147.2   --   114.2   --    --    --    --    --    NON-HDL CHOLESTEROL  202   --   138   --   175   --   135   --    --    --    --    --    AST  13   < >   --    < >  16   < >  11  13  13  12  12  35   ALT  11   < >   --    < >  21   < >  9 L  14  11  11  10  31    < > = values in this interval not displayed.       BNP:  Recent Labs   Lab  06/16/17   1927  07/08/17   0322  01/03/18   1155  05/17/18   1410  09/30/18   1030   BNP  950 H  598 H  390 H  579 H  621 H       TSH:        Free T4:         Diagnostic Results:  CTA Chest 9/30/18  -No evidence of pulmonary arterial embolism.  Pulmonary edema with additional patchy bilateral opacities, right greater than left.  Findings may relate to atelectasis and/or aspiration with superimposed pneumonia also in the differential diagnosis.  -The tip of the endotracheal tube terminates near the right mainstem bronchus.  This can be retracted several cm for more optimal positioning.  Cardiomegaly with small left pleural effusion and other findings as above.  This report was flagged in Epic as abnormal.    Echo: 9/30/18  · Left ventricle ejection fraction is mildly decreased at 45%  · Left ventricle shows concentric remodeling.  · RV systolic function is normal.  · Left atrium is mildly dilated.  · Right atrium is moderately dilated.  · Tricuspid valve shows mild regurgitation.  · Grade II (moderate) left ventricular diastolic dysfunction consistent with pseudonormalization.  · LA pressure is elevated.  · Aortic valve: There is a bioprosthetic valve present. There is no aortic insufficiency present. CLEMENT is 1.31 cm2; mean gradient is 8.63 mmHg; peak gradient is 15.40 mmHg; AV peak velocity is 1.96 m/s    Echo 8/10/18    1 - Normal left ventricular systolic function (EF 60-65%).     2 - No wall motion abnormalities.     3 - Concentric hypertrophy.     4 - Biatrial enlargement.     5 - Right ventricular enlargement with normal systolic function.     6 - Pulmonary hypertension. The estimated PA systolic pressure is 52 mmHg.     7 - S/P transcatheter AVR, CLEMENT = 2.58 cm2, AVAi = 1.32 cm2/m2, peak velocity = 1.7 m/s, mean gradient = 8 mmHg.     8 - Mild paravalvular aortic regurgitation.     9 - Mild tricuspid regurgitation.     10 - Trivial pericardial effusion.     11 - S/p 29mm Evolut TF TAVR.     TAVR/R ADELSO PTA 7/5/18    Successful RTF 29 Evolut with RCI PTA to enable delivery of the valve delivery system.    Severe, unexplained anemia, worse today.  Tranfused for Hbg 8  pre TAVR.    Cath: 6/1/18  D. Angiographic Results  Diagnostic:        Patient has a left dominant coronary artery.        The coronary vessels have luminal irregularities.        - Left Main Coronary Artery:             The LM has luminal irregularities. There is GERMAIN 3 flow.     - Left Anterior Descending Artery:             The ostial LAD has a 60% stenosis. There is GERMAIN 3 flow.     - Left Circumflex Artery:             The mid LCX has a 60% stenosis. There is GERMAIN 3 flow.     - Right Coronary Artery:             The ostial RCA has a 99% stenosis. There is GERMAIN 3 flow.             The proximal RCA has a 80% stenosis. There is GERMAIN 3 flow.     - Common Femoral Artery:             The right CFA has luminal irregularities. Access closure with perclose was very difficult due to inability to catch needles with perclose device.  Intervention       Ostial RCA:              The lesion was successfully intervened. Post-stenosis of 0%, post-GERMAIN 3 flow and TMP grade 3. The vessel was accessed natively.  The following items were used: 2.0MM 15MM Theodosia Balloon and 2.5X12 Mini Vision Stent.       Proximal RCA:              The lesion was successfully intervened. Post-stenosis of 0%, post-GERMAIN 3 flow and TMP grade 3. The vessel was accessed natively.  The following items were used: 2.5MM 12MM Theodosia Balloon, Stent Integrity 2.5 X 8 and 3.0MM 8MM Theodosia Balloon.      Assessment and Plan:     * Cardiac arrest    PEA on arrival with severe acidosis.   EKG with .   Normal PPM function with no high rate episodes on interrogation.   Etiology for the arrest is unclear - possible respiratory.   Repeat echo with mildly decreased EF and normal TAVR fxn.        Acute pulmonary edema    Diuresis and afterload reduction as tolerated  Currently without UOP and creat persistently elevated (?CONCETTA from CTA chest)        CAD s/p PCI    Mild troponin increase.   S/p RCA PCI prior to TAVR, intermed LAD/LCx stenoses noted.  Persistent rise in Cr  this AM - ?over-diuresed vs CONCETTA from CTA.   Will hydrate +/- neph consult and plan for LHC when Cr improved.        Essential hypertension    Cont med rx        Hyperlipidemia    Cont statin         Pacemaker    Biotronik.   Interrogation with normal device function and no high rate episodes        S/P TAVR (transcatheter aortic valve replacement)    Normal TAVR fxn by repeat echo this admission        Stage 3 chronic kidney disease    ARF/CRI  Creat up to 2.1 with absent UOP  ?CONCETTA vs overdiuresis  Consider neph/urology consult             VTE Risk Mitigation (From admission, onward)        Ordered     enoxaparin injection 30 mg  Daily      10/04/18 1306     IP VTE HIGH RISK PATIENT  Once      09/30/18 1359        Critical care time 35min    Giancarlo Waterman MD  Cardiology  [facility]

## 2018-10-05 NOTE — ASSESSMENT & PLAN NOTE
PEA on arrival with severe acidosis.   EKG with .   Normal PPM function with no high rate episodes on interrogation.   Etiology for the arrest is unclear - possible respiratory.   Repeat echo with mildly decreased EF and normal TAVR fxn.

## 2018-10-05 NOTE — PLAN OF CARE
Problem: Patient Care Overview  Goal: Plan of Care Review  Outcome: Ongoing (interventions implemented as appropriate)  Pt remains in ICU, AAO X3, hematuria per per briceno noted, briceno cath irrigated as ordered, few small clots noted, nephrologist and urologist assessed pt at bedside, adequate UO, no BM , pt denied any pain and SOB throughout shift, plan of care reviewed with pt and family, verbalized understanding, safety maintained, remains free of injury,  no acute distress noted at present time, family remains at bedside, will continue to monitor.

## 2018-10-05 NOTE — ASSESSMENT & PLAN NOTE
No indication for vasopressor or aggressive IVF  Monitor closely on IV lasix   Avoid nephrotoxins   ARF--holding Lasix and giving IVF hydration.  Creat stable, but urine output poor.  Nephrology consult.  Hematuria--hold Lovenox.  Consult Urology.

## 2018-10-05 NOTE — ASSESSMENT & PLAN NOTE
- Suspect related to briceno and anticoagulation   - Irrigate catheter PRN   - If clots develop, upsize catheter (22Fr). No need to do so now, draining well.    - Cystoscopy as outpatient.   - Recommend upper tract imaging (CT urogram) if Cr recovers - no need to do this urgently at this time, can do as outpatient if Cr normalizes.   - Send urine culture

## 2018-10-06 LAB
ANION GAP SERPL CALC-SCNC: 8 MMOL/L
BACTERIA #/AREA URNS HPF: ABNORMAL /HPF
BASOPHILS # BLD AUTO: 0.02 K/UL
BASOPHILS NFR BLD: 0.3 %
BILIRUB UR QL STRIP: NEGATIVE
BUN SERPL-MCNC: 51 MG/DL
CALCIUM SERPL-MCNC: 8.6 MG/DL
CHLORIDE SERPL-SCNC: 110 MMOL/L
CLARITY UR: ABNORMAL
CO2 SERPL-SCNC: 25 MMOL/L
COLOR UR: YELLOW
CREAT SERPL-MCNC: 1.5 MG/DL
DIFFERENTIAL METHOD: ABNORMAL
EOSINOPHIL # BLD AUTO: 0.3 K/UL
EOSINOPHIL NFR BLD: 4.3 %
ERYTHROCYTE [DISTWIDTH] IN BLOOD BY AUTOMATED COUNT: 15.5 %
EST. GFR  (AFRICAN AMERICAN): 38 ML/MIN/1.73 M^2
EST. GFR  (NON AFRICAN AMERICAN): 33 ML/MIN/1.73 M^2
GLUCOSE SERPL-MCNC: 109 MG/DL
GLUCOSE UR QL STRIP: NEGATIVE
HCT VFR BLD AUTO: 26 %
HGB BLD-MCNC: 8.4 G/DL
HGB UR QL STRIP: ABNORMAL
HYALINE CASTS #/AREA URNS LPF: 0 /LPF
KETONES UR QL STRIP: NEGATIVE
LEUKOCYTE ESTERASE UR QL STRIP: NEGATIVE
LYMPHOCYTES # BLD AUTO: 1.8 K/UL
LYMPHOCYTES NFR BLD: 28.1 %
MCH RBC QN AUTO: 28.3 PG
MCHC RBC AUTO-ENTMCNC: 32.3 G/DL
MCV RBC AUTO: 88 FL
MICROSCOPIC COMMENT: ABNORMAL
MONOCYTES # BLD AUTO: 0.8 K/UL
MONOCYTES NFR BLD: 12 %
NEUTROPHILS # BLD AUTO: 3.6 K/UL
NEUTROPHILS NFR BLD: 55.3 %
NITRITE UR QL STRIP: NEGATIVE
PH UR STRIP: 5 [PH] (ref 5–8)
PLATELET # BLD AUTO: 248 K/UL
PMV BLD AUTO: 9.3 FL
POCT GLUCOSE: 115 MG/DL (ref 70–110)
POTASSIUM SERPL-SCNC: 4.1 MMOL/L
PROT UR QL STRIP: ABNORMAL
RBC # BLD AUTO: 2.97 M/UL
RBC #/AREA URNS HPF: >100 /HPF (ref 0–4)
SODIUM SERPL-SCNC: 143 MMOL/L
SP GR UR STRIP: 1.01 (ref 1–1.03)
SQUAMOUS #/AREA URNS HPF: 10 /HPF
URN SPEC COLLECT METH UR: ABNORMAL
UROBILINOGEN UR STRIP-ACNC: NEGATIVE EU/DL
WBC # BLD AUTO: 6.52 K/UL
WBC #/AREA URNS HPF: 5 /HPF (ref 0–5)

## 2018-10-06 PROCEDURE — 99232 SBSQ HOSP IP/OBS MODERATE 35: CPT | Mod: ,,, | Performed by: UROLOGY

## 2018-10-06 PROCEDURE — 80048 BASIC METABOLIC PNL TOTAL CA: CPT

## 2018-10-06 PROCEDURE — 94640 AIRWAY INHALATION TREATMENT: CPT

## 2018-10-06 PROCEDURE — 63600175 PHARM REV CODE 636 W HCPCS: Performed by: INTERNAL MEDICINE

## 2018-10-06 PROCEDURE — 25000003 PHARM REV CODE 250: Performed by: HOSPITALIST

## 2018-10-06 PROCEDURE — 25000003 PHARM REV CODE 250: Performed by: INTERNAL MEDICINE

## 2018-10-06 PROCEDURE — 94761 N-INVAS EAR/PLS OXIMETRY MLT: CPT

## 2018-10-06 PROCEDURE — 36415 COLL VENOUS BLD VENIPUNCTURE: CPT

## 2018-10-06 PROCEDURE — 99233 SBSQ HOSP IP/OBS HIGH 50: CPT | Mod: ,,, | Performed by: INTERNAL MEDICINE

## 2018-10-06 PROCEDURE — 85025 COMPLETE CBC W/AUTO DIFF WBC: CPT

## 2018-10-06 PROCEDURE — 20000000 HC ICU ROOM

## 2018-10-06 PROCEDURE — 87040 BLOOD CULTURE FOR BACTERIA: CPT

## 2018-10-06 PROCEDURE — 81000 URINALYSIS NONAUTO W/SCOPE: CPT

## 2018-10-06 PROCEDURE — 63600175 PHARM REV CODE 636 W HCPCS: Performed by: HOSPITALIST

## 2018-10-06 RX ORDER — CLONIDINE 0.2 MG/24H
1 PATCH, EXTENDED RELEASE TRANSDERMAL
Status: DISCONTINUED | OUTPATIENT
Start: 2018-10-06 | End: 2018-10-07

## 2018-10-06 RX ORDER — ACETAMINOPHEN 650 MG/1
650 SUPPOSITORY RECTAL EVERY 6 HOURS PRN
Status: DISCONTINUED | OUTPATIENT
Start: 2018-10-06 | End: 2018-10-07

## 2018-10-06 RX ORDER — LABETALOL HYDROCHLORIDE 5 MG/ML
20 INJECTION, SOLUTION INTRAVENOUS ONCE
Status: COMPLETED | OUTPATIENT
Start: 2018-10-06 | End: 2018-10-06

## 2018-10-06 RX ORDER — CLONIDINE 0.2 MG/24H
1 PATCH, EXTENDED RELEASE TRANSDERMAL
Status: DISCONTINUED | OUTPATIENT
Start: 2018-10-07 | End: 2018-10-06

## 2018-10-06 RX ORDER — HYDRALAZINE HYDROCHLORIDE 20 MG/ML
20 INJECTION INTRAMUSCULAR; INTRAVENOUS EVERY 8 HOURS PRN
Status: DISCONTINUED | OUTPATIENT
Start: 2018-10-06 | End: 2018-10-08

## 2018-10-06 RX ORDER — HYDRALAZINE HYDROCHLORIDE 20 MG/ML
10 INJECTION INTRAMUSCULAR; INTRAVENOUS EVERY 8 HOURS PRN
Status: DISCONTINUED | OUTPATIENT
Start: 2018-10-06 | End: 2018-10-06

## 2018-10-06 RX ORDER — CLOPIDOGREL BISULFATE 75 MG/1
75 TABLET ORAL DAILY
Status: DISCONTINUED | OUTPATIENT
Start: 2018-10-06 | End: 2018-10-10

## 2018-10-06 RX ADMIN — AZELASTINE HYDROCHLORIDE 137 MCG: 137 SPRAY, METERED NASAL at 09:10

## 2018-10-06 RX ADMIN — HYDRALAZINE HYDROCHLORIDE 50 MG: 25 TABLET ORAL at 01:10

## 2018-10-06 RX ADMIN — FLUTICASONE PROPIONATE 1 PUFF: 220 AEROSOL, METERED RESPIRATORY (INHALATION) at 08:10

## 2018-10-06 RX ADMIN — CETIRIZINE HYDROCHLORIDE 10 MG: 10 TABLET, FILM COATED ORAL at 08:10

## 2018-10-06 RX ADMIN — GABAPENTIN 300 MG: 300 CAPSULE ORAL at 08:10

## 2018-10-06 RX ADMIN — ACETAMINOPHEN 650 MG: 650 SOLUTION ORAL at 06:10

## 2018-10-06 RX ADMIN — AZELASTINE HYDROCHLORIDE 137 MCG: 137 SPRAY, METERED NASAL at 08:10

## 2018-10-06 RX ADMIN — SODIUM CHLORIDE: 0.9 INJECTION, SOLUTION INTRAVENOUS at 03:10

## 2018-10-06 RX ADMIN — CLONIDINE HYDROCHLORIDE 0.2 MG: 0.1 TABLET ORAL at 08:10

## 2018-10-06 RX ADMIN — GABAPENTIN 300 MG: 300 CAPSULE ORAL at 02:10

## 2018-10-06 RX ADMIN — HYDRALAZINE HYDROCHLORIDE 10 MG: 20 INJECTION INTRAMUSCULAR; INTRAVENOUS at 03:10

## 2018-10-06 RX ADMIN — CLOPIDOGREL BISULFATE 75 MG: 75 TABLET ORAL at 08:10

## 2018-10-06 RX ADMIN — ACETAMINOPHEN 650 MG: 650 SUPPOSITORY RECTAL at 08:10

## 2018-10-06 RX ADMIN — DOCUSATE SODIUM AND SENNOSIDES 1 TABLET: 8.6; 5 TABLET, FILM COATED ORAL at 08:10

## 2018-10-06 RX ADMIN — CARBAMAZEPINE 200 MG: 200 TABLET ORAL at 02:10

## 2018-10-06 RX ADMIN — ASPIRIN 81 MG CHEWABLE TABLET 81 MG: 81 TABLET CHEWABLE at 09:10

## 2018-10-06 RX ADMIN — HYDRALAZINE HYDROCHLORIDE 50 MG: 25 TABLET ORAL at 05:10

## 2018-10-06 RX ADMIN — LABETALOL HYDROCHLORIDE 20 MG: 5 INJECTION, SOLUTION INTRAVENOUS at 07:10

## 2018-10-06 RX ADMIN — CLONIDINE 1 PATCH: 0.2 PATCH TRANSDERMAL at 09:10

## 2018-10-06 RX ADMIN — CLONIDINE HYDROCHLORIDE 0.2 MG: 0.1 TABLET ORAL at 02:10

## 2018-10-06 RX ADMIN — HYDRALAZINE HYDROCHLORIDE 20 MG: 20 INJECTION INTRAMUSCULAR; INTRAVENOUS at 10:10

## 2018-10-06 RX ADMIN — METOPROLOL TARTRATE 100 MG: 50 TABLET ORAL at 08:10

## 2018-10-06 RX ADMIN — POLYETHYLENE GLYCOL 3350 17 G: 17 POWDER, FOR SOLUTION ORAL at 08:10

## 2018-10-06 RX ADMIN — CARBAMAZEPINE 200 MG: 200 TABLET ORAL at 08:10

## 2018-10-06 RX ADMIN — BENZONATATE 200 MG: 100 CAPSULE ORAL at 03:10

## 2018-10-06 NOTE — ASSESSMENT & PLAN NOTE
PEA on arrival with severe acidosis.   EKG with .   Normal PPM function with no high rate episodes on interrogation.   Etiology for the arrest is unclear - possible respiratory.   Repeat echo with mildly decreased EF and normal TAVR fxn.  Plan eventual cath when renal fxn stabilizes (?Monday with Dr. Carmen)

## 2018-10-06 NOTE — SUBJECTIVE & OBJECTIVE
Interval History: Hematuria improving. Irwin draining well.     Review of Systems   Constitutional: Negative for appetite change, chills and fever.   HENT: Negative for congestion, sore throat and trouble swallowing.    Eyes: Negative for pain and itching.   Respiratory: Negative for cough and shortness of breath.    Cardiovascular: Negative for chest pain, palpitations and leg swelling.   Gastrointestinal: Negative for abdominal distention, abdominal pain, constipation, diarrhea, nausea and vomiting.   Genitourinary: Positive for hematuria.   Musculoskeletal: Negative for back pain, neck pain and neck stiffness.   Skin: Negative for rash and wound.   Neurological: Negative for dizziness and seizures.   Hematological: Negative for adenopathy. Does not bruise/bleed easily.   Psychiatric/Behavioral: Negative for confusion. The patient is not nervous/anxious.    All other systems reviewed and are negative.    Objective:     Temp:  [98 °F (36.7 °C)-99.3 °F (37.4 °C)] 98.8 °F (37.1 °C)  Pulse:  [69-83] 69  Resp:  [6-36] 21  SpO2:  [95 %-100 %] 96 %  BP: ()/(47-91) 151/67     Body mass index is 35.59 kg/m².    Date 10/06/18 0700 - 10/07/18 0659   Shift 7030-8552 4081-6838 2613-8023 24 Hour Total   INTAKE   P.O. 600   600   I.V.(mL/kg) 375(3.9)   375(3.9)   Shift Total(mL/kg) 975(10.1)   975(10.1)   OUTPUT   Urine(mL/kg/hr) 255   255   Shift Total(mL/kg) 255(2.6)   255(2.6)   Weight (kg) 97 97 97 97          Drains     Drain                 Urethral Catheter 09/30/18 1025 16 Fr. 6 days                Physical Exam   Vitals reviewed.  Constitutional: She is oriented to person, place, and time. She appears well-developed and well-nourished. No distress.   HENT:   Head: Normocephalic and atraumatic.   Neck: Normal range of motion.   Cardiovascular: Normal rate and regular rhythm.    Pulmonary/Chest: Effort normal and breath sounds normal. No respiratory distress.   Abdominal: Soft. She exhibits no distension and no  mass. There is no tenderness. There is no rebound and no guarding.   Genitourinary:   Genitourinary Comments: Irwin - angelika urine with sediment   Musculoskeletal: Normal range of motion.   Neurological: She is alert and oriented to person, place, and time.   Skin: Skin is warm and dry. No rash noted. She is not diaphoretic. No erythema.     Psychiatric: She has a normal mood and affect. Her behavior is normal.       Significant Labs:    BMP:  Recent Labs   Lab  10/04/18   0633  10/05/18   0242  10/06/18   0310   NA  141  139  143   K  3.6  3.6  4.1   CL  103  106  110   CO2  29  25  25   BUN  46*  54*  51*   CREATININE  2.1*  2.1*  1.5*   CALCIUM  8.9  8.0*  8.6*       CBC:   Recent Labs   Lab  10/04/18   0632  10/05/18   0242  10/06/18   0310   WBC  5.15  4.18  6.52   HGB  8.3*  7.6*  8.4*   HCT  25.6*  23.7*  26.0*   PLT  207  206  248       Blood Culture:   Recent Labs   Lab  09/30/18   1101  09/30/18   1113   LABBLOO  No growth after 5 days.  No growth after 5 days.     Urine Culture: No results for input(s): LABURIN in the last 168 hours.  Urine Studies:   Recent Labs   Lab  09/30/18   1037  10/05/18   1150   COLORU  Yellow  Red*   APPEARANCEUA  Cloudy*  Cloudy*   PHUR  6.0  6.0   SPECGRAV  1.010  1.015   PROTEINUA  2+*  2+*   GLUCUA  3+*  Negative   KETONESU  Negative  Negative   BILIRUBINUA  Negative  Negative   OCCULTUA  2+*  2+*   NITRITE  Negative  Negative   UROBILINOGEN  Negative  Negative   LEUKOCYTESUR  Negative  Negative   RBCUA  >100*  >100*   WBCUA  0  0   BACTERIA  None  None   SQUAMEPITHEL  10   --    HYALINECASTS  0  0       Significant Imaging:  All pertinent imaging results/findings from the past 24 hours have been reviewed.

## 2018-10-06 NOTE — ASSESSMENT & PLAN NOTE
- Suspect related to briceno and anticoagulation   - Irrigate catheter PRN   - If clots develop, upsize catheter (22Fr). No need to do so now, draining well.    - Cystoscopy as outpatient.   - Recommend upper tract imaging (CT urogram) if Cr recovers - no need to do this urgently at this time, can do as outpatient if Cr normalizes.   - Send urine culture    Will sign off now. Call with further questions/concerns.

## 2018-10-06 NOTE — PROGRESS NOTES
[facility]  Urology  Progress Note    Patient Name: Cindy Lyman  MRN: [id number]  Admission Date: 9/30/2018  Hospital Length of Stay: 6 days  Code Status: Full Code   Attending Provider: Grupo Wharton MD   Primary Care Physician: Rodger Camarena MD    Subjective:     HPI:  81yo F admitted after cardiac arrest on 9/30/18. She remains in ICU. Urology is now consulted for gross hematuria. Denies hematuria prior to admission. She has briceno catheter in place. She is on Plavix and ASA.     Interval History: Hematuria improving. Briceno draining well.     Review of Systems   Constitutional: Negative for appetite change, chills and fever.   HENT: Negative for congestion, sore throat and trouble swallowing.    Eyes: Negative for pain and itching.   Respiratory: Negative for cough and shortness of breath.    Cardiovascular: Negative for chest pain, palpitations and leg swelling.   Gastrointestinal: Negative for abdominal distention, abdominal pain, constipation, diarrhea, nausea and vomiting.   Genitourinary: Positive for hematuria.   Musculoskeletal: Negative for back pain, neck pain and neck stiffness.   Skin: Negative for rash and wound.   Neurological: Negative for dizziness and seizures.   Hematological: Negative for adenopathy. Does not bruise/bleed easily.   Psychiatric/Behavioral: Negative for confusion. The patient is not nervous/anxious.    All other systems reviewed and are negative.    Objective:     Temp:  [98 °F (36.7 °C)-99.3 °F (37.4 °C)] 98.8 °F (37.1 °C)  Pulse:  [69-83] 69  Resp:  [6-36] 21  SpO2:  [95 %-100 %] 96 %  BP: ()/(47-91) 151/67     Body mass index is 35.59 kg/m².    Date 10/06/18 0700 - 10/07/18 0659   Shift 8074-9980 7564-5597 8450-2520 24 Hour Total   INTAKE   P.O. 600   600   I.V.(mL/kg) 375(3.9)   375(3.9)   Shift Total(mL/kg) 975(10.1)   975(10.1)   OUTPUT   Urine(mL/kg/hr) 255   255   Shift Total(mL/kg) 255(2.6)   255(2.6)   Weight (kg) 97 97 97 97           Drains     Drain                 Urethral Catheter 09/30/18 1025 16 Fr. 6 days                Physical Exam   Vitals reviewed.  Constitutional: She is oriented to person, place, and time. She appears well-developed and well-nourished. No distress.   HENT:   Head: Normocephalic and atraumatic.   Neck: Normal range of motion.   Cardiovascular: Normal rate and regular rhythm.    Pulmonary/Chest: Effort normal and breath sounds normal. No respiratory distress.   Abdominal: Soft. She exhibits no distension and no mass. There is no tenderness. There is no rebound and no guarding.   Genitourinary:   Genitourinary Comments: Irwin - angelika urine with sediment   Musculoskeletal: Normal range of motion.   Neurological: She is alert and oriented to person, place, and time.   Skin: Skin is warm and dry. No rash noted. She is not diaphoretic. No erythema.     Psychiatric: She has a normal mood and affect. Her behavior is normal.       Significant Labs:    BMP:  Recent Labs   Lab  10/04/18   0633  10/05/18   0242  10/06/18   0310   NA  141  139  143   K  3.6  3.6  4.1   CL  103  106  110   CO2  29  25  25   BUN  46*  54*  51*   CREATININE  2.1*  2.1*  1.5*   CALCIUM  8.9  8.0*  8.6*       CBC:   Recent Labs   Lab  10/04/18   0632  10/05/18   0242  10/06/18   0310   WBC  5.15  4.18  6.52   HGB  8.3*  7.6*  8.4*   HCT  25.6*  23.7*  26.0*   PLT  207  206  248       Blood Culture:   Recent Labs   Lab  09/30/18   1101  09/30/18   1113   LABBLOO  No growth after 5 days.  No growth after 5 days.     Urine Culture: No results for input(s): LABURIN in the last 168 hours.  Urine Studies:   Recent Labs   Lab  09/30/18   1037  10/05/18   1150   COLORU  Yellow  Red*   APPEARANCEUA  Cloudy*  Cloudy*   PHUR  6.0  6.0   SPECGRAV  1.010  1.015   PROTEINUA  2+*  2+*   GLUCUA  3+*  Negative   KETONESU  Negative  Negative   BILIRUBINUA  Negative  Negative   OCCULTUA  2+*  2+*   NITRITE  Negative  Negative   UROBILINOGEN  Negative  Negative    LEUKOCYTESUR  Negative  Negative   RBCUA  >100*  >100*   WBCUA  0  0   BACTERIA  None  None   SQUAMEPITHEL  10   --    HYALINECASTS  0  0       Significant Imaging:  All pertinent imaging results/findings from the past 24 hours have been reviewed.                  Assessment/Plan:     Hematuria, gross     - Suspect related to briceno and anticoagulation   - Irrigate catheter PRN   - If clots develop, upsize catheter (22Fr). No need to do so now, draining well.    - Cystoscopy as outpatient.   - Recommend upper tract imaging (CT urogram) if Cr recovers - no need to do this urgently at this time, can do as outpatient if Cr normalizes.   - Send urine culture    Will sign off now. Call with further questions/concerns.            VTE Risk Mitigation (From admission, onward)        Ordered     Place sequential compression device  Until discontinued      10/05/18 1249     Place LAUREANO hose  Until discontinued      10/05/18 1249     IP VTE HIGH RISK PATIENT  Once      09/30/18 1359          Fernanda Bland MD  Urology  [facility]

## 2018-10-06 NOTE — PLAN OF CARE
Problem: Patient Care Overview  Goal: Plan of Care Review  Outcome: Ongoing (interventions implemented as appropriate)  Pt blood pressure remained moderately elevated throughout  the day 150's-190's SBP, mild relief from IV hydralazine, low grade temp of 98 at 1100, returned to WNL at 98.2 by 1730, denies pain, adequate yellow to dark red/brown urine with sediment to briceno catheter to gravity, appetite strong, ate 100-75% of breakfast and lunch, fluid intake adequate, persistent cough relieved by tessilon pearls, breath sounds coarse in all fields, pt confused at times, but oriented to self, situation, and place, family at bedside throughout the shift, resting comfortably at this time with eyes closed, able to make needs known, no needs at this time.

## 2018-10-06 NOTE — SUBJECTIVE & OBJECTIVE
Review of Systems   Gastrointestinal: Negative for melena.   Genitourinary: Positive for hematuria.     Objective:     Vital Signs (Most Recent):  Temp: 99.3 °F (37.4 °C) (10/06/18 0315)  Pulse: 78 (10/06/18 1000)  Resp: (!) 22 (10/06/18 1000)  BP: (!) 190/77 (10/06/18 1000)  SpO2: 96 % (10/06/18 1000) Vital Signs (24h Range):  Temp:  [98 °F (36.7 °C)-99.3 °F (37.4 °C)] 99.3 °F (37.4 °C)  Pulse:  [69-83] 78  Resp:  [6-36] 22  SpO2:  [95 %-100 %] 96 %  BP: ()/(47-91) 190/77     Weight: 97 kg (213 lb 13.5 oz)  Body mass index is 35.59 kg/m².     SpO2: 96 %  O2 Device (Oxygen Therapy): room air      Intake/Output Summary (Last 24 hours) at 10/6/2018 1009  Last data filed at 10/6/2018 1000  Gross per 24 hour   Intake 3806.25 ml   Output 1415 ml   Net 2391.25 ml       Lines/Drains/Airways     Central Venous Catheter Line                 Percutaneous Central Line Insertion/Assessment - triple lumen  09/30/18 1050 right internal jugular 5 days          Drain                 Urethral Catheter 09/30/18 1025 16 Fr. 5 days                Physical Exam   Constitutional: She is oriented to person, place, and time. She appears well-developed and well-nourished. No distress.   HENT:   Head: Normocephalic and atraumatic.   Mouth/Throat: No oropharyngeal exudate.   Eyes: Conjunctivae and EOM are normal. Pupils are equal, round, and reactive to light. No scleral icterus.   Neck: Normal range of motion. Neck supple. No JVD present. No tracheal deviation present. No thyromegaly present.   Cardiovascular: Normal rate, regular rhythm, S1 normal and S2 normal. Exam reveals no gallop and no friction rub.   Murmur heard.   Systolic murmur is present with a grade of 2/6.  Pulmonary/Chest: Effort normal and breath sounds normal. No respiratory distress. She has no wheezes. She has no rales. She exhibits no tenderness.   Abdominal: Soft. She exhibits no distension.   obese   Musculoskeletal: Normal range of motion. She exhibits no  edema.   Neurological: She is alert and oriented to person, place, and time. No cranial nerve deficit.   Skin: Skin is warm and dry. She is not diaphoretic.   Psychiatric: She has a normal mood and affect. Her behavior is normal. Judgment normal.       Current Medications:   aspirin  81 mg Oral Daily    azelastine  1 spray Nasal BID    carBAMazepine  200 mg Oral TID    cetirizine  10 mg Oral Daily    cloNIDine  0.2 mg Oral TID    clopidogrel  75 mg Oral Daily    fluticasone  1 puff Inhalation BID    gabapentin  300 mg Oral TID    hydrALAZINE  50 mg Oral Q8H    metoprolol tartrate  100 mg Oral BID    senna-docusate 8.6-50 mg  1 tablet Oral BID      sodium chloride 0.9% 75 mL/hr at 10/06/18 1000     acetaminophen, benzonatate, hydrALAZINE, influenza, nitroGLYCERIN, ondansetron, polyethylene glycol    Laboratory:  CBC:  Recent Labs   Lab  10/03/18   0230  10/04/18   0307  10/04/18   0632  10/05/18   0242  10/06/18   0310   WHITE BLOOD CELL COUNT  7.36  5.58  5.15  4.18  6.52   HEMOGLOBIN  9.3 L  8.5 L  8.3 L  7.6 L  8.4 L   HEMATOCRIT  28.8 L  26.4 L  25.6 L  23.7 L  26.0 L   PLATELETS  221  206  207  206  248       CHEMISTRIES:  Recent Labs   Lab  05/17/18   1410   07/06/18   0301   10/03/18   0230  10/04/18   0307  10/04/18   0633  10/05/18   0242  10/06/18   0310   GLUCOSE  106   < >  107   < >  127 H  104  95  103  109   SODIUM  145   < >  141   < >  141  140  141  139  143   POTASSIUM  3.8   < >  3.6   < >  3.0 L  3.6  3.6  3.6  4.1   BUN BLD  17   < >  17   < >  29 H  42 H  46 H  54 H  51 H   CREATININE  1.1   < >  0.8   < >  1.1  1.9 H  2.1 H  2.1 H  1.5 H   EGFR IF   55 A   < >  >60.0   < >  55 A  28 A  25 A  25 A  38 A   EGFR IF NON-  48 A   < >  >60.0   < >  48 A  25 A  22 A  22 A  33 A   CALCIUM  8.7   < >  8.1 L   < >  9.4  8.7  8.9  8.0 L  8.6 L   MAGNESIUM  2.2   --   1.8   --    --    --    --    --    --     < > = values in this interval not displayed.        CARDIAC BIOMARKERS:  Recent Labs   Lab  10/01/18   0255  10/01/18   0911  10/03/18   1557  10/03/18   2210  10/04/18   0307   CPK   --    --   155   --    --    CPK MB   --    --   5.5   --    --    TROPONIN I  0.183 H  0.137 H  0.138 H  0.163 H  0.204 H       COAGS:  Recent Labs   Lab  05/17/18   1410  07/02/18   1234  08/20/18 1929 09/30/18   1000  10/04/18   0633   INR  1.0  1.0  1.0  1.0  1.0       LIPIDS/LFTS:  Recent Labs   Lab  04/06/16   1055   06/11/16   0334   01/09/17   1346   11/15/17   1025  01/03/18   1155  05/17/18   1410  08/20/18   1929  08/24/18   1407  09/30/18   1000   CHOLESTEROL  252 H   --   182   --   215 H   --   184   --    --    --    --    --    TRIGLYCERIDES  126   --   69   --   139   --   104   --    --    --    --    --    HDL  50   --   44   --   40   --   49   --    --    --    --    --    LDL CHOLESTEROL  176.8 H   --   124.2   --   147.2   --   114.2   --    --    --    --    --    NON-HDL CHOLESTEROL  202   --   138   --   175   --   135   --    --    --    --    --    AST  13   < >   --    < >  16   < >  11  13  13  12  12  35   ALT  11   < >   --    < >  21   < >  9 L  14  11  11  10  31    < > = values in this interval not displayed.       BNP:  Recent Labs   Lab  06/16/17   1927  07/08/17   0322  01/03/18   1155  05/17/18   1410  09/30/18   1030   BNP  950 H  598 H  390 H  579 H  621 H       TSH:        Free T4:        Diagnostic Results:  CTA Chest 9/30/18  -No evidence of pulmonary arterial embolism.  Pulmonary edema with additional patchy bilateral opacities, right greater than left.  Findings may relate to atelectasis and/or aspiration with superimposed pneumonia also in the differential diagnosis.  -The tip of the endotracheal tube terminates near the right mainstem bronchus.  This can be retracted several cm for more optimal positioning.  Cardiomegaly with small left pleural effusion and other findings as above.  This report was flagged in Epic as  abnormal.    Echo: 9/30/18  · Left ventricle ejection fraction is mildly decreased at 45%  · Left ventricle shows concentric remodeling.  · RV systolic function is normal.  · Left atrium is mildly dilated.  · Right atrium is moderately dilated.  · Tricuspid valve shows mild regurgitation.  · Grade II (moderate) left ventricular diastolic dysfunction consistent with pseudonormalization.  · LA pressure is elevated.  · Aortic valve: There is a bioprosthetic valve present. There is no aortic insufficiency present. CLEMENT is 1.31 cm2; mean gradient is 8.63 mmHg; peak gradient is 15.40 mmHg; AV peak velocity is 1.96 m/s    Echo 8/10/18    1 - Normal left ventricular systolic function (EF 60-65%).     2 - No wall motion abnormalities.     3 - Concentric hypertrophy.     4 - Biatrial enlargement.     5 - Right ventricular enlargement with normal systolic function.     6 - Pulmonary hypertension. The estimated PA systolic pressure is 52 mmHg.     7 - S/P transcatheter AVR, CLEMENT = 2.58 cm2, AVAi = 1.32 cm2/m2, peak velocity = 1.7 m/s, mean gradient = 8 mmHg.     8 - Mild paravalvular aortic regurgitation.     9 - Mild tricuspid regurgitation.     10 - Trivial pericardial effusion.     11 - S/p 29mm Evolut TF TAVR.     TAVR/R ADELSO PTA 7/5/18    Successful RTF 29 Evolut with RCI PTA to enable delivery of the valve delivery system.    Severe, unexplained anemia, worse today.  Tranfused for Hbg 8 pre TAVR.    Cath: 6/1/18  D. Angiographic Results  Diagnostic:        Patient has a left dominant coronary artery.        The coronary vessels have luminal irregularities.        - Left Main Coronary Artery:             The LM has luminal irregularities. There is GERMAIN 3 flow.     - Left Anterior Descending Artery:             The ostial LAD has a 60% stenosis. There is GERMAIN 3 flow.     - Left Circumflex Artery:             The mid LCX has a 60% stenosis. There is GERMAIN 3 flow.     - Right Coronary Artery:             The ostial RCA has a 99%  stenosis. There is GERMAIN 3 flow.             The proximal RCA has a 80% stenosis. There is GERMAIN 3 flow.     - Common Femoral Artery:             The right CFA has luminal irregularities. Access closure with perclose was very difficult due to inability to catch needles with perclose device.  Intervention       Ostial RCA:              The lesion was successfully intervened. Post-stenosis of 0%, post-GERMAIN 3 flow and TMP grade 3. The vessel was accessed natively.  The following items were used: 2.0MM 15MM Putnam Balloon and 2.5X12 Mini Vision Stent.       Proximal RCA:              The lesion was successfully intervened. Post-stenosis of 0%, post-GERMAIN 3 flow and TMP grade 3. The vessel was accessed natively.  The following items were used: 2.5MM 12MM Putnam Balloon, Stent Integrity 2.5 X 8 and 3.0MM 8MM Putnam Balloon.

## 2018-10-06 NOTE — ASSESSMENT & PLAN NOTE
No indication for vasopressor or aggressive IVF  Monitor closely on IV lasix   Avoid nephrotoxins   ARF--holding Lasix and giving IVF hydration.  Creat stable, but urine output poor.  Nephrology consult.  Hematuria--hold Lovenox.  Consulted Urology.  Improving urine output and Creat

## 2018-10-06 NOTE — PROGRESS NOTES
[facility] Hospital Medicine  Progress Note    Patient Name: Cindy Lyman  MRN: [id number]  Patient Class: IP- Inpatient   Admission Date: 9/30/2018  Length of Stay: 6 days  Attending Physician: Grupo Wharton MD  Primary Care Provider: Rodger Camarena MD        Subjective:     Principal Problem:Cardiac arrest    HPI:  83 yo female with diastolic CHF, COPD, gastric cancer, aortic stenosis with complete heart block and s/p TAVR and pacemaker placed 7/2018 presented in cardiac arrest. Per ED notes, patient was on her way to [place] with others and c/o chest pain. CPR was initiated in parked vehicle outside ED. One round of epinephrine given and got ROSC. Per family, she c/o worsening SOB over several days. On arrival pt was euthermic, hypertensive, elevated lactate and BNP. CXR suggest pulmonary edema though infection cannot be ruled out. Pt intubated and evaluated by pulmonology. Cardiology consulted. ECHO pending. Broad spectrum antibiotics initiated until cultures result and clinical course dictates.     Chest CTA negative for pulmonary emboli.     Per family, Pt is a full code.     Hospital Course:  Pt admitted after PEA arrest and acute respiratory failure/acidosis. Acidosis corrected. Pulmonology assisting with vent management/weaning. Trial of precedex today but patient did not tolerate. Switched back to propofol. CPAP this am and now back on PVRC. Pacemaker interrogated and appears to be functioning fine. Diuresed well on lasix without change in renal function. Possible plan for LHC per cardiology. Extubated on 10/2. Initially slow to respond, but mental status improving.  Planned LHC on 10/4 held secondary to rise in Creat.  Lasix stopped and getting IVF hydration.  Episodes of slight hypotension and BP medications adjusted.  Worsening urine output.  Urine also bloody.  Urology consulted.  Improving urine output and Creat.    Interval History: Feeling better.  No  complaints.    Review of Systems   HENT: Negative for ear discharge and ear pain.    Eyes: Negative for pain and itching.   Cardiovascular: Negative for chest pain and palpitations.   Neurological: Negative for seizures and syncope.     Objective:     Vital Signs (Most Recent):  Temp: 98.8 °F (37.1 °C) (10/06/18 0704)  Pulse: 69 (10/06/18 1200)  Resp: 13 (10/06/18 1200)  BP: (!) 161/71 (10/06/18 1200)  SpO2: 98 % (10/06/18 1200) Vital Signs (24h Range):  Temp:  [98 °F (36.7 °C)-99.3 °F (37.4 °C)] 98.8 °F (37.1 °C)  Pulse:  [69-83] 69  Resp:  [6-36] 13  SpO2:  [95 %-100 %] 98 %  BP: ()/(47-91) 161/71     Weight: 97 kg (213 lb 13.5 oz)  Body mass index is 35.59 kg/m².    Intake/Output Summary (Last 24 hours) at 10/6/2018 1256  Last data filed at 10/6/2018 1200  Gross per 24 hour   Intake 3675 ml   Output 1435 ml   Net 2240 ml      Physical Exam   Constitutional: She appears well-developed and well-nourished.   HENT:   Head: Normocephalic and atraumatic.   Eyes: Conjunctivae are normal. Pupils are equal, round, and reactive to light.   Neck: Neck supple.   Cardiovascular: Normal rate and regular rhythm.   Pulmonary/Chest: Effort normal. She has no wheezes.   Scattered rhonchi    Abdominal: Soft. She exhibits no distension.   Genitourinary:   Genitourinary Comments: Bloody urine in briceno bag   Musculoskeletal: She exhibits no edema or deformity.   Neurological:   Awake, slow to respond, but answering questions and following simple commands.   Skin: Skin is warm and dry.       Significant Labs:   BMP:   Recent Labs   Lab  10/06/18   0310   GLU  109   NA  143   K  4.1   CL  110   CO2  25   BUN  51*   CREATININE  1.5*   CALCIUM  8.6*     CBC:   Recent Labs   Lab  10/05/18   0242  10/06/18   0310   WBC  4.18  6.52   HGB  7.6*  8.4*   HCT  23.7*  26.0*   PLT  206  248         Assessment/Plan:      * Cardiac arrest    Etiology still unclear, but PEA reported by first repsonders, quick ROSC with one round of  epinephrine  Pt intubated on sedation and will have wean sedation for appropriate neurological exam, consult neurology if indicated   ECHO  -  +DD  Pacemaker interrogated             Acute hypoxemic respiratory failure    Chest CTA r/o PE, likely due to decompensated CHF and possible underlying infection  Pulmonology consulted for vent management  Cardiology consulted - [facility] :  · Left ventricle ejection fraction is mildly decreased at 45%  · Left ventricle shows concentric remodeling.  · RV systolic function is normal.  · Left atrium is mildly dilated.  · Right atrium is moderately dilated.  · Tricuspid valve shows mild regurgitation.  · Grade II (moderate) left ventricular diastolic dysfunction consistent with pseudonormalization.  · LA pressure is elevated.   Cultures - negative, sent for resp cultures today   Appreciate Pulmonary input.  Extubated on 10/2.  Initially slow to respond, but improving.  Advance diet.                Pacemaker    See above           Acute pulmonary edema    Pt given IV lasix with large amount of diuresis  CXr improving   Holding Lasix secondary to rise in Creat.            S/P TAVR (transcatheter aortic valve replacement)              Stage 3 chronic kidney disease    No indication for vasopressor or aggressive IVF  Monitor closely on IV lasix   Avoid nephrotoxins   ARF--holding Lasix and giving IVF hydration.  Creat stable, but urine output poor.  Nephrology consult.  Hematuria--hold Lovenox.  Consulted Urology.  Improving urine output and Creat          Essential hypertension    BP uncontrolled  Patient on home Clonidine that was stopped during this admit.  Possible rebound HTN.  Restarted home Clonidine.          CAD s/p PCI    Aspirin, plavix and statin resumed   Cardiology following  Elevated troponin after cardiopulmonary arrest  [facility] in Am.        Anemia of chronic disease    Monitor  Reviewed bone marrow biopsy 2017 - no malignancy   H/H low, but stable.         Hyperlipidemia    Resume statin             VTE Risk Mitigation (From admission, onward)        Ordered     Place sequential compression device  Until discontinued      10/05/18 1249     Place LAUREANO hose  Until discontinued      10/05/18 1249     IP VTE HIGH RISK PATIENT  Once      09/30/18 1359          Critical care time spent on the evaluation and treatment of severe organ dysfunction, review of pertinent labs and imaging studies, discussions with consulting providers and discussions with patient/family: 45 minutes.    Grupo Wharton MD  Department of Hospital Medicine   [facility]

## 2018-10-06 NOTE — CONSULTS
Renal Progress Note    Date of Admission:  9/30/2018 10:00 AM      ROS: unable to obtain at present    Physical Exam:    Vitals:    10/06/18 1500   BP: (!) 176/77   Pulse: 71   Resp: (!) 27   Temp:      I/O last 3 completed shifts:  In: 4140 [P.O.:1440; I.V.:2700]  Out: 1310 [Urine:1310]  I/O this shift:  In: 1200 [P.O.:600; I.V.:600]  Out: 440 [Urine:440]      Laboratories:    Recent Labs   Lab  10/06/18   0310   WBC  6.52   RBC  2.97*   HGB  8.4*   HCT  26.0*   PLT  248   MCV  88   MCH  28.3   MCHC  32.3     Recent Labs   Lab  10/06/18   0310   CALCIUM  8.6*   NA  143   K  4.1   CO2  25   CL  110   BUN  51*   CREATININE  1.5*     Prot/Creat Ratio, Ur              2.26   Prot/Creat Ratio, Ur      Sodium Urine Random              46   Sodium Urine Random     Protein, Urine Random              163   Protein          Microbiology Results (last 7 days)     Procedure Component Value Units Date/Time    Blood culture #2 **CANNOT BE ORDERED STAT** [[id number]] Collected:  09/30/18 1113    Order Status:  Completed Specimen:  Blood from Peripheral, Forearm, Right Updated:  10/05/18 1303     Blood Culture, Routine No growth after 5 days.    Blood Culture #1 **CANNOT BE ORDERED STAT** [[id number]] Collected:  09/30/18 1101    Order Status:  Completed Specimen:  Blood from Peripheral, Hand, Left Updated:  10/05/18 1303     Blood Culture, Routine No growth after 5 days.    Culture, Respiratory with Gram Stain [[id number]] Collected:  10/01/18 1521    Order Status:  Completed Specimen:  Respiratory from Endotracheal Aspirate Updated:  10/03/18 0802     Respiratory Culture Normal respiratory kim     Gram Stain (Respiratory) <10 epithelial cells per low power field.     Gram Stain (Respiratory) Moderate WBC's     Gram Stain (Respiratory) Few Gram positive cocci in pairs     Gram Stain (Respiratory) Rare Gram negative diplococci        Diagnostic Tests:  X-Ray Chest AP Portable [[id number]] Resulted:  10/01/18 1115   Order Status: Completed      FINDINGS:  Endotracheal tube is repositioned with tip about 6 cm above the jean marie.  Right central venous catheter and gastric tube remain in place.  Pacemaker is present on the left.  Heart remains mildly enlarged.  Aortic atherosclerosis and aortic valve stent are noted in the mediastinum.  The lungs are better expanded and aerated.  Most of the airspace opacification seen in both lungs on prior exam has cleared with some still present at the bases.  Small pleural effusions are present.  No pneumothorax.   Impression:       Endotracheal tube repositioned with tip above jean marie    Improving pulmonary edema.      Electronically signed by: Doug Rojas MD  Date: 10/01/2018  Time: 11:15     Assessment:    83 y/o female with Hx. AVR s/p TAVR and Pacer in July admitted with:    - s/p PEA arrest  - Oliguric EUGENE et? Likely multifactorial due to arrest, IV contrast, overdiuresis etc. IMPROVING creatinine and UO  - Macroscopic hematuria likely Irwin trauma  - Anemia  - Hx. CAD with mild troponin increase  - Hx. HTN    Plan:  - Irrigate Irwin PRN  - IV n.s. At 75cc/h  - NO IV contrast for now  - f/u BMP and CBC  - Transfuse PRN

## 2018-10-06 NOTE — ASSESSMENT & PLAN NOTE
Mild troponin increase.   S/p RCA PCI prior to TAVR, intermed LAD/LCx stenoses noted.  Eventual LHC with Dr. Carmen when renal fxn stabilizes

## 2018-10-06 NOTE — PLAN OF CARE
Problem: Patient Care Overview  Goal: Plan of Care Review  Outcome: Ongoing (interventions implemented as appropriate)  Remains in ICU. Alert to person only, slow to respond but follows commands. 100% paced on monitor. Irwin to gravity w/ hematuria and adequate urine output. R IJ TLC w/ NS @ 75ml/hour. CVP 7-10. SCDs and TEDs in place. Remains free of falls and injuries.

## 2018-10-06 NOTE — PROGRESS NOTES
[facility]  Cardiology  Progress Note    Patient Name: Cindy Lyman  MRN: [id number]  Admission Date: 9/30/2018  Hospital Length of Stay: 6 days  Code Status: Full Code   Attending Physician: Grupo Wharton MD   Primary Care Physician: Rodger Camarena MD  Expected Discharge Date:   Principal Problem:Cardiac arrest    Subjective:     Interval Hx: pt seen in ICU, case d/w RN.  Holding off cath until renal fxn improves.  Pt denies cp/sob.  UOP improving.    Tele: AS- (pers rev)        Review of Systems   Gastrointestinal: Negative for melena.   Genitourinary: Positive for hematuria.     Objective:     Vital Signs (Most Recent):  Temp: 99.3 °F (37.4 °C) (10/06/18 0315)  Pulse: 78 (10/06/18 1000)  Resp: (!) 22 (10/06/18 1000)  BP: (!) 190/77 (10/06/18 1000)  SpO2: 96 % (10/06/18 1000) Vital Signs (24h Range):  Temp:  [98 °F (36.7 °C)-99.3 °F (37.4 °C)] 99.3 °F (37.4 °C)  Pulse:  [69-83] 78  Resp:  [6-36] 22  SpO2:  [95 %-100 %] 96 %  BP: ()/(47-91) 190/77     Weight: 97 kg (213 lb 13.5 oz)  Body mass index is 35.59 kg/m².     SpO2: 96 %  O2 Device (Oxygen Therapy): room air      Intake/Output Summary (Last 24 hours) at 10/6/2018 1009  Last data filed at 10/6/2018 1000  Gross per 24 hour   Intake 3806.25 ml   Output 1415 ml   Net 2391.25 ml       Lines/Drains/Airways     Central Venous Catheter Line                 Percutaneous Central Line Insertion/Assessment - triple lumen  09/30/18 1050 right internal jugular 5 days          Drain                 Urethral Catheter 09/30/18 1025 16 Fr. 5 days                Physical Exam   Constitutional: She is oriented to person, place, and time. She appears well-developed and well-nourished. No distress.   HENT:   Head: Normocephalic and atraumatic.   Mouth/Throat: No oropharyngeal exudate.   Eyes: Conjunctivae and EOM are normal. Pupils are equal, round, and reactive to light. No scleral icterus.   Neck: Normal range of motion. Neck supple. No  JVD present. No tracheal deviation present. No thyromegaly present.   Cardiovascular: Normal rate, regular rhythm, S1 normal and S2 normal. Exam reveals no gallop and no friction rub.   Murmur heard.   Systolic murmur is present with a grade of 2/6.  Pulmonary/Chest: Effort normal and breath sounds normal. No respiratory distress. She has no wheezes. She has no rales. She exhibits no tenderness.   Abdominal: Soft. She exhibits no distension.   obese   Musculoskeletal: Normal range of motion. She exhibits no edema.   Neurological: She is alert and oriented to person, place, and time. No cranial nerve deficit.   Skin: Skin is warm and dry. She is not diaphoretic.   Psychiatric: She has a normal mood and affect. Her behavior is normal. Judgment normal.       Current Medications:   aspirin  81 mg Oral Daily    azelastine  1 spray Nasal BID    carBAMazepine  200 mg Oral TID    cetirizine  10 mg Oral Daily    cloNIDine  0.2 mg Oral TID    clopidogrel  75 mg Oral Daily    fluticasone  1 puff Inhalation BID    gabapentin  300 mg Oral TID    hydrALAZINE  50 mg Oral Q8H    metoprolol tartrate  100 mg Oral BID    senna-docusate 8.6-50 mg  1 tablet Oral BID      sodium chloride 0.9% 75 mL/hr at 10/06/18 1000     acetaminophen, benzonatate, hydrALAZINE, influenza, nitroGLYCERIN, ondansetron, polyethylene glycol    Laboratory:  CBC:  Recent Labs   Lab  10/03/18   0230  10/04/18   0307  10/04/18   0632  10/05/18   0242  10/06/18   0310   WHITE BLOOD CELL COUNT  7.36  5.58  5.15  4.18  6.52   HEMOGLOBIN  9.3 L  8.5 L  8.3 L  7.6 L  8.4 L   HEMATOCRIT  28.8 L  26.4 L  25.6 L  23.7 L  26.0 L   PLATELETS  221  206  207  206  248       CHEMISTRIES:  Recent Labs   Lab  05/17/18   1410   07/06/18   0301   10/03/18   0230  10/04/18   0307  10/04/18   0633  10/05/18   0242  10/06/18   0310   GLUCOSE  106   < >  107   < >  127 H  104  95  103  109   SODIUM  145   < >  141   < >  141  140  141  139  143   POTASSIUM  3.8   < >   3.6   < >  3.0 L  3.6  3.6  3.6  4.1   BUN BLD  17   < >  17   < >  29 H  42 H  46 H  54 H  51 H   CREATININE  1.1   < >  0.8   < >  1.1  1.9 H  2.1 H  2.1 H  1.5 H   EGFR IF   55 A   < >  >60.0   < >  55 A  28 A  25 A  25 A  38 A   EGFR IF NON-  48 A   < >  >60.0   < >  48 A  25 A  22 A  22 A  33 A   CALCIUM  8.7   < >  8.1 L   < >  9.4  8.7  8.9  8.0 L  8.6 L   MAGNESIUM  2.2   --   1.8   --    --    --    --    --    --     < > = values in this interval not displayed.       CARDIAC BIOMARKERS:  Recent Labs   Lab  10/01/18   0255  10/01/18   0911  10/03/18   1557  10/03/18   2210  10/04/18   0307   CPK   --    --   155   --    --    CPK MB   --    --   5.5   --    --    TROPONIN I  0.183 H  0.137 H  0.138 H  0.163 H  0.204 H       COAGS:  Recent Labs   Lab  05/17/18   1410  07/02/18   1234  08/20/18 1929 09/30/18   1000  10/04/18   0633   INR  1.0  1.0  1.0  1.0  1.0       LIPIDS/LFTS:  Recent Labs   Lab  04/06/16   1055   06/11/16   0334   01/09/17   1346   11/15/17   1025  01/03/18   1155  05/17/18   1410  08/20/18 1929 08/24/18   1407  09/30/18   1000   CHOLESTEROL  252 H   --   182   --   215 H   --   184   --    --    --    --    --    TRIGLYCERIDES  126   --   69   --   139   --   104   --    --    --    --    --    HDL  50   --   44   --   40   --   49   --    --    --    --    --    LDL CHOLESTEROL  176.8 H   --   124.2   --   147.2   --   114.2   --    --    --    --    --    NON-HDL CHOLESTEROL  202   --   138   --   175   --   135   --    --    --    --    --    AST  13   < >   --    < >  16   < >  11  13  13  12  12  35   ALT  11   < >   --    < >  21   < >  9 L  14  11  11  10  31    < > = values in this interval not displayed.       BNP:  Recent Labs   Lab  06/16/17   1927  07/08/17   0322  01/03/18   1155  05/17/18   1410  09/30/18   1030   BNP  950 H  598 H  390 H  579 H  621 H       TSH:        Free T4:        Diagnostic Results:  CTA Chest 9/30/18  -No  evidence of pulmonary arterial embolism.  Pulmonary edema with additional patchy bilateral opacities, right greater than left.  Findings may relate to atelectasis and/or aspiration with superimposed pneumonia also in the differential diagnosis.  -The tip of the endotracheal tube terminates near the right mainstem bronchus.  This can be retracted several cm for more optimal positioning.  Cardiomegaly with small left pleural effusion and other findings as above.  This report was flagged in Epic as abnormal.    Echo: 9/30/18  · Left ventricle ejection fraction is mildly decreased at 45%  · Left ventricle shows concentric remodeling.  · RV systolic function is normal.  · Left atrium is mildly dilated.  · Right atrium is moderately dilated.  · Tricuspid valve shows mild regurgitation.  · Grade II (moderate) left ventricular diastolic dysfunction consistent with pseudonormalization.  · LA pressure is elevated.  · Aortic valve: There is a bioprosthetic valve present. There is no aortic insufficiency present. CLEMENT is 1.31 cm2; mean gradient is 8.63 mmHg; peak gradient is 15.40 mmHg; AV peak velocity is 1.96 m/s    Echo 8/10/18    1 - Normal left ventricular systolic function (EF 60-65%).     2 - No wall motion abnormalities.     3 - Concentric hypertrophy.     4 - Biatrial enlargement.     5 - Right ventricular enlargement with normal systolic function.     6 - Pulmonary hypertension. The estimated PA systolic pressure is 52 mmHg.     7 - S/P transcatheter AVR, CLEMENT = 2.58 cm2, AVAi = 1.32 cm2/m2, peak velocity = 1.7 m/s, mean gradient = 8 mmHg.     8 - Mild paravalvular aortic regurgitation.     9 - Mild tricuspid regurgitation.     10 - Trivial pericardial effusion.     11 - S/p 29mm Evolut TF TAVR.     TAVR/R ADELSO PTA 7/5/18    Successful RTF 29 Evolut with RCI PTA to enable delivery of the valve delivery system.    Severe, unexplained anemia, worse today.  Tranfused for Hbg 8 pre TAVR.    Cath: 6/1/18  D. Angiographic  Results  Diagnostic:        Patient has a left dominant coronary artery.        The coronary vessels have luminal irregularities.        - Left Main Coronary Artery:             The LM has luminal irregularities. There is GERMAIN 3 flow.     - Left Anterior Descending Artery:             The ostial LAD has a 60% stenosis. There is GERMAIN 3 flow.     - Left Circumflex Artery:             The mid LCX has a 60% stenosis. There is GERMAIN 3 flow.     - Right Coronary Artery:             The ostial RCA has a 99% stenosis. There is GERMAIN 3 flow.             The proximal RCA has a 80% stenosis. There is GERMAIN 3 flow.     - Common Femoral Artery:             The right CFA has luminal irregularities. Access closure with perclose was very difficult due to inability to catch needles with perclose device.  Intervention       Ostial RCA:              The lesion was successfully intervened. Post-stenosis of 0%, post-GERMAIN 3 flow and TMP grade 3. The vessel was accessed natively.  The following items were used: 2.0MM 15MM [place] Balloon and 2.5X12 Mini Vision Stent.       Proximal RCA:              The lesion was successfully intervened. Post-stenosis of 0%, post-GERMAIN 3 flow and TMP grade 3. The vessel was accessed natively.  The following items were used: 2.5MM 12MM [place] Balloon, Stent Integrity 2.5 X 8 and 3.0MM 8MM [place] Balloon.      Assessment and Plan:     * Cardiac arrest    PEA on arrival with severe acidosis.   EKG with .   Normal PPM function with no high rate episodes on interrogation.   Etiology for the arrest is unclear - possible respiratory.   Repeat echo with mildly decreased EF and normal TAVR fxn.  Plan eventual cath when renal fxn stabilizes (?Monday with Dr. Carmen)        Acute pulmonary edema    Seems to be doing better          CAD s/p PCI    Mild troponin increase.   S/p RCA PCI prior to TAVR, intermed LAD/LCx stenoses noted.  Eventual LHC with Dr. Carmen when renal fxn stabilizes        Essential hypertension     Cont med rx  Labile BP noted        Hyperlipidemia    Cont statin         Pacemaker    Biotronik.   Interrogation with normal device function and no high rate episodes        S/P TAVR (transcatheter aortic valve replacement)    Normal TAVR fxn by repeat echo this admission        Stage 3 chronic kidney disease    ARF/CRI  ?CONCETTA vs overdiuresis  Creat 2.1->1.5 today            VTE Risk Mitigation (From admission, onward)        Ordered     Place sequential compression device  Until discontinued      10/05/18 1249     Place LAUREANO hose  Until discontinued      10/05/18 1249     IP VTE HIGH RISK PATIENT  Once      09/30/18 1359        OK for transfer to [facility] from cardiac perspective.    Giancarlo Waterman MD  Cardiology  [facility]

## 2018-10-06 NOTE — SUBJECTIVE & OBJECTIVE
Interval History: Feeling better.  No complaints.    Review of Systems   HENT: Negative for ear discharge and ear pain.    Eyes: Negative for pain and itching.   Cardiovascular: Negative for chest pain and palpitations.   Neurological: Negative for seizures and syncope.     Objective:     Vital Signs (Most Recent):  Temp: 98.8 °F (37.1 °C) (10/06/18 0704)  Pulse: 69 (10/06/18 1200)  Resp: 13 (10/06/18 1200)  BP: (!) 161/71 (10/06/18 1200)  SpO2: 98 % (10/06/18 1200) Vital Signs (24h Range):  Temp:  [98 °F (36.7 °C)-99.3 °F (37.4 °C)] 98.8 °F (37.1 °C)  Pulse:  [69-83] 69  Resp:  [6-36] 13  SpO2:  [95 %-100 %] 98 %  BP: ()/(47-91) 161/71     Weight: 97 kg (213 lb 13.5 oz)  Body mass index is 35.59 kg/m².    Intake/Output Summary (Last 24 hours) at 10/6/2018 1256  Last data filed at 10/6/2018 1200  Gross per 24 hour   Intake 3675 ml   Output 1435 ml   Net 2240 ml      Physical Exam   Constitutional: She appears well-developed and well-nourished.   HENT:   Head: Normocephalic and atraumatic.   Eyes: Conjunctivae are normal. Pupils are equal, round, and reactive to light.   Neck: Neck supple.   Cardiovascular: Normal rate and regular rhythm.   Pulmonary/Chest: Effort normal. She has no wheezes.   Scattered rhonchi    Abdominal: Soft. She exhibits no distension.   Genitourinary:   Genitourinary Comments: Bloody urine in briceno bag   Musculoskeletal: She exhibits no edema or deformity.   Neurological:   Awake, slow to respond, but answering questions and following simple commands.   Skin: Skin is warm and dry.       Significant Labs:   BMP:   Recent Labs   Lab  10/06/18   0310   GLU  109   NA  143   K  4.1   CL  110   CO2  25   BUN  51*   CREATININE  1.5*   CALCIUM  8.6*     CBC:   Recent Labs   Lab  10/05/18   0242  10/06/18   0310   WBC  4.18  6.52   HGB  7.6*  8.4*   HCT  23.7*  26.0*   PLT  206  248

## 2018-10-07 LAB
ANION GAP SERPL CALC-SCNC: 9 MMOL/L
BASOPHILS # BLD AUTO: 0.01 K/UL
BASOPHILS NFR BLD: 0.1 %
BUN SERPL-MCNC: 40 MG/DL
CALCIUM SERPL-MCNC: 8.8 MG/DL
CHLORIDE SERPL-SCNC: 113 MMOL/L
CO2 SERPL-SCNC: 21 MMOL/L
CREAT SERPL-MCNC: 1.1 MG/DL
DIFFERENTIAL METHOD: ABNORMAL
EOSINOPHIL # BLD AUTO: 0.1 K/UL
EOSINOPHIL NFR BLD: 1.2 %
ERYTHROCYTE [DISTWIDTH] IN BLOOD BY AUTOMATED COUNT: 15.9 %
EST. GFR  (AFRICAN AMERICAN): 55 ML/MIN/1.73 M^2
EST. GFR  (NON AFRICAN AMERICAN): 48 ML/MIN/1.73 M^2
GLUCOSE SERPL-MCNC: 140 MG/DL
HCT VFR BLD AUTO: 25.6 %
HGB BLD-MCNC: 8.4 G/DL
LYMPHOCYTES # BLD AUTO: 0.7 K/UL
LYMPHOCYTES NFR BLD: 7.1 %
MCH RBC QN AUTO: 28.8 PG
MCHC RBC AUTO-ENTMCNC: 32.8 G/DL
MCV RBC AUTO: 88 FL
MONOCYTES # BLD AUTO: 0.8 K/UL
MONOCYTES NFR BLD: 9 %
NEUTROPHILS # BLD AUTO: 7.6 K/UL
NEUTROPHILS NFR BLD: 82.6 %
PLATELET # BLD AUTO: 236 K/UL
PMV BLD AUTO: 9.2 FL
POTASSIUM SERPL-SCNC: 4.1 MMOL/L
RBC # BLD AUTO: 2.92 M/UL
SODIUM SERPL-SCNC: 143 MMOL/L
WBC # BLD AUTO: 9.14 K/UL

## 2018-10-07 PROCEDURE — 25000003 PHARM REV CODE 250: Performed by: HOSPITALIST

## 2018-10-07 PROCEDURE — 20000000 HC ICU ROOM

## 2018-10-07 PROCEDURE — 85025 COMPLETE CBC W/AUTO DIFF WBC: CPT

## 2018-10-07 PROCEDURE — 63600175 PHARM REV CODE 636 W HCPCS: Performed by: HOSPITALIST

## 2018-10-07 PROCEDURE — 97162 PT EVAL MOD COMPLEX 30 MIN: CPT | Performed by: PHYSICAL THERAPIST

## 2018-10-07 PROCEDURE — 94761 N-INVAS EAR/PLS OXIMETRY MLT: CPT

## 2018-10-07 PROCEDURE — 25000003 PHARM REV CODE 250: Performed by: INTERNAL MEDICINE

## 2018-10-07 PROCEDURE — 94640 AIRWAY INHALATION TREATMENT: CPT

## 2018-10-07 PROCEDURE — 36415 COLL VENOUS BLD VENIPUNCTURE: CPT

## 2018-10-07 PROCEDURE — 27000221 HC OXYGEN, UP TO 24 HOURS

## 2018-10-07 PROCEDURE — G8979 MOBILITY GOAL STATUS: HCPCS | Mod: CI | Performed by: PHYSICAL THERAPIST

## 2018-10-07 PROCEDURE — 99233 SBSQ HOSP IP/OBS HIGH 50: CPT | Mod: ,,, | Performed by: INTERNAL MEDICINE

## 2018-10-07 PROCEDURE — 63600175 PHARM REV CODE 636 W HCPCS: Performed by: INTERNAL MEDICINE

## 2018-10-07 PROCEDURE — 80048 BASIC METABOLIC PNL TOTAL CA: CPT

## 2018-10-07 PROCEDURE — G8978 MOBILITY CURRENT STATUS: HCPCS | Mod: CM | Performed by: PHYSICAL THERAPIST

## 2018-10-07 RX ORDER — CLONIDINE HYDROCHLORIDE 0.1 MG/1
0.3 TABLET ORAL 3 TIMES DAILY
Status: DISCONTINUED | OUTPATIENT
Start: 2018-10-07 | End: 2018-10-21 | Stop reason: HOSPADM

## 2018-10-07 RX ORDER — HYDRALAZINE HYDROCHLORIDE 25 MG/1
100 TABLET, FILM COATED ORAL EVERY 8 HOURS
Status: DISCONTINUED | OUTPATIENT
Start: 2018-10-07 | End: 2018-10-21 | Stop reason: HOSPADM

## 2018-10-07 RX ORDER — POLYETHYLENE GLYCOL 3350 17 G/17G
17 POWDER, FOR SOLUTION ORAL 2 TIMES DAILY PRN
Status: DISCONTINUED | OUTPATIENT
Start: 2018-10-07 | End: 2018-10-21 | Stop reason: HOSPADM

## 2018-10-07 RX ORDER — FUROSEMIDE 10 MG/ML
40 INJECTION INTRAMUSCULAR; INTRAVENOUS ONCE
Status: COMPLETED | OUTPATIENT
Start: 2018-10-07 | End: 2018-10-07

## 2018-10-07 RX ORDER — MOXIFLOXACIN HYDROCHLORIDE 400 MG/1
400 TABLET ORAL DAILY
Status: DISCONTINUED | OUTPATIENT
Start: 2018-10-07 | End: 2018-10-21 | Stop reason: HOSPADM

## 2018-10-07 RX ORDER — METOPROLOL TARTRATE 50 MG/1
100 TABLET ORAL 2 TIMES DAILY
Status: DISCONTINUED | OUTPATIENT
Start: 2018-10-07 | End: 2018-10-21 | Stop reason: HOSPADM

## 2018-10-07 RX ORDER — NICARDIPINE HYDROCHLORIDE 0.2 MG/ML
2.5 INJECTION INTRAVENOUS CONTINUOUS
Status: DISCONTINUED | OUTPATIENT
Start: 2018-10-07 | End: 2018-10-10

## 2018-10-07 RX ORDER — MORPHINE SULFATE 10 MG/ML
1 INJECTION INTRAMUSCULAR; INTRAVENOUS; SUBCUTANEOUS EVERY 4 HOURS PRN
Status: DISCONTINUED | OUTPATIENT
Start: 2018-10-07 | End: 2018-10-08

## 2018-10-07 RX ORDER — ACETAMINOPHEN 325 MG/1
650 TABLET ORAL EVERY 4 HOURS PRN
Status: DISCONTINUED | OUTPATIENT
Start: 2018-10-07 | End: 2018-10-21 | Stop reason: HOSPADM

## 2018-10-07 RX ORDER — ALBUTEROL SULFATE 2.5 MG/.5ML
SOLUTION RESPIRATORY (INHALATION)
Status: DISCONTINUED
Start: 2018-10-07 | End: 2018-10-07 | Stop reason: WASHOUT

## 2018-10-07 RX ADMIN — CLOPIDOGREL BISULFATE 75 MG: 75 TABLET ORAL at 11:10

## 2018-10-07 RX ADMIN — AZELASTINE HYDROCHLORIDE 137 MCG: 137 SPRAY, METERED NASAL at 02:10

## 2018-10-07 RX ADMIN — DOCUSATE SODIUM AND SENNOSIDES 1 TABLET: 8.6; 5 TABLET, FILM COATED ORAL at 11:10

## 2018-10-07 RX ADMIN — GABAPENTIN 300 MG: 300 CAPSULE ORAL at 09:10

## 2018-10-07 RX ADMIN — FLUTICASONE PROPIONATE 1 PUFF: 220 AEROSOL, METERED RESPIRATORY (INHALATION) at 07:10

## 2018-10-07 RX ADMIN — ACETAMINOPHEN 650 MG: 325 TABLET, FILM COATED ORAL at 11:10

## 2018-10-07 RX ADMIN — MORPHINE SULFATE 1 MG: 10 INJECTION INTRAVENOUS at 11:10

## 2018-10-07 RX ADMIN — METOPROLOL TARTRATE 100 MG: 50 TABLET ORAL at 11:10

## 2018-10-07 RX ADMIN — HYDRALAZINE HYDROCHLORIDE 100 MG: 25 TABLET ORAL at 09:10

## 2018-10-07 RX ADMIN — GABAPENTIN 300 MG: 300 CAPSULE ORAL at 11:10

## 2018-10-07 RX ADMIN — NICARDIPINE HYDROCHLORIDE 2.5 MG/HR: 0.2 INJECTION, SOLUTION INTRAVENOUS at 01:10

## 2018-10-07 RX ADMIN — HYDRALAZINE HYDROCHLORIDE 20 MG: 20 INJECTION INTRAMUSCULAR; INTRAVENOUS at 11:10

## 2018-10-07 RX ADMIN — CLONIDINE HYDROCHLORIDE 0.3 MG: 0.1 TABLET ORAL at 02:10

## 2018-10-07 RX ADMIN — POLYETHYLENE GLYCOL 3350 17 G: 17 POWDER, FOR SOLUTION ORAL at 11:10

## 2018-10-07 RX ADMIN — NICARDIPINE HYDROCHLORIDE 2.5 MG/HR: 0.2 INJECTION, SOLUTION INTRAVENOUS at 08:10

## 2018-10-07 RX ADMIN — METOPROLOL TARTRATE 100 MG: 50 TABLET ORAL at 08:10

## 2018-10-07 RX ADMIN — FUROSEMIDE 40 MG: 10 INJECTION, SOLUTION INTRAMUSCULAR; INTRAVENOUS at 11:10

## 2018-10-07 RX ADMIN — CETIRIZINE HYDROCHLORIDE 10 MG: 10 TABLET, FILM COATED ORAL at 11:10

## 2018-10-07 RX ADMIN — AZELASTINE HYDROCHLORIDE 137 MCG: 137 SPRAY, METERED NASAL at 09:10

## 2018-10-07 RX ADMIN — CLONIDINE HYDROCHLORIDE 0.3 MG: 0.1 TABLET ORAL at 08:10

## 2018-10-07 RX ADMIN — MORPHINE SULFATE 1 MG: 10 INJECTION INTRAVENOUS at 07:10

## 2018-10-07 RX ADMIN — MOXIFLOXACIN HYDROCHLORIDE 400 MG: 400 TABLET, FILM COATED ORAL at 11:10

## 2018-10-07 RX ADMIN — ACETAMINOPHEN 650 MG: 650 SUPPOSITORY RECTAL at 03:10

## 2018-10-07 RX ADMIN — DOCUSATE SODIUM AND SENNOSIDES 1 TABLET: 8.6; 5 TABLET, FILM COATED ORAL at 08:10

## 2018-10-07 RX ADMIN — ACETAMINOPHEN 650 MG: 325 TABLET, FILM COATED ORAL at 04:10

## 2018-10-07 RX ADMIN — ASPIRIN 81 MG CHEWABLE TABLET 81 MG: 81 TABLET CHEWABLE at 11:10

## 2018-10-07 RX ADMIN — HYDRALAZINE HYDROCHLORIDE 100 MG: 25 TABLET ORAL at 02:10

## 2018-10-07 NOTE — ASSESSMENT & PLAN NOTE
Aspirin, plavix and statin resumed   Cardiology following  Elevated troponin after cardiopulmonary arrest

## 2018-10-07 NOTE — PROGRESS NOTES
0930 Discussed overnight change in pt status with Dr. Wharton, including necessity for NG tube, changes to medication, blood pressure control, secretions, slurred speech, confusion, febrile temperature. TLC securement device is loose at top, remains securely in place, dressing changed, blood return all three ports, no leaking or s/s of infection or infiltration.     0945 Placed NG tube to R nostril, pt tolerated well, 63 cm at nostril, called Xray for verification; awaiting results.    1130  Ok to use NG tube, medications administered via NG tube.  Pt reports relief from pain, blood pressure trending down.    1630  Pt maintained NPO, pain well controlled, afebrile at 1530, turned Q2, urine output adequate, urine clearing after Irwin catheter irrigation to cloudy yellow urine, edema to hands/wrist improved to 1+, weaned to 1L via NC O2 for an SpO2 of 99%, HR 81, SBP below 160, residual 0 mL throughout shift, oral care and pericare performed, up to seated position at side of bed and performed some ROM exercises with with PT, pt able to answer questions and follow commands, but response is delayed, and pt appears drowsy.  Able to make needs known, no needs at this time.

## 2018-10-07 NOTE — SUBJECTIVE & OBJECTIVE
Interval History: Placed on Cardene gtt overnight.  Spiked fever.  Less responsive.    Review of Systems   Unable to perform ROS: Mental status change     Objective:     Vital Signs (Most Recent):  Temp: 100.1 °F (37.8 °C) (10/07/18 0900)  Pulse: 97 (10/07/18 1100)  Resp: (!) 31 (10/07/18 1100)  BP: (!) 156/67 (10/07/18 1030)  SpO2: 99 % (10/07/18 1100) Vital Signs (24h Range):  Temp:  [98.2 °F (36.8 °C)-100.8 °F (38.2 °C)] 100.1 °F (37.8 °C)  Pulse:  [] 97  Resp:  [13-54] 31  SpO2:  [93 %-100 %] 99 %  BP: (112-224)/() 156/67     Weight: 92.5 kg (203 lb 14.8 oz)  Body mass index is 33.93 kg/m².    Intake/Output Summary (Last 24 hours) at 10/7/2018 1135  Last data filed at 10/7/2018 0800  Gross per 24 hour   Intake 1398.13 ml   Output 1480 ml   Net -81.87 ml      Physical Exam   Constitutional: She appears well-developed and well-nourished.   HENT:   Head: Normocephalic and atraumatic.   Eyes: Conjunctivae are normal. Pupils are equal, round, and reactive to light.   Neck: Neck supple.   Cardiovascular: Normal rate and regular rhythm.   Pulmonary/Chest: Effort normal. She has no wheezes.   Scattered rhonchi    Abdominal: Soft. She exhibits no distension.   Musculoskeletal: She exhibits no edema or deformity.   Neurological: No cranial nerve deficit.   Less responsive.   Skin: Skin is warm and dry.       Significant Labs:   BMP:   Recent Labs   Lab  10/07/18   0340   GLU  140*   NA  143   K  4.1   CL  113*   CO2  21*   BUN  40*   CREATININE  1.1   CALCIUM  8.8     CBC:   Recent Labs   Lab  10/06/18   0310  10/07/18   0340   WBC  6.52  9.14   HGB  8.4*  8.4*   HCT  26.0*  25.6*   PLT  248  236       Significant Imaging: I have reviewed all pertinent imaging results/findings within the past 24 hours.

## 2018-10-07 NOTE — CONSULTS
Renal Progress Note    Date of Admission:  9/30/2018 10:00 AM      ROS: unable to obtain at present    Physical Exam:    Vitals:    10/07/18 0800   BP: (!) 147/65   Pulse: 94   Resp: (!) 32   Temp: 99.5 °F (37.5 °C)     I/O last 3 completed shifts:  In: 3628.1 [P.O.:1560; I.V.:2068.1]  Out: 2370 [Urine:2370]  No intake/output data recorded.      Laboratories:    Recent Labs   Lab  10/07/18   0340   WBC  9.14   RBC  2.92*   HGB  8.4*   HCT  25.6*   PLT  236   MCV  88   MCH  28.8   MCHC  32.8     Recent Labs   Lab  10/07/18   0340   CALCIUM  8.8   NA  143   K  4.1   CO2  21*   CL  113*   BUN  40*   CREATININE  1.1     Prot/Creat Ratio, Ur              2.26   Prot/Creat Ratio, Ur      Sodium Urine Random              46   Sodium Urine Random     Protein, Urine Random              163   Protein          Microbiology Results (last 7 days)     Procedure Component Value Units Date/Time    Blood culture [[id number]] Collected:  10/06/18 2123    Order Status:  Completed Specimen:  Blood Updated:  10/07/18 0512     Blood Culture, Routine No Growth to date    Blood culture [[id number]] Collected:  10/06/18 2157    Order Status:  Completed Specimen:  Blood from Peripheral, Left  Arm Updated:  10/07/18 0512     Blood Culture, Routine No Growth to date    Blood culture #2 **CANNOT BE ORDERED STAT** [[id number]] Collected:  09/30/18 1113    Order Status:  Completed Specimen:  Blood from Peripheral, Forearm, Right Updated:  10/05/18 1303     Blood Culture, Routine No growth after 5 days.    Blood Culture #1 **CANNOT BE ORDERED STAT** [[id number]] Collected:  09/30/18 1101    Order Status:  Completed Specimen:  Blood from Peripheral, Hand, Left Updated:  10/05/18 1303     Blood Culture, Routine No growth after 5 days.    Culture, Respiratory with Gram Stain [[id number]] Collected:  10/01/18 1521    Order Status:  Completed Specimen:  Respiratory from Endotracheal Aspirate Updated:  10/03/18 0802      Respiratory Culture Normal respiratory kim     Gram Stain (Respiratory) <10 epithelial cells per low power field.     Gram Stain (Respiratory) Moderate WBC's     Gram Stain (Respiratory) Few Gram positive cocci in pairs     Gram Stain (Respiratory) Rare Gram negative diplococci        Diagnostic Tests:  X-Ray Chest AP Portable [[id number]] Resulted: 10/01/18 1115   Order Status: Completed      FINDINGS:  Endotracheal tube is repositioned with tip about 6 cm above the jean marie.  Right central venous catheter and gastric tube remain in place.  Pacemaker is present on the left.  Heart remains mildly enlarged.  Aortic atherosclerosis and aortic valve stent are noted in the mediastinum.  The lungs are better expanded and aerated.  Most of the airspace opacification seen in both lungs on prior exam has cleared with some still present at the bases.  Small pleural effusions are present.  No pneumothorax.   Impression:       Endotracheal tube repositioned with tip above jean marie    Improving pulmonary edema.      Electronically signed by: Doug Rojas MD  Date: 10/01/2018  Time: 11:15     Assessment:    83 y/o female with Hx. AVR s/p TAVR and Pacer in July admitted with:    - s/p PEA arrest  - RESOLVED Oliguric EUGENE   - Macroscopic hematuria likely Irwin trauma  - Anemia  - Hx. CAD with mild troponin increase  - Hx. HTN    Plan:  - O.K. To proceed with planned Cardiac Cath.   - BP control  - f/u BMP and CBC

## 2018-10-07 NOTE — ASSESSMENT & PLAN NOTE
BP uncontrolled  Patient on home Clonidine that was stopped during this admit.  Possible rebound HTN.  Restarted home Clonidine.  Now on Cardene gtt.

## 2018-10-07 NOTE — ASSESSMENT & PLAN NOTE
1.  PVD w/o Tear OD - RD precautions. 2.  Return for an appointment in 6 weeks for 30. with Dr. Ree Lombardi. Seems to be doing better  Possible aspiration event noted 10/6/18

## 2018-10-07 NOTE — SUBJECTIVE & OBJECTIVE
Review of Systems   Gastrointestinal: Negative for melena.   Genitourinary: Positive for hematuria.     Objective:     Vital Signs (Most Recent):  Temp: 99.1 °F (37.3 °C) (10/07/18 0515)  Pulse: 85 (10/07/18 0615)  Resp: (!) 25 (10/07/18 0615)  BP: (!) 140/63 (10/07/18 0600)  SpO2: 98 % (10/07/18 0615) Vital Signs (24h Range):  Temp:  [98.2 °F (36.8 °C)-100.8 °F (38.2 °C)] 99.1 °F (37.3 °C)  Pulse:  [69-99] 85  Resp:  [7-54] 25  SpO2:  [93 %-100 %] 98 %  BP: (129-224)/() 140/63     Weight: 92.5 kg (203 lb 14.8 oz)  Body mass index is 33.93 kg/m².     SpO2: 98 %  O2 Device (Oxygen Therapy): nasal cannula      Intake/Output Summary (Last 24 hours) at 10/7/2018 0741  Last data filed at 10/7/2018 0600  Gross per 24 hour   Intake 1888.13 ml   Output 1605 ml   Net 283.13 ml       Lines/Drains/Airways     Central Venous Catheter Line                 Percutaneous Central Line Insertion/Assessment - triple lumen  09/30/18 1050 right internal jugular 6 days          Drain                 Urethral Catheter 09/30/18 1025 16 Fr. 6 days                Physical Exam   Constitutional: She appears well-developed and well-nourished. No distress.   HENT:   Head: Normocephalic and atraumatic.   Mouth/Throat: No oropharyngeal exudate.   Eyes: Conjunctivae and EOM are normal. Pupils are equal, round, and reactive to light. No scleral icterus.   Neck: Normal range of motion. Neck supple. No JVD present. No tracheal deviation present. No thyromegaly present.   Cardiovascular: Normal rate, regular rhythm, S1 normal and S2 normal. Exam reveals no gallop and no friction rub.   Murmur heard.   Systolic murmur is present with a grade of 2/6.  Pulmonary/Chest: Effort normal and breath sounds normal. No respiratory distress. She has no wheezes. She has no rales. She exhibits no tenderness.   Abdominal: Soft. She exhibits no distension.   obese   Musculoskeletal: Normal range of motion. She exhibits no edema.   Neurological: No cranial  nerve deficit.   obtunded   Skin: Skin is warm and dry. She is not diaphoretic.   Psychiatric: She has a normal mood and affect. Her behavior is normal. Judgment normal.       Current Medications:   aspirin  81 mg Oral Daily    azelastine  1 spray Nasal BID    carBAMazepine  200 mg Oral TID    cetirizine  10 mg Oral Daily    cloNIDine 0.2 mg/24 hr td ptwk  1 patch Transdermal Q7 Days    clopidogrel  75 mg Oral Daily    fluticasone  1 puff Inhalation BID    gabapentin  300 mg Oral TID    senna-docusate 8.6-50 mg  1 tablet Oral BID      niCARdipine 5 mg/hr (10/07/18 0600)     acetaminophen, benzonatate, hydrALAZINE, influenza, nitroGLYCERIN, ondansetron, polyethylene glycol    Laboratory:  CBC:  Recent Labs   Lab  10/04/18   0307  10/04/18   0632  10/05/18   0242  10/06/18   0310  10/07/18   0340   WHITE BLOOD CELL COUNT  5.58  5.15  4.18  6.52  9.14   HEMOGLOBIN  8.5 L  8.3 L  7.6 L  8.4 L  8.4 L   HEMATOCRIT  26.4 L  25.6 L  23.7 L  26.0 L  25.6 L   PLATELETS  206  207  206  248  236       CHEMISTRIES:  Recent Labs   Lab  05/17/18   1410   07/06/18   0301   10/04/18   0307  10/04/18   0633  10/05/18   0242  10/06/18   0310  10/07/18   0340   GLUCOSE  106   < >  107   < >  104  95  103  109  140 H   SODIUM  145   < >  141   < >  140  141  139  143  143   POTASSIUM  3.8   < >  3.6   < >  3.6  3.6  3.6  4.1  4.1   BUN BLD  17   < >  17   < >  42 H  46 H  54 H  51 H  40 H   CREATININE  1.1   < >  0.8   < >  1.9 H  2.1 H  2.1 H  1.5 H  1.1   EGFR IF   55 A   < >  >60.0   < >  28 A  25 A  25 A  38 A  55 A   EGFR IF NON-  48 A   < >  >60.0   < >  25 A  22 A  22 A  33 A  48 A   CALCIUM  8.7   < >  8.1 L   < >  8.7  8.9  8.0 L  8.6 L  8.8   MAGNESIUM  2.2   --   1.8   --    --    --    --    --    --     < > = values in this interval not displayed.       CARDIAC BIOMARKERS:  Recent Labs   Lab  10/01/18   0255  10/01/18   0911  10/03/18   7187  10/03/18   3180  10/04/18   0307   CPK    --    --   155   --    --    CPK MB   --    --   5.5   --    --    TROPONIN I  0.183 H  0.137 H  0.138 H  0.163 H  0.204 H       COAGS:  Recent Labs   Lab  05/17/18   1410  07/02/18   1234  08/20/18 1929  09/30/18   1000  10/04/18   0633   INR  1.0  1.0  1.0  1.0  1.0       LIPIDS/LFTS:  Recent Labs   Lab  04/06/16   1055   06/11/16   0334   01/09/17   1346   11/15/17   1025  01/03/18   1155  05/17/18   1410  08/20/18   1929  08/24/18   1407  09/30/18   1000   CHOLESTEROL  252 H   --   182   --   215 H   --   184   --    --    --    --    --    TRIGLYCERIDES  126   --   69   --   139   --   104   --    --    --    --    --    HDL  50   --   44   --   40   --   49   --    --    --    --    --    LDL CHOLESTEROL  176.8 H   --   124.2   --   147.2   --   114.2   --    --    --    --    --    NON-HDL CHOLESTEROL  202   --   138   --   175   --   135   --    --    --    --    --    AST  13   < >   --    < >  16   < >  11  13  13  12  12  35   ALT  11   < >   --    < >  21   < >  9 L  14  11  11  10  31    < > = values in this interval not displayed.       BNP:  Recent Labs   Lab  06/16/17   1927  07/08/17   0322  01/03/18   1155  05/17/18   1410  09/30/18   1030   BNP  950 H  598 H  390 H  579 H  621 H       TSH:        Free T4:        Diagnostic Results:  CTA Chest 9/30/18  -No evidence of pulmonary arterial embolism.  Pulmonary edema with additional patchy bilateral opacities, right greater than left.  Findings may relate to atelectasis and/or aspiration with superimposed pneumonia also in the differential diagnosis.  -The tip of the endotracheal tube terminates near the right mainstem bronchus.  This can be retracted several cm for more optimal positioning.  Cardiomegaly with small left pleural effusion and other findings as above.  This report was flagged in Epic as abnormal.    Echo: 9/30/18  · Left ventricle ejection fraction is mildly decreased at 45%  · Left ventricle shows concentric remodeling.  · RV systolic  function is normal.  · Left atrium is mildly dilated.  · Right atrium is moderately dilated.  · Tricuspid valve shows mild regurgitation.  · Grade II (moderate) left ventricular diastolic dysfunction consistent with pseudonormalization.  · LA pressure is elevated.  · Aortic valve: There is a bioprosthetic valve present. There is no aortic insufficiency present. CLEMENT is 1.31 cm2; mean gradient is 8.63 mmHg; peak gradient is 15.40 mmHg; AV peak velocity is 1.96 m/s    Echo 8/10/18    1 - Normal left ventricular systolic function (EF 60-65%).     2 - No wall motion abnormalities.     3 - Concentric hypertrophy.     4 - Biatrial enlargement.     5 - Right ventricular enlargement with normal systolic function.     6 - Pulmonary hypertension. The estimated PA systolic pressure is 52 mmHg.     7 - S/P transcatheter AVR, CLEMENT = 2.58 cm2, AVAi = 1.32 cm2/m2, peak velocity = 1.7 m/s, mean gradient = 8 mmHg.     8 - Mild paravalvular aortic regurgitation.     9 - Mild tricuspid regurgitation.     10 - Trivial pericardial effusion.     11 - S/p 29mm Evolut TF TAVR.     TAVR/R ADELSO PTA 7/5/18    Successful RTF 29 Evolut with RCI PTA to enable delivery of the valve delivery system.    Severe, unexplained anemia, worse today.  Tranfused for Hbg 8 pre TAVR.    Cath: 6/1/18  D. Angiographic Results  Diagnostic:        Patient has a left dominant coronary artery.        The coronary vessels have luminal irregularities.        - Left Main Coronary Artery:             The LM has luminal irregularities. There is GERMAIN 3 flow.     - Left Anterior Descending Artery:             The ostial LAD has a 60% stenosis. There is GERMAIN 3 flow.     - Left Circumflex Artery:             The mid LCX has a 60% stenosis. There is GERMAIN 3 flow.     - Right Coronary Artery:             The ostial RCA has a 99% stenosis. There is GERMAIN 3 flow.             The proximal RCA has a 80% stenosis. There is GERMAIN 3 flow.     - Common Femoral Artery:             The  right CFA has luminal irregularities. Access closure with perclose was very difficult due to inability to catch needles with perclose device.  Intervention       Ostial RCA:              The lesion was successfully intervened. Post-stenosis of 0%, post-GERMAIN 3 flow and TMP grade 3. The vessel was accessed natively.  The following items were used: 2.0MM 15MM [place] Balloon and 2.5X12 Mini Vision Stent.       Proximal RCA:              The lesion was successfully intervened. Post-stenosis of 0%, post-GERMAIN 3 flow and TMP grade 3. The vessel was accessed natively.  The following items were used: 2.5MM 12MM [place] Balloon, Stent Integrity 2.5 X 8 and 3.0MM 8MM [place] Balloon.

## 2018-10-07 NOTE — ASSESSMENT & PLAN NOTE
PEA on arrival with severe acidosis.   EKG with .   Normal PPM function with no high rate episodes on interrogation.   Etiology for the arrest is unclear - possible respiratory.   Repeat echo with mildly decreased EF and normal TAVR fxn.  Plan eventual cath when renal fxn stabilizes, will await clearance from Dr. Monroe/nephrology.  Cath on hold pending clearance of ?delirium/aspiration.

## 2018-10-07 NOTE — PROGRESS NOTES
[facility] Hospital Medicine  Progress Note    Patient Name: Cindy Lyman  MRN: [id number]  Patient Class: IP- Inpatient   Admission Date: 9/30/2018  Length of Stay: 7 days  Attending Physician: Grupo Wharton MD  Primary Care Provider: Rodger Camarena MD        Subjective:     Principal Problem:Cardiac arrest    HPI:  81 yo female with diastolic CHF, COPD, gastric cancer, aortic stenosis with complete heart block and s/p TAVR and pacemaker placed 7/2018 presented in cardiac arrest. Per ED notes, patient was on her way to [facility] with others and c/o chest pain. CPR was initiated in parked vehicle outside ED. One round of epinephrine given and got ROSC. Per family, she c/o worsening SOB over several days. On arrival pt was euthermic, hypertensive, elevated lactate and BNP. CXR suggest pulmonary edema though infection cannot be ruled out. Pt intubated and evaluated by pulmonology. Cardiology consulted. ECHO pending. Broad spectrum antibiotics initiated until cultures result and clinical course dictates.     Chest CTA negative for pulmonary emboli.     Per family, Pt is a full code.     Hospital Course:  Pt admitted after PEA arrest and acute respiratory failure/acidosis. Acidosis corrected. Pulmonology assisting with vent management/weaning. Trial of precedex today but patient did not tolerate. Switched back to propofol. CPAP this am and now back on PVRC. Pacemaker interrogated and appears to be functioning fine. Diuresed well on lasix without change in renal function. Possible plan for LHC per cardiology. Extubated on 10/2. Initially slow to respond, but mental status improving.  Planned LHC on 10/4 held secondary to rise in Creat.  Lasix stopped and getting IVF hydration.  Episodes of slight hypotension and BP medications adjusted.  Worsening urine output.  Urine also bloody.  Urology consulted.  Improving urine output and Creat.  10/7: Improving Creat.  Patient more drowsy and less  responsive.  Spiked fever overnight.  Had to be placed on Cardene gtt secondary to poorly controlled HTN.    Interval History: Placed on Cardene gtt overnight.  Spiked fever.  Less responsive.    Review of Systems   Unable to perform ROS: Mental status change     Objective:     Vital Signs (Most Recent):  Temp: 100.1 °F (37.8 °C) (10/07/18 0900)  Pulse: 97 (10/07/18 1100)  Resp: (!) 31 (10/07/18 1100)  BP: (!) 156/67 (10/07/18 1030)  SpO2: 99 % (10/07/18 1100) Vital Signs (24h Range):  Temp:  [98.2 °F (36.8 °C)-100.8 °F (38.2 °C)] 100.1 °F (37.8 °C)  Pulse:  [] 97  Resp:  [13-54] 31  SpO2:  [93 %-100 %] 99 %  BP: (112-224)/() 156/67     Weight: 92.5 kg (203 lb 14.8 oz)  Body mass index is 33.93 kg/m².    Intake/Output Summary (Last 24 hours) at 10/7/2018 1135  Last data filed at 10/7/2018 0800  Gross per 24 hour   Intake 1398.13 ml   Output 1480 ml   Net -81.87 ml      Physical Exam   Constitutional: She appears well-developed and well-nourished.   HENT:   Head: Normocephalic and atraumatic.   Eyes: Conjunctivae are normal. Pupils are equal, round, and reactive to light.   Neck: Neck supple.   Cardiovascular: Normal rate and regular rhythm.   Pulmonary/Chest: Effort normal. She has no wheezes.   Scattered rhonchi    Abdominal: Soft. She exhibits no distension.   Musculoskeletal: She exhibits no edema or deformity.   Neurological: No cranial nerve deficit.   Less responsive.   Skin: Skin is warm and dry.       Significant Labs:   BMP:   Recent Labs   Lab  10/07/18   0340   GLU  140*   NA  143   K  4.1   CL  113*   CO2  21*   BUN  40*   CREATININE  1.1   CALCIUM  8.8     CBC:   Recent Labs   Lab  10/06/18   0310  10/07/18   0340   WBC  6.52  9.14   HGB  8.4*  8.4*   HCT  26.0*  25.6*   PLT  248  236       Significant Imaging: I have reviewed all pertinent imaging results/findings within the past 24 hours.    Assessment/Plan:      * Cardiac arrest    Etiology still unclear, but PEA reported by first  humphrey, quick ROSC with one round of epinephrine  Pt intubated on sedation and will have wean sedation for appropriate neurological exam, consult neurology if indicated   ECHO  -  +DD  Pacemaker interrogated             Acute hypoxemic respiratory failure    Chest CTA r/o PE, likely due to decompensated CHF and possible underlying infection  Pulmonology consulted for vent management  Cardiology consulted - ECHo :  · Left ventricle ejection fraction is mildly decreased at 45%  · Left ventricle shows concentric remodeling.  · RV systolic function is normal.  · Left atrium is mildly dilated.  · Right atrium is moderately dilated.  · Tricuspid valve shows mild regurgitation.  · Grade II (moderate) left ventricular diastolic dysfunction consistent with pseudonormalization.  · LA pressure is elevated.   Cultures - negative, sent for resp cultures today   Appreciate Pulmonary input.  Extubated on 10/2.  Initially slow to respond, but improving.  Advance diet.  Less responsive today.  Spiked fever.  Possible aspiration pneumonitis.  Start Avelox.  Placed on Cardene drip for uncontrolled HTN.              Hematuria, gross    Appreciate Urology input.  Holding Lovenox.  Urine clearing.          Pacemaker    See above           Acute pulmonary edema    Pt given IV lasix with large amount of diuresis  CXr improving   Holding Lasix secondary to rise in Creat.            S/P TAVR (transcatheter aortic valve replacement)              Stage 3 chronic kidney disease    No indication for vasopressor or aggressive IVF  Monitor closely on IV lasix   Avoid nephrotoxins   ARF--holding Lasix and giving IVF hydration.  Creat stable, but urine output poor.  Nephrology consult.  Hematuria--hold Lovenox.  Consulted Urology.  Improving urine output and Creat          Essential hypertension    BP uncontrolled  Patient on home Clonidine that was stopped during this admit.  Possible rebound HTN.  Restarted home Clonidine.  Now on  Cardene gtt.            CAD s/p PCI    Aspirin, plavix and statin resumed   Cardiology following  Elevated troponin after cardiopulmonary arrest          Anemia of chronic disease    Monitor  Reviewed bone marrow biopsy 2017 - no malignancy   H/H low, but stable.        Hyperlipidemia    Resume statin             VTE Risk Mitigation (From admission, onward)        Ordered     Place sequential compression device  Until discontinued      10/05/18 1249     Place LAUREANO hose  Until discontinued      10/05/18 1249     IP VTE HIGH RISK PATIENT  Once      09/30/18 1359          Critical care time spent on the evaluation and treatment of severe organ dysfunction, review of pertinent labs and imaging studies, discussions with consulting providers and discussions with patient/family: 50 minutes.    Grupo Wharton MD  Department of Hospital Medicine   [facility]

## 2018-10-07 NOTE — PT/OT/SLP EVAL
Physical Therapy Evaluation    Patient Name:  Cindy Lyman   MRN:  [id number]    Recommendations:     Discharge Recommendations:      Discharge Equipment Recommendations: none   Barriers to discharge: None    Assessment:     Cindy Lyman is a 82 y.o. female admitted with a medical diagnosis of Cardiac arrest.  She presents with the following impairments/functional limitations:  weakness, impaired endurance, impaired functional mobilty, gait instability, impaired balance, decreased lower extremity function, decreased safety awareness, pain, impaired coordination, impaired cardiopulmonary response to activity, edema.    Rehab Prognosis:  Good -; patient would benefit from acute skilled PT services to address these deficits and reach maximum level of function.      Recent Surgery: Procedure(s) (LRB):  Left heart cath (Left)      Plan:     During this hospitalization, patient to be seen 6 x/week to address the above listed problems via gait training, therapeutic activities, therapeutic exercises  · Plan of Care Expires:  10/20/18   Plan of Care Reviewed with: patient(- attempted to call daughter - left message..)    Subjective     Communicated with Nurse Campbell prior to session.  Patient found supine upon PT entry to room, agreeable to evaluation.      Chief Complaint: moans with AAROM with ISRRAEL KOHLER  Patient comments/goals: to return to PLOF    Pain/Comfort:  · Pain Rating 1: other (see comments)(- Pt moans with AAROM, but unable to quantify. )  · Location - Side 1: Bilateral  · Location 1: hip  · Pain Addressed 1: Nurse notified, Pre-medicate for activity    Patients cultural, spiritual, Jew conflicts given the current situation:      Living Environment:  Pt lives with daughter in 2 story home with 1 IVONNE and 13 Steps to 2nd floor with // on Right side.  Pt has a lift to get to the second floor where the bed and bathroom are located.   Prior to admission, patients level of function was Mod I with  Rollator use for community ambulation only.  Patient has the following equipment: cane, straight, rollator.  DME owned (not currently used): none.  Upon discharge, patient will have assistance from Daughter and self.    Objective:     Patient found with: blood pressure cuff, central line, briceno catheter, oxygen, telemetry, SCD, pulse ox (continuous)(- 3.0 Lpm of O2 via N.C. )     General Precautions: Standard, fall   Orthopedic Precautions:Full weight bearing   Braces: N/A     Exams:  · Cognitive Exam:  Patient is oriented to Person  · Gross Motor Coordination:  limited 2* weakness and edema  · Postural Exam:  Patient presented with the following abnormalities:    · -       Rounded shoulders  · -       Forward head  · Skin Integrity/Edema:      · -       Skin integrity: Visible skin intact  · -       Edema: Mild B hands  · RLE ROM: WFL  · RLE Strength: Deficits: Quad = 2+/5  · LLE ROM: WFL  · LLE Strength: Deficits: Quad = 2+/5    Functional Mobility:  · Bed Mobility:     · Supine to Sit: maximal assistance  · Sit to Supine: maximal assistance  · Balance: sitting with max assist to maintain postural control    AM-PAC 6 CLICK MOBILITY  Total Score:8     Patient left supine with all lines intact, call button in reach and Nurse Shannan notified.    GOALS:   Multidisciplinary Problems     Physical Therapy Goals        Problem: Physical Therapy Goal    Goal Priority Disciplines Outcome Goal Variances Interventions   Physical Therapy Goal     PT, PT/OT      Description:  Goals to be met by: 10/20/2018     Patient will increase functional independence with mobility by performin. Supine to sit with Stand-by Assistance  2. Sit to supine with Stand-by Assistance  3. Sitting at edge of bed x15 minutes with Modified [place]  4. Lower extremity exercise program x20 reps per handout, with supervision.  5. Transfers - TBD at later time.  6. Gait - TBD at later time.    Recommend: SNF at time of discharge.                      History:     Past Medical History:   Diagnosis Date    Anemia     Anticoagulant long-term use     Aortic stenosis     Arthritis     lower back    Asthma     CAD (coronary artery disease) 4/24/2015    Carpal tunnel syndrome on both sides     Cataract     CHF (congestive heart failure) 5/2013    COPD (chronic obstructive pulmonary disease)     Depression     DVT (deep venous thrombosis)     Hyperlipidemia     Hypertension     Pulmonary HTN     TMJ (temporomandibular joint disorder)        Past Surgical History:   Procedure Laterality Date    A-V CARDIAC PACEMAKER INSERTION N/A 7/5/2018    Procedure: INSERTION, CARDIAC PACEMAKER, DUAL CHAMBER;  Surgeon: Giancarlo Houser MD;  Location: [facility] CATH LAB;  Service: Cardiology;  Laterality: N/A;    AORTIC VALVE REPLACEMENT N/A 7/5/2018    Procedure: Replacement-valve-aortic;  Surgeon: Corey Castañeda MD;  Location: [facility] CATH LAB;  Service: Cardiology;  Laterality: N/A;    BACK SURGERY      BIOPSY-BONE MARROW N/A 5/26/2016    Performed by Rufino Ovalle MD at [facility] ENDO    cardiac stents      CARDIAC VALVE SURGERY      CARPAL TUNNEL RELEASE      Right/Left    CHOLECYSTECTOMY-LAPAROSCOPIC W/ CHOLANGIOGRAM N/A 7/13/2017    Performed by Luis Carlos Jarvis MD at [facility] OR    EYE SURGERY      cataract extraction    HYSTERECTOMY      Partial    INSERTION, CARDIAC PACEMAKER, DUAL CHAMBER N/A 7/5/2018    Performed by Giancarlo Houser MD at [facility] CATH LAB    JOINT REPLACEMENT  2009    Left hip    POLYPECTOMY      x9    Replacement-valve-aortic N/A 7/5/2018    Performed by Corey Castañeda MD at [facility] CATH LAB    TEMPOROMANDIBULAR JOINT SURGERY      ULTRASOUND-ENDOSCOPIC-UPPER N/A 4/25/2018    Performed by Socrates Andrew MD at [facility] ENDO ([facility])       Clinical Decision Making:     History  Co-morbidities and personal factors that may impact the plan of care Examination  Body Structures and Functions, activity limitations and participation  restrictions that may impact the plan of care Clinical Presentation   Decision Making/ Complexity Score   Co-morbidities:   [] Time since onset of injury / illness / exacerbation  [x] Status of current condition  [x]Patient's cognitive status and safety concerns    [x] Multiple Medical Problems (see med hx)  Personal Factors:   [x] Patient's age  [] Prior Level of function   [] Patient's home situation (environment and family support)  [] Patient's level of motivation  [] Expected progression of patient      HISTORY:(criteria)    [] 10385 - no personal factors/history    [x] 89649 - has 1-2 personal factor/comorbidity     [] 00262 - has >3 personal factor/comorbidity     Body Regions:  [] Objective examination findings  [] Head     []  Neck  [] Trunk   [x] Upper Extremity  [x] Lower Extremity    Body Systems:  [x] For communication ability, affect, cognition, language, and learning style: the assessment of the ability to make needs known, consciousness, orientation (person, place, and time), expected emotional /behavioral responses, and learning preferences (eg, learning barriers, education  needs)  [x] For the neuromuscular system: a general assessment of gross coordinated movement (eg, balance, gait, locomotion, transfers, and transitions) and motor function  (motor control and motor learning)  [x] For the musculoskeletal system: the assessment of gross symmetry, gross range of motion, gross strength, height, and weight  [x] For the integumentary system: the assessment of pliability(texture), presence of scar formation, skin color, and skin integrity  [x] For cardiovascular/pulmonary system: the assessment of heart rate, respiratory rate, blood pressure, and edema     Activity limitations:    [x] Patient's cognitive status and saf ety concerns          [x] Status of current condition      [] Weight bearing restriction  [] Cardiopulmunary Restriction    Participation Restrictions:   [] Goals and goal agreement with  the patient     [x] Rehab potential (prognosis) and probable outcome      Examination of Body System: (criteria)    [] 61755 - addressing 1-2 elements    [x] 74760 - addressing a total of 3 or more elements     [] 25212 -  Addressing a total of 4 or more elements         Clinical Presentation: (criteria)  Unstable - 01360     On examination of body system using standardized tests and measures patient presents with 4 or more elements from any of the following: body structures and functions, activity limitations, and/or participation restrictions.  Leading to a clinical presentation that is considered evolving with changing characteristics                              Clinical Decision Making  (Eval Complexity):  Moderate - 50121     Time Tracking:     PT Received On: 10/07/18  PT Start Time: 1030     PT Stop Time: 1045  PT Total Time (min): 15 min     Billable Minutes: Evaluation 15 min       iRley Cortez, PT  10/07/2018

## 2018-10-07 NOTE — PLAN OF CARE
Problem: Patient Care Overview  Goal: Plan of Care Review  Outcome: Ongoing (interventions implemented as appropriate)  Remains in ICU. Alert to person only, slow to respond but follows commands. 100% paced on monitor. [place]win to gravity w/ hematuria and adequate urine output. R IJ TLC w/ Cardene infusing for blood pressure control. SCDs and TEDs in place. Episode of aspiration last night w/ fevers. Pan cultured. NPO until swallow study by ST. Remains free of falls and injuries.

## 2018-10-07 NOTE — PLAN OF CARE
Problem: Physical Therapy Goal  Goal: Physical Therapy Goal  Goals to be met by: 10/20/2018     Patient will increase functional independence with mobility by performin. Supine to sit with Stand-by Assistance  2. Sit to supine with Stand-by Assistance  3. Sitting at edge of bed x15 minutes with Modified [place]  4. Lower extremity exercise program x20 reps per handout, with supervision.  5. Transfers - TBD at later time.  6. Gait - TBD at later time.    Recommend: SNF at time of discharge.   Riley Cortez, PT  10/07/2018

## 2018-10-07 NOTE — PROGRESS NOTES
[facility]  Cardiology  Progress Note    Patient Name: Cindy Lyman  MRN: [id number]  Admission Date: 9/30/2018  Hospital Length of Stay: 7 days  Code Status: Full Code   Attending Physician: Grupo Wharton MD   Primary Care Physician: Rodger Camarena MD  Expected Discharge Date:   Principal Problem:Cardiac arrest    Subjective:     Hospital Course:   10/6/18: ?aspirated    Interval Hx: pt seen in ICU, case d/w RN.  Possible aspiration event yesterday, pt now NPO.  Message left for Dr. Stafford to address when it's OK to use IV contrast ([facility] planned at some point).  Pt with depressed MS today.    Tele: AS- (pers rev)        Review of Systems   Gastrointestinal: Negative for melena.   Genitourinary: Positive for hematuria.     Objective:     Vital Signs (Most Recent):  Temp: 99.1 °F (37.3 °C) (10/07/18 0515)  Pulse: 85 (10/07/18 0615)  Resp: (!) 25 (10/07/18 0615)  BP: (!) 140/63 (10/07/18 0600)  SpO2: 98 % (10/07/18 0615) Vital Signs (24h Range):  Temp:  [98.2 °F (36.8 °C)-100.8 °F (38.2 °C)] 99.1 °F (37.3 °C)  Pulse:  [69-99] 85  Resp:  [7-54] 25  SpO2:  [93 %-100 %] 98 %  BP: (129-224)/() 140/63     Weight: 92.5 kg (203 lb 14.8 oz)  Body mass index is 33.93 kg/m².     SpO2: 98 %  O2 Device (Oxygen Therapy): nasal cannula      Intake/Output Summary (Last 24 hours) at 10/7/2018 0741  Last data filed at 10/7/2018 0600  Gross per 24 hour   Intake 1888.13 ml   Output 1605 ml   Net 283.13 ml       Lines/Drains/Airways     Central Venous Catheter Line                 Percutaneous Central Line Insertion/Assessment - triple lumen  09/30/18 1050 right internal jugular 6 days          Drain                 Urethral Catheter 09/30/18 1025 16 Fr. 6 days                Physical Exam   Constitutional: She appears well-developed and well-nourished. No distress.   HENT:   Head: Normocephalic and atraumatic.   Mouth/Throat: No oropharyngeal exudate.   Eyes: Conjunctivae and EOM are normal. Pupils  are equal, round, and reactive to light. No scleral icterus.   Neck: Normal range of motion. Neck supple. No JVD present. No tracheal deviation present. No thyromegaly present.   Cardiovascular: Normal rate, regular rhythm, S1 normal and S2 normal. Exam reveals no gallop and no friction rub.   Murmur heard.   Systolic murmur is present with a grade of 2/6.  Pulmonary/Chest: Effort normal and breath sounds normal. No respiratory distress. She has no wheezes. She has no rales. She exhibits no tenderness.   Abdominal: Soft. She exhibits no distension.   obese   Musculoskeletal: Normal range of motion. She exhibits no edema.   Neurological: No cranial nerve deficit.   obtunded   Skin: Skin is warm and dry. She is not diaphoretic.   Psychiatric: She has a normal mood and affect. Her behavior is normal. Judgment normal.       Current Medications:   aspirin  81 mg Oral Daily    azelastine  1 spray Nasal BID    carBAMazepine  200 mg Oral TID    cetirizine  10 mg Oral Daily    cloNIDine 0.2 mg/24 hr td ptwk  1 patch Transdermal Q7 Days    clopidogrel  75 mg Oral Daily    fluticasone  1 puff Inhalation BID    gabapentin  300 mg Oral TID    senna-docusate 8.6-50 mg  1 tablet Oral BID      niCARdipine 5 mg/hr (10/07/18 0600)     acetaminophen, benzonatate, hydrALAZINE, influenza, nitroGLYCERIN, ondansetron, polyethylene glycol    Laboratory:  CBC:  Recent Labs   Lab  10/04/18   0307  10/04/18   0632  10/05/18   0242  10/06/18   0310  10/07/18   0340   WHITE BLOOD CELL COUNT  5.58  5.15  4.18  6.52  9.14   HEMOGLOBIN  8.5 L  8.3 L  7.6 L  8.4 L  8.4 L   HEMATOCRIT  26.4 L  25.6 L  23.7 L  26.0 L  25.6 L   PLATELETS  206  207  206  248  236       CHEMISTRIES:  Recent Labs   Lab  05/17/18   1410   07/06/18   0301   10/04/18   0307  10/04/18   0633  10/05/18   0242  10/06/18   0310  10/07/18   0340   GLUCOSE  106   < >  107   < >  104  95  103  109  140 H   SODIUM  145   < >  141   < >  140  141  139  143  143   POTASSIUM   3.8   < >  3.6   < >  3.6  3.6  3.6  4.1  4.1   BUN BLD  17   < >  17   < >  42 H  46 H  54 H  51 H  40 H   CREATININE  1.1   < >  0.8   < >  1.9 H  2.1 H  2.1 H  1.5 H  1.1   EGFR IF   55 A   < >  >60.0   < >  28 A  25 A  25 A  38 A  55 A   EGFR IF NON-  48 A   < >  >60.0   < >  25 A  22 A  22 A  33 A  48 A   CALCIUM  8.7   < >  8.1 L   < >  8.7  8.9  8.0 L  8.6 L  8.8   MAGNESIUM  2.2   --   1.8   --    --    --    --    --    --     < > = values in this interval not displayed.       CARDIAC BIOMARKERS:  Recent Labs   Lab  10/01/18   0255  10/01/18   0911  10/03/18   1557  10/03/18   2210  10/04/18   0307   CPK   --    --   155   --    --    CPK MB   --    --   5.5   --    --    TROPONIN I  0.183 H  0.137 H  0.138 H  0.163 H  0.204 H       COAGS:  Recent Labs   Lab  05/17/18   1410  07/02/18   1234  08/20/18 1929 09/30/18   1000  10/04/18   0633   INR  1.0  1.0  1.0  1.0  1.0       LIPIDS/LFTS:  Recent Labs   Lab  04/06/16   1055   06/11/16   0334   01/09/17   1346   11/15/17   1025  01/03/18   1155  05/17/18   1410  08/20/18 1929  08/24/18   1407  09/30/18   1000   CHOLESTEROL  252 H   --   182   --   215 H   --   184   --    --    --    --    --    TRIGLYCERIDES  126   --   69   --   139   --   104   --    --    --    --    --    HDL  50   --   44   --   40   --   49   --    --    --    --    --    LDL CHOLESTEROL  176.8 H   --   124.2   --   147.2   --   114.2   --    --    --    --    --    NON-HDL CHOLESTEROL  202   --   138   --   175   --   135   --    --    --    --    --    AST  13   < >   --    < >  16   < >  11  13  13  12  12  35   ALT  11   < >   --    < >  21   < >  9 L  14  11  11  10  31    < > = values in this interval not displayed.       BNP:  Recent Labs   Lab  06/16/17   1927  07/08/17   0322  01/03/18   1155  05/17/18   1410  09/30/18   1030   BNP  950 H  598 H  390 H  579 H  621 H       TSH:        Free T4:        Diagnostic Results:  CTA Chest  9/30/18  -No evidence of pulmonary arterial embolism.  Pulmonary edema with additional patchy bilateral opacities, right greater than left.  Findings may relate to atelectasis and/or aspiration with superimposed pneumonia also in the differential diagnosis.  -The tip of the endotracheal tube terminates near the right mainstem bronchus.  This can be retracted several cm for more optimal positioning.  Cardiomegaly with small left pleural effusion and other findings as above.  This report was flagged in Epic as abnormal.    Echo: 9/30/18  · Left ventricle ejection fraction is mildly decreased at 45%  · Left ventricle shows concentric remodeling.  · RV systolic function is normal.  · Left atrium is mildly dilated.  · Right atrium is moderately dilated.  · Tricuspid valve shows mild regurgitation.  · Grade II (moderate) left ventricular diastolic dysfunction consistent with pseudonormalization.  · LA pressure is elevated.  · Aortic valve: There is a bioprosthetic valve present. There is no aortic insufficiency present. CLEMENT is 1.31 cm2; mean gradient is 8.63 mmHg; peak gradient is 15.40 mmHg; AV peak velocity is 1.96 m/s    Echo 8/10/18    1 - Normal left ventricular systolic function (EF 60-65%).     2 - No wall motion abnormalities.     3 - Concentric hypertrophy.     4 - Biatrial enlargement.     5 - Right ventricular enlargement with normal systolic function.     6 - Pulmonary hypertension. The estimated PA systolic pressure is 52 mmHg.     7 - S/P transcatheter AVR, CLEMENT = 2.58 cm2, AVAi = 1.32 cm2/m2, peak velocity = 1.7 m/s, mean gradient = 8 mmHg.     8 - Mild paravalvular aortic regurgitation.     9 - Mild tricuspid regurgitation.     10 - Trivial pericardial effusion.     11 - S/p 29mm Evolut TF TAVR.     TAVR/R ADELSO PTA 7/5/18    Successful RTF 29 Evolut with RCI PTA to enable delivery of the valve delivery system.    Severe, unexplained anemia, worse today.  Tranfused for Hbg 8 pre TAVR.    Cath: 6/1/18  D.  Angiographic Results  Diagnostic:        Patient has a left dominant coronary artery.        The coronary vessels have luminal irregularities.        - Left Main Coronary Artery:             The LM has luminal irregularities. There is GERMAIN 3 flow.     - Left Anterior Descending Artery:             The ostial LAD has a 60% stenosis. There is GERMAIN 3 flow.     - Left Circumflex Artery:             The mid LCX has a 60% stenosis. There is GERMAIN 3 flow.     - Right Coronary Artery:             The ostial RCA has a 99% stenosis. There is GERMAIN 3 flow.             The proximal RCA has a 80% stenosis. There is GERMAIN 3 flow.     - Common Femoral Artery:             The right CFA has luminal irregularities. Access closure with perclose was very difficult due to inability to catch needles with perclose device.  Intervention       Ostial RCA:              The lesion was successfully intervened. Post-stenosis of 0%, post-GERMAIN 3 flow and TMP grade 3. The vessel was accessed natively.  The following items were used: 2.0MM 15MM [place] Balloon and 2.5X12 Mini Vision Stent.       Proximal RCA:              The lesion was successfully intervened. Post-stenosis of 0%, post-GERMAIN 3 flow and TMP grade 3. The vessel was accessed natively.  The following items were used: 2.5MM 12MM [place] Balloon, Stent Integrity 2.5 X 8 and 3.0MM 8MM [place] Balloon.      Assessment and Plan:     * Cardiac arrest    PEA on arrival with severe acidosis.   EKG with .   Normal PPM function with no high rate episodes on interrogation.   Etiology for the arrest is unclear - possible respiratory.   Repeat echo with mildly decreased EF and normal TAVR fxn.  Plan eventual cath when renal fxn stabilizes, will await clearance from Dr. Monroe/nephrology.  Cath on hold pending clearance of ?delirium/aspiration.        Acute pulmonary edema    Seems to be doing better  Possible aspiration event noted 10/6/18          CAD s/p PCI    Mild troponin increase.   S/p RCA PCI  prior to TAVR, intermed LAD/LCx stenoses noted.  Eventual LHC with Dr. Carmen when renal fxn stabilizes        Essential hypertension    Cont med rx  Labile BP noted        Hyperlipidemia    Cont statin         Pacemaker    Biotronik.   Interrogation with normal device function and no high rate episodes        S/P TAVR (transcatheter aortic valve replacement)    Normal TAVR fxn by repeat echo this admission        Stage 3 chronic kidney disease    ARF/CRI  ?CONCETTA vs overdiuresis  Creat 2.1->1.5->1.1 today            VTE Risk Mitigation (From admission, onward)        Ordered     Place sequential compression device  Until discontinued      10/05/18 1249     Place LAUREANO hose  Until discontinued      10/05/18 1249     IP VTE HIGH RISK PATIENT  Once      09/30/18 1359          Giancarlo Waterman MD  Cardiology  [facility]

## 2018-10-07 NOTE — NURSING
Patient aspirated liquid Tylenol. Suctioned orally. Dr. Walls notified. Patient NPO until swallow study. O2 sats dropped to 90%. O2 3 liters NC applied. Stat portable CXR ordered.  BC X 2 and UA w/ reflex.

## 2018-10-07 NOTE — ASSESSMENT & PLAN NOTE
Chest CTA r/o PE, likely due to decompensated CHF and possible underlying infection  Pulmonology consulted for vent management  Cardiology consulted - [facility] :  · Left ventricle ejection fraction is mildly decreased at 45%  · Left ventricle shows concentric remodeling.  · RV systolic function is normal.  · Left atrium is mildly dilated.  · Right atrium is moderately dilated.  · Tricuspid valve shows mild regurgitation.  · Grade II (moderate) left ventricular diastolic dysfunction consistent with pseudonormalization.  · LA pressure is elevated.   Cultures - negative, sent for resp cultures today   Appreciate Pulmonary input.  Extubated on 10/2.  Initially slow to respond, but improving.  Advance diet.  Less responsive today.  Spiked fever.  Possible aspiration pneumonitis.  Start Avelox.  Placed on Cardene drip for uncontrolled HTN.

## 2018-10-08 PROBLEM — Z51.5 PALLIATIVE CARE ENCOUNTER: Status: ACTIVE | Noted: 2018-10-08

## 2018-10-08 LAB
ANION GAP SERPL CALC-SCNC: 11 MMOL/L
BASOPHILS # BLD AUTO: 0.02 K/UL
BASOPHILS NFR BLD: 0.3 %
BUN SERPL-MCNC: 40 MG/DL
CALCIUM SERPL-MCNC: 9.3 MG/DL
CHLORIDE SERPL-SCNC: 114 MMOL/L
CO2 SERPL-SCNC: 23 MMOL/L
CREAT SERPL-MCNC: 1.2 MG/DL
DIFFERENTIAL METHOD: ABNORMAL
EOSINOPHIL # BLD AUTO: 0.1 K/UL
EOSINOPHIL NFR BLD: 1.8 %
ERYTHROCYTE [DISTWIDTH] IN BLOOD BY AUTOMATED COUNT: 16.3 %
EST. GFR  (AFRICAN AMERICAN): 49 ML/MIN/1.73 M^2
EST. GFR  (NON AFRICAN AMERICAN): 43 ML/MIN/1.73 M^2
GLUCOSE SERPL-MCNC: 125 MG/DL
HCT VFR BLD AUTO: 23.9 %
HGB BLD-MCNC: 7.7 G/DL
LYMPHOCYTES # BLD AUTO: 1.3 K/UL
LYMPHOCYTES NFR BLD: 16.8 %
MCH RBC QN AUTO: 28.5 PG
MCHC RBC AUTO-ENTMCNC: 32.2 G/DL
MCV RBC AUTO: 89 FL
MONOCYTES # BLD AUTO: 0.7 K/UL
MONOCYTES NFR BLD: 9.2 %
NEUTROPHILS # BLD AUTO: 5.5 K/UL
NEUTROPHILS NFR BLD: 71.9 %
PLATELET # BLD AUTO: 246 K/UL
PMV BLD AUTO: 9.8 FL
POTASSIUM SERPL-SCNC: 4 MMOL/L
RBC # BLD AUTO: 2.7 M/UL
SODIUM SERPL-SCNC: 148 MMOL/L
WBC # BLD AUTO: 7.6 K/UL

## 2018-10-08 PROCEDURE — 99221 1ST HOSP IP/OBS SF/LOW 40: CPT | Mod: ,,, | Performed by: NURSE PRACTITIONER

## 2018-10-08 PROCEDURE — 94761 N-INVAS EAR/PLS OXIMETRY MLT: CPT

## 2018-10-08 PROCEDURE — 25000003 PHARM REV CODE 250: Performed by: INTERNAL MEDICINE

## 2018-10-08 PROCEDURE — 94640 AIRWAY INHALATION TREATMENT: CPT

## 2018-10-08 PROCEDURE — 85025 COMPLETE CBC W/AUTO DIFF WBC: CPT

## 2018-10-08 PROCEDURE — 20000000 HC ICU ROOM

## 2018-10-08 PROCEDURE — 99233 SBSQ HOSP IP/OBS HIGH 50: CPT | Mod: ,,, | Performed by: INTERNAL MEDICINE

## 2018-10-08 PROCEDURE — 25000003 PHARM REV CODE 250: Performed by: HOSPITALIST

## 2018-10-08 PROCEDURE — 97110 THERAPEUTIC EXERCISES: CPT

## 2018-10-08 PROCEDURE — 36415 COLL VENOUS BLD VENIPUNCTURE: CPT

## 2018-10-08 PROCEDURE — 80048 BASIC METABOLIC PNL TOTAL CA: CPT

## 2018-10-08 RX ORDER — SODIUM CHLORIDE 450 MG/100ML
INJECTION, SOLUTION INTRAVENOUS CONTINUOUS
Status: DISCONTINUED | OUTPATIENT
Start: 2018-10-08 | End: 2018-10-09

## 2018-10-08 RX ORDER — LABETALOL HYDROCHLORIDE 5 MG/ML
10 INJECTION, SOLUTION INTRAVENOUS EVERY 6 HOURS PRN
Status: DISCONTINUED | OUTPATIENT
Start: 2018-10-08 | End: 2018-10-17

## 2018-10-08 RX ORDER — OXYCODONE HCL 5 MG/5 ML
5 SOLUTION, ORAL ORAL EVERY 6 HOURS PRN
Status: DISCONTINUED | OUTPATIENT
Start: 2018-10-08 | End: 2018-10-21 | Stop reason: HOSPADM

## 2018-10-08 RX ADMIN — CETIRIZINE HYDROCHLORIDE 10 MG: 10 TABLET, FILM COATED ORAL at 08:10

## 2018-10-08 RX ADMIN — ASPIRIN 81 MG CHEWABLE TABLET 81 MG: 81 TABLET CHEWABLE at 08:10

## 2018-10-08 RX ADMIN — FLUTICASONE PROPIONATE 1 PUFF: 220 AEROSOL, METERED RESPIRATORY (INHALATION) at 07:10

## 2018-10-08 RX ADMIN — GABAPENTIN 300 MG: 300 CAPSULE ORAL at 08:10

## 2018-10-08 RX ADMIN — ACETAMINOPHEN 650 MG: 325 TABLET, FILM COATED ORAL at 01:10

## 2018-10-08 RX ADMIN — NICARDIPINE HYDROCHLORIDE 2.5 MG/HR: 0.2 INJECTION, SOLUTION INTRAVENOUS at 10:10

## 2018-10-08 RX ADMIN — DOCUSATE SODIUM AND SENNOSIDES 1 TABLET: 8.6; 5 TABLET, FILM COATED ORAL at 08:10

## 2018-10-08 RX ADMIN — CLONIDINE HYDROCHLORIDE 0.3 MG: 0.1 TABLET ORAL at 08:10

## 2018-10-08 RX ADMIN — ACETAMINOPHEN 650 MG: 325 TABLET, FILM COATED ORAL at 08:10

## 2018-10-08 RX ADMIN — METOPROLOL TARTRATE 100 MG: 50 TABLET ORAL at 08:10

## 2018-10-08 RX ADMIN — LABETALOL HYDROCHLORIDE 10 MG: 5 INJECTION, SOLUTION INTRAVENOUS at 03:10

## 2018-10-08 RX ADMIN — HYDRALAZINE HYDROCHLORIDE 100 MG: 25 TABLET ORAL at 09:10

## 2018-10-08 RX ADMIN — CLOPIDOGREL BISULFATE 75 MG: 75 TABLET ORAL at 08:10

## 2018-10-08 RX ADMIN — AZELASTINE HYDROCHLORIDE 137 MCG: 137 SPRAY, METERED NASAL at 08:10

## 2018-10-08 RX ADMIN — ACETAMINOPHEN 650 MG: 325 TABLET, FILM COATED ORAL at 05:10

## 2018-10-08 RX ADMIN — CLONIDINE HYDROCHLORIDE 0.3 MG: 0.1 TABLET ORAL at 03:10

## 2018-10-08 RX ADMIN — HYDRALAZINE HYDROCHLORIDE 100 MG: 25 TABLET ORAL at 01:10

## 2018-10-08 RX ADMIN — MOXIFLOXACIN HYDROCHLORIDE 400 MG: 400 TABLET, FILM COATED ORAL at 08:10

## 2018-10-08 RX ADMIN — SODIUM CHLORIDE: 0.45 INJECTION, SOLUTION INTRAVENOUS at 09:10

## 2018-10-08 RX ADMIN — HYDRALAZINE HYDROCHLORIDE 100 MG: 25 TABLET ORAL at 06:10

## 2018-10-08 RX ADMIN — ACETAMINOPHEN 650 MG: 325 TABLET, FILM COATED ORAL at 03:10

## 2018-10-08 RX ADMIN — LABETALOL HYDROCHLORIDE 10 MG: 5 INJECTION, SOLUTION INTRAVENOUS at 01:10

## 2018-10-08 RX ADMIN — SODIUM CHLORIDE: 0.45 INJECTION, SOLUTION INTRAVENOUS at 08:10

## 2018-10-08 NOTE — ASSESSMENT & PLAN NOTE
Etiology still unclear, but PEA reported by first repsonders, quick ROSC with one round of epinephrine  Pt intubated on sedation and will have wean sedation for appropriate neurological exam, consult neurology if indicated   ECHO  -  +DD  Pacemaker interrogated   [facility] [place].

## 2018-10-08 NOTE — SUBJECTIVE & OBJECTIVE
Interval History: patient continues to be in ICU and is improving    Past Medical History:   Diagnosis Date    Anemia     Anticoagulant long-term use     Aortic stenosis     Arthritis     lower back    Asthma     CAD (coronary artery disease) 4/24/2015    Carpal tunnel syndrome on both sides     Cataract     CHF (congestive heart failure) 5/2013    COPD (chronic obstructive pulmonary disease)     Depression     DVT (deep venous thrombosis)     Hyperlipidemia     Hypertension     Pulmonary HTN     TMJ (temporomandibular joint disorder)        Past Surgical History:   Procedure Laterality Date    A-V CARDIAC PACEMAKER INSERTION N/A 7/5/2018    Procedure: INSERTION, CARDIAC PACEMAKER, DUAL CHAMBER;  Surgeon: Giancarlo Houser MD;  Location: [facility] CATH LAB;  Service: Cardiology;  Laterality: N/A;    AORTIC VALVE REPLACEMENT N/A 7/5/2018    Procedure: Replacement-valve-aortic;  Surgeon: Corey Castañeda MD;  Location: [facility] CATH LAB;  Service: Cardiology;  Laterality: N/A;    BACK SURGERY      BIOPSY-BONE MARROW N/A 5/26/2016    Performed by Rufino Ovalle MD at [facility] ENDO    cardiac stents      CARDIAC VALVE SURGERY      CARPAL TUNNEL RELEASE      Right/Left    CHOLECYSTECTOMY-LAPAROSCOPIC W/ CHOLANGIOGRAM N/A 7/13/2017    Performed by Luis Carlos Jarvis MD at [facility] OR    EYE SURGERY      cataract extraction    HYSTERECTOMY      Partial    INSERTION, CARDIAC PACEMAKER, DUAL CHAMBER N/A 7/5/2018    Performed by Giancarlo Houser MD at [facility] CATH LAB    JOINT REPLACEMENT  2009    Left hip    POLYPECTOMY      x9    Replacement-valve-aortic N/A 7/5/2018    Performed by Corey Castañeda MD at [facility] CATH LAB    TEMPOROMANDIBULAR JOINT SURGERY      ULTRASOUND-ENDOSCOPIC-UPPER N/A 4/25/2018    Performed by Socrates Andrew MD at [facility] ENDO (2ND FLR)       Review of patient's allergies indicates:   Allergen Reactions    Doxycycline hyclate Diarrhea and Nausea And Vomiting    Singulair  [montelukast]      Stomach pain, Muscle pain, Cough      Penicillins      Other reaction(s): Anaphylaxis    Ventolin [albuterol sulfate] Other (See Comments)     Severely elevated blood pressure    Latex, natural rubber Rash       Medications:  Continuous Infusions:   sodium chloride 0.45% Stopped (10/08/18 1000)    niCARdipine Stopped (10/07/18 1501)     Scheduled Meds:   aspirin  81 mg Oral Daily    azelastine  1 spray Nasal BID    cetirizine  10 mg Oral Daily    cloNIDine  0.3 mg Oral TID    clopidogrel  75 mg Oral Daily    fluticasone  1 puff Inhalation BID    gabapentin  300 mg Oral TID    hydrALAZINE  100 mg Per NG tube Q8H    metoprolol tartrate  100 mg Per NG tube BID    moxifloxacin  400 mg Per NG tube Daily    senna-docusate 8.6-50 mg  1 tablet Oral BID     PRN Meds:acetaminophen, benzonatate, influenza, labetalol, nitroGLYCERIN, ondansetron, oxyCODONE, polyethylene glycol    Family History     Problem Relation (Age of Onset)    Cancer Son    Heart disease Mother    Hypertension Daughter        Tobacco Use    Smoking status: Never Smoker    Smokeless tobacco: Never Used   Substance and Sexual Activity    Alcohol use: No    Drug use: No    Sexual activity: No       Review of Systems   Unable to perform ROS: Other     Objective:     Vital Signs (Most Recent):  Temp: 98 °F (36.7 °C) (10/08/18 1101)  Pulse: 87 (10/08/18 1415)  Resp: (!) 25 (10/08/18 1415)  BP: (!) 197/81 (10/08/18 1400)  SpO2: 97 % (10/08/18 1415) Vital Signs (24h Range):  Temp:  [98 °F (36.7 °C)-100.7 °F (38.2 °C)] 98 °F (36.7 °C)  Pulse:  [] 87  Resp:  [7-29] 25  SpO2:  [93 %-100 %] 97 %  BP: (111-223)/(49-89) 197/81     Weight: 92.5 kg (203 lb 14.8 oz)  Body mass index is 33.93 kg/m².    Physical Exam   Constitutional: She appears well-developed and well-nourished.   HENT:   Head: Normocephalic and atraumatic.   Mouth/Throat: Oropharynx is clear and moist.   NGT   Neck: Neck supple.   Cardiovascular: Normal rate  "and regular rhythm.   Pulmonary/Chest: Effort normal and breath sounds normal.   Abdominal: Soft. Bowel sounds are normal.   Neurological: She is alert.   Skin: Skin is warm and dry.   Nursing note and vitals reviewed.      Significant Labs: All pertinent labs within the past 24 hours have been reviewed.  CBC:   Recent Labs   Lab  10/08/18   0420   WBC  7.60   HGB  7.7*   HCT  23.9*   MCV  89   PLT  246     BMP:  Recent Labs   Lab  10/08/18   0420   GLU  125*   NA  148*   K  4.0   CL  114*   CO2  23   BUN  40*   CREATININE  1.2   CALCIUM  9.3     LFT:  Lab Results   Component Value Date    AST 35 09/30/2018    ALKPHOS 96 09/30/2018    BILITOT 0.3 09/30/2018     Albumin:   Albumin   Date Value Ref Range Status   09/30/2018 3.7 3.5 - 5.2 g/dL Final     Protein:   Total Protein   Date Value Ref Range Status   09/30/2018 7.1 6.0 - 8.4 g/dL Final     Lactic acid:   Lab Results   Component Value Date    LACTATE 1.5 09/30/2018    LACTATE 6.2 (HH) 09/30/2018       Significant Imaging: I have reviewed all pertinent imaging results/findings within the past 24 hours.    Advanced Directives::  Living Will: No  LaPOST: No  Do Not Resuscitate Status: patient is a Full code  Medical Power of : No    ASSESSMENT/PLAN:  Palliative encounter: Bedside consult with patient. No family in room.    Patient yelling out " Radha" (her daughter) when I walked in the room.Comforted by the nurse who let her know her daughter had stepped out for a little while. Bedside Nurse reports patient had a dose of Morphine in the last 24 hours and that she seemed to have had an alteration in mental status since the Morphine and it has since be disconitnued. Patient is oriented to self but could not tell me she is in the hospital. Patient was independent and driving prior to this event. Patient denies pain or SOB. When asked to squeeze my hand with her right hand she states " you know I can't do that" and then asked me to move her right hand for " her to a more comfortable position. Plan is to have LHC tomorrow and to discuss GOC with family Spoke with SW.   Literature left in room for daughter to refer to prior to our meeting tomorrow.      -continue current treatment  -Palliative to meet with family tomorrow to discuss GOC

## 2018-10-08 NOTE — PROGRESS NOTES
[facility]  Cardiology  Progress Note    Patient Name: Cindy Lyman  MRN: [id number]  Admission Date: 9/30/2018  Hospital Length of Stay: 8 days  Code Status: Full Code   Attending Physician: Grupo Wharton MD   Primary Care Physician: Rodger Camarena MD  Expected Discharge Date:   Principal Problem:Cardiac arrest    Subjective:     Hospital Course:   10/6/18: ?aspirated    Interval Hx: pt seen in ICU, case d/w RN and mult family at bedside as well as with Dr. Carmen.  No cp/sob.  MS improved today.    Tele: AS- (pers rev)        Review of Systems   Gastrointestinal: Negative for melena.   Genitourinary: Positive for hematuria.     Objective:     Vital Signs (Most Recent):  Temp: 99.8 °F (37.7 °C) (10/08/18 0701)  Pulse: 69 (10/08/18 1015)  Resp: (!) 22 (10/08/18 1015)  BP: (!) 120/57 (10/08/18 1000)  SpO2: 97 % (10/08/18 1015) Vital Signs (24h Range):  Temp:  [98.5 °F (36.9 °C)-100.7 °F (38.2 °C)] 99.8 °F (37.7 °C)  Pulse:  [] 69  Resp:  [7-31] 22  SpO2:  [93 %-100 %] 97 %  BP: (119-223)/(49-89) 120/57     Weight: 92.5 kg (203 lb 14.8 oz)  Body mass index is 33.93 kg/m².     SpO2: 97 %  O2 Device (Oxygen Therapy): room air      Intake/Output Summary (Last 24 hours) at 10/8/2018 1124  Last data filed at 10/8/2018 0700  Gross per 24 hour   Intake 328.33 ml   Output 1408 ml   Net -1079.67 ml       Lines/Drains/Airways     Central Venous Catheter Line                 Percutaneous Central Line Insertion/Assessment - triple lumen  09/30/18 1050 right internal jugular 8 days          Drain                 Urethral Catheter 09/30/18 1025 16 Fr. 8 days         NG/OG Tube 10/07/18 0945 nasogastric 14 Fr. Right nostril 1 day                Physical Exam   Constitutional: She appears well-developed and well-nourished. No distress.   HENT:   Head: Normocephalic and atraumatic.   Mouth/Throat: No oropharyngeal exudate.   Eyes: Conjunctivae and EOM are normal. Pupils are equal, round, and  reactive to light. No scleral icterus.   Neck: Normal range of motion. Neck supple. No JVD present. No tracheal deviation present. No thyromegaly present.   Cardiovascular: Normal rate, regular rhythm, S1 normal and S2 normal. Exam reveals no gallop and no friction rub.   Murmur heard.   Systolic murmur is present with a grade of 2/6.  Pulmonary/Chest: Effort normal and breath sounds normal. No respiratory distress. She has no wheezes. She has no rales. She exhibits no tenderness.   Abdominal: Soft. She exhibits no distension.   obese   Musculoskeletal: Normal range of motion. She exhibits no edema.   Neurological: She is alert. No cranial nerve deficit.   Skin: Skin is warm and dry. She is not diaphoretic.   Psychiatric: She has a normal mood and affect. Her behavior is normal. Judgment normal.       Current Medications:   aspirin  81 mg Oral Daily    azelastine  1 spray Nasal BID    cetirizine  10 mg Oral Daily    cloNIDine  0.3 mg Oral TID    clopidogrel  75 mg Oral Daily    fluticasone  1 puff Inhalation BID    gabapentin  300 mg Oral TID    hydrALAZINE  100 mg Per NG tube Q8H    metoprolol tartrate  100 mg Per NG tube BID    moxifloxacin  400 mg Per NG tube Daily    senna-docusate 8.6-50 mg  1 tablet Oral BID      sodium chloride 0.45% 75 mL/hr at 10/08/18 0859    niCARdipine Stopped (10/07/18 1501)     acetaminophen, benzonatate, influenza, labetalol, morphine, nitroGLYCERIN, ondansetron, polyethylene glycol    Laboratory:  CBC:  Recent Labs   Lab  10/04/18   0632  10/05/18   0242  10/06/18   0310  10/07/18   0340  10/08/18   0420   WHITE BLOOD CELL COUNT  5.15  4.18  6.52  9.14  7.60   HEMOGLOBIN  8.3 L  7.6 L  8.4 L  8.4 L  7.7 L   HEMATOCRIT  25.6 L  23.7 L  26.0 L  25.6 L  23.9 L   PLATELETS  207  206  248  236  246       CHEMISTRIES:  Recent Labs   Lab  05/17/18   1410   07/06/18   0301   10/04/18   0633  10/05/18   0242  10/06/18   0310  10/07/18   0340  10/08/18   0420   GLUCOSE  106   < >   107   < >  95  103  109  140 H  125 H   SODIUM  145   < >  141   < >  141  139  143  143  148 H   POTASSIUM  3.8   < >  3.6   < >  3.6  3.6  4.1  4.1  4.0   BUN BLD  17   < >  17   < >  46 H  54 H  51 H  40 H  40 H   CREATININE  1.1   < >  0.8   < >  2.1 H  2.1 H  1.5 H  1.1  1.2   EGFR IF   55 A   < >  >60.0   < >  25 A  25 A  38 A  55 A  49 A   EGFR IF NON-  48 A   < >  >60.0   < >  22 A  22 A  33 A  48 A  43 A   CALCIUM  8.7   < >  8.1 L   < >  8.9  8.0 L  8.6 L  8.8  9.3   MAGNESIUM  2.2   --   1.8   --    --    --    --    --    --     < > = values in this interval not displayed.       CARDIAC BIOMARKERS:  Recent Labs   Lab  10/01/18   0255  10/01/18   0911  10/03/18   1557  10/03/18   2210  10/04/18   0307   CPK   --    --   155   --    --    CPK MB   --    --   5.5   --    --    TROPONIN I  0.183 H  0.137 H  0.138 H  0.163 H  0.204 H       COAGS:  Recent Labs   Lab  05/17/18   1410  07/02/18   1234  08/20/18 1929 09/30/18   1000  10/04/18   0633   INR  1.0  1.0  1.0  1.0  1.0       LIPIDS/LFTS:  Recent Labs   Lab  04/06/16   1055   06/11/16   0334   01/09/17   1346   11/15/17   1025  01/03/18   1155  05/17/18   1410  08/20/18   1929  08/24/18   1407  09/30/18   1000   CHOLESTEROL  252 H   --   182   --   215 H   --   184   --    --    --    --    --    TRIGLYCERIDES  126   --   69   --   139   --   104   --    --    --    --    --    HDL  50   --   44   --   40   --   49   --    --    --    --    --    LDL CHOLESTEROL  176.8 H   --   124.2   --   147.2   --   114.2   --    --    --    --    --    NON-HDL CHOLESTEROL  202   --   138   --   175   --   135   --    --    --    --    --    AST  13   < >   --    < >  16   < >  11  13  13  12  12  35   ALT  11   < >   --    < >  21   < >  9 L  14  11  11  10  31    < > = values in this interval not displayed.       BNP:  Recent Labs   Lab  06/16/17   1927  07/08/17   0322  01/03/18   1155  05/17/18   1410  09/30/18   1030   BNP   950 H  598 H  390 H  579 H  621 H       TSH:        Free T4:        Diagnostic Results:  CTA Chest 9/30/18  -No evidence of pulmonary arterial embolism.  Pulmonary edema with additional patchy bilateral opacities, right greater than left.  Findings may relate to atelectasis and/or aspiration with superimposed pneumonia also in the differential diagnosis.  -The tip of the endotracheal tube terminates near the right mainstem bronchus.  This can be retracted several cm for more optimal positioning.  Cardiomegaly with small left pleural effusion and other findings as above.  This report was flagged in Epic as abnormal.    Echo: 9/30/18  · Left ventricle ejection fraction is mildly decreased at 45%  · Left ventricle shows concentric remodeling.  · RV systolic function is normal.  · Left atrium is mildly dilated.  · Right atrium is moderately dilated.  · Tricuspid valve shows mild regurgitation.  · Grade II (moderate) left ventricular diastolic dysfunction consistent with pseudonormalization.  · LA pressure is elevated.  · Aortic valve: There is a bioprosthetic valve present. There is no aortic insufficiency present. CLEMENT is 1.31 cm2; mean gradient is 8.63 mmHg; peak gradient is 15.40 mmHg; AV peak velocity is 1.96 m/s    Echo 8/10/18    1 - Normal left ventricular systolic function (EF 60-65%).     2 - No wall motion abnormalities.     3 - Concentric hypertrophy.     4 - Biatrial enlargement.     5 - Right ventricular enlargement with normal systolic function.     6 - Pulmonary hypertension. The estimated PA systolic pressure is 52 mmHg.     7 - S/P transcatheter AVR, CLEMENT = 2.58 cm2, AVAi = 1.32 cm2/m2, peak velocity = 1.7 m/s, mean gradient = 8 mmHg.     8 - Mild paravalvular aortic regurgitation.     9 - Mild tricuspid regurgitation.     10 - Trivial pericardial effusion.     11 - S/p 29mm Evolut TF TAVR.     TAVR/R ADELSO PTA 7/5/18    Successful RTF 29 Evolut with RCI PTA to enable delivery of the valve delivery  system.    Severe, unexplained anemia, worse today.  Tranfused for Hbg 8 pre TAVR.    Cath: 6/1/18  D. Angiographic Results  Diagnostic:        Patient has a left dominant coronary artery.        The coronary vessels have luminal irregularities.        - Left Main Coronary Artery:             The LM has luminal irregularities. There is GERMAIN 3 flow.     - Left Anterior Descending Artery:             The ostial LAD has a 60% stenosis. There is GERMAIN 3 flow.     - Left Circumflex Artery:             The mid LCX has a 60% stenosis. There is GERMAIN 3 flow.     - Right Coronary Artery:             The ostial RCA has a 99% stenosis. There is GERMAIN 3 flow.             The proximal RCA has a 80% stenosis. There is GERMAIN 3 flow.     - Common Femoral Artery:             The right CFA has luminal irregularities. Access closure with perclose was very difficult due to inability to catch needles with perclose device.  Intervention       Ostial RCA:              The lesion was successfully intervened. Post-stenosis of 0%, post-GERMAIN 3 flow and TMP grade 3. The vessel was accessed natively.  The following items were used: 2.0MM 15MM Blackshear Balloon and 2.5X12 Mini Vision Stent.       Proximal RCA:              The lesion was successfully intervened. Post-stenosis of 0%, post-GERMAIN 3 flow and TMP grade 3. The vessel was accessed natively.  The following items were used: 2.5MM 12MM Black[place]ar Balloon, Stent Integrity 2.5 X 8 and 3.0MM 8MM Blackshear Balloon.      Assessment and Plan:     * Cardiac arrest    PEA on arrival with severe acidosis.   EKG with .   Normal PPM function with no high rate episodes on interrogation.   Etiology for the arrest is unclear - possible respiratory.   Repeat echo with mildly decreased EF and normal TAVR fxn.  Plan cath in am (Dr. Carmen), nephrology OK with IV contrast  Cont ASA/Plavix        Acute pulmonary edema    Seems to be doing better  Possible aspiration event noted 10/6/18          CAD s/p PCI    Mild  troponin increase.   S/p RCA PCI prior to TAVR, intermed LAD/LCx stenoses noted.  C with Dr. Carmen in am        Essential hypertension    Cont med rx  Labile BP noted        Hyperlipidemia    Cont statin         Pacemaker    Biotronik.   Interrogation with normal device function and no high rate episodes        S/P TAVR (transcatheter aortic valve replacement)    Normal TAVR fxn by repeat echo this admission        Stage 3 chronic kidney disease    ARF/CRI  ?CONCETTA vs overdiuresis  Creat 2.1->1.5->1.1 today            VTE Risk Mitigation (From admission, onward)        Ordered     Place sequential compression device  Until discontinued      10/05/18 1249     Place LAUREANO hose  Until discontinued      10/05/18 1249     IP VTE HIGH RISK PATIENT  Once      09/30/18 1359          Giancarlo Waterman MD  Cardiology  [facility]

## 2018-10-08 NOTE — ASSESSMENT & PLAN NOTE
Mild troponin increase.   S/p RCA PCI prior to TAVR, intermed LAD/LCx stenoses noted.  [facility] with Dr. Carmen in am

## 2018-10-08 NOTE — SUBJECTIVE & OBJECTIVE
Interval History: Weaned off Cardene gtt.      Review of Systems   HENT: Negative for ear discharge and ear pain.    Eyes: Negative for pain and itching.   Cardiovascular: Negative for chest pain and palpitations.   Neurological: Negative for seizures and syncope.     Objective:     Vital Signs (Most Recent):  Temp: 98 °F (36.7 °C) (10/08/18 1101)  Pulse: 77 (10/08/18 1145)  Resp: (!) 22 (10/08/18 1145)  BP: (!) 151/68 (10/08/18 1130)  SpO2: 98 % (10/08/18 1145) Vital Signs (24h Range):  Temp:  [98 °F (36.7 °C)-100.7 °F (38.2 °C)] 98 °F (36.7 °C)  Pulse:  [] 77  Resp:  [7-29] 22  SpO2:  [93 %-100 %] 98 %  BP: (111-223)/(49-89) 151/68     Weight: 92.5 kg (203 lb 14.8 oz)  Body mass index is 33.93 kg/m².    Intake/Output Summary (Last 24 hours) at 10/8/2018 1249  Last data filed at 10/8/2018 1200  Gross per 24 hour   Intake 329.58 ml   Output 1305 ml   Net -975.42 ml      Physical Exam   Constitutional: She appears well-developed and well-nourished.   HENT:   Head: Normocephalic and atraumatic.   Eyes: Conjunctivae are normal. Pupils are equal, round, and reactive to light.   Neck: Neck supple.   Cardiovascular: Normal rate and regular rhythm.   Pulmonary/Chest: Effort normal. She has no wheezes.   Scattered rhonchi    Abdominal: Soft. She exhibits no distension.   Musculoskeletal: She exhibits no edema or deformity.   Neurological: She is alert. No cranial nerve deficit.   Slow to respond, but answers simple questions.   Skin: Skin is warm and dry.       Significant Labs:   BMP:   Recent Labs   Lab  10/08/18   0420   GLU  125*   NA  148*   K  4.0   CL  114*   CO2  23   BUN  40*   CREATININE  1.2   CALCIUM  9.3     CBC:   Recent Labs   Lab  10/07/18   0340  10/08/18   0420   WBC  9.14  7.60   HGB  8.4*  7.7*   HCT  25.6*  23.9*   PLT  236  443

## 2018-10-08 NOTE — SUBJECTIVE & OBJECTIVE
Review of Systems   Gastrointestinal: Negative for melena.   Genitourinary: Positive for hematuria.     Objective:     Vital Signs (Most Recent):  Temp: 99.8 °F (37.7 °C) (10/08/18 0701)  Pulse: 69 (10/08/18 1015)  Resp: (!) 22 (10/08/18 1015)  BP: (!) 120/57 (10/08/18 1000)  SpO2: 97 % (10/08/18 1015) Vital Signs (24h Range):  Temp:  [98.5 °F (36.9 °C)-100.7 °F (38.2 °C)] 99.8 °F (37.7 °C)  Pulse:  [] 69  Resp:  [7-31] 22  SpO2:  [93 %-100 %] 97 %  BP: (119-223)/(49-89) 120/57     Weight: 92.5 kg (203 lb 14.8 oz)  Body mass index is 33.93 kg/m².     SpO2: 97 %  O2 Device (Oxygen Therapy): room air      Intake/Output Summary (Last 24 hours) at 10/8/2018 1124  Last data filed at 10/8/2018 0700  Gross per 24 hour   Intake 328.33 ml   Output 1408 ml   Net -1079.67 ml       Lines/Drains/Airways     Central Venous Catheter Line                 Percutaneous Central Line Insertion/Assessment - triple lumen  09/30/18 1050 right internal jugular 8 days          Drain                 Urethral Catheter 09/30/18 1025 16 Fr. 8 days         NG/OG Tube 10/07/18 0945 nasogastric 14 Fr. Right nostril 1 day                Physical Exam   Constitutional: She appears well-developed and well-nourished. No distress.   HENT:   Head: Normocephalic and atraumatic.   Mouth/Throat: No oropharyngeal exudate.   Eyes: Conjunctivae and EOM are normal. Pupils are equal, round, and reactive to light. No scleral icterus.   Neck: Normal range of motion. Neck supple. No JVD present. No tracheal deviation present. No thyromegaly present.   Cardiovascular: Normal rate, regular rhythm, S1 normal and S2 normal. Exam reveals no gallop and no friction rub.   Murmur heard.   Systolic murmur is present with a grade of 2/6.  Pulmonary/Chest: Effort normal and breath sounds normal. No respiratory distress. She has no wheezes. She has no rales. She exhibits no tenderness.   Abdominal: Soft. She exhibits no distension.   obese   Musculoskeletal: Normal  range of motion. She exhibits no edema.   Neurological: She is alert. No cranial nerve deficit.   Skin: Skin is warm and dry. She is not diaphoretic.   Psychiatric: She has a normal mood and affect. Her behavior is normal. Judgment normal.       Current Medications:   aspirin  81 mg Oral Daily    azelastine  1 spray Nasal BID    cetirizine  10 mg Oral Daily    cloNIDine  0.3 mg Oral TID    clopidogrel  75 mg Oral Daily    fluticasone  1 puff Inhalation BID    gabapentin  300 mg Oral TID    hydrALAZINE  100 mg Per NG tube Q8H    metoprolol tartrate  100 mg Per NG tube BID    moxifloxacin  400 mg Per NG tube Daily    senna-docusate 8.6-50 mg  1 tablet Oral BID      sodium chloride 0.45% 75 mL/hr at 10/08/18 0859    niCARdipine Stopped (10/07/18 1501)     acetaminophen, benzonatate, influenza, labetalol, morphine, nitroGLYCERIN, ondansetron, polyethylene glycol    Laboratory:  CBC:  Recent Labs   Lab  10/04/18   0632  10/05/18   0242  10/06/18   0310  10/07/18   0340  10/08/18   0420   WHITE BLOOD CELL COUNT  5.15  4.18  6.52  9.14  7.60   HEMOGLOBIN  8.3 L  7.6 L  8.4 L  8.4 L  7.7 L   HEMATOCRIT  25.6 L  23.7 L  26.0 L  25.6 L  23.9 L   PLATELETS  207  206  248  236  246       CHEMISTRIES:  Recent Labs   Lab  05/17/18   1410   07/06/18   0301   10/04/18   0633  10/05/18   0242  10/06/18   0310  10/07/18   0340  10/08/18   0420   GLUCOSE  106   < >  107   < >  95  103  109  140 H  125 H   SODIUM  145   < >  141   < >  141  139  143  143  148 H   POTASSIUM  3.8   < >  3.6   < >  3.6  3.6  4.1  4.1  4.0   BUN BLD  17   < >  17   < >  46 H  54 H  51 H  40 H  40 H   CREATININE  1.1   < >  0.8   < >  2.1 H  2.1 H  1.5 H  1.1  1.2   EGFR IF   55 A   < >  >60.0   < >  25 A  25 A  38 A  55 A  49 A   EGFR IF NON-  48 A   < >  >60.0   < >  22 A  22 A  33 A  48 A  43 A   CALCIUM  8.7   < >  8.1 L   < >  8.9  8.0 L  8.6 L  8.8  9.3   MAGNESIUM  2.2   --   1.8   --    --    --    --     --    --     < > = values in this interval not displayed.       CARDIAC BIOMARKERS:  Recent Labs   Lab  10/01/18   0255  10/01/18   0911  10/03/18   1557  10/03/18   2210  10/04/18   0307   CPK   --    --   155   --    --    CPK MB   --    --   5.5   --    --    TROPONIN I  0.183 H  0.137 H  0.138 H  0.163 H  0.204 H       COAGS:  Recent Labs   Lab  05/17/18   1410  07/02/18   1234  08/20/18   1929 09/30/18   1000  10/04/18   0633   INR  1.0  1.0  1.0  1.0  1.0       LIPIDS/LFTS:  Recent Labs   Lab  04/06/16   1055   06/11/16   0334   01/09/17   1346   11/15/17   1025  01/03/18   1155  05/17/18   1410  08/20/18   1929  08/24/18   1407  09/30/18   1000   CHOLESTEROL  252 H   --   182   --   215 H   --   184   --    --    --    --    --    TRIGLYCERIDES  126   --   69   --   139   --   104   --    --    --    --    --    HDL  50   --   44   --   40   --   49   --    --    --    --    --    LDL CHOLESTEROL  176.8 H   --   124.2   --   147.2   --   114.2   --    --    --    --    --    NON-HDL CHOLESTEROL  202   --   138   --   175   --   135   --    --    --    --    --    AST  13   < >   --    < >  16   < >  11  13  13  12  12  35   ALT  11   < >   --    < >  21   < >  9 L  14  11  11  10  31    < > = values in this interval not displayed.       BNP:  Recent Labs   Lab  06/16/17   1927  07/08/17   0322  01/03/18   1155  05/17/18   1410  09/30/18   1030   BNP  950 H  598 H  390 H  579 H  621 H       TSH:        Free T4:        Diagnostic Results:  CTA Chest 9/30/18  -No evidence of pulmonary arterial embolism.  Pulmonary edema with additional patchy bilateral opacities, right greater than left.  Findings may relate to atelectasis and/or aspiration with superimposed pneumonia also in the differential diagnosis.  -The tip of the endotracheal tube terminates near the right mainstem bronchus.  This can be retracted several cm for more optimal positioning.  Cardiomegaly with small left pleural effusion and other findings  as above.  This report was flagged in Epic as abnormal.    Echo: 9/30/18  · Left ventricle ejection fraction is mildly decreased at 45%  · Left ventricle shows concentric remodeling.  · RV systolic function is normal.  · Left atrium is mildly dilated.  · Right atrium is moderately dilated.  · Tricuspid valve shows mild regurgitation.  · Grade II (moderate) left ventricular diastolic dysfunction consistent with pseudonormalization.  · LA pressure is elevated.  · Aortic valve: There is a bioprosthetic valve present. There is no aortic insufficiency present. CLEMENT is 1.31 cm2; mean gradient is 8.63 mmHg; peak gradient is 15.40 mmHg; AV peak velocity is 1.96 m/s    Echo 8/10/18    1 - Normal left ventricular systolic function (EF 60-65%).     2 - No wall motion abnormalities.     3 - Concentric hypertrophy.     4 - Biatrial enlargement.     5 - Right ventricular enlargement with normal systolic function.     6 - Pulmonary hypertension. The estimated PA systolic pressure is 52 mmHg.     7 - S/P transcatheter AVR, CLEMENT = 2.58 cm2, AVAi = 1.32 cm2/m2, peak velocity = 1.7 m/s, mean gradient = 8 mmHg.     8 - Mild paravalvular aortic regurgitation.     9 - Mild tricuspid regurgitation.     10 - Trivial pericardial effusion.     11 - S/p 29mm Evolut TF TAVR.     TAVR/R ADELSO PTA 7/5/18    Successful RTF 29 Evolut with RCI PTA to enable delivery of the valve delivery system.    Severe, unexplained anemia, worse today.  Tranfused for Hbg 8 pre TAVR.    Cath: 6/1/18  D. Angiographic Results  Diagnostic:        Patient has a left dominant coronary artery.        The coronary vessels have luminal irregularities.        - Left Main Coronary Artery:             The LM has luminal irregularities. There is GERMAIN 3 flow.     - Left Anterior Descending Artery:             The ostial LAD has a 60% stenosis. There is GERMAIN 3 flow.     - Left Circumflex Artery:             The mid LCX has a 60% stenosis. There is GERMAIN 3 flow.     - Right Coronary  Artery:             The ostial RCA has a 99% stenosis. There is GERMAIN 3 flow.             The proximal RCA has a 80% stenosis. There is GERMAIN 3 flow.     - Common Femoral Artery:             The right CFA has luminal irregularities. Access closure with perclose was very difficult due to inability to catch needles with perclose device.  Intervention       Ostial RCA:              The lesion was successfully intervened. Post-stenosis of 0%, post-GERMAIN 3 flow and TMP grade 3. The vessel was accessed natively.  The following items were used: 2.0MM 15MM [place] Balloon and 2.5X12 Mini Vision Stent.       Proximal RCA:              The lesion was successfully intervened. Post-stenosis of 0%, post-GERMAIN 3 flow and TMP grade 3. The vessel was accessed natively.  The following items were used: 2.5MM 12MM [place] Balloon, Stent Integrity 2.5 X 8 and 3.0MM 8MM [place] Balloon.

## 2018-10-08 NOTE — HPI
Principal Problem:Cardiac arrest     HPI:  81 yo female with diastolic CHF, COPD, gastric cancer, aortic stenosis with complete heart block and s/p TAVR and pacemaker placed 7/2018 presented in cardiac arrest. Per ED notes, patient was on her way to [facility] with others and c/o chest pain. CPR was initiated in parked vehicle outside ED. One round of epinephrine given and got ROSC. Per family, she c/o worsening SOB over several days. On arrival pt was euthermic, hypertensive, elevated lactate and BNP. CXR suggest pulmonary edema though infection cannot be ruled out. Pt intubated and evaluated by pulmonology. Cardiology consulted. ECHO pending. Broad spectrum antibiotics initiated until cultures result and clinical course dictates.      Chest CTA negative for pulmonary emboli.      Per family, Pt is a full code.      Hospital Course:  Pt admitted after PEA arrest and acute respiratory failure/acidosis. Acidosis corrected. Pulmonology assisting with vent management/weaning. Trial of precedex today but patient did not tolerate. Switched back to propofol. CPAP this am and now back on PVRC. Pacemaker interrogated and appears to be functioning fine. Diuresed well on lasix without change in renal function. Possible plan for LHC per cardiology. Extubated on 10/2. Initially slow to respond, but mental status improving.  Planned LHC on 10/4 held secondary to rise in Creat.  Lasix stopped and getting IVF hydration.  Episodes of slight hypotension and BP medications adjusted.  Worsening urine output.  Urine also bloody.  Urology consulted.  Improving urine output and Creat.  10/7: Improving Creat.  Patient more drowsy and less responsive.  Spiked fever overnight.  Had to be placed on Cardene gtt secondary to poorly controlled HTN.  Weaned off Cardene gtt.  Possible aspiration pneumonitis and started on Avelox.  Plan for LHC in Am.

## 2018-10-08 NOTE — PROGRESS NOTES
Cindy Lyman is a 82 y.o. female patient.    Follow for EUGENE    Comfortable  Tolerated extubation well  Somnolence    Scheduled Meds:   aspirin  81 mg Oral Daily    azelastine  1 spray Nasal BID    cetirizine  10 mg Oral Daily    cloNIDine  0.3 mg Oral TID    clopidogrel  75 mg Oral Daily    fluticasone  1 puff Inhalation BID    gabapentin  300 mg Oral TID    hydrALAZINE  100 mg Per NG tube Q8H    metoprolol tartrate  100 mg Per NG tube BID    moxifloxacin  400 mg Per NG tube Daily    senna-docusate 8.6-50 mg  1 tablet Oral BID       Review of patient's allergies indicates:   Allergen Reactions    Doxycycline hyclate Diarrhea and Nausea And Vomiting    Singulair [montelukast]      Stomach pain, Muscle pain, Cough      Penicillins      Other reaction(s): Anaphylaxis    Ventolin [albuterol sulfate] Other (See Comments)     Severely elevated blood pressure    Latex, natural rubber Rash         Vital Signs Range (Last 24H):  Temp:  [98.5 °F (36.9 °C)-100.7 °F (38.2 °C)]   Pulse:  []   Resp:  [7-31]   BP: (119-223)/(49-89)   SpO2:  [93 %-100 %]     I & O (Last 24H):    Intake/Output Summary (Last 24 hours) at 10/8/2018 1025  Last data filed at 10/8/2018 0700  Gross per 24 hour   Intake 353.33 ml   Output 1488 ml   Net -1134.67 ml           Physical Exam:  General appearance: well developed, well nourished, no distress  Lungs:  clear to auscultation bilaterally and normal respiratory effort  Heart: regular rate and rhythm  Abdomen: soft, non-tender non-distented; bowel sounds normal; no masses,  no organomegaly  Extremities: no cyanosis or edema, or clubbing    Laboratory:  CBC:   Recent Labs   Lab  10/08/18   0420   WBC  7.60   RBC  2.70*   HGB  7.7*   HCT  23.9*   PLT  246   MCV  89   MCH  28.5   MCHC  32.2     CMP:   Recent Labs   Lab  10/08/18   0420   GLU  125*   CALCIUM  9.3   NA  148*   K  4.0   CO2  23   CL  114*   BUN  40*   CREATININE  1.2       Imp/Plan    EUGENE - resolving,  non oliguric  S/p PEA arrest  S/p respiratory failure - tolerated extubation well  CAD  HTN  Anemia of chronic disease    IVF w/ 1/2 NS  Watch patient renal function closely post LHC  We'll follow  Discussed with family at bedside    Jimmie Valente  10/8/2018

## 2018-10-08 NOTE — PLAN OF CARE
Problem: Physical Therapy Goal  Goal: Physical Therapy Goal  Goals to be met by: 10/20/2018     Patient will increase functional independence with mobility by performin. Supine to sit with Stand-by Assistance  2. Sit to supine with Stand-by Assistance  3. Sitting at edge of bed x15 minutes with Modified [place]  4. Lower extremity exercise program x20 reps per handout, with supervision.  5. Transfers - TBD at later time.  6. Gait - TBD at later time.    Recommend: SNF at time of discharge.    Outcome: Ongoing (interventions implemented as appropriate)  Pt limited 2/2 confusion and decreased ability to follow commands.  Continue with POC as able.

## 2018-10-08 NOTE — PT/OT/SLP PROGRESS
Speech Language Pathology  Attempted Visit    Cindy Lyman  MRN: [id number]    Patient not seen today secondary to Nursing hold (pt agitated throughout the night, requiring morphine which resulted in pt staying awake most of the night. Pt is now lethargic and finally resting this AM, per BRITNEY Gomez).  BRITNEY Gomez also reported to SLP that pt is NPO for possible heart cath procedure. SLP will follow-up as able.    DIA Jones, CCC-SLP  10/8/2018

## 2018-10-08 NOTE — PLAN OF CARE
Problem: Patient Care Overview  Goal: Plan of Care Review  Outcome: Ongoing (interventions implemented as appropriate)  Pt with paced rhythm today. Pt has remained NPO. Pt to have LHC tomorrow. Pt has remained confused most of day with BP as high as 220. Pt has received prn labetalol x's 1 today plus all scheduled BP meds.the patient has remained safe and free of injury today. Pt has been afebrile today. Pt with no skin breakdown noted.

## 2018-10-08 NOTE — PLAN OF CARE
10/08/18 1140   Discharge Reassessment   Assessment Type Discharge Planning Reassessment   Provided patient/caregiver education on the expected discharge date and the discharge plan Yes  (in general)   Do you have any problems affording any of your prescribed medications? No   Discharge Plan A Home with family;Home Health   Discharge Plan B Home with family;Hospice/home   Patient choice form signed by patient/caregiver No   Can the patient answer the patient profile reliably? No, cognitively impaired   How does the patient rate their overall health at the present time? Fair   Describe the patient's ability to walk at the present time. Does not walk or unable to take any steps at all   How often would a person be available to care for the patient? Whenever needed   Number of comorbid conditions (as recorded on the chart) Five or more   During the past month, has the patient often been bothered by feeling down, depressed or hopeless? No  (record)   During the past month, has the patient often been bothered by little interest or pleasure in doing things? No  (record)   Patient remains in ICU. FAITH met with daughter Radha Swartz ([phone]) and son Rafael Quiñonez  ([phone]). Radha provides most of care at home where patient lives. Radha's autistic son also lives in the home. Radha confirms has lift seat for patient to go up stairs but will need a hospital bed at discharge to continue care for her mother.   Daughter and son have decided to continue with cardiac consult which includes patient to cardiac lab tomorrow. After more information available, the family will be able to determine if home health or hospice will become the appropriate home resource. Family done not want out of home care (not SNF, not alternates).   FAITH reviewed the equipment resources thru patient PHN insurance or thru the medicare home hospice benefit if this becomes the appropriate plan. SW encouraged daughter and son to remind  additional family members that they will need help at home to realistically provide the care.  SW also provided information regarding medicaid [id number] to contact in event patient would be able to meet criteria. Explained this takes about 6 weeks to obtain.   SW additionally explained that patient has a referral for Palliative Care and encouraged family to consider the patient goals of care when meet with the RN.   At this time, SW will place sticky note to remind team of need for hospital bed at discharge.  SW will follow in ICU and assist as needed.

## 2018-10-08 NOTE — ASSESSMENT & PLAN NOTE
PEA on arrival with severe acidosis.   EKG with .   Normal PPM function with no high rate episodes on interrogation.   Etiology for the arrest is unclear - possible respiratory.   Repeat echo with mildly decreased EF and normal TAVR fxn.  Plan cath in am (Dr. Carmen), nephrology OK with IV contrast  Cont ASA/Plavix

## 2018-10-08 NOTE — PLAN OF CARE
Problem: Patient Care Overview  Goal: Plan of Care Review  Outcome: Ongoing (interventions implemented as appropriate)  Remains in ICU. Alert to person only, slow to respond but follows commands. 100% paced on monitor. [place] to gravity w/ hematuria and adequate urine output. R IJ TLC clean, dry and intact. Febrile last PM, Tylenol given w/adequate relief.  NPO until swallow study by [facility] NGT clamped. Remains free of falls and injuries.

## 2018-10-08 NOTE — PT/OT/SLP PROGRESS
Physical Therapy Treatment    Patient Name:  Cindy Lyman   MRN:  [id number]    Recommendations:     Discharge Recommendations:  nursing facility, skilled   Discharge Equipment Recommendations: none   Barriers to discharge: Decreased caregiver support    Assessment:     Cindy Lyman is a 82 y.o. female admitted with a medical diagnosis of Cardiac arrest.  She presents with the following impairments/functional limitations:  weakness, impaired self care skills, impaired balance, decreased coordination, decreased safety awareness, edema, decreased upper extremity function, impaired functional mobilty, impaired endurance, gait instability, impaired cognition, decreased lower extremity function, impaired fine motor, impaired cardiopulmonary response to activity Pt limited today 2/2 confusion and inability to follow commands.    Rehab Prognosis:  Fair; patient would benefit from acute skilled PT services to address these deficits and reach maximum level of function.      Recent Surgery: Procedure(s) (LRB):  Left heart cath (Left)      Plan:     During this hospitalization, patient to be seen 6 x/week to address the above listed problems via gait training, therapeutic activities, therapeutic exercises  · Plan of Care Expires:  10/20/18   Plan of Care Reviewed with: patient    Subjective     Communicated with nsg prior to session.  Patient found supine upon PT entry to room, agreeable to treatment.      Chief Complaint: no c/o, talking to people that werent there  Patient comments/goals: unable to state  Pain/Comfort:  · Pain Rating 1: 0/10    Patients cultural, spiritual, Yazdanism conflicts given the current situation: none    Objective:     Patient found with: central line, briceno catheter(ICU monitoring)     General Precautions: Standard, fall   Orthopedic Precautions:N/A   Braces: N/A     Functional Mobility:  · N/A      AM-PAC 6 CLICK MOBILITY  Turning over in bed (including adjusting bedclothes,  sheets and blankets)?: 1  Sitting down on and standing up from a chair with arms (e.g., wheelchair, bedside commode, etc.): 1  Moving from lying on back to sitting on the side of the bed?: 1  Moving to and from a bed to a chair (including a wheelchair)?: 1  Need to walk in hospital room?: 1  Climbing 3-5 steps with a railing?: 1  Basic Mobility Total Score: 6       Therapeutic Activities and Exercises:   Pt received B ankle and knee PROM x 10 reps and B HCS 3x30 sec.    Patient left supine with all lines intact, call button in reach and nsg notified..    GOALS:   Multidisciplinary Problems     Physical Therapy Goals        Problem: Physical Therapy Goal    Goal Priority Disciplines Outcome Goal Variances Interventions   Physical Therapy Goal     PT, PT/OT Ongoing (interventions implemented as appropriate)     Description:  Goals to be met by: 10/20/2018     Patient will increase functional independence with mobility by performin. Supine to sit with Stand-by Assistance  2. Sit to supine with Stand-by Assistance  3. Sitting at edge of bed x15 minutes with Modified [place]  4. Lower extremity exercise program x20 reps per handout, with supervision.  5. Transfers - TBD at later time.  6. Gait - TBD at later time.    Recommend: SNF at time of discharge.                     Time Tracking:     PT Received On: 10/08/18  PT Start Time: 1500     PT Stop Time: 1510  PT Total Time (min): 10 min     Billable Minutes: Therapeutic Exercise 10    Treatment Type: Treatment  PT/PTA: PT     PTA Visit Number: 0     Jessica Villarreal, PT  10/08/2018

## 2018-10-08 NOTE — CONSULTS
[facility]  Palliative Medicine  Consult Note    Patient Name: Cindy Lyman  MRN: [id number]  Admission Date: 9/30/2018  Hospital Length of Stay: 8 days  Code Status: Full Code   Attending Provider: Grupo Wharton MD  Consulting Provider: Carla Arriaga NP  Primary Care Physician: Rodger Camarena MD  Principal Problem:Cardiac arrest    Patient information was obtained from patient, past medical records and ER records.          Thank you for your consult. I will follow-up with patient. Please contact us if you have any additional questions.    Subjective:       Principal Problem:Cardiac arrest     HPI:  83 yo female with diastolic CHF, COPD, gastric cancer, aortic stenosis with complete heart block and s/p TAVR and pacemaker placed 7/2018 presented in cardiac arrest. Per ED notes, patient was on her way to [place] with others and c/o chest pain. CPR was initiated in parked vehicle outside ED. One round of epinephrine given and got ROSC. Per family, she c/o worsening SOB over several days. On arrival pt was euthermic, hypertensive, elevated lactate and BNP. CXR suggest pulmonary edema though infection cannot be ruled out. Pt intubated and evaluated by pulmonology. Cardiology consulted. ECHO pending. Broad spectrum antibiotics initiated until cultures result and clinical course dictates.      Chest CTA negative for pulmonary emboli.      Per family, Pt is a full code.      Hospital Course:  Pt admitted after PEA arrest and acute respiratory failure/acidosis. Acidosis corrected. Pulmonology assisting with vent management/weaning. Trial of precedex today but patient did not tolerate. Switched back to propofol. CPAP this am and now back on PVRC. Pacemaker interrogated and appears to be functioning fine. Diuresed well on lasix without change in renal function. Possible plan for LHC per cardiology. Extubated on 10/2. Initially slow to respond, but mental status improving.  Planned LHC on 10/4  held secondary to rise in Creat.  Lasix stopped and getting IVF hydration.  Episodes of slight hypotension and BP medications adjusted.  Worsening urine output.  Urine also bloody.  Urology consulted.  Improving urine output and Creat.  10/7: Improving Creat.  Patient more drowsy and less responsive.  Spiked fever overnight.  Had to be placed on Cardene gtt secondary to poorly controlled HTN.  Weaned off Cardene gtt.  Possible aspiration pneumonitis and started on Avelox.  Plan for LHC in Am.        Hospital Course:  No notes on file    Interval History: patient continues to be in ICU and is improving    Past Medical History:   Diagnosis Date    Anemia     Anticoagulant long-term use     Aortic stenosis     Arthritis     lower back    Asthma     CAD (coronary artery disease) 4/24/2015    Carpal tunnel syndrome on both sides     Cataract     CHF (congestive heart failure) 5/2013    COPD (chronic obstructive pulmonary disease)     Depression     DVT (deep venous thrombosis)     Hyperlipidemia     Hypertension     Pulmonary HTN     TMJ (temporomandibular joint disorder)        Past Surgical History:   Procedure Laterality Date    A-V CARDIAC PACEMAKER INSERTION N/A 7/5/2018    Procedure: INSERTION, CARDIAC PACEMAKER, DUAL CHAMBER;  Surgeon: Giancarlo Houser MD;  Location: [facility] CATH LAB;  Service: Cardiology;  Laterality: N/A;    AORTIC VALVE REPLACEMENT N/A 7/5/2018    Procedure: Replacement-valve-aortic;  Surgeon: Corey Castañeda MD;  Location: [facility] CATH LAB;  Service: Cardiology;  Laterality: N/A;    BACK SURGERY      BIOPSY-BONE MARROW N/A 5/26/2016    Performed by [facility]ufino Ovalle [facility] at [facility] ENDO    cardiac stents      CARDIAC VALVE SURGERY      CARPAL TUNNEL RELEASE      Right/Left    CHOLECYSTECTOMY-LAPAROSCOPIC W/ CHOLANGIOGRAM N/A 7/13/2017    Performed by Luis Carlos Jarvis MD at [facility] OR    EYE SURGERY      cataract extraction    HYSTERECTOMY      Partial    INSERTION, CARDIAC  PACEMAKER, DUAL CHAMBER N/A 7/5/2018    Performed by [facility]lo [facility]er [facility] at [facility] CATH LAB    JOINT REPLACEMENT  2009    Left hip    POLYPECTOMY      x9    Replacement-valve-aortic N/A 7/5/2018    Performed by Corey Castañeda MD at [facility] CATH LAB    TEMPOROMANDIBULAR JOINT SURGERY      ULTRASOUND-ENDOSCOPIC-UPPER N/A 4/25/2018    Performed by Socrates Andrew MD at [facility] ENDO (2ND FLR)       Review of patient's allergies indicates:   Allergen Reactions    Doxycycline hyclate Diarrhea and Nausea And Vomiting    Singulair [montelukast]      Stomach pain, Muscle pain, Cough      Penicillins      Other reaction(s): Anaphylaxis    Ventolin [albuterol sulfate] Other (See Comments)     Severely elevated blood pressure    Latex, natural rubber Rash       Medications:  Continuous Infusions:   sodium chloride 0.45% Stopped (10/08/18 1000)    niCARdipine Stopped (10/07/18 1501)     Scheduled Meds:   aspirin  81 mg Oral Daily    azelastine  1 spray Nasal BID    cetirizine  10 mg Oral Daily    cloNIDine  0.3 mg Oral TID    clopidogrel  75 mg Oral Daily    fluticasone  1 puff Inhalation BID    gabapentin  300 mg Oral TID    hydrALAZINE  100 mg Per NG tube Q8H    metoprolol tartrate  100 mg Per NG tube BID    moxifloxacin  400 mg Per NG tube Daily    senna-docusate 8.6-50 mg  1 tablet Oral BID     PRN Meds:acetaminophen, benzonatate, influenza, labetalol, nitroGLYCERIN, ondansetron, oxyCODONE, polyethylene glycol    Family History     Problem Relation (Age of Onset)    Cancer Son    Heart disease Mother    Hypertension Daughter        Tobacco Use    Smoking status: Never Smoker    Smokeless tobacco: Never Used   Substance and Sexual Activity    Alcohol use: No    Drug use: No    Sexual activity: No       Review of Systems   Unable to perform ROS: Other     Objective:     Vital Signs (Most Recent):  Temp: 98 °F (36.7 °C) (10/08/18 1101)  Pulse: 87 (10/08/18 1415)  Resp: (!) 25 (10/08/18 1415)  BP:  "(!) 197/81 (10/08/18 1400)  SpO2: 97 % (10/08/18 1415) Vital Signs (24h Range):  Temp:  [98 °F (36.7 °C)-100.7 °F (38.2 °C)] 98 °F (36.7 °C)  Pulse:  [] 87  Resp:  [7-29] 25  SpO2:  [93 %-100 %] 97 %  BP: (111-223)/(49-89) 197/81     Weight: 92.5 kg (203 lb 14.8 oz)  Body mass index is 33.93 kg/m².    Physical Exam   Constitutional: She appears well-developed and well-nourished.   HENT:   Head: Normocephalic and atraumatic.   Mouth/Throat: Oropharynx is clear and moist.   NGT   Neck: Neck supple.   Cardiovascular: Normal rate and regular rhythm.   Pulmonary/Chest: Effort normal and breath sounds normal.   Abdominal: Soft. Bowel sounds are normal.   Neurological: She is alert.   Skin: Skin is warm and dry.   Nursing note and vitals reviewed.      Significant Labs: All pertinent labs within the past 24 hours have been reviewed.  CBC:   Recent Labs   Lab  10/08/18   0420   WBC  7.60   HGB  7.7*   HCT  23.9*   MCV  89   PLT  246     BMP:  Recent Labs   Lab  10/08/18   0420   GLU  125*   NA  148*   K  4.0   CL  114*   CO2  23   BUN  40*   CREATININE  1.2   CALCIUM  9.3     LFT:  Lab Results   Component Value Date    AST 35 09/30/2018    ALKPHOS 96 09/30/2018    BILITOT 0.3 09/30/2018     Albumin:   Albumin   Date Value Ref Range Status   09/30/2018 3.7 3.5 - 5.2 g/dL Final     Protein:   Total Protein   Date Value Ref Range Status   09/30/2018 7.1 6.0 - 8.4 g/dL Final     Lactic acid:   Lab Results   Component Value Date    LACTATE 1.5 09/30/2018    LACTATE 6.2 (HH) 09/30/2018       Significant Imaging: I have reviewed all pertinent imaging results/findings within the past 24 hours.    Advanced Directives::  Living Will: No  LaPOST: No  Do Not Resuscitate Status: patient is a Full code  Medical Power of : No    ASSESSMENT/PLAN:  Palliative encounter: Bedside consult with patient. No family in room.    Patient yelling out " Radha" (her daughter) when I walked in the room.Comforted by the nurse who let her " "know her daughter had stepped out for a little while. Bedside Nurse reports patient had a dose of Morphine in the last 24 hours and that she seemed to have had an alteration in mental status since the Morphine and it has since be disconitnued. Patient is oriented to self but could not tell me she is in the hospital. Patient was independent and driving prior to this event. Patient denies pain or SOB. When asked to squeeze my hand with her right hand she states " you know I can't do that" and then asked me to move her right hand for her to a more comfortable position. Plan is to have LHC tomorrow and to discuss GOC with family Spoke with SW.   Literature left in room for daughter to refer to prior to our meeting tomorrow.      -continue current treatment  -Palliative to meet with family tomorrow to discuss GOC        > 50% of 30 min visit spent in chart review, face to face discussion of goals of care,  symptom assessment, coordination of care and emotional support.    Carla Arriaga, NP  Palliative Medicine  [facility] [place]      Addendum: spoke to patient's daughter Radha and son regarding patient's current condition and my role.They want to see how their mother does over the next 24-48 hours. I will follow up with them over the next 2 days   "

## 2018-10-08 NOTE — ASSESSMENT & PLAN NOTE
Chest CTA r/o PE, likely due to decompensated CHF and possible underlying infection  Pulmonology consulted for vent management  Cardiology consulted - [facility] :  · Left ventricle ejection fraction is mildly decreased at 45%  · Left ventricle shows concentric remodeling.  · RV systolic function is normal.  · Left atrium is mildly dilated.  · Right atrium is moderately dilated.  · Tricuspid valve shows mild regurgitation.  · Grade II (moderate) left ventricular diastolic dysfunction consistent with pseudonormalization.  · LA pressure is elevated.   Cultures - negative, sent for resp cultures today   Appreciate Pulmonary input.  Extubated on 10/2.  Initially slow to respond, but improving.  Advance diet.  Less responsive on 10/7.  Spiked fever.  Possible aspiration pneumonitis.  Started Avelox.  Placed on Cardene drip for uncontrolled HTN.  Able to be weaned off.

## 2018-10-08 NOTE — PROGRESS NOTES
[facility] Hospital Medicine  Progress Note    Patient Name: Cindy Lyman  MRN: [id number]  Patient Class: IP- Inpatient   Admission Date: 9/30/2018  Length of Stay: 8 days  Attending Physician: Grupo Wharton MD  Primary Care Provider: Rodger Camarena MD        Subjective:     Principal Problem:Cardiac arrest    HPI:  81 yo female with diastolic CHF, COPD, gastric cancer, aortic stenosis with complete heart block and s/p TAVR and pacemaker placed 7/2018 presented in cardiac arrest. Per ED notes, patient was on her way to [place] with others and c/o chest pain. CPR was initiated in parked vehicle outside ED. One round of epinephrine given and got ROSC. Per family, she c/o worsening SOB over several days. On arrival pt was euthermic, hypertensive, elevated lactate and BNP. CXR suggest pulmonary edema though infection cannot be ruled out. Pt intubated and evaluated by pulmonology. Cardiology consulted. ECHO pending. Broad spectrum antibiotics initiated until cultures result and clinical course dictates.     Chest CTA negative for pulmonary emboli.     Per family, Pt is a full code.     Hospital Course:  Pt admitted after PEA arrest and acute respiratory failure/acidosis. Acidosis corrected. Pulmonology assisting with vent management/weaning. Trial of precedex today but patient did not tolerate. Switched back to propofol. CPAP this am and now back on PVRC. Pacemaker interrogated and appears to be functioning fine. Diuresed well on lasix without change in renal function. Possible plan for LHC per cardiology. Extubated on 10/2. Initially slow to respond, but mental status improving.  Planned LHC on 10/4 held secondary to rise in Creat.  Lasix stopped and getting IVF hydration.  Episodes of slight hypotension and BP medications adjusted.  Worsening urine output.  Urine also bloody.  Urology consulted.  Improving urine output and Creat.  10/7: Improving Creat.  Patient more drowsy and less  responsive.  Spiked fever overnight.  Had to be placed on Cardene gtt secondary to poorly controlled HTN.  Weaned off Cardene gtt.  Possible aspiration pneumonitis and started on Avelox.  Plan for LHC in Am.    Interval History: Weaned off Cardene gtt.      Review of Systems   HENT: Negative for ear discharge and ear pain.    Eyes: Negative for pain and itching.   Cardiovascular: Negative for chest pain and palpitations.   Neurological: Negative for seizures and syncope.     Objective:     Vital Signs (Most Recent):  Temp: 98 °F (36.7 °C) (10/08/18 1101)  Pulse: 77 (10/08/18 1145)  Resp: (!) 22 (10/08/18 1145)  BP: (!) 151/68 (10/08/18 1130)  SpO2: 98 % (10/08/18 1145) Vital Signs (24h Range):  Temp:  [98 °F (36.7 °C)-100.7 °F (38.2 °C)] 98 °F (36.7 °C)  Pulse:  [] 77  Resp:  [7-29] 22  SpO2:  [93 %-100 %] 98 %  BP: (111-223)/(49-89) 151/68     Weight: 92.5 kg (203 lb 14.8 oz)  Body mass index is 33.93 kg/m².    Intake/Output Summary (Last 24 hours) at 10/8/2018 1249  Last data filed at 10/8/2018 1200  Gross per 24 hour   Intake 329.58 ml   Output 1305 ml   Net -975.42 ml      Physical Exam   Constitutional: She appears well-developed and well-nourished.   HENT:   Head: Normocephalic and atraumatic.   Eyes: Conjunctivae are normal. Pupils are equal, round, and reactive to light.   Neck: Neck supple.   Cardiovascular: Normal rate and regular rhythm.   Pulmonary/Chest: Effort normal. She has no wheezes.   Scattered rhonchi    Abdominal: Soft. She exhibits no distension.   Musculoskeletal: She exhibits no edema or deformity.   Neurological: She is alert. No cranial nerve deficit.   Slow to respond, but answers simple questions.   Skin: Skin is warm and dry.       Significant Labs:   BMP:   Recent Labs   Lab  10/08/18   0420   GLU  125*   NA  148*   K  4.0   CL  114*   CO2  23   BUN  40*   CREATININE  1.2   CALCIUM  9.3     CBC:   Recent Labs   Lab  10/07/18   0340  10/08/18   0420   WBC  9.14  7.60   HGB  8.4*   7.7*   HCT  25.6*  23.9*   PLT  236  246         Assessment/Plan:      * Cardiac arrest    Etiology still unclear, but PEA reported by first repsonders, quick ROSC with one round of epinephrine  Pt intubated on sedation and will have wean sedation for appropriate neurological exam, consult neurology if indicated   ECHO  -  +DD  Pacemaker interrogated   [facility] [place].            Acute hypoxemic respiratory failure    Chest CTA r/o PE, likely due to decompensated CHF and possible underlying infection  Pulmonology consulted for vent management  Cardiology consulted - [facility] :  · Left ventricle ejection fraction is mildly decreased at 45%  · Left ventricle shows concentric remodeling.  · RV systolic function is normal.  · Left atrium is mildly dilated.  · Right atrium is moderately dilated.  · Tricuspid valve shows mild regurgitation.  · Grade II (moderate) left ventricular diastolic dysfunction consistent with pseudonormalization.  · LA pressure is elevated.   Cultures - negative, sent for resp cultures today   Appreciate Pulmonary input.  Extubated on 10/2.  Initially slow to respond, but improving.  Advance diet.  Less responsive on 10/7.  Spiked fever.  Possible aspiration pneumonitis.  Started Avelox.  Placed on Cardene drip for uncontrolled HTN.  Able to be weaned off.              Hematuria, gross    Appreciate Urology input.  Holding Lovenox.  Urine clearing.          Pacemaker    See above           Acute pulmonary edema    Pt given IV lasix with large amount of diuresis  CXr improving   Holding Lasix secondary to rise in Creat.            S/P TAVR (transcatheter aortic valve replacement)              Stage 3 chronic kidney disease    No indication for vasopressor or aggressive IVF  Monitor closely on IV lasix   Avoid nephrotoxins   ARF--holding Lasix and giving IVF hydration.  Creat stable, but urine output poor.  Nephrology consult.  Hematuria--hold Lovenox.  Consulted Urology.  Improving urine output and  Creat          Essential hypertension    BP uncontrolled  Patient on home Clonidine that was stopped during this admit.  Possible rebound HTN.  Restarted home Clonidine.  Now on Cardene gtt.            CAD s/p PCI    Aspirin, plavix and statin resumed   Cardiology following  Elevated troponin after cardiopulmonary arrest          Anemia of chronic disease    Monitor  Reviewed bone marrow biopsy 2017 - no malignancy   H/H low, but stable.          Hyperlipidemia    Resume statin             VTE Risk Mitigation (From admission, onward)        Ordered     Place sequential compression device  Until discontinued      10/05/18 1249     Place LAUREANO hose  Until discontinued      10/05/18 1249     IP VTE HIGH RISK PATIENT  Once      09/30/18 1359          Critical care time spent on the evaluation and treatment of severe organ dysfunction, review of pertinent labs and imaging studies, discussions with consulting providers and discussions with patient/family: 40 minutes.    Grupo Wharton MD  Department of Hospital Medicine   [facility]

## 2018-10-09 LAB
ALBUMIN SERPL BCP-MCNC: 2.7 G/DL
ALP SERPL-CCNC: 69 U/L
ALT SERPL W/O P-5'-P-CCNC: 14 U/L
ANION GAP SERPL CALC-SCNC: 10 MMOL/L
AST SERPL-CCNC: 31 U/L
BASOPHILS # BLD AUTO: 0.02 K/UL
BASOPHILS NFR BLD: 0.2 %
BILIRUB SERPL-MCNC: 0.4 MG/DL
BUN SERPL-MCNC: 33 MG/DL
CALCIUM SERPL-MCNC: 9.1 MG/DL
CHLORIDE SERPL-SCNC: 111 MMOL/L
CO2 SERPL-SCNC: 22 MMOL/L
CREAT SERPL-MCNC: 1 MG/DL
DIFFERENTIAL METHOD: ABNORMAL
EOSINOPHIL # BLD AUTO: 0.2 K/UL
EOSINOPHIL NFR BLD: 2.1 %
ERYTHROCYTE [DISTWIDTH] IN BLOOD BY AUTOMATED COUNT: 16.6 %
EST. GFR  (AFRICAN AMERICAN): >60 ML/MIN/1.73 M^2
EST. GFR  (NON AFRICAN AMERICAN): 53 ML/MIN/1.73 M^2
GLUCOSE SERPL-MCNC: 117 MG/DL
HCT VFR BLD AUTO: 24.1 %
HGB BLD-MCNC: 7.9 G/DL
LYMPHOCYTES # BLD AUTO: 1.7 K/UL
LYMPHOCYTES NFR BLD: 19.2 %
MCH RBC QN AUTO: 28.8 PG
MCHC RBC AUTO-ENTMCNC: 32.8 G/DL
MCV RBC AUTO: 88 FL
MONOCYTES # BLD AUTO: 0.8 K/UL
MONOCYTES NFR BLD: 9.2 %
NEUTROPHILS # BLD AUTO: 6.2 K/UL
NEUTROPHILS NFR BLD: 69.3 %
PLATELET # BLD AUTO: 298 K/UL
PMV BLD AUTO: 10.1 FL
POTASSIUM SERPL-SCNC: 3.7 MMOL/L
PROT SERPL-MCNC: 6.6 G/DL
RBC # BLD AUTO: 2.74 M/UL
SODIUM SERPL-SCNC: 143 MMOL/L
WBC # BLD AUTO: 8.89 K/UL

## 2018-10-09 PROCEDURE — 25000003 PHARM REV CODE 250: Performed by: INTERNAL MEDICINE

## 2018-10-09 PROCEDURE — C1894 INTRO/SHEATH, NON-LASER: HCPCS | Performed by: INTERNAL MEDICINE

## 2018-10-09 PROCEDURE — 25000003 PHARM REV CODE 250: Performed by: HOSPITALIST

## 2018-10-09 PROCEDURE — 63600175 PHARM REV CODE 636 W HCPCS: Performed by: INTERNAL MEDICINE

## 2018-10-09 PROCEDURE — 93454 CORONARY ARTERY ANGIO S&I: CPT | Performed by: INTERNAL MEDICINE

## 2018-10-09 PROCEDURE — B2111ZZ FLUOROSCOPY OF MULTIPLE CORONARY ARTERIES USING LOW OSMOLAR CONTRAST: ICD-10-PCS | Performed by: INTERNAL MEDICINE

## 2018-10-09 PROCEDURE — 99152 MOD SED SAME PHYS/QHP 5/>YRS: CPT | Performed by: INTERNAL MEDICINE

## 2018-10-09 PROCEDURE — C1887 CATHETER, GUIDING: HCPCS | Performed by: INTERNAL MEDICINE

## 2018-10-09 PROCEDURE — 4A023N7 MEASUREMENT OF CARDIAC SAMPLING AND PRESSURE, LEFT HEART, PERCUTANEOUS APPROACH: ICD-10-PCS | Performed by: INTERNAL MEDICINE

## 2018-10-09 PROCEDURE — 20000000 HC ICU ROOM

## 2018-10-09 PROCEDURE — 94640 AIRWAY INHALATION TREATMENT: CPT

## 2018-10-09 PROCEDURE — 85025 COMPLETE CBC W/AUTO DIFF WBC: CPT

## 2018-10-09 PROCEDURE — C1760 CLOSURE DEV, VASC: HCPCS | Performed by: INTERNAL MEDICINE

## 2018-10-09 PROCEDURE — 99153 MOD SED SAME PHYS/QHP EA: CPT | Performed by: INTERNAL MEDICINE

## 2018-10-09 PROCEDURE — 92526 ORAL FUNCTION THERAPY: CPT

## 2018-10-09 PROCEDURE — 36415 COLL VENOUS BLD VENIPUNCTURE: CPT

## 2018-10-09 PROCEDURE — 94761 N-INVAS EAR/PLS OXIMETRY MLT: CPT

## 2018-10-09 PROCEDURE — C1769 GUIDE WIRE: HCPCS | Performed by: INTERNAL MEDICINE

## 2018-10-09 PROCEDURE — 80053 COMPREHEN METABOLIC PANEL: CPT

## 2018-10-09 PROCEDURE — 25500020 PHARM REV CODE 255: Performed by: INTERNAL MEDICINE

## 2018-10-09 PROCEDURE — 93454 CORONARY ARTERY ANGIO S&I: CPT | Mod: 26,,, | Performed by: INTERNAL MEDICINE

## 2018-10-09 PROCEDURE — 99152 MOD SED SAME PHYS/QHP 5/>YRS: CPT | Mod: ,,, | Performed by: INTERNAL MEDICINE

## 2018-10-09 DEVICE — ANGIO-SEAL VIP VASCULAR CLOSURE DEVICE
Type: IMPLANTABLE DEVICE | Site: GROIN | Status: FUNCTIONAL
Brand: ANGIO-SEAL

## 2018-10-09 RX ORDER — NITROGLYCERIN 0.4 MG/1
0.4 TABLET SUBLINGUAL EVERY 5 MIN PRN
Status: DISCONTINUED | OUTPATIENT
Start: 2018-10-09 | End: 2018-10-10

## 2018-10-09 RX ORDER — LIDOCAINE HYDROCHLORIDE 20 MG/ML
INJECTION, SOLUTION EPIDURAL; INFILTRATION; INTRACAUDAL; PERINEURAL
Status: DISCONTINUED | OUTPATIENT
Start: 2018-10-09 | End: 2018-10-10

## 2018-10-09 RX ORDER — FENTANYL CITRATE 50 UG/ML
INJECTION, SOLUTION INTRAMUSCULAR; INTRAVENOUS
Status: DISCONTINUED | OUTPATIENT
Start: 2018-10-09 | End: 2018-10-10

## 2018-10-09 RX ORDER — ACETAMINOPHEN 325 MG/1
650 TABLET ORAL EVERY 4 HOURS PRN
Status: DISCONTINUED | OUTPATIENT
Start: 2018-10-09 | End: 2018-10-10

## 2018-10-09 RX ORDER — MIDAZOLAM HYDROCHLORIDE 1 MG/ML
INJECTION, SOLUTION INTRAMUSCULAR; INTRAVENOUS
Status: DISCONTINUED | OUTPATIENT
Start: 2018-10-09 | End: 2018-10-10

## 2018-10-09 RX ORDER — SODIUM CHLORIDE 9 MG/ML
INJECTION, SOLUTION INTRAVENOUS CONTINUOUS
Status: ACTIVE | OUTPATIENT
Start: 2018-10-09 | End: 2018-10-09

## 2018-10-09 RX ORDER — HEPARIN SODIUM 200 [USP'U]/100ML
INJECTION, SOLUTION INTRAVENOUS
Status: DISCONTINUED | OUTPATIENT
Start: 2018-10-09 | End: 2018-10-10

## 2018-10-09 RX ORDER — HYDROCODONE BITARTRATE AND ACETAMINOPHEN 5; 325 MG/1; MG/1
1 TABLET ORAL EVERY 4 HOURS PRN
Status: DISCONTINUED | OUTPATIENT
Start: 2018-10-09 | End: 2018-10-10

## 2018-10-09 RX ADMIN — METOPROLOL TARTRATE 100 MG: 50 TABLET ORAL at 08:10

## 2018-10-09 RX ADMIN — HYDRALAZINE HYDROCHLORIDE 100 MG: 25 TABLET ORAL at 02:10

## 2018-10-09 RX ADMIN — FLUTICASONE PROPIONATE 1 PUFF: 220 AEROSOL, METERED RESPIRATORY (INHALATION) at 08:10

## 2018-10-09 RX ADMIN — GABAPENTIN 300 MG: 300 CAPSULE ORAL at 09:10

## 2018-10-09 RX ADMIN — GABAPENTIN 300 MG: 300 CAPSULE ORAL at 02:10

## 2018-10-09 RX ADMIN — CETIRIZINE HYDROCHLORIDE 10 MG: 10 TABLET, FILM COATED ORAL at 08:10

## 2018-10-09 RX ADMIN — METOPROLOL TARTRATE 100 MG: 50 TABLET ORAL at 09:10

## 2018-10-09 RX ADMIN — CLONIDINE HYDROCHLORIDE 0.3 MG: 0.1 TABLET ORAL at 08:10

## 2018-10-09 RX ADMIN — AZELASTINE HYDROCHLORIDE 137 MCG: 137 SPRAY, METERED NASAL at 09:10

## 2018-10-09 RX ADMIN — ASPIRIN 81 MG CHEWABLE TABLET 81 MG: 81 TABLET CHEWABLE at 08:10

## 2018-10-09 RX ADMIN — NICARDIPINE HYDROCHLORIDE 2.5 MG/HR: 0.2 INJECTION, SOLUTION INTRAVENOUS at 06:10

## 2018-10-09 RX ADMIN — GABAPENTIN 300 MG: 300 CAPSULE ORAL at 08:10

## 2018-10-09 RX ADMIN — MOXIFLOXACIN HYDROCHLORIDE 400 MG: 400 TABLET, FILM COATED ORAL at 08:10

## 2018-10-09 RX ADMIN — CLONIDINE HYDROCHLORIDE 0.3 MG: 0.1 TABLET ORAL at 09:10

## 2018-10-09 RX ADMIN — CLONIDINE HYDROCHLORIDE 0.3 MG: 0.1 TABLET ORAL at 02:10

## 2018-10-09 RX ADMIN — AZELASTINE HYDROCHLORIDE 137 MCG: 137 SPRAY, METERED NASAL at 08:10

## 2018-10-09 RX ADMIN — CLOPIDOGREL BISULFATE 75 MG: 75 TABLET ORAL at 08:10

## 2018-10-09 RX ADMIN — HYDRALAZINE HYDROCHLORIDE 100 MG: 25 TABLET ORAL at 05:10

## 2018-10-09 NOTE — ASSESSMENT & PLAN NOTE
Etiology still unclear, but PEA reported by first repsonders, quick ROSC with one round of epinephrine  Pt intubated on sedation and will have wean sedation for appropriate neurological exam, consult neurology if indicated   ECHO  -  +DD  Pacemaker interrogated   [facility] - 10/9- stents to RCA

## 2018-10-09 NOTE — SUBJECTIVE & OBJECTIVE
Interval History: No new issues.   Review of Systems   Unable to perform ROS: Dementia     Objective:     Vital Signs (Most Recent):  Temp: 99.5 °F (37.5 °C) (10/09/18 0315)  Pulse: 95 (10/09/18 0853)  Resp: 19 (10/09/18 0835)  BP: (!) 163/72 (10/09/18 0852)  SpO2: 97 % (10/09/18 0835) Vital Signs (24h Range):  Temp:  [98 °F (36.7 °C)-99.5 °F (37.5 °C)] 99.5 °F (37.5 °C)  Pulse:  [69-98] 95  Resp:  [5-33] 19  SpO2:  [94 %-99 %] 97 %  BP: (111-220)/() 163/72     Weight: 92.5 kg (203 lb 14.8 oz)  Body mass index is 33.93 kg/m².    Intake/Output Summary (Last 24 hours) at 10/9/2018 0950  Last data filed at 10/9/2018 0605  Gross per 24 hour   Intake 2001.47 ml   Output 1503 ml   Net 498.47 ml      Physical Exam   Constitutional: She appears well-developed and well-nourished.   HENT:   Head: Normocephalic and atraumatic.   Cardiovascular: Normal rate and regular rhythm.   Pulmonary/Chest: Breath sounds normal. No stridor. She has no wheezes. She has no rales.   Abdominal: Soft. Bowel sounds are normal. She exhibits no distension. There is no tenderness. There is no guarding.   Neurological: She is alert.   Vitals reviewed.      Significant Labs:   BMP:   Recent Labs   Lab  10/09/18   0245   GLU  117*   NA  143   K  3.7   CL  111*   CO2  22*   BUN  33*   CREATININE  1.0   CALCIUM  9.1     CBC:   Recent Labs   Lab  10/08/18   0420  10/09/18   0245   WBC  7.60  8.89   HGB  7.7*  7.9*   HCT  23.9*  24.1*   PLT  246  298       Significant Imaging:

## 2018-10-09 NOTE — PLAN OF CARE
Problem: Patient Care Overview  Goal: Plan of Care Review  Recommendations     Recommendation/Intervention:   1. For TF initiation, rec Isosource 1.5 @ 10ml/hr; adv as skinny'd to a goal rate of 50ml/hr to provide 1800 cals, 81 gms prot, & 933 mls free fl    2. If free water flushes desired, rec 225 mls QID; adjust prn based on I/O's, labs       Goals: TF initiation or diet advancement w/in 48 hrs  Nutrition Goal Status: progressing towards goal

## 2018-10-09 NOTE — PLAN OF CARE
Problem: SLP Goal  Goal: SLP Goal  STGs:  1. Pt will participate in ongoing assessment of swallow function at b/s.  2. Pt will tolerate low level PO trials without overt s/s aspiration at b/s.  3.Pt will tolerate least restrictive PO diet without overt s/s aspiration and adequate oral clearance.    Outcome: Ongoing (interventions implemented as appropriate)  Pt with inconsistent s/s aspiration across all PO consistencies assessed this date. REC continue NPO with alternative means nutrition/medication/hydration and ongoing swallow assessment with ST.  Continue ST POC.

## 2018-10-09 NOTE — CONSULTS
"  [facility] [place]  Adult Nutrition  Consult Note    SUMMARY     Recommendations    Recommendation/Intervention:   1. For TF initiation, rec Isosource 1.5 @ 10ml/hr; adv as skinny'd to a goal rate of 50ml/hr to provide 1800 cals, 81 gms prot, & 933 mls free fl    2. If free water flushes desired, rec 225 mls QID; adjust prn based on I/O's, labs      Goals: TF initiation or diet advancement w/in 48 hrs  Nutrition Goal Status: progressing towards goal  Communication of RD Recs: reviewed with RN    Reason for Assessment    Reason for Assessment: RD follow-up  Diagnosis: (acute hypoxemic resp failure)  Relevant Medical History: CHF, CAD s/p stent, COPD, HLD, HTN  General Information Comments: Pt seen this a.m. after returning from agniogram w/ stent placement. ST there to assess pt. ST rec'ing NPO. NG in place. NFPE not peformed as pt was well-nourished pta & had adequate intake of meals prior to being made NPO on 10/7. EUGENE resolved since RD's last eval  Nutrition Discharge Planning: Unable to determine @ this time.    Nutrition Risk Screen    Nutrition Risk Screen: no indicators present    Nutrition/Diet History    Patient Reported Diet/Restrictions/Preferences: low salt(at times)  Typical Food/Fluid Intake: Adequate pta  Food Preferences: None reported  Do you have any cultural, spiritual, Voodoo conflicts, given your current situation?: na  Factors Affecting Nutritional Intake: difficulty/impaired swallowing, NPO    Anthropometrics    Temp: 99.5 °F (37.5 °C)  Height: 5' 5" (165.1 cm)  Height (inches): 65 in  Weight Method: Bed Scale  Weight: 92.5 kg (203 lb 14.8 oz)(taken by RN on 10/7)  Weight (lb): 203.93 lb  Ideal Body Weight (IBW), Female: 125 lb  % Ideal Body Weight, Female (lb): 163.14 lb  BMI (Calculated): 34  BMI Grade: 30 - 34.9- obesity - grade I       Lab/Procedures/Meds    Pertinent Labs Reviewed: reviewed  Pertinent Labs Comments: Cl 111, CO2 22, BUN 33, Glu 117  Pertinent Medications " Reviewed: reviewed  Pertinent Medications Comments: cardene, senna-docusate    Physical Findings/Assessment    Overall Physical Appearance: on oxygen therapy, obese  Tubes: nasogastric  Oral/Mouth Cavity: tooth/teeth missing  Skin: intact    Estimated/Assessed Needs    Weight Used For Calorie Calculations: 92.5 kg (203 lb 14.8 oz)  Energy Calorie Requirements (kcal): 1745  Energy Need Method: [place]-St Jeor(x 1.25 (PAL))     Protein Requirements: 93 gms (1gm/kg)  Weight Used For Protein Calculations: 92.5 kg (203 lb 14.8 oz)     Fluid Need Method: RDA Method  RDA Method (mL): 1745       Nutrition Prescription Ordered    Current Diet Order: NPO    Evaluation of Received Nutrient/Fluid Intake    Other Calories (kcal): 0  IV Fluid (mL): 1751(x 24 hrs)  I/O: +423 mls x 24 hrs; -4.777L since admit  Energy Calories Required: not meeting needs  Protein Required: not meeting needs  Fluid Required: (Per MD)  Comments: LBM 10/9; good uop  Tolerance: (NPO)  % Intake of Estimated Energy Needs: 0 - 25 %  % Meal Intake: NPO    Nutrition Risk    Level of Risk/Frequency of Follow-up: (F/u 2 x weekly)     Assessment and Plan    Nutrition Problem  Inadeqaute energy intake     Related to (etiology):   NPO w/ no alternate means of nutr in place     Signs and Symptoms (as evidenced by):   <85% of EEN/EPN being met     Interventions/Recommendations (treatment strategy):  See recs     Nutrition Diagnosis Status:   New         Monitor and Evaluation    Food and Nutrient Intake: enteral nutrition intake, energy intake  Food and Nutrient Adminstration: enteral and parenteral nutrition administration, diet order  Physical Activity and Function: nutrition-related ADLs and IADLs  Anthropometric Measurements: weight, weight change  Biochemical Data, Medical Tests and Procedures: electrolyte and renal panel, glucose/endocrine profile  Nutrition-Focused Physical Findings: overall appearance     Nutrition Follow-Up    RD Follow-up?: Yes

## 2018-10-09 NOTE — PT/OT/SLP PROGRESS
Physical Therapy      Patient Name:  Cindy Lyman   MRN:  [id number]    Patient not seen today secondary to Stent placement. Will follow-up 10/10/2018.    Jessica Villarreal, PT

## 2018-10-09 NOTE — PT/OT/SLP PROGRESS
Speech Language Pathology Treatment    Patient Name:  Cindy Lyman   MRN:  [id number]  Admitting Diagnosis: Cardiac arrest    Recommendations:                 General Recommendations:  Dysphagia therapy  Diet recommendations:  NPO, Liquid Diet Level: NPO   Aspiration Precautions: Frequent oral care, HOB to 90 degrees and Strict aspiration precautions   General Precautions: Standard, aspiration, NPO    Subjective   Pt semi-reclined in bed with several family members at b/s.  Pt recently returned from cardiac procedure this am.  NG tube in place but TFs not yet started. Pt awake, alert, and cooperative for tx this date.  Patient goals: to eat     Pain/Comfort:  · Pain Rating 1: 0/10    Objective:     Has the patient been evaluated by SLP for swallowing?   Yes  Keep patient NPO? Yes   Current Respiratory Status: nasal cannula      Pt positioned upright in bed for ongoing swallow assessment. Pt with strong volitional cough upon request.  Pt demonstrated overt s/s aspiration (strong coughing) on 1/4 ice chip trials.  Pt with difficulty taking thin liquid bolus via cup-edge sip 2/2 tongue thrusting and anterior spillage of bolus; Pt taking very small sized bolus from cup of thin liquids presented; overt s/s aspiration present on 2/4 thin liquid trials via cup sips (strong coughing). Pt demonstrated overt s/s aspiration (strong coughing) with 2/4 thin liquid trials via straw sips.  Pt demonstrated overt s/s aspiration (strong coughing) on 2/5 nectar thick liquid trials; clinician assisted cup-edge sips.  Delayed coughing x1 noted on 1/5 half tsp puree trials. Delayed swallow initiation and reduced laryngeal elevation noted across trials of all consistencies. Decreased coordination of oral phase noted across trials of all consistencies.  Pt demonstrated multiple swallows, (3-7) for each trail presented, across consistencies. SLP provided skilled education re: s/s aspiration, risk of aspiration and sequelae, ST POC,  role of ST.     Assessment:     Cindy Lyman is a 82 y.o. female with an SLP diagnosis of oropharyngeal  Dysphagia.  She presents with inconsistent overt s/s aspiration with thin liquids, nectar liquids, and puree trials.  Pt is not yet safe for PO diet. REC continue NPO with alternate means nutrition/medication/hydration and ongoing swallow assessment with ST.     Goals:   Multidisciplinary Problems     SLP Goals        Problem: SLP Goal    Goal Priority Disciplines Outcome   SLP Goal    Medium SLP Ongoing (interventions implemented as appropriate)   Description:  STGs:  1. Pt will tolerate mechanical soft diet with thin liquids with no overt s/s of aspiration.   2. Pt will tolerate regular solids with thin liquids with no overt s/s of aspiration.                    Plan:     · Patient to be seen:  3 x/week   · Plan of Care expires:  10/12/18  · Plan of Care reviewed with:  patient, daughter, family   · SLP Follow-Up:  Yes       Discharge recommendations:  nursing facility, skilled   Barriers to Discharge:  nutritional status    Time Tracking:     SLP Treatment Date:   10/09/18  Speech Start Time:  1122  Speech Stop Time:  1145     Speech Total Time (min):  23 min    Billable Minutes: Treatment Swallowing Dysfunction 23    DIA Spence, CCC-SLP  10/09/2018

## 2018-10-09 NOTE — ASSESSMENT & PLAN NOTE
No indication for vasopressor or aggressive IVF  Monitor closely on IV lasix   Avoid nephrotoxins   ARF--holding Lasix and giving IVF hydration.  Creat stable, but urine output poor.  Nephrology consult.  Hematuria--hold Lovenox.  Consulted Urology.  Improving urine output and Creat  Patient now has normal renal function

## 2018-10-09 NOTE — PROGRESS NOTES
Cindy Lyman is a 82 y.o. female patient.    Follow for EUGENE    No new c/o, LHC done this am   Comfortable    Scheduled Meds:   aspirin  81 mg Oral Daily    azelastine  1 spray Nasal BID    cetirizine  10 mg Oral Daily    cloNIDine  0.3 mg Oral TID    clopidogrel  75 mg Oral Daily    fluticasone  1 puff Inhalation BID    gabapentin  300 mg Oral TID    hydrALAZINE  100 mg Per NG tube Q8H    metoprolol tartrate  100 mg Per NG tube BID    moxifloxacin  400 mg Per NG tube Daily    senna-docusate 8.6-50 mg  1 tablet Oral BID       Review of patient's allergies indicates:   Allergen Reactions    Doxycycline hyclate Diarrhea and Nausea And Vomiting    Singulair [montelukast]      Stomach pain, Muscle pain, Cough      Penicillins      Other reaction(s): Anaphylaxis    Ventolin [albuterol sulfate] Other (See Comments)     Severely elevated blood pressure    Latex, natural rubber Rash         Vital Signs Range (Last 24H):  Temp:  [98 °F (36.7 °C)-99.5 °F (37.5 °C)]   Pulse:  [69-98]   Resp:  [5-33]   BP: (134-220)/()   SpO2:  [94 %-99 %]     I & O (Last 24H):    Intake/Output Summary (Last 24 hours) at 10/9/2018 1044  Last data filed at 10/9/2018 0605  Gross per 24 hour   Intake 1925.22 ml   Output 1476 ml   Net 449.22 ml           Physical Exam:  General appearance: well developed, well nourished, no distress  Lungs:  clear to auscultation bilaterally and normal respiratory effort  Heart: regular rate and rhythm  Abdomen: soft, non-tender non-distented; bowel sounds normal; no masses,  no organomegaly  Extremities: no cyanosis or edema, or clubbing    Laboratory:  CBC:   Recent Labs   Lab  10/09/18   0245   WBC  8.89   RBC  2.74*   HGB  7.9*   HCT  24.1*   PLT  298   MCV  88   MCH  28.8   MCHC  32.8     CMP:   Recent Labs   Lab  10/09/18   0245   GLU  117*   CALCIUM  9.1   ALBUMIN  2.7*   PROT  6.6   NA  143   K  3.7   CO2  22*   CL  111*   BUN  33*   CREATININE  1.0   ALKPHOS  69   ALT   14   AST  31   BILITOT  0.4       Imp/Plan    EUGENE - resolved  S/p PEA arrest  S/p respiratory failure  CAD s/p LHC  HTN  Anemia of chronic disease    Continue IVF  CMP in am        Trac T Sidra  10/9/2018

## 2018-10-09 NOTE — PROGRESS NOTES
[facility] Hospital Medicine  Progress Note    Patient Name: Cindy Lyman  MRN: [id number]  Patient Class: IP- Inpatient   Admission Date: 9/30/2018  Length of Stay: 9 days  Attending Physician: Tu Patel MD  Primary Care Provider: Rodger Camarena MD        Subjective:     Principal Problem:Cardiac arrest    HPI:  83 yo female with diastolic CHF, COPD, gastric cancer, aortic stenosis with complete heart block and s/p TAVR and pacemaker placed 7/2018 presented in cardiac arrest. Per ED notes, patient was on her way to [employer] with others and c/o chest pain. CPR was initiated in parked vehicle outside ED. One round of epinephrine given and got ROSC. Per family, she c/o worsening SOB over several days. On arrival pt was euthermic, hypertensive, elevated lactate and BNP. CXR suggest pulmonary edema though infection cannot be ruled out. Pt intubated and evaluated by pulmonology. Cardiology consulted. ECHO pending. Broad spectrum antibiotics initiated until cultures result and clinical course dictates.     Chest CTA negative for pulmonary emboli.     Per family, Pt is a full code.     Hospital Course:  Pt admitted after PEA arrest and acute respiratory failure/acidosis. Acidosis corrected. Pulmonology assisting with vent management/weaning. Trial of precedex today but patient did not tolerate. Switched back to propofol. CPAP this am and now back on PVRC. Pacemaker interrogated and appears to be functioning fine. Diuresed well on lasix without change in renal function. Possible plan for LHC per cardiology. Extubated on 10/2. Initially slow to respond, but mental status improving.  Planned LHC on 10/4 held secondary to rise in Creat.  Lasix stopped and getting IVF hydration.  Episodes of slight hypotension and BP medications adjusted.  Worsening urine output.  Urine also bloody.  Urology consulted.  Improving urine output and Creat.  10/7: Improving Creat.  Patient more drowsy and less  responsive.  Spiked fever overnight.  Had to be placed on Cardene gtt secondary to poorly controlled HTN. Weaned off Cardene gtt.  Possible aspiration pneumonitis and started on Avelox.  The patient went for C on 10/9 with stents placed to RCA.       Interval History: No new issues.   Review of Systems   Unable to perform ROS: Dementia     Objective:     Vital Signs (Most Recent):  Temp: 99.5 °F (37.5 °C) (10/09/18 0315)  Pulse: 95 (10/09/18 0853)  Resp: 19 (10/09/18 0835)  BP: (!) 163/72 (10/09/18 0852)  SpO2: 97 % (10/09/18 0835) Vital Signs (24h Range):  Temp:  [98 °F (36.7 °C)-99.5 °F (37.5 °C)] 99.5 °F (37.5 °C)  Pulse:  [69-98] 95  Resp:  [5-33] 19  SpO2:  [94 %-99 %] 97 %  BP: (111-220)/() 163/72     Weight: 92.5 kg (203 lb 14.8 oz)  Body mass index is 33.93 kg/m².    Intake/Output Summary (Last 24 hours) at 10/9/2018 0950  Last data filed at 10/9/2018 0605  Gross per 24 hour   Intake 2001.47 ml   Output 1503 ml   Net 498.47 ml      Physical Exam   Constitutional: She appears well-developed and well-nourished.   HENT:   Head: Normocephalic and atraumatic.   Cardiovascular: Normal rate and regular rhythm.   Pulmonary/Chest: Breath sounds normal. No stridor. She has no wheezes. She has no rales.   Abdominal: Soft. Bowel sounds are normal. She exhibits no distension. There is no tenderness. There is no guarding.   Neurological: She is alert.   Vitals reviewed.      Significant Labs:   BMP:   Recent Labs   Lab  10/09/18   0245   GLU  117*   NA  143   K  3.7   CL  111*   CO2  22*   BUN  33*   CREATININE  1.0   CALCIUM  9.1     CBC:   Recent Labs   Lab  10/08/18   0420  10/09/18   0245   WBC  7.60  8.89   HGB  7.7*  7.9*   HCT  23.9*  24.1*   PLT  246  298       Significant Imaging:     Assessment/Plan:      * Cardiac arrest    Etiology still unclear, but PEA reported by first repsonders, quick ROSC with one round of epinephrine  Pt intubated on sedation and will have wean sedation for appropriate  neurological exam, consult neurology if indicated   ECHO  -  +DD  Pacemaker interrogated   [facility] - 10/9- stents to RCA          Palliative care encounter    Consult pending           Hematuria, gross    Appreciate Urology input.  Holding Lovenox.  Urine clearing.          Pacemaker    See above           Acute hypoxemic respiratory failure    Chest CTA r/o PE, likely due to decompensated CHF and possible underlying infection  Pulmonology consulted for vent management  Cardiology consulted - [facility] :  · Left ventricle ejection fraction is mildly decreased at 45%  · Left ventricle shows concentric remodeling.  · RV systolic function is normal.  · Left atrium is mildly dilated.  · Right atrium is moderately dilated.  · Tricuspid valve shows mild regurgitation.  · Grade II (moderate) left ventricular diastolic dysfunction consistent with pseudonormalization.  · LA pressure is elevated.   Cultures - negative, sent for resp cultures today   Appreciate Pulmonary input.  Extubated on 10/2.  Initially slow to respond, but improving.  Advance diet.  Less responsive on 10/7.  Spiked fever.  Possible aspiration pneumonitis.  Started Avelox.  Placed on Cardene drip for uncontrolled HTN.  Able to be weaned off.              Acute pulmonary edema    Pt given IV lasix with large amount of diuresis  CXr improving   Holding Lasix secondary to rise in Creat.            S/P TAVR (transcatheter aortic valve replacement)    No acute issues           Stage 3 chronic kidney disease    No indication for vasopressor or aggressive IVF  Monitor closely on IV lasix   Avoid nephrotoxins   ARF--holding Lasix and giving IVF hydration.  Creat stable, but urine output poor.  Nephrology consult.  Hematuria--hold Lovenox.  Consulted Urology.  Improving urine output and Creat  Patient now has normal renal function           Essential hypertension    BP uncontrolled  Patient on home Clonidine that was stopped during this admit.  Possible rebound HTN.   Restarted home Clonidine.  Now on Cardene gtt.            CAD s/p PCI    Aspirin, plavix and statin resumed   Cardiology following  Elevated troponin after cardiopulmonary arrest          Anemia of chronic disease    Monitor  Reviewed bone marrow biopsy 2017 - no malignancy   H/H low, but stable.          Hyperlipidemia    Resume statin             VTE Risk Mitigation (From admission, onward)        Ordered     heparin infusion 1,000 units/500 ml in 0.9% NaCl (pressure line flush)  Intra-op continuous PRN      10/09/18 0752     Place sequential compression device  Until discontinued      10/05/18 1249     Place LAUREANO hose  Until discontinued      10/05/18 1249     IP VTE HIGH RISK PATIENT  Once      09/30/18 1359          Critical care time spent on the evaluation and treatment of severe organ dysfunction, review of pertinent labs and imaging studies, discussions with consulting providers and discussions with patient/family: 45  Minutes.    Swallow study today. Palliative care consulted. S/P LHC- no intervention.  Med management only. To floor likely 10/10    Tu Roman MD  Department of Hospital Medicine   [facility] - [place], [place]

## 2018-10-09 NOTE — PLAN OF CARE
Problem: Patient Care Overview  Goal: Plan of Care Review  Outcome: Ongoing (interventions implemented as appropriate)  Pt had LHC today with out complications, angioseal clean dry and intact with out any hematoma noted. Pt weaned off cardene today at 1315. BP has been 110's-140 since cardene off. Pt has remained safe and free of injury. Pt has no skin breakdown noted. TF started at 5pm at 10ml/hr. Pt has been resting most of afternoon and has been without any CP or SOB.Pt remains with paced rhythm.

## 2018-10-09 NOTE — PROCEDURES
Procedures       Cr improved after hydration. EF newly depressed after cardiac arrest. Peak troponin 0.2. Discussed LHC at length with family they agree to proceed. Patient continues with moderate confusion    Pre-sedation Assessment:    1. ASA Score: ASA 3 - Patient with moderate systemic disease with functional limitations  2. Mallampati Class: III (soft and hard palate and base of uvula visible)  3. Patient or family history of any reaction to anesthesia or sedation: No  4. Plan for Sedation: Moderate  5. H&P within 30 days of the procedure and updated within 24 hrs of admission or registration: Yes

## 2018-10-09 NOTE — PROCEDURES
Procedures     [facility]   Dr Carmen  Pre-op Dx cardiac arrest, NSTEMI  Post-op Dx same  Specimen none  EBL < 50 cc    10/9/18 [facility] - RCA stents patent, LAD/Cx disease unchanged from previous [facility]    Medical Rx  Angioseal

## 2018-10-09 NOTE — PLAN OF CARE
Problem: Patient Care Overview  Goal: Plan of Care Review  Outcome: Ongoing (interventions implemented as appropriate)  Patient remains in ICU. BP continue to be very elevated. Cardene drip started last night and titrated up to 7.5mg/hr. Now back at 2.5mg/hr and 6am hydralazine given via ng tube. Patient became agitated last night with daughter at bedside wanting the ng tube out. Dr. Walls notified of patient and family wishes. Explained to daughter and patient need for ng tube to give bp meds. Patient calmed down and agreed to keep ng tube. Heart cath scheduled for this am. Loose Bm x2.

## 2018-10-10 PROBLEM — R53.81 DEBILITY: Status: ACTIVE | Noted: 2018-10-10

## 2018-10-10 LAB
ALBUMIN SERPL BCP-MCNC: 2.4 G/DL
ALP SERPL-CCNC: 64 U/L
ALT SERPL W/O P-5'-P-CCNC: 13 U/L
ANION GAP SERPL CALC-SCNC: 6 MMOL/L
AST SERPL-CCNC: 27 U/L
BASOPHILS # BLD AUTO: 0.01 K/UL
BASOPHILS NFR BLD: 0.2 %
BILIRUB SERPL-MCNC: 0.3 MG/DL
BUN SERPL-MCNC: 37 MG/DL
CALCIUM SERPL-MCNC: 8.7 MG/DL
CHLORIDE SERPL-SCNC: 113 MMOL/L
CO2 SERPL-SCNC: 24 MMOL/L
CREAT SERPL-MCNC: 1.3 MG/DL
DIFFERENTIAL METHOD: ABNORMAL
EOSINOPHIL # BLD AUTO: 0.2 K/UL
EOSINOPHIL NFR BLD: 2.5 %
ERYTHROCYTE [DISTWIDTH] IN BLOOD BY AUTOMATED COUNT: 16.5 %
EST. GFR  (AFRICAN AMERICAN): 45 ML/MIN/1.73 M^2
EST. GFR  (NON AFRICAN AMERICAN): 39 ML/MIN/1.73 M^2
GLUCOSE SERPL-MCNC: 132 MG/DL
HCT VFR BLD AUTO: 21.6 %
HGB BLD-MCNC: 7 G/DL
LYMPHOCYTES # BLD AUTO: 1.7 K/UL
LYMPHOCYTES NFR BLD: 28.5 %
MAGNESIUM SERPL-MCNC: 2.1 MG/DL
MCH RBC QN AUTO: 28.8 PG
MCHC RBC AUTO-ENTMCNC: 32.4 G/DL
MCV RBC AUTO: 89 FL
MONOCYTES # BLD AUTO: 0.5 K/UL
MONOCYTES NFR BLD: 8.4 %
NEUTROPHILS # BLD AUTO: 3.7 K/UL
NEUTROPHILS NFR BLD: 60.4 %
OB PNL STL: NEGATIVE
PLATELET # BLD AUTO: 255 K/UL
PMV BLD AUTO: 9.6 FL
POTASSIUM SERPL-SCNC: 3.3 MMOL/L
PROT SERPL-MCNC: 5.6 G/DL
RBC # BLD AUTO: 2.43 M/UL
SODIUM SERPL-SCNC: 143 MMOL/L
WBC # BLD AUTO: 6.08 K/UL

## 2018-10-10 PROCEDURE — 25000003 PHARM REV CODE 250: Performed by: INTERNAL MEDICINE

## 2018-10-10 PROCEDURE — 94640 AIRWAY INHALATION TREATMENT: CPT

## 2018-10-10 PROCEDURE — 25000242 PHARM REV CODE 250 ALT 637 W/ HCPCS: Performed by: HOSPITALIST

## 2018-10-10 PROCEDURE — 36415 COLL VENOUS BLD VENIPUNCTURE: CPT

## 2018-10-10 PROCEDURE — 25000003 PHARM REV CODE 250: Performed by: HOSPITALIST

## 2018-10-10 PROCEDURE — 80053 COMPREHEN METABOLIC PANEL: CPT

## 2018-10-10 PROCEDURE — 92611 MOTION FLUOROSCOPY/SWALLOW: CPT

## 2018-10-10 PROCEDURE — G8997 SWALLOW GOAL STATUS: HCPCS | Mod: CM

## 2018-10-10 PROCEDURE — 83735 ASSAY OF MAGNESIUM: CPT

## 2018-10-10 PROCEDURE — G8998 SWALLOW D/C STATUS: HCPCS | Mod: CM

## 2018-10-10 PROCEDURE — 21400001 HC TELEMETRY ROOM

## 2018-10-10 PROCEDURE — 94761 N-INVAS EAR/PLS OXIMETRY MLT: CPT

## 2018-10-10 PROCEDURE — 92526 ORAL FUNCTION THERAPY: CPT

## 2018-10-10 PROCEDURE — 82272 OCCULT BLD FECES 1-3 TESTS: CPT

## 2018-10-10 PROCEDURE — 99233 SBSQ HOSP IP/OBS HIGH 50: CPT | Mod: ,,, | Performed by: INTERNAL MEDICINE

## 2018-10-10 PROCEDURE — 97110 THERAPEUTIC EXERCISES: CPT

## 2018-10-10 PROCEDURE — G8996 SWALLOW CURRENT STATUS: HCPCS | Mod: CM

## 2018-10-10 PROCEDURE — 85025 COMPLETE CBC W/AUTO DIFF WBC: CPT

## 2018-10-10 RX ORDER — SODIUM CHLORIDE 9 MG/ML
INJECTION, SOLUTION INTRAVENOUS CONTINUOUS
Status: DISCONTINUED | OUTPATIENT
Start: 2018-10-10 | End: 2018-10-10

## 2018-10-10 RX ORDER — ASPIRIN 81 MG/1
81 TABLET ORAL DAILY
Status: DISCONTINUED | OUTPATIENT
Start: 2018-10-10 | End: 2018-10-21 | Stop reason: HOSPADM

## 2018-10-10 RX ORDER — SODIUM CHLORIDE 9 MG/ML
INJECTION, SOLUTION INTRAVENOUS CONTINUOUS
Status: DISCONTINUED | OUTPATIENT
Start: 2018-10-10 | End: 2018-10-15

## 2018-10-10 RX ORDER — POTASSIUM CHLORIDE 20 MEQ/15ML
40 SOLUTION ORAL ONCE
Status: COMPLETED | OUTPATIENT
Start: 2018-10-10 | End: 2018-10-10

## 2018-10-10 RX ORDER — CLOPIDOGREL BISULFATE 75 MG/1
75 TABLET ORAL DAILY
Status: DISCONTINUED | OUTPATIENT
Start: 2018-10-11 | End: 2018-10-15

## 2018-10-10 RX ADMIN — GABAPENTIN 300 MG: 300 CAPSULE ORAL at 05:10

## 2018-10-10 RX ADMIN — GABAPENTIN 300 MG: 300 CAPSULE ORAL at 08:10

## 2018-10-10 RX ADMIN — CLONIDINE HYDROCHLORIDE 0.3 MG: 0.1 TABLET ORAL at 08:10

## 2018-10-10 RX ADMIN — CLONIDINE HYDROCHLORIDE 0.3 MG: 0.1 TABLET ORAL at 05:10

## 2018-10-10 RX ADMIN — FLUTICASONE PROPIONATE 1 PUFF: 220 AEROSOL, METERED RESPIRATORY (INHALATION) at 07:10

## 2018-10-10 RX ADMIN — HYDRALAZINE HYDROCHLORIDE 100 MG: 25 TABLET ORAL at 08:10

## 2018-10-10 RX ADMIN — HYDRALAZINE HYDROCHLORIDE 100 MG: 25 TABLET ORAL at 02:10

## 2018-10-10 RX ADMIN — MOXIFLOXACIN HYDROCHLORIDE 400 MG: 400 TABLET, FILM COATED ORAL at 08:10

## 2018-10-10 RX ADMIN — CETIRIZINE HYDROCHLORIDE 10 MG: 10 TABLET, FILM COATED ORAL at 08:10

## 2018-10-10 RX ADMIN — METOPROLOL TARTRATE 100 MG: 50 TABLET ORAL at 08:10

## 2018-10-10 RX ADMIN — CLOPIDOGREL BISULFATE 75 MG: 75 TABLET ORAL at 08:10

## 2018-10-10 RX ADMIN — SODIUM CHLORIDE: 0.9 INJECTION, SOLUTION INTRAVENOUS at 08:10

## 2018-10-10 RX ADMIN — ASPIRIN 81 MG: 81 TABLET, COATED ORAL at 12:10

## 2018-10-10 RX ADMIN — DOCUSATE SODIUM AND SENNOSIDES 1 TABLET: 8.6; 5 TABLET, FILM COATED ORAL at 08:10

## 2018-10-10 RX ADMIN — FLUTICASONE PROPIONATE 1 PUFF: 220 AEROSOL, METERED RESPIRATORY (INHALATION) at 09:10

## 2018-10-10 RX ADMIN — AZELASTINE HYDROCHLORIDE 137 MCG: 137 SPRAY, METERED NASAL at 08:10

## 2018-10-10 RX ADMIN — AZELASTINE HYDROCHLORIDE 137 MCG: 137 SPRAY, METERED NASAL at 12:10

## 2018-10-10 RX ADMIN — HYDRALAZINE HYDROCHLORIDE 100 MG: 25 TABLET ORAL at 06:10

## 2018-10-10 RX ADMIN — POTASSIUM CHLORIDE 40 MEQ: 20 SOLUTION ORAL at 08:10

## 2018-10-10 NOTE — PLAN OF CARE
Problem: Patient Care Overview  Goal: Plan of Care Review  Outcome: Ongoing (interventions implemented as appropriate)   10/10/18 1831   Coping/Psychosocial   Plan Of Care Reviewed With patient       Problem: Infection, Risk/Actual (Adult)  Goal: Infection Prevention/Resolution  Patient will demonstrate the desired outcomes by discharge/transition of care.  Outcome: Ongoing (interventions implemented as appropriate)   10/10/18 1831   Infection, Risk/Actual (Adult)   Infection Prevention/Resolution making progress toward outcome       Problem: Fall Risk (Adult)  Goal: Identify Related Risk Factors and Signs and Symptoms  Related risk factors and signs and symptoms are identified upon initiation of Human Response Clinical Practice Guideline (CPG)  Outcome: Ongoing (interventions implemented as appropriate)   10/10/18 1831   Fall Risk   Related Risk Factors (Fall Risk) age-related changes;confusion/agitation;sensory deficits       Problem: Pressure Ulcer Risk (Villa Scale) (Adult,Obstetrics,Pediatric)  Goal: Skin Integrity  Patient will demonstrate the desired outcomes by discharge/transition of care.  Outcome: Ongoing (interventions implemented as appropriate)   10/10/18 1831   Pressure Ulcer Risk (Villa Scale) (Adult,Obstetrics,Pediatric)   Skin Integrity making progress toward outcome

## 2018-10-10 NOTE — PROGRESS NOTES
Cindy Lyman is a 82 y.o. female patient.    Follow for EUGENE    No new c/o, comfortable    Scheduled Meds:   azelastine  1 spray Nasal BID    cetirizine  10 mg Oral Daily    cloNIDine  0.3 mg Oral TID    clopidogrel  75 mg Oral Daily    fluticasone  1 puff Inhalation BID    gabapentin  300 mg Oral TID    hydrALAZINE  100 mg Per NG tube Q8H    metoprolol tartrate  100 mg Per NG tube BID    moxifloxacin  400 mg Per NG tube Daily    senna-docusate 8.6-50 mg  1 tablet Oral BID       Review of patient's allergies indicates:   Allergen Reactions    Doxycycline hyclate Diarrhea and Nausea And Vomiting    Singulair [montelukast]      Stomach pain, Muscle pain, Cough      Penicillins      Other reaction(s): Anaphylaxis    Ventolin [albuterol sulfate] Other (See Comments)     Severely elevated blood pressure    Latex, natural rubber Rash         Vital Signs Range (Last 24H):  Temp:  [97.7 °F (36.5 °C)-100.2 °F (37.9 °C)]   Pulse:  []   Resp:  [6-40]   BP: ()/(49-99)   SpO2:  [92 %-97 %]     I & O (Last 24H):    Intake/Output Summary (Last 24 hours) at 10/10/2018 0838  Last data filed at 10/10/2018 0700  Gross per 24 hour   Intake 974.13 ml   Output 1307 ml   Net -332.87 ml           Physical Exam:  General appearance: well developed, well nourished, no distress  Lungs:  clear to auscultation bilaterally and normal respiratory effort  Heart: regular rate and rhythm  Abdomen: soft, non-tender non-distented; bowel sounds normal; no masses,  no organomegaly  Extremities: no cyanosis or edema, or clubbing    Laboratory:  CBC:   Recent Labs   Lab  10/10/18   0215   WBC  6.08   RBC  2.43*   HGB  7.0*   HCT  21.6*   PLT  255   MCV  89   MCH  28.8   MCHC  32.4     CMP:   Recent Labs   Lab  10/10/18   0215   GLU  132*   CALCIUM  8.7   ALBUMIN  2.4*   PROT  5.6*   NA  143   K  3.3*   CO2  24   CL  113*   BUN  37*   CREATININE  1.3   ALKPHOS  64   ALT  13   AST  27   BILITOT  0.3        Imp/Plan    EUGENE - resolving, creatinine stable post LHC  HTN  S/p PEA cardiac arrest  CAD  Anemia of CKD    Renal status stable  Replace K  We'll follow prn  Thanks    Jimmie Valente  10/10/2018

## 2018-10-10 NOTE — SUBJECTIVE & OBJECTIVE
Interval History: confused but comfortable. No new issues reported by nursing staff.       Review of Systems   Unable to perform ROS: Dementia   Constitutional: Positive for chills.     Objective:     Vital Signs (Most Recent):  Temp: 99.5 °F (37.5 °C) (10/10/18 0330)  Pulse: 72 (10/10/18 0615)  Resp: (!) 32 (10/10/18 0615)  BP: 130/60 (10/10/18 0603)  SpO2: (!) 94 % (10/10/18 0615) Vital Signs (24h Range):  Temp:  [97.7 °F (36.5 °C)-100.2 °F (37.9 °C)] 99.5 °F (37.5 °C)  Pulse:  [] 72  Resp:  [6-40] 32  SpO2:  [92 %-97 %] 94 %  BP: ()/(49-99) 130/60     Weight: 92.5 kg (203 lb 14.8 oz)(taken by RN on 10/7)  Body mass index is 33.93 kg/m².    Intake/Output Summary (Last 24 hours) at 10/10/2018 0656  Last data filed at 10/10/2018 0600  Gross per 24 hour   Intake 945.8 ml   Output 1412 ml   Net -466.2 ml      Physical Exam   Constitutional: She appears well-developed and well-nourished.   HENT:   Head: Normocephalic and atraumatic.   Cardiovascular: Normal rate and regular rhythm.   Pulmonary/Chest: Breath sounds normal. No stridor. She has no wheezes. She has no rales.   Abdominal: Soft. Bowel sounds are normal. She exhibits no distension. There is no tenderness. There is no guarding.   Neurological: She is alert.   Vitals reviewed.      Significant Labs:   BMP:   Recent Labs   Lab  10/10/18   0215   GLU  132*   NA  143   K  3.3*   CL  113*   CO2  24   BUN  37*   CREATININE  1.3   CALCIUM  8.7   MG  2.1     CBC:   Recent Labs   Lab  10/09/18   0245  10/10/18   0215   WBC  8.89  6.08   HGB  7.9*  7.0*   HCT  24.1*  21.6*   PLT  298  255       Significant Imaging:

## 2018-10-10 NOTE — ASSESSMENT & PLAN NOTE
No indication for vasopressor or aggressive IVF  Monitor closely on IV lasix   Avoid nephrotoxins   ARF--holding Lasix and giving IVF hydration.  Creat stable, but urine output poor.  Nephrology consult.  Hematuria--hold Lovenox.  Consulted Urology.  Improving urine output and Creat  IV fluids today

## 2018-10-10 NOTE — ASSESSMENT & PLAN NOTE
Consulted PT/OT. SNF on discharge.  Will consult speech for swallow study. Continue NG tube feeds for now.

## 2018-10-10 NOTE — PLAN OF CARE
Problem: SLP Goal  Goal: SLP Goal  STGs:  1. Pt will participate in ongoing assessment of swallow function at b/s.  2. Pt will tolerate low level PO trials without overt s/s aspiration at b/s.  3.Pt will tolerate least restrictive PO diet without overt s/s aspiration and adequate oral clearance.     4. Pt will successfully participate in Modified Barium Swallow study to radiographically assess swallow function, rule out aspiration and determine safest/least restrictive diet.    Outcome: Ongoing (interventions implemented as appropriate)  10/10: Pt seen this AM for ongoing swallow assessment. Pt continues to demonstrated inconsistent, delayed coughing/choking s/p swallow thin liquids, nectar thick liquids, and puree textures. SLP unable to objectively r/o aspiration andd determine safest/least restrictive PO diet at bedside. Pt would benefit from Modified Barium Swallow study to radiographically assess swallow function, rule out aspiration and determine safest/least restrictive diet. SLP recs: pt will participate in MBSS later today-- further goals/recs pending MBSS results. MD Patel in agreement with SLP recs for MBSS.  JAVED Jones., CCC-SLP  Speech-Language Pathologist

## 2018-10-10 NOTE — NURSING TRANSFER
Nursing Transfer Note      10/10/2018     Transfer To: Radiology then 307 A    Transfer via stretcher    Transfer with cardiac monitoring    Transported by Transport service    Medicines sent: inhailers    Chart send with patient: yes    Notified: daughter    Patient reassessed at: 1200    Taken by transport service at 1500

## 2018-10-10 NOTE — PROGRESS NOTES
[facility] Hospital Medicine  Progress Note    Patient Name: Cindy Lyman  MRN: [id number]  Patient Class: IP- Inpatient   Admission Date: 9/30/2018  Length of Stay: 10 days  Attending Physician: Tu Patel MD  Primary Care Provider: Rodger Camarena MD        Subjective:     Principal Problem:Cardiac arrest    HPI:  81 yo female with diastolic CHF, COPD, gastric cancer, aortic stenosis with complete heart block and s/p TAVR and pacemaker placed 7/2018 presented in cardiac arrest. Per ED notes, patient was on her way to [facility] with others and c/o chest pain. CPR was initiated in parked vehicle outside ED. One round of epinephrine given and got ROSC. Per family, she c/o worsening SOB over several days. On arrival pt was euthermic, hypertensive, elevated lactate and BNP. CXR suggest pulmonary edema though infection cannot be ruled out. Pt intubated and evaluated by pulmonology. Cardiology consulted. ECHO pending. Broad spectrum antibiotics initiated until cultures result and clinical course dictates.     Chest CTA negative for pulmonary emboli.     Per family, Pt is a full code.     Hospital Course:  Pt admitted after PEA arrest and acute respiratory failure/acidosis. Acidosis corrected. Pulmonology assisting with vent management/weaning. Trial of precedex today but patient did not tolerate. Switched back to propofol. CPAP this am and now back on PVRC. Pacemaker interrogated and appears to be functioning fine. Diuresed well on lasix without change in renal function. Possible plan for LHC per cardiology. Extubated on 10/2. Initially slow to respond, but mental status improving.  Planned LHC on 10/4 held secondary to rise in Creat.  Lasix stopped and getting IVF hydration.  Episodes of slight hypotension and BP medications adjusted.  Worsening urine output.  Urine also bloody.  Urology consulted.  Improving urine output and Creat.  10/7: Improving Creat.  Patient more drowsy and less  responsive.  Spiked fever overnight.  Had to be placed on Cardene gtt secondary to poorly controlled HTN. Weaned off Cardene gtt.  Possible aspiration pneumonitis and started on Avelox (10/7)  The patient went for LHC on 10/9- no intervention. Patent stents. Med management only.  Palliative care consulted. PT/OT were consulted and recommended SNF on discharge. The patient was sent to the floor on 10/10.    Interval History: confused but comfortable. No new issues reported by nursing staff.       Review of Systems   Unable to perform ROS: Dementia   Constitutional: Positive for chills.     Objective:     Vital Signs (Most Recent):  Temp: 99.5 °F (37.5 °C) (10/10/18 0330)  Pulse: 72 (10/10/18 0615)  Resp: (!) 32 (10/10/18 0615)  BP: 130/60 (10/10/18 0603)  SpO2: (!) 94 % (10/10/18 0615) Vital Signs (24h Range):  Temp:  [97.7 °F (36.5 °C)-100.2 °F (37.9 °C)] 99.5 °F (37.5 °C)  Pulse:  [] 72  Resp:  [6-40] 32  SpO2:  [92 %-97 %] 94 %  BP: ()/(49-99) 130/60     Weight: 92.5 kg (203 lb 14.8 oz)(taken by RN on 10/7)  Body mass index is 33.93 kg/m².    Intake/Output Summary (Last 24 hours) at 10/10/2018 0656  Last data filed at 10/10/2018 0600  Gross per 24 hour   Intake 945.8 ml   Output 1412 ml   Net -466.2 ml      Physical Exam   Constitutional: She appears well-developed and well-nourished.   HENT:   Head: Normocephalic and atraumatic.   Cardiovascular: Normal rate and regular rhythm.   Pulmonary/Chest: Breath sounds normal. No stridor. She has no wheezes. She has no rales.   Abdominal: Soft. Bowel sounds are normal. She exhibits no distension. There is no tenderness. There is no guarding.   Neurological: She is alert.   Vitals reviewed.      Significant Labs:   BMP:   Recent Labs   Lab  10/10/18   0215   GLU  132*   NA  143   K  3.3*   CL  113*   CO2  24   BUN  37*   CREATININE  1.3   CALCIUM  8.7   MG  2.1     CBC:   Recent Labs   Lab  10/09/18   0245  10/10/18   0215   WBC  8.89  6.08   HGB  7.9*  7.0*    HCT  24.1*  21.6*   PLT  298  255       Significant Imaging:    Assessment/Plan:      * Cardiac arrest    Etiology still unclear, but PEA reported by first repsonders, quick ROSC with one round of epinephrine  Pt intubated on sedation and will have wean sedation for appropriate neurological exam, consult neurology if indicated   ECHO  -  +DD  Pacemaker interrogated   [facility] - 10/9- stents RCA are patent. No intervention. Med therapy only           Debility    Consulted PT/OT. SNF on discharge.  Will consult speech for swallow study. Continue NG tube feeds for now.             Palliative care encounter    Consult pending           Hematuria, gross    Appreciate Urology input.  Holding Lovenox.  Urine clearing.          Pacemaker    See above           Acute hypoxemic respiratory failure    Chest CTA r/o PE, likely due to decompensated CHF and possible underlying infection  Pulmonology consulted for vent management  Cardiology consulted - ECHo :  · Left ventricle ejection fraction is mildly decreased at 45%  · Left ventricle shows concentric remodeling.  · RV systolic function is normal.  · Left atrium is mildly dilated.  · Right atrium is moderately dilated.  · Tricuspid valve shows mild regurgitation.  · Grade II (moderate) left ventricular diastolic dysfunction consistent with pseudonormalization.  · LA pressure is elevated.   Cultures - negative, sent for resp cultures today   Appreciate Pulmonary input.  Extubated on 10/2.  Initially slow to respond, but improving.  Advance diet.  Less responsive on 10/7.  Spiked fever.  Possible aspiration pneumonitis.  Started Avelox.  Placed on Cardene drip for uncontrolled HTN.  Able to be weaned off.              Acute pulmonary edema    Pt given IV lasix with large amount of diuresis  CXr improving   Holding Lasix secondary to rise in Creat.            S/P TAVR (transcatheter aortic valve replacement)    No acute issues           Stage 3 chronic kidney disease    No  indication for vasopressor or aggressive IVF  Monitor closely on IV lasix   Avoid nephrotoxins   ARF--holding Lasix and giving IVF hydration.  Creat stable, but urine output poor.  Nephrology consult.  Hematuria--hold Lovenox.  Consulted Urology.  Improving urine output and Creat  IV fluids today           Essential hypertension    BP uncontrolled  Patient on home Clonidine that was stopped during this admit.  Possible rebound HTN.  Restarted home Clonidine.  Now on Cardene gtt.            CAD s/p PCI    Aspirin, plavix and statin resumed   Cardiology following  Elevated troponin after cardiopulmonary arrest          Anemia of chronic disease    Monitor  Reviewed bone marrow biopsy 2017 - no malignancy   H/H low- may need transfusion by 10/11. Heme test stools. Will continue Plavix now for stents.          Hyperlipidemia    Resume statin             VTE Risk Mitigation (From admission, onward)        Ordered     heparin infusion 1,000 units/500 ml in 0.9% NaCl (pressure line flush)  Intra-op continuous PRN      10/09/18 0752     Place sequential compression device  Until discontinued      10/05/18 1249     Place LAUREANO hose  Until discontinued      10/05/18 1249     IP VTE HIGH RISK PATIENT  Once      09/30/18 1359          Critical care time spent on the evaluation and treatment of severe organ dysfunction, review of pertinent labs and imaging studies, discussions with consulting providers and discussions with patient/family:50 minutes.    Will transfer to floor.       Tu Roman MD  Department of Hospital Medicine   [facility]

## 2018-10-10 NOTE — CARE UPDATE
Transfer Summary:     Pt admitted after PEA arrest and acute respiratory failure/acidosis. Acidosis corrected. Pulmonology assisting with vent management/weaning. Trial of precedex today but patient did not tolerate. Switched back to propofol. CPAP this am and now back on PVRC. Pacemaker interrogated and appears to be functioning fine. Diuresed well on lasix without change in renal function. Possible plan for LHC per cardiology. Extubated on 10/2. Initially slow to respond, but mental status improving.  Planned LHC on 10/4 held secondary to rise in Creat.  Lasix stopped and getting IVF hydration.  Episodes of slight hypotension and BP medications adjusted.  Worsening urine output.  Urine also bloody.  Urology consulted.  Improving urine output and Creat.  10/7: Improving Creat.  Patient more drowsy and less responsive.  Spiked fever overnight.  Had to be placed on Cardene gtt secondary to poorly controlled HTN. Weaned off Cardene gtt.  Possible aspiration pneumonitis and started on Avelox (10/7)  The patient went for LHC on 10/9- no intervention. Patent stents. Med management only.  Palliative care consulted and may have a meeting with family on 10/10.  PT/OT were consulted and recommended SNF on discharge.  The patient continued on NG tube feeds and swallow study was ordered for 10/10.    The patient was sent to the floor on 10/10.I believe she is from home.  SNF on discharge. Swallow study. Monitor H/H and renal function.  Palliative care recs.     Addendum:   I was informed by  that the family is leaning towards taking the patient home with hospice.

## 2018-10-10 NOTE — SUBJECTIVE & OBJECTIVE
Review of Systems   Gastrointestinal: Negative for melena.   Genitourinary: Positive for hematuria.     Objective:     Vital Signs (Most Recent):  Temp: 98.8 °F (37.1 °C) (10/10/18 0700)  Pulse: 69 (10/10/18 0800)  Resp: (!) 23 (10/10/18 0800)  BP: (!) 160/67 (10/10/18 0856)  SpO2: (!) 94 % (10/10/18 0800) Vital Signs (24h Range):  Temp:  [97.7 °F (36.5 °C)-100.2 °F (37.9 °C)] 98.8 °F (37.1 °C)  Pulse:  [] 69  Resp:  [6-40] 23  SpO2:  [92 %-97 %] 94 %  BP: ()/(49-87) 160/67     Weight: 92.5 kg (203 lb 14.8 oz)(taken by RN on 10/7)  Body mass index is 33.93 kg/m².     SpO2: (!) 94 %  O2 Device (Oxygen Therapy): room air      Intake/Output Summary (Last 24 hours) at 10/10/2018 0948  Last data filed at 10/10/2018 0800  Gross per 24 hour   Intake 933.71 ml   Output 1157 ml   Net -223.29 ml       Lines/Drains/Airways     Central Venous Catheter Line                 Percutaneous Central Line Insertion/Assessment - triple lumen  09/30/18 1050 right internal jugular 9 days          Drain                 Urethral Catheter 09/30/18 1025 16 Fr. 9 days         NG/OG Tube 10/07/18 0945 nasogastric 14 Fr. Right nostril 3 days                Physical Exam   Constitutional: She appears well-developed and well-nourished. No distress.   HENT:   Head: Normocephalic and atraumatic.   Mouth/Throat: No oropharyngeal exudate.   Eyes: Conjunctivae and EOM are normal. Pupils are equal, round, and reactive to light. No scleral icterus.   Neck: Normal range of motion. Neck supple. No JVD present. No tracheal deviation present. No thyromegaly present.   Cardiovascular: Normal rate, regular rhythm, S1 normal and S2 normal. Exam reveals no gallop and no friction rub.   Murmur heard.   Systolic murmur is present with a grade of 2/6.  R groin access c/d/i.   Pulmonary/Chest: Effort normal and breath sounds normal. No respiratory distress. She has no wheezes. She has no rales. She exhibits no tenderness.   Abdominal: Soft. She  exhibits no distension.   obese   Musculoskeletal: Normal range of motion. She exhibits no edema.   Neurological: She is alert. No cranial nerve deficit.   Skin: Skin is warm and dry. She is not diaphoretic.   Psychiatric: She has a normal mood and affect. Her behavior is normal. Judgment normal.       Current Medications:   azelastine  1 spray Nasal BID    cetirizine  10 mg Oral Daily    cloNIDine  0.3 mg Oral TID    clopidogrel  75 mg Oral Daily    fluticasone  1 puff Inhalation BID    gabapentin  300 mg Oral TID    hydrALAZINE  100 mg Per NG tube Q8H    metoprolol tartrate  100 mg Per NG tube BID    moxifloxacin  400 mg Per NG tube Daily    senna-docusate 8.6-50 mg  1 tablet Oral BID      sodium chloride 0.9% 100 mL/hr at 10/10/18 0857    heparin (porcine)      niCARdipine Stopped (10/09/18 1317)     acetaminophen, acetaminophen, benzonatate, fentaNYL, heparin (porcine), HYDROcodone-acetaminophen, influenza, labetalol, lidocaine (PF) 20 mg/ml (2%), midazolam, nitroGLYCERIN, nitroGLYCERIN, omnipaque 350 iohexol, ondansetron, oxyCODONE, polyethylene glycol    Laboratory:  CBC:  Recent Labs   Lab  10/06/18   0310  10/07/18   0340  10/08/18   0420  10/09/18   0245  10/10/18   0215   WHITE BLOOD CELL COUNT  6.52  9.14  7.60  8.89  6.08   HEMOGLOBIN  8.4 L  8.4 L  7.7 L  7.9 L  7.0 L   HEMATOCRIT  26.0 L  25.6 L  23.9 L  24.1 L  21.6 L   PLATELETS  248  236  246  298  255       CHEMISTRIES:  Recent Labs   Lab  05/17/18   1410   07/06/18   0301   10/06/18   0310  10/07/18   0340  10/08/18   0420  10/09/18   0245  10/10/18   0215   GLUCOSE  106   < >  107   < >  109  140 H  125 H  117 H  132 H   SODIUM  145   < >  141   < >  143  143  148 H  143  143   POTASSIUM  3.8   < >  3.6   < >  4.1  4.1  4.0  3.7  3.3 L   BUN BLD  17   < >  17   < >  51 H  40 H  40 H  33 H  37 H   CREATININE  1.1   < >  0.8   < >  1.5 H  1.1  1.2  1.0  1.3   EGFR IF   55 A   < >  >60.0   < >  38 A  55 A  49 A  >60  45  A   EGFR IF NON-  48 A   < >  >60.0   < >  33 A  48 A  43 A  53 A  39 A   CALCIUM  8.7   < >  8.1 L   < >  8.6 L  8.8  9.3  9.1  8.7   MAGNESIUM  2.2   --   1.8   --    --    --    --    --   2.1    < > = values in this interval not displayed.       CARDIAC BIOMARKERS:  Recent Labs   Lab  10/01/18   0255  10/01/18   0911  10/03/18   1557  10/03/18   2210  10/04/18   0307   CPK   --    --   155   --    --    CPK MB   --    --   5.5   --    --    TROPONIN I  0.183 H  0.137 H  0.138 H  0.163 H  0.204 H       COAGS:  Recent Labs   Lab  05/17/18   1410  07/02/18   1234  08/20/18   1929 09/30/18   1000  10/04/18   0633   INR  1.0  1.0  1.0  1.0  1.0       LIPIDS/LFTS:  Recent Labs   Lab  04/06/16   1055   06/11/16   0334   01/09/17   1346   11/15/17   1025   08/20/18   1929  08/24/18   1407  09/30/18   1000  10/09/18   0245  10/10/18   0215   CHOLESTEROL  252 H   --   182   --   215 H   --   184   --    --    --    --    --    --    TRIGLYCERIDES  126   --   69   --   139   --   104   --    --    --    --    --    --    HDL  50   --   44   --   40   --   49   --    --    --    --    --    --    LDL CHOLESTEROL  176.8 H   --   124.2   --   147.2   --   114.2   --    --    --    --    --    --    NON-HDL CHOLESTEROL  202   --   138   --   175   --   135   --    --    --    --    --    --    AST  13   < >   --    < >  16   < >  11   < >  12  12  35  31  27   ALT  11   < >   --    < >  21   < >  9 L   < >  11  10  31  14  13    < > = values in this interval not displayed.       BNP:  Recent Labs   Lab  06/16/17   1927  07/08/17   0322  01/03/18   1155  05/17/18   1410  09/30/18   1030   BNP  950 H  598 H  390 H  579 H  621 H       TSH:        Free T4:        Diagnostic Results:  CTA Chest 9/30/18  -No evidence of pulmonary arterial embolism.  Pulmonary edema with additional patchy bilateral opacities, right greater than left.  Findings may relate to atelectasis and/or aspiration with superimposed pneumonia  also in the differential diagnosis.  -The tip of the endotracheal tube terminates near the right mainstem bronchus.  This can be retracted several cm for more optimal positioning.  Cardiomegaly with small left pleural effusion and other findings as above.  This report was flagged in Epic as abnormal.    Cath 10/9/18 (images pers rev)  · Patent ostial and proximal RCA stents  · Patent mid LAD stent  · Mid Cx lesion is 60% stenosed. Unchanged from previous [facility]    Echo: 9/30/18  · Left ventricle ejection fraction is mildly decreased at 45%  · Left ventricle shows concentric remodeling.  · RV systolic function is normal.  · Left atrium is mildly dilated.  · Right atrium is moderately dilated.  · Tricuspid valve shows mild regurgitation.  · Grade II (moderate) left ventricular diastolic dysfunction consistent with pseudonormalization.  · LA pressure is elevated.  · Aortic valve: There is a bioprosthetic valve present. There is no aortic insufficiency present. CLEMENT is 1.31 cm2; mean gradient is 8.63 mmHg; peak gradient is 15.40 mmHg; AV peak velocity is 1.96 m/s    Echo 8/10/18    1 - Normal left ventricular systolic function (EF 60-65%).     2 - No wall motion abnormalities.     3 - Concentric hypertrophy.     4 - Biatrial enlargement.     5 - Right ventricular enlargement with normal systolic function.     6 - Pulmonary hypertension. The estimated PA systolic pressure is 52 mmHg.     7 - S/P transcatheter AVR, CLEMENT = 2.58 cm2, AVAi = 1.32 cm2/m2, peak velocity = 1.7 m/s, mean gradient = 8 mmHg.     8 - Mild paravalvular aortic regurgitation.     9 - Mild tricuspid regurgitation.     10 - Trivial pericardial effusion.     11 - S/p 29mm Evolut TF TAVR.     TAVR/R ADELSO PTA 7/5/18    Successful RTF 29 Evolut with RCI PTA to enable delivery of the valve delivery system.    Severe, unexplained anemia, worse today.  Tranfused for Hbg 8 pre TAVR.    Cath: 6/1/18  D. Angiographic Results  Diagnostic:        Patient has a left  dominant coronary artery.        The coronary vessels have luminal irregularities.        - Left Main Coronary Artery:             The LM has luminal irregularities. There is GERMAIN 3 flow.     - Left Anterior Descending Artery:             The ostial LAD has a 60% stenosis. There is GERMAIN 3 flow.     - Left Circumflex Artery:             The mid LCX has a 60% stenosis. There is GERMAIN 3 flow.     - Right Coronary Artery:             The ostial RCA has a 99% stenosis. There is GERMAIN 3 flow.             The proximal RCA has a 80% stenosis. There is GERMAIN 3 flow.     - Common Femoral Artery:             The right CFA has luminal irregularities. Access closure with perclose was very difficult due to inability to catch needles with perclose device.  Intervention       Ostial RCA:              The lesion was successfully intervened. Post-stenosis of 0%, post-GERMAIN 3 flow and TMP grade 3. The vessel was accessed natively.  The following items were used: 2.0MM 15MM [place] Balloon and 2.5X12 Mini Vision Stent.       Proximal RCA:              The lesion was successfully intervened. Post-stenosis of 0%, post-GERMAIN 3 flow and TMP grade 3. The vessel was accessed natively.  The following items were used: 2.5MM 12MM [place] Balloon, Stent Integrity 2.5 X 8 and 3.0MM 8MM [place] Balloon.

## 2018-10-10 NOTE — ASSESSMENT & PLAN NOTE
PEA on arrival with severe acidosis.   EKG with .   Normal PPM function with no high rate episodes on interrogation.   Etiology for the arrest is unclear - possible respiratory.   Repeat echo with mildly decreased EF and normal TAVR fxn.  Cath 10/9/18 with nonobst CAD and patent stents, cont med rx  Cont ASA/Plavix

## 2018-10-10 NOTE — PROGRESS NOTES
[facility]  Cardiology  Progress Note    Patient Name: Cindy Lyman  MRN: [id number]  Admission Date: 9/30/2018  Hospital Length of Stay: 10 days  Code Status: Full Code   Attending Physician: Tu Patel MD   Primary Care Physician: Rodger Camarena MD  Expected Discharge Date:   Principal Problem:Cardiac arrest    Subjective:     Hospital Course:   10/6/18: ?aspirated  10/9/18: cath with nonobst CAD, patent RCA stent (TAVR noted)    Interval Hx: pt seen in ICU. No cp/sob.    Tele: AS- (pers rev)        Review of Systems   Gastrointestinal: Negative for melena.   Genitourinary: Positive for hematuria.     Objective:     Vital Signs (Most Recent):  Temp: 98.8 °F (37.1 °C) (10/10/18 0700)  Pulse: 69 (10/10/18 0800)  Resp: (!) 23 (10/10/18 0800)  BP: (!) 160/67 (10/10/18 0856)  SpO2: (!) 94 % (10/10/18 0800) Vital Signs (24h Range):  Temp:  [97.7 °F (36.5 °C)-100.2 °F (37.9 °C)] 98.8 °F (37.1 °C)  Pulse:  [] 69  Resp:  [6-40] 23  SpO2:  [92 %-97 %] 94 %  BP: ()/(49-87) 160/67     Weight: 92.5 kg (203 lb 14.8 oz)(taken by RN on 10/7)  Body mass index is 33.93 kg/m².     SpO2: (!) 94 %  O2 Device (Oxygen Therapy): room air      Intake/Output Summary (Last 24 hours) at 10/10/2018 0948  Last data filed at 10/10/2018 0800  Gross per 24 hour   Intake 933.71 ml   Output 1157 ml   Net -223.29 ml       Lines/Drains/Airways     Central Venous Catheter Line                 Percutaneous Central Line Insertion/Assessment - triple lumen  09/30/18 1050 right internal jugular 9 days          Drain                 Urethral Catheter 09/30/18 1025 16 Fr. 9 days         NG/OG Tube 10/07/18 0945 nasogastric 14 Fr. Right nostril 3 days                Physical Exam   Constitutional: She appears well-developed and well-nourished. No distress.   HENT:   Head: Normocephalic and atraumatic.   Mouth/Throat: No oropharyngeal exudate.   Eyes: Conjunctivae and EOM are normal. Pupils are equal, round,  and reactive to light. No scleral icterus.   Neck: Normal range of motion. Neck supple. No JVD present. No tracheal deviation present. No thyromegaly present.   Cardiovascular: Normal rate, regular rhythm, S1 normal and S2 normal. Exam reveals no gallop and no friction rub.   Murmur heard.   Systolic murmur is present with a grade of 2/6.  R groin access c/d/i.   Pulmonary/Chest: Effort normal and breath sounds normal. No respiratory distress. She has no wheezes. She has no rales. She exhibits no tenderness.   Abdominal: Soft. She exhibits no distension.   obese   Musculoskeletal: Normal range of motion. She exhibits no edema.   Neurological: She is alert. No cranial nerve deficit.   Skin: Skin is warm and dry. She is not diaphoretic.   Psychiatric: She has a normal mood and affect. Her behavior is normal. Judgment normal.       Current Medications:   azelastine  1 spray Nasal BID    cetirizine  10 mg Oral Daily    cloNIDine  0.3 mg Oral TID    clopidogrel  75 mg Oral Daily    fluticasone  1 puff Inhalation BID    gabapentin  300 mg Oral TID    hydrALAZINE  100 mg Per NG tube Q8H    metoprolol tartrate  100 mg Per NG tube BID    moxifloxacin  400 mg Per NG tube Daily    senna-docusate 8.6-50 mg  1 tablet Oral BID      sodium chloride 0.9% 100 mL/hr at 10/10/18 0857    heparin (porcine)      niCARdipine Stopped (10/09/18 1317)     acetaminophen, acetaminophen, benzonatate, fentaNYL, heparin (porcine), HYDROcodone-acetaminophen, influenza, labetalol, lidocaine (PF) 20 mg/ml (2%), midazolam, nitroGLYCERIN, nitroGLYCERIN, omnipaque 350 iohexol, ondansetron, oxyCODONE, polyethylene glycol    Laboratory:  CBC:  Recent Labs   Lab  10/06/18   0310  10/07/18   0340  10/08/18   0420  10/09/18   0245  10/10/18   0215   WHITE BLOOD CELL COUNT  6.52  9.14  7.60  8.89  6.08   HEMOGLOBIN  8.4 L  8.4 L  7.7 L  7.9 L  7.0 L   HEMATOCRIT  26.0 L  25.6 L  23.9 L  24.1 L  21.6 L   PLATELETS  248  236  246  298  255        CHEMISTRIES:  Recent Labs   Lab  05/17/18   1410   07/06/18   0301   10/06/18   0310  10/07/18   0340  10/08/18   0420  10/09/18   0245  10/10/18   0215   GLUCOSE  106   < >  107   < >  109  140 H  125 H  117 H  132 H   SODIUM  145   < >  141   < >  143  143  148 H  143  143   POTASSIUM  3.8   < >  3.6   < >  4.1  4.1  4.0  3.7  3.3 L   BUN BLD  17   < >  17   < >  51 H  40 H  40 H  33 H  37 H   CREATININE  1.1   < >  0.8   < >  1.5 H  1.1  1.2  1.0  1.3   EGFR IF   55 A   < >  >60.0   < >  38 A  55 A  49 A  >60  45 A   EGFR IF NON-  48 A   < >  >60.0   < >  33 A  48 A  43 A  53 A  39 A   CALCIUM  8.7   < >  8.1 L   < >  8.6 L  8.8  9.3  9.1  8.7   MAGNESIUM  2.2   --   1.8   --    --    --    --    --   2.1    < > = values in this interval not displayed.       CARDIAC BIOMARKERS:  Recent Labs   Lab  10/01/18   0255  10/01/18   0911  10/03/18   1557  10/03/18   2210  10/04/18   0307   CPK   --    --   155   --    --    CPK MB   --    --   5.5   --    --    TROPONIN I  0.183 H  0.137 H  0.138 H  0.163 H  0.204 H       COAGS:  Recent Labs   Lab  05/17/18   1410  07/02/18   1234  08/20/18 1929 09/30/18   1000  10/04/18   0633   INR  1.0  1.0  1.0  1.0  1.0       LIPIDS/LFTS:  Recent Labs   Lab  04/06/16   1055   06/11/16   0334   01/09/17   1346   11/15/17   1025   08/20/18   1929  08/24/18   1407  09/30/18   1000  10/09/18   0245  10/10/18   0215   CHOLESTEROL  252 H   --   182   --   215 H   --   184   --    --    --    --    --    --    TRIGLYCERIDES  126   --   69   --   139   --   104   --    --    --    --    --    --    HDL  50   --   44   --   40   --   49   --    --    --    --    --    --    LDL CHOLESTEROL  176.8 H   --   124.2   --   147.2   --   114.2   --    --    --    --    --    --    NON-HDL CHOLESTEROL  202   --   138   --   175   --   135   --    --    --    --    --    --    AST  13   < >   --    < >  16   < >  11   < >  12  12  35  31  27   ALT  11   < >    --    < >  21   < >  9 L   < >  11  10  31  14  13    < > = values in this interval not displayed.       BNP:  Recent Labs   Lab  06/16/17   1927  07/08/17   0322  01/03/18   1155  05/17/18   1410  09/30/18   1030   BNP  950 H  598 H  390 H  579 H  621 H       TSH:        Free T4:        Diagnostic Results:  CTA Chest 9/30/18  -No evidence of pulmonary arterial embolism.  Pulmonary edema with additional patchy bilateral opacities, right greater than left.  Findings may relate to atelectasis and/or aspiration with superimposed pneumonia also in the differential diagnosis.  -The tip of the endotracheal tube terminates near the right mainstem bronchus.  This can be retracted several cm for more optimal positioning.  Cardiomegaly with small left pleural effusion and other findings as above.  This report was flagged in Epic as abnormal.    Cath 10/9/18 (images pers rev)  · Patent ostial and proximal RCA stents  · Patent mid LAD stent  · Mid Cx lesion is 60% stenosed. Unchanged from previous [facility]    Echo: 9/30/18  · Left ventricle ejection fraction is mildly decreased at 45%  · Left ventricle shows concentric remodeling.  · RV systolic function is normal.  · Left atrium is mildly dilated.  · Right atrium is moderately dilated.  · Tricuspid valve shows mild regurgitation.  · Grade II (moderate) left ventricular diastolic dysfunction consistent with pseudonormalization.  · LA pressure is elevated.  · Aortic valve: There is a bioprosthetic valve present. There is no aortic insufficiency present. CLEMENT is 1.31 cm2; mean gradient is 8.63 mmHg; peak gradient is 15.40 mmHg; AV peak velocity is 1.96 m/s    Echo 8/10/18    1 - Normal left ventricular systolic function (EF 60-65%).     2 - No wall motion abnormalities.     3 - Concentric hypertrophy.     4 - Biatrial enlargement.     5 - Right ventricular enlargement with normal systolic function.     6 - Pulmonary hypertension. The estimated PA systolic pressure is 52 mmHg.     7 -  S/P transcatheter AVR, CLEMENT = 2.58 cm2, AVAi = 1.32 cm2/m2, peak velocity = 1.7 m/s, mean gradient = 8 mmHg.     8 - Mild paravalvular aortic regurgitation.     9 - Mild tricuspid regurgitation.     10 - Trivial pericardial effusion.     11 - S/p 29mm Evolut TF TAVR.     TAVR/R ADELSO PTA 7/5/18    Successful RTF 29 Evolut with RCI PTA to enable delivery of the valve delivery system.    Severe, unexplained anemia, worse today.  Tranfused for Hbg 8 pre TAVR.    Cath: 6/1/18  D. Angiographic Results  Diagnostic:        Patient has a left dominant coronary artery.        The coronary vessels have luminal irregularities.        - Left Main Coronary Artery:             The LM has luminal irregularities. There is GERMAIN 3 flow.     - Left Anterior Descending Artery:             The ostial LAD has a 60% stenosis. There is GERMAIN 3 flow.     - Left Circumflex Artery:             The mid LCX has a 60% stenosis. There is GERMAIN 3 flow.     - Right Coronary Artery:             The ostial RCA has a 99% stenosis. There is GERMAIN 3 flow.             The proximal RCA has a 80% stenosis. There is GERMAIN 3 flow.     - Common Femoral Artery:             The right CFA has luminal irregularities. Access closure with perclose was very difficult due to inability to catch needles with perclose device.  Intervention       Ostial RCA:              The lesion was successfully intervened. Post-stenosis of 0%, post-GERMAIN 3 flow and TMP grade 3. The vessel was accessed natively.  The following items were used: 2.0MM 15MM Catron Balloon and 2.5X12 Mini Vision Stent.       Proximal RCA:              The lesion was successfully intervened. Post-stenosis of 0%, post-GERMAIN 3 flow and TMP grade 3. The vessel was accessed natively.  The following items were used: 2.5MM 12MM Catron Balloon, Stent Integrity 2.5 X 8 and 3.0MM 8MM Catron Balloon.      Assessment and Plan:     * Cardiac arrest    PEA on arrival with severe acidosis.   EKG with .   Normal PPM function  with no high rate episodes on interrogation.   Etiology for the arrest is unclear - possible respiratory.   Repeat echo with mildly decreased EF and normal TAVR fxn.  Cath 10/9/18 with nonobst CAD and patent stents, cont med rx  Cont ASA/Plavix        Acute pulmonary edema    Seems to be doing better  Possible aspiration event noted 10/6/18          CAD s/p PCI    Mild troponin increase.   S/p RCA PCI prior to TAVR, intermed LAD/LCx stenoses noted.  Cath 10/9/18 with nonobst CAD and patent stents.        Essential hypertension    Cont med rx  Labile BP noted        Hyperlipidemia    Cont statin         Pacemaker    Biotronik.   Interrogation with normal device function and no high rate episodes        S/P TAVR (transcatheter aortic valve replacement)    Normal TAVR fxn by repeat echo this admission        Stage 3 chronic kidney disease    ARF/CRI  ?CONCETTA vs overdiuresis  Creat 2.1->1.5->1.1->1.2            VTE Risk Mitigation (From admission, onward)        Ordered     heparin infusion 1,000 units/500 ml in 0.9% NaCl (pressure line flush)  Intra-op continuous PRN      10/09/18 0752     Place sequential compression device  Until discontinued      10/05/18 1249     Place LAUREANO hose  Until discontinued      10/05/18 1249     IP VTE HIGH RISK PATIENT  Once      09/30/18 1359        Cardiology will sign off, pls call with questions.  Pt to follow up with Dr. Carmen after discharge.    Giancarlo Waterman MD  Cardiology  [facility]

## 2018-10-10 NOTE — PLAN OF CARE
Problem: Patient Care Overview  Goal: Plan of Care Review  Outcome: Ongoing (interventions implemented as appropriate)  Patient remains in ICU. Heart cath yesterday unchanged from previous one. IV fluids were discontinued post cath procedure. Urine output has slowed some but urine remains clear yellow. NG tube to right nare. Tube feeds started at 50pm. Goal rate is 50ml/hr. Patient is tolerating and was increased to 40ml/hr at 4am with no residual noted. Lungs sound coarse. 1 Large liquid stool this shift. Holding stool softner.

## 2018-10-10 NOTE — ASSESSMENT & PLAN NOTE
Etiology still unclear, but PEA reported by first repsonders, quick ROSC with one round of epinephrine  Pt intubated on sedation and will have wean sedation for appropriate neurological exam, consult neurology if indicated   ECHO  -  +DD  Pacemaker interrogated   [facility] - 10/9- stents RCA are patent. No intervention. Med therapy only

## 2018-10-10 NOTE — PT/OT/SLP PROGRESS
"Physical Therapy Treatment    Patient Name:  Cindy Lyman   MRN:  [id number]    Recommendations:     Discharge Recommendations:  nursing facility, skilled   Discharge Equipment Recommendations: none   Barriers to discharge: Inaccessible home and Decreased caregiver support    Assessment:     Cindy Lyman is a 82 y.o. female admitted with a medical diagnosis of Cardiac arrest.  She presents with the following impairments/functional limitations:  weakness, impaired functional mobilty, impaired cognition, decreased safety awareness, impaired coordination, pain, decreased coordination, impaired endurance, gait instability, impaired balance, decreased upper extremity function, edema, decreased lower extremity function, decreased ROM, impaired self care skills pt limited by weakness.    Rehab Prognosis:  Fair; patient would benefit from acute skilled PT services to address these deficits and reach maximum level of function.      Recent Surgery: Procedure(s) (LRB):  Left heart cath (Left) 1 Day Post-Op    Plan:     During this hospitalization, patient to be seen 5 x/week to address the above listed problems via gait training, therapeutic activities, therapeutic exercises  · Plan of Care Expires:  10/20/18   Plan of Care Reviewed with: patient    Subjective     Communicated with nsg prior to session.  Patient found R SL upon PT entry to room, agreeable to treatment.      Chief Complaint: pain in thighs  Patient comments/goals: "I don't know why I cant move my legs"  Pain/Comfort:  · Pain Rating 1: (not rated)  · Location 1: ("my thighs")  · Pain Addressed 1: Reposition, Distraction, Cessation of Activity, Nurse notified    Patients cultural, spiritual, [employer] conflicts given the current situation: none    Objective:     Patient found with: central line, SCD, briceno catheter(ICU monitoring)     General Precautions: Standard, aspiration, fall, NPO   Orthopedic Precautions:N/A   Braces: N/A     Functional " Mobility:  · Bed Mobility:     · Rolling Left:  dependence  · Rolling Right: dependence  · Scooting: dependence   · Bed to stretcher t/f Dependent x 4 persons with drawsheet      AM-PAC 6 CLICK MOBILITY  Turning over in bed (including adjusting bedclothes, sheets and blankets)?: 1  Sitting down on and standing up from a chair with arms (e.g., wheelchair, bedside commode, etc.): 1  Moving from lying on back to sitting on the side of the bed?: 1  Moving to and from a bed to a chair (including a wheelchair)?: 1  Need to walk in hospital room?: 1  Climbing 3-5 steps with a railing?: 1  Basic Mobility Total Score: 6       Therapeutic Activities and Exercises:   Pt received B HCS 3x30 sec and B ankle/knee PROM x 10 reps.  Pt received R PROM for finger flex/ext and performed L AAROM for finger flex/ext x 10 reps    Patient left on stretcher with transporter with nursing present to t/f to floor present..    GOALS:   Multidisciplinary Problems     Physical Therapy Goals        Problem: Physical Therapy Goal    Goal Priority Disciplines Outcome Goal Variances Interventions   Physical Therapy Goal     PT, PT/OT Ongoing (interventions implemented as appropriate)     Description:  Goals to be met by: 10/20/2018     Patient will increase functional independence with mobility by performin. Supine to sit with Stand-by Assistance  2. Sit to supine with Stand-by Assistance  3. Sitting at edge of bed x15 minutes with Modified [place]  4. Lower extremity exercise program x20 reps per handout, with supervision.  5. Transfers - TBD at later time.  6. Gait - TBD at later time.    Recommend: SNF at time of discharge.                     Time Tracking:     PT Received On: 10/10/18  PT Start Time: 1445     PT Stop Time: 1500  PT Total Time (min): 15 min     Billable Minutes: Therapeutic Exercise 15    Treatment Type: Treatment  PT/PTA: PT     PTA Visit Number: 0     Jessica Villarreal, SONDRA  10/10/2018

## 2018-10-10 NOTE — PLAN OF CARE
Problem: Physical Therapy Goal  Goal: Physical Therapy Goal  Goals to be met by: 10/20/2018     Patient will increase functional independence with mobility by performin. Supine to sit with Stand-by Assistance  2. Sit to supine with Stand-by Assistance  3. Sitting at edge of bed x15 minutes with Modified [place]  4. Lower extremity exercise program x20 reps per handout, with supervision.  5. Transfers - TBD at later time.  6. Gait - TBD at later time.    Recommend: SNF at time of discharge.    Outcome: Ongoing (interventions implemented as appropriate)  Pt dependent for all mobility.  Continue with POC as able

## 2018-10-10 NOTE — ASSESSMENT & PLAN NOTE
Mild troponin increase.   S/p RCA PCI prior to TAVR, intermed LAD/LCx stenoses noted.  Cath 10/9/18 with nonobst CAD and patent stents.

## 2018-10-10 NOTE — PT/OT/SLP PROGRESS
"Speech Language Pathology Treatment    Patient Name:  Cindy Lyman   MRN:  [id number]  Admitting Diagnosis: Cardiac arrest    Recommendations:                 General Recommendations:  Modified barium swallow study  Diet recommendations:  NPO, Liquid Diet Level: NPO   Aspiration Precautions: Strict aspiration precautions   General Precautions: Standard, aspiration, NPO  Communication strategies:  provide increased time to answer and go to room if call light pushed    Subjective     Pt found in bed awake upon SLP entry. Pt agreeable to participate in ongoing swallow assessment.     Patient goals: "Oh that tastes so good" per pt, re: trials thin liquids     Pain/Comfort:  · Pain Rating 1: 0/10    Objective:     Has the patient been evaluated by SLP for swallowing?   Yes  Keep patient NPO? Yes   Current Respiratory Status: nasal cannula      Pt seen this AM for ongoing swallow assessment. Pt continues to demonstrate delayed trigger of swallow and dcr'd laryngeal elevation/excursion during the swallow, as well as inconsistent, delayed coughing/choking s/p swallow thin liquids via tsp 2/5x, cup sips 3/5x, nectar thick liquids via cup sips 2/2x, and puree textures via tsp x1. Pt also observed to require multiple swallows per sip/bite during all PO trials. SLP unable to objectively r/o aspiration andd determine safest/least restrictive PO diet at bedside. Pt would benefit from Modified Barium Swallow study to radiographically assess swallow function, rule out aspiration and determine safest/least restrictive diet.     Assessment:     Cindy Lyman is a 82 y.o. female with an SLP diagnosis of oropharyngeal Dysphagia.  She continues to present with inconsistent, delayed coughing/choking s/p swallow thin and nectar thick liquids, and puree textures. SLP recs: pt will participate in MBSS later today-- further goals/recs pending MBSS results. MD Patel in agreement with SLP recs for MBSS.    Goals: "   Multidisciplinary Problems     SLP Goals        Problem: SLP Goal    Goal Priority Disciplines Outcome   SLP Goal    Medium SLP Ongoing (interventions implemented as appropriate)   Description:  STGs:  1. Pt will participate in ongoing assessment of swallow function at b/s.  2. Pt will tolerate low level PO trials without overt s/s aspiration at b/s.  3.Pt will tolerate least restrictive PO diet without overt s/s aspiration and adequate oral clearance.     4. Pt will successfully participate in Modified Barium Swallow study to radiographically assess swallow function, rule out aspiration and determine safest/least restrictive diet.                      Plan:     · Patient to be seen:  3 x/week   · Plan of Care expires:  10/12/18  · Plan of Care reviewed with:  patient(BRITNEY Campbell and MD Patel)   · SLP Follow-Up:  Yes       Discharge recommendations:  nursing facility, skilled   Barriers to Discharge:  nutritional status    Time Tracking:     SLP Treatment Date:   10/10/18  Speech Start Time:  0906  Speech Stop Time:  0920     Speech Total Time (min):  14 min    Billable Minutes: Treatment Swallowing Dysfunction 14    DIA Jones, CCC-SLP  10/10/2018

## 2018-10-10 NOTE — ASSESSMENT & PLAN NOTE
Monitor  Reviewed bone marrow biopsy 2017 - no malignancy   H/H low- may need transfusion by 10/11. Heme test stools. Will d/c asa and lovenox for now.

## 2018-10-10 NOTE — PROCEDURES
Modified Barium Swallow    Patient Name:  Cindy Lyman   MRN:  [id number]      Recommendations:     Recommendations:                General Recommendations:  GI evaluation and Dysphagia therapy  Diet recommendations:  NPO, NPO   Aspiration Precautions: Alternate means of nutrition/hydration, Frequent oral care and Strict aspiration precautions   General Precautions: Standard, aspiration, NPO, fall  Communication strategies:  provide increased time to answer and go to room if call light pushed    Referral     Reason for Referral  Patient was referred for a Modified Barium Swallow Study to assess the efficiency of his/her swallow function, rule out aspiration and make recommendations regarding safe dietary consistencies, effective compensatory strategies, and safe eating environment.     Diagnosis: Cardiac arrest       History:     Past Medical History:   Diagnosis Date    Anemia     Anticoagulant long-term use     Aortic stenosis     Arthritis     lower back    Asthma     CAD (coronary artery disease) 4/24/2015    Carpal tunnel syndrome on both sides     Cataract     CHF (congestive heart failure) 5/2013    COPD (chronic obstructive pulmonary disease)     Depression     DVT (deep venous thrombosis)     Hyperlipidemia     Hypertension     Pulmonary HTN     TMJ (temporomandibular joint disorder)        Objective:     Current Respiratory Status: 10/10/18    Alert: yes    Cooperative: inconsistent    Follows Directions: inconsistent    Visualization  · Patient was seen in the lateral view  · NG tube observed in place  · Central lines observed in place    Oral Peripheral Examination  · Oral Musculature: WFL  · Dentition: edentulous  · Mucosal Quality: good  · Mandibular Strength and Mobility: WFL  · Oral Labial Strength and Mobility: WFL  · Lingual Strength and Mobility: WFL  · Oral Musculature Comments: Structure and function WFL    Consistencies Assessed  THIN: 5cc x1, 10cc x1 of thin liquids  barium via cup  NECTAR: 5cc x1, 10cc x2 of nectar thick liquid barium via cup  PUREE: tsp amount of barium pudding    Oral Preparation/Oral Phase  Pt presents with moderate-severe oral dysphagia   · Anterior loss of thin liquid bolus  · Poor bolus formation and control-- across all consistencies  · prolonged A-P transfer-- across all consistencies  · Oral residue present post swallow--all consistencies  · Decreased base of tongue mobility-- across all consistencies  · Premature spillage-- for thin liquids and nectar thick liquids into vallecula  · Dcr'd oral acceptance across all consistencies    Pharyngeal Phase   -Pt presents with moderate-severe pharyngeal dysphagia c/b significantly reduced laryngeal elevation/excursion, significantly delayed trigger of swallow( swallow triggered at level of pyriform sinuses for thin liquids and vallecula for nectar thick liquids)    - Pt also demonstrated poor epiglottic inversion and reduced airway protection, as pt demonstrated consistent episodes of deep laryngeal penetration (to the level of the vocal cords) during the swallow for all trials of thin liquids and larger volumes of nectar thick liquids (10cc x2), which pt unable to spontaneously eject penetrated materials from airway entrance, as pt's laryngeal vestibule was observed barium coating present s/p swallow, despite max cuing from SLP to produce productive cough and/or throat clear and swallow techniques.  · NOTE: SLP's view of pt's entire airway was obstructed by pt's shoulders (only able to visualize laryngeal vestibule and above), so SLP unable to objectively r/o aspiration under fluoro. However, pt with coughing/choking intermittently during MBSS, which SLP suspects 2/2 residuals from prior PO trials remaining in airway entrance    -Pt also demonstrated significantly reduced tongue base retraction, which resulted in significant residuals within vallecula and pyriform sinuses after the swallow across all  consistencies. Pt demonstrated poor pharyngeal sensation/clearance, as pt did not perform spontaneous multiple swallows to clear residuals from pharyngeal cavity. SLP cued pt perform multiple swallow technique to clear pharyngeal residuals. However, pt with limited success in clearing residuals with multiple cued swallows.   · Residuals in vallecula and pyriform sinuses were observed to trickle along pt's airway after the swallow, which puts pt at risk for aspiration after the swallow.       Cervical Esophageal Phase  · Unable to assess 2/2 pt's shoulders obstructing SLP's view    Assessment:     Impressions  ·   Patient demonstrates moderate-severe Oropharyngeal dysphagia characterized by above mentioned deficits (please refer to detailed notes above). Pt is not safe for an oral diet at this time and should consider alternative means of nutrition/hydration, as pt is at high risk of aspiration during and after the swallow.     Prognosis: Guarded    Barriers:  · Cognitive status-- waxing and waning KOBE, difficulty following commands  · Dependent feeding    Plan  SLP recs: NPO, with consideration for alternative means of nutrition/hydration (i.e. PEG). Pt would benefit from GI consult to determine candidacy for PEG placement, as well. Pt is not safe for an oral diet at this time 2/2 above mentioned, detailed oropharyngeal deficits, as pt is at high risk for aspiration during and after the swallow.  -SLP will continue to follow pt with trial sessions of indirect dysphagia exercises to strengthen musculature for swallowing mechanism.      Education  Results were discussed with patient. Results were discussed with BRITNEY Aguilera and SLP notified MD Patel of MBSS results/recs.    Goals:   Multidisciplinary Problems     SLP Goals        Problem: SLP Goal    Goal Priority Disciplines Outcome   SLP Goal    Medium SLP Ongoing (interventions implemented as appropriate)   Description:  STGs:  1. Pt will participate in ongoing  assessment of swallow function at b/s.-discontinued 10/10  2. Pt will tolerate low level PO trials without overt s/s aspiration at b/s.  3.Pt will tolerate least restrictive PO diet without overt s/s aspiration and adequate oral clearance.   -discontinued 10/10  4. Pt will successfully participate in Modified Barium Swallow study to radiographically assess swallow function, rule out aspiration and determine safest/least restrictive diet.- MET 10/10  5. Pt will participate in trial sessions of indirect dysphagia exercises to strengthen musculature for swallowing mechanism mod effort.                          Plan:   · Patient to be seen:  Therapy Frequency: 3 x/week   · Plan of Care expires:  10/12/18  · Plan of Care reviewed with:  patient(BRITNEY Aguilera and MD Patel)        Discharge recommendations:  nursing facility, skilled   Barriers to Discharge:  Level of Skilled Assistance Needed . and nutritional status    Time Tracking:   SLP Treatment Date:   10/10/18  Speech Start Time:  1530  Speech Stop Time:  1545     Speech Total Time (min):  15 min    DIA Jones, CCC-SLP  10/10/2018

## 2018-10-11 LAB
ANION GAP SERPL CALC-SCNC: 5 MMOL/L
BACTERIA BLD CULT: NORMAL
BACTERIA BLD CULT: NORMAL
BUN SERPL-MCNC: 37 MG/DL
CALCIUM SERPL-MCNC: 8.5 MG/DL
CHLORIDE SERPL-SCNC: 117 MMOL/L
CO2 SERPL-SCNC: 24 MMOL/L
CREAT SERPL-MCNC: 1.2 MG/DL
EST. GFR  (AFRICAN AMERICAN): 49 ML/MIN/1.73 M^2
EST. GFR  (NON AFRICAN AMERICAN): 43 ML/MIN/1.73 M^2
GLUCOSE SERPL-MCNC: 127 MG/DL
POTASSIUM SERPL-SCNC: 3.8 MMOL/L
SODIUM SERPL-SCNC: 146 MMOL/L

## 2018-10-11 PROCEDURE — 94761 N-INVAS EAR/PLS OXIMETRY MLT: CPT

## 2018-10-11 PROCEDURE — 97110 THERAPEUTIC EXERCISES: CPT

## 2018-10-11 PROCEDURE — 92526 ORAL FUNCTION THERAPY: CPT

## 2018-10-11 PROCEDURE — 25000003 PHARM REV CODE 250: Performed by: INTERNAL MEDICINE

## 2018-10-11 PROCEDURE — 21400001 HC TELEMETRY ROOM

## 2018-10-11 PROCEDURE — 63600175 PHARM REV CODE 636 W HCPCS: Performed by: HOSPITALIST

## 2018-10-11 PROCEDURE — 80048 BASIC METABOLIC PNL TOTAL CA: CPT

## 2018-10-11 PROCEDURE — 25000003 PHARM REV CODE 250: Performed by: HOSPITALIST

## 2018-10-11 PROCEDURE — 97530 THERAPEUTIC ACTIVITIES: CPT

## 2018-10-11 PROCEDURE — G8996 SWALLOW CURRENT STATUS: HCPCS | Mod: CM

## 2018-10-11 PROCEDURE — 94640 AIRWAY INHALATION TREATMENT: CPT

## 2018-10-11 RX ORDER — GUAIFENESIN 100 MG/5ML
200 SOLUTION ORAL EVERY 4 HOURS PRN
Status: DISCONTINUED | OUTPATIENT
Start: 2018-10-11 | End: 2018-10-16

## 2018-10-11 RX ADMIN — CLONIDINE HYDROCHLORIDE 0.3 MG: 0.1 TABLET ORAL at 03:10

## 2018-10-11 RX ADMIN — OXYCODONE HYDROCHLORIDE 5 MG: 5 SOLUTION ORAL at 08:10

## 2018-10-11 RX ADMIN — DOCUSATE SODIUM AND SENNOSIDES 1 TABLET: 8.6; 5 TABLET, FILM COATED ORAL at 08:10

## 2018-10-11 RX ADMIN — OXYCODONE HYDROCHLORIDE 5 MG: 5 SOLUTION ORAL at 06:10

## 2018-10-11 RX ADMIN — HYDRALAZINE HYDROCHLORIDE 100 MG: 25 TABLET ORAL at 03:10

## 2018-10-11 RX ADMIN — FLUTICASONE PROPIONATE 1 PUFF: 220 AEROSOL, METERED RESPIRATORY (INHALATION) at 07:10

## 2018-10-11 RX ADMIN — CLONIDINE HYDROCHLORIDE 0.3 MG: 0.1 TABLET ORAL at 08:10

## 2018-10-11 RX ADMIN — AZELASTINE HYDROCHLORIDE 137 MCG: 137 SPRAY, METERED NASAL at 08:10

## 2018-10-11 RX ADMIN — GABAPENTIN 300 MG: 300 CAPSULE ORAL at 03:10

## 2018-10-11 RX ADMIN — GABAPENTIN 300 MG: 300 CAPSULE ORAL at 08:10

## 2018-10-11 RX ADMIN — CETIRIZINE HYDROCHLORIDE 10 MG: 10 TABLET, FILM COATED ORAL at 08:10

## 2018-10-11 RX ADMIN — HYDRALAZINE HYDROCHLORIDE 100 MG: 25 TABLET ORAL at 06:10

## 2018-10-11 RX ADMIN — ASPIRIN 81 MG: 81 TABLET, COATED ORAL at 08:10

## 2018-10-11 RX ADMIN — HYDRALAZINE HYDROCHLORIDE 100 MG: 25 TABLET ORAL at 09:10

## 2018-10-11 RX ADMIN — GABAPENTIN 300 MG: 300 CAPSULE ORAL at 09:10

## 2018-10-11 RX ADMIN — SODIUM CHLORIDE: 0.9 INJECTION, SOLUTION INTRAVENOUS at 11:10

## 2018-10-11 RX ADMIN — CLONIDINE HYDROCHLORIDE 0.3 MG: 0.1 TABLET ORAL at 09:10

## 2018-10-11 RX ADMIN — GUAIFENESIN 200 MG: 200 SOLUTION ORAL at 03:10

## 2018-10-11 RX ADMIN — METOPROLOL TARTRATE 100 MG: 50 TABLET ORAL at 09:10

## 2018-10-11 RX ADMIN — CLOPIDOGREL BISULFATE 75 MG: 75 TABLET ORAL at 08:10

## 2018-10-11 RX ADMIN — MOXIFLOXACIN HYDROCHLORIDE 400 MG: 400 TABLET, FILM COATED ORAL at 08:10

## 2018-10-11 RX ADMIN — SODIUM CHLORIDE: 0.9 INJECTION, SOLUTION INTRAVENOUS at 03:10

## 2018-10-11 RX ADMIN — METOPROLOL TARTRATE 100 MG: 50 TABLET ORAL at 08:10

## 2018-10-11 RX ADMIN — SODIUM CHLORIDE: 0.9 INJECTION, SOLUTION INTRAVENOUS at 09:10

## 2018-10-11 RX ADMIN — AZELASTINE HYDROCHLORIDE 137 MCG: 137 SPRAY, METERED NASAL at 09:10

## 2018-10-11 NOTE — PLAN OF CARE
Problem: SLP Goal  Goal: SLP Goal  STGs:  1. Pt will participate in ongoing assessment of swallow function at b/s.-discontinued 10/10  2. Pt will tolerate low level PO trials without overt s/s aspiration at b/s.  3.Pt will tolerate least restrictive PO diet without overt s/s aspiration and adequate oral clearance.   -discontinued 10/10  4. Pt will successfully participate in Modified Barium Swallow study to radiographically assess swallow function, rule out aspiration and determine safest/least restrictive diet.- MET 10/10  5. Pt will participate in trial sessions of indirect dysphagia exercises to strengthen musculature for swallowing mechanism mod effort.         Outcome: Ongoing (interventions implemented as appropriate)  Slow progress toward goals.

## 2018-10-11 NOTE — PROGRESS NOTES
TN spoke with patient's daughter Radha. Radha stated she would like home hospice arranged for her mother when she's ready for discharge. TN informed Radha of a few hospice providers. Radha stated her brother Hall will come to visit their mother today; therefore, all information can be provided to him during that time.    1028-  TN contacted Dr. Mello to inform. Palliative care consult already ordered.

## 2018-10-11 NOTE — PROGRESS NOTES
Pt calmly lying in bed appears asleep, arouses easily, son at bedside.  Bedside report given by NOREEN Aguilera.  Bed in lowest position, brakes locked, bed alarm on, side rails up x 3, call bell w/i reach.  No distress noted.

## 2018-10-11 NOTE — PLAN OF CARE
Palliative Care consulted. Family interested in home hospice.        10/11/18 1032   Discharge Reassessment   Assessment Type Discharge Planning Reassessment   Provided patient/caregiver education on the expected discharge date and the discharge plan Yes   Do you have any problems affording any of your prescribed medications? No   Discharge Plan A Hospice/home   Discharge Plan B Hospice/home   Patient choice form signed by patient/caregiver No   How does the patient rate their overall health at the present time? Fair   How often would a person be available to care for the patient? Whenever needed   Number of comorbid conditions (as recorded on the chart) Five or more   During the past month, has the patient often been bothered by feeling down, depressed or hopeless? No   During the past month, has the patient often been bothered by little interest or pleasure in doing things? No

## 2018-10-11 NOTE — PLAN OF CARE
Problem: Fall Risk (Adult)  Intervention: Monitor/Assist with Self Care   10/09/18 1501 10/10/18 1501 10/11/18 0934   Functional Level Current   Ambulation 4 - completely dependent --  --    Transferring 4 - completely dependent --  --    Toileting 4 - completely dependent --  --    Bathing 4 - completely dependent --  --    Dressing 4 - completely dependent --  --    Eating other (see comments) --  --    Communication 0 - understands/communicates without difficulty --  --    Daily Care Interventions   Self-Care Promotion --  --  BADL personal objects within reach;BADL personal routines maintained;meal setup provided;safe use of adaptive equipment encouraged   Activity   Activity Assistance Provided --  assistance, 2 people --      Intervention: Reduce Risk/Promote Restraint Free Environment   10/11/18 0738   Safety Interventions   Safety Precautions emergency equipment at bedside     Intervention: Patient Rounds   10/11/18 0738   Safety Interventions   Patient Rounds bed in low position;bed wheels locked;call light in reach;clutter free environment maintained;ID band on;placement of personal items at bedside;visualized patient     Intervention: Safety Promotion/Fall Prevention   10/11/18 0738   Safety Interventions   Safety Promotion/Fall Prevention assistive device/personal item within reach;bed alarm set;Fall Risk reviewed with patient/family;Fall Risk signage in place;medications reviewed;nonskid shoes/socks when out of bed;room near unit station;side rails raised x 3;instructed to call staff for mobility     Intervention: Safety Precautions   10/11/18 0738   Safety Interventions   Safety Precautions emergency equipment at bedside         Problem: Pressure Ulcer Risk (Villa Scale) (Adult,Obstetrics,Pediatric)  Intervention: Promote/Optimize Nutrition   10/11/18 1120   Nutrition Interventions   Oral Nutrition Promotion nutritional therapy counseling provided     Intervention: Prevent/Manage Excess Moisture    10/10/18 0700 10/10/18 2040 10/11/18 0600   Hygiene Care   Perineal Care absorbent pad changed;catheter care provided --  --    Bathing/Skin Care --  --  bath, complete;linen changed   Skin Interventions   Skin Protection --  Adhesive use limited;Incontinence pads --      Intervention: Maintain Head of Bed Elevation Less Than 30 Degrees as Tolerated   10/11/18 0738   Positioning   Head of Bed (HOB) HOB at 60-90 degrees     Intervention: Prevent/Minimize Sheer/Friction Injuries   10/10/18 1500 10/10/18 2040   Skin Interventions   Pressure Reduction Devices --  Pressure-redistributing mattress utilized   Pressure Reduction Techniques --  heels elevated off bed;pressure points protected   Positioning   Positioning/Transfer Devices pillows;in use --      Intervention: Turn/Reposition Often   10/10/18 2040 10/11/18 0738   Skin Interventions   Pressure Reduction Techniques heels elevated off bed;pressure points protected --    Positioning   Body Position --  side-lying, right       Goal: Identify Related Risk Factors and Signs and Symptoms  Related risk factors and signs and symptoms are identified upon initiation of Human Response Clinical Practice Guideline (CPG)  Outcome: Ongoing (interventions implemented as appropriate)   10/07/18 1622   Pressure Ulcer Risk (Villa Scale)   Related Risk Factors (Pressure Ulcer Risk (Villa Scale)) age extremes;body weight extremes;cognitive impairment;critical care admission;excretions/secretions;infection;medical devices;medication;mental impairment;mobility impaired     Goal: Skin Integrity  Patient will demonstrate the desired outcomes by discharge/transition of care.  Outcome: Ongoing (interventions implemented as appropriate)   10/10/18 1831   Pressure Ulcer Risk (Villa Scale) (Adult,Obstetrics,Pediatric)   Skin Integrity making progress toward outcome

## 2018-10-11 NOTE — PLAN OF CARE
Problem: Physical Therapy Goal  Goal: Physical Therapy Goal  Goals to be met by: 10/20/2018     Patient will increase functional independence with mobility by performin. Supine to sit with Stand-by Assistance  2. Sit to supine with Stand-by Assistance  3. Sitting at edge of bed x15 minutes with Modified [place]  4. Lower extremity exercise program x20 reps per handout, with supervision.  5. Transfers - TBD at later time.  6. Gait - TBD at later time.    Recommend: SNF at time of discharge.    Outcome: Ongoing (interventions implemented as appropriate)  Educated and demonstrated proper and safety technique for PROM BUE&BLE in all available planes and positioning/ turning to prevent pressure sore   . Pt 's son verbalize and demonstrated good understanding.

## 2018-10-11 NOTE — PROGRESS NOTES
TN met with patient's son Rafael at patient's bedside. TN provided Mr. Hall with hospice  agency brochures to review. Mr. Hall will discuss with family. Will follow up tomorrow.

## 2018-10-11 NOTE — PT/OT/SLP PROGRESS
Physical Therapy Treatment    Patient Name:  Cindy Lyman   MRN:  [id number]    Recommendations:     Discharge Recommendations:  nursing facility, skilled   Discharge Equipment Recommendations: none    Barriers to discharge: Inaccessible home and Decreased caregiver support    Assessment:     Cindy Lyman is a 82 y.o. female admitted with a medical diagnosis of Cardiac arrest.  She presents with the following impairments/functional limitations:  weakness, impaired endurance, impaired self care skills, gait instability, impaired functional mobilty, impaired balance, visual deficits, decreased coordination, impaired cognition, decreased upper extremity function, decreased lower extremity function, decreased ROM, edema, impaired skin, pain, impaired muscle length .Educated and demonstrated proper and safety technique for PROM BUE&BLE in all available planes and positioning/ turning to prevent pressure sore   . Pt 's son verbalize and demonstrated good understanding.       Rehab Prognosis: fair-; patient would benefit from acute skilled PT services to address these deficits and reach maximum level of function.      Recent Surgery: Procedure(s) (LRB):  Left heart cath (Left) 2 Days Post-Op    Plan:     During this hospitalization, patient to be seen 5 x/week to address the above listed problems via gait training, therapeutic activities, therapeutic exercises  · Plan of Care Expires:  10/20/18   Plan of Care Reviewed with: patient    Subjective     Communicated with nurse [place] prior to session.  Patient found in bed  upon PT entry to room, agreeable to treatment.      Chief Complaint: none  Patient comments/goals: did not state   Pain/Comfort:  · Pain Rating 1: 0/10  · Pain Rating Post-Intervention 1: 0/10    Patients cultural, spiritual, Restorationism conflicts given the current situation: none    Objective:     Patient found with: bed alarm, telemetry, NG tube, peripheral IV     General Precautions:  Standard, fall,NPO, aspiration    Orthopedic Precautions:N/A   Braces: N/A       AM-PAC 6 CLICK MOBILITY  Turning over in bed (including adjusting bedclothes, sheets and blankets)?: 1  Sitting down on and standing up from a chair with arms (e.g., wheelchair, bedside commode, etc.): 1  Moving from lying on back to sitting on the side of the bed?: 1  Moving to and from a bed to a chair (including a wheelchair)?: 1  Need to walk in hospital room?: 1  Climbing 3-5 steps with a railing?: 1  Basic Mobility Total Score: 6       Therapeutic Activities and Exercises:   Educated and demonstrated proper and safety technique for PROM BUE&BLE in all available planes and positioning/ turning to prevent pressure sore  . Pt 's son verbalize and demonstrated good understanding.   Performed PROM x 10 reps each RLE BUE&BLE in all available planes . Pt able to AAROM BUE with VC's.   Pt's son able to perform PROM- AAROM LLE BUE &BLE in all available planes . V/T cues for safety and proper technique.  Purple foam wedge and pressure relief boots provided.         Patient left HOB elevated , BUE elevated, pressure relief boots placed B heels  with all lines intact, call button in reach, nurse notified and family present..    GOALS:   Multidisciplinary Problems     Physical Therapy Goals        Problem: Physical Therapy Goal    Goal Priority Disciplines Outcome Goal Variances Interventions   Physical Therapy Goal     PT, PT/OT Ongoing (interventions implemented as appropriate)     Description:  Goals to be met by: 10/20/2018     Patient will increase functional independence with mobility by performin. Supine to sit with Stand-by Assistance  2. Sit to supine with Stand-by Assistance  3. Sitting at edge of bed x15 minutes with Modified [place]  4. Lower extremity exercise program x20 reps per handout, with supervision.  5. Transfers - TBD at later time.  6. Gait - TBD at later time.    Recommend: SNF at time of discharge.                      Time Tracking:     PT Received On: 10/11/18  PT Start Time: 1425     PT Stop Time: 1450  PT Total Time (min): 25 min     Billable Minutes: Therapeutic Activity 15 and Therapeutic Exercise 10    Treatment Type: Treatment  PT/PTA: PTA     PTA Visit Number: 1     Rosmery Pandya PTA  10/11/2018

## 2018-10-11 NOTE — PT/OT/SLP PROGRESS
Speech Language Pathology Treatment    Patient Name:  Cindy Lyman   MRN:  [id number]  Admitting Diagnosis: Cardiac arrest    Recommendations:                 General Recommendations:  Dysphagia therapy  Diet recommendations:  NPO, Liquid Diet Level: NPO   Aspiration Precautions: Alternate means of nutrition/hydration and Frequent oral care   General Precautions: Standard, NPO, aspiration, fall  Communication strategies:  provide increased time to answer    Subjective     Pt lethargic and minimally responsive during tx session. Pt requested water for dry mouth. Therapist educated pt on ongoing NPO status.  Patient goals: No goals stated.    Pain/Comfort:  · Pain Rating 1: 0/10    Objective:     Has the patient been evaluated by SLP for swallowing?   Yes  Keep patient NPO? Yes   Current Respiratory Status: nasal cannula      Oral care provided to pt. Pt performed OME x5 with max verbal/visual cues. Pt performed BOT ex's with poor ability given max cues. Pt attempted but unable to complete addition dysphagia ex's during tx session 2/2 fatigue.    Assessment:     Cindy Lyman is a 82 y.o. female with cardiac arrest. .  She presents with severe oropharyngeal dysphagia c/b significantly reduced laryngeal elevation/excursion and significantly delayed trigger of swallow response. Pt continues to exhibit difficulty manage oral secretions.    Goals:   Multidisciplinary Problems     SLP Goals        Problem: SLP Goal    Goal Priority Disciplines Outcome   SLP Goal    Medium SLP Ongoing (interventions implemented as appropriate)   Description:  STGs:  1. Pt will participate in ongoing assessment of swallow function at b/s.-discontinued 10/10  2. Pt will tolerate low level PO trials without overt s/s aspiration at b/s.  3.Pt will tolerate least restrictive PO diet without overt s/s aspiration and adequate oral clearance.   -discontinued 10/10  4. Pt will successfully participate in Modified Barium Swallow study  to radiographically assess swallow function, rule out aspiration and determine safest/least restrictive diet.- MET 10/10  5. Pt will participate in trial sessions of indirect dysphagia exercises to strengthen musculature for swallowing mechanism mod effort.                          Plan:     · Patient to be seen:  3 x/week   · Plan of Care expires:  10/19/18  · Plan of Care reviewed with:  patient   · SLP Follow-Up:  Yes       Discharge recommendations:  nursing facility, skilled   Barriers to Discharge:  Level of Skilled Assistance Needed 24 supervision required    Time Tracking:     SLP Treatment Date:   10/11/18  Speech Start Time:  0923  Speech Stop Time:  0935     Speech Total Time (min):  12 min    Billable Minutes: Treatment Swallowing Dysfunction 12 min    Vane Moscoso CCC-SLP  10/11/2018

## 2018-10-11 NOTE — PROGRESS NOTES
Discharge planning: Daughter Lesley ([phone]) called this SW to request further assistance to plan for hospice at home. She was not currently at the hospital. FAITH explained that coworker will be continuing to assist now that patient is out of ICU. FAITH explained laura finish conversation will contact coworker to request call to daughter.  FAITH confirmed that has left chart note and written communication that patient will need hospital bed to transition home.   Lesley is the primary care provider. Her brother Hall helps at times. The other children are not available to help at home. FAITH inquired if anyone else can help (have provided the limits of home hospice during past conversation). Lelsey reports that unlikely anyone else. FAITH inquired regarding possibly considering inpatient hospice situation with nursing home or even inpatient hospice house. Declines as still wants to provide care at home.  FAITH has now left voice mail for co worker FAITH Garcia

## 2018-10-12 LAB
ANION GAP SERPL CALC-SCNC: 11 MMOL/L
BUN SERPL-MCNC: 28 MG/DL
CALCIUM SERPL-MCNC: 9.3 MG/DL
CHLORIDE SERPL-SCNC: 116 MMOL/L
CO2 SERPL-SCNC: 17 MMOL/L
CREAT SERPL-MCNC: 1 MG/DL
EST. GFR  (AFRICAN AMERICAN): >60 ML/MIN/1.73 M^2
EST. GFR  (NON AFRICAN AMERICAN): 53 ML/MIN/1.73 M^2
GLUCOSE SERPL-MCNC: 150 MG/DL
POTASSIUM SERPL-SCNC: 4.4 MMOL/L
SODIUM SERPL-SCNC: 144 MMOL/L

## 2018-10-12 PROCEDURE — 97530 THERAPEUTIC ACTIVITIES: CPT

## 2018-10-12 PROCEDURE — G8978 MOBILITY CURRENT STATUS: HCPCS | Mod: CN

## 2018-10-12 PROCEDURE — 80048 BASIC METABOLIC PNL TOTAL CA: CPT

## 2018-10-12 PROCEDURE — 92526 ORAL FUNCTION THERAPY: CPT

## 2018-10-12 PROCEDURE — 94761 N-INVAS EAR/PLS OXIMETRY MLT: CPT

## 2018-10-12 PROCEDURE — 25000003 PHARM REV CODE 250: Performed by: INTERNAL MEDICINE

## 2018-10-12 PROCEDURE — G8997 SWALLOW GOAL STATUS: HCPCS | Mod: CM

## 2018-10-12 PROCEDURE — 94640 AIRWAY INHALATION TREATMENT: CPT

## 2018-10-12 PROCEDURE — 25000003 PHARM REV CODE 250: Performed by: HOSPITALIST

## 2018-10-12 PROCEDURE — G8996 SWALLOW CURRENT STATUS: HCPCS | Mod: CM

## 2018-10-12 PROCEDURE — G8980 MOBILITY D/C STATUS: HCPCS | Mod: CN

## 2018-10-12 PROCEDURE — G8979 MOBILITY GOAL STATUS: HCPCS | Mod: CI

## 2018-10-12 PROCEDURE — 36415 COLL VENOUS BLD VENIPUNCTURE: CPT

## 2018-10-12 PROCEDURE — 21400001 HC TELEMETRY ROOM

## 2018-10-12 RX ADMIN — CETIRIZINE HYDROCHLORIDE 10 MG: 10 TABLET, FILM COATED ORAL at 08:10

## 2018-10-12 RX ADMIN — FLUTICASONE PROPIONATE 1 PUFF: 220 AEROSOL, METERED RESPIRATORY (INHALATION) at 08:10

## 2018-10-12 RX ADMIN — METOPROLOL TARTRATE 100 MG: 50 TABLET ORAL at 08:10

## 2018-10-12 RX ADMIN — CLONIDINE HYDROCHLORIDE 0.3 MG: 0.1 TABLET ORAL at 08:10

## 2018-10-12 RX ADMIN — HYDRALAZINE HYDROCHLORIDE 100 MG: 25 TABLET ORAL at 02:10

## 2018-10-12 RX ADMIN — GABAPENTIN 300 MG: 300 CAPSULE ORAL at 02:10

## 2018-10-12 RX ADMIN — CLONIDINE HYDROCHLORIDE 0.3 MG: 0.1 TABLET ORAL at 09:10

## 2018-10-12 RX ADMIN — LABETALOL HYDROCHLORIDE 10 MG: 5 INJECTION, SOLUTION INTRAVENOUS at 11:10

## 2018-10-12 RX ADMIN — METOPROLOL TARTRATE 100 MG: 50 TABLET ORAL at 09:10

## 2018-10-12 RX ADMIN — MOXIFLOXACIN HYDROCHLORIDE 400 MG: 400 TABLET, FILM COATED ORAL at 08:10

## 2018-10-12 RX ADMIN — HYDRALAZINE HYDROCHLORIDE 100 MG: 25 TABLET ORAL at 05:10

## 2018-10-12 RX ADMIN — AZELASTINE HYDROCHLORIDE 137 MCG: 137 SPRAY, METERED NASAL at 08:10

## 2018-10-12 RX ADMIN — BIMATOPROST 1 DROP: 0.1 SOLUTION/ DROPS OPHTHALMIC at 09:10

## 2018-10-12 RX ADMIN — GUAIFENESIN 200 MG: 200 SOLUTION ORAL at 03:10

## 2018-10-12 RX ADMIN — CLONIDINE HYDROCHLORIDE 0.3 MG: 0.1 TABLET ORAL at 02:10

## 2018-10-12 RX ADMIN — ASPIRIN 81 MG: 81 TABLET, COATED ORAL at 08:10

## 2018-10-12 RX ADMIN — CLOPIDOGREL BISULFATE 75 MG: 75 TABLET ORAL at 08:10

## 2018-10-12 RX ADMIN — GABAPENTIN 300 MG: 300 CAPSULE ORAL at 09:10

## 2018-10-12 RX ADMIN — GABAPENTIN 300 MG: 300 CAPSULE ORAL at 08:10

## 2018-10-12 RX ADMIN — HYDRALAZINE HYDROCHLORIDE 100 MG: 25 TABLET ORAL at 09:10

## 2018-10-12 RX ADMIN — AZELASTINE HYDROCHLORIDE 137 MCG: 137 SPRAY, METERED NASAL at 09:10

## 2018-10-12 NOTE — PLAN OF CARE
Problem: Fall Risk (Adult)  Intervention: Safety Promotion/Fall Prevention   10/12/18 0300   Safety Interventions   Safety Promotion/Fall Prevention assistive device/personal item within reach;bed alarm set;diversional activities provided;Fall Risk reviewed with patient/family;lighting adjusted;medications reviewed;nonskid shoes/socks when out of bed;room near unit station;/camera at bedside;side rails raised x 2;toileting scheduled;instructed to call staff for mobility

## 2018-10-12 NOTE — PROGRESS NOTES
[facility] Hospital Medicine  Progress Note    Patient Name: Cindy Lyman  MRN: [id number]  Patient Class: IP- Inpatient   Admission Date: 9/30/2018  Length of Stay: 12 days  Attending Physician: Viviane Mello MD  Primary Care Provider: Rodger Camarena MD        Subjective:     Principal Problem:Cardiac arrest    HPI:  83 yo female with diastolic CHF, COPD, gastric cancer, aortic stenosis with complete heart block and s/p TAVR and pacemaker placed 7/2018 presented in cardiac arrest. Per ED notes, patient was on her way to [place] with others and c/o chest pain. CPR was initiated in parked vehicle outside ED. One round of epinephrine given and got ROSC. Per family, she c/o worsening SOB over several days. On arrival pt was euthermic, hypertensive, elevated lactate and BNP. CXR suggest pulmonary edema though infection cannot be ruled out. Pt intubated and evaluated by pulmonology. Cardiology consulted. ECHO pending. Broad spectrum antibiotics initiated until cultures result and clinical course dictates.     Chest CTA negative for pulmonary emboli.     Per family, Pt is a full code.     Hospital Course:  Pt admitted after PEA arrest and acute respiratory failure/acidosis. Acidosis corrected. Pulmonology assisting with vent management/weaning. Trial of precedex today but patient did not tolerate. Switched back to propofol. CPAP this am and now back on PVRC. Pacemaker interrogated and appears to be functioning fine. Diuresed well on lasix without change in renal function. Possible plan for LHC per cardiology. Extubated on 10/2. Initially slow to respond, but mental status improving.  Planned LHC on 10/4 held secondary to rise in Creat.  Lasix stopped and getting IVF hydration.  Episodes of slight hypotension and BP medications adjusted.  Worsening urine output.  Urine also bloody.  Urology consulted.  Improving urine output and Creat.  10/7: Improving Creat.  Patient more drowsy and less  responsive.  Spiked fever overnight.  Had to be placed on Cardene gtt secondary to poorly controlled HTN. Weaned off Cardene gtt.  Possible aspiration pneumonitis and started on Avelox (10/7)  The patient went for LHC on 10/9- no intervention. Patent stents. Med management only.  Palliative care consulted. PT/OT were consulted and recommended SNF on discharge. The patient was sent to the floor on 10/10.    10/11 - failed swallow eval; palliative care consulted +/- peg tube, receiving NGT feeds    Interval History: pt c/o back pain     Review of Systems   Unable to perform ROS: Dementia   Constitutional: Positive for chills.   Musculoskeletal: Positive for back pain.     Objective:     Vital Signs (Most Recent):  Temp: 98.9 °F (37.2 °C) (10/12/18 0824)  Pulse: 69 (10/12/18 0934)  Resp: 18 (10/12/18 0836)  BP: (!) 175/80 (10/12/18 0934)  SpO2: 97 % (10/12/18 0836) Vital Signs (24h Range):  Temp:  [97.7 °F (36.5 °C)-99.3 °F (37.4 °C)] 98.9 °F (37.2 °C)  Pulse:  [69-89] 69  Resp:  [16-20] 18  SpO2:  [94 %-97 %] 97 %  BP: (153-229)/(65-95) 175/80     Weight: 96 kg (211 lb 10.3 oz)  Body mass index is 35.22 kg/m².    Intake/Output Summary (Last 24 hours) at 10/12/2018 0957  Last data filed at 10/12/2018 0522  Gross per 24 hour   Intake 1552 ml   Output --   Net 1552 ml      Physical Exam   Constitutional: She appears well-developed and well-nourished.   HENT:   Head: Normocephalic and atraumatic.   Cardiovascular: Normal rate and regular rhythm.   Pulmonary/Chest: Breath sounds normal. No stridor. She has no wheezes. She has no rales.   Abdominal: Soft. Bowel sounds are normal. She exhibits no distension. There is no tenderness. There is no guarding.   Neurological: She is alert.   Vitals reviewed.      Significant Labs: All pertinent labs within the past 24 hours have been reviewed.    Significant Imaging: I have reviewed and interpreted all pertinent imaging results/findings within the past 24 hours.    Assessment/Plan:       * Cardiac arrest    Etiology still unclear, but PEA reported by first humphrey, quick ROSC with one round of epinephrine  Pt intubated on sedation and will have wean sedation for appropriate neurological exam, consult neurology if indicated   ECHO  -  +DD  Pacemaker interrogated   [facility] - 10/9- stents RCA are patent. No intervention. Med therapy only           Acute hypoxemic respiratory failure    Chest CTA r/o PE, likely due to decompensated CHF and possible underlying infection  Pulmonology consulted for vent management  Cardiology consulted - [facility] :  · Left ventricle ejection fraction is mildly decreased at 45%  · Left ventricle shows concentric remodeling.  · RV systolic function is normal.  · Left atrium is mildly dilated.  · Right atrium is moderately dilated.  · Tricuspid valve shows mild regurgitation.  · Grade II (moderate) left ventricular diastolic dysfunction consistent with pseudonormalization.  · LA pressure is elevated.   Cultures - negative, sent for resp cultures today   Appreciate Pulmonary input.  Extubated on 10/2.  Initially slow to respond, but improving.  Advance diet.  Less responsive on 10/7.  Spiked fever.  Possible aspiration pneumonitis.  Started Avelox.  Placed on Cardene drip for uncontrolled HTN.  Able to be weaned off.              Debility    Consulted PT/OT. SNF on discharge.  Will consult speech for swallow study. Continue NG tube feeds for now.             Palliative care encounter    Consult pending           Hematuria, gross    Appreciate Urology input.  Holding Lovenox.  Urine clearing.          Pacemaker    See above           Acute pulmonary edema    Pt given IV lasix with large amount of diuresis  CXr improving   Holding Lasix secondary to rise in Creat.            S/P TAVR (transcatheter aortic valve replacement)    No acute issues           Stage 3 chronic kidney disease    No indication for vasopressor or aggressive IVF  Monitor closely on IV lasix   Avoid  nephrotoxins   ARF--holding Lasix and giving IVF hydration.  Creat stable, but urine output poor.  Nephrology consult.  Hematuria--hold Lovenox.  Consulted Urology.  Improving urine output and Creat  IV fluids today           Essential hypertension    BP uncontrolled  Patient on home Clonidine that was stopped during this admit.  Possible rebound HTN.  Restarted home Clonidine.  Now on Cardene gtt.            CAD s/p PCI    Aspirin, plavix and statin resumed   Cardiology following  Elevated troponin after cardiopulmonary arrest          Anemia of chronic disease    Monitor  Reviewed bone marrow biopsy 2017 - no malignancy   H/H low- may need transfusion by 10/11. Heme test stools. Will d/c asa and lovenox for now.           Hyperlipidemia    Resume statin             VTE Risk Mitigation (From admission, onward)        Ordered     Place sequential compression device  Until discontinued      10/05/18 1249     Place LAUREANO hose  Until discontinued      10/05/18 1249     IP VTE HIGH RISK PATIENT  Once      09/30/18 1359              Viviane Mello MD  Department of Hospital Medicine   [facility]

## 2018-10-12 NOTE — PT/OT/SLP PROGRESS
Speech Language Pathology Treatment    Patient Name:  Cindy Lyman   MRN:  [id number]  Admitting Diagnosis: Cardiac arrest    Recommendations:                 General Recommendations:  Dysphagia therapy  Diet recommendations:  NPO, Liquid Diet Level: NPO   Aspiration Precautions: HOB to 90 degrees and Remain upright 30 minutes post meal   General Precautions: Standard, aspiration  Communication strategies:  provide increased time to answer    Subjective   Pt readily participated in dysphagia therapy. Appropriate KOBE throughout.   Patient goals: d/c NGT    Pain/Comfort:  · Pain Rating 1: 0/10    Objective:     Has the patient been evaluated by SLP for swallowing?   Yes  Keep patient NPO? Yes   Current Respiratory Status: nasal cannula      NGT in place, Pt upright for trial dysphagia therapy. Pt performed laryngeal excursion exercises X10 for 2 sets after ST model with fair ability; Pt performed kathy X1 with poor ability. X5 trials of thin liquids revealed open mouth posture during swallow with tongue thrust; multiple swallows per bolus, decreased laryngeal excursion upon palpation, strong delayed cough for 4 of 5 trials.     Assessment:     Cindy Lyman is a 82 y.o. female with dx of Cardiac arrest she presents with severe oropharyngeal dysphagia.     Goals:   Multidisciplinary Problems     SLP Goals        Problem: SLP Goal    Goal Priority Disciplines Outcome   SLP Goal    Medium SLP Ongoing (interventions implemented as appropriate)   Description:  STGs:  1. Pt will participate in ongoing assessment of swallow function at b/s.-discontinued 10/10  2. Pt will tolerate low level PO trials without overt s/s aspiration at b/s.  3.Pt will tolerate least restrictive PO diet without overt s/s aspiration and adequate oral clearance.   -discontinued 10/10  4. Pt will successfully participate in Modified Barium Swallow study to radiographically assess swallow function, rule out aspiration and determine  safest/least restrictive diet.- MET 10/10  5. Pt will participate in trial sessions of indirect dysphagia exercises to strengthen musculature for swallowing mechanism mod effort.                          Plan:     · Patient to be seen:  3 x/week   · Plan of Care expires:  10/19/18  · Plan of Care reviewed with:  patient   · SLP Follow-Up:  Yes       Discharge recommendations:  hospice  Barriers to Discharge:  None    Time Tracking:     SLP Treatment Date:   10/12/18  Speech Start Time:  1230  Speech Stop Time:  1240     Speech Total Time (min):  10 min    Billable Minutes: Treatment Swallowing Dysfunction 10    Marga Espinosa CCC-SLP  10/12/2018

## 2018-10-12 NOTE — PROGRESS NOTES
Spoke with Dr Mello and family has decided to do hospice and inpatient hospice recommended. Patient at present meets inpatient hospice criteria with failed swallow studies and current clinical condition. PEG is not recommended.    Family conference scheduled for tomorrow with Dr Mello. [facility] notified and will be present for meeting to evaluate patient and speak with family.

## 2018-10-12 NOTE — PROGRESS NOTES
TN met with patient's daughters Rachel and Jennifer to discuss patient's discharge plan. Jennifer and Rachel both stated they would like their mother to go to an inpatient hospice facility. TN inquired if anyone has POA. Both were unsure. Rachel stated a family meeting will be arranged on Saturday to further discuss patient's discharge plan.

## 2018-10-12 NOTE — SUBJECTIVE & OBJECTIVE
Interval History: pt has NGT and c/o of tape on nose falling off, denies back pain currently but reports pain last night     Review of Systems   Unable to perform ROS: Dementia   Constitutional: Positive for chills.   Musculoskeletal: Positive for back pain.     Objective:     Vital Signs (Most Recent):  Temp: 97.4 °F (36.3 °C) (10/12/18 1116)  Pulse: 70 (10/12/18 1116)  Resp: 16 (10/12/18 1116)  BP: (!) 182/80 (10/12/18 1135)  SpO2: 98 % (10/12/18 1116) Vital Signs (24h Range):  Temp:  [97.4 °F (36.3 °C)-99.3 °F (37.4 °C)] 97.4 °F (36.3 °C)  Pulse:  [69-99] 70  Resp:  [16-20] 16  SpO2:  [94 %-98 %] 98 %  BP: (153-229)/(65-95) 182/80     Weight: 96 kg (211 lb 10.3 oz)  Body mass index is 35.22 kg/m².    Intake/Output Summary (Last 24 hours) at 10/12/2018 1151  Last data filed at 10/12/2018 0522  Gross per 24 hour   Intake 1552 ml   Output --   Net 1552 ml      Physical Exam   Constitutional: She appears well-developed and well-nourished.   HENT:   Head: Normocephalic and atraumatic.   Cardiovascular: Normal rate and regular rhythm.   Pulmonary/Chest: Breath sounds normal. No stridor. She has no wheezes. She has no rales.   Abdominal: Soft. Bowel sounds are normal. She exhibits no distension. There is no tenderness. There is no guarding.   Neurological: She is alert.   Vitals reviewed.      Significant Labs:   BMP:   Recent Labs   Lab  10/12/18   0717   GLU  150*   NA  144   K  4.4   CL  116*   CO2  17*   BUN  28*   CREATININE  1.0   CALCIUM  9.3     CBC: No results for input(s): WBC, HGB, HCT, PLT in the last 48 hours.    Significant Imaging: I have reviewed all pertinent imaging results/findings within the past 24 hours.

## 2018-10-12 NOTE — SUBJECTIVE & OBJECTIVE
Interval History: pt c/o back pain     Review of Systems   Unable to perform ROS: Dementia   Constitutional: Positive for chills.   Musculoskeletal: Positive for back pain.     Objective:     Vital Signs (Most Recent):  Temp: 98.9 °F (37.2 °C) (10/12/18 0824)  Pulse: 69 (10/12/18 0934)  Resp: 18 (10/12/18 0836)  BP: (!) 175/80 (10/12/18 0934)  SpO2: 97 % (10/12/18 0836) Vital Signs (24h Range):  Temp:  [97.7 °F (36.5 °C)-99.3 °F (37.4 °C)] 98.9 °F (37.2 °C)  Pulse:  [69-89] 69  Resp:  [16-20] 18  SpO2:  [94 %-97 %] 97 %  BP: (153-229)/(65-95) 175/80     Weight: 96 kg (211 lb 10.3 oz)  Body mass index is 35.22 kg/m².    Intake/Output Summary (Last 24 hours) at 10/12/2018 0957  Last data filed at 10/12/2018 0522  Gross per 24 hour   Intake 1552 ml   Output --   Net 1552 ml      Physical Exam   Constitutional: She appears well-developed and well-nourished.   HENT:   Head: Normocephalic and atraumatic.   Cardiovascular: Normal rate and regular rhythm.   Pulmonary/Chest: Breath sounds normal. No stridor. She has no wheezes. She has no rales.   Abdominal: Soft. Bowel sounds are normal. She exhibits no distension. There is no tenderness. There is no guarding.   Neurological: She is alert.   Vitals reviewed.      Significant Labs: All pertinent labs within the past 24 hours have been reviewed.    Significant Imaging: I have reviewed and interpreted all pertinent imaging results/findings within the past 24 hours.

## 2018-10-12 NOTE — UM SECONDARY REVIEW
Other (see comment) UM COMMITTEE  CRITERIA    Level of Care Issue    LOS: approved an agreement with D/C plan   Discharge planning begun, but no definite post-acute location identified (either pending insurance reasons or family reasons). Variance days should be appropriately assigned

## 2018-10-12 NOTE — ASSESSMENT & PLAN NOTE
Consulted PT/OT.   Will consult speech for swallow study. Continue NG tube feeds for now.     Proceed with hospice placement

## 2018-10-12 NOTE — PLAN OF CARE
Problem: SLP Goal  Goal: SLP Goal  STGs:  1. Pt will participate in ongoing assessment of swallow function at b/s.-discontinued 10/10  2. Pt will tolerate low level PO trials without overt s/s aspiration at b/s.  3.Pt will tolerate least restrictive PO diet without overt s/s aspiration and adequate oral clearance.   -discontinued 10/10  4. Pt will successfully participate in Modified Barium Swallow study to radiographically assess swallow function, rule out aspiration and determine safest/least restrictive diet.- MET 10/10  5. Pt will participate in trial sessions of indirect dysphagia exercises to strengthen musculature for swallowing mechanism mod effort.         Outcome: Ongoing (interventions implemented as appropriate)  Pt readily participated in trial dysphagia therapy

## 2018-10-12 NOTE — PROGRESS NOTES
[facility] Hospital Medicine  Progress Note    Patient Name: Cindy Lyman  MRN: [id number]  Patient Class: IP- Inpatient   Admission Date: 9/30/2018  Length of Stay: 12 days  Attending Physician: Viviane Mello MD  Primary Care Provider: Rodger Camarena MD        Subjective:     Principal Problem:Cardiac arrest    HPI:  81 yo female with diastolic CHF, COPD, gastric cancer, aortic stenosis with complete heart block and s/p TAVR and pacemaker placed 7/2018 presented in cardiac arrest. Per ED notes, patient was on her way to [place] with others and c/o chest pain. CPR was initiated in parked vehicle outside ED. One round of epinephrine given and got ROSC. Per family, she c/o worsening SOB over several days. On arrival pt was euthermic, hypertensive, elevated lactate and BNP. CXR suggest pulmonary edema though infection cannot be ruled out. Pt intubated and evaluated by pulmonology. Cardiology consulted. ECHO pending. Broad spectrum antibiotics initiated until cultures result and clinical course dictates.     Chest CTA negative for pulmonary emboli.     Per family, Pt is a full code.     Hospital Course:  Pt admitted after PEA arrest and acute respiratory failure/acidosis. Acidosis corrected. Pulmonology assisting with vent management/weaning. Trial of precedex today but patient did not tolerate. Switched back to propofol. CPAP this am and now back on PVRC. Pacemaker interrogated and appears to be functioning fine. Diuresed well on lasix without change in renal function. Possible plan for LHC per cardiology. Extubated on 10/2. Initially slow to respond, but mental status improving.  Planned LHC on 10/4 held secondary to rise in Creat.  Lasix stopped and getting IVF hydration.  Episodes of slight hypotension and BP medications adjusted.  Worsening urine output.  Urine also bloody.  Urology consulted.  Improving urine output and Creat.  10/7: Improving Creat.  Patient more drowsy and less  responsive.  Spiked fever overnight.  Had to be placed on Cardene gtt secondary to poorly controlled HTN. Weaned off Cardene gtt.  Possible aspiration pneumonitis and started on Avelox (10/7)  The patient went for LHC on 10/9- no intervention. Patent stents. Med management only.  Palliative care consulted. PT/OT were consulted and recommended SNF on discharge. The patient was sent to the floor on 10/10.    10/11 - failed swallow eval; palliative care consulted +/- peg tube, receiving NGT feeds  10/12- met with two daughters and they agree to inpatient hospice, they do not have 24 hour support for home hospice, d/w Carla Arriaga NP.  Will try to meet with a hospice group and family tomorrow and finalize plan.  +/- peg tube placement    Interval History: pt has NGT and c/o of tape on nose falling off, denies back pain currently but reports pain last night     Review of Systems   Unable to perform ROS: Dementia   Constitutional: Positive for chills.   Musculoskeletal: Positive for back pain.     Objective:     Vital Signs (Most Recent):  Temp: 97.4 °F (36.3 °C) (10/12/18 1116)  Pulse: 70 (10/12/18 1116)  Resp: 16 (10/12/18 1116)  BP: (!) 182/80 (10/12/18 1135)  SpO2: 98 % (10/12/18 1116) Vital Signs (24h Range):  Temp:  [97.4 °F (36.3 °C)-99.3 °F (37.4 °C)] 97.4 °F (36.3 °C)  Pulse:  [69-99] 70  Resp:  [16-20] 16  SpO2:  [94 %-98 %] 98 %  BP: (153-229)/(65-95) 182/80     Weight: 96 kg (211 lb 10.3 oz)  Body mass index is 35.22 kg/m².    Intake/Output Summary (Last 24 hours) at 10/12/2018 1151  Last data filed at 10/12/2018 0522  Gross per 24 hour   Intake 1552 ml   Output --   Net 1552 ml      Physical Exam   Constitutional: She appears well-developed and well-nourished.   HENT:   Head: Normocephalic and atraumatic.   Cardiovascular: Normal rate and regular rhythm.   Pulmonary/Chest: Breath sounds normal. No stridor. She has no wheezes. She has no rales.   Abdominal: Soft. Bowel sounds are normal. She exhibits no  distension. There is no tenderness. There is no guarding.   Neurological: She is alert.   Vitals reviewed.      Significant Labs:   BMP:   Recent Labs   Lab  10/12/18   0717   GLU  150*   NA  144   K  4.4   CL  116*   CO2  17*   BUN  28*   CREATININE  1.0   CALCIUM  9.3     CBC: No results for input(s): WBC, HGB, HCT, PLT in the last 48 hours.    Significant Imaging: I have reviewed all pertinent imaging results/findings within the past 24 hours.    Assessment/Plan:      * Cardiac arrest    Etiology still unclear, but PEA reported by first repsonders, quick ROSC with one round of epinephrine  Pt intubated on sedation and will have wean sedation for appropriate neurological exam, consult neurology if indicated   ECHO  -  +DD  Pacemaker interrogated   [facility] - 10/9- stents RCA are patent. No intervention. Med therapy only           Acute hypoxemic respiratory failure    Chest CTA r/o PE, likely due to decompensated CHF and possible underlying infection  Pulmonology consulted for vent management  Cardiology consulted - [facility] :  · Left ventricle ejection fraction is mildly decreased at 45%  · Left ventricle shows concentric remodeling.  · RV systolic function is normal.  · Left atrium is mildly dilated.  · Right atrium is moderately dilated.  · Tricuspid valve shows mild regurgitation.  · Grade II (moderate) left ventricular diastolic dysfunction consistent with pseudonormalization.  · LA pressure is elevated.   Cultures - negative, sent for resp cultures today   Appreciate Pulmonary input.  Extubated on 10/2.  Initially slow to respond, but improving.  Advance diet.  Less responsive on 10/7.  Spiked fever.  Possible aspiration pneumonitis.  Started Avelox.  Placed on Cardene drip for uncontrolled HTN.  Able to be weaned off.              Debility    Consulted PT/OT.   Will consult speech for swallow study. Continue NG tube feeds for now.     Proceed with hospice placement           Palliative care encounter     Consult pending           Hematuria, gross    Appreciate Urology input.  Holding Lovenox.  Urine clearing.          Pacemaker    See above           Acute pulmonary edema    Pt given IV lasix with large amount of diuresis  CXr improving   Holding Lasix secondary to rise in Creat.            S/P TAVR (transcatheter aortic valve replacement)    No acute issues           Stage 3 chronic kidney disease    No indication for vasopressor or aggressive IVF  Monitor closely on IV lasix   Avoid nephrotoxins   ARF--holding Lasix and giving IVF hydration.  Creat stable, but urine output poor.  Nephrology consult.  Hematuria--hold Lovenox.  Consulted Urology.  Improving urine output and Creat  IV fluids today           Essential hypertension    BP uncontrolled  Patient on home Clonidine that was stopped during this admit.  Possible rebound HTN.  Restarted home Clonidine.  Now on Cardene gtt.            CAD s/p PCI    Aspirin, plavix and statin resumed   Cardiology following  Elevated troponin after cardiopulmonary arrest          Anemia of chronic disease    Monitor  Reviewed bone marrow biopsy 2017 - no malignancy   H/H low- may need transfusion by 10/11. Heme test stools. Will d/c asa and lovenox for now.           Hyperlipidemia    Resume statin             VTE Risk Mitigation (From admission, onward)        Ordered     Place sequential compression device  Until discontinued      10/05/18 1249     Place LAUREANO hose  Until discontinued      10/05/18 1249     IP VTE HIGH RISK PATIENT  Once      09/30/18 1359              Viviane Mello MD  Department of Hospital Medicine   [facility]

## 2018-10-12 NOTE — PROGRESS NOTES
" [facility] [place]  Adult Nutrition  Progress Note    SUMMARY       Recommendations    Recommendation/Intervention: Plans noted for likely transition to Hospice care. In light of this, no further nutr interventions rec'd/warranted. Cont w/ TF for now via NG until pt is admitted to Hospice.    Goals: TF initiation or diet advancement w/in 48 hrs  Nutrition Goal Status: goal met  Communication of RD Recs: reviewed with RN    Reason for Assessment    Reason for Assessment: RD follow-up  Diagnosis: (acute hypoxemic resp failure)  Relevant Medical History: CHF, CAD s/p stent, COPD, HLD, HTN  General Information Comments: Pt seen this a.m. TF infusing @ goal rate via NG w/ no intolerance. ST cont's to rec NPO. S/p LHC on 10/9 w/ no interventions. Plans now include transitioning to Hospice care. No plans for PEG tube. Fmly meeting tmrw w/ Hospice company.   Nutrition Discharge Planning: Unable to determine @ this time.    Nutrition Risk Screen    Nutrition Risk Screen: no indicators present    Nutrition/Diet History    Patient Reported Diet/Restrictions/Preferences: low salt(at times)  Typical Food/Fluid Intake: Adequate pta  Food Preferences: None reported  Do you have any cultural, spiritual, Adventism conflicts, given your current situation?: (none)  Factors Affecting Nutritional Intake: difficulty/impaired swallowing, NPO    Anthropometrics    Temp: 97.4 °F (36.3 °C)  Height: 5' 5" (165.1 cm)  Height (inches): 65 in  Weight Method: Bed Scale  Weight: 96 kg (211 lb 10.3 oz)(taken by RN on 10/12)  Weight (lb): 211.64 lb  Ideal Body Weight (IBW), Female: 125 lb  % Ideal Body Weight, Female (lb): 169.31 lb  BMI (Calculated): 35.3  BMI Grade: 35 - 39.9 - obesity - grade II       Lab/Procedures/Meds    Pertinent Labs Reviewed: reviewed  Pertinent Labs Comments: Cl 116, CO2 17, Glu 150  Pertinent Medications Reviewed: reviewed  Pertinent Medications Comments: senna-docusate    Physical " Findings/Assessment    Overall Physical Appearance: on oxygen therapy, obese  Tubes: nasogastric tube  Oral/Mouth Cavity: tooth/teeth missing  Skin: intact    Estimated/Assessed Needs    Weight Used For Calorie Calculations: 92.5 kg (203 lb 14.8 oz)  Energy Calorie Requirements (kcal): 1745  Energy Need Method: [place]-St Jeor(x 1.25 (PAL))     Protein Requirements: 93 gms (1gm/kg)  Weight Used For Protein Calculations: 92.5 kg (203 lb 14.8 oz)     Fluid Need Method: RDA Method  RDA Method (mL): 1745         Nutrition Prescription Ordered    Current Diet Order: NPO  Current Nutrition Support Formula Ordered: Isosource 1.5  Current Nutrition Support Rate Ordered: 50 (ml)  Current Nutrition Support Frequency Ordered: ml/hr    Evaluation of Received Nutrient/Fluid Intake    Enteral Calories (kcal): 1800  Enteral Protein (gm): 81  Enteral (Free Water) Fluid (mL): 933  Other Calories (kcal): 0  % Kcal Needs: 103  % Protein Needs: 87  IV Fluid (mL): (NS @ 100ml/hr)  I/O: reviewed  Energy Calories Required: meeting needs  Protein Required: meeting needs  Fluid Required: (Per MD)  Comments: LBM 10/11; good uop  Tolerance: tolerating  % Intake of Estimated Energy Needs: 75 - 100 %  % Meal Intake: NPO    Nutrition Risk    Level of Risk/Frequency of Follow-up: (F/u 1 x weekly)     Assessment and Plan    Nutrition Problem  Inadeqaute energy intake     Related to (etiology):   NPO w/ no alternate means of nutr in place     Signs and Symptoms (as evidenced by):   <85% of EEN/EPN being met     Interventions/Recommendations (treatment strategy):  See recs     Nutrition Diagnosis Status:   Resolved     Monitor and Evaluation    Food and Nutrient Intake: enteral nutrition intake, energy intake  Food and Nutrient Adminstration: enteral and parenteral nutrition administration, diet order  Physical Activity and Function: nutrition-related ADLs and IADLs  Anthropometric Measurements: weight, weight change  Biochemical Data, Medical Tests  and Procedures: electrolyte and renal panel, glucose/endocrine profile  Nutrition-Focused Physical Findings: overall appearance     Nutrition Follow-Up    RD Follow-up?: Yes

## 2018-10-12 NOTE — PT/OT/SLP PROGRESS
Physical Therapy Treatment     Patient Name:  Cindy Lyman   MRN:  [id number]     Recommendations:      Discharge Recommendations:  nursing facility, skilled   Discharge Equipment Recommendations: none    Barriers to discharge: Inaccessible home and Decreased caregiver support     Assessment:      Cindy Lyman is a 82 y.o. female admitted with a medical diagnosis of Cardiac arrest.  She presents with the following impairments/functional limitations:  weakness, impaired endurance, impaired self care skills, gait instability, impaired functional mobilty, impaired balance, visual deficits, decreased coordination, impaired cognition, decreased upper extremity function, decreased lower extremity function, decreased ROM, edema, impaired skin, pain, impaired muscle length .Educated and demonstrated proper and safety technique for PROM BUE&BLE in all available planes and positioning/ turning to prevent pressure sore   . Pt 's son verbalize and demonstrated good understanding.         Rehab Prognosis: fair-; patient would benefit from acute skilled PT services to address these deficits and reach maximum level of function.       Recent Surgery: Procedure(s) (LRB):  Left heart cath (Left) 2 Days Post-Op     Plan:      During this hospitalization, patient to be seen 5 x/week to address the above listed problems via gait training, therapeutic activities, therapeutic exercises  § Plan of Care Expires:  10/20/18  · Plan of Care Reviewed with: patient     Subjective      Communicated with nurse M[place] prior to session.  Patient found in bed  upon PT entry to room, agreeable to treatment.       Chief Complaint: none  Patient comments/goals: did not state   Pain/Comfort:  · Pain Rating 1: 0/10  · Pain Rating Post-Intervention 1: 0/10     Patients cultural, spiritual, Caodaism conflicts given the current situation: none     Objective:      Patient found with: bed alarm, telemetry, NG tube, peripheral IV       General Precautions: Standard, fall,NPO, aspiration    Orthopedic Precautions:N/A   Braces: N/A         AM-PAC 6 CLICK MOBILITY  Turning over in bed (including adjusting bedclothes, sheets and blankets)?: 1  Sitting down on and standing up from a chair with arms (e.g., wheelchair, bedside commode, etc.): 1  Moving from lying on back to sitting on the side of the bed?: 1  Moving to and from a bed to a chair (including a wheelchair)?: 1  Need to walk in hospital room?: 1  Climbing 3-5 steps with a railing?: 1  Basic Mobility Total Score: 6          Therapeutic Activities and Exercises:   Educated and demonstrated proper and safety technique for PROM BUE&BLE in all available planes and positioning/ turning to prevent pressure sore  . Pt 's son verbalize and demonstrated good understanding.   Performed PROM  RLE BUE&BLE in all available planes .   Pt's son and daughter able to perform PROM- AAROM BLE BUE &BLE in all available planes . V/T cues for safety and proper technique.  Handout on BUE PROM given.      Performed B calf stretch x ~ 30 s hold x 3 trials.        Patient left HOB elevated , BUE elevated, pressure relief boots placed B heels  with all lines intact, call button in reach, nurse notified and family present..     GOALS:       Multidisciplinary Problems           Physical Therapy Goals                 Problem: Physical Therapy Goal      Goal Priority Disciplines Outcome Goal Variances Interventions   Physical Therapy Goal      PT, PT/OT Ongoing (interventions implemented as appropriate)       Description:  Goals to be met by: 10/20/2018      Patient will increase functional independence with mobility by performin. Supine to sit with Stand-by Assistance  2. Sit to supine with Stand-by Assistance  3. Sitting at edge of bed x15 minutes with Modified [place]  4. Lower extremity exercise program x20 reps per handout, with supervision.  5. Transfers - TBD at later time.  6. Gait - TBD at later  time.     Recommend: SNF at time of discharge.                          Time Tracking:      PT Received On: 10/12/18  PT Start Time: 1521     PT Stop Time: 1533  PT Total Time (min): 12 min      Billable Minutes: Therapeutic Activity 12     Treatment Type: Treatment  PT/PTA: PTA     PTA Visit Number: 2     Rosmery Pandya PTA  10/12/2018

## 2018-10-12 NOTE — PLAN OF CARE
Problem: Patient Care Overview  Goal: Plan of Care Review   10/12/18 0353   Coping/Psychosocial   Plan Of Care Reviewed With patient

## 2018-10-12 NOTE — PLAN OF CARE
Problem: Physical Therapy Goal  Goal: Physical Therapy Goal  Goals to be met by: 10/20/2018     Patient will increase functional independence with mobility by performin. Supine to sit with Stand-by Assistance  2. Sit to supine with Stand-by Assistance  3. Sitting at edge of bed x15 minutes with Modified [place]  4. Lower extremity exercise program x20 reps per handout, with supervision.  5. Transfers - TBD at later time.  6. Gait - TBD at later time.    Recommend: SNF at time of discharge.    Outcome: Ongoing (interventions implemented as appropriate)  Educated pt family on PROM BUE&BLE in all available planes. Pt's son and daughter demonstrated good understanding.

## 2018-10-13 LAB
ANION GAP SERPL CALC-SCNC: 6 MMOL/L
BUN SERPL-MCNC: 18 MG/DL
CALCIUM SERPL-MCNC: 9.2 MG/DL
CHLORIDE SERPL-SCNC: 111 MMOL/L
CO2 SERPL-SCNC: 24 MMOL/L
CREAT SERPL-MCNC: 0.8 MG/DL
EST. GFR  (AFRICAN AMERICAN): >60 ML/MIN/1.73 M^2
EST. GFR  (NON AFRICAN AMERICAN): >60 ML/MIN/1.73 M^2
GLUCOSE SERPL-MCNC: 141 MG/DL
POTASSIUM SERPL-SCNC: 4 MMOL/L
SODIUM SERPL-SCNC: 141 MMOL/L

## 2018-10-13 PROCEDURE — 21400001 HC TELEMETRY ROOM

## 2018-10-13 PROCEDURE — 25000003 PHARM REV CODE 250: Performed by: HOSPITALIST

## 2018-10-13 PROCEDURE — 63600175 PHARM REV CODE 636 W HCPCS: Performed by: HOSPITALIST

## 2018-10-13 PROCEDURE — 94760 N-INVAS EAR/PLS OXIMETRY 1: CPT

## 2018-10-13 PROCEDURE — 80048 BASIC METABOLIC PNL TOTAL CA: CPT

## 2018-10-13 PROCEDURE — 94640 AIRWAY INHALATION TREATMENT: CPT

## 2018-10-13 PROCEDURE — 25000003 PHARM REV CODE 250: Performed by: INTERNAL MEDICINE

## 2018-10-13 PROCEDURE — 36415 COLL VENOUS BLD VENIPUNCTURE: CPT

## 2018-10-13 RX ORDER — ISOSORBIDE DINITRATE 20 MG/1
20 TABLET ORAL 3 TIMES DAILY
Status: DISCONTINUED | OUTPATIENT
Start: 2018-10-13 | End: 2018-10-21 | Stop reason: HOSPADM

## 2018-10-13 RX ADMIN — AZELASTINE HYDROCHLORIDE 137 MCG: 137 SPRAY, METERED NASAL at 09:10

## 2018-10-13 RX ADMIN — GABAPENTIN 300 MG: 300 CAPSULE ORAL at 09:10

## 2018-10-13 RX ADMIN — FLUTICASONE PROPIONATE 1 PUFF: 220 AEROSOL, METERED RESPIRATORY (INHALATION) at 07:10

## 2018-10-13 RX ADMIN — SODIUM CHLORIDE: 0.9 INJECTION, SOLUTION INTRAVENOUS at 09:10

## 2018-10-13 RX ADMIN — METOPROLOL TARTRATE 100 MG: 50 TABLET ORAL at 09:10

## 2018-10-13 RX ADMIN — DOCUSATE SODIUM AND SENNOSIDES 1 TABLET: 8.6; 5 TABLET, FILM COATED ORAL at 09:10

## 2018-10-13 RX ADMIN — GABAPENTIN 300 MG: 300 CAPSULE ORAL at 02:10

## 2018-10-13 RX ADMIN — BIMATOPROST 1 DROP: 0.1 SOLUTION/ DROPS OPHTHALMIC at 09:10

## 2018-10-13 RX ADMIN — GABAPENTIN 300 MG: 300 CAPSULE ORAL at 08:10

## 2018-10-13 RX ADMIN — CLONIDINE HYDROCHLORIDE 0.3 MG: 0.1 TABLET ORAL at 08:10

## 2018-10-13 RX ADMIN — CETIRIZINE HYDROCHLORIDE 10 MG: 10 TABLET, FILM COATED ORAL at 08:10

## 2018-10-13 RX ADMIN — CLOPIDOGREL BISULFATE 75 MG: 75 TABLET ORAL at 08:10

## 2018-10-13 RX ADMIN — HYDRALAZINE HYDROCHLORIDE 100 MG: 25 TABLET ORAL at 05:10

## 2018-10-13 RX ADMIN — MOXIFLOXACIN HYDROCHLORIDE 400 MG: 400 TABLET, FILM COATED ORAL at 08:10

## 2018-10-13 RX ADMIN — OXYCODONE HYDROCHLORIDE 5 MG: 5 SOLUTION ORAL at 09:10

## 2018-10-13 RX ADMIN — CLONIDINE HYDROCHLORIDE 0.3 MG: 0.1 TABLET ORAL at 02:10

## 2018-10-13 RX ADMIN — HYDRALAZINE HYDROCHLORIDE 100 MG: 25 TABLET ORAL at 02:10

## 2018-10-13 RX ADMIN — ISOSORBIDE DINITRATE 20 MG: 20 TABLET ORAL at 09:10

## 2018-10-13 RX ADMIN — DOCUSATE SODIUM AND SENNOSIDES 1 TABLET: 8.6; 5 TABLET, FILM COATED ORAL at 08:10

## 2018-10-13 RX ADMIN — ISOSORBIDE DINITRATE 20 MG: 20 TABLET ORAL at 02:10

## 2018-10-13 RX ADMIN — METOPROLOL TARTRATE 100 MG: 50 TABLET ORAL at 08:10

## 2018-10-13 RX ADMIN — AZELASTINE HYDROCHLORIDE 137 MCG: 137 SPRAY, METERED NASAL at 08:10

## 2018-10-13 RX ADMIN — ASPIRIN 81 MG: 81 TABLET, COATED ORAL at 08:10

## 2018-10-13 RX ADMIN — CLONIDINE HYDROCHLORIDE 0.3 MG: 0.1 TABLET ORAL at 09:10

## 2018-10-13 RX ADMIN — HYDRALAZINE HYDROCHLORIDE 100 MG: 25 TABLET ORAL at 09:10

## 2018-10-13 NOTE — PLAN OF CARE
10/13/18 1547   Discharge Reassessment   Assessment Type Discharge Planning Reassessment   Darell with [facility] met with family who would like pt to go in patient hospice at [facility] once PEG placed.  Darell agreed that pt would be appropriate for inpatient.  TN notified Dr Mello.

## 2018-10-13 NOTE — PROGRESS NOTES
Received bedside report. Patient awake resting quietly, family at bedside, IV infusing & NG tube intact & infusing, telemetry monitoring in place, no distress noted. Safety maintained, [employer] at bedside

## 2018-10-14 LAB
ANION GAP SERPL CALC-SCNC: 5 MMOL/L
BUN SERPL-MCNC: 21 MG/DL
CALCIUM SERPL-MCNC: 8.8 MG/DL
CHLORIDE SERPL-SCNC: 113 MMOL/L
CO2 SERPL-SCNC: 25 MMOL/L
CREAT SERPL-MCNC: 0.8 MG/DL
EST. GFR  (AFRICAN AMERICAN): >60 ML/MIN/1.73 M^2
EST. GFR  (NON AFRICAN AMERICAN): >60 ML/MIN/1.73 M^2
GLUCOSE SERPL-MCNC: 116 MG/DL
POCT GLUCOSE: 142 MG/DL (ref 70–110)
POTASSIUM SERPL-SCNC: 4.1 MMOL/L
SODIUM SERPL-SCNC: 143 MMOL/L

## 2018-10-14 PROCEDURE — 63600175 PHARM REV CODE 636 W HCPCS: Performed by: HOSPITALIST

## 2018-10-14 PROCEDURE — 25000003 PHARM REV CODE 250: Performed by: HOSPITALIST

## 2018-10-14 PROCEDURE — 94761 N-INVAS EAR/PLS OXIMETRY MLT: CPT

## 2018-10-14 PROCEDURE — 21400001 HC TELEMETRY ROOM

## 2018-10-14 PROCEDURE — 80048 BASIC METABOLIC PNL TOTAL CA: CPT

## 2018-10-14 PROCEDURE — 25000003 PHARM REV CODE 250: Performed by: INTERNAL MEDICINE

## 2018-10-14 PROCEDURE — 94640 AIRWAY INHALATION TREATMENT: CPT

## 2018-10-14 RX ORDER — PANTOPRAZOLE SODIUM 40 MG/1
40 TABLET, DELAYED RELEASE ORAL DAILY
Status: DISCONTINUED | OUTPATIENT
Start: 2018-10-14 | End: 2018-10-21 | Stop reason: HOSPADM

## 2018-10-14 RX ADMIN — PANTOPRAZOLE SODIUM 40 MG: 40 TABLET, DELAYED RELEASE ORAL at 11:10

## 2018-10-14 RX ADMIN — METOPROLOL TARTRATE 100 MG: 50 TABLET ORAL at 08:10

## 2018-10-14 RX ADMIN — AZELASTINE HYDROCHLORIDE 137 MCG: 137 SPRAY, METERED NASAL at 08:10

## 2018-10-14 RX ADMIN — DOCUSATE SODIUM AND SENNOSIDES 1 TABLET: 8.6; 5 TABLET, FILM COATED ORAL at 08:10

## 2018-10-14 RX ADMIN — HYDRALAZINE HYDROCHLORIDE 100 MG: 25 TABLET ORAL at 10:10

## 2018-10-14 RX ADMIN — BIMATOPROST 1 DROP: 0.1 SOLUTION/ DROPS OPHTHALMIC at 08:10

## 2018-10-14 RX ADMIN — CETIRIZINE HYDROCHLORIDE 10 MG: 10 TABLET, FILM COATED ORAL at 08:10

## 2018-10-14 RX ADMIN — FLUTICASONE PROPIONATE 1 PUFF: 220 AEROSOL, METERED RESPIRATORY (INHALATION) at 08:10

## 2018-10-14 RX ADMIN — SODIUM CHLORIDE: 0.9 INJECTION, SOLUTION INTRAVENOUS at 05:10

## 2018-10-14 RX ADMIN — ISOSORBIDE DINITRATE 20 MG: 20 TABLET ORAL at 03:10

## 2018-10-14 RX ADMIN — HYDRALAZINE HYDROCHLORIDE 100 MG: 25 TABLET ORAL at 03:10

## 2018-10-14 RX ADMIN — MOXIFLOXACIN HYDROCHLORIDE 400 MG: 400 TABLET, FILM COATED ORAL at 08:10

## 2018-10-14 RX ADMIN — ASPIRIN 81 MG: 81 TABLET, COATED ORAL at 08:10

## 2018-10-14 RX ADMIN — ISOSORBIDE DINITRATE 20 MG: 20 TABLET ORAL at 08:10

## 2018-10-14 RX ADMIN — CLONIDINE HYDROCHLORIDE 0.3 MG: 0.1 TABLET ORAL at 08:10

## 2018-10-14 RX ADMIN — SODIUM CHLORIDE: 0.9 INJECTION, SOLUTION INTRAVENOUS at 07:10

## 2018-10-14 RX ADMIN — AZELASTINE HYDROCHLORIDE 137 MCG: 137 SPRAY, METERED NASAL at 10:10

## 2018-10-14 RX ADMIN — HYDRALAZINE HYDROCHLORIDE 100 MG: 25 TABLET ORAL at 05:10

## 2018-10-14 RX ADMIN — CLONIDINE HYDROCHLORIDE 0.3 MG: 0.1 TABLET ORAL at 03:10

## 2018-10-14 RX ADMIN — GABAPENTIN 300 MG: 300 CAPSULE ORAL at 08:10

## 2018-10-14 RX ADMIN — OXYCODONE HYDROCHLORIDE 5 MG: 5 SOLUTION ORAL at 04:10

## 2018-10-14 RX ADMIN — GABAPENTIN 300 MG: 300 CAPSULE ORAL at 03:10

## 2018-10-14 RX ADMIN — CLOPIDOGREL BISULFATE 75 MG: 75 TABLET ORAL at 08:10

## 2018-10-14 NOTE — ASSESSMENT & PLAN NOTE
Etiology still unclear, but PEA reported by first repsonders, quick ROSC with one round of epinephrine  Pt intubated on sedation and will have wean sedation for appropriate neurological exam, consult neurology if indicated   ECHO  -  +DD  Pacemaker interrogated   [facility] - 10/9- stents RCA are patent. No intervention. Med therapy only

## 2018-10-14 NOTE — PLAN OF CARE
Problem: Patient Care Overview  Goal: Plan of Care Review   10/14/18 0237   Coping/Psychosocial   Plan Of Care Reviewed With patient

## 2018-10-14 NOTE — PLAN OF CARE
Problem: Coping, Compromised Individual (Adult,Obstetrics,Pediatric)  Intervention: Support/Enhance Coping Strategies   10/14/18 0230   Coping/Psychosocial Interventions   Supportive Measures positive reinforcement provided;relaxation techniques promoted;verbalization of feelings encouraged   Environmental Support calm environment promoted;distractions minimized;caregiver consistency promoted;environmental consistency promoted;personal routine supported;rest periods encouraged;rooming-in facilitated   Psychosocial Support   Family/Support System Care caregiver stress acknowledged;presence promoted;involvement promoted;support provided

## 2018-10-14 NOTE — PROGRESS NOTES
[facility] Hospital Medicine  Progress Note    Patient Name: Cindy Lyman  MRN: [id number]  Patient Class: IP- Inpatient   Admission Date: 9/30/2018  Length of Stay: 14 days  Attending Physician: Viviane Mello MD  Primary Care Provider: Rodger Camarena MD        Subjective:     Principal Problem:Cardiac arrest    HPI:  81 yo female with diastolic CHF, COPD, gastric cancer, aortic stenosis with complete heart block and s/p TAVR and pacemaker placed 7/2018 presented in cardiac arrest. Per ED notes, patient was on her way to [place] with others and c/o chest pain. CPR was initiated in parked vehicle outside ED. One round of epinephrine given and got ROSC. Per family, she c/o worsening SOB over several days. On arrival pt was euthermic, hypertensive, elevated lactate and BNP. CXR suggest pulmonary edema though infection cannot be ruled out. Pt intubated and evaluated by pulmonology. Cardiology consulted. ECHO pending. Broad spectrum antibiotics initiated until cultures result and clinical course dictates.     Chest CTA negative for pulmonary emboli.     Per family, Pt is a full code.     Hospital Course:  Pt admitted after PEA arrest and acute respiratory failure/acidosis. Acidosis corrected. Pulmonology assisting with vent management/weaning. Trial of precedex today but patient did not tolerate. Switched back to propofol. CPAP this am and now back on PVRC. Pacemaker interrogated and appears to be functioning fine. Diuresed well on lasix without change in renal function. Possible plan for LHC per cardiology. Extubated on 10/2. Initially slow to respond, but mental status improving.  Planned LHC on 10/4 held secondary to rise in Creat.  Lasix stopped and getting IVF hydration.  Episodes of slight hypotension and BP medications adjusted.  Worsening urine output.  Urine also bloody.  Urology consulted.  Improving urine output and Creat.  10/7: Improving Creat.  Patient more drowsy and less  responsive.  Spiked fever overnight.  Had to be placed on Cardene gtt secondary to poorly controlled HTN. Weaned off Cardene gtt.  Possible aspiration pneumonitis and started on Avelox (10/7)  The patient went for LHC on 10/9- no intervention. Patent stents. Med management only.  Palliative care consulted. PT/OT were consulted and recommended SNF on discharge. The patient was sent to the floor on 10/10.    10/11 - failed swallow eval; palliative care consulted +/- peg tube, receiving NGT feeds  10/12- met with two daughters and they agree to inpatient hospice, they do not have 24 hour support for home hospice, d/w Carla Arriaga, ANDRÉS.  Will try to meet with a hospice group and family tomorrow and finalize plan.  +/- peg tube placement  10/13 - family and patient want peg tube placement; [facility] will accept to inpatient after peg tube placement     Interval History: pt BP elevated per nursing, increase medication; hospice here to discuss admission     Review of Systems   Unable to perform ROS: Dementia   Constitutional: Positive for chills.   Musculoskeletal: Positive for back pain.     Objective:     Vital Signs (Most Recent):  Temp: 98.5 °F (36.9 °C) (10/14/18 0733)  Pulse: 98 (10/14/18 0804)  Resp: (!) 21 (10/14/18 0804)  BP: (!) 227/104 (10/14/18 0733)  SpO2: 96 % (10/14/18 0804) Vital Signs (24h Range):  Temp:  [97.3 °F (36.3 °C)-98.9 °F (37.2 °C)] 98.5 °F (36.9 °C)  Pulse:  [69-98] 98  Resp:  [16-24] 21  SpO2:  [95 %-99 %] 96 %  BP: (123-227)/() 227/104     Weight: 95.7 kg (210 lb 15.7 oz)  Body mass index is 35.11 kg/m².    Intake/Output Summary (Last 24 hours) at 10/14/2018 0919  Last data filed at 10/14/2018 0600  Gross per 24 hour   Intake 2154 ml   Output --   Net 2154 ml      Physical Exam   Constitutional: She appears well-developed and well-nourished.   HENT:   Head: Normocephalic and atraumatic.   Cardiovascular: Normal rate and regular rhythm.   Pulmonary/Chest: Breath sounds normal. No  stridor. She has no wheezes. She has no rales.   Abdominal: Soft. Bowel sounds are normal. She exhibits no distension. There is no tenderness. There is no guarding.   Neurological: She is alert.   Vitals reviewed.      Significant Labs: All pertinent labs within the past 24 hours have been reviewed.    Significant Imaging: I have reviewed and interpreted all pertinent imaging results/findings within the past 24 hours.    Assessment/Plan:      * Cardiac arrest    Etiology still unclear, but PEA reported by first repsonders, quick ROSC with one round of epinephrine  Pt intubated on sedation and will have wean sedation for appropriate neurological exam, consult neurology if indicated   ECHO  -  +DD  Pacemaker interrogated   [facility] - 10/9- stents RCA are patent. No intervention. Med therapy only           Acute hypoxemic respiratory failure    Chest CTA r/o PE, likely due to decompensated CHF and possible underlying infection  Pulmonology consulted for vent management  Cardiology consulted - [facility] :  · Left ventricle ejection fraction is mildly decreased at 45%  · Left ventricle shows concentric remodeling.  · RV systolic function is normal.  · Left atrium is mildly dilated.  · Right atrium is moderately dilated.  · Tricuspid valve shows mild regurgitation.  · Grade II (moderate) left ventricular diastolic dysfunction consistent with pseudonormalization.  · LA pressure is elevated.   Cultures - negative, sent for resp cultures today   Appreciate Pulmonary input.  Extubated on 10/2.  Initially slow to respond, but improving.  Advance diet.  Less responsive on 10/7.  Spiked fever.  Possible aspiration pneumonitis.  Started Avelox.  Placed on Cardene drip for uncontrolled HTN.  Able to be weaned off.              Debility    Consulted PT/OT.   Will consult speech for swallow study. Continue NG tube feeds for now.     Proceed with hospice placement           Palliative care encounter    Pt and family wants peg tube  GI  consulted           Hematuria, gross    Appreciate Urology input.  Holding Lovenox.  Urine clearing.          Pacemaker    See above           Acute pulmonary edema    Pt given IV lasix with large amount of diuresis  CXr improving   Holding Lasix secondary to rise in Creat.            S/P TAVR (transcatheter aortic valve replacement)    No acute issues           Stage 3 chronic kidney disease    No indication for vasopressor or aggressive IVF  Monitor closely on IV lasix   Avoid nephrotoxins   ARF--holding Lasix and giving IVF hydration.  Creat stable, but urine output poor.  Nephrology consult.  Hematuria--hold Lovenox.  Consulted Urology.  Improving urine output and Creat  IV fluids today           Essential hypertension    BP uncontrolled  Patient on home Clonidine that was stopped during this admit.  Possible rebound HTN.  Restarted home Clonidine.  Now on Cardene gtt.            CAD s/p PCI    Aspirin, plavix and statin resumed   Cardiology following  Elevated troponin after cardiopulmonary arrest          Anemia of chronic disease    Monitor  Reviewed bone marrow biopsy 2017 - no malignancy   H/H low- may need transfusion by 10/11. Heme test stools. Will d/c asa and lovenox for now.           Hyperlipidemia    Resume statin             VTE Risk Mitigation (From admission, onward)        Ordered     Place sequential compression device  Until discontinued      10/05/18 1249     Place LAUREANO hose  Until discontinued      10/05/18 1249     IP VTE HIGH RISK PATIENT  Once      09/30/18 1359              Viviane Mello MD  Department of Hospital Medicine   [facility]

## 2018-10-14 NOTE — PROGRESS NOTES
5:04pm- Dr. Mello called/paged about NG removal. Awaiting call back.    5:08- Dr. Mello paged back, okay to replace NG and get x-ray for placement

## 2018-10-14 NOTE — CONSULTS
Reason for Consult:      Requested to see the patient for PEG placement    HPI:    Pt is a 82 y.o. year old female was admitted following cardiac arrest and CPR.  Patient has been getting tube feeding and we are requested to see her for PEG placement.  Hospice was consulted but family has not decided on it yet. No family at the bedside.    Past Medical History:   Diagnosis Date    Anemia     Anticoagulant long-term use     Aortic stenosis     Arthritis     lower back    Asthma     CAD (coronary artery disease) 4/24/2015    Carpal tunnel syndrome on both sides     Cataract     CHF (congestive heart failure) 5/2013    COPD (chronic obstructive pulmonary disease)     Depression     DVT (deep venous thrombosis)     Hyperlipidemia     Hypertension     Pulmonary HTN     TMJ (temporomandibular joint disorder)        Past Surgical History:   Procedure Laterality Date    A-V CARDIAC PACEMAKER INSERTION N/A 7/5/2018    Procedure: INSERTION, CARDIAC PACEMAKER, DUAL CHAMBER;  Surgeon: Giancarlo Houser MD;  Location: [facility] CATH LAB;  Service: Cardiology;  Laterality: N/A;    AORTIC VALVE REPLACEMENT N/A 7/5/2018    Procedure: Replacement-valve-aortic;  Surgeon: Corey Castañeda MD;  Location: [facility] CATH LAB;  Service: Cardiology;  Laterality: N/A;    BACK SURGERY      BIOPSY-BONE MARROW N/A 5/26/2016    Performed by Rufino Ovalle MD at [facility] ENDO    cardiac stents      CARDIAC VALVE SURGERY      CARPAL TUNNEL RELEASE      Right/Left    CHOLECYSTECTOMY-LAPAROSCOPIC W/ CHOLANGIOGRAM N/A 7/13/2017    Performed by Luis Carlos Jarvis MD at [facility] OR    EYE SURGERY      cataract extraction    HYSTERECTOMY      Partial    INSERTION, CARDIAC PACEMAKER, DUAL CHAMBER N/A 7/5/2018    Performed by Giancarlo Houser MD at [facility] CATH LAB    JOINT REPLACEMENT  2009    Left hip    Left heart cath Left 10/9/2018    Performed by Tony Carmen MD at [facility] CATH LAB    LEFT HEART CATHETERIZATION Left 10/9/2018     Procedure: Left heart cath;  Surgeon: Tony Carmen MD;  Location: [facility] CATH LAB;  Service: Cardiology;  Laterality: Left;    POLYPECTOMY      x9    Replacement-valve-aortic N/A 7/5/2018    Performed by [facility]y Cast[facility]eda MD at [facility] CATH LAB    TEMPOROMANDIBULAR JOINT SURGERY      ULTRASOUND-ENDOSCOPIC-UPPER N/A 4/25/2018    Performed by Socrates Andrew MD at [facility] ENDO (2ND FLR)       Review of patient's allergies indicates:   Allergen Reactions    Doxycycline hyclate Diarrhea and Nausea And Vomiting    Singulair [montelukast]      Stomach pain, Muscle pain, Cough      Penicillins      Other reaction(s): Anaphylaxis    Ventolin [albuterol sulfate] Other (See Comments)     Severely elevated blood pressure    Latex, natural rubber Rash       No current facility-administered medications on file prior to encounter.      Current Outpatient Medications on File Prior to Encounter   Medication Sig Dispense Refill    acetaminophen (TYLENOL) 325 MG tablet Take 2 tablets (650 mg total) by mouth every 6 (six) hours as needed for Pain or Temperature greater than (100.4).  0    aspirin (ECOTRIN) 81 MG EC tablet Take 81 mg by mouth once daily.      azelastine (ASTELIN) 137 mcg (0.1 %) nasal spray 1 spray (137 mcg total) by Nasal route 2 (two) times daily. 30 mL 0    carBAMazepine (TEGRETOL) 200 mg tablet Take 1 tablet (200 mg total) by mouth 3 (three) times daily. 270 tablet 1    cetirizine (ZYRTEC) 10 MG tablet Take 1 tablet (10 mg total) by mouth once daily.  0    cloNIDine (CATAPRES) 0.3 MG tablet Take 1 tablet (0.3 mg total) by mouth 3 (three) times daily. 270 tablet 1    clopidogrel (PLAVIX) 75 mg tablet Take 1 tablet (75 mg total) by mouth once daily. 30 tablet 11    fluticasone (FLONASE) 50 mcg/actuation nasal spray TWO SPRAYS IN EACH NOSTRIL ONCE DAILY 16 g 5    fluticasone (FLOVENT HFA) 220 mcg/actuation inhaler Inhale 1 puff into the lungs 2 (two) times daily. Controller 12 g 2    furosemide  (LASIX) 40 MG tablet TAKE ONE TABLET BY MOUTH EVERY DAY AS NEEDED FOR SHORTNESS OF BREATH or leg swelling 90 tablet 1    gabapentin (NEURONTIN) 300 MG capsule ONE CAPSULE BY MOUTH THREE TIMES DAILY 270 capsule 0    hydrALAZINE (APRESOLINE) 100 MG tablet ONE TABLET BY MOUTH THREE TIMES DAILY 270 tablet 0    metoprolol tartrate (LOPRESSOR) 100 MG tablet ONE TABLET BY MOUTH TWICE DAILY 180 tablet 1    nitroGLYCERIN (NITROSTAT) 0.4 MG SL tablet Place 0.4 mg under the tongue every 5 (five) minutes as needed for Chest pain.      rosuvastatin (CRESTOR) 20 MG tablet ONE TABLET BY MOUTH EVERY EVENING 90 tablet 1    verapamil (VERELAN) 360 MG C24P ONE CAPSULE BY MOUTH once a day 90 capsule 3    walker (ULTRA-LIGHT ROLLATOR) Misc One rollator, size large. 1 each 0     Scheduled Meds:   aspirin  81 mg Per NG tube Daily    azelastine  1 spray Nasal BID    bimatoprost  1 drop Both Eyes QHS    cetirizine  10 mg Oral Daily    cloNIDine  0.3 mg Oral TID    clopidogrel  75 mg Per NG tube Daily    fluticasone  1 puff Inhalation BID    gabapentin  300 mg Oral TID    hydrALAZINE  100 mg Per NG tube Q8H    isosorbide dinitrate  20 mg Per NG tube TID    metoprolol tartrate  100 mg Per NG tube BID    moxifloxacin  400 mg Per NG tube Daily    senna-docusate 8.6-50 mg  1 tablet Oral BID     Continuous Infusions:   sodium chloride 0.9% 100 mL/hr at 10/14/18 0724     PRN Meds:.acetaminophen, guaifenesin 100 mg/5 ml, influenza, labetalol, nitroGLYCERIN, ondansetron, oxyCODONE, polyethylene glycol, varibar nectar Ba Sulfate, varibar pudding Ba Sulfate, varibar thin liquid ba sulfate    Social History     Socioeconomic History    Marital status:      Spouse name: Not on file    Number of children: Not on file    Years of education: Not on file    Highest education level: Not on file   Social Needs    Financial resource strain: Not on file    Food insecurity - worry: Not on file    Food insecurity - inability:  Not on file    Transportation needs - medical: Not on file    Transportation needs - non-medical: Not on file   Occupational History    Not on file   Tobacco Use    Smoking status: Never Smoker    Smokeless tobacco: Never Used   Substance and Sexual Activity    Alcohol use: No    Drug use: No    Sexual activity: No   Other Topics Concern    Not on file   Social History Narrative    Not on file       Family history:  Family History   Problem Relation Age of Onset    Heart disease Mother     Hypertension Daughter     Cancer Son     Breast cancer Neg Hx     Colon cancer Neg Hx     Ovarian cancer Neg Hx     Esophageal cancer Neg Hx          Endoscopic History:  There is a case request for EGD on 9/7/2018 in chart but I could not find the report.    Review of Systems -     Could not be done as the patient is not responsive.    Physical Exam -    General: Well developed, well nourished, no apparent distress  Vital Signs Range (Last 24H):  Temp:  [97.3 °F (36.3 °C)-98.9 °F (37.2 °C)]   Pulse:  [69-98]   Resp:  [16-24]   BP: (123-227)/()   SpO2:  [95 %-99 %]   HEENT: Anicteric, PERRLA  CVS: S1, S2, no murmers/rubs  Lungs: CTA-B, normal excursion  Abdomen: Soft, NT, ND, normal BS's  Extremities: No c/c/e, 1+ DP pulses to BLE's  Skin: No rashes/lesions.    Labs:  Recent Labs   Lab  10/14/18   0529   CALCIUM  8.8   NA  143   K  4.1   CO2  25   CL  113*   BUN  21   CREATININE  0.8     Recent Results (from the past 336 hour(s))   CBC auto differential    Collection Time: 10/10/18  2:15 AM   Result Value Ref Range    WBC 6.08 3.90 - 12.70 K/uL    Hemoglobin 7.0 (L) 12.0 - 16.0 g/dL    Hematocrit 21.6 (L) 37.0 - 48.5 %    Platelets 255 150 - 350 K/uL   CBC auto differential    Collection Time: 10/09/18  2:45 AM   Result Value Ref Range    WBC 8.89 3.90 - 12.70 K/uL    Hemoglobin 7.9 (L) 12.0 - 16.0 g/dL    Hematocrit 24.1 (L) 37.0 - 48.5 %    Platelets 298 150 - 350 K/uL   CBC auto differential    Collection  Time: 10/08/18  4:20 AM   Result Value Ref Range    WBC 7.60 3.90 - 12.70 K/uL    Hemoglobin 7.7 (L) 12.0 - 16.0 g/dL    Hematocrit 23.9 (L) 37.0 - 48.5 %    Platelets 246 150 - 350 K/uL     No results for input(s): PT, INR, APTT in the last 24 hours.    Imaging:  Modified barium swallow:    Unfortunately the patient was limited mobility.  The soft tissues of the shoulders are superimposed over the hypopharyngeal and larynx.  Varying consistencies of barium were ingested by the patient.  There was significant vallecular pooling as well as some pooling in the piriform sinuses.  Penetration occurred though no convincing aspiration allowing for the limited visibility.  Please see speech pathology report for details.  NG tube noted.    Assessment: S/P cardiac arrest and CPR  Needs PEG for nutrition  Anemia  Patient Active Problem List   Diagnosis    Hyperlipidemia    Depression    Bilateral cataracts    Pulmonary hypertension    TMJ (temporomandibular joint disorder)    Carpal tunnel syndrome on both sides    Nonrheumatic aortic valve stenosis    Anemia of chronic disease    CAD s/p PCI    Presence of drug coated stent in LAD coronary artery    Right leg DVT    Class 1 obesity with serious comorbidity and body mass index (BMI) of 32.0 to 32.9 in adult    DVT (deep venous thrombosis)    Uncomplicated asthma    Coronary artery disease involving native coronary artery without angina pectoris    Long term (current) use of anticoagulants [V58.61]    Anemia    Essential hypertension    Vitamin D deficiency    Right sciatic nerve pain    Chronic diastolic heart failure    Stage 3 chronic kidney disease    Obesity hypoventilation syndrome    Hyperdense renal cyst    Gastric polyp    Nodular calcific aortic valve stenosis    Bifascicular block    Coronary artery disease due to lipid rich plaque    S/P TAVR (transcatheter aortic valve replacement)    Complete heart block    Atherosclerosis of aorta     Acute pulmonary edema    Cardiac arrest    Acute hypoxemic respiratory failure    Pacemaker    Hematuria, gross    Palliative care encounter    Debility       Recommendations:  1. Patient is on Clopidogrel.  Need to be off Clopidogrel for five days before PEG can be done to avoid bleeding.  Will follow with you.  No gross GI bleeding but the patient has significant anemia.  I will add pantoprazole 40 mg daily.      I would like to take this opportunity to thank you for this consult.  If you have any questions or concerns, please do not hesitate to contact me.       Chirag Aguilar MD

## 2018-10-14 NOTE — SUBJECTIVE & OBJECTIVE
Interval History: pt BP elevated per nursing, increase medication; hospice here to discuss admission     Review of Systems   Unable to perform ROS: Dementia   Constitutional: Positive for chills.   Musculoskeletal: Positive for back pain.     Objective:     Vital Signs (Most Recent):  Temp: 98.5 °F (36.9 °C) (10/14/18 0733)  Pulse: 98 (10/14/18 0804)  Resp: (!) 21 (10/14/18 0804)  BP: (!) 227/104 (10/14/18 0733)  SpO2: 96 % (10/14/18 0804) Vital Signs (24h Range):  Temp:  [97.3 °F (36.3 °C)-98.9 °F (37.2 °C)] 98.5 °F (36.9 °C)  Pulse:  [69-98] 98  Resp:  [16-24] 21  SpO2:  [95 %-99 %] 96 %  BP: (123-227)/() 227/104     Weight: 95.7 kg (210 lb 15.7 oz)  Body mass index is 35.11 kg/m².    Intake/Output Summary (Last 24 hours) at 10/14/2018 0919  Last data filed at 10/14/2018 0600  Gross per 24 hour   Intake 2154 ml   Output --   Net 2154 ml      Physical Exam   Constitutional: She appears well-developed and well-nourished.   HENT:   Head: Normocephalic and atraumatic.   Cardiovascular: Normal rate and regular rhythm.   Pulmonary/Chest: Breath sounds normal. No stridor. She has no wheezes. She has no rales.   Abdominal: Soft. Bowel sounds are normal. She exhibits no distension. There is no tenderness. There is no guarding.   Neurological: She is alert.   Vitals reviewed.      Significant Labs: All pertinent labs within the past 24 hours have been reviewed.    Significant Imaging: I have reviewed and interpreted all pertinent imaging results/findings within the past 24 hours.

## 2018-10-14 NOTE — PLAN OF CARE
Problem: Pressure Ulcer Risk (Villa Scale) (Adult,Obstetrics,Pediatric)  Intervention: Promote/Optimize Nutrition   10/14/18 1257   Nutrition Interventions   Oral Nutrition Promotion rest periods promoted     Intervention: Maintain Head of Bed Elevation Less Than 30 Degrees as Tolerated   10/14/18 1257   Positioning   Head of Bed (HOB) HOB at 60 degrees       Goal: Identify Related Risk Factors and Signs and Symptoms  Related risk factors and signs and symptoms are identified upon initiation of Human Response Clinical Practice Guideline (CPG)  Outcome: Ongoing (interventions implemented as appropriate)   10/14/18 1257   Pressure Ulcer Risk (Villa Scale)   Related Risk Factors (Pressure Ulcer Risk (Villa Scale)) body weight extremes;age extremes;mobility impaired;nutritional deficiencies     Goal: Skin Integrity  Patient will demonstrate the desired outcomes by discharge/transition of care.  Outcome: Ongoing (interventions implemented as appropriate)   10/14/18 1257   Pressure Ulcer Risk (Villa Scale) (Adult,Obstetrics,Pediatric)   Skin Integrity making progress toward outcome

## 2018-10-14 NOTE — PROGRESS NOTES
Patient aaox2-3. C/O of being cold and would like to be repositioned, with wedge. IV fluids infusing continuously at 100ml per hour. NG tube feeding infusing at 50ml/hr to right nare. No signs of distress observed. Suctioned per janet several times. Frequent cough noted. Unna boots in place along with ryanne hose. No family at bedside at this time. Patient blood pressure elevated. scheduled blood pressure meds will be given. Call light in reach. Bed low and locked. Tele sitter near.  Will continue to monitor.

## 2018-10-15 LAB
ABO + RH BLD: NORMAL
ANION GAP SERPL CALC-SCNC: 5 MMOL/L
BASOPHILS # BLD AUTO: 0.03 K/UL
BASOPHILS NFR BLD: 0.5 %
BLD GP AB SCN CELLS X3 SERPL QL: NORMAL
BUN SERPL-MCNC: 24 MG/DL
CALCIUM SERPL-MCNC: 8.7 MG/DL
CHLORIDE SERPL-SCNC: 115 MMOL/L
CO2 SERPL-SCNC: 23 MMOL/L
CREAT SERPL-MCNC: 0.8 MG/DL
DIFFERENTIAL METHOD: ABNORMAL
EOSINOPHIL # BLD AUTO: 0.3 K/UL
EOSINOPHIL NFR BLD: 4.3 %
ERYTHROCYTE [DISTWIDTH] IN BLOOD BY AUTOMATED COUNT: 17.5 %
EST. GFR  (AFRICAN AMERICAN): >60 ML/MIN/1.73 M^2
EST. GFR  (NON AFRICAN AMERICAN): >60 ML/MIN/1.73 M^2
GLUCOSE SERPL-MCNC: 108 MG/DL
HCT VFR BLD AUTO: 21.3 %
HGB BLD-MCNC: 6.7 G/DL
LYMPHOCYTES # BLD AUTO: 1.1 K/UL
LYMPHOCYTES NFR BLD: 18.4 %
MCH RBC QN AUTO: 28.8 PG
MCHC RBC AUTO-ENTMCNC: 31.5 G/DL
MCV RBC AUTO: 91 FL
MONOCYTES # BLD AUTO: 0.6 K/UL
MONOCYTES NFR BLD: 9.8 %
NEUTROPHILS # BLD AUTO: 4 K/UL
NEUTROPHILS NFR BLD: 66.7 %
PLATELET # BLD AUTO: 354 K/UL
PMV BLD AUTO: 11 FL
POTASSIUM SERPL-SCNC: 4.3 MMOL/L
RBC # BLD AUTO: 2.33 M/UL
SODIUM SERPL-SCNC: 143 MMOL/L
WBC # BLD AUTO: 6.04 K/UL

## 2018-10-15 PROCEDURE — 36430 TRANSFUSION BLD/BLD COMPNT: CPT

## 2018-10-15 PROCEDURE — P9021 RED BLOOD CELLS UNIT: HCPCS

## 2018-10-15 PROCEDURE — 21400001 HC TELEMETRY ROOM

## 2018-10-15 PROCEDURE — 25000003 PHARM REV CODE 250: Performed by: INTERNAL MEDICINE

## 2018-10-15 PROCEDURE — 25000003 PHARM REV CODE 250: Performed by: HOSPITALIST

## 2018-10-15 PROCEDURE — 80048 BASIC METABOLIC PNL TOTAL CA: CPT

## 2018-10-15 PROCEDURE — 85025 COMPLETE CBC W/AUTO DIFF WBC: CPT

## 2018-10-15 PROCEDURE — 86850 RBC ANTIBODY SCREEN: CPT

## 2018-10-15 PROCEDURE — 94761 N-INVAS EAR/PLS OXIMETRY MLT: CPT

## 2018-10-15 PROCEDURE — 94640 AIRWAY INHALATION TREATMENT: CPT

## 2018-10-15 PROCEDURE — 36415 COLL VENOUS BLD VENIPUNCTURE: CPT

## 2018-10-15 PROCEDURE — 63600175 PHARM REV CODE 636 W HCPCS: Performed by: HOSPITALIST

## 2018-10-15 PROCEDURE — 86920 COMPATIBILITY TEST SPIN: CPT

## 2018-10-15 RX ORDER — HYDROCODONE BITARTRATE AND ACETAMINOPHEN 500; 5 MG/1; MG/1
TABLET ORAL
Status: DISCONTINUED | OUTPATIENT
Start: 2018-10-15 | End: 2018-10-21 | Stop reason: HOSPADM

## 2018-10-15 RX ADMIN — PANTOPRAZOLE SODIUM 40 MG: 40 TABLET, DELAYED RELEASE ORAL at 09:10

## 2018-10-15 RX ADMIN — ISOSORBIDE DINITRATE 20 MG: 20 TABLET ORAL at 10:10

## 2018-10-15 RX ADMIN — ISOSORBIDE DINITRATE 20 MG: 20 TABLET ORAL at 02:10

## 2018-10-15 RX ADMIN — METOPROLOL TARTRATE 100 MG: 50 TABLET ORAL at 09:10

## 2018-10-15 RX ADMIN — GUAIFENESIN 200 MG: 200 SOLUTION ORAL at 09:10

## 2018-10-15 RX ADMIN — HYDRALAZINE HYDROCHLORIDE 100 MG: 25 TABLET ORAL at 05:10

## 2018-10-15 RX ADMIN — CLONIDINE HYDROCHLORIDE 0.3 MG: 0.1 TABLET ORAL at 10:10

## 2018-10-15 RX ADMIN — GABAPENTIN 300 MG: 300 CAPSULE ORAL at 10:10

## 2018-10-15 RX ADMIN — CLONIDINE HYDROCHLORIDE 0.3 MG: 0.1 TABLET ORAL at 02:10

## 2018-10-15 RX ADMIN — CLONIDINE HYDROCHLORIDE 0.3 MG: 0.1 TABLET ORAL at 09:10

## 2018-10-15 RX ADMIN — DOCUSATE SODIUM AND SENNOSIDES 1 TABLET: 8.6; 5 TABLET, FILM COATED ORAL at 09:10

## 2018-10-15 RX ADMIN — AZELASTINE HYDROCHLORIDE 137 MCG: 137 SPRAY, METERED NASAL at 09:10

## 2018-10-15 RX ADMIN — CETIRIZINE HYDROCHLORIDE 10 MG: 10 TABLET, FILM COATED ORAL at 09:10

## 2018-10-15 RX ADMIN — AZELASTINE HYDROCHLORIDE 137 MCG: 137 SPRAY, METERED NASAL at 10:10

## 2018-10-15 RX ADMIN — HYDRALAZINE HYDROCHLORIDE 100 MG: 25 TABLET ORAL at 10:10

## 2018-10-15 RX ADMIN — METOPROLOL TARTRATE 100 MG: 50 TABLET ORAL at 10:10

## 2018-10-15 RX ADMIN — FLUTICASONE PROPIONATE 1 PUFF: 220 AEROSOL, METERED RESPIRATORY (INHALATION) at 08:10

## 2018-10-15 RX ADMIN — ISOSORBIDE DINITRATE 20 MG: 20 TABLET ORAL at 09:10

## 2018-10-15 RX ADMIN — BIMATOPROST 1 DROP: 0.1 SOLUTION/ DROPS OPHTHALMIC at 10:10

## 2018-10-15 RX ADMIN — GABAPENTIN 300 MG: 300 CAPSULE ORAL at 09:10

## 2018-10-15 RX ADMIN — OXYCODONE HYDROCHLORIDE 5 MG: 5 SOLUTION ORAL at 04:10

## 2018-10-15 RX ADMIN — MOXIFLOXACIN HYDROCHLORIDE 400 MG: 400 TABLET, FILM COATED ORAL at 09:10

## 2018-10-15 RX ADMIN — HYDRALAZINE HYDROCHLORIDE 100 MG: 25 TABLET ORAL at 01:10

## 2018-10-15 RX ADMIN — ASPIRIN 81 MG: 81 TABLET, COATED ORAL at 09:10

## 2018-10-15 RX ADMIN — DOCUSATE SODIUM AND SENNOSIDES 1 TABLET: 8.6; 5 TABLET, FILM COATED ORAL at 10:10

## 2018-10-15 RX ADMIN — GABAPENTIN 300 MG: 300 CAPSULE ORAL at 02:10

## 2018-10-15 NOTE — PT/OT/SLP PROGRESS
Speech Language Pathology      Cindy Lyman  MRN: [id number]    Patient not seen today secondary to Patient fatigue. Wet/gurgly breathy sounds, Pt with difficulty maintaining alertness at this time will continue to follow.    Marga Espinosa, ZULMA-SLP

## 2018-10-15 NOTE — SUBJECTIVE & OBJECTIVE
Interval History: awaiting peg tube placement off plavix    Review of Systems   Unable to perform ROS: Dementia   Constitutional: Positive for chills.   Musculoskeletal: Positive for back pain.     Objective:     Vital Signs (Most Recent):  Temp: 98.3 °F (36.8 °C) (10/15/18 0725)  Pulse: 73 (10/15/18 0843)  Resp: 17 (10/15/18 0843)  BP: (!) 178/79 (10/15/18 0725)  SpO2: 97 % (10/15/18 0843) Vital Signs (24h Range):  Temp:  [97.4 °F (36.3 °C)-98.7 °F (37.1 °C)] 98.3 °F (36.8 °C)  Pulse:  [69-80] 73  Resp:  [16-19] 17  SpO2:  [96 %-98 %] 97 %  BP: (146-241)/() 178/79     Weight: 101.9 kg (224 lb 10.4 oz)  Body mass index is 37.38 kg/m².    Intake/Output Summary (Last 24 hours) at 10/15/2018 1038  Last data filed at 10/14/2018 1825  Gross per 24 hour   Intake 1101.67 ml   Output --   Net 1101.67 ml      Physical Exam   Constitutional: She appears well-developed and well-nourished.   HENT:   Head: Normocephalic and atraumatic.   Cardiovascular: Normal rate and regular rhythm.   Pulmonary/Chest: Breath sounds normal. No stridor. She has no wheezes. She has no rales.   Abdominal: Soft. Bowel sounds are normal. She exhibits no distension. There is no tenderness. There is no guarding.   Neurological: She is alert.   Vitals reviewed.      Significant Labs: All pertinent labs within the past 24 hours have been reviewed.    Significant Imaging: I have reviewed and interpreted all pertinent imaging results/findings within the past 24 hours.

## 2018-10-15 NOTE — PROGRESS NOTES
Patient awake, alert, oriented resting comfortably, BRITNEY Nickerson. Report given to oncoming nurse. 12hour chart check complete.

## 2018-10-15 NOTE — PLAN OF CARE
Problem: Infection, Risk/Actual (Adult)  Goal: Identify Related Risk Factors and Signs and Symptoms  Related risk factors and signs and symptoms are identified upon initiation of Human Response Clinical Practice Guideline (CPG)   10/15/18 1202   Infection, Risk/Actual   Related Risk Factors (Infection, Risk/Actual) age extremes;chronic illness/condition;exposure to microbes;malnutrition   Signs and Symptoms (Infection, Risk/Actual) malaise     Goal: Infection Prevention/Resolution  Patient will demonstrate the desired outcomes by discharge/transition of care.   10/15/18 1202   Infection, Risk/Actual (Adult)   Infection Prevention/Resolution making progress toward outcome

## 2018-10-15 NOTE — NURSING
12 hour chart check completed;     Awaiting contact from lab as patient to receive 2 units RBC;     Will continue to f/u;

## 2018-10-15 NOTE — PLAN OF CARE
Problem: Fall Risk (Adult)  Intervention: Safety Promotion/Fall Prevention   10/14/18 1921   Safety Interventions   Safety Promotion/Fall Prevention assistive device/personal item within reach;bed alarm set;diversional activities provided;Fall Risk reviewed with patient/family;medications reviewed;lighting adjusted;room near unit station;/camera at bedside;side rails raised x 3;toileting scheduled;instructed to call staff for mobility

## 2018-10-15 NOTE — PROGRESS NOTES
The patient and nurse deny any new issues overnight.    Vitals:    10/14/18 2006 10/15/18 0008 10/15/18 0355 10/15/18 0455   BP: (!) 190/84 (!) 150/66 (!) 189/82 (!) 146/69   BP Location:  Right arm Right arm    Patient Position:  Lying Lying    Pulse: 70 69 77    Resp: 18 18 18    Temp:  97.8 °F (36.6 °C) 98.7 °F (37.1 °C)    TempSrc:  Oral Oral    SpO2: 96% 96% 97%    Weight:   101.9 kg (224 lb 10.4 oz)    Height:          aspirin  81 mg Per NG tube Daily    azelastine  1 spray Nasal BID    bimatoprost  1 drop Both Eyes QHS    cetirizine  10 mg Oral Daily    cloNIDine  0.3 mg Oral TID    clopidogrel  75 mg Per NG tube Daily    fluticasone  1 puff Inhalation BID    gabapentin  300 mg Oral TID    hydrALAZINE  100 mg Per NG tube Q8H    isosorbide dinitrate  20 mg Per NG tube TID    metoprolol tartrate  100 mg Per NG tube BID    moxifloxacin  400 mg Per NG tube Daily    pantoprazole  40 mg Oral Daily    senna-docusate 8.6-50 mg  1 tablet Oral BID     P.E.:  GEN: A x O x 3, NAD  HEENT: EOMI, PERRL, anicteric sclera  CV: RRR, no M/R/G  Chest: CTA B  Abdomen: soft, NTND, normoactive BS  Ext: No C/C/E. 2+ dorsalis pedis pulses B    Labs:  Recent Results (from the past 336 hour(s))   CBC auto differential    Collection Time: 10/10/18  2:15 AM   Result Value Ref Range    WBC 6.08 3.90 - 12.70 K/uL    Hemoglobin 7.0 (L) 12.0 - 16.0 g/dL    Hematocrit 21.6 (L) 37.0 - 48.5 %    Platelets 255 150 - 350 K/uL   CBC auto differential    Collection Time: 10/09/18  2:45 AM   Result Value Ref Range    WBC 8.89 3.90 - 12.70 K/uL    Hemoglobin 7.9 (L) 12.0 - 16.0 g/dL    Hematocrit 24.1 (L) 37.0 - 48.5 %    Platelets 298 150 - 350 K/uL   CBC auto differential    Collection Time: 10/08/18  4:20 AM   Result Value Ref Range    WBC 7.60 3.90 - 12.70 K/uL    Hemoglobin 7.7 (L) 12.0 - 16.0 g/dL    Hematocrit 23.9 (L) 37.0 - 48.5 %    Platelets 246 150 - 350 K/uL     CMP  Sodium   Date Value Ref Range Status   10/14/2018 143 136  - 145 mmol/L Final     Potassium   Date Value Ref Range Status   10/14/2018 4.1 3.5 - 5.1 mmol/L Final     Chloride   Date Value Ref Range Status   10/14/2018 113 (H) 95 - 110 mmol/L Final     CO2   Date Value Ref Range Status   10/14/2018 25 23 - 29 mmol/L Final     Glucose   Date Value Ref Range Status   10/14/2018 116 (H) 70 - 110 mg/dL Final     BUN, Bld   Date Value Ref Range Status   10/14/2018 21 8 - 23 mg/dL Final     Creatinine   Date Value Ref Range Status   10/14/2018 0.8 0.5 - 1.4 mg/dL Final     Calcium   Date Value Ref Range Status   10/14/2018 8.8 8.7 - 10.5 mg/dL Final     Total Protein   Date Value Ref Range Status   10/10/2018 5.6 (L) 6.0 - 8.4 g/dL Final     Albumin   Date Value Ref Range Status   10/10/2018 2.4 (L) 3.5 - 5.2 g/dL Final     Total Bilirubin   Date Value Ref Range Status   10/10/2018 0.3 0.1 - 1.0 mg/dL Final     Comment:     For infants and newborns, interpretation of results should be based  on gestational age, weight and in agreement with clinical  observations.  Premature Infant recommended reference ranges:  Up to 24 hours.............<8.0 mg/dL  Up to 48 hours............<12.0 mg/dL  3-5 days..................<15.0 mg/dL  6-29 days.................<15.0 mg/dL       Alkaline Phosphatase   Date Value Ref Range Status   10/10/2018 64 55 - 135 U/L Final     AST   Date Value Ref Range Status   10/10/2018 27 10 - 40 U/L Final     ALT   Date Value Ref Range Status   10/10/2018 13 10 - 44 U/L Final     Anion Gap   Date Value Ref Range Status   10/14/2018 5 (L) 8 - 16 mmol/L Final     eGFR if    Date Value Ref Range Status   10/14/2018 >60 >60 mL/min/1.73 m^2 Final     eGFR if non    Date Value Ref Range Status   10/14/2018 >60 >60 mL/min/1.73 m^2 Final     Comment:     Calculation used to obtain the estimated glomerular filtration  rate (eGFR) is the CKD-EPI equation.          No results for input(s): PT, INR, APTT in the last 24 hours.    A/P:  The  patient is an 82 year old woman with a history of HTN, CAD, CHF, aortic stenosis, pulmonary HTN, and deep vein thrombosis presenting s/p cardiac arrest with dysphagia.  1.  Dysphagia - plavix needs to be held for 5 days for PEG tube placement.  The nurse states this has been held since yesterday, although it is still listed on her medication list.  If it was held, a PEG tube can be placed on Friday.  A CBC will be checked as her last hemoglobin from a few days ago was 7.0.

## 2018-10-15 NOTE — PROGRESS NOTES
[facility] Hospital Medicine  Progress Note    Patient Name: Cindy Lyman  MRN: [id number]  Patient Class: IP- Inpatient   Admission Date: 9/30/2018  Length of Stay: 15 days  Attending Physician: Viviane Mello MD  Primary Care Provider: Rodger Camarena MD        Subjective:     Principal Problem:Cardiac arrest    HPI:  81 yo female with diastolic CHF, COPD, gastric cancer, aortic stenosis with complete heart block and s/p TAVR and pacemaker placed 7/2018 presented in cardiac arrest. Per ED notes, patient was on her way to [place] [place] with others and c/o chest pain. CPR was initiated in parked vehicle outside ED. One round of epinephrine given and got ROSC. Per family, she c/o worsening SOB over several days. On arrival pt was euthermic, hypertensive, elevated lactate and BNP. CXR suggest pulmonary edema though infection cannot be ruled out. Pt intubated and evaluated by pulmonology. Cardiology consulted. ECHO pending. Broad spectrum antibiotics initiated until cultures result and clinical course dictates.     Chest CTA negative for pulmonary emboli.     Per family, Pt is a full code.     Hospital Course:  Pt admitted after PEA arrest and acute respiratory failure/acidosis. Acidosis corrected. Pulmonology assisting with vent management/weaning. Trial of precedex today but patient did not tolerate. Switched back to propofol. CPAP this am and now back on PVRC. Pacemaker interrogated and appears to be functioning fine. Diuresed well on lasix without change in renal function. Possible plan for LHC per cardiology. Extubated on 10/2. Initially slow to respond, but mental status improving.  Planned LHC on 10/4 held secondary to rise in Creat.  Lasix stopped and getting IVF hydration.  Episodes of slight hypotension and BP medications adjusted.  Worsening urine output.  Urine also bloody.  Urology consulted.  Improving urine output and Creat.  10/7: Improving Creat.  Patient more drowsy and less  responsive.  Spiked fever overnight.  Had to be placed on Cardene gtt secondary to poorly controlled HTN. Weaned off Cardene gtt.  Possible aspiration pneumonitis and started on Avelox (10/7)  The patient went for LHC on 10/9- no intervention. Patent stents. Med management only.  Palliative care consulted. PT/OT were consulted and recommended SNF on discharge. The patient was sent to the floor on 10/10.    10/11 - failed swallow eval; palliative care consulted +/- peg tube, receiving NGT feeds  10/12- met with two daughters and they agree to inpatient hospice, they do not have 24 hour support for home hospice, d/w Carla Arriaga, ANDRÉS.  Will try to meet with a hospice group and family tomorrow and finalize plan.  +/- peg tube placement  10/13 - family and patient want peg tube placement; passages will accept to inpatient after peg tube placement   10/14- NGT dislodged and replaced by nursing, plavix on hold for peg tube placement next week, Passages awaiting peg tube placement for inpatient hospice     Interval History: awaiting peg tube placement off plavix    Review of Systems   Unable to perform ROS: Dementia   Constitutional: Positive for chills.   Musculoskeletal: Positive for back pain.     Objective:     Vital Signs (Most Recent):  Temp: 98.3 °F (36.8 °C) (10/15/18 0725)  Pulse: 73 (10/15/18 0843)  Resp: 17 (10/15/18 0843)  BP: (!) 178/79 (10/15/18 0725)  SpO2: 97 % (10/15/18 0843) Vital Signs (24h Range):  Temp:  [97.4 °F (36.3 °C)-98.7 °F (37.1 °C)] 98.3 °F (36.8 °C)  Pulse:  [69-80] 73  Resp:  [16-19] 17  SpO2:  [96 %-98 %] 97 %  BP: (146-241)/() 178/79     Weight: 101.9 kg (224 lb 10.4 oz)  Body mass index is 37.38 kg/m².    Intake/Output Summary (Last 24 hours) at 10/15/2018 1038  Last data filed at 10/14/2018 1825  Gross per 24 hour   Intake 1101.67 ml   Output --   Net 1101.67 ml      Physical Exam   Constitutional: She appears well-developed and well-nourished.   HENT:   Head: Normocephalic and  atraumatic.   Cardiovascular: Normal rate and regular rhythm.   Pulmonary/Chest: Breath sounds normal. No stridor. She has no wheezes. She has no rales.   Abdominal: Soft. Bowel sounds are normal. She exhibits no distension. There is no tenderness. There is no guarding.   Neurological: She is alert.   Vitals reviewed.      Significant Labs: All pertinent labs within the past 24 hours have been reviewed.    Significant Imaging: I have reviewed and interpreted all pertinent imaging results/findings within the past 24 hours.    Assessment/Plan:      * Cardiac arrest    Etiology still unclear, but PEA reported by first repsonders, quick ROSC with one round of epinephrine  Pt intubated on sedation and will have wean sedation for appropriate neurological exam, consult neurology if indicated   ECHO  -  +DD  Pacemaker interrogated   [facility] - 10/9- stents RCA are patent. No intervention. Med therapy only           Acute hypoxemic respiratory failure    Chest CTA r/o PE, likely due to decompensated CHF and possible underlying infection  Pulmonology consulted for vent management  Cardiology consulted - [facility] :  · Left ventricle ejection fraction is mildly decreased at 45%  · Left ventricle shows concentric remodeling.  · RV systolic function is normal.  · Left atrium is mildly dilated.  · Right atrium is moderately dilated.  · Tricuspid valve shows mild regurgitation.  · Grade II (moderate) left ventricular diastolic dysfunction consistent with pseudonormalization.  · LA pressure is elevated.   Cultures - negative, sent for resp cultures today   Appreciate Pulmonary input.  Extubated on 10/2.  Initially slow to respond, but improving.  Advance diet.  Less responsive on 10/7.  Spiked fever.  Possible aspiration pneumonitis.  Started Avelox.  Placed on Cardene drip for uncontrolled HTN.  Able to be weaned off.              Debility    Consulted PT/OT.   Will consult speech for swallow study. Continue NG tube feeds for now.      Proceed with hospice placement           Palliative care encounter    Pt and family wants peg tube  GI consulted           Hematuria, gross    Appreciate Urology input.  Holding Lovenox.  Urine clearing.          Pacemaker    See above           Acute pulmonary edema    Pt given IV lasix with large amount of diuresis  CXr improving   Holding Lasix secondary to rise in Creat.            S/P TAVR (transcatheter aortic valve replacement)    No acute issues           Stage 3 chronic kidney disease    No indication for vasopressor or aggressive IVF  Monitor closely on IV lasix   Avoid nephrotoxins   ARF--holding Lasix and giving IVF hydration.  Creat stable, but urine output poor.  Nephrology consult.  Hematuria--hold Lovenox.  Consulted Urology.  Improving urine output and Creat  IV fluids today           Essential hypertension    BP uncontrolled  Patient on home Clonidine that was stopped during this admit.  Possible rebound HTN.  Restarted home Clonidine.  Now on Cardene gtt.            CAD s/p PCI    Aspirin, plavix and statin resumed   Cardiology following  Elevated troponin after cardiopulmonary arrest          Anemia of chronic disease    Monitor  Reviewed bone marrow biopsy 2017 - no malignancy   H/H low- may need transfusion by 10/11. Heme test stools. Will d/c asa and lovenox for now.           Hyperlipidemia    Resume statin             VTE Risk Mitigation (From admission, onward)        Ordered     Place sequential compression device  Until discontinued      10/05/18 1249     Place LAUREANO hose  Until discontinued      10/05/18 1249     IP VTE HIGH RISK PATIENT  Once      09/30/18 4319              Viviane Mello MD  Department of Hospital Medicine   [facility]

## 2018-10-15 NOTE — PLAN OF CARE
Problem: Patient Care Overview  Goal: Plan of Care Review   10/14/18 1914   Coping/Psychosocial   Plan Of Care Reviewed With patient

## 2018-10-16 ENCOUNTER — ANESTHESIA (OUTPATIENT)
Dept: ENDOSCOPY | Facility: HOSPITAL | Age: 83
End: 2018-10-16

## 2018-10-16 LAB
ANION GAP SERPL CALC-SCNC: 9 MMOL/L
BUN SERPL-MCNC: 25 MG/DL
CALCIUM SERPL-MCNC: 9.9 MG/DL
CHLORIDE SERPL-SCNC: 111 MMOL/L
CO2 SERPL-SCNC: 21 MMOL/L
CREAT SERPL-MCNC: 1 MG/DL
EST. GFR  (AFRICAN AMERICAN): >60 ML/MIN/1.73 M^2
EST. GFR  (NON AFRICAN AMERICAN): 53 ML/MIN/1.73 M^2
GLUCOSE SERPL-MCNC: 127 MG/DL
POTASSIUM SERPL-SCNC: 4.5 MMOL/L
SODIUM SERPL-SCNC: 141 MMOL/L

## 2018-10-16 PROCEDURE — 25000003 PHARM REV CODE 250: Performed by: INTERNAL MEDICINE

## 2018-10-16 PROCEDURE — 36415 COLL VENOUS BLD VENIPUNCTURE: CPT

## 2018-10-16 PROCEDURE — P9021 RED BLOOD CELLS UNIT: HCPCS

## 2018-10-16 PROCEDURE — 25000242 PHARM REV CODE 250 ALT 637 W/ HCPCS: Performed by: INTERNAL MEDICINE

## 2018-10-16 PROCEDURE — 63600175 PHARM REV CODE 636 W HCPCS: Performed by: HOSPITALIST

## 2018-10-16 PROCEDURE — 63600175 PHARM REV CODE 636 W HCPCS

## 2018-10-16 PROCEDURE — 21400001 HC TELEMETRY ROOM

## 2018-10-16 PROCEDURE — 25000003 PHARM REV CODE 250: Performed by: HOSPITALIST

## 2018-10-16 PROCEDURE — 94761 N-INVAS EAR/PLS OXIMETRY MLT: CPT

## 2018-10-16 PROCEDURE — 80048 BASIC METABOLIC PNL TOTAL CA: CPT

## 2018-10-16 PROCEDURE — 94640 AIRWAY INHALATION TREATMENT: CPT

## 2018-10-16 PROCEDURE — 27000221 HC OXYGEN, UP TO 24 HOURS

## 2018-10-16 PROCEDURE — 63600175 PHARM REV CODE 636 W HCPCS: Performed by: INTERNAL MEDICINE

## 2018-10-16 RX ORDER — GUAIFENESIN 100 MG/5ML
200 SOLUTION ORAL EVERY 8 HOURS
Status: DISPENSED | OUTPATIENT
Start: 2018-10-16 | End: 2018-10-18

## 2018-10-16 RX ORDER — FUROSEMIDE 10 MG/ML
40 INJECTION INTRAMUSCULAR; INTRAVENOUS ONCE
Status: COMPLETED | OUTPATIENT
Start: 2018-10-16 | End: 2018-10-16

## 2018-10-16 RX ORDER — GABAPENTIN 100 MG/1
100 CAPSULE ORAL 3 TIMES DAILY
Status: DISCONTINUED | OUTPATIENT
Start: 2018-10-16 | End: 2018-10-16

## 2018-10-16 RX ORDER — IPRATROPIUM BROMIDE 0.5 MG/2.5ML
0.5 SOLUTION RESPIRATORY (INHALATION)
Status: DISCONTINUED | OUTPATIENT
Start: 2018-10-16 | End: 2018-10-21 | Stop reason: HOSPADM

## 2018-10-16 RX ORDER — IPRATROPIUM BROMIDE AND ALBUTEROL SULFATE 2.5; .5 MG/3ML; MG/3ML
3 SOLUTION RESPIRATORY (INHALATION) ONCE
Status: COMPLETED | OUTPATIENT
Start: 2018-10-16 | End: 2018-10-16

## 2018-10-16 RX ORDER — GABAPENTIN 100 MG/1
100 CAPSULE ORAL 3 TIMES DAILY
Status: DISCONTINUED | OUTPATIENT
Start: 2018-10-16 | End: 2018-10-21 | Stop reason: HOSPADM

## 2018-10-16 RX ORDER — ROSUVASTATIN CALCIUM 10 MG/1
10 TABLET, COATED ORAL NIGHTLY
Status: DISCONTINUED | OUTPATIENT
Start: 2018-10-16 | End: 2018-10-21 | Stop reason: HOSPADM

## 2018-10-16 RX ORDER — FUROSEMIDE 10 MG/ML
INJECTION INTRAMUSCULAR; INTRAVENOUS
Status: COMPLETED
Start: 2018-10-16 | End: 2018-10-16

## 2018-10-16 RX ADMIN — DOCUSATE SODIUM AND SENNOSIDES 1 TABLET: 8.6; 5 TABLET, FILM COATED ORAL at 09:10

## 2018-10-16 RX ADMIN — CLONIDINE HYDROCHLORIDE 0.3 MG: 0.1 TABLET ORAL at 09:10

## 2018-10-16 RX ADMIN — METOPROLOL TARTRATE 100 MG: 50 TABLET ORAL at 09:10

## 2018-10-16 RX ADMIN — ROSUVASTATIN CALCIUM 10 MG: 10 TABLET, FILM COATED ORAL at 09:10

## 2018-10-16 RX ADMIN — HYDRALAZINE HYDROCHLORIDE 100 MG: 25 TABLET ORAL at 05:10

## 2018-10-16 RX ADMIN — BIMATOPROST 1 DROP: 0.1 SOLUTION/ DROPS OPHTHALMIC at 09:10

## 2018-10-16 RX ADMIN — IPRATROPIUM BROMIDE AND ALBUTEROL SULFATE 3 ML: .5; 2.5 SOLUTION RESPIRATORY (INHALATION) at 07:10

## 2018-10-16 RX ADMIN — CLONIDINE HYDROCHLORIDE 0.3 MG: 0.1 TABLET ORAL at 03:10

## 2018-10-16 RX ADMIN — GABAPENTIN 100 MG: 100 CAPSULE ORAL at 09:10

## 2018-10-16 RX ADMIN — GABAPENTIN 100 MG: 100 CAPSULE ORAL at 03:10

## 2018-10-16 RX ADMIN — OXYCODONE HYDROCHLORIDE 5 MG: 5 SOLUTION ORAL at 02:10

## 2018-10-16 RX ADMIN — ISOSORBIDE DINITRATE 20 MG: 20 TABLET ORAL at 09:10

## 2018-10-16 RX ADMIN — LABETALOL HYDROCHLORIDE 10 MG: 5 INJECTION, SOLUTION INTRAVENOUS at 06:10

## 2018-10-16 RX ADMIN — FUROSEMIDE 40 MG: 10 INJECTION, SOLUTION INTRAMUSCULAR; INTRAVENOUS at 07:10

## 2018-10-16 RX ADMIN — HYDRALAZINE HYDROCHLORIDE 100 MG: 25 TABLET ORAL at 09:10

## 2018-10-16 RX ADMIN — MOXIFLOXACIN HYDROCHLORIDE 400 MG: 400 TABLET, FILM COATED ORAL at 09:10

## 2018-10-16 RX ADMIN — HYDRALAZINE HYDROCHLORIDE 100 MG: 25 TABLET ORAL at 03:10

## 2018-10-16 RX ADMIN — ISOSORBIDE DINITRATE 20 MG: 20 TABLET ORAL at 03:10

## 2018-10-16 RX ADMIN — ASPIRIN 81 MG: 81 TABLET, COATED ORAL at 09:10

## 2018-10-16 RX ADMIN — PANTOPRAZOLE SODIUM 40 MG: 40 TABLET, DELAYED RELEASE ORAL at 09:10

## 2018-10-16 RX ADMIN — IPRATROPIUM BROMIDE 0.5 MG: 0.5 SOLUTION RESPIRATORY (INHALATION) at 05:10

## 2018-10-16 RX ADMIN — FLUTICASONE PROPIONATE 1 PUFF: 220 AEROSOL, METERED RESPIRATORY (INHALATION) at 07:10

## 2018-10-16 NOTE — ASSESSMENT & PLAN NOTE
Etiology still unclear, but PEA reported by first repsonders, quick ROSC with one round of epinephrine  Pt intubated on sedation and will have wean sedation for appropriate neurological exam, consult neurology if indicated   ECHO  -  +DD  Pacemaker interrogated   [facility] - 10/9- stents RCA are patent. No intervention. Med therapy only

## 2018-10-16 NOTE — ASSESSMENT & PLAN NOTE
S/p 2U of PRBCs. Unknown decrease in Hgb but may be from multiple blood draws during hospital stay  Pending repeat CBC post PRBC transfusion  Will then limit blood draws to avoid this from happening again

## 2018-10-16 NOTE — PROGRESS NOTES
[facility] Hospital Medicine  Progress Note    Patient Name: Cindy Lyman  MRN: [id number]  Patient Class: IP- Inpatient   Admission Date: 9/30/2018  Length of Stay: 15 days  Attending Physician: Viviane Mello MD  Primary Care Provider: Rodger Camarena MD        Subjective:     Principal Problem:Cardiac arrest    HPI:  81 yo female with diastolic CHF, COPD, gastric cancer, aortic stenosis with complete heart block and s/p TAVR and pacemaker placed 7/2018 presented in cardiac arrest. Per ED notes, patient was on her way to [place] with others and c/o chest pain. CPR was initiated in parked vehicle outside ED. One round of epinephrine given and got ROSC. Per family, she c/o worsening SOB over several days. On arrival pt was euthermic, hypertensive, elevated lactate and BNP. CXR suggest pulmonary edema though infection cannot be ruled out. Pt intubated and evaluated by pulmonology. Cardiology consulted. ECHO pending. Broad spectrum antibiotics initiated until cultures result and clinical course dictates.     Chest CTA negative for pulmonary emboli.     Per family, Pt is a full code.     Hospital Course:  Pt admitted after PEA arrest and acute respiratory failure/acidosis. Acidosis corrected. Pulmonology assisting with vent management/weaning. Trial of precedex today but patient did not tolerate. Switched back to propofol. CPAP this am and now back on PVRC. Pacemaker interrogated and appears to be functioning fine. Diuresed well on lasix without change in renal function. Possible plan for LHC per cardiology. Extubated on 10/2. Initially slow to respond, but mental status improving.  Planned LHC on 10/4 held secondary to rise in Creat.  Lasix stopped and getting IVF hydration.  Episodes of slight hypotension and BP medications adjusted.  Worsening urine output.  Urine also bloody.  Urology consulted.  Improving urine output and Creat.  10/7: Improving Creat.  Patient more drowsy and less  responsive.  Spiked fever overnight.  Had to be placed on Cardene gtt secondary to poorly controlled HTN. Weaned off Cardene gtt.  Possible aspiration pneumonitis and started on Avelox (10/7)  The patient went for LHC on 10/9- no intervention. Patent stents. Med management only.  Palliative care consulted. PT/OT were consulted and recommended SNF on discharge. The patient was sent to the floor on 10/10.    10/11 - failed swallow eval; palliative care consulted +/- peg tube, receiving NGT feeds  10/12- met with two daughters and they agree to inpatient hospice, they do not have 24 hour support for home hospice, d/w Carla Arriaga, ANDRÉS.  Will try to meet with a hospice group and family tomorrow and finalize plan.  +/- peg tube placement  10/13 - family and patient want peg tube placement; passages will accept to inpatient after peg tube placement   10/14- NGT dislodged and replaced by nursing, plavix on hold for peg tube placement next week, Passages awaiting peg tube placement for inpatient hospice   10/15- Gi agreed for peg tube placement on Friday 19th off of plavix x 5 days, H/h dropped to 6.7/21.3 - transfuse PRBC today     Interval History: pt more lethargic and  Anemia worse, d/w son at bedside plan for transfusion     Review of Systems   Unable to perform ROS: Dementia   Constitutional: Positive for chills.   Musculoskeletal: Positive for back pain.     Objective:     Vital Signs (Most Recent):  Temp: 98.2 °F (36.8 °C) (10/15/18 1938)  Pulse: 80 (10/15/18 2012)  Resp: 18 (10/15/18 2012)  BP: (!) 171/75 (10/15/18 1938)  SpO2: 95 % (10/15/18 2012) Vital Signs (24h Range):  Temp:  [97.8 °F (36.6 °C)-98.7 °F (37.1 °C)] 98.2 °F (36.8 °C)  Pulse:  [69-81] 80  Resp:  [17-19] 18  SpO2:  [94 %-98 %] 95 %  BP: (146-189)/(66-83) 171/75     Weight: 101.9 kg (224 lb 10.4 oz)  Body mass index is 37.38 kg/m².    Intake/Output Summary (Last 24 hours) at 10/15/2018 2116  Last data filed at 10/15/2018 1800  Gross per 24 hour    Intake 0 ml   Output --   Net 0 ml      Physical Exam   Constitutional: She appears well-developed and well-nourished.   HENT:   Head: Normocephalic and atraumatic.   Cardiovascular: Normal rate and regular rhythm.   Pulmonary/Chest: Breath sounds normal. No stridor. She has no wheezes. She has no rales.   Abdominal: Soft. Bowel sounds are normal. She exhibits no distension. There is no tenderness. There is no guarding.   Neurological: She is alert.   Vitals reviewed.      Significant Labs: All pertinent labs within the past 24 hours have been reviewed.    Significant Imaging: I have reviewed all pertinent imaging results/findings within the past 24 hours.    Assessment/Plan:      * Cardiac arrest    Etiology still unclear, but PEA reported by first repsonders, quick ROSC with one round of epinephrine  Pt intubated on sedation and will have wean sedation for appropriate neurological exam, consult neurology if indicated   ECHO  -  +DD  Pacemaker interrogated   [facility] - 10/9- stents RCA are patent. No intervention. Med therapy only           Acute hypoxemic respiratory failure    Chest CTA r/o PE, likely due to decompensated CHF and possible underlying infection  Pulmonology consulted for vent management  Cardiology consulted - [facility] :  · Left ventricle ejection fraction is mildly decreased at 45%  · Left ventricle shows concentric remodeling.  · RV systolic function is normal.  · Left atrium is mildly dilated.  · Right atrium is moderately dilated.  · Tricuspid valve shows mild regurgitation.  · Grade II (moderate) left ventricular diastolic dysfunction consistent with pseudonormalization.  · LA pressure is elevated.   Cultures - negative, sent for resp cultures today   Appreciate Pulmonary input.  Extubated on 10/2.  Initially slow to respond, but improving.  Advance diet.  Less responsive on 10/7.  Spiked fever.  Possible aspiration pneumonitis.  Started Avelox.  Placed on Cardene drip for uncontrolled HTN.   Able to be weaned off.              Debility    Consulted PT/OT.   Will consult speech for swallow study. Continue NG tube feeds for now.     Proceed with hospice placement           Palliative care encounter    Pt and family wants peg tube  GI consulted           Hematuria, gross    Appreciate Urology input.  Holding Lovenox.  Urine clearing.          Pacemaker    See above           Acute pulmonary edema    Pt given IV lasix with large amount of diuresis  CXr improving   Holding Lasix secondary to rise in Creat.            S/P TAVR (transcatheter aortic valve replacement)    No acute issues           Stage 3 chronic kidney disease    No indication for vasopressor or aggressive IVF  Monitor closely on IV lasix   Avoid nephrotoxins   ARF--holding Lasix and giving IVF hydration.  Creat stable, but urine output poor.  Nephrology consult.  Hematuria--hold Lovenox.  Consulted Urology.  Improving urine output and Creat  IV fluids today           Essential hypertension    BP uncontrolled  Patient on home Clonidine that was stopped during this admit.  Possible rebound HTN.  Restarted home Clonidine.  Now on Cardene gtt.            CAD s/p PCI    Aspirin, plavix and statin resumed   Cardiology following  Elevated troponin after cardiopulmonary arrest          Anemia of chronic disease    Monitor  Reviewed bone marrow biopsy 2017 - no malignancy   H/H low- may need transfusion by 10/11. Heme test stools. Will d/c asa and lovenox for now.           Hyperlipidemia    Resume statin             VTE Risk Mitigation (From admission, onward)        Ordered     Place sequential compression device  Until discontinued      10/05/18 1249     Place LAUREANO hose  Until discontinued      10/05/18 1249     IP VTE HIGH RISK PATIENT  Once      09/30/18 1359              Viviane Mello MD  Department of Hospital Medicine   [facility]

## 2018-10-16 NOTE — ASSESSMENT & PLAN NOTE
Per [facility], coronaries are patent, including area where stent is in place  On ASA and BB. Plavix on hold for PEG. Will resume statin

## 2018-10-16 NOTE — PROGRESS NOTES
[facility] Hospital Medicine  Progress Note    Patient Name: Cindy Lyman  MRN: [id number]  Patient Class: IP- Inpatient   Admission Date: 9/30/2018  Length of Stay: 16 days  Attending Physician: Lexus Vences MD  Primary Care Provider: Rodger Camarena MD        Subjective:     Principal Problem:Cardiac arrest    HPI:  81 yo female with diastolic CHF, COPD, gastric cancer, aortic stenosis with complete heart block and s/p TAVR and pacemaker placed 7/2018 presented in cardiac arrest. Per ED notes, patient was on her way to [place] with others and c/o chest pain. CPR was initiated in parked vehicle outside ED. One round of epinephrine given and got ROSC. Per family, she c/o worsening SOB over several days. On arrival pt was euthermic, hypertensive, elevated lactate and BNP. CXR suggest pulmonary edema though infection cannot be ruled out. Pt intubated and evaluated by pulmonology. Cardiology consulted. ECHO pending. Broad spectrum antibiotics initiated until cultures result and clinical course dictates.     Chest CTA negative for pulmonary emboli.     Per family, Pt is a full code.     Hospital Course:  No notes on file    Interval History: received 2U of PRBCs overnight. Per nurse, 30 minutes after second unit, patient developed respiratory distress, wheezing and severe hypertension. Mentation is intact. Patient also complains of trouble swallowing her own saliva.     Review of Systems   Respiratory: Positive for cough, shortness of breath and wheezing.    Cardiovascular: Negative for chest pain, palpitations and leg swelling.   Gastrointestinal: Negative.    Musculoskeletal: Negative.    Neurological: Positive for weakness.   Hematological: Negative.    Psychiatric/Behavioral: The patient is nervous/anxious.      Objective:     Vital Signs (Most Recent):  Temp: 98.2 °F (36.8 °C) (10/16/18 0553)  Pulse: 104 (10/16/18 0705)  Resp: (!) 32 (10/16/18 0705)  BP: (!) 246/107 (10/16/18  0705)  SpO2: (!) 94 % (10/16/18 0705) Vital Signs (24h Range):  Temp:  [98 °F (36.7 °C)-98.4 °F (36.9 °C)] 98.2 °F (36.8 °C)  Pulse:  [] 104  Resp:  [17-32] 32  SpO2:  [93 %-98 %] 94 %  BP: ()/() 246/107     Weight: 102.2 kg (225 lb 5 oz)  Body mass index is 37.49 kg/m².    Intake/Output Summary (Last 24 hours) at 10/16/2018 0734  Last data filed at 10/15/2018 2242  Gross per 24 hour   Intake 304 ml   Output --   Net 304 ml      Physical Exam   Constitutional: She is oriented to person, place, and time. She appears well-developed. She appears distressed.   Eyes: Conjunctivae and EOM are normal. Pupils are equal, round, and reactive to light.   Cardiovascular: Regular rhythm.   tachycardia   Pulmonary/Chest: She has wheezes. She has no rales.   Using accessory muscles  Speaking in short words   Abdominal: Soft. Bowel sounds are normal.   Musculoskeletal: Normal range of motion. She exhibits edema (BUE).   Neurological: She is alert and oriented to person, place, and time.   Skin: Skin is warm. Capillary refill takes less than 2 seconds. She is diaphoretic.   Psychiatric:   Somewhat anxious   Nursing note and vitals reviewed.      Significant Labs: All pertinent labs within the past 24 hours have been reviewed.    Significant Imaging: I have reviewed all pertinent imaging results/findings within the past 24 hours.  I have reviewed and interpreted all pertinent imaging results/findings within the past 24 hours.    Assessment/Plan:      * Cardiac arrest    Etiology is unclear, but PEA reported by first repsonders. Quick ROSC with chest compressions and one round of epinephrine  Was intubated. Is now extubated and on supplemental O2. Mentation appropriate  ECHO  -  +DD  Pacemaker interrogated. Working well   [facility] - 10/9- stents RCA are patent. No intervention. Med therapy only   Plavix currently on hold for PEG. Is on ASA and BB. BP very difficult to control          Acute hypoxemic respiratory  failure    Again with respiratory distress and hypoxia today. This started about 30 minutes after 2nd unit of blood  This indicated pulmonary edema is likely culprit, as it happened on admission  Also very hypertensive. Flash pulmonary edema considered.  Will give a dose of lasix 40 mg IV now, change NC to non rebreather (BiPAP not recommended given oral secretions requiring frequent suctioning), duo-nebs, elevate HOB > 45 degrees and obtain CXR   Treat BP              Acute pulmonary edema    As above            Debility    Not pursuing SNF but rather inpatient hospice after PEG is placed for dysphagia                Palliative care encounter    Pt and family wants peg tube  GI consulted. Plan is for Friday once plavix had been on hold for 5 days           Hematuria, gross    Appreciate Urology input.  Held lovenox  Urine clear  No briceno in place          Pacemaker    See above           S/P TAVR (transcatheter aortic valve replacement)    No acute issues           Stage 3 chronic kidney disease    Stable.           Essential hypertension    Very difficult to control  Treating respiratory distress and adjusting antihypertensive regimen for better control          CAD s/p PCI    Per [facility], coronaries are patent, including area where stent is in place  On ASA and BB. Plavix on hold for PEG. Will resume statin          Anemia of chronic disease    S/p 2U of PRBCs. Unknown decrease in Hgb but may be from multiple blood draws during hospital stay  Pending repeat CBC post PRBC transfusion  Will then limit blood draws to avoid this from happening again          Hyperlipidemia    Resume statin             VTE Risk Mitigation (From admission, onward)        Ordered     Place sequential compression device  Until discontinued      10/05/18 1249     Place LAUREANO hose  Until discontinued      10/05/18 1249     IP VTE HIGH RISK PATIENT  Once      09/30/18 1359        Will discuss plan of care with palliative care service as patient  is still full code with plans for hospice.       Lexus Good MD  Department of Hospital Medicine   [facility]

## 2018-10-16 NOTE — PLAN OF CARE
TN met with patient's daughter Bushra at bedside. TN informed Bushra of patient's discharge plan. Patient will have a peg tube placed on Friday and then discharge to inpatient hospice at [facility].       10/16/18 5234   Discharge Reassessment   Assessment Type Discharge Planning Reassessment   Provided patient/caregiver education on the expected discharge date and the discharge plan Yes   Do you have any problems affording any of your prescribed medications? No   Discharge Plan A Hospice/home   Discharge Plan B Hospice/home   Patient choice form signed by patient/caregiver No   Can the patient answer the patient profile reliably? No, cognitively impaired   How does the patient rate their overall health at the present time? Fair   Describe the patient's ability to walk at the present time. Does not walk or unable to take any steps at all   How often would a person be available to care for the patient? Whenever needed   Number of comorbid conditions (as recorded on the chart) Five or more   During the past month, has the patient often been bothered by feeling down, depressed or hopeless? No   During the past month, has the patient often been bothered by little interest or pleasure in doing things? No

## 2018-10-16 NOTE — SUBJECTIVE & OBJECTIVE
Interval History: pt more lethargic and  Anemia worse, d/w son at bedside plan for transfusion     Review of Systems   Unable to perform ROS: Dementia   Constitutional: Positive for chills.   Musculoskeletal: Positive for back pain.     Objective:     Vital Signs (Most Recent):  Temp: 98.2 °F (36.8 °C) (10/15/18 1938)  Pulse: 80 (10/15/18 2012)  Resp: 18 (10/15/18 2012)  BP: (!) 171/75 (10/15/18 1938)  SpO2: 95 % (10/15/18 2012) Vital Signs (24h Range):  Temp:  [97.8 °F (36.6 °C)-98.7 °F (37.1 °C)] 98.2 °F (36.8 °C)  Pulse:  [69-81] 80  Resp:  [17-19] 18  SpO2:  [94 %-98 %] 95 %  BP: (146-189)/(66-83) 171/75     Weight: 101.9 kg (224 lb 10.4 oz)  Body mass index is 37.38 kg/m².    Intake/Output Summary (Last 24 hours) at 10/15/2018 2116  Last data filed at 10/15/2018 1800  Gross per 24 hour   Intake 0 ml   Output --   Net 0 ml      Physical Exam   Constitutional: She appears well-developed and well-nourished.   HENT:   Head: Normocephalic and atraumatic.   Cardiovascular: Normal rate and regular rhythm.   Pulmonary/Chest: Breath sounds normal. No stridor. She has no wheezes. She has no rales.   Abdominal: Soft. Bowel sounds are normal. She exhibits no distension. There is no tenderness. There is no guarding.   Neurological: She is alert.   Vitals reviewed.      Significant Labs: All pertinent labs within the past 24 hours have been reviewed.    Significant Imaging: I have reviewed all pertinent imaging results/findings within the past 24 hours.

## 2018-10-16 NOTE — PROGRESS NOTES
The patient states she feels hot, but she is afebrile.  She received 2 units of pRBCs, after which her blood pressure increased.    Vitals:    10/16/18 0300 10/16/18 0330 10/16/18 0553 10/16/18 0617   BP: (!) 162/74 129/60 (!) 244/116 (!) 220/100   BP Location:   Right arm Right arm   Patient Position:   Lying    Pulse: 74 70 89    Resp: 20 18 20    Temp: 98.2 °F (36.8 °C) 98 °F (36.7 °C) 98.2 °F (36.8 °C)    TempSrc: Oral Oral Oral    SpO2: 95% 96% (!) 93%    Weight:   102.2 kg (225 lb 5 oz)    Height:          aspirin  81 mg Per NG tube Daily    azelastine  1 spray Nasal BID    bimatoprost  1 drop Both Eyes QHS    cetirizine  10 mg Oral Daily    cloNIDine  0.3 mg Oral TID    fluticasone  1 puff Inhalation BID    gabapentin  300 mg Oral TID    hydrALAZINE  100 mg Per NG tube Q8H    isosorbide dinitrate  20 mg Per NG tube TID    metoprolol tartrate  100 mg Per NG tube BID    moxifloxacin  400 mg Per NG tube Daily    pantoprazole  40 mg Oral Daily    senna-docusate 8.6-50 mg  1 tablet Oral BID     P.E.:  GEN: A x O x 3, NAD  HEENT: EOMI, PERRL, anicteric sclera  CV: RRR, no M/R/G  Chest: CTA B  Abdomen: soft, NTND, normoactive BS  Ext: No C/C/E. 2+ dorsalis pedis pulses B    Labs:  Recent Results (from the past 336 hour(s))   CBC auto differential    Collection Time: 10/15/18  5:09 AM   Result Value Ref Range    WBC 6.04 3.90 - 12.70 K/uL    Hemoglobin 6.7 (L) 12.0 - 16.0 g/dL    Hematocrit 21.3 (L) 37.0 - 48.5 %    Platelets 354 (H) 150 - 350 K/uL   CBC auto differential    Collection Time: 10/10/18  2:15 AM   Result Value Ref Range    WBC 6.08 3.90 - 12.70 K/uL    Hemoglobin 7.0 (L) 12.0 - 16.0 g/dL    Hematocrit 21.6 (L) 37.0 - 48.5 %    Platelets 255 150 - 350 K/uL   CBC auto differential    Collection Time: 10/09/18  2:45 AM   Result Value Ref Range    WBC 8.89 3.90 - 12.70 K/uL    Hemoglobin 7.9 (L) 12.0 - 16.0 g/dL    Hematocrit 24.1 (L) 37.0 - 48.5 %    Platelets 298 150 - 350 K/uL      CMP  Sodium   Date Value Ref Range Status   10/15/2018 143 136 - 145 mmol/L Final     Potassium   Date Value Ref Range Status   10/15/2018 4.3 3.5 - 5.1 mmol/L Final     Chloride   Date Value Ref Range Status   10/15/2018 115 (H) 95 - 110 mmol/L Final     CO2   Date Value Ref Range Status   10/15/2018 23 23 - 29 mmol/L Final     Glucose   Date Value Ref Range Status   10/15/2018 108 70 - 110 mg/dL Final     BUN, Bld   Date Value Ref Range Status   10/15/2018 24 (H) 8 - 23 mg/dL Final     Creatinine   Date Value Ref Range Status   10/15/2018 0.8 0.5 - 1.4 mg/dL Final     Calcium   Date Value Ref Range Status   10/15/2018 8.7 8.7 - 10.5 mg/dL Final     Total Protein   Date Value Ref Range Status   10/10/2018 5.6 (L) 6.0 - 8.4 g/dL Final     Albumin   Date Value Ref Range Status   10/10/2018 2.4 (L) 3.5 - 5.2 g/dL Final     Total Bilirubin   Date Value Ref Range Status   10/10/2018 0.3 0.1 - 1.0 mg/dL Final     Comment:     For infants and newborns, interpretation of results should be based  on gestational age, weight and in agreement with clinical  observations.  Premature Infant recommended reference ranges:  Up to 24 hours.............<8.0 mg/dL  Up to 48 hours............<12.0 mg/dL  3-5 days..................<15.0 mg/dL  6-29 days.................<15.0 mg/dL       Alkaline Phosphatase   Date Value Ref Range Status   10/10/2018 64 55 - 135 U/L Final     AST   Date Value Ref Range Status   10/10/2018 27 10 - 40 U/L Final     ALT   Date Value Ref Range Status   10/10/2018 13 10 - 44 U/L Final     Anion Gap   Date Value Ref Range Status   10/15/2018 5 (L) 8 - 16 mmol/L Final     eGFR if    Date Value Ref Range Status   10/15/2018 >60 >60 mL/min/1.73 m^2 Final     eGFR if non    Date Value Ref Range Status   10/15/2018 >60 >60 mL/min/1.73 m^2 Final     Comment:     Calculation used to obtain the estimated glomerular filtration  rate (eGFR) is the CKD-EPI equation.          No results  for input(s): PT, INR, APTT in the last 24 hours.    A/P:  The patient is an 82 year old woman with a history of HTN, CAD, CHF, aortic stenosis, pulmonary HTN, and deep vein thrombosis presenting s/p cardiac arrest with dysphagia.  1.  Dysphagia - plavix needs to be held for 5 days for PEG tube placement.  The nurse states this has been held since 10/14/18 and this was confirmed with the nurse today as well.  A PEG tube can be placed on Friday.  The patient received 2 units of pRBCs and a repeat H/H is pending.

## 2018-10-16 NOTE — NURSING
B/P remains elevated 246/107. PRN Labetolol given. Remains dyspneic. Report to Dr Good. Orders noted for Lasix, Duoneb, CXR

## 2018-10-16 NOTE — ASSESSMENT & PLAN NOTE
Again with respiratory distress and hypoxia today. This started about 30 minutes after 2nd unit of blood  This indicated pulmonary edema is likely culprit, as it happened on admission  Also very hypertensive. Flash pulmonary edema considered.  Will give a dose of lasix 40 mg IV now, change NC to non rebreather (BiPAP not recommended given oral secretions requiring frequent suctioning), duo-nebs, elevate HOB > 45 degrees and obtain CXR   Treat BP

## 2018-10-16 NOTE — ASSESSMENT & PLAN NOTE
Pt and family wants peg tube  GI consulted. Plan is for Friday once plavix had been on hold for 5 days

## 2018-10-16 NOTE — ASSESSMENT & PLAN NOTE
Etiology is unclear, but PEA reported by first repsonders. Quick ROSC with chest compressions and one round of epinephrine  Was intubated. Is now extubated and on supplemental O2. Mentation appropriate  ECHO  -  +DD  Pacemaker interrogated. Working well   [facility] - 10/9- stents RCA are patent. No intervention. Med therapy only   Plavix currently on hold for PEG. Is on ASA and BB. BP very difficult to control

## 2018-10-16 NOTE — SUBJECTIVE & OBJECTIVE
Interval History: received 2U of PRBCs overnight. Per nurse, 30 minutes after second unit, patient developed respiratory distress, wheezing and severe hypertension. Mentation is intact. Patient also complains of trouble swallowing her own saliva.     Review of Systems   Respiratory: Positive for cough, shortness of breath and wheezing.    Cardiovascular: Negative for chest pain, palpitations and leg swelling.   Gastrointestinal: Negative.    Musculoskeletal: Negative.    Neurological: Positive for weakness.   Hematological: Negative.    Psychiatric/Behavioral: The patient is nervous/anxious.      Objective:     Vital Signs (Most Recent):  Temp: 98.2 °F (36.8 °C) (10/16/18 0553)  Pulse: 104 (10/16/18 0705)  Resp: (!) 32 (10/16/18 0705)  BP: (!) 246/107 (10/16/18 0705)  SpO2: (!) 94 % (10/16/18 0705) Vital Signs (24h Range):  Temp:  [98 °F (36.7 °C)-98.4 °F (36.9 °C)] 98.2 °F (36.8 °C)  Pulse:  [] 104  Resp:  [17-32] 32  SpO2:  [93 %-98 %] 94 %  BP: ()/() 246/107     Weight: 102.2 kg (225 lb 5 oz)  Body mass index is 37.49 kg/m².    Intake/Output Summary (Last 24 hours) at 10/16/2018 0734  Last data filed at 10/15/2018 2242  Gross per 24 hour   Intake 304 ml   Output --   Net 304 ml      Physical Exam   Constitutional: She is oriented to person, place, and time. She appears well-developed. She appears distressed.   Eyes: Conjunctivae and EOM are normal. Pupils are equal, round, and reactive to light.   Cardiovascular: Regular rhythm.   tachycardia   Pulmonary/Chest: She has wheezes. She has no rales.   Using accessory muscles  Speaking in short words   Abdominal: Soft. Bowel sounds are normal.   Musculoskeletal: Normal range of motion. She exhibits edema (BUE).   Neurological: She is alert and oriented to person, place, and time.   Skin: Skin is warm. Capillary refill takes less than 2 seconds. She is diaphoretic.   Psychiatric:   Somewhat anxious   Nursing note and vitals reviewed.      Significant  Labs: All pertinent labs within the past 24 hours have been reviewed.    Significant Imaging: I have reviewed all pertinent imaging results/findings within the past 24 hours.  I have reviewed and interpreted all pertinent imaging results/findings within the past 24 hours.

## 2018-10-16 NOTE — ASSESSMENT & PLAN NOTE
Very difficult to control  Treating respiratory distress and adjusting antihypertensive regimen for better control

## 2018-10-17 LAB
ANION GAP SERPL CALC-SCNC: 7 MMOL/L
BASOPHILS # BLD AUTO: 0.04 K/UL
BASOPHILS NFR BLD: 0.6 %
BUN SERPL-MCNC: 25 MG/DL
CALCIUM SERPL-MCNC: 9.6 MG/DL
CHLORIDE SERPL-SCNC: 108 MMOL/L
CO2 SERPL-SCNC: 26 MMOL/L
CREAT SERPL-MCNC: 0.9 MG/DL
DIFFERENTIAL METHOD: ABNORMAL
EOSINOPHIL # BLD AUTO: 0.3 K/UL
EOSINOPHIL NFR BLD: 5 %
ERYTHROCYTE [DISTWIDTH] IN BLOOD BY AUTOMATED COUNT: 16.6 %
EST. GFR  (AFRICAN AMERICAN): >60 ML/MIN/1.73 M^2
EST. GFR  (NON AFRICAN AMERICAN): >60 ML/MIN/1.73 M^2
GLUCOSE SERPL-MCNC: 104 MG/DL
HCT VFR BLD AUTO: 30.4 %
HGB BLD-MCNC: 9.8 G/DL
LYMPHOCYTES # BLD AUTO: 1 K/UL
LYMPHOCYTES NFR BLD: 15.4 %
MCH RBC QN AUTO: 29 PG
MCHC RBC AUTO-ENTMCNC: 32.2 G/DL
MCV RBC AUTO: 90 FL
MONOCYTES # BLD AUTO: 0.5 K/UL
MONOCYTES NFR BLD: 8 %
NEUTROPHILS # BLD AUTO: 4.5 K/UL
NEUTROPHILS NFR BLD: 71 %
PLATELET # BLD AUTO: 377 K/UL
PMV BLD AUTO: 10.3 FL
POCT GLUCOSE: 102 MG/DL (ref 70–110)
POCT GLUCOSE: 104 MG/DL (ref 70–110)
POTASSIUM SERPL-SCNC: 4.3 MMOL/L
RBC # BLD AUTO: 3.38 M/UL
SODIUM SERPL-SCNC: 141 MMOL/L
WBC # BLD AUTO: 6.36 K/UL

## 2018-10-17 PROCEDURE — G8997 SWALLOW GOAL STATUS: HCPCS | Mod: CM

## 2018-10-17 PROCEDURE — 25000003 PHARM REV CODE 250: Performed by: HOSPITALIST

## 2018-10-17 PROCEDURE — 92526 ORAL FUNCTION THERAPY: CPT

## 2018-10-17 PROCEDURE — 21400001 HC TELEMETRY ROOM

## 2018-10-17 PROCEDURE — 63600175 PHARM REV CODE 636 W HCPCS: Performed by: HOSPITALIST

## 2018-10-17 PROCEDURE — 25000242 PHARM REV CODE 250 ALT 637 W/ HCPCS: Performed by: INTERNAL MEDICINE

## 2018-10-17 PROCEDURE — 25000003 PHARM REV CODE 250: Performed by: INTERNAL MEDICINE

## 2018-10-17 PROCEDURE — G8996 SWALLOW CURRENT STATUS: HCPCS | Mod: CN

## 2018-10-17 PROCEDURE — 94640 AIRWAY INHALATION TREATMENT: CPT

## 2018-10-17 PROCEDURE — 94761 N-INVAS EAR/PLS OXIMETRY MLT: CPT

## 2018-10-17 PROCEDURE — 80048 BASIC METABOLIC PNL TOTAL CA: CPT

## 2018-10-17 PROCEDURE — 27000221 HC OXYGEN, UP TO 24 HOURS

## 2018-10-17 PROCEDURE — 85025 COMPLETE CBC W/AUTO DIFF WBC: CPT

## 2018-10-17 RX ORDER — HYDROXYZINE PAMOATE 25 MG/1
50 CAPSULE ORAL NIGHTLY PRN
Status: DISCONTINUED | OUTPATIENT
Start: 2018-10-17 | End: 2018-10-21 | Stop reason: HOSPADM

## 2018-10-17 RX ORDER — LABETALOL HYDROCHLORIDE 5 MG/ML
10 INJECTION, SOLUTION INTRAVENOUS EVERY 4 HOURS PRN
Status: DISCONTINUED | OUTPATIENT
Start: 2018-10-17 | End: 2018-10-21 | Stop reason: HOSPADM

## 2018-10-17 RX ADMIN — PANTOPRAZOLE SODIUM 40 MG: 40 TABLET, DELAYED RELEASE ORAL at 09:10

## 2018-10-17 RX ADMIN — ISOSORBIDE DINITRATE 20 MG: 20 TABLET ORAL at 03:10

## 2018-10-17 RX ADMIN — GABAPENTIN 100 MG: 100 CAPSULE ORAL at 03:10

## 2018-10-17 RX ADMIN — HYDRALAZINE HYDROCHLORIDE 100 MG: 25 TABLET ORAL at 03:10

## 2018-10-17 RX ADMIN — ISOSORBIDE DINITRATE 20 MG: 20 TABLET ORAL at 09:10

## 2018-10-17 RX ADMIN — HYDRALAZINE HYDROCHLORIDE 100 MG: 25 TABLET ORAL at 05:10

## 2018-10-17 RX ADMIN — GABAPENTIN 100 MG: 100 CAPSULE ORAL at 09:10

## 2018-10-17 RX ADMIN — OXYCODONE HYDROCHLORIDE 5 MG: 5 SOLUTION ORAL at 05:10

## 2018-10-17 RX ADMIN — CLONIDINE HYDROCHLORIDE 0.3 MG: 0.1 TABLET ORAL at 09:10

## 2018-10-17 RX ADMIN — GUAIFENESIN 200 MG: 200 SOLUTION ORAL at 09:10

## 2018-10-17 RX ADMIN — DOCUSATE SODIUM AND SENNOSIDES 1 TABLET: 8.6; 5 TABLET, FILM COATED ORAL at 09:10

## 2018-10-17 RX ADMIN — ROSUVASTATIN CALCIUM 10 MG: 10 TABLET, FILM COATED ORAL at 09:10

## 2018-10-17 RX ADMIN — METOPROLOL TARTRATE 100 MG: 50 TABLET ORAL at 09:10

## 2018-10-17 RX ADMIN — HYDRALAZINE HYDROCHLORIDE 100 MG: 25 TABLET ORAL at 09:10

## 2018-10-17 RX ADMIN — BIMATOPROST 1 DROP: 0.1 SOLUTION/ DROPS OPHTHALMIC at 09:10

## 2018-10-17 RX ADMIN — IPRATROPIUM BROMIDE 0.5 MG: 0.5 SOLUTION RESPIRATORY (INHALATION) at 03:10

## 2018-10-17 RX ADMIN — CLONIDINE HYDROCHLORIDE 0.3 MG: 0.1 TABLET ORAL at 03:10

## 2018-10-17 RX ADMIN — IPRATROPIUM BROMIDE 0.5 MG: 0.5 SOLUTION RESPIRATORY (INHALATION) at 07:10

## 2018-10-17 RX ADMIN — ASPIRIN 81 MG: 81 TABLET, COATED ORAL at 09:10

## 2018-10-17 RX ADMIN — MOXIFLOXACIN HYDROCHLORIDE 400 MG: 400 TABLET, FILM COATED ORAL at 09:10

## 2018-10-17 NOTE — PLAN OF CARE
Problem: Pressure Ulcer Risk (Villa Scale) (Adult,Obstetrics,Pediatric)  Intervention: Promote/Optimize Nutrition   10/17/18 1703   Nutrition Interventions   Oral Nutrition Promotion rest periods promoted     Intervention: Prevent/Manage Excess Moisture   10/17/18 1703   Skin Interventions   Skin Protection Incontinence pads   Hygiene Care   Perineal Care diaper changed;absorbent pad changed;perineum cleansed     Intervention: Maintain Head of Bed Elevation Less Than 30 Degrees as Tolerated   10/17/18 0945   Positioning   Head of Bed (HOB) HOB at 20-30 degrees     Intervention: Prevent/Minimize Sheer/Friction Injuries   10/17/18 1703   Skin Interventions   Pressure Reduction Techniques frequent weight shift encouraged;heels elevated off bed;pressure points protected   Positioning   Positioning/Transfer Devices pillows     Intervention: Turn/Reposition Often   10/17/18 0945 10/17/18 1703   Skin Interventions   Pressure Reduction Techniques --  frequent weight shift encouraged;heels elevated off bed;pressure points protected   Positioning   Body Position log-rolled;supine, head elevated --        Goal: Identify Related Risk Factors and Signs and Symptoms  Related risk factors and signs and symptoms are identified upon initiation of Human Response Clinical Practice Guideline (CPG)   10/17/18 1703   Pressure Ulcer Risk (Villa Scale)   Related Risk Factors (Pressure Ulcer Risk (Villa Scale)) age extremes;body weight extremes;excretions/secretions;medication;tissue perfusion altered;mobility impaired;nutritional deficiencies     Goal: Skin Integrity  Patient will demonstrate the desired outcomes by discharge/transition of care.   10/17/18 1703   Pressure Ulcer Risk (Villa Scale) (Adult,Obstetrics,Pediatric)   Skin Integrity making progress toward outcome

## 2018-10-17 NOTE — PROGRESS NOTES
[facility] Hospital Medicine  Progress Note    Patient Name: Cindy Lyman  MRN: [id number]  Patient Class: IP- Inpatient   Admission Date: 9/30/2018  Length of Stay: 17 days  Attending Physician: Ashley Moreno MD  Primary Care Provider: Rodger Camarena MD        Subjective:     Principal Problem:Cardiac arrest    HPI:  83 yo female with diastolic CHF, COPD, gastric cancer, aortic stenosis with complete heart block and s/p TAVR and pacemaker placed 7/2018 presented in cardiac arrest. Per ED notes, patient was on her way to [place] with others and c/o chest pain. CPR was initiated in parked vehicle outside ED. One round of epinephrine given and got ROSC. Per family, she c/o worsening SOB over several days. On arrival pt was euthermic, hypertensive, elevated lactate and BNP. CXR suggest pulmonary edema though infection cannot be ruled out. Pt intubated and evaluated by pulmonology. Cardiology consulted. ECHO pending. Broad spectrum antibiotics initiated until cultures result and clinical course dictates.     Chest CTA negative for pulmonary emboli.     Per family, Pt is a full code.     Hospital Course:  83 yo female with diastolic CHF, COPD, gastric cancer, aortic stenosis with complete heart block and s/p TAVR and pacemaker placed 7/2018 presented in cardiac arrest. Per ED notes, patient was on her way to [place] with others and c/o chest pain. CPR was initiated in parked vehicle outside ED. One round of epinephrine given and got ROSC. Per family, she c/o worsening SOB over several days. On arrival pt was euthermic, hypertensive, elevated lactate and BNP. CXR suggest pulmonary edema though infection cannot be ruled out. Pt was  intubated and evaluated by pulmonology. Cardiology consulted. Broad spectrum antibiotics initiated until cultures result and clinical course dictates.   Chest CTA negative for pulmonary emboli.   Per family, Pt is a full code.   Duo to severe medical condition  of patient,pfilemon is going to hospice.  She failed swallow study,plan for PEG tub [place] friday.    Interval History: patient is stable on NC O 2    Review of Systems   Respiratory: Positive for cough, shortness of breath and wheezing.    Cardiovascular: Negative for chest pain, palpitations and leg swelling.   Gastrointestinal: Negative.    Musculoskeletal: Negative.    Neurological: Positive for weakness.   Hematological: Negative.    Psychiatric/Behavioral: The patient is nervous/anxious.      Objective:     Vital Signs (Most Recent):  Temp: 98.9 °F (37.2 °C) (10/17/18 0750)  Pulse: 94 (10/17/18 0750)  Resp: 18 (10/17/18 0750)  BP: (!) 186/86 (10/17/18 0800)  SpO2: 98 % (10/17/18 0750) Vital Signs (24h Range):  Temp:  [98 °F (36.7 °C)-98.9 °F (37.2 °C)] 98.9 °F (37.2 °C)  Pulse:  [69-94] 94  Resp:  [17-20] 18  SpO2:  [96 %-100 %] 98 %  BP: (139-232)/(62-97) 186/86     Weight: 95.5 kg (210 lb 8.6 oz)  Body mass index is 35.04 kg/m².    Intake/Output Summary (Last 24 hours) at 10/17/2018 1127  Last data filed at 10/17/2018 0548  Gross per 24 hour   Intake 200 ml   Output --   Net 200 ml      Physical Exam   Constitutional: She is oriented to person, place, and time. She appears well-developed. She appears distressed.   Eyes: Conjunctivae and EOM are normal. Pupils are equal, round, and reactive to light.   Cardiovascular: Regular rhythm.   tachycardia   Pulmonary/Chest: She has wheezes. She has no rales.   Using accessory muscles  Speaking in short words   Abdominal: Soft. Bowel sounds are normal.   Musculoskeletal: Normal range of motion. She exhibits edema (BUE).   Neurological: She is alert and oriented to person, place, and time.   Skin: Skin is warm. Capillary refill takes less than 2 seconds. She is diaphoretic.   Psychiatric:   Somewhat anxious   Nursing note and vitals reviewed.      Significant Labs: All pertinent labs within the past 24 hours have been reviewed.    Significant Imaging: I have reviewed all  pertinent imaging results/findings within the past 24 hours.  I have reviewed and interpreted all pertinent imaging results/findings within the past 24 hours.    Assessment/Plan:      * Cardiac arrest    Etiology is unclear, but PEA reported by first repsonders. Quick ROSC with chest compressions and one round of epinephrine  Was intubated. Is now extubated and on supplemental O2. Mentation appropriate  ECHO  -  +DD  Pacemaker interrogated. Working well   [facility] - 10/9- stents RCA are patent. No intervention. Med therapy only   Plavix currently on hold for PEG. Is on ASA and BB. BP very difficult to control.  Plan going to hospice,but still is full code,spoked with family,palliative dare on case as well.will asses again           CAD s/p PCI    Per [facility], coronaries are patent, including area where stent is in place  On ASA and BB. Plavix on hold for PEG. Will resume statin          Debility    Not pursuing SNF but rather inpatient hospice after PEG is placed for dysphagia                Palliative care encounter    Pt and family wants peg tube  GI consulted. Plan is for Friday once plavix had been on hold for 5 days           Hematuria, gross    Appreciate Urology input.  Held lovenox  Urine clear  No briceno in place          Pacemaker    See above           Acute hypoxemic respiratory failure    Again with respiratory distress and hypoxia today. This started about 30 minutes after 2nd unit of blood  This indicated pulmonary edema is likely culprit, as it happened on admission  Also very hypertensive. Flash pulmonary edema considered.  Will give a dose of lasix 40 mg IV now, change NC to non rebreather (BiPAP not recommended given oral secretions requiring frequent suctioning), duo-nebs, elevate HOB > 45 degrees and obtain CXR   Treat BP              Acute pulmonary edema    As above            S/P TAVR (transcatheter aortic valve replacement)    No acute issues           Stage 3 chronic kidney disease    Stable.            Essential hypertension    Very difficult to control  Treating respiratory distress and adjusting antihypertensive regimen for better control          Anemia of chronic disease    S/p 2U of PRBCs. Unknown decrease in Hgb but may be from multiple blood draws during hospital stay  Pending repeat CBC post PRBC transfusion  Will then limit blood draws to avoid this from happening again          Hyperlipidemia    Resume statin             VTE Risk Mitigation (From admission, onward)        Ordered     Place sequential compression device  Until discontinued      10/05/18 1249     Place LAUREANO hose  Until discontinued      10/05/18 1249     IP VTE HIGH RISK PATIENT  Once      09/30/18 1359              Ashley Moreno MD  Department of Hospital Medicine   [facility]

## 2018-10-17 NOTE — PLAN OF CARE
Problem: Coping, Compromised Individual (Adult,Obstetrics,Pediatric)  Intervention: Support/Enhance Coping Strategies   10/14/18 0238 10/17/18 0912   Coping/Psychosocial Interventions   Supportive Measures --  active listening utilized   Environmental Support --  calm environment promoted;distractions minimized;rest periods encouraged   Psychosocial Support   Family/Support System Care caregiver stress acknowledged;presence promoted;involvement promoted;support provided --

## 2018-10-17 NOTE — PLAN OF CARE
Problem: SLP Goal  Goal: SLP Goal  STGs:  1. Pt will participate in ongoing assessment of swallow function at b/s.-discontinued 10/10  2. Pt will tolerate low level PO trials without overt s/s aspiration at b/s.  3.Pt will tolerate least restrictive PO diet without overt s/s aspiration and adequate oral clearance.   -discontinued 10/10  4. Pt will successfully participate in Modified Barium Swallow study to radiographically assess swallow function, rule out aspiration and determine safest/least restrictive diet.- MET 10/10  5. Pt will participate in trial sessions of indirect dysphagia exercises to strengthen musculature for swallowing mechanism mod effort.         Outcome: Ongoing (interventions implemented as appropriate)  No progress made this session

## 2018-10-17 NOTE — PT/OT/SLP PROGRESS
Speech Language Pathology Treatment    Patient Name:  Cindy Lyman   MRN:  [id number]  Admitting Diagnosis: Cardiac arrest    Recommendations:                 General Recommendations:  Dysphagia therapy  Diet recommendations:  NPO, Liquid Diet Level: NPO   Aspiration Precautions: Continue alternate means of nutrition and Frequent oral care   General Precautions: Standard, NPO, aspiration  Communication strategies:  provide increased time to answer    Subjective     Pt alert and awake for ST session. Pt requested to spit out secretions when therapist arrived. Pt observed to have wet/gurgly breath sounds.  Patient goals: No goals stated.    Pain/Comfort:  · Pain Rating 1: 0/10    Objective:     Has the patient been evaluated by SLP for swallowing?   Yes  Keep patient NPO? Yes   Current Respiratory Status: nasal cannula      No PO trials attempted 2/2 poor oral secretion management and wet/gurgly breath sounds.   Oral care provided and oral suctioning performed to help manage oral secretions.  Pt attempted effortful dry swallow x2 given visual cues and model with poor ability. Pt performed laryngeal excursion ex's x5 (1 set) given model with poor ability.    Assessment:     Cindy Lyman is a 82 y.o. female with dx of cardiac arrest.  She presents with severe oropharyngeal dysphagia.    Goals:   Multidisciplinary Problems     SLP Goals        Problem: SLP Goal    Goal Priority Disciplines Outcome   SLP Goal    Medium SLP Ongoing (interventions implemented as appropriate)   Description:  STGs:  1. Pt will participate in ongoing assessment of swallow function at b/s.-discontinued 10/10  2. Pt will tolerate low level PO trials without overt s/s aspiration at b/s.  3.Pt will tolerate least restrictive PO diet without overt s/s aspiration and adequate oral clearance.   -discontinued 10/10  4. Pt will successfully participate in Modified Barium Swallow study to radiographically assess swallow function, rule  out aspiration and determine safest/least restrictive diet.- MET 10/10  5. Pt will participate in trial sessions of indirect dysphagia exercises to strengthen musculature for swallowing mechanism mod effort.                          Plan:     · Patient to be seen:  2 x/week   · Plan of Care expires:  10/19/18  · Plan of Care reviewed with:  patient   · SLP Follow-Up:  Yes       Discharge recommendations:  nursing facility, skilled   Barriers to Discharge:  Level of Skilled Assistance Needed  24 hr supervision    Time Tracking:     SLP Treatment Date:   10/17/18  Speech Start Time:  0930  Speech Stop Time:  0940     Speech Total Time (min):  10 min    Billable Minutes: Treatment Swallowing Dysfunction 10 min    Vane Moscoso CCC-SLP  10/17/2018

## 2018-10-17 NOTE — NURSING
12 hour chart check complete;     Alert and talking with family at bed-side;     Patient continues on 15L non-rebreather mask;     NPO & NG tube continued;

## 2018-10-17 NOTE — SUBJECTIVE & OBJECTIVE
Interval History: patient is stable on NC O 2    Review of Systems   Respiratory: Positive for cough, shortness of breath and wheezing.    Cardiovascular: Negative for chest pain, palpitations and leg swelling.   Gastrointestinal: Negative.    Musculoskeletal: Negative.    Neurological: Positive for weakness.   Hematological: Negative.    Psychiatric/Behavioral: The patient is nervous/anxious.      Objective:     Vital Signs (Most Recent):  Temp: 98.9 °F (37.2 °C) (10/17/18 0750)  Pulse: 94 (10/17/18 0750)  Resp: 18 (10/17/18 0750)  BP: (!) 186/86 (10/17/18 0800)  SpO2: 98 % (10/17/18 0750) Vital Signs (24h Range):  Temp:  [98 °F (36.7 °C)-98.9 °F (37.2 °C)] 98.9 °F (37.2 °C)  Pulse:  [69-94] 94  Resp:  [17-20] 18  SpO2:  [96 %-100 %] 98 %  BP: (139-232)/(62-97) 186/86     Weight: 95.5 kg (210 lb 8.6 oz)  Body mass index is 35.04 kg/m².    Intake/Output Summary (Last 24 hours) at 10/17/2018 1127  Last data filed at 10/17/2018 0548  Gross per 24 hour   Intake 200 ml   Output --   Net 200 ml      Physical Exam   Constitutional: She is oriented to person, place, and time. She appears well-developed. She appears distressed.   Eyes: Conjunctivae and EOM are normal. Pupils are equal, round, and reactive to light.   Cardiovascular: Regular rhythm.   tachycardia   Pulmonary/Chest: She has wheezes. She has no rales.   Using accessory muscles  Speaking in short words   Abdominal: Soft. Bowel sounds are normal.   Musculoskeletal: Normal range of motion. She exhibits edema (BUE).   Neurological: She is alert and oriented to person, place, and time.   Skin: Skin is warm. Capillary refill takes less than 2 seconds. She is diaphoretic.   Psychiatric:   Somewhat anxious   Nursing note and vitals reviewed.      Significant Labs: All pertinent labs within the past 24 hours have been reviewed.    Significant Imaging: I have reviewed all pertinent imaging results/findings within the past 24 hours.  I have reviewed and interpreted all  pertinent imaging results/findings within the past 24 hours.

## 2018-10-17 NOTE — PLAN OF CARE
Problem: Physical Therapy Goal  Goal: Physical Therapy Goal  Goals to be met by: 10/20/2018     Patient will increase functional independence with mobility by performin. Supine to sit with Stand-by Assistance  2. Sit to supine with Stand-by Assistance  3. Sitting at edge of bed x15 minutes with Modified [place]  4. Lower extremity exercise program x20 reps per handout, with supervision.  5. Transfers - TBD at later time.  6. Gait - TBD at later time.    Outcome: Unable to achieve outcome(s) by discharge  Initial recommendation was for SNF, but patient being D/C'd with hospice care.  Pt unable to progress toward PT goals at this time.  Family training completed for ROM exercises and family has been compliant with program.  PT to sign off.

## 2018-10-17 NOTE — PROGRESS NOTES
Pt care assumed, pt aaox4 with no c/o of pain or anydiscomfort . Talking with family at bedside. Pt with 100 percent NRB mask in place. NG tube to rt nare with clamped feeding on hold at present. Rt IJ tlc in place dsg is intact and dry. Saline lock to lt ac in place. Pt with generalized edema in place. Pt is incontinent of urine and bowel diaper in place pt has teds and zone air bot in place. Pt and family informed of plan of care this shift. Pt safety maintained with bed alarm.bedside safety camera in use.

## 2018-10-17 NOTE — PT/OT/SLP DISCHARGE
Physical Therapy Discharge Summary    Name: Cindy Lyman  MRN: [id number]   Principal Problem: Cardiac arrest     Patient Discharged from acute Physical Therapy on 10/17/2018.  Please refer to prior PT noted date on 10/12/2018 for functional status.     Assessment:     Patient appropriate for care in another setting.    Objective:     GOALS:   Multidisciplinary Problems     Physical Therapy Goals        Problem: Physical Therapy Goal    Goal Priority Disciplines Outcome Goal Variances Interventions   Physical Therapy Goal     PT, PT/OT Unable to achieve outcome(s) by discharge     Description:  Goals to be met by: 10/20/2018     Patient will increase functional independence with mobility by performin. Supine to sit with Stand-by Assistance  2. Sit to supine with Stand-by Assistance  3. Sitting at edge of bed x15 minutes with Modified [place]  4. Lower extremity exercise program x20 reps per handout, with supervision.  5. Transfers - TBD at later time.  6. Gait - TBD at later time.                      Reasons for Discontinuation of Therapy Services  Transfer to alternate level of care.      Plan:     Patient Discharged to: Palliative Care/Hospice.    Jessica Villarreal, PT  10/17/2018

## 2018-10-17 NOTE — ASSESSMENT & PLAN NOTE
Etiology is unclear, but PEA reported by first repsonders. Quick ROSC with chest compressions and one round of epinephrine  Was intubated. Is now extubated and on supplemental O2. Mentation appropriate  ECHO  -  +DD  Pacemaker interrogated. Working well   [facility] - 10/9- stents RCA are patent. No intervention. Med therapy only   Plavix currently on hold for PEG. Is on ASA and BB. BP very difficult to control.  Plan going to hospice,but still is full code,spoked with family,palliative dare on case as well.will asses again

## 2018-10-17 NOTE — NURSING
Report received from BRITNEY Toro. Patient resting quietly, awake upon verbal stimuli. NG tube in place, no tube feeding running. Stopped per previous shift. Safety maintained. Will continue to monitor.

## 2018-10-17 NOTE — PLAN OF CARE
Problem: Pressure Ulcer Risk (Villa Scale) (Adult,Obstetrics,Pediatric)  Goal: Identify Related Risk Factors and Signs and Symptoms  Related risk factors and signs and symptoms are identified upon initiation of Human Response Clinical Practice Guideline (CPG)   10/16/18 1925   Pressure Ulcer Risk (Villa Scale)   Related Risk Factors (Pressure Ulcer Risk (Villa Scale)) age extremes;body weight extremes;excretions/secretions;hospitalization prolonged;medication;mobility impaired;tissue perfusion altered     Goal: Skin Integrity  Patient will demonstrate the desired outcomes by discharge/transition of care.   10/16/18 1925   Pressure Ulcer Risk (Villa Scale) (Adult,Obstetrics,Pediatric)   Skin Integrity making progress toward outcome

## 2018-10-17 NOTE — UM SECONDARY REVIEW
Physician Advisor Internal    IP Extended Stay > 10    LOS: approved an agreement with D/C plan INPATIENT HOSPICE FOLLOWING PEG PLACEMENT ON FRIDAY

## 2018-10-17 NOTE — PROGRESS NOTES
The patient states needs to constantly spit, but the aid states she did not have saliva and only has the sensation.  She did not have any new issues overnight.    Vitals:    10/16/18 2140 10/16/18 2339 10/17/18 0431 10/17/18 0526   BP: (!) 198/88 139/62 (!) 212/73 (!) 185/62   BP Location:       Patient Position:       Pulse: 77 70 82    Resp:  17 18    Temp:  98.5 °F (36.9 °C) 98.5 °F (36.9 °C)    TempSrc:  Axillary Axillary    SpO2:  100% 99%    Weight:   95.5 kg (210 lb 8.6 oz)    Height:          aspirin  81 mg Per NG tube Daily    bimatoprost  1 drop Both Eyes QHS    cloNIDine  0.3 mg Oral TID    gabapentin  100 mg Per NG tube TID    guaifenesin 100 mg/5 ml  200 mg Per NG tube Q8H    hydrALAZINE  100 mg Per NG tube Q8H    ipratropium  0.5 mg Nebulization Q8H WA    isosorbide dinitrate  20 mg Per NG tube TID    metoprolol tartrate  100 mg Per NG tube BID    moxifloxacin  400 mg Per NG tube Daily    pantoprazole  40 mg Oral Daily    rosuvastatin  10 mg Per NG tube QHS    senna-docusate 8.6-50 mg  1 tablet Oral BID     P.E.:  GEN: A x O x 3, NAD  HEENT: EOMI, PERRL, anicteric sclera  CV: RRR, no M/R/G  Chest: CTA B  Abdomen: soft, NTND, normoactive BS  Ext: No C/C/E. 2+ dorsalis pedis pulses B    Labs:  Recent Results (from the past 336 hour(s))   CBC auto differential    Collection Time: 10/17/18  5:30 AM   Result Value Ref Range    WBC 6.36 3.90 - 12.70 K/uL    Hemoglobin 9.8 (L) 12.0 - 16.0 g/dL    Hematocrit 30.4 (L) 37.0 - 48.5 %    Platelets 377 (H) 150 - 350 K/uL   CBC auto differential    Collection Time: 10/15/18  5:09 AM   Result Value Ref Range    WBC 6.04 3.90 - 12.70 K/uL    Hemoglobin 6.7 (L) 12.0 - 16.0 g/dL    Hematocrit 21.3 (L) 37.0 - 48.5 %    Platelets 354 (H) 150 - 350 K/uL   CBC auto differential    Collection Time: 10/10/18  2:15 AM   Result Value Ref Range    WBC 6.08 3.90 - 12.70 K/uL    Hemoglobin 7.0 (L) 12.0 - 16.0 g/dL    Hematocrit 21.6 (L) 37.0 - 48.5 %    Platelets 255  150 - 350 K/uL     CMP  Sodium   Date Value Ref Range Status   10/16/2018 141 136 - 145 mmol/L Final     Potassium   Date Value Ref Range Status   10/16/2018 4.5 3.5 - 5.1 mmol/L Final     Chloride   Date Value Ref Range Status   10/16/2018 111 (H) 95 - 110 mmol/L Final     CO2   Date Value Ref Range Status   10/16/2018 21 (L) 23 - 29 mmol/L Final     Glucose   Date Value Ref Range Status   10/16/2018 127 (H) 70 - 110 mg/dL Final     BUN, Bld   Date Value Ref Range Status   10/16/2018 25 (H) 8 - 23 mg/dL Final     Creatinine   Date Value Ref Range Status   10/16/2018 1.0 0.5 - 1.4 mg/dL Final     Calcium   Date Value Ref Range Status   10/16/2018 9.9 8.7 - 10.5 mg/dL Final     Total Protein   Date Value Ref Range Status   10/10/2018 5.6 (L) 6.0 - 8.4 g/dL Final     Albumin   Date Value Ref Range Status   10/10/2018 2.4 (L) 3.5 - 5.2 g/dL Final     Total Bilirubin   Date Value Ref Range Status   10/10/2018 0.3 0.1 - 1.0 mg/dL Final     Comment:     For infants and newborns, interpretation of results should be based  on gestational age, weight and in agreement with clinical  observations.  Premature Infant recommended reference ranges:  Up to 24 hours.............<8.0 mg/dL  Up to 48 hours............<12.0 mg/dL  3-5 days..................<15.0 mg/dL  6-29 days.................<15.0 mg/dL       Alkaline Phosphatase   Date Value Ref Range Status   10/10/2018 64 55 - 135 U/L Final     AST   Date Value Ref Range Status   10/10/2018 27 10 - 40 U/L Final     ALT   Date Value Ref Range Status   10/10/2018 13 10 - 44 U/L Final     Anion Gap   Date Value Ref Range Status   10/16/2018 9 8 - 16 mmol/L Final     eGFR if    Date Value Ref Range Status   10/16/2018 >60 >60 mL/min/1.73 m^2 Final     eGFR if non    Date Value Ref Range Status   10/16/2018 53 (A) >60 mL/min/1.73 m^2 Final     Comment:     Calculation used to obtain the estimated glomerular filtration  rate (eGFR) is the CKD-EPI  equation.          No results for input(s): PT, INR, APTT in the last 24 hours.    A/P:  The patient is an 82 year old woman with a history of HTN, CAD, CHF, aortic stenosis, pulmonary HTN, and deep vein thrombosis presenting s/p cardiac arrest with dysphagia.  1.  Dysphagia - plavix needs to be held for 5 days for PEG tube placement.  The nurse states this has been held since 10/14/18 and this was confirmed multiple times.  A PEG tube can be placed on Friday.  The patient received 2 units of pRBCs on 10/15/18 and her H/H corrected appropriately.  She has never had gross bleeding.

## 2018-10-18 LAB — POCT GLUCOSE: 116 MG/DL (ref 70–110)

## 2018-10-18 PROCEDURE — 94761 N-INVAS EAR/PLS OXIMETRY MLT: CPT

## 2018-10-18 PROCEDURE — G8998 SWALLOW D/C STATUS: HCPCS | Mod: CN

## 2018-10-18 PROCEDURE — 25000003 PHARM REV CODE 250: Performed by: INTERNAL MEDICINE

## 2018-10-18 PROCEDURE — G8997 SWALLOW GOAL STATUS: HCPCS | Mod: CM

## 2018-10-18 PROCEDURE — 25000242 PHARM REV CODE 250 ALT 637 W/ HCPCS: Performed by: INTERNAL MEDICINE

## 2018-10-18 PROCEDURE — 27000221 HC OXYGEN, UP TO 24 HOURS

## 2018-10-18 PROCEDURE — 25000003 PHARM REV CODE 250: Performed by: HOSPITALIST

## 2018-10-18 PROCEDURE — 94640 AIRWAY INHALATION TREATMENT: CPT

## 2018-10-18 PROCEDURE — G8996 SWALLOW CURRENT STATUS: HCPCS | Mod: CN

## 2018-10-18 PROCEDURE — 92526 ORAL FUNCTION THERAPY: CPT

## 2018-10-18 PROCEDURE — 21400001 HC TELEMETRY ROOM

## 2018-10-18 PROCEDURE — 63600175 PHARM REV CODE 636 W HCPCS: Performed by: HOSPITALIST

## 2018-10-18 RX ORDER — SODIUM CHLORIDE 0.9 % (FLUSH) 0.9 %
3 SYRINGE (ML) INJECTION
Status: DISCONTINUED | OUTPATIENT
Start: 2018-10-18 | End: 2018-10-19

## 2018-10-18 RX ADMIN — DOCUSATE SODIUM AND SENNOSIDES 1 TABLET: 8.6; 5 TABLET, FILM COATED ORAL at 09:10

## 2018-10-18 RX ADMIN — GABAPENTIN 100 MG: 100 CAPSULE ORAL at 11:10

## 2018-10-18 RX ADMIN — HYDRALAZINE HYDROCHLORIDE 100 MG: 25 TABLET ORAL at 11:10

## 2018-10-18 RX ADMIN — CLONIDINE HYDROCHLORIDE 0.3 MG: 0.1 TABLET ORAL at 09:10

## 2018-10-18 RX ADMIN — METOPROLOL TARTRATE 100 MG: 50 TABLET ORAL at 09:10

## 2018-10-18 RX ADMIN — ISOSORBIDE DINITRATE 20 MG: 20 TABLET ORAL at 09:10

## 2018-10-18 RX ADMIN — ROSUVASTATIN CALCIUM 10 MG: 10 TABLET, FILM COATED ORAL at 11:10

## 2018-10-18 RX ADMIN — IPRATROPIUM BROMIDE 0.5 MG: 0.5 SOLUTION RESPIRATORY (INHALATION) at 07:10

## 2018-10-18 RX ADMIN — LABETALOL HYDROCHLORIDE 10 MG: 5 INJECTION, SOLUTION INTRAVENOUS at 01:10

## 2018-10-18 RX ADMIN — GABAPENTIN 100 MG: 100 CAPSULE ORAL at 03:10

## 2018-10-18 RX ADMIN — HYDRALAZINE HYDROCHLORIDE 100 MG: 25 TABLET ORAL at 06:10

## 2018-10-18 RX ADMIN — DOCUSATE SODIUM AND SENNOSIDES 1 TABLET: 8.6; 5 TABLET, FILM COATED ORAL at 11:10

## 2018-10-18 RX ADMIN — ISOSORBIDE DINITRATE 20 MG: 20 TABLET ORAL at 11:10

## 2018-10-18 RX ADMIN — CLONIDINE HYDROCHLORIDE 0.3 MG: 0.1 TABLET ORAL at 03:10

## 2018-10-18 RX ADMIN — MOXIFLOXACIN HYDROCHLORIDE 400 MG: 400 TABLET, FILM COATED ORAL at 09:10

## 2018-10-18 RX ADMIN — GUAIFENESIN 200 MG: 200 SOLUTION ORAL at 06:10

## 2018-10-18 RX ADMIN — PANTOPRAZOLE SODIUM 40 MG: 40 TABLET, DELAYED RELEASE ORAL at 09:10

## 2018-10-18 RX ADMIN — HYDRALAZINE HYDROCHLORIDE 100 MG: 25 TABLET ORAL at 03:10

## 2018-10-18 RX ADMIN — ASPIRIN 81 MG: 81 TABLET, COATED ORAL at 09:10

## 2018-10-18 RX ADMIN — IPRATROPIUM BROMIDE 0.5 MG: 0.5 SOLUTION RESPIRATORY (INHALATION) at 04:10

## 2018-10-18 RX ADMIN — OXYCODONE HYDROCHLORIDE 5 MG: 5 SOLUTION ORAL at 03:10

## 2018-10-18 RX ADMIN — CLONIDINE HYDROCHLORIDE 0.3 MG: 0.1 TABLET ORAL at 11:10

## 2018-10-18 RX ADMIN — OXYCODONE HYDROCHLORIDE 5 MG: 5 SOLUTION ORAL at 09:10

## 2018-10-18 RX ADMIN — GABAPENTIN 100 MG: 100 CAPSULE ORAL at 09:10

## 2018-10-18 RX ADMIN — BIMATOPROST 1 DROP: 0.1 SOLUTION/ DROPS OPHTHALMIC at 11:10

## 2018-10-18 RX ADMIN — OXYCODONE HYDROCHLORIDE 5 MG: 5 SOLUTION ORAL at 11:10

## 2018-10-18 RX ADMIN — METOPROLOL TARTRATE 100 MG: 50 TABLET ORAL at 11:10

## 2018-10-18 RX ADMIN — ISOSORBIDE DINITRATE 20 MG: 20 TABLET ORAL at 03:10

## 2018-10-18 NOTE — NURSING
Report received from Karime TAN and Alayna RN. Pt awake and alert. Respirations even and unlabored. NAD noted. Telemetry monitor in use, alarm set. Triple lumen catheter noted to right jugular, intact. O2 at 3L nasal cannula noted. NJ-tube noted intact. Suction equipment maintained at the bedside. Bed in lowest position. Call light within reach. Will continue to monitor.

## 2018-10-18 NOTE — PROGRESS NOTES
[facility] Hospital Medicine  Progress Note    Patient Name: Cindy Lyman  MRN: [id number]  Patient Class: IP- Inpatient   Admission Date: 9/30/2018  Length of Stay: 18 days  Attending Physician: Ashley Moreno MD  Primary Care Provider: Rodger Camarena MD        Subjective:     Principal Problem:Cardiac arrest    HPI:  81 yo female with diastolic CHF, COPD, gastric cancer, aortic stenosis with complete heart block and s/p TAVR and pacemaker placed 7/2018 presented in cardiac arrest. Per ED notes, patient was on her way to [place] with others and c/o chest pain. CPR was initiated in parked vehicle outside ED. One round of epinephrine given and got ROSC. Per family, she c/o worsening SOB over several days. On arrival pt was euthermic, hypertensive, elevated lactate and BNP. CXR suggest pulmonary edema though infection cannot be ruled out. Pt intubated and evaluated by pulmonology. Cardiology consulted. ECHO pending. Broad spectrum antibiotics initiated until cultures result and clinical course dictates.     Chest CTA negative for pulmonary emboli.     Per family, Pt is a full code.     Hospital Course:  81 yo female with diastolic CHF, COPD, gastric cancer, aortic stenosis with complete heart block and s/p TAVR and pacemaker placed 7/2018 presented in cardiac arrest. Per ED notes, patient was on her way to [place] with others and c/o chest pain. CPR was initiated in parked vehicle outside ED. One round of epinephrine given and got ROSC. Per family, she c/o worsening SOB over several days. On arrival pt was euthermic, hypertensive, elevated lactate and BNP. CXR suggest pulmonary edema though infection cannot be ruled out. Pt was  intubated and evaluated by pulmonology. Cardiology consulted. Broad spectrum antibiotics initiated until cultures result and clinical course dictates.   Chest CTA negative for pulmonary emboli.   Per family, Pt is a full code.   Duo to severe medical condition  of patient,p;naveen is going to hospice.  She failed swallow study,plan for PEG tube on in AM.    Interval History: patient is stable on NC O 2    Review of Systems   Respiratory: Positive for cough. Negative for shortness of breath and wheezing.    Cardiovascular: Negative for chest pain, palpitations and leg swelling.   Gastrointestinal: Negative.    Musculoskeletal: Negative.    Neurological: Positive for weakness.   Hematological: Negative.    Psychiatric/Behavioral: The patient is not nervous/anxious.      Objective:     Vital Signs (Most Recent):  Temp: 98.8 °F (37.1 °C) (10/18/18 0825)  Pulse: 89 (10/18/18 0825)  Resp: 18 (10/18/18 0825)  BP: (!) 151/71 (10/18/18 0825)  SpO2: 98 % (10/18/18 0825) Vital Signs (24h Range):  Temp:  [97.9 °F (36.6 °C)-98.9 °F (37.2 °C)] 98.8 °F (37.1 °C)  Pulse:  [70-89] 89  Resp:  [16-20] 18  SpO2:  [94 %-100 %] 98 %  BP: (116-200)/(57-88) 151/71     Weight: 95.5 kg (210 lb 8.6 oz)  Body mass index is 35.04 kg/m².    Intake/Output Summary (Last 24 hours) at 10/18/2018 1047  Last data filed at 10/18/2018 0630  Gross per 24 hour   Intake 740 ml   Output --   Net 740 ml      Physical Exam   Constitutional: She is oriented to person, place, and time. She appears well-developed. No distress.   Eyes: Conjunctivae and EOM are normal. Pupils are equal, round, and reactive to light.   Cardiovascular: Regular rhythm.   tachycardia   Pulmonary/Chest: She has no wheezes. She has no rales.   Using accessory muscles  Speaking in short words   Abdominal: Soft. Bowel sounds are normal.   Musculoskeletal: Normal range of motion. She exhibits no edema (BUE).   Neurological: She is alert and oriented to person, place, and time.   Skin: Skin is warm. Capillary refill takes less than 2 seconds. She is not diaphoretic.   Psychiatric:   Somewhat anxious   Nursing note and vitals reviewed.      Significant Labs: All pertinent labs within the past 24 hours have been reviewed.    Significant Imaging: I have  reviewed all pertinent imaging results/findings within the past 24 hours.  I have reviewed and interpreted all pertinent imaging results/findings within the past 24 hours.    Assessment/Plan:      * Cardiac arrest    Etiology is unclear, but PEA reported by first repsonders. Quick ROSC with chest compressions and one round of epinephrine  Was intubated. Is now extubated and on supplemental O2. Mentation appropriate  ECHO  -  +DD  Pacemaker interrogated. Working well   [facility] - 10/9- stents RCA are patent. No intervention. Med therapy only   Plavix currently on hold for PEG. Is on ASA and BB. BP very difficult to control.  Plan going to hospice,but still is full code,spoked with family,palliative dare on case as well.will asses again        CAD s/p PCI    Per [facility], coronaries are patent, including area where stent is in place  On ASA and BB. Plavix on hold for PEG. Will resume statin       Debility    Not pursuing SNF but rather inpatient hospice after PEG is placed for dysphagia             Palliative care encounter    Pt and family wants peg tube  GI consulted. Plan is for Friday once plavix had been on hold for 5 days        Hematuria, gross    Appreciate Urology input.  Held lovenox  Urine clear  No briceno in place       Pacemaker    See above        Acute hypoxemic respiratory failure    Again with respiratory distress and hypoxia today. This started about 30 minutes after 2nd unit of blood  This indicated pulmonary edema is likely culprit, as it happened on admission  Also very hypertensive. Flash pulmonary edema considered.  Will give a dose of lasix 40 mg IV now, change NC to non rebreather (BiPAP not recommended given oral secretions requiring frequent suctioning), duo-nebs, elevate HOB > 45 degrees and obtain CXR   Treat BP           Acute pulmonary edema    As above         S/P TAVR (transcatheter aortic valve replacement)    No acute issues        Stage 3 chronic kidney disease    Stable.         Essential hypertension    Very difficult to control  Treating respiratory distress and adjusting antihypertensive regimen for better control       Anemia of chronic disease    S/p 2U of PRBCs. Unknown decrease in Hgb but may be from multiple blood draws during hospital stay  Pending repeat CBC post PRBC transfusion  Will then limit blood draws to avoid this from happening again       Hyperlipidemia    Resume statin          VTE Risk Mitigation (From admission, onward)        Ordered     Place sequential compression device  Until discontinued      10/05/18 1249     Place LAUREANO hose  Until discontinued      10/05/18 1249     IP VTE HIGH RISK PATIENT  Once      09/30/18 1359              Ashley Moreno MD  Department of Hospital Medicine   [facility]

## 2018-10-18 NOTE — PLAN OF CARE
Problem: Pressure Ulcer Risk (Villa Scale) (Adult,Obstetrics,Pediatric)  Intervention: Promote/Optimize Nutrition   10/17/18 1930   Nutrition Interventions   Oral Nutrition Promotion rest periods promoted     Intervention: Prevent/Manage Excess Moisture   10/18/18 0829   Skin Interventions   Skin Protection Incontinence pads   Hygiene Care   Perineal Care diaper changed   Bathing/Skin Care incontinence care     Intervention: Maintain Head of Bed Elevation Less Than 30 Degrees as Tolerated   10/18/18 1800   Positioning   Head of Bed (HOB) HOB at 30-45 degrees     Intervention: Prevent/Minimize Sheer/Friction Injuries   10/18/18 0829 10/18/18 1800   Skin Interventions   Pressure Reduction Techniques frequent weight shift encouraged;heels elevated off bed;pressure points protected --    Positioning   Positioning/Transfer Devices --  in use;pillows     Intervention: Turn/Reposition Often   10/18/18 0829 10/18/18 1809   Skin Interventions   Pressure Reduction Techniques frequent weight shift encouraged;heels elevated off bed;pressure points protected --    Positioning   Body Position --  side-lying, right;side-lying, left;upper extremity elevated, left;weight shift assistance provided       Goal: Identify Related Risk Factors and Signs and Symptoms  Related risk factors and signs and symptoms are identified upon initiation of Human Response Clinical Practice Guideline (CPG)   10/17/18 1703   Pressure Ulcer Risk (Villa Scale)   Related Risk Factors (Pressure Ulcer Risk (Villa Scale)) age extremes;body weight extremes;excretions/secretions;medication;tissue perfusion altered;mobility impaired;nutritional deficiencies     Goal: Skin Integrity  Patient will demonstrate the desired outcomes by discharge/transition of care.   10/18/18 1809   Pressure Ulcer Risk (Villa Scale) (Adult,Obstetrics,Pediatric)   Skin Integrity making progress toward outcome

## 2018-10-18 NOTE — PLAN OF CARE
Problem: SLP Goal  Goal: SLP Goal  STGs:  1. Pt will participate in ongoing assessment of swallow function at b/s.-discontinued 10/10  2. Pt will tolerate low level PO trials without overt s/s aspiration at b/s.- d/c 10/18  3.Pt will tolerate least restrictive PO diet without overt s/s aspiration and adequate oral clearance.   -discontinued 10/10  4. Pt will successfully participate in Modified Barium Swallow study to radiographically assess swallow function, rule out aspiration and determine safest/least restrictive diet.- MET 10/10  5. Pt will participate in trial sessions of indirect dysphagia exercises to strengthen musculature for swallowing mechanism mod effort. -d/c 10/18        Outcome: Unable to achieve outcome(s) by discharge  Pt remains lethargic and unsafe for PO nutrition at this time. No further ST is warranted at this time. Pt unable to functionally participate in dysphagia ex's to improve [facility] functioning.

## 2018-10-18 NOTE — NURSING
Report received from NOREEN Costa. Patient resting quietly, awake upon verbal stimuli. Safety maintained. Will continue to monitor.

## 2018-10-18 NOTE — PROGRESS NOTES
No acute events overnight.  Tolerating TF's.  Planning on EGD/PEG tomorrow, as needed 5 days off of Plavix prior to placement.   Hold TF's after MN.  Check Coags in AM.

## 2018-10-18 NOTE — PLAN OF CARE
Problem: Pressure Ulcer Risk (Villa Scale) (Adult,Obstetrics,Pediatric)  Intervention: Promote/Optimize Nutrition   10/17/18 1930   Nutrition Interventions   Oral Nutrition Promotion rest periods promoted     Intervention: Prevent/Manage Excess Moisture   10/18/18 0829   Skin Interventions   Skin Protection Incontinence pads   Hygiene Care   Perineal Care diaper changed   Bathing/Skin Care incontinence care     Intervention: Maintain Head of Bed Elevation Less Than 30 Degrees as Tolerated   10/18/18 0829   Positioning   Head of Bed (HOB) HOB at 30-45 degrees     Intervention: Prevent/Minimize Sheer/Friction Injuries   10/18/18 0829   Skin Interventions   Pressure Reduction Devices Pressure-redistributing mattress utilized   Pressure Reduction Techniques frequent weight shift encouraged;heels elevated off bed;pressure points protected   Positioning   Positioning/Transfer Devices pillows     Intervention: Turn/Reposition Often   10/18/18 0829   Skin Interventions   Pressure Reduction Techniques frequent weight shift encouraged;heels elevated off bed;pressure points protected   Positioning   Body Position side-lying, right         Comments: Pt remains free of pressure ulcer. Pt turned q2 hrs. Incontinence care q2 hrs.

## 2018-10-18 NOTE — PT/OT/SLP PROGRESS
Speech Language Pathology Treatment    Patient Name:  Cindy Lyman   MRN:  [id number]  Admitting Diagnosis: Cardiac arrest    Recommendations:                 General Recommendations:  Follow-up not indicated  Diet recommendations:  NPO, Liquid Diet Level: NPO   Aspiration Precautions: Alternate means of nutrition/hydration   General Precautions: Standard, aspiration, NPO  Communication strategies:  provide increased time to answer    Subjective     Pt remains lethargic and minimally awake during ST session.  Patient goals: No goals stated today.    Pain/Comfort:  · Pain Rating Post-Intervention 1: 0/10    Objective:     Has the patient been evaluated by SLP for swallowing?   Yes  Keep patient NPO? Yes   Current Respiratory Status: nasal cannula      Oral care provided and suctioning to help pt manage oral secretions. Pt unable to functionally perform laryngeal strengthening ex's or OME.     Assessment:     Cindy Lyman is a 82 y.o. female with dx of cardiac arrest.  She presents with severe oropharyngeal dysphagia. Pt continues to exhibit difficulty managing own secretions. Pt remains unsafe for PO trials/nutrition. No further ST is indicated at this time.    Goals:   Multidisciplinary Problems     SLP Goals        Problem: SLP Goal    Goal Priority Disciplines Outcome   SLP Goal    Medium SLP Unable to achieve outcome(s) by discharge   Description:  STGs:  1. Pt will participate in ongoing assessment of swallow function at b/s.-discontinued 10/10  2. Pt will tolerate low level PO trials without overt s/s aspiration at b/s.- d/c 10/18  3.Pt will tolerate least restrictive PO diet without overt s/s aspiration and adequate oral clearance.   -discontinued 10/10  4. Pt will successfully participate in Modified Barium Swallow study to radiographically assess swallow function, rule out aspiration and determine safest/least restrictive diet.- MET 10/10  5. Pt will participate in trial sessions of indirect  dysphagia exercises to strengthen musculature for swallowing mechanism mod effort. -d/c 10/18                          Plan:     · Patient to be seen:  D/c from ST services  · Plan of Care expires:  10/18/18  · Plan of Care reviewed with:  patient   · SLP Follow-Up:  No       Discharge recommendations:  nursing facility, skilled   Barriers to Discharge:  Level of Skilled Assistance Needed 24 hr supervision    Time Tracking:     SLP Treatment Date:   10/18/18  Speech Start Time:  1000  Speech Stop Time:  1011     Speech Total Time (min):  11 min    Billable Minutes: Treatment Swallowing Dysfunction 11 min    Vane Moscoso CCC-SLP  10/18/2018

## 2018-10-18 NOTE — SUBJECTIVE & OBJECTIVE
Interval History: patient is stable on NC O 2    Review of Systems   Respiratory: Positive for cough. Negative for shortness of breath and wheezing.    Cardiovascular: Negative for chest pain, palpitations and leg swelling.   Gastrointestinal: Negative.    Musculoskeletal: Negative.    Neurological: Positive for weakness.   Hematological: Negative.    Psychiatric/Behavioral: The patient is not nervous/anxious.      Objective:     Vital Signs (Most Recent):  Temp: 98.8 °F (37.1 °C) (10/18/18 0825)  Pulse: 89 (10/18/18 0825)  Resp: 18 (10/18/18 0825)  BP: (!) 151/71 (10/18/18 0825)  SpO2: 98 % (10/18/18 0825) Vital Signs (24h Range):  Temp:  [97.9 °F (36.6 °C)-98.9 °F (37.2 °C)] 98.8 °F (37.1 °C)  Pulse:  [70-89] 89  Resp:  [16-20] 18  SpO2:  [94 %-100 %] 98 %  BP: (116-200)/(57-88) 151/71     Weight: 95.5 kg (210 lb 8.6 oz)  Body mass index is 35.04 kg/m².    Intake/Output Summary (Last 24 hours) at 10/18/2018 1047  Last data filed at 10/18/2018 0630  Gross per 24 hour   Intake 740 ml   Output --   Net 740 ml      Physical Exam   Constitutional: She is oriented to person, place, and time. She appears well-developed. No distress.   Eyes: Conjunctivae and EOM are normal. Pupils are equal, round, and reactive to light.   Cardiovascular: Regular rhythm.   tachycardia   Pulmonary/Chest: She has no wheezes. She has no rales.   Using accessory muscles  Speaking in short words   Abdominal: Soft. Bowel sounds are normal.   Musculoskeletal: Normal range of motion. She exhibits no edema (BUE).   Neurological: She is alert and oriented to person, place, and time.   Skin: Skin is warm. Capillary refill takes less than 2 seconds. She is not diaphoretic.   Psychiatric:   Somewhat anxious   Nursing note and vitals reviewed.      Significant Labs: All pertinent labs within the past 24 hours have been reviewed.    Significant Imaging: I have reviewed all pertinent imaging results/findings within the past 24 hours.  I have reviewed and  interpreted all pertinent imaging results/findings within the past 24 hours.

## 2018-10-18 NOTE — PROGRESS NOTES
TN contacted Coby at [facility] to inquire if patient would be able to transfer to their facility over the weekend. Per Coby, patient will be able to transfer on Saturday if its been at least 24 hours after placing the Peg tube.     Cboy will schedule a meeting with patient's family tomorrow to sign consents.

## 2018-10-19 ENCOUNTER — ANESTHESIA (OUTPATIENT)
Dept: ENDOSCOPY | Facility: HOSPITAL | Age: 83
DRG: 208 | End: 2018-10-19
Payer: MEDICARE

## 2018-10-19 ENCOUNTER — ANESTHESIA EVENT (OUTPATIENT)
Dept: ENDOSCOPY | Facility: HOSPITAL | Age: 83
End: 2018-10-19

## 2018-10-19 ENCOUNTER — ANESTHESIA EVENT (OUTPATIENT)
Dept: ENDOSCOPY | Facility: HOSPITAL | Age: 83
DRG: 208 | End: 2018-10-19
Payer: MEDICARE

## 2018-10-19 LAB
ALBUMIN SERPL BCP-MCNC: 2.6 G/DL
ALP SERPL-CCNC: 80 U/L
ALT SERPL W/O P-5'-P-CCNC: 19 U/L
ANION GAP SERPL CALC-SCNC: 4 MMOL/L
APTT BLDCRRT: 23 SEC
AST SERPL-CCNC: 25 U/L
BASOPHILS # BLD AUTO: 0.02 K/UL
BASOPHILS # BLD AUTO: 0.02 K/UL
BASOPHILS NFR BLD: 0.4 %
BASOPHILS NFR BLD: 0.4 %
BILIRUB SERPL-MCNC: 0.4 MG/DL
BLD PROD TYP BPU: NORMAL
BLOOD UNIT EXPIRATION DATE: NORMAL
BLOOD UNIT TYPE CODE: 6200
BLOOD UNIT TYPE: NORMAL
BUN SERPL-MCNC: 30 MG/DL
CALCIUM SERPL-MCNC: 9.1 MG/DL
CHLORIDE SERPL-SCNC: 109 MMOL/L
CO2 SERPL-SCNC: 29 MMOL/L
CODING SYSTEM: NORMAL
CREAT SERPL-MCNC: 1 MG/DL
DIFFERENTIAL METHOD: ABNORMAL
DIFFERENTIAL METHOD: ABNORMAL
DISPENSE STATUS: NORMAL
EOSINOPHIL # BLD AUTO: 0.2 K/UL
EOSINOPHIL # BLD AUTO: 0.2 K/UL
EOSINOPHIL NFR BLD: 3.8 %
EOSINOPHIL NFR BLD: 3.8 %
ERYTHROCYTE [DISTWIDTH] IN BLOOD BY AUTOMATED COUNT: 16.6 %
ERYTHROCYTE [DISTWIDTH] IN BLOOD BY AUTOMATED COUNT: 16.6 %
EST. GFR  (AFRICAN AMERICAN): >60 ML/MIN/1.73 M^2
EST. GFR  (NON AFRICAN AMERICAN): 53 ML/MIN/1.73 M^2
GLUCOSE SERPL-MCNC: 106 MG/DL
HCT VFR BLD AUTO: 27.6 %
HCT VFR BLD AUTO: 27.6 %
HGB BLD-MCNC: 8.8 G/DL
HGB BLD-MCNC: 8.8 G/DL
INR PPP: 1
LYMPHOCYTES # BLD AUTO: 1.2 K/UL
LYMPHOCYTES # BLD AUTO: 1.2 K/UL
LYMPHOCYTES NFR BLD: 23.2 %
LYMPHOCYTES NFR BLD: 23.2 %
MCH RBC QN AUTO: 29.2 PG
MCH RBC QN AUTO: 29.2 PG
MCHC RBC AUTO-ENTMCNC: 31.9 G/DL
MCHC RBC AUTO-ENTMCNC: 31.9 G/DL
MCV RBC AUTO: 92 FL
MCV RBC AUTO: 92 FL
MONOCYTES # BLD AUTO: 0.5 K/UL
MONOCYTES # BLD AUTO: 0.5 K/UL
MONOCYTES NFR BLD: 8.8 %
MONOCYTES NFR BLD: 8.8 %
NEUTROPHILS # BLD AUTO: 3.3 K/UL
NEUTROPHILS # BLD AUTO: 3.3 K/UL
NEUTROPHILS NFR BLD: 63.8 %
NEUTROPHILS NFR BLD: 63.8 %
PLATELET # BLD AUTO: 334 K/UL
PLATELET # BLD AUTO: 334 K/UL
PMV BLD AUTO: 10.2 FL
PMV BLD AUTO: 10.2 FL
POTASSIUM SERPL-SCNC: 4.6 MMOL/L
PROT SERPL-MCNC: 5.7 G/DL
PROTHROMBIN TIME: 10.3 SEC
RBC # BLD AUTO: 3.01 M/UL
RBC # BLD AUTO: 3.01 M/UL
SODIUM SERPL-SCNC: 142 MMOL/L
TRANS ERYTHROCYTES VOL PATIENT: NORMAL ML
WBC # BLD AUTO: 5.21 K/UL
WBC # BLD AUTO: 5.21 K/UL

## 2018-10-19 PROCEDURE — 94640 AIRWAY INHALATION TREATMENT: CPT

## 2018-10-19 PROCEDURE — 80053 COMPREHEN METABOLIC PANEL: CPT

## 2018-10-19 PROCEDURE — 85025 COMPLETE CBC W/AUTO DIFF WBC: CPT

## 2018-10-19 PROCEDURE — 27000221 HC OXYGEN, UP TO 24 HOURS

## 2018-10-19 PROCEDURE — 37000008 HC ANESTHESIA 1ST 15 MINUTES: Performed by: INTERNAL MEDICINE

## 2018-10-19 PROCEDURE — 25000003 PHARM REV CODE 250: Performed by: HOSPITALIST

## 2018-10-19 PROCEDURE — 25000003 PHARM REV CODE 250: Performed by: INTERNAL MEDICINE

## 2018-10-19 PROCEDURE — 37000009 HC ANESTHESIA EA ADD 15 MINS: Performed by: INTERNAL MEDICINE

## 2018-10-19 PROCEDURE — 25000242 PHARM REV CODE 250 ALT 637 W/ HCPCS: Performed by: INTERNAL MEDICINE

## 2018-10-19 PROCEDURE — 85610 PROTHROMBIN TIME: CPT

## 2018-10-19 PROCEDURE — 94761 N-INVAS EAR/PLS OXIMETRY MLT: CPT

## 2018-10-19 PROCEDURE — D9220A PRA ANESTHESIA: Mod: CRNA,,, | Performed by: NURSE ANESTHETIST, CERTIFIED REGISTERED

## 2018-10-19 PROCEDURE — 63600175 PHARM REV CODE 636 W HCPCS: Performed by: HOSPITALIST

## 2018-10-19 PROCEDURE — 27201018 HC KIT, PEG (ANY): Performed by: INTERNAL MEDICINE

## 2018-10-19 PROCEDURE — D9220A PRA ANESTHESIA: Mod: ANES,,, | Performed by: ANESTHESIOLOGY

## 2018-10-19 PROCEDURE — 85730 THROMBOPLASTIN TIME PARTIAL: CPT

## 2018-10-19 PROCEDURE — 43246 EGD PLACE GASTROSTOMY TUBE: CPT | Performed by: INTERNAL MEDICINE

## 2018-10-19 PROCEDURE — 21400001 HC TELEMETRY ROOM

## 2018-10-19 PROCEDURE — 63600175 PHARM REV CODE 636 W HCPCS: Performed by: INTERNAL MEDICINE

## 2018-10-19 PROCEDURE — 63600175 PHARM REV CODE 636 W HCPCS: Performed by: NURSE ANESTHETIST, CERTIFIED REGISTERED

## 2018-10-19 PROCEDURE — 25000003 PHARM REV CODE 250: Performed by: NURSE ANESTHETIST, CERTIFIED REGISTERED

## 2018-10-19 PROCEDURE — 0DH63UZ INSERTION OF FEEDING DEVICE INTO STOMACH, PERCUTANEOUS APPROACH: ICD-10-PCS | Performed by: INTERNAL MEDICINE

## 2018-10-19 RX ORDER — ONDANSETRON 2 MG/ML
INJECTION INTRAMUSCULAR; INTRAVENOUS
Status: COMPLETED
Start: 2018-10-19 | End: 2018-10-19

## 2018-10-19 RX ORDER — ONDANSETRON 2 MG/ML
INJECTION INTRAMUSCULAR; INTRAVENOUS
Status: DISCONTINUED | OUTPATIENT
Start: 2018-10-19 | End: 2018-10-19

## 2018-10-19 RX ORDER — PROPOFOL 10 MG/ML
VIAL (ML) INTRAVENOUS
Status: COMPLETED
Start: 2018-10-19 | End: 2018-10-19

## 2018-10-19 RX ORDER — LIDOCAINE HCL/PF 100 MG/5ML
SYRINGE (ML) INTRAVENOUS
Status: DISCONTINUED | OUTPATIENT
Start: 2018-10-19 | End: 2018-10-19

## 2018-10-19 RX ORDER — VANCOMYCIN HCL IN 5 % DEXTROSE 1G/250ML
1000 PLASTIC BAG, INJECTION (ML) INTRAVENOUS ONCE
Status: COMPLETED | OUTPATIENT
Start: 2018-10-19 | End: 2018-10-19

## 2018-10-19 RX ORDER — LIDOCAINE HYDROCHLORIDE 20 MG/ML
INJECTION, SOLUTION EPIDURAL; INFILTRATION; INTRACAUDAL; PERINEURAL
Status: DISPENSED
Start: 2018-10-19 | End: 2018-10-20

## 2018-10-19 RX ORDER — PROPOFOL 10 MG/ML
VIAL (ML) INTRAVENOUS
Status: DISCONTINUED | OUTPATIENT
Start: 2018-10-19 | End: 2018-10-19

## 2018-10-19 RX ORDER — SODIUM CHLORIDE 9 MG/ML
INJECTION, SOLUTION INTRAVENOUS CONTINUOUS
Status: DISCONTINUED | OUTPATIENT
Start: 2018-10-19 | End: 2018-10-19

## 2018-10-19 RX ADMIN — Medication 1000 MG: at 01:10

## 2018-10-19 RX ADMIN — HYDRALAZINE HYDROCHLORIDE 100 MG: 25 TABLET ORAL at 09:10

## 2018-10-19 RX ADMIN — IPRATROPIUM BROMIDE 0.5 MG: 0.5 SOLUTION RESPIRATORY (INHALATION) at 07:10

## 2018-10-19 RX ADMIN — OXYCODONE HYDROCHLORIDE 5 MG: 5 SOLUTION ORAL at 09:10

## 2018-10-19 RX ADMIN — DOCUSATE SODIUM AND SENNOSIDES 1 TABLET: 8.6; 5 TABLET, FILM COATED ORAL at 09:10

## 2018-10-19 RX ADMIN — ISOSORBIDE DINITRATE 20 MG: 20 TABLET ORAL at 04:10

## 2018-10-19 RX ADMIN — CLONIDINE HYDROCHLORIDE 0.3 MG: 0.1 TABLET ORAL at 09:10

## 2018-10-19 RX ADMIN — LIDOCAINE HYDROCHLORIDE 20 MG: 20 INJECTION, SOLUTION INTRAVENOUS at 01:10

## 2018-10-19 RX ADMIN — CLONIDINE HYDROCHLORIDE 0.3 MG: 0.1 TABLET ORAL at 08:10

## 2018-10-19 RX ADMIN — ROSUVASTATIN CALCIUM 10 MG: 10 TABLET, FILM COATED ORAL at 09:10

## 2018-10-19 RX ADMIN — DOCUSATE SODIUM AND SENNOSIDES 1 TABLET: 8.6; 5 TABLET, FILM COATED ORAL at 08:10

## 2018-10-19 RX ADMIN — ACETAMINOPHEN 650 MG: 325 TABLET, FILM COATED ORAL at 04:10

## 2018-10-19 RX ADMIN — PANTOPRAZOLE SODIUM 40 MG: 40 TABLET, DELAYED RELEASE ORAL at 08:10

## 2018-10-19 RX ADMIN — ISOSORBIDE DINITRATE 20 MG: 20 TABLET ORAL at 09:10

## 2018-10-19 RX ADMIN — IPRATROPIUM BROMIDE 0.5 MG: 0.5 SOLUTION RESPIRATORY (INHALATION) at 04:10

## 2018-10-19 RX ADMIN — PROPOFOL 20 MG: 10 INJECTION, EMULSION INTRAVENOUS at 01:10

## 2018-10-19 RX ADMIN — LABETALOL HYDROCHLORIDE 10 MG: 5 INJECTION, SOLUTION INTRAVENOUS at 05:10

## 2018-10-19 RX ADMIN — METOPROLOL TARTRATE 100 MG: 50 TABLET ORAL at 08:10

## 2018-10-19 RX ADMIN — PROPOFOL 40 MG: 10 INJECTION, EMULSION INTRAVENOUS at 01:10

## 2018-10-19 RX ADMIN — MOXIFLOXACIN HYDROCHLORIDE 400 MG: 400 TABLET, FILM COATED ORAL at 08:10

## 2018-10-19 RX ADMIN — BIMATOPROST 1 DROP: 0.1 SOLUTION/ DROPS OPHTHALMIC at 09:10

## 2018-10-19 RX ADMIN — CLONIDINE HYDROCHLORIDE 0.3 MG: 0.1 TABLET ORAL at 04:10

## 2018-10-19 RX ADMIN — ONDANSETRON 4 MG: 2 INJECTION, SOLUTION INTRAMUSCULAR; INTRAVENOUS at 01:10

## 2018-10-19 RX ADMIN — GABAPENTIN 100 MG: 100 CAPSULE ORAL at 04:10

## 2018-10-19 RX ADMIN — METOPROLOL TARTRATE 100 MG: 50 TABLET ORAL at 09:10

## 2018-10-19 RX ADMIN — ISOSORBIDE DINITRATE 20 MG: 20 TABLET ORAL at 08:10

## 2018-10-19 RX ADMIN — GABAPENTIN 100 MG: 100 CAPSULE ORAL at 08:10

## 2018-10-19 RX ADMIN — ASPIRIN 81 MG: 81 TABLET, COATED ORAL at 08:10

## 2018-10-19 RX ADMIN — GABAPENTIN 100 MG: 100 CAPSULE ORAL at 09:10

## 2018-10-19 RX ADMIN — SODIUM CHLORIDE: 0.9 INJECTION, SOLUTION INTRAVENOUS at 11:10

## 2018-10-19 RX ADMIN — BENZOCAINE, BUTAMBEN, AND TETRACAINE HYDROCHLORIDE 1 SPRAY: .028; .004; .004 AEROSOL, SPRAY TOPICAL at 01:10

## 2018-10-19 NOTE — SUBJECTIVE & OBJECTIVE
Interval History: patient is stable on NC O 2    Review of Systems   Respiratory: Positive for cough. Negative for shortness of breath and wheezing.    Cardiovascular: Negative for chest pain, palpitations and leg swelling.   Gastrointestinal: Negative.    Musculoskeletal: Negative.    Neurological: Positive for weakness.   Hematological: Negative.    Psychiatric/Behavioral: The patient is not nervous/anxious.      Objective:     Vital Signs (Most Recent):  Temp: 98.8 °F (37.1 °C) (10/19/18 0713)  Pulse: 80 (10/19/18 0744)  Resp: 18 (10/19/18 0744)  BP: (!) 142/81 (10/19/18 0713)  SpO2: 98 % (10/19/18 0744) Vital Signs (24h Range):  Temp:  [96.4 °F (35.8 °C)-98.8 °F (37.1 °C)] 98.8 °F (37.1 °C)  Pulse:  [70-91] 80  Resp:  [17-20] 18  SpO2:  [97 %-100 %] 98 %  BP: (142-227)/(64-98) 142/81     Weight: 95.2 kg (209 lb 14.1 oz)  Body mass index is 34.93 kg/m².    Intake/Output Summary (Last 24 hours) at 10/19/2018 0811  Last data filed at 10/19/2018 0649  Gross per 24 hour   Intake 1319 ml   Output 0 ml   Net 1319 ml      Physical Exam   Constitutional: She is oriented to person, place, and time. She appears well-developed. No distress.   Eyes: Conjunctivae and EOM are normal. Pupils are equal, round, and reactive to light.   Cardiovascular: Regular rhythm.   tachycardia   Pulmonary/Chest: She has no wheezes. She has no rales.   Using accessory muscles  Speaking in short words   Abdominal: Soft. Bowel sounds are normal.   Musculoskeletal: Normal range of motion. She exhibits no edema (BUE).   Neurological: She is alert and oriented to person, place, and time.   Skin: Skin is warm. Capillary refill takes less than 2 seconds. She is not diaphoretic.   Psychiatric:   Somewhat anxious   Nursing note and vitals reviewed.      Significant Labs: All pertinent labs within the past 24 hours have been reviewed.    Significant Imaging: I have reviewed all pertinent imaging results/findings within the past 24 hours.  I have  reviewed and interpreted all pertinent imaging results/findings within the past 24 hours.

## 2018-10-19 NOTE — PROGRESS NOTES
Tube feeding stopped at 12:30 am. No residual noted. 216 ml taken in since shift change. All medication given as ordered and water bolus administered.

## 2018-10-19 NOTE — PROGRESS NOTES
[facility] Hospital Medicine  Progress Note    Patient Name: Cindy Lyman  MRN: [id number]  Patient Class: IP- Inpatient   Admission Date: 9/30/2018  Length of Stay: 19 days  Attending Physician: Ashley Mroeno MD  Primary Care Provider: Rodger Camarena MD        Subjective:     Principal Problem:Cardiac arrest    HPI:  81 yo female with diastolic CHF, COPD, gastric cancer, aortic stenosis with complete heart block and s/p TAVR and pacemaker placed 7/2018 presented in cardiac arrest. Per ED notes, patient was on her way to [facility] with others and c/o chest pain. CPR was initiated in parked vehicle outside ED. One round of epinephrine given and got ROSC. Per family, she c/o worsening SOB over several days. On arrival pt was euthermic, hypertensive, elevated lactate and BNP. CXR suggest pulmonary edema though infection cannot be ruled out. Pt intubated and evaluated by pulmonology. Cardiology consulted. ECHO pending. Broad spectrum antibiotics initiated until cultures result and clinical course dictates.     Chest CTA negative for pulmonary emboli.     Per family, Pt is a full code.     Hospital Course:  81 yo female with diastolic CHF, COPD, gastric cancer, aortic stenosis with complete heart block and s/p TAVR and pacemaker placed 7/2018 presented in cardiac arrest. Per ED notes, patient was on her way to [employer] with others and c/o chest pain. CPR was initiated in parked vehicle outside ED. One round of epinephrine given and got ROSC. Per family, she c/o worsening SOB over several days. On arrival pt was euthermic, hypertensive, elevated lactate and BNP. CXR suggest pulmonary edema though infection cannot be ruled out. Pt was  intubated and evaluated by pulmonology. Cardiology consulted. Broad spectrum antibiotics initiated until cultures result and clinical course dictates.   Chest CTA negative for pulmonary emboli.   Per family, Pt is a full code.   Duo to severe medical condition  of patient,pfilemon is going to hospice.  She failed swallow study,plan for PEG tube today.    Interval History: patient is stable on NC O 2    Review of Systems   Respiratory: Positive for cough. Negative for shortness of breath and wheezing.    Cardiovascular: Negative for chest pain, palpitations and leg swelling.   Gastrointestinal: Negative.    Musculoskeletal: Negative.    Neurological: Positive for weakness.   Hematological: Negative.    Psychiatric/Behavioral: The patient is not nervous/anxious.      Objective:     Vital Signs (Most Recent):  Temp: 98.8 °F (37.1 °C) (10/19/18 0713)  Pulse: 80 (10/19/18 0744)  Resp: 18 (10/19/18 0744)  BP: (!) 142/81 (10/19/18 0713)  SpO2: 98 % (10/19/18 0744) Vital Signs (24h Range):  Temp:  [96.4 °F (35.8 °C)-98.8 °F (37.1 °C)] 98.8 °F (37.1 °C)  Pulse:  [70-91] 80  Resp:  [17-20] 18  SpO2:  [97 %-100 %] 98 %  BP: (142-227)/(64-98) 142/81     Weight: 95.2 kg (209 lb 14.1 oz)  Body mass index is 34.93 kg/m².    Intake/Output Summary (Last 24 hours) at 10/19/2018 0811  Last data filed at 10/19/2018 0649  Gross per 24 hour   Intake 1319 ml   Output 0 ml   Net 1319 ml      Physical Exam   Constitutional: She is oriented to person, place, and time. She appears well-developed. No distress.   Eyes: Conjunctivae and EOM are normal. Pupils are equal, round, and reactive to light.   Cardiovascular: Regular rhythm.   tachycardia   Pulmonary/Chest: She has no wheezes. She has no rales.   Using accessory muscles  Speaking in short words   Abdominal: Soft. Bowel sounds are normal.   Musculoskeletal: Normal range of motion. She exhibits no edema (BUE).   Neurological: She is alert and oriented to person, place, and time.   Skin: Skin is warm. Capillary refill takes less than 2 seconds. She is not diaphoretic.   Psychiatric:   Somewhat anxious   Nursing note and vitals reviewed.      Significant Labs: All pertinent labs within the past 24 hours have been reviewed.    Significant Imaging: I  have reviewed all pertinent imaging results/findings within the past 24 hours.  I have reviewed and interpreted all pertinent imaging results/findings within the past 24 hours.    Assessment/Plan:      * Cardiac arrest    Etiology is unclear, but PEA reported by first repsonders. Quick ROSC with chest compressions and one round of epinephrine  Was intubated. Is now extubated and on supplemental O2. Mentation appropriate  ECHO  -  +DD  Pacemaker interrogated. Working well   [facility] - 10/9- stents RCA are patent. No intervention. Med therapy only   Plavix currently on hold for PEG. Is on ASA and BB. BP very difficult to control.  Plan going to hospice,but still is full code,spoked with family,palliative dare on case as well.will asses again ,will be DNR after procedure and before go to hospice.       CAD s/p PCI    Per [facility], coronaries are patent, including area where stent is in place  On ASA and BB. Plavix on hold for PEG. Will resume statin       Debility    Not pursuing SNF but rather inpatient hospice after PEG is placed for dysphagia             Palliative care encounter    Pt and family wants peg tube  GI consulted. Plan is for today  once plavix had been on hold for 5 days        Hematuria, gross    Appreciate Urology input.  Held lovenox  Urine clear  No briceno in place       Pacemaker    See above        Acute hypoxemic respiratory failure    Again with respiratory distress and hypoxia today. This started about 30 minutes after 2nd unit of blood  This indicated pulmonary edema is likely culprit, as it happened on admission  Also very hypertensive. Flash pulmonary edema considered.  dose of lasix 40 mg IV  change NC to non rebreather (BiPAP not recommended given oral secretions requiring frequent suctioning), duo-nebs, elevate HOB > 45 degrees and obtain CXR   Treat BP           Acute pulmonary edema    As above         S/P TAVR (transcatheter aortic valve replacement)    No acute issues        Stage 3  chronic kidney disease    Stable.        Essential hypertension    Very difficult to control  Treating respiratory distress and adjusting antihypertensive regimen for better control       Anemia of chronic disease    S/p 2U of PRBCs. Unknown decrease in Hgb but may be from multiple blood draws during hospital stay  Pending repeat CBC post PRBC transfusion  Will then limit blood draws to avoid this from happening again       Hyperlipidemia    Resume statin          VTE Risk Mitigation (From admission, onward)        Ordered     Place sequential compression device  Until discontinued      10/05/18 1249     Place LAUREANO hose  Until discontinued      10/05/18 1249     IP VTE HIGH RISK PATIENT  Once      09/30/18 1359              Ashley Moreno MD  Department of Hospital Medicine   [facility]

## 2018-10-19 NOTE — TRANSFER OF CARE
"Anesthesia Transfer of Care Note    Patient: Cindy Lyman    Procedure(s) Performed: Procedure(s) (LRB):  INSERTION, PEG TUBE (N/A)    Patient location: PACU    Anesthesia Type: general    Transport from OR: Transported from OR on room air with adequate spontaneous ventilation    Post pain: adequate analgesia    Post assessment: no apparent anesthetic complications and tolerated procedure well    Post vital signs: stable    Level of consciousness: awake, alert and oriented    Nausea/Vomiting: no nausea/vomiting    Complications: none    Transfer of care protocol was followed      Last vitals:   Visit Vitals  BP (!) 160/76   Pulse 70   Temp 36.7 °C (98.1 °F) (Oral)   Resp 16   Ht 5' 5" (1.651 m)   Wt 95.2 kg (209 lb 14.1 oz)   SpO2 100%   Breastfeeding? No   BMI 34.93 kg/m²     "

## 2018-10-19 NOTE — ASSESSMENT & PLAN NOTE
Etiology is unclear, but PEA reported by first repsonders. Quick ROSC with chest compressions and one round of epinephrine  Was intubated. Is now extubated and on supplemental O2. Mentation appropriate  ECHO  -  +DD  Pacemaker interrogated. Working well   [facility] - 10/9- stents RCA are patent. No intervention. Med therapy only   Plavix currently on hold for PEG. Is on ASA and BB. BP very difficult to control.  Plan going to hospice,but still is full code,spoked with family,palliative dare on case as well.will asses again ,will be DNR after procedure and before go to hospice.

## 2018-10-19 NOTE — PLAN OF CARE
Patient going to OR for Peg tub placement. Tentative discharge to [facility] on Sunday.    TN contacted patient's son Hall to inform. No answer. Voice message left.        10/19/18 1020   Discharge Reassessment   Assessment Type Discharge Planning Reassessment   Provided patient/caregiver education on the expected discharge date and the discharge plan Yes   Do you have any problems affording any of your prescribed medications? No   Discharge Plan A Inpatient Hospice   Discharge Plan B Inpatient Hospice   Patient choice form signed by patient/caregiver No   Can the patient answer the patient profile reliably? No, cognitively impaired   How does the patient rate their overall health at the present time? Fair   Describe the patient's ability to walk at the present time. Does not walk or unable to take any steps at all   How often would a person be available to care for the patient? Whenever needed   Number of comorbid conditions (as recorded on the chart) Five or more   During the past month, has the patient often been bothered by feeling down, depressed or hopeless? No   During the past month, has the patient often been bothered by little interest or pleasure in doing things? No

## 2018-10-19 NOTE — [employer] PATIENT INSTRUCTIONS
Discharge Summary/Instructions after an Endoscopic Procedure  Patient Name: Cindy Lyman  Patient MRN: [id number]  Patient YOB: 1935 Friday, October 19, 2018  Eddie Segundo MD  RESTRICTIONS:  During your procedure today, you received medications for sedation.  These   medications may affect your judgment, balance and coordination.  Therefore,   for 24 hours, you have the following restrictions:   - DO NOT drive a car, operate machinery, make legal/financial decisions,   sign important papers or drink alcohol.    ACTIVITY:  Today: no heavy lifting, straining or running due to procedural   sedation/anesthesia.  The following day: return to full activity including work.  DIET:  Eat and drink normally unless instructed otherwise.     TREATMENT FOR COMMON SIDE EFFECTS:  - Mild abdominal pain, nausea, belching, bloating or excessive gas:  rest,   eat lightly and use a heating pad.  - Sore Throat: treat with throat lozenges and/or gargle with warm salt   water.  - Because air was used during the procedure, expelling large amounts of air   from your rectum or belching is normal.  - If a bowel prep was taken, you may not have a bowel movement for 1-3 days.    This is normal.  SYMPTOMS TO WATCH FOR AND REPORT TO YOUR PHYSICIAN:  1. Abdominal pain or bloating, other than gas cramps.  2. Chest pain.  3. Back pain.  4. Signs of infection such as: chills or fever occurring within 24 hours   after the procedure.  5. Rectal bleeding, which would show as bright red, maroon, or black stools.   (A tablespoon of blood from the rectum is not serious, especially if   hemorrhoids are present.)  6. Vomiting.  7. Weakness or dizziness.  GO DIRECTLY TO THE NEAREST EMERGENCY ROOM IF YOU HAVE ANY OF THE FOLLOWING:      Difficulty breathing              Chills and/or fever over 101 F   Persistent vomiting and/or vomiting blood   Severe abdominal pain   Severe chest pain   Black, tarry stools   Bleeding- more than one  tablespoon   Any other symptom or condition that you feel may need urgent attention  Your doctor recommends these additional instructions:  If any biopsies were taken, your doctors clinic will contact you in 1 to 2   weeks with any results.  - Return patient to [facility] for ongoing care.   - NPO.   - Continue present medications.   - Observe patient's clinical course.   - Use a proton pump inhibitor PO daily.   - Please follow the post-PEG recommendations including: Nutrition consult   for formula and volume, advance food and medications per primary care   provider, change dressing once per day, may use PEG today for meds and   water, may use PEG tomorrow for feedings, check site for bleeding q 4 hrs   and clean site with soap and water daily and dry thoroughly.   - The findings and recommendations were discussed with the patient's   family.  For questions, problems or results please call your physician - Eddie Segundo MD at Work:  [phone].  [facility] can be reached at [phone]     IF A COMPLICATION OR EMERGENCY SITUATION ARISES AND YOU ARE UNABLE TO REACH   YOUR PHYSICIAN - GO DIRECTLY TO THE EMERGENCY ROOM.  Eddie Segundo MD  10/19/2018 1:59:59 PM  This report has been verified and signed electronically.  [employer]

## 2018-10-19 NOTE — PLAN OF CARE
Problem: Pressure Ulcer Risk (Villa Scale) (Adult,Obstetrics,Pediatric)  Intervention: Maintain Head of Bed Elevation Less Than 30 Degrees as Tolerated   10/19/18 0400   Positioning   Head of Bed (HOB) HOB at 30-45 degrees     Intervention: Turn/Reposition Often   10/18/18 0829 10/19/18 0400   Skin Interventions   Pressure Reduction Techniques frequent weight shift encouraged;heels elevated off bed;pressure points protected --    Positioning   Body Position --  side-lying, right       Goal: Skin Integrity  Patient will demonstrate the desired outcomes by discharge/transition of care.   10/18/18 1809   Pressure Ulcer Risk (Villa Scale) (Adult,Obstetrics,Pediatric)   Skin Integrity making progress toward outcome       Comments: Skin remains intact. Turned every two hours and wedge kept in place. Heel elevated on pillow. PROM performed. Door kept open.

## 2018-10-19 NOTE — PROGRESS NOTES
" [facility]  Adult Nutrition  Progress Note    SUMMARY       Recommendations    Recommendation/Intervention:   1. Resume TF s/p PEG placement: Isosource 1.5 to goal rate of 50 ml/hr + fluid flushes of 150 ml q 4 hrs or per MD.   TF to provide: 1800 kcal, 82 g pro, 917 ml fluid.    Hold TF for n/v/abd discomfort or residuals >500 ml; HOB >30.   2. RD to monitor    Goals: TF initiation or diet advancement w/in 48 hrs  Nutrition Goal Status: goal met  Communication of RD Recs: reviewed with RN    Reason for Assessment    Reason for Assessment: RD follow-up  Diagnosis: (acute hypoxemic resp failure)  Relevant Medical History: CHF, CAD s/p stent, COPD, HLD, HTN    General Information Comments: Pt TF off for PEG placement, then transfer to inpt hospice. No issues with prev TF tolerance. No sx/o malnutrition noted in prev visits.     Nutrition Discharge Planning: TF to meet 100% of estimated needs.   Nutrition Risk Screen    Nutrition Risk Screen: no indicators present    Nutrition/Diet History    Patient Reported Diet/Restrictions/Preferences: low salt(at times)  Typical Food/Fluid Intake: Adequate pta  Food Preferences: None reported  Do you have any cultural, spiritual, Sabianism conflicts, given your current situation?: (none)  Factors Affecting Nutritional Intake: difficulty/impaired swallowing, NPO    Anthropometrics    Temp: 98.8 °F (37.1 °C)  Height Method: Stated  Height: 5' 5" (165.1 cm)  Height (inches): 65 in  Weight Method: Bed Scale  Weight: 95.2 kg (209 lb 14.1 oz)  Weight (lb): 209.88 lb  Ideal Body Weight (IBW), Female: 125 lb  % Ideal Body Weight, Female (lb): 180.25 lb  BMI (Calculated): 37.6  BMI Grade: 35 - 39.9 - obesity - grade II       Lab/Procedures/Meds    Pertinent Labs Reviewed: reviewed  Pertinent Labs Comments: alb 2.6  Pertinent Medications Reviewed: reviewed  Pertinent Medications Comments: senna-docusate    Physical Findings/Assessment    Overall Physical Appearance: on " oxygen therapy, obese  Tubes: nasogastric tube  Oral/Mouth Cavity: tooth/teeth missing  Skin: intact    Estimated/Assessed Needs    Weight Used For Calorie Calculations: 92.5 kg (203 lb 14.8 oz)  Energy Calorie Requirements (kcal): 1745  Energy Need Method: Breckinridge-St Jeor(x 1.25 (PAL))  Protein Requirements: 93 gms (1gm/kg)  Weight Used For Protein Calculations: 92.5 kg (203 lb 14.8 oz)     Fluid Need Method: RDA Method  RDA Method (mL): 1745      Nutrition Prescription Ordered    Current Diet Order: NPO  Current Nutrition Support Formula Ordered: Isosource 1.5 (on hold for PEG placement)  Current Nutrition Support Rate Ordered: 50 (ml)  Current Nutrition Support Frequency Ordered: ml/hr    Evaluation of Received Nutrient/Fluid Intake    Enteral Calories (kcal): 1800  Enteral Protein (gm): 81  Enteral (Free Water) Fluid (mL): 933  Other Calories (kcal): 0  % Kcal Needs: 103  % Protein Needs: 87  IV Fluid (mL): (NS @ 100ml/hr)  I/O: reviewed  Energy Calories Required: meeting needs  Protein Required: meeting needs  Fluid Required: (Per MD)  Comments: LBM: 10/18  Tolerance: tolerating    % Meal Intake: NPO    Nutrition Risk    Level of Risk/Frequency of Follow-up: (F/u 1 x weekly)     Assessment and Plan    Nutrition Problem  Inadeqaute energy intake     Related to (etiology):   NPO w/ no alternate means of nutr in place     Signs and Symptoms (as evidenced by):   <85% of EEN/EPN being met     Interventions/Recommendations (treatment strategy):  See recs     Nutrition Diagnosis Status:   Resolved.       Monitor and Evaluation    Food and Nutrient Intake: enteral nutrition intake, energy intake  Food and Nutrient Adminstration: enteral and parenteral nutrition administration, diet order  Physical Activity and Function: nutrition-related ADLs and IADLs  Anthropometric Measurements: weight, weight change  Biochemical Data, Medical Tests and Procedures: electrolyte and renal panel, glucose/endocrine  profile  Nutrition-Focused Physical Findings: overall appearance     Nutrition Follow-Up    RD Follow-up?: Yes

## 2018-10-19 NOTE — ASSESSMENT & PLAN NOTE
Again with respiratory distress and hypoxia today. This started about 30 minutes after 2nd unit of blood  This indicated pulmonary edema is likely culprit, as it happened on admission  Also very hypertensive. Flash pulmonary edema considered.  dose of lasix 40 mg IV  change NC to non rebreather (BiPAP not recommended given oral secretions requiring frequent suctioning), duo-nebs, elevate HOB > 45 degrees and obtain CXR   Treat BP

## 2018-10-19 NOTE — NURSING
Received report from Endo nurse to use PEG tube today for meds and water, may use PEG tomorrow for feedings.

## 2018-10-19 NOTE — ASSESSMENT & PLAN NOTE
Pt and family wants peg tube  GI consulted. Plan is for today  once plavix had been on hold for 5 days

## 2018-10-19 NOTE — PROGRESS NOTES
Replaced nasal clamp for NG tube. No skin breakdown noticed to the nasal area. Tube clamped due to NPO status.

## 2018-10-20 LAB
ALBUMIN SERPL BCP-MCNC: 2.8 G/DL
ALP SERPL-CCNC: 90 U/L
ALT SERPL W/O P-5'-P-CCNC: 15 U/L
ANION GAP SERPL CALC-SCNC: 8 MMOL/L
AST SERPL-CCNC: 26 U/L
BASOPHILS # BLD AUTO: 0.02 K/UL
BASOPHILS NFR BLD: 0.4 %
BILIRUB SERPL-MCNC: 0.5 MG/DL
BUN SERPL-MCNC: 24 MG/DL
CALCIUM SERPL-MCNC: 9.3 MG/DL
CHLORIDE SERPL-SCNC: 104 MMOL/L
CO2 SERPL-SCNC: 27 MMOL/L
CREAT SERPL-MCNC: 0.9 MG/DL
DIFFERENTIAL METHOD: ABNORMAL
EOSINOPHIL # BLD AUTO: 0.2 K/UL
EOSINOPHIL NFR BLD: 2.8 %
ERYTHROCYTE [DISTWIDTH] IN BLOOD BY AUTOMATED COUNT: 16.5 %
EST. GFR  (AFRICAN AMERICAN): >60 ML/MIN/1.73 M^2
EST. GFR  (NON AFRICAN AMERICAN): >60 ML/MIN/1.73 M^2
GLUCOSE SERPL-MCNC: 95 MG/DL
HCT VFR BLD AUTO: 29.1 %
HGB BLD-MCNC: 9.3 G/DL
LYMPHOCYTES # BLD AUTO: 1 K/UL
LYMPHOCYTES NFR BLD: 17.9 %
MCH RBC QN AUTO: 29.4 PG
MCHC RBC AUTO-ENTMCNC: 32 G/DL
MCV RBC AUTO: 92 FL
MONOCYTES # BLD AUTO: 0.4 K/UL
MONOCYTES NFR BLD: 8.1 %
NEUTROPHILS # BLD AUTO: 3.8 K/UL
NEUTROPHILS NFR BLD: 70.8 %
PLATELET # BLD AUTO: 342 K/UL
PMV BLD AUTO: 10.1 FL
POTASSIUM SERPL-SCNC: 4.2 MMOL/L
PROT SERPL-MCNC: 6.2 G/DL
RBC # BLD AUTO: 3.16 M/UL
SODIUM SERPL-SCNC: 139 MMOL/L
WBC # BLD AUTO: 5.32 K/UL

## 2018-10-20 PROCEDURE — 25000003 PHARM REV CODE 250: Performed by: INTERNAL MEDICINE

## 2018-10-20 PROCEDURE — 27000221 HC OXYGEN, UP TO 24 HOURS

## 2018-10-20 PROCEDURE — 25000003 PHARM REV CODE 250: Performed by: HOSPITALIST

## 2018-10-20 PROCEDURE — 63600175 PHARM REV CODE 636 W HCPCS: Performed by: HOSPITALIST

## 2018-10-20 PROCEDURE — 21400001 HC TELEMETRY ROOM

## 2018-10-20 PROCEDURE — 94760 N-INVAS EAR/PLS OXIMETRY 1: CPT

## 2018-10-20 PROCEDURE — 94761 N-INVAS EAR/PLS OXIMETRY MLT: CPT

## 2018-10-20 PROCEDURE — 85025 COMPLETE CBC W/AUTO DIFF WBC: CPT

## 2018-10-20 PROCEDURE — 25000242 PHARM REV CODE 250 ALT 637 W/ HCPCS: Performed by: INTERNAL MEDICINE

## 2018-10-20 PROCEDURE — 94640 AIRWAY INHALATION TREATMENT: CPT

## 2018-10-20 PROCEDURE — 80053 COMPREHEN METABOLIC PANEL: CPT

## 2018-10-20 RX ADMIN — HYDROXYZINE PAMOATE 50 MG: 25 CAPSULE ORAL at 02:10

## 2018-10-20 RX ADMIN — PANTOPRAZOLE SODIUM 40 MG: 40 TABLET, DELAYED RELEASE ORAL at 08:10

## 2018-10-20 RX ADMIN — ISOSORBIDE DINITRATE 20 MG: 20 TABLET ORAL at 08:10

## 2018-10-20 RX ADMIN — DOCUSATE SODIUM AND SENNOSIDES 1 TABLET: 8.6; 5 TABLET, FILM COATED ORAL at 09:10

## 2018-10-20 RX ADMIN — ISOSORBIDE DINITRATE 20 MG: 20 TABLET ORAL at 09:10

## 2018-10-20 RX ADMIN — HYDRALAZINE HYDROCHLORIDE 100 MG: 25 TABLET ORAL at 05:10

## 2018-10-20 RX ADMIN — METOPROLOL TARTRATE 100 MG: 50 TABLET ORAL at 08:10

## 2018-10-20 RX ADMIN — HYDRALAZINE HYDROCHLORIDE 100 MG: 25 TABLET ORAL at 03:10

## 2018-10-20 RX ADMIN — BIMATOPROST 1 DROP: 0.1 SOLUTION/ DROPS OPHTHALMIC at 09:10

## 2018-10-20 RX ADMIN — CLONIDINE HYDROCHLORIDE 0.3 MG: 0.1 TABLET ORAL at 08:10

## 2018-10-20 RX ADMIN — HYDRALAZINE HYDROCHLORIDE 100 MG: 25 TABLET ORAL at 10:10

## 2018-10-20 RX ADMIN — MOXIFLOXACIN HYDROCHLORIDE 400 MG: 400 TABLET, FILM COATED ORAL at 08:10

## 2018-10-20 RX ADMIN — DOCUSATE SODIUM AND SENNOSIDES 1 TABLET: 8.6; 5 TABLET, FILM COATED ORAL at 08:10

## 2018-10-20 RX ADMIN — GABAPENTIN 100 MG: 100 CAPSULE ORAL at 09:10

## 2018-10-20 RX ADMIN — GABAPENTIN 100 MG: 100 CAPSULE ORAL at 04:10

## 2018-10-20 RX ADMIN — METOPROLOL TARTRATE 100 MG: 50 TABLET ORAL at 09:10

## 2018-10-20 RX ADMIN — ASPIRIN 81 MG: 81 TABLET, COATED ORAL at 08:10

## 2018-10-20 RX ADMIN — OXYCODONE HYDROCHLORIDE 5 MG: 5 SOLUTION ORAL at 09:10

## 2018-10-20 RX ADMIN — ISOSORBIDE DINITRATE 20 MG: 20 TABLET ORAL at 04:10

## 2018-10-20 RX ADMIN — OXYCODONE HYDROCHLORIDE 5 MG: 5 SOLUTION ORAL at 12:10

## 2018-10-20 RX ADMIN — IPRATROPIUM BROMIDE 0.5 MG: 0.5 SOLUTION RESPIRATORY (INHALATION) at 03:10

## 2018-10-20 RX ADMIN — GABAPENTIN 100 MG: 100 CAPSULE ORAL at 08:10

## 2018-10-20 RX ADMIN — IPRATROPIUM BROMIDE 0.5 MG: 0.5 SOLUTION RESPIRATORY (INHALATION) at 07:10

## 2018-10-20 RX ADMIN — ROSUVASTATIN CALCIUM 10 MG: 10 TABLET, FILM COATED ORAL at 09:10

## 2018-10-20 RX ADMIN — CLONIDINE HYDROCHLORIDE 0.3 MG: 0.1 TABLET ORAL at 09:10

## 2018-10-20 RX ADMIN — CLONIDINE HYDROCHLORIDE 0.3 MG: 0.1 TABLET ORAL at 04:10

## 2018-10-20 NOTE — SUBJECTIVE & OBJECTIVE
Interval History: patient is stable on NC O 2    Review of Systems   Respiratory: Negative for cough, shortness of breath and wheezing.    Cardiovascular: Negative for chest pain, palpitations and leg swelling.   Gastrointestinal: Negative.    Musculoskeletal: Negative.    Neurological: Positive for weakness.   Hematological: Negative.    Psychiatric/Behavioral: The patient is not nervous/anxious.      Objective:     Vital Signs (Most Recent):  Temp: 97.9 °F (36.6 °C) (10/20/18 1105)  Pulse: 71 (10/20/18 1105)  Resp: 18 (10/20/18 1105)  BP: (!) 145/72 (10/20/18 1105)  SpO2: 100 % (10/20/18 1105) Vital Signs (24h Range):  Temp:  [97.9 °F (36.6 °C)-98.9 °F (37.2 °C)] 97.9 °F (36.6 °C)  Pulse:  [64-89] 71  Resp:  [16-18] 18  SpO2:  [95 %-100 %] 100 %  BP: (130-187)/(60-88) 145/72     Weight: 95.2 kg (209 lb 14.1 oz)  Body mass index is 34.93 kg/m².    Intake/Output Summary (Last 24 hours) at 10/20/2018 1107  Last data filed at 10/20/2018 0700  Gross per 24 hour   Intake 450 ml   Output 0 ml   Net 450 ml      Physical Exam   Constitutional: She is oriented to person, place, and time. She appears well-developed. No distress.   Eyes: Conjunctivae and EOM are normal. Pupils are equal, round, and reactive to light.   Cardiovascular: Regular rhythm.   tachycardia   Pulmonary/Chest: She has no wheezes. She has no rales.   Using accessory muscles  Speaking in short words   Abdominal: Soft. Bowel sounds are normal.   Musculoskeletal: Normal range of motion. She exhibits no edema (BUE).   Neurological: She is alert and oriented to person, place, and time.   Skin: Skin is warm. Capillary refill takes less than 2 seconds. She is not diaphoretic.   Psychiatric:   Somewhat anxious   Nursing note and vitals reviewed.      Significant Labs: All pertinent labs within the past 24 hours have been reviewed.    Significant Imaging: I have reviewed all pertinent imaging results/findings within the past 24 hours.  I have reviewed and  interpreted all pertinent imaging results/findings within the past 24 hours.

## 2018-10-20 NOTE — PROGRESS NOTES
[facility] Hospital Medicine  Progress Note    Patient Name: Cindy Lyman  MRN: [id number]  Patient Class: IP- Inpatient   Admission Date: 9/30/2018  Length of Stay: 20 days  Attending Physician: Ashley Moreno MD  Primary Care Provider: Rodger Camarena MD        Subjective:     Principal Problem:Cardiac arrest    HPI:  81 yo female with diastolic CHF, COPD, gastric cancer, aortic stenosis with complete heart block and s/p TAVR and pacemaker placed 7/2018 presented in cardiac arrest. Per ED notes, patient was on her way to [employer] with others and c/o chest pain. CPR was initiated in parked vehicle outside ED. One round of epinephrine given and got ROSC. Per family, she c/o worsening SOB over several days. On arrival pt was euthermic, hypertensive, elevated lactate and BNP. CXR suggest pulmonary edema though infection cannot be ruled out. Pt intubated and evaluated by pulmonology. Cardiology consulted. ECHO pending. Broad spectrum antibiotics initiated until cultures result and clinical course dictates.     Chest CTA negative for pulmonary emboli.     Per family, Pt is a full code.     Hospital Course:    81 yo female with diastolic CHF, COPD, gastric cancer, aortic stenosis with complete heart block and s/p TAVR and pacemaker placed 7/2018 presented in cardiac arrest. Per ED notes, patient was on her way to [employer] with others and c/o chest pain. CPR was initiated in parked vehicle outside ED. One round of epinephrine given and got ROSC. Per family, she c/o worsening SOB over several days. On arrival pt was euthermic, hypertensive, elevated lactate and BNP. CXR suggest pulmonary edema though infection cannot be ruled out. Pt was  intubated and evaluated by pulmonology. Cardiology consulted. Broad spectrum antibiotics initiated until cultures result and clinical course dictates.   Chest CTA negative for pulmonary emboli.   Per family, Pt is a full code.   Duo to severe medical  condition of patient,p;naveen is going to hospice.  She failed swallow study,S/P  PEG tube placement on 10.19.18,stared on tube feeding.            Interval History: patient is stable on NC O 2    Review of Systems   Respiratory: Negative for cough, shortness of breath and wheezing.    Cardiovascular: Negative for chest pain, palpitations and leg swelling.   Gastrointestinal: Negative.    Musculoskeletal: Negative.    Neurological: Positive for weakness.   Hematological: Negative.    Psychiatric/Behavioral: The patient is not nervous/anxious.      Objective:     Vital Signs (Most Recent):  Temp: 97.9 °F (36.6 °C) (10/20/18 1105)  Pulse: 71 (10/20/18 1105)  Resp: 18 (10/20/18 1105)  BP: (!) 145/72 (10/20/18 1105)  SpO2: 100 % (10/20/18 1105) Vital Signs (24h Range):  Temp:  [97.9 °F (36.6 °C)-98.9 °F (37.2 °C)] 97.9 °F (36.6 °C)  Pulse:  [64-89] 71  Resp:  [16-18] 18  SpO2:  [95 %-100 %] 100 %  BP: (130-187)/(60-88) 145/72     Weight: 95.2 kg (209 lb 14.1 oz)  Body mass index is 34.93 kg/m².    Intake/Output Summary (Last 24 hours) at 10/20/2018 1107  Last data filed at 10/20/2018 0700  Gross per 24 hour   Intake 450 ml   Output 0 ml   Net 450 ml      Physical Exam   Constitutional: She is oriented to person, place, and time. She appears well-developed. No distress.   Eyes: Conjunctivae and EOM are normal. Pupils are equal, round, and reactive to light.   Cardiovascular: Regular rhythm.   tachycardia   Pulmonary/Chest: She has no wheezes. She has no rales.   Using accessory muscles  Speaking in short words   Abdominal: Soft. Bowel sounds are normal.   Musculoskeletal: Normal range of motion. She exhibits no edema (BUE).   Neurological: She is alert and oriented to person, place, and time.   Skin: Skin is warm. Capillary refill takes less than 2 seconds. She is not diaphoretic.   Psychiatric:   Somewhat anxious   Nursing note and vitals reviewed.      Significant Labs: All pertinent labs within the past 24 hours have  been reviewed.    Significant Imaging: I have reviewed all pertinent imaging results/findings within the past 24 hours.  I have reviewed and interpreted all pertinent imaging results/findings within the past 24 hours.    Assessment/Plan:      * Cardiac arrest    Etiology is unclear, but PEA reported by first repsonders. Quick ROSC with chest compressions and one round of epinephrine  Was intubated. Is now extubated and on supplemental O2. Mentation appropriate  ECHO  -  +DD  Pacemaker interrogated. Working well   [facility] - 10/9- stents RCA are patent. No intervention. Med therapy only   Plavix currently on hold for PEG. Is on ASA and BB. BP very difficult to control.  Plan going to hospice,but still is full code,spoked with family,palliative dare on case as well.will asses again ,will be DNR after procedure and before go to hospice.  S/P PEG tube,srated on tube feeding.       CAD s/p PCI    Per [facility], coronaries are patent, including area where stent is in place  On ASA and BB. Plavix on hold for PEG. Will resume statin       Debility    Not pursuing SNF but rather inpatient hospice after PEG is placed for dysphagia             Palliative care encounter    Pt and family wants peg tube  GI consulted. Plan is for today  once plavix had been on hold for 5 days        Hematuria, gross    Appreciate Urology input.  Held lovenox  Urine clear  No briceno in place       Pacemaker    See above        Acute hypoxemic respiratory failure    Again with respiratory distress and hypoxia today. This started about 30 minutes after 2nd unit of blood  This indicated pulmonary edema is likely culprit, as it happened on admission  Also very hypertensive. Flash pulmonary edema considered.  dose of lasix 40 mg IV  change NC to non rebreather (BiPAP not recommended given oral secretions requiring frequent suctioning), duo-nebs, elevate HOB > 45 degrees and obtain CXR   Treat BP           Acute pulmonary edema    As above         S/P TAVR  (transcatheter aortic valve replacement)    No acute issues        Stage 3 chronic kidney disease    Stable.        Essential hypertension    Very difficult to control  Treating respiratory distress and adjusting antihypertensive regimen for better control       Anemia of chronic disease    S/p 2U of PRBCs. Unknown decrease in Hgb but may be from multiple blood draws during hospital stay  Pending repeat CBC post PRBC transfusion  Will then limit blood draws to avoid this from happening again       Hyperlipidemia    Resume statin          VTE Risk Mitigation (From admission, onward)        Ordered     Place sequential compression device  Until discontinued      10/05/18 1249     Place LAUREANO hose  Until discontinued      10/05/18 1249     IP VTE HIGH RISK PATIENT  Once      09/30/18 1059              Ashley Moreno MD  Department of Hospital Medicine   [facility]

## 2018-10-20 NOTE — ASSESSMENT & PLAN NOTE
Etiology is unclear, but PEA reported by first repsonders. Quick ROSC with chest compressions and one round of epinephrine  Was intubated. Is now extubated and on supplemental O2. Mentation appropriate  ECHO  -  +DD  Pacemaker interrogated. Working well   [facility] - 10/9- stents RCA are patent. No intervention. Med therapy only   Plavix currently on hold for PEG. Is on ASA and BB. BP very difficult to control.  Plan going to hospice,but still is full code,spoked with family,palliative dare on case as well.will asses again ,will be DNR after procedure and before go to hospice.  S/P PEG tube,srated on tube feeding.

## 2018-10-20 NOTE — PLAN OF CARE
Problem: Infection, Risk/Actual (Adult)  Intervention: Prevent Infection/Maximize Resistance   10/16/18 2030 10/17/18 1930 10/19/18 2120   Nutrition Interventions   Glycemic Management blood glucose monitoring --  --    Oral Nutrition Promotion --  rest periods promoted --    Hygiene Care   Bathing/Skin Care --  --  bath, partial;incontinence care   Respiratory Interventions   Airway/Ventilation Management airway patency maintained;pulmonary hygiene promoted --  --    Pain/Comfort/Sleep Interventions   Sleep/Rest Enhancement --  --  awakenings minimized;consistent schedule promoted;noise level reduced;regular sleep/rest pattern promoted         Problem: Pressure Ulcer Risk (Villa Scale) (Adult,Obstetrics,Pediatric)  Intervention: Promote/Optimize Nutrition   10/17/18 1930   Nutrition Interventions   Oral Nutrition Promotion rest periods promoted     Intervention: Prevent/Minimize Sheer/Friction Injuries   10/18/18 2000 10/19/18 2120   Skin Interventions   Pressure Reduction Devices --  Heel offloading device utilized;Pressure-redistributing mattress utilized   Pressure Reduction Techniques --  pressure points protected;weight shift assistance provided   Positioning   Positioning/Transfer Devices wedge;in use --        Goal: Skin Integrity  Patient will demonstrate the desired outcomes by discharge/transition of care.   10/18/18 1809   Pressure Ulcer Risk (Villa Scale) (Adult,Obstetrics,Pediatric)   Skin Integrity making progress toward outcome       Problem: Nutrition Imbalance: Excess Oral Intake (Adult)  Intervention: Promote Healthy Nutritional Intake/Lifestyle   10/17/18 0912 10/17/18 1930   Nutrition Interventions   Oral Nutrition Promotion --  rest periods promoted   Coping/Psychosocial Interventions   Supportive Measures active listening utilized --        Goal: Expresses Desire and Motivation to Change  Patient will demonstrate the desired outcomes by discharge/transition of care.   10/20/18 0634    Nutrition Imbalance: Excess Oral Intake (Adult)   Expresses Desire and Motivation to Change making progress toward outcome       Comments: Wedge kept in place to protect pressure areas. Heels in fluidized boots. Peg tube flushed and patent. PRN med for insomnia effective. Skin kept intact. Call light at side so pt can listen to TV. Door kept open. Oral suctioning at bedside but needed less during this shift.

## 2018-10-21 VITALS
WEIGHT: 209.88 LBS | TEMPERATURE: 99 F | SYSTOLIC BLOOD PRESSURE: 145 MMHG | RESPIRATION RATE: 18 BRPM | HEIGHT: 65 IN | OXYGEN SATURATION: 99 % | DIASTOLIC BLOOD PRESSURE: 67 MMHG | BODY MASS INDEX: 34.97 KG/M2 | HEART RATE: 70 BPM

## 2018-10-21 LAB
ALBUMIN SERPL BCP-MCNC: 2.9 G/DL
ALP SERPL-CCNC: 95 U/L
ALT SERPL W/O P-5'-P-CCNC: 16 U/L
ANION GAP SERPL CALC-SCNC: 8 MMOL/L
AST SERPL-CCNC: 26 U/L
BASOPHILS # BLD AUTO: 0.01 K/UL
BASOPHILS NFR BLD: 0.2 %
BILIRUB SERPL-MCNC: 0.4 MG/DL
BUN SERPL-MCNC: 25 MG/DL
CALCIUM SERPL-MCNC: 9.4 MG/DL
CHLORIDE SERPL-SCNC: 103 MMOL/L
CO2 SERPL-SCNC: 27 MMOL/L
CREAT SERPL-MCNC: 1 MG/DL
DIFFERENTIAL METHOD: ABNORMAL
EOSINOPHIL # BLD AUTO: 0.2 K/UL
EOSINOPHIL NFR BLD: 3.2 %
ERYTHROCYTE [DISTWIDTH] IN BLOOD BY AUTOMATED COUNT: 16.4 %
EST. GFR  (AFRICAN AMERICAN): >60 ML/MIN/1.73 M^2
EST. GFR  (NON AFRICAN AMERICAN): 53 ML/MIN/1.73 M^2
GLUCOSE SERPL-MCNC: 114 MG/DL
HCT VFR BLD AUTO: 30.3 %
HGB BLD-MCNC: 9.9 G/DL
LYMPHOCYTES # BLD AUTO: 1.6 K/UL
LYMPHOCYTES NFR BLD: 26.3 %
MCH RBC QN AUTO: 29.7 PG
MCHC RBC AUTO-ENTMCNC: 32.7 G/DL
MCV RBC AUTO: 91 FL
MONOCYTES # BLD AUTO: 0.5 K/UL
MONOCYTES NFR BLD: 8.8 %
NEUTROPHILS # BLD AUTO: 3.7 K/UL
NEUTROPHILS NFR BLD: 61.5 %
PLATELET # BLD AUTO: 335 K/UL
PMV BLD AUTO: 9.9 FL
POTASSIUM SERPL-SCNC: 4.1 MMOL/L
PROT SERPL-MCNC: 6.5 G/DL
RBC # BLD AUTO: 3.33 M/UL
SODIUM SERPL-SCNC: 138 MMOL/L
WBC # BLD AUTO: 5.93 K/UL

## 2018-10-21 PROCEDURE — 94640 AIRWAY INHALATION TREATMENT: CPT

## 2018-10-21 PROCEDURE — 25000003 PHARM REV CODE 250: Performed by: HOSPITALIST

## 2018-10-21 PROCEDURE — 27000221 HC OXYGEN, UP TO 24 HOURS

## 2018-10-21 PROCEDURE — 94761 N-INVAS EAR/PLS OXIMETRY MLT: CPT

## 2018-10-21 PROCEDURE — 25000003 PHARM REV CODE 250: Performed by: INTERNAL MEDICINE

## 2018-10-21 PROCEDURE — 85025 COMPLETE CBC W/AUTO DIFF WBC: CPT

## 2018-10-21 PROCEDURE — 25000242 PHARM REV CODE 250 ALT 637 W/ HCPCS: Performed by: INTERNAL MEDICINE

## 2018-10-21 PROCEDURE — 80053 COMPREHEN METABOLIC PANEL: CPT

## 2018-10-21 RX ORDER — LISINOPRIL 20 MG/1
20 TABLET ORAL DAILY
Qty: 90 TABLET | Refills: 3
Start: 2018-10-21 | End: 2019-04-16

## 2018-10-21 RX ORDER — POLYETHYLENE GLYCOL 3350 17 G/17G
17 POWDER, FOR SOLUTION ORAL 2 TIMES DAILY PRN
Refills: 0 | Status: ON HOLD
Start: 2018-10-21 | End: 2020-09-20 | Stop reason: HOSPADM

## 2018-10-21 RX ORDER — PANTOPRAZOLE SODIUM 40 MG/1
40 TABLET, DELAYED RELEASE ORAL DAILY
Qty: 30 TABLET | Refills: 11
Start: 2018-10-22 | End: 2021-01-07 | Stop reason: SDUPTHER

## 2018-10-21 RX ORDER — ISOSORBIDE DINITRATE 20 MG/1
20 TABLET ORAL 3 TIMES DAILY
Qty: 90 TABLET | Refills: 11
Start: 2018-10-21 | End: 2019-04-16

## 2018-10-21 RX ORDER — FUROSEMIDE 40 MG/1
40 TABLET ORAL DAILY
Qty: 90 TABLET | Refills: 1 | Status: SHIPPED | OUTPATIENT
Start: 2018-10-21 | End: 2021-01-07 | Stop reason: SDUPTHER

## 2018-10-21 RX ADMIN — IPRATROPIUM BROMIDE 0.5 MG: 0.5 SOLUTION RESPIRATORY (INHALATION) at 07:10

## 2018-10-21 RX ADMIN — PANTOPRAZOLE SODIUM 40 MG: 40 TABLET, DELAYED RELEASE ORAL at 08:10

## 2018-10-21 RX ADMIN — MOXIFLOXACIN HYDROCHLORIDE 400 MG: 400 TABLET, FILM COATED ORAL at 08:10

## 2018-10-21 RX ADMIN — CLONIDINE HYDROCHLORIDE 0.3 MG: 0.1 TABLET ORAL at 08:10

## 2018-10-21 RX ADMIN — METOPROLOL TARTRATE 100 MG: 50 TABLET ORAL at 08:10

## 2018-10-21 RX ADMIN — ASPIRIN 81 MG: 81 TABLET, COATED ORAL at 08:10

## 2018-10-21 RX ADMIN — ISOSORBIDE DINITRATE 20 MG: 20 TABLET ORAL at 08:10

## 2018-10-21 RX ADMIN — HYDRALAZINE HYDROCHLORIDE 100 MG: 25 TABLET ORAL at 01:10

## 2018-10-21 RX ADMIN — GABAPENTIN 100 MG: 100 CAPSULE ORAL at 08:10

## 2018-10-21 RX ADMIN — DOCUSATE SODIUM AND SENNOSIDES 1 TABLET: 8.6; 5 TABLET, FILM COATED ORAL at 08:10

## 2018-10-21 RX ADMIN — HYDRALAZINE HYDROCHLORIDE 100 MG: 25 TABLET ORAL at 05:10

## 2018-10-21 NOTE — NURSING
In preparation for discharge, removal of PICC line and heart monitor per policy. No distress noted. Called report to nurse Askew at  [facility] facility.

## 2018-10-21 NOTE — PLAN OF CARE
Problem: Pressure Ulcer Risk (Villa Scale) (Adult,Obstetrics,Pediatric)  Intervention: Prevent/Minimize Sheer/Friction Injuries   10/18/18 2000 10/20/18 2200   Skin Interventions   Pressure Reduction Devices --  Heel offloading device utilized   Pressure Reduction Techniques --  frequent weight shift encouraged   Positioning   Positioning/Transfer Devices wedge;in use --        Goal: Skin Integrity  Patient will demonstrate the desired outcomes by discharge/transition of care.   10/21/18 8371   Pressure Ulcer Risk (Villa Scale) (Adult,Obstetrics,Pediatric)   Skin Integrity making progress toward outcome       Comments: Did not like to be turned to left side but explained that alternating side was important to prevent skin breaks. Skin remains intact and barrier cream applied after perineal care. Fluidize boots in place. Daughter at bedside supportive to pt. Tube feeding to NG tube at rate of 40 mL and patent. Site cleans with soap and warm water and new gauze in place. PROM to upper and lower extremities done by staff and family. Bed alarm active.

## 2018-10-21 NOTE — PLAN OF CARE
Problem: Infection, Risk/Actual (Adult)  Goal: Identify Related Risk Factors and Signs and Symptoms  Related risk factors and signs and symptoms are identified upon initiation of Human Response Clinical Practice Guideline (CPG)  Outcome: Ongoing (interventions implemented as appropriate)   10/21/18 1133   Infection, Risk/Actual   Related Risk Factors (Infection, Risk/Actual) age extremes;chronic illness/condition;exposure to microbes;prolonged hospitalization;immunosuppressed;medication effects   Signs and Symptoms (Infection, Risk/Actual) edema;heart rate increase;weakness

## 2018-10-21 NOTE — SUBJECTIVE & OBJECTIVE
Interval History: patient is stable on NC O 2    Review of Systems   Respiratory: Negative for cough, shortness of breath and wheezing.    Cardiovascular: Negative for chest pain, palpitations and leg swelling.   Gastrointestinal: Negative.    Musculoskeletal: Negative.    Neurological: Positive for weakness.   Hematological: Negative.    Psychiatric/Behavioral: The patient is not nervous/anxious.      Objective:     Vital Signs (Most Recent):  Temp: 98.6 °F (37 °C) (10/21/18 0719)  Pulse: 74 (10/21/18 0722)  Resp: 20 (10/21/18 0722)  BP: (!) 182/74 (10/21/18 0719)  SpO2: 97 % (10/21/18 0722) Vital Signs (24h Range):  Temp:  [97.9 °F (36.6 °C)-98.8 °F (37.1 °C)] 98.6 °F (37 °C)  Pulse:  [60-77] 74  Resp:  [16-20] 20  SpO2:  [95 %-100 %] 97 %  BP: (137-182)/(60-74) 182/74     Weight: 95.2 kg (209 lb 14.1 oz)  Body mass index is 34.93 kg/m².    Intake/Output Summary (Last 24 hours) at 10/21/2018 0900  Last data filed at 10/21/2018 0548  Gross per 24 hour   Intake 780 ml   Output 0 ml   Net 780 ml      Physical Exam   Constitutional: She is oriented to person, place, and time. She appears well-developed. No distress.   Eyes: Conjunctivae and EOM are normal. Pupils are equal, round, and reactive to light.   Cardiovascular: Regular rhythm.   tachycardia   Pulmonary/Chest: She has no wheezes. She has no rales.   Using accessory muscles  Speaking in short words   Abdominal: Soft. Bowel sounds are normal.   Musculoskeletal: Normal range of motion. She exhibits no edema (BUE).   Neurological: She is alert and oriented to person, place, and time.   Skin: Skin is warm. Capillary refill takes less than 2 seconds. She is not diaphoretic.   Psychiatric:   Somewhat anxious   Nursing note and vitals reviewed.      Significant Labs: All pertinent labs within the past 24 hours have been reviewed.    Significant Imaging: I have reviewed all pertinent imaging results/findings within the past 24 hours.  I have reviewed and interpreted  all pertinent imaging results/findings within the past 24 hours.

## 2018-10-21 NOTE — ASSESSMENT & PLAN NOTE
Again with respiratory distress and hypoxia today. This started about 30 minutes after 2nd unit of blood  This indicated pulmonary edema is likely culprit, as it happened on admission  Also very hypertensive. Flash pulmonary edema considered.  dose of lasix 40 mg IV  change NC to non rebreather (BiPAP not recommended given oral secretions requiring frequent suctioning), duo-nebs, elevate HOB > 45 degrees and obtain CXR   Treat BP.

## 2018-10-21 NOTE — NURSING
In preparation for discharge, removal of PICC line and heart monitor per policy. Report given to nurse Askew at [facility] hospice facility. No distress noted. Patient left with transport.

## 2018-10-21 NOTE — PROGRESS NOTES
Ana Maria with [facility] requested a order requesting EMS transport; requested from Dr. Davis; [place] to assist with order.    14:15 Faxed order for transport to [facility]; contacted Ana Maria    14:30 Authorization number: #[id number] and provided to Niru    [facility]

## 2018-10-21 NOTE — DISCHARGE SUMMARY
[facility] Hospital Medicine  Discharge Summary      Patient Name: Cindy Lyman  MRN: [id number]  Admission Date: 9/30/2018  Hospital Length of Stay: 21 days  Discharge Date and Time:  10/21/2018 1:24 PM  Attending Physician: Ashley Moreno MD   Discharging Provider: Ashley Moreno MD  Primary Care Provider: Rodger Camarena MD      HPI:   81 yo female with diastolic CHF, COPD, gastric cancer, aortic stenosis with complete heart block and s/p TAVR and pacemaker placed 7/2018 presented in cardiac arrest. Per ED notes, patient was on her way to [employer] with others and c/o chest pain. CPR was initiated in parked vehicle outside ED. One round of epinephrine given and got ROSC. Per family, she c/o worsening SOB over several days. On arrival pt was euthermic, hypertensive, elevated lactate and BNP. CXR suggest pulmonary edema though infection cannot be ruled out. Pt intubated and evaluated by pulmonology. Cardiology consulted. ECHO pending. Broad spectrum antibiotics initiated until cultures result and clinical course dictates.     Chest CTA negative for pulmonary emboli.     Per family, Pt is a full code.     Procedure(s) (LRB):  INSERTION, PEG TUBE (N/A)      Hospital Course:     81 yo female with diastolic CHF, COPD, gastric cancer, aortic stenosis with complete heart block and s/p TAVR and pacemaker placed 7/2018 presented in cardiac arrest. Per ED notes, patient was on her way to [employer] with others and c/o chest pain. CPR was initiated in parked vehicle outside ED. One round of epinephrine given and got ROSC. Per family, she c/o worsening SOB over several days. On arrival pt was euthermic, hypertensive, elevated lactate and BNP. CXR suggest pulmonary edema though infection cannot be ruled out. Pt was  intubated and evaluated by pulmonology. Cardiology consulted. Broad spectrum antibiotics initiated until cultures result and clinical course dictates. Abx deescalated,after no  major growth in cultures,  Chest CTA negative for pulmonary emboli.   Per family, Pt is a full code.   Duo to severity of  medical condition ,palliative care was consulted and patient and family as agree with hospice placement..  She failed swallow study for multile time and initially was on NG tube feeding,GI was consulted for PEG tube placement,,S/P  PEG tube placement on 10.19.18,stared on tube feeding,was tolerating feeding.  patient has been discharged to inpatient Hospice.             Consults:   Consults (From admission, onward)        Status Ordering Provider     Inpatient consult to Critical Care Medicine  Once     Provider:  Henok Estrada MD    Completed TOMMY CHANDLER     Inpatient consult to Gastroenterology  Once     Provider:  Jyothi Strickland MD    Completed RAMOS MONTESINOS     Inpatient consult to Interventional Cardiology  Once     Provider:  Tony Carmen MD    Acknowledged TOMMY CHANDLER     Inpatient consult to Nephrology  Once     Provider:  Jatin Stafford MD    Acknowledged AAKASH BAÑUELOS     Inpatient consult to Palliative Care  Once     Provider:  Carla Arriaga NP    Completed AAKASH BAÑUELOS     Inpatient consult to Palliative Care  Once     Provider:  (Not yet assigned)    Acknowledged RUDY ISAAC     Inpatient consult to Registered Dietitian/Nutritionist  Once     Provider:  (Not yet assigned)    Completed HENOK ESTRADA     Inpatient consult to Registered Dietitian/Nutritionist  Once     Provider:  (Not yet assigned)    Completed MARCOS PANTOJA     Inpatient consult to Registered Dietitian/Nutritionist  Once     Provider:  (Not yet assigned)    Completed MARCOS PANTOJA     Inpatient consult to Social Work  Once     Provider:  (Not yet assigned)    Acknowledged KATHERYN HIRSCH     Inpatient consult to Social Work  Once     Provider:  (Not yet assigned)    Acknowledged RAMOS MONTESINOS     Inpatient consult to Spiritual  Care  Once     Provider:  (Not yet assigned)    Completed AAKASH BAÑUELOS     Inpatient consult to Spiritual Care  Once     Provider:  (Not yet assigned)    Completed RAMOS MONTESINOS     Inpatient consult to Spiritual Care  Once     Provider:  (Not yet assigned)    Completed RUDY ISAAC     Inpatient consult to Urology  Once     Provider:  COMPA Singleton MD    Completed AAKASH BAÑUELOS     IP consult to case management  Once     Provider:  (Not yet assigned)    Completed RAMOS MONTESINOS          No new Assessment & Plan notes have been filed under this hospital service since the last note was generated.  Service: Hospital Medicine    Final Active Diagnoses:    Diagnosis Date Noted POA    PRINCIPAL PROBLEM:  Cardiac arrest [I46.9] 09/30/2018 Yes    CAD s/p PCI [I25.10] 04/24/2015 Yes     Chronic    Debility [R53.81] 10/10/2018 Yes    Palliative care encounter [Z51.5] 10/08/2018 Not Applicable    Hematuria, gross [R31.0] 10/05/2018 No    Pacemaker [Z95.0] 10/01/2018 Yes    Acute pulmonary edema [J81.0] 09/30/2018 Yes    Acute hypoxemic respiratory failure [J96.01] 09/30/2018 Yes    S/P TAVR (transcatheter aortic valve replacement) [Z95.2] 07/05/2018 Not Applicable    Stage 3 chronic kidney disease [N18.3] 07/08/2017 Yes     Chronic    Essential hypertension [I10] 06/10/2016 Yes     Chronic    Anemia of chronic disease [D63.8] 11/29/2013 Yes     Chronic    Hyperlipidemia [E78.5]  Yes     Chronic      Problems Resolved During this Admission:    Diagnosis Date Noted Date Resolved POA    Encephalopathy, metabolic [G93.41] 10/01/2018 10/04/2018 Yes       Discharged Condition: poor    Disposition: Hospice/Medical Facility    Follow Up:  Follow-up Information     [facility] - Urology In 2 weeks.    Specialty:  Urology  Why:  For hospital follow up  Contact information:  [address]  [facility] [address] [phone]  Additional information:  [address]           [place]  BLAKE Camarena MD In 1 week.    Specialty:  Family Medicine  Contact information:  [address] [address]  [phone]             [facility].    Specialties:  Hospice and Palliative Medicine, Hospice Services  Why:  Hospice: In-Patient  Contact information:  [address] [facility] [address]  [phone]                 Patient Instructions:   No discharge procedures on file.    Significant Diagnostic Studies: Labs:   BMP:   Recent Labs   Lab 10/20/18  0524 10/21/18  0604   GLU 95 114*    138   K 4.2 4.1    103   CO2 27 27   BUN 24* 25*   CREATININE 0.9 1.0   CALCIUM 9.3 9.4    and CBC   Recent Labs   Lab 10/20/18  0524 10/21/18  0604   WBC 5.32 5.93   HGB 9.3* 9.9*   HCT 29.1* 30.3*    335     Microbiology:   Blood Culture   Lab Results   Component Value Date    LABBLOO No growth after 5 days. 10/06/2018   , Sputum Culture   Lab Results   Component Value Date    GSRESP <10 epithelial cells per low power field. 10/01/2018    GSRESP Moderate WBC's 10/01/2018    GSRESP Few Gram positive cocci in pairs 10/01/2018    GSRESP Rare Gram negative diplococci 10/01/2018    RESPIRATORYC Normal respiratory kim 10/01/2018    and Urine Culture    Lab Results   Component Value Date    LABURIN No significant growth 07/20/2017     Radiology: X-Ray: CXR: X-Ray Chest 1 View (CXR): No results found for this visit on 09/30/18. and X-Ray Chest PA and Lateral (CXR): No results found for this visit on 09/30/18.  CT scan:CTA chest   Cardiac Graphics: Echocardiogram:   2D echo with color flow doppler:   Results for orders placed or performed during the hospital encounter of 08/10/18   2D echo with color flow doppler   Result Value Ref Range    Calculated EF 60 55 - 65    Aortic Valve Regurgitation MILD     Est. PA Systolic Pressure 51.72 (A)     Pericardial Effusion TRIVIAL     Tricuspid Valve Regurgitation MILD        Pending Diagnostic Studies:     None         Medications:  Reconciled Home Medications:       Medication List      START taking these medications    isosorbide dinitrate 20 MG tablet  Commonly known as:  ISORDIL  Take 1 tablet (20 mg total) by mouth 3 (three) times daily.     lisinopril 20 MG tablet  Commonly known as:  PRINIVIL,ZESTRIL  Take 1 tablet (20 mg total) by mouth once daily.     pantoprazole 40 MG tablet  Commonly known as:  PROTONIX  Take 1 tablet (40 mg total) by mouth once daily.  Start taking on:  10/22/2018     polyethylene glycol 17 gram Pwpk  Commonly known as:  GLYCOLAX  17 g by Per NG tube route 2 (two) times daily as needed.        CHANGE how you take these medications    furosemide 40 MG tablet  Commonly known as:  LASIX  Take 1 tablet (40 mg total) by mouth once daily. TAKE ONE TABLET BY MOUTH EVERY DAY AS NEEDED FOR SHORTNESS OF BREATH or leg swelling  What changed:    · how much to take  · how to take this  · when to take this        CONTINUE taking these medications    acetaminophen 325 MG tablet  Commonly known as:  TYLENOL  Take 2 tablets (650 mg total) by mouth every 6 (six) hours as needed for Pain or Temperature greater than (100.4).     aspirin 81 MG EC tablet  Commonly known as:  ECOTRIN  Take 81 mg by mouth once daily.     azelastine 137 mcg (0.1 %) nasal spray  Commonly known as:  ASTELIN  1 spray (137 mcg total) by Nasal route 2 (two) times daily.     cetirizine 10 MG tablet  Commonly known as:  ZYRTEC  Take 1 tablet (10 mg total) by mouth once daily.     cloNIDine 0.3 MG tablet  Commonly known as:  CATAPRES  Take 1 tablet (0.3 mg total) by mouth 3 (three) times daily.     clopidogrel 75 mg tablet  Commonly known as:  PLAVIX  Take 1 tablet (75 mg total) by mouth once daily.     * fluticasone 220 mcg/actuation inhaler  Commonly known as:  FLOVENT HFA  Inhale 1 puff into the lungs 2 (two) times daily. Controller     * fluticasone 50 mcg/actuation nasal spray  Commonly known as:  FLONASE  TWO SPRAYS IN EACH NOSTRIL ONCE DAILY     gabapentin 300 MG capsule  Commonly known  as:  NEURONTIN  ONE CAPSULE BY MOUTH THREE TIMES DAILY     hydrALAZINE 100 MG tablet  Commonly known as:  APRESOLINE  ONE TABLET BY MOUTH THREE TIMES DAILY     metoprolol tartrate 100 MG tablet  Commonly known as:  LOPRESSOR  ONE TABLET BY MOUTH TWICE DAILY     nitroGLYCERIN 0.4 MG SL tablet  Commonly known as:  NITROSTAT  Place 0.4 mg under the tongue every 5 (five) minutes as needed for Chest pain.     rosuvastatin 20 MG tablet  Commonly known as:  CRESTOR  ONE TABLET BY MOUTH EVERY EVENING     verapamil 360 MG C24p  Commonly known as:  VERELAN  ONE CAPSULE BY MOUTH once a day     walker Misc  Commonly known as:  ULTRA-LIGHT ROLLATOR  One rollator, size large.         * This list has 2 medication(s) that are the same as other medications prescribed for you. Read the directions carefully, and ask your doctor or other care provider to review them with you.            STOP taking these medications    carBAMazepine 200 mg tablet  Commonly known as:  TEGRETOL            Indwelling Lines/Drains at time of discharge:   Lines/Drains/Airways     Central Venous Catheter Line                 Percutaneous Central Line Insertion/Assessment - triple lumen  09/30/18 1050 right internal jugular 21 days          Drain                 Gastrostomy/Enterostomy 10/19/18 1353 Percutaneous endoscopic gastrostomy (PEG) LUQ feeding 1 day          Airway                 Airway - Non-Surgical Nasal Cannula;Mask -- days                Time spent on the discharge of patient: over 30  minutes  Patient was seen and examined on the date of discharge and determined to be suitable for discharge.         Ashley Moreno MD  Department of Hospital Medicine  [facility]

## 2018-10-21 NOTE — ASSESSMENT & PLAN NOTE
Etiology is unclear, but PEA reported by first repsonders. Quick ROSC with chest compressions and one round of epinephrine  Was intubated. Is now extubated and on supplemental O2. Mentation appropriate  ECHO  -  +DD  Pacemaker interrogated. Working well   [facility] - 10/9- stents RCA are patent. No intervention. Med therapy only   Plavix currently on hold for PEG. Is on ASA and BB. BP very difficult to control.  Plan going to hospice,but still is full code,spoked with family,palliative dare on case as well.will asses again ,will be DNR after procedure and before go to hospice.  S/P PEG tube,srated on tube feeding.tolerating.     Good point. If mica ever gets her off the prednisone, let us know and we will order zostavax again

## 2018-10-21 NOTE — PLAN OF CARE
[facility]     Department of Hospital Medicine     [address] [place] [address]     [phone] after hours  [phone] fax                                   HOSPICE  ORDERS     10/21/2018    Admit to Hospice: Inpatient Service     Diagnoses:  Active Hospital Problems    Diagnosis  POA    *Cardiac arrest [I46.9]  Yes    CAD s/p PCI [I25.10]  Yes     Priority: 4      Chronic     CARLOS MANUEL to mid-LAD 4/2015 per Dr. Cresencio Alfredo      Debility [R53.81]  Yes    Palliative care encounter [Z51.5]  Not Applicable    Hematuria, gross [R31.0]  No    Pacemaker [Z95.0]  Yes    Acute pulmonary edema [J81.0]  Yes    Acute hypoxemic respiratory failure [J96.01]  Yes    S/P TAVR (transcatheter aortic valve replacement) [Z95.2]  Not Applicable    Stage 3 chronic kidney disease [N18.3]  Yes     Chronic    Essential hypertension [I10]  Yes     Chronic    Anemia of chronic disease [D63.8]  Yes     Chronic    Hyperlipidemia [E78.5]  Yes     Chronic      Resolved Hospital Problems    Diagnosis Date Resolved POA    Encephalopathy, metabolic [G93.41] 10/04/2018 Yes       Hospice Qualifying Diagnoses: cardiomyopathy,cardiac arrythmia         Patient has a life expectancy < 6 months due to these conditions.    Vital Signs: Routine per Hospice Protocol.    Allergies:  Review of patient's allergies indicates:   Allergen Reactions    Doxycycline hyclate Diarrhea and Nausea And Vomiting    Singulair [montelukast]      Stomach pain, Muscle pain, Cough      Penicillins      Other reaction(s): Anaphylaxis    Ventolin [albuterol sulfate] Other (See Comments)     Severely elevated blood pressure    Latex, natural rubber Rash       Diet: per peg Isosource 1.5 at 50 cc per hour,with freew ater 250 cc Q 8 hours    Activities: As tolerated    Nursing: Per Hospice Routine    Future Orders:  Hospice Medical Director may dictate new orders for comfortable care measures & sign death  certificate.    Medications:         Comfort Care Medications Only         Cindy Lyman   Home Medication Instructions PAUL:[id number]    Printed on:10/21/18 2955   Medication Information                      acetaminophen (TYLENOL) 325 MG tablet  Take 2 tablets (650 mg total) by mouth every 6 (six) hours as needed for Pain or Temperature greater than (100.4).             aspirin (ECOTRIN) 81 MG EC tablet  Take 81 mg by mouth once daily.             azelastine (ASTELIN) 137 mcg (0.1 %) nasal spray  1 spray (137 mcg total) by Nasal route 2 (two) times daily.             cetirizine (ZYRTEC) 10 MG tablet  Take 1 tablet (10 mg total) by mouth once daily.             cloNIDine (CATAPRES) 0.3 MG tablet  Take 1 tablet (0.3 mg total) by mouth 3 (three) times daily.             clopidogrel (PLAVIX) 75 mg tablet  Take 1 tablet (75 mg total) by mouth once daily.             fluticasone (FLONASE) 50 mcg/actuation nasal spray  TWO SPRAYS IN EACH NOSTRIL ONCE DAILY             fluticasone (FLOVENT HFA) 220 mcg/actuation inhaler  Inhale 1 puff into the lungs 2 (two) times daily. Controller             furosemide (LASIX) 40 MG tablet  Take 1 tablet (40 mg total) by mouth once daily.and  TAKE ONE extra TABLET BY MOUTH EVERY DAY AS NEEDED FOR SHORTNESS OF BREATH or leg swelling             gabapentin (NEURONTIN) 300 MG capsule  ONE CAPSULE BY MOUTH THREE TIMES DAILY             hydrALAZINE (APRESOLINE) 100 MG tablet  ONE TABLET BY MOUTH THREE TIMES DAILY             isosorbide dinitrate (ISORDIL) 20 MG tablet  Take 1 tablet (20 mg total) by mouth 3 (three) times daily.             lisinopril (PRINIVIL,ZESTRIL) 20 MG tablet  Take 1 tablet (20 mg total) by mouth once daily.             metoprolol tartrate (LOPRESSOR) 100 MG tablet  ONE TABLET BY MOUTH TWICE DAILY             nitroGLYCERIN (NITROSTAT) 0.4 MG SL tablet  Place 0.4 mg under the tongue every 5 (five) minutes as needed for Chest pain.             pantoprazole  (PROTONIX) 40 MG tablet  Take 1 tablet (40 mg total) by mouth once daily.             polyethylene glycol (GLYCOLAX) 17 gram PwPk  17 g by Per NG tube route 2 (two) times daily.             rosuvastatin (CRESTOR) 20 MG tablet  ONE TABLET BY MOUTH EVERY EVENING                                                         _________________________________  Ashley Moreno MD  10/21/2018

## 2018-10-21 NOTE — PROGRESS NOTES
[facility] Hospital Medicine  Progress Note    Patient Name: Cindy Lyman  MRN: [id number]  Patient Class: IP- Inpatient   Admission Date: 9/30/2018  Length of Stay: 21 days  Attending Physician: Ashley Moreno MD  Primary Care Provider: Rodger Camarena MD        Subjective:     Principal Problem:Cardiac arrest    HPI:  81 yo female with diastolic CHF, COPD, gastric cancer, aortic stenosis with complete heart block and s/p TAVR and pacemaker placed 7/2018 presented in cardiac arrest. Per ED notes, patient was on her way to [employer] with others and c/o chest pain. CPR was initiated in parked vehicle outside ED. One round of epinephrine given and got ROSC. Per family, she c/o worsening SOB over several days. On arrival pt was euthermic, hypertensive, elevated lactate and BNP. CXR suggest pulmonary edema though infection cannot be ruled out. Pt intubated and evaluated by pulmonology. Cardiology consulted. ECHO pending. Broad spectrum antibiotics initiated until cultures result and clinical course dictates.     Chest CTA negative for pulmonary emboli.     Per family, Pt is a full code.     Hospital Course:    81 yo female with diastolic CHF, COPD, gastric cancer, aortic stenosis with complete heart block and s/p TAVR and pacemaker placed 7/2018 presented in cardiac arrest. Per ED notes, patient was on her way to [employer] with others and c/o chest pain. CPR was initiated in parked vehicle outside ED. One round of epinephrine given and got ROSC. Per family, she c/o worsening SOB over several days. On arrival pt was euthermic, hypertensive, elevated lactate and BNP. CXR suggest pulmonary edema though infection cannot be ruled out. Pt was  intubated and evaluated by pulmonology. Cardiology consulted. Broad spectrum antibiotics initiated until cultures result and clinical course dictates.   Chest CTA negative for pulmonary emboli.   Per family, Pt is a full code.   Duo to severe medical  condition of patient,p;naveen is going to hospice.  She failed swallow study,S/P  PEG tube placement on 10.19.18,stared on tube feeding,tolerating feeding.            Interval History: patient is stable on NC O 2    Review of Systems   Respiratory: Negative for cough, shortness of breath and wheezing.    Cardiovascular: Negative for chest pain, palpitations and leg swelling.   Gastrointestinal: Negative.    Musculoskeletal: Negative.    Neurological: Positive for weakness.   Hematological: Negative.    Psychiatric/Behavioral: The patient is not nervous/anxious.      Objective:     Vital Signs (Most Recent):  Temp: 98.6 °F (37 °C) (10/21/18 0719)  Pulse: 74 (10/21/18 0722)  Resp: 20 (10/21/18 0722)  BP: (!) 182/74 (10/21/18 0719)  SpO2: 97 % (10/21/18 0722) Vital Signs (24h Range):  Temp:  [97.9 °F (36.6 °C)-98.8 °F (37.1 °C)] 98.6 °F (37 °C)  Pulse:  [60-77] 74  Resp:  [16-20] 20  SpO2:  [95 %-100 %] 97 %  BP: (137-182)/(60-74) 182/74     Weight: 95.2 kg (209 lb 14.1 oz)  Body mass index is 34.93 kg/m².    Intake/Output Summary (Last 24 hours) at 10/21/2018 0900  Last data filed at 10/21/2018 0548  Gross per 24 hour   Intake 780 ml   Output 0 ml   Net 780 ml      Physical Exam   Constitutional: She is oriented to person, place, and time. She appears well-developed. No distress.   Eyes: Conjunctivae and EOM are normal. Pupils are equal, round, and reactive to light.   Cardiovascular: Regular rhythm.   tachycardia   Pulmonary/Chest: She has no wheezes. She has no rales.   Using accessory muscles  Speaking in short words   Abdominal: Soft. Bowel sounds are normal.   Musculoskeletal: Normal range of motion. She exhibits no edema (BUE).   Neurological: She is alert and oriented to person, place, and time.   Skin: Skin is warm. Capillary refill takes less than 2 seconds. She is not diaphoretic.   Psychiatric:   Somewhat anxious   Nursing note and vitals reviewed.      Significant Labs: All pertinent labs within the past 24  hours have been reviewed.    Significant Imaging: I have reviewed all pertinent imaging results/findings within the past 24 hours.  I have reviewed and interpreted all pertinent imaging results/findings within the past 24 hours.    Assessment/Plan:      * Cardiac arrest    Etiology is unclear, but PEA reported by first repwilma. Quick ROSC with chest compressions and one round of epinephrine  Was intubated. Is now extubated and on supplemental O2. Mentation appropriate  ECHO  -  +DD  Pacemaker interrogated. Working well   [facility] - 10/9- stents RCA are patent. No intervention. Med therapy only   Plavix currently on hold for PEG. Is on ASA and BB. BP very difficult to control.  Plan going to hospice,but still is full code,spoked with family,palliative dare on case as well.will asses again ,will be DNR after procedure and before go to hospice.  S/P PEG tube,srated on tube feeding.tolerating.       CAD s/p PCI    Per [facility], coronaries are patent, including area where stent is in place  On ASA and BB. Plavix on hold for PEG. Will resume statin       Debility    Not pursuing SNF but rather inpatient hospice after PEG is placed for dysphagia             Palliative care encounter    Pt and family wants peg tube  GI consulted. Plan is for today  once plavix had been on hold for 5 days        Hematuria, gross    Appreciate Urology input.  Held lovenox  Urine clear  No briceno in place       Pacemaker    See above        Acute hypoxemic respiratory failure    Again with respiratory distress and hypoxia today. This started about 30 minutes after 2nd unit of blood  This indicated pulmonary edema is likely culprit, as it happened on admission  Also very hypertensive. Flash pulmonary edema considered.  dose of lasix 40 mg IV  change NC to non rebreather (BiPAP not recommended given oral secretions requiring frequent suctioning), duo-nebs, elevate HOB > 45 degrees and obtain CXR   Treat BP.           Acute pulmonary edema    As  above         S/P TAVR (transcatheter aortic valve replacement)    No acute issues        Stage 3 chronic kidney disease    Stable.        Essential hypertension    Very difficult to control  Treating respiratory distress and adjusting antihypertensive regimen for better control       Anemia of chronic disease    S/p 2U of PRBCs. Unknown decrease in Hgb but may be from multiple blood draws during hospital stay  Pending repeat CBC post PRBC transfusion  Will then limit blood draws to avoid this from happening again       Hyperlipidemia    Resume statin          VTE Risk Mitigation (From admission, onward)        Ordered     Place sequential compression device  Until discontinued      10/05/18 1249     Place LAUREANO hose  Until discontinued      10/05/18 1249     IP VTE HIGH RISK PATIENT  Once      09/30/18 1359              Ashley Moreno MD  Department of Hospital Medicine   [facility]

## 2018-10-21 NOTE — PLAN OF CARE
Problem: Fall Risk (Adult)  Goal: Identify Related Risk Factors and Signs and Symptoms  Related risk factors and signs and symptoms are identified upon initiation of Human Response Clinical Practice Guideline (CPG)  Outcome: Ongoing (interventions implemented as appropriate)   10/21/18 5757   Fall Risk   Related Risk Factors (Fall Risk) age-related changes;bladder function altered;culprit medication(s);fatigue/slow reaction;fear of falling;gait/mobility problems;polypharmacy;neuro disease/injury   Signs and Symptoms (Fall Risk) presence of risk factors

## 2018-10-21 NOTE — PROGRESS NOTES
Contacted Danna with [facility] to determine status of patient acceptance to In-Patient Hospice; Danna indicated that they would be able to accept patient now.     Contacted Dr. Davis to information of acceptance and informed nurse to set up for discharge; scheduled EMS transport for patient to [facility]

## 2018-10-21 NOTE — PROGRESS NOTES
Contacted nursing staff at [facility] in-patient to set up move to in-patient hospice; staff member indicated that she did not know about the admit and would check with her nursing DON; awaiting return call from [facility]; sent through [facility]: progress report and plan of care.    10:30am Called again and spoke with Mila, who reported that she was unaware of a possible discharge for inpatient hospice; she will contact her representative to contact me about the possible discharge.

## 2018-10-22 NOTE — ANESTHESIA PREPROCEDURE EVALUATION
10/22/2018  Cindy Lyman is a 82 y.o., female.    Anesthesia Evaluation    I have reviewed the Patient Summary Reports.    I have reviewed the Nursing Notes.   I have reviewed the Medications.     Review of Systems  Anesthesia Hx:  No problems with previous Anesthesia  History of prior surgery of interest to airway management or planning: Denies Family Hx of Anesthesia complications.   Denies Personal Hx of Anesthesia complications.       Physical Exam  General:  Well nourished    Airway/Jaw/Neck:  Airway Findings: Mouth Opening: Normal Tongue: Normal  General Airway Assessment: Adult  Mallampati: II     Eyes/Ears/Nose:  Eyes/Ears/Nose Findings:          Mental Status:  Mental Status Findings:  Cooperative, Alert and Oriented         Anesthesia Plan  Type of Anesthesia, risks & benefits discussed:  Anesthesia Type:  general  Patient's Preference:   Intra-op Monitoring Plan: standard ASA monitors  Intra-op Monitoring Plan Comments:   Post Op Pain Control Plan: multimodal analgesia, IV/PO Opioids PRN and per primary service following discharge from PACU  Post Op Pain Control Plan Comments:   Induction:   IV  Beta Blocker:  Patient is not currently on a Beta-Blocker (No further documentation required).       Informed Consent: Patient understands risks and agrees with Anesthesia plan.  Questions answered. Anesthesia consent signed with patient.  ASA Score: 2     Day of Surgery Review of History & Physical:     H&P completed by Anesthesiologist.       Ready For Surgery From Anesthesia Perspective.      Pre-operative evaluation for Procedure(s) (LRB):  INSERTION, PEG TUBE (N/A)    Cindy Lyman is a 82 y.o. female     Patient Active Problem List   Diagnosis    Hyperlipidemia    Depression    Bilateral cataracts    Pulmonary hypertension    TMJ (temporomandibular joint disorder)    Carpal  tunnel syndrome on both sides    Nonrheumatic aortic valve stenosis    Anemia of chronic disease    CAD s/p PCI    Presence of drug coated stent in LAD coronary artery    Right leg DVT    Class 1 obesity with serious comorbidity and body mass index (BMI) of 32.0 to 32.9 in adult    DVT (deep venous thrombosis)    Uncomplicated asthma    Coronary artery disease involving native coronary artery without angina pectoris    Long term (current) use of anticoagulants [V58.61]    Anemia    Essential hypertension    Vitamin D deficiency    Right sciatic nerve pain    Chronic diastolic heart failure    Stage 3 chronic kidney disease    Obesity hypoventilation syndrome    Hyperdense renal cyst    Gastric polyp    Nodular calcific aortic valve stenosis    Bifascicular block    Coronary artery disease due to lipid rich plaque    S/P TAVR (transcatheter aortic valve replacement)    Complete heart block    Atherosclerosis of aorta    Acute pulmonary edema    Cardiac arrest    Acute hypoxemic respiratory failure    Pacemaker    Hematuria, gross    Palliative care encounter    Debility       Review of patient's allergies indicates:   Allergen Reactions    Doxycycline hyclate Diarrhea and Nausea And Vomiting    Singulair [montelukast]      Stomach pain, Muscle pain, Cough      Penicillins      Other reaction(s): Anaphylaxis    Ventolin [albuterol sulfate] Other (See Comments)     Severely elevated blood pressure    Latex, natural rubber Rash       No current facility-administered medications on file prior to encounter.      Current Outpatient Medications on File Prior to Encounter   Medication Sig Dispense Refill    acetaminophen (TYLENOL) 325 MG tablet Take 2 tablets (650 mg total) by mouth every 6 (six) hours as needed for Pain or Temperature greater than (100.4).  0    aspirin (ECOTRIN) 81 MG EC tablet Take 81 mg by mouth once daily.      azelastine (ASTELIN) 137 mcg (0.1 %) nasal spray 1  spray (137 mcg total) by Nasal route 2 (two) times daily. 30 mL 0    cetirizine (ZYRTEC) 10 MG tablet Take 1 tablet (10 mg total) by mouth once daily.  0    cloNIDine (CATAPRES) 0.3 MG tablet Take 1 tablet (0.3 mg total) by mouth 3 (three) times daily. 270 tablet 1    clopidogrel (PLAVIX) 75 mg tablet Take 1 tablet (75 mg total) by mouth once daily. 30 tablet 11    fluticasone (FLONASE) 50 mcg/actuation nasal spray TWO SPRAYS IN EACH NOSTRIL ONCE DAILY 16 g 5    fluticasone (FLOVENT HFA) 220 mcg/actuation inhaler Inhale 1 puff into the lungs 2 (two) times daily. Controller 12 g 2    gabapentin (NEURONTIN) 300 MG capsule ONE CAPSULE BY MOUTH THREE TIMES DAILY 270 capsule 0    hydrALAZINE (APRESOLINE) 100 MG tablet ONE TABLET BY MOUTH THREE TIMES DAILY 270 tablet 0    metoprolol tartrate (LOPRESSOR) 100 MG tablet ONE TABLET BY MOUTH TWICE DAILY 180 tablet 1    nitroGLYCERIN (NITROSTAT) 0.4 MG SL tablet Place 0.4 mg under the tongue every 5 (five) minutes as needed for Chest pain.      rosuvastatin (CRESTOR) 20 MG tablet ONE TABLET BY MOUTH EVERY EVENING 90 tablet 1    verapamil (VERELAN) 360 MG C24P ONE CAPSULE BY MOUTH once a day 90 capsule 3    walker (ULTRA-LIGHT ROLLATOR) Misc One rollator, size large. 1 each 0       Past Surgical History:   Procedure Laterality Date    A-V CARDIAC PACEMAKER INSERTION N/A 7/5/2018    Procedure: INSERTION, CARDIAC PACEMAKER, DUAL CHAMBER;  Surgeon: Giancarlo Houser MD;  Location: [facility] CATH LAB;  Service: Cardiology;  Laterality: N/A;    AORTIC VALVE REPLACEMENT N/A 7/5/2018    Procedure: Replacement-valve-aortic;  Surgeon: Corey Castañeda MD;  Location: [facility] CATH LAB;  Service: Cardiology;  Laterality: N/A;    BACK SURGERY      BIOPSY-BONE MARROW N/A 5/26/2016    [facility] ENDO    cardiac stents      CARDIAC VALVE SURGERY      CARPAL TUNNEL RELEASE      Right/Left    CHOLECYSTECTOMY-LAPAROSCOPIC W/ CHOLANGIOGRAM N/A 7/13/2017     Performed by Luis Carlos Jarvis [facility] at [facility] OR    EYE SURGERY      cataract extraction    HYSTERECTOMY      Partial    INSERTION, CARDIAC PACEMAKER, DUAL CHAMBER N/A 2018    Performed by [facility]lo [facility]er [facility] at [facility] CATH LAB    JOINT REPLACEMENT  2009    Left hip    Left heart cath Left 10/9/2018    Performed by Tony Carmen MD at [facility] CATH LAB    LEFT HEART CATHETERIZATION Left 10/9/2018    Procedure: Left heart cath;  Surgeon: Tony Carmen MD;  Location: [facility] CATH LAB;  Service: Cardiology;  Laterality: Left;    POLYPECTOMY      x9    Replacement-valve-aortic N/A 2018    Performed by Corey Castañeda MD at [facility] CATH LAB    TEMPOROMANDIBULAR JOINT SURGERY      ULTRASOUND-ENDOSCOPIC-UPPER N/A 2018    Performed by Socrates Andrew MD at [facility] ENDO (2ND FLR)       Social History     Socioeconomic History    Marital status:      Spouse name: Not on file    Number of children: Not on file    Years of education: Not on file    Highest education level: Not on file   Social Needs    Financial resource strain: Not on file    Food insecurity - worry: Not on file    Food insecurity - inability: Not on file    Transportation needs - medical: Not on file    Transportation needs - non-medical: Not on file   Occupational History    Not on file   Tobacco Use    Smoking status: Never Smoker    Smokeless tobacco: Never Used   Substance and Sexual Activity    Alcohol use: No    Drug use: No    Sexual activity: No   Other Topics Concern    Not on file   Social History Narrative    Not on file               Diagnostic Studies:      EKD Echo:  Results for orders placed or performed during the hospital encounter of 08/10/18   2D echo with color flow doppler   Result Value Ref Range    Calculated EF 60 55 - 65    Aortic Valve Regurgitation MILD     Est. PA Systolic Pressure 51.72 (A)     Pericardial Effusion TRIVIAL     Tricuspid Valve Regurgitation MILD

## 2018-10-22 NOTE — ANESTHESIA POSTPROCEDURE EVALUATION
"Anesthesia Post Evaluation    Patient: Cindy Lyman    Procedure(s) Performed: Procedure(s) (LRB):  INSERTION, PEG TUBE (N/A)    Final Anesthesia Type: general  Patient location during evaluation: PACU  Patient participation: Yes- Able to Participate  Level of consciousness: awake and alert and oriented  Post-procedure vital signs: reviewed and stable  Pain management: adequate  Airway patency: patent  PONV status at discharge: No PONV  Anesthetic complications: no      Cardiovascular status: blood pressure returned to baseline and hemodynamically stable  Respiratory status: unassisted, spontaneous ventilation and room air  Hydration status: euvolemic  Follow-up not needed.        Visit Vitals  BP (!) 145/67 (BP Location: Left arm, Patient Position: Lying)   Pulse 70   Temp 37 °C (98.6 °F) (Oral)   Resp 18   Ht 5' 5" (1.651 m)   Wt 95.2 kg (209 lb 14.1 oz)   SpO2 99%   Breastfeeding? No   BMI 34.93 kg/m²       Pain/Malvin Score: Pain Assessment Performed: Yes (10/21/2018  2:00 PM)  Presence of Pain: denies (10/21/2018  2:00 PM)        "

## 2018-11-01 NOTE — PLAN OF CARE
11/01/18 1010   Final Note   Assessment Type Final Discharge Note   [facility]   Hospital Follow Up  Appt(s) scheduled? Yes   Discharge plans and expectations educations in teach back method with documentation complete? Yes   Right Care Referral Info   Referral Type INPATIENT HOSPICE   Facility Name [facility]

## 2018-11-05 ENCOUNTER — TELEPHONE (OUTPATIENT)
Dept: FAMILY MEDICINE | Facility: CLINIC | Age: 83
End: 2018-11-05

## 2018-11-05 NOTE — TELEPHONE ENCOUNTER
"----- Message from Robina Flynn sent at 11/5/2018  9:13 AM CST -----  Contact: [facility] "[facility]" [phone]  Requesting an orders & medical necessity form to be faxed over [phone]  "

## 2018-11-05 NOTE — TELEPHONE ENCOUNTER
Pt. Needs orders for Home Health with Letter of Med necessity faxed to [phone].     Med necessity form with office notes on Jacquie's desk.

## 2018-11-05 NOTE — TELEPHONE ENCOUNTER
----- Message from Jem Romero sent at 11/5/2018  2:13 PM CST -----  Contact: Radha Cox [place]/[phone]  [employer] would like to speak to the staff regarding Home Health for the patient.        Thank you

## 2018-11-07 ENCOUNTER — TELEPHONE (OUTPATIENT)
Dept: FAMILY MEDICINE | Facility: CLINIC | Age: 83
End: 2018-11-07

## 2018-11-07 NOTE — TELEPHONE ENCOUNTER
----- Message from Carla Pelletier sent at 11/7/2018 11:10 AM CST -----  Contact: pt's daughter marcin [phone]            Name of Who is Calling: marcin      What is the request in detail: needs a letter for long term care. Call marcin      Can the clinic reply by [facility]: no      [facility]'s daughter marcin [phone]

## 2018-11-08 NOTE — TELEPHONE ENCOUNTER
Spoke with Rosemarie at [facility] re: HH orders. Told her Dr. Camarena would need to see patient prior to doing HH orders. She stated she will check with NurseBRIAN in charge of his care to see if she meant to get orders from hospital discharge doctor or Dr. Camarena. She will get back with me     Spoke with patient's daughter Radha. She states her mother was rushed to ED because she stopped breathing. She states her mother was in a coma and when she was discharged with a feeding tube and referred to hospice. She stated hospice nurse was told her mother had stage 3 cancer, which is an error. Daughter d/c hospice and is now asking for HH for assistance for caring for her mother.     WHEN [facility] NURSEBRIAN CALLS, PLEASE FORWARD CALL TO DR. CAMARENA

## 2018-11-12 ENCOUNTER — TELEPHONE (OUTPATIENT)
Dept: FAMILY MEDICINE | Facility: CLINIC | Age: 83
End: 2018-11-12

## 2018-11-12 NOTE — TELEPHONE ENCOUNTER
----- Message from Roslyn Bhagat sent at 11/12/2018  9:11 AM CST -----  Contact: Daughter/Jennifer/[phone]  Jennifer would like staff to give her a call regarding paper work for Home Health Care for her mother.  Thank You.

## 2018-11-12 NOTE — TELEPHONE ENCOUNTER
----- Message from Steff Pearson sent at 11/8/2018  3:11 PM CST -----  Contact: Daughter - Jennifer   Daughter is trying to get long term Home Health care for the patient. She said the pt went into the hospital 10/7/18. The first Sunday in October. She is asking for a call back as soon as possible. She wants to know if the paperwork had been sent over to the insurance company, [employer]. Please call at [phone].

## 2018-11-12 NOTE — TELEPHONE ENCOUNTER
I spoke Joselin, at [employer] member services. She has already spoken to pt's daughter regarding the following: Pt has not been terminated from hospice yet, and once she is terminated from hospice, she cannot enroll into [employer] until the 1st of the following month. Then she will be able to get home health. As of right now, she's still enrolled in hospice with traditional medicare, not [employer]. Dr Camarena was notified

## 2018-12-12 ENCOUNTER — OFFICE VISIT (OUTPATIENT)
Dept: FAMILY MEDICINE | Facility: CLINIC | Age: 83
End: 2018-12-12
Payer: MEDICARE

## 2018-12-12 VITALS
BODY MASS INDEX: 34.93 KG/M2 | HEART RATE: 70 BPM | OXYGEN SATURATION: 98 % | DIASTOLIC BLOOD PRESSURE: 101 MMHG | TEMPERATURE: 98 F | HEIGHT: 65 IN | SYSTOLIC BLOOD PRESSURE: 167 MMHG | RESPIRATION RATE: 28 BRPM

## 2018-12-12 DIAGNOSIS — R53.81 PHYSICAL DECONDITIONING: ICD-10-CM

## 2018-12-12 DIAGNOSIS — L89.323 PRESSURE INJURY OF LEFT BUTTOCK, STAGE 3: Primary | ICD-10-CM

## 2018-12-12 DIAGNOSIS — I50.32 CHRONIC DIASTOLIC HEART FAILURE: ICD-10-CM

## 2018-12-12 DIAGNOSIS — I25.10 CORONARY ARTERY DISEASE INVOLVING NATIVE CORONARY ARTERY OF NATIVE HEART WITHOUT ANGINA PECTORIS: Chronic | ICD-10-CM

## 2018-12-12 DIAGNOSIS — I10 ESSENTIAL HYPERTENSION: Chronic | ICD-10-CM

## 2018-12-12 PROCEDURE — 1101F PT FALLS ASSESS-DOCD LE1/YR: CPT | Mod: CPTII,S$GLB,, | Performed by: FAMILY MEDICINE

## 2018-12-12 PROCEDURE — 3077F SYST BP >= 140 MM HG: CPT | Mod: CPTII,S$GLB,, | Performed by: FAMILY MEDICINE

## 2018-12-12 PROCEDURE — 99999 PR PBB SHADOW E&M-EST. PATIENT-LVL III: CPT | Mod: PBBFAC,,, | Performed by: FAMILY MEDICINE

## 2018-12-12 PROCEDURE — 99499 UNLISTED E&M SERVICE: CPT | Mod: S$GLB,,, | Performed by: FAMILY MEDICINE

## 2018-12-12 PROCEDURE — 99214 OFFICE O/P EST MOD 30 MIN: CPT | Mod: S$GLB,,, | Performed by: FAMILY MEDICINE

## 2018-12-12 PROCEDURE — 3080F DIAST BP >= 90 MM HG: CPT | Mod: CPTII,S$GLB,, | Performed by: FAMILY MEDICINE

## 2018-12-12 NOTE — PROGRESS NOTES
"Routine Office Visit    Patient Name: Cindy Lyman    : 1935  MRN: [id number]    Subjective:  Cindy is a 83 y.o. female who presents today for:    1. Deconditioning  Patient presenting today with generalized deconditioning s/p what was diagnosed as MIEryn tong has a history of CHF, COPD, hyperlipidemia, and HTN.  She is with her daughter today who states that she was at [employer] when patient began to be dizzy.  She took 3 sublingual nitroglycerine tablets.  Afterwards, she was brought by a [employer] member to the ED at [facility].  Family states on the way there she became unconscious and they couldn't feel a pulse.  She states that she was told Mrs. Lyman "physically ".  She was resuscitated and was doing well.  She then aspirated on a tablet of Tylenol and reports that family was told she was brain dead and would never move again nor be alert enough to ever do anything.  Per family, patient was discharged to hospice under the diagnosis of terminal gastric cancer.  Patient has a history of benign gastric polyp, but no history of cancer.  Once at hospice, family reports she was given large amounts of pain medication and was "talking out of her head".  Family decided to bring her home and since then she has begun to be her normal self, but is very weak and stays in the bed.  Family is requesting she go into a skilled facility as the daughter she lives with now has Lupus, a bad back, and is taking care of her autistic son.  Her daughter reports that it is very hard to change Mrs. Lyman on her own and she is not able to turn her every 2 hours.  Mrs. Rodrigues has now devoloped a pressure ulcer on her left glut and family reports it is not healing.  Mrs. Lyman says there is a burning pain where the ulcer is, but that it is not severe.  Family states that no one has been taking care of the ulcer from a medical standpoint.     Past Medical History  Past Medical History:   Diagnosis Date    " Anemia     Anticoagulant long-term use     Aortic stenosis     Arthritis     lower back    Asthma     CAD (coronary artery disease) 4/24/2015    Carpal tunnel syndrome on both sides     Cataract     CHF (congestive heart failure) 5/2013    COPD (chronic obstructive pulmonary disease)     Depression     DVT (deep venous thrombosis)     Hyperlipidemia     Hypertension     Pulmonary HTN     TMJ (temporomandibular joint disorder)        Past Surgical History  Past Surgical History:   Procedure Laterality Date    A-V CARDIAC PACEMAKER INSERTION N/A 7/5/2018    Procedure: INSERTION, CARDIAC PACEMAKER, DUAL CHAMBER;  Surgeon: Giancarlo Houser MD;  Location: [facility] CATH LAB;  Service: Cardiology;  Laterality: N/A;    AORTIC VALVE REPLACEMENT N/A 7/5/2018    Procedure: Replacement-valve-aortic;  Surgeon: Corey Castañeda MD;  Location: [facility] CATH LAB;  Service: Cardiology;  Laterality: N/A;    BACK SURGERY      BIOPSY-BONE MARROW N/A 5/26/2016    Performed by [facility]ufino Ovalle [facility] at [facility] ENDO    cardiac stents      CARDIAC VALVE SURGERY      CARPAL TUNNEL RELEASE      Right/Left    CHOLECYSTECTOMY-LAPAROSCOPIC W/ CHOLANGIOGRAM N/A 7/13/2017    Performed by Luis Carlos Jarvis MD at [facility] OR    EYE SURGERY      cataract extraction    HYSTERECTOMY      Partial    INSERTION, CARDIAC PACEMAKER, DUAL CHAMBER N/A 7/5/2018    Performed by Giancarlo Houser MD at [facility] CATH LAB    INSERTION, PEG TUBE N/A 10/19/2018    Performed by Eddie Segundo MD at [facility] ENDO    JOINT REPLACEMENT  2009    Left hip    Left heart cath Left 10/9/2018    Performed by Tony Carmen MD at [facility] CATH LAB    LEFT HEART CATHETERIZATION Left 10/9/2018    Procedure: Left heart cath;  Surgeon: Tony Carmen MD;  Location: [facility] CATH LAB;  Service: Cardiology;  Laterality: Left;    POLYPECTOMY      x9    Replacement-valve-aortic N/A 7/5/2018    Performed by Corey Castañeda MD at [facility] CATH LAB    TEMPOROMANDIBULAR JOINT  SURGERY      ULTRASOUND-ENDOSCOPIC-UPPER N/A 4/25/2018    Performed by Socrates Andrew [facility] at [facility] ([facility])       Family History  Family History   Problem Relation Age of Onset    Heart disease Mother     Hypertension Daughter     Cancer Son     Breast cancer Neg Hx     Colon cancer Neg Hx     Ovarian cancer Neg Hx     Esophageal cancer Neg Hx        Social History  Social History     Socioeconomic History    Marital status:      Spouse name: Not on file    Number of children: Not on file    Years of education: Not on file    Highest education level: Not on file   Social Needs    Financial resource strain: Not on file    Food insecurity - worry: Not on file    Food insecurity - inability: Not on file    Transportation needs - medical: Not on file    Transportation needs - non-medical: Not on file   Occupational History    Not on file   Tobacco Use    Smoking status: Never Smoker    Smokeless tobacco: Never Used   Substance and Sexual Activity    Alcohol use: No    Drug use: No    Sexual activity: No   Other Topics Concern    Not on file   Social History Narrative    Not on file       Current Medications  Current Outpatient Medications on File Prior to Visit   Medication Sig Dispense Refill    acetaminophen (TYLENOL) 325 MG tablet Take 2 tablets (650 mg total) by mouth every 6 (six) hours as needed for Pain or Temperature greater than (100.4).  0    aspirin (ECOTRIN) 81 MG EC tablet Take 81 mg by mouth once daily.      azelastine (ASTELIN) 137 mcg (0.1 %) nasal spray 1 spray (137 mcg total) by Nasal route 2 (two) times daily. 30 mL 0    cetirizine (ZYRTEC) 10 MG tablet Take 1 tablet (10 mg total) by mouth once daily.  0    cloNIDine (CATAPRES) 0.3 MG tablet Take 1 tablet (0.3 mg total) by mouth 3 (three) times daily. 270 tablet 1    clopidogrel (PLAVIX) 75 mg tablet Take 1 tablet (75 mg total) by mouth once daily. 30 tablet 11    fluticasone (FLONASE) 50 mcg/actuation  nasal spray TWO SPRAYS IN EACH NOSTRIL ONCE DAILY 16 g 5    fluticasone (FLOVENT HFA) 220 mcg/actuation inhaler Inhale 1 puff into the lungs 2 (two) times daily. Controller 12 g 2    furosemide (LASIX) 40 MG tablet Take 1 tablet (40 mg total) by mouth once daily. TAKE ONE TABLET BY MOUTH EVERY DAY AS NEEDED FOR SHORTNESS OF BREATH or leg swelling 90 tablet 1    gabapentin (NEURONTIN) 300 MG capsule ONE CAPSULE BY MOUTH THREE TIMES DAILY 270 capsule 0    hydrALAZINE (APRESOLINE) 100 MG tablet ONE TABLET BY MOUTH THREE TIMES DAILY 270 tablet 0    isosorbide dinitrate (ISORDIL) 20 MG tablet Take 1 tablet (20 mg total) by mouth 3 (three) times daily. 90 tablet 11    lisinopril (PRINIVIL,ZESTRIL) 20 MG tablet Take 1 tablet (20 mg total) by mouth once daily. 90 tablet 3    metoprolol tartrate (LOPRESSOR) 100 MG tablet ONE TABLET BY MOUTH TWICE DAILY 180 tablet 1    nitroGLYCERIN (NITROSTAT) 0.4 MG SL tablet Place 0.4 mg under the tongue every 5 (five) minutes as needed for Chest pain.      pantoprazole (PROTONIX) 40 MG tablet Take 1 tablet (40 mg total) by mouth once daily. 30 tablet 11    polyethylene glycol (GLYCOLAX) 17 gram PwPk 17 g by Per NG tube route 2 (two) times daily as needed.  0    rosuvastatin (CRESTOR) 20 MG tablet ONE TABLET BY MOUTH EVERY EVENING 90 tablet 1    verapamil (VERELAN) 360 MG C24P ONE CAPSULE BY MOUTH once a day 90 capsule 3    walker (ULTRA-LIGHT ROLLATOR) Misc One rollator, size large. 1 each 0     No current facility-administered medications on file prior to visit.        Allergies   Review of patient's allergies indicates:   Allergen Reactions    Doxycycline hyclate Diarrhea and Nausea And Vomiting    Singulair [montelukast]      Stomach pain, Muscle pain, Cough      Penicillins      Other reaction(s): Anaphylaxis    Ventolin [albuterol sulfate] Other (See Comments)     Severely elevated blood pressure    Latex, natural rubber Rash       Review of Systems (Pertinent  "positives)  Review of Systems   Constitutional: Positive for malaise/fatigue.   HENT: Negative.    Eyes: Negative.    Respiratory: Negative for cough, shortness of breath and wheezing.    Cardiovascular: Negative for chest pain and palpitations.   Gastrointestinal: Negative.    Genitourinary: Negative.    Musculoskeletal: Negative.    Skin:        +ulcer of buttock   Neurological: Positive for weakness.         BP (!) 167/101 (BP Location: Right arm, Patient Position: Sitting, BP Method: Medium (Manual))   Pulse 70   Temp 98.4 °F (36.9 °C) (Oral)   Resp (!) 28   Ht 5' 5" (1.651 m)   SpO2 98%   BMI 34.93 kg/m²     GENERAL APPEARANCE: in no apparent distress and well developed and well nourished  HEENT: PERRL, EOMI, Sclera clear, anicteric, Oropharynx clear, no lesions, Neck supple with midline trachea  NECK: normal, supple, no adenopathy, thyroid normal in size  RESPIRATORY: appears well, vitals normal, no respiratory distress, acyanotic, normal RR, chest clear, no wheezing, crepitations, rhonchi, normal symmetric air entry  HEART: regular rate and rhythm, S1, S2 normal, no murmur, click, rub or gallop.    ABDOMEN: abdomen is soft without tenderness, no masses, no hernias, no organomegaly, no rebound, no guarding. Suprapubic tenderness absent. No CVA tenderness.  NEUROLOGIC: normal without focal findings, CN II-XII are intact.  Strength 4/5 in bilateral upper extremities; 2/5 in bilateral lower extremitie  Extremities: warm/well perfused.  No abnormal hair patterns.  No clubbing, cyanosis or edema.  no muscular tenderness noted; decreased strength in trunk    SKIN: no rashes, no wounds, no other lesions  PSYCH: Alert, oriented x 3, thought content appropriate, speech normal, pleasant and cooperative, good eye contact, well groomed    Assessment/Plan:  Cindy Lyman is a 83 y.o. female who presents today for :    Diagnoses and all orders for this visit:    Pressure injury of left buttock, stage " 3    Physical deconditioning    Essential hypertension    Coronary artery disease involving native coronary artery of native heart without angina pectoris    Chronic diastolic heart failure      1.  Patients daughter will bring her to the ED at [place] for evaluation of gluteal wound  2.  Patient wound benefit from SNF  3.  No evidence of gastric cancer found in records including pathology reports and EGD studies  4.  No MRI or CT brain seen in record; CTA of chest showed no evidence of PE  5.  Labs reviewed from hospital stay that showed elevated troponin; echo showed EF of 45%  6.  Patient is freely communicating today      Rodger Camarena MD

## 2018-12-17 ENCOUNTER — TELEPHONE (OUTPATIENT)
Dept: FAMILY MEDICINE | Facility: CLINIC | Age: 83
End: 2018-12-17

## 2018-12-17 NOTE — TELEPHONE ENCOUNTER
----- Message from Georges Moser sent at 12/17/2018 10:14 AM CST -----  Contact: Ynes(daughter)[phone]  The patient's daughter is requesting a call back from the staff. She reports that they are having trouble getting the patient into a facility, being that the patient is bed-bound. Please call at your earliest convenience.

## 2018-12-17 NOTE — TELEPHONE ENCOUNTER
Patient's daughter is concerned because she discussed having mother go to skilled but [facility] does not agree this is appropriate for mother. Daughter gave me hospital 's num to call with orders for skilled.LMOVM for .     Per Dr. Camarena, we need daughter to fill out involvement of care (faxed to pt) so he can discuss patient's health and recommendations. Contacted daughter, she will fax me back signed involvement of care tomorrow.

## 2018-12-18 NOTE — TELEPHONE ENCOUNTER
Miguel cantrell  called back and stated, it wasn't miguel cantrell that disagreed,but it was her Insurance company who didn't approve her to go to a skill unit,because she didn't have a skill. Instead that approve her for regular nursing home placement.     Patient Daughter was informed,but states miguel cantrell hasn't re summited the information to her insurance company,since that Diagnoses of terminal cancer was taken out of her chart.    Patient wants to discuss with . She states her mother is still at [place] and has a few days until she has to get out.

## 2018-12-21 DIAGNOSIS — I10 ESSENTIAL HYPERTENSION: ICD-10-CM

## 2018-12-22 RX ORDER — CLONIDINE HYDROCHLORIDE 0.3 MG/1
TABLET ORAL
Qty: 270 TABLET | Refills: 1 | OUTPATIENT
Start: 2018-12-22

## 2018-12-26 RX ORDER — METOPROLOL TARTRATE 100 MG/1
TABLET ORAL
Qty: 180 TABLET | Refills: 1 | Status: SHIPPED | OUTPATIENT
Start: 2018-12-26 | End: 2021-01-07 | Stop reason: SDUPTHER

## 2019-01-02 DIAGNOSIS — I10 ESSENTIAL HYPERTENSION: ICD-10-CM

## 2019-01-02 RX ORDER — CLONIDINE HYDROCHLORIDE 0.3 MG/1
0.3 TABLET ORAL 3 TIMES DAILY
Qty: 270 TABLET | Refills: 1 | Status: SHIPPED | OUTPATIENT
Start: 2019-01-02 | End: 2021-01-01 | Stop reason: SDUPTHER

## 2019-01-02 NOTE — TELEPHONE ENCOUNTER
----- Message from Milli Gama sent at 1/2/2019  9:48 AM CST -----  Contact: daughter  pts daughter is calling to get a refill on medication cloNIDine (CATAPRES) 0.3 MG tablet. Please call at [phone]    [facility] [place] [address]  [address] [facility]  [place] [address]  Phone: [phone] Fax: [phone]

## 2019-01-04 ENCOUNTER — TELEPHONE (OUTPATIENT)
Dept: FAMILY MEDICINE | Facility: CLINIC | Age: 84
End: 2019-01-04

## 2019-01-04 DIAGNOSIS — I25.10 CORONARY ARTERY DISEASE INVOLVING NATIVE CORONARY ARTERY OF NATIVE HEART WITHOUT ANGINA PECTORIS: Chronic | ICD-10-CM

## 2019-01-04 DIAGNOSIS — I27.20 PULMONARY HYPERTENSION: Primary | Chronic | ICD-10-CM

## 2019-01-04 DIAGNOSIS — Z74.01 BEDBOUND: ICD-10-CM

## 2019-01-04 DIAGNOSIS — I50.32 CHRONIC DIASTOLIC HEART FAILURE: ICD-10-CM

## 2019-01-04 NOTE — TELEPHONE ENCOUNTER
Contacted Danielle with [facility]. She is requesting the following info to be put on orders:    Home health Aid to go into the home twice a week to help with ADL's.     Please send with last o.v. For approval to [employer].

## 2019-01-04 NOTE — TELEPHONE ENCOUNTER
"----- Message from Robina Flynn sent at 1/4/2019 10:01 AM CST -----  Contact: [place] [facility]" [phone]  [facility] Please fax back to [phone]  "

## 2019-01-05 DIAGNOSIS — M54.31 RIGHT SCIATIC NERVE PAIN: ICD-10-CM

## 2019-01-07 RX ORDER — GABAPENTIN 300 MG/1
CAPSULE ORAL
Qty: 270 CAPSULE | Refills: 0 | Status: ON HOLD | OUTPATIENT
Start: 2019-01-07 | End: 2020-09-20 | Stop reason: HOSPADM

## 2019-01-21 PROBLEM — J81.0 ACUTE PULMONARY EDEMA: Status: RESOLVED | Noted: 2018-09-30 | Resolved: 2019-01-21

## 2019-01-24 ENCOUNTER — TELEPHONE (OUTPATIENT)
Dept: ADMINISTRATIVE | Facility: CLINIC | Age: 84
End: 2019-01-24

## 2019-01-24 ENCOUNTER — TELEPHONE (OUTPATIENT)
Dept: FAMILY MEDICINE | Facility: CLINIC | Age: 84
End: 2019-01-24

## 2019-01-24 NOTE — TELEPHONE ENCOUNTER
Notified JEN Santos nurse that patient needs to be seen. She will have daughter call to schedule.

## 2019-01-24 NOTE — TELEPHONE ENCOUNTER
Spoke with home health nurse, Danielle. She states patient is having chest and nasal congestion with cough. She denies fever,sore throat or bodyaches. Pt. Would like cough medicine.    Pt. Would like rx for Nystatin for mouth. She states hospice gave it to her in past and she feels like she's getting it again.

## 2019-01-24 NOTE — TELEPHONE ENCOUNTER
Patient needs to be seen.  For cough and congestion she can drink peppermint tea with lemon and honey    Thanks  Dr. Camarena

## 2019-01-24 NOTE — TELEPHONE ENCOUNTER
----- Message from Roslyn Bhagat sent at 1/24/2019 11:14 AM CST -----  Contact: [facility]/ [phone]  Patient would like to know is cough medication could be called in for her and also nystatin.  Thank you.

## 2019-01-29 ENCOUNTER — TELEPHONE (OUTPATIENT)
Dept: FAMILY MEDICINE | Facility: CLINIC | Age: 84
End: 2019-01-29

## 2019-01-29 NOTE — TELEPHONE ENCOUNTER
----- Message from Jem Romero sent at 1/29/2019  8:50 AM CST -----  Contact: Christiano rapp/[phone]  The patient daughter would like orders placed for a chest xray as well as a urinalysis.          Thank you

## 2019-01-29 NOTE — TELEPHONE ENCOUNTER
Medical necessity form with office notes on Dr. Camarena's desk for signature.     Notified patient's daughter of paperwork pending but will be faxed tomorrow to [employer].     Daughter specified concerns of the possibility of her mother having fluid in her lungs and UTI. Patient has appt on 1/31 with Dr. Camarena. I explained to daughter the urgency of getting patient to ED asap. Daughter declined and stated she has an autistic child and this is too hard for her to handle both in the ED.     Notified Dr. Camarena.

## 2019-01-29 NOTE — TELEPHONE ENCOUNTER
----- Message from Milli Gama sent at 1/29/2019  2:19 PM CST -----  Contact: [facility]  Pt has an appt on the 31st. And needs to be transported by ambulance. Need clinic notes and medical necessity form faxed to [employer] [facility] to get it approved. Please fax [phone]  Phone [phone]

## 2019-01-29 NOTE — TELEPHONE ENCOUNTER
Spoke with patient's daughter re: requesting chest xray and urinalysis for visit on 1/31 with Dr. Camarena.  I told her if Dr. Camarena feels its needed he will order it.

## 2019-01-30 ENCOUNTER — HOSPITAL ENCOUNTER (EMERGENCY)
Facility: HOSPITAL | Age: 84
Discharge: HOME | End: 2019-01-30
Attending: EMERGENCY MEDICINE
Payer: MEDICARE

## 2019-01-30 VITALS
TEMPERATURE: 98 F | DIASTOLIC BLOOD PRESSURE: 108 MMHG | HEART RATE: 69 BPM | RESPIRATION RATE: 18 BRPM | BODY MASS INDEX: 34.82 KG/M2 | WEIGHT: 209 LBS | HEIGHT: 65 IN | SYSTOLIC BLOOD PRESSURE: 175 MMHG | OXYGEN SATURATION: 100 %

## 2019-01-30 DIAGNOSIS — R05.9 COUGH: Primary | ICD-10-CM

## 2019-01-30 DIAGNOSIS — R10.9 ABDOMINAL PAIN: ICD-10-CM

## 2019-01-30 DIAGNOSIS — R53.1 WEAKNESS: ICD-10-CM

## 2019-01-30 DIAGNOSIS — R06.02 SOB (SHORTNESS OF BREATH): ICD-10-CM

## 2019-01-30 LAB
ALBUMIN SERPL BCP-MCNC: 3.5 G/DL
ALP SERPL-CCNC: 83 U/L
ALT SERPL W/O P-5'-P-CCNC: 28 U/L
ANION GAP SERPL CALC-SCNC: 13 MMOL/L
AST SERPL-CCNC: 24 U/L
BASOPHILS # BLD AUTO: 0.03 K/UL
BASOPHILS NFR BLD: 0.5 %
BILIRUB SERPL-MCNC: 0.3 MG/DL
BILIRUB UR QL STRIP: NEGATIVE
BNP SERPL-MCNC: 667 PG/ML
BUN SERPL-MCNC: 30 MG/DL
CALCIUM SERPL-MCNC: 10.4 MG/DL
CHLORIDE SERPL-SCNC: 104 MMOL/L
CLARITY UR REFRACT.AUTO: CLEAR
CO2 SERPL-SCNC: 22 MMOL/L
COLOR UR AUTO: YELLOW
CREAT SERPL-MCNC: 1 MG/DL
DIFFERENTIAL METHOD: ABNORMAL
EOSINOPHIL # BLD AUTO: 0.4 K/UL
EOSINOPHIL NFR BLD: 6.2 %
ERYTHROCYTE [DISTWIDTH] IN BLOOD BY AUTOMATED COUNT: 16.2 %
EST. GFR  (AFRICAN AMERICAN): >60 ML/MIN/1.73 M^2
EST. GFR  (NON AFRICAN AMERICAN): 53 ML/MIN/1.73 M^2
GLUCOSE SERPL-MCNC: 102 MG/DL
GLUCOSE UR QL STRIP: NEGATIVE
HCT VFR BLD AUTO: 40 %
HGB BLD-MCNC: 12.9 G/DL
HGB UR QL STRIP: NEGATIVE
HYALINE CASTS UR QL AUTO: 12 /LPF
IMM GRANULOCYTES # BLD AUTO: 0.01 K/UL
IMM GRANULOCYTES NFR BLD AUTO: 0.2 %
KETONES UR QL STRIP: NEGATIVE
LEUKOCYTE ESTERASE UR QL STRIP: NEGATIVE
LYMPHOCYTES # BLD AUTO: 1.9 K/UL
LYMPHOCYTES NFR BLD: 29.1 %
MCH RBC QN AUTO: 30.1 PG
MCHC RBC AUTO-ENTMCNC: 32.3 G/DL
MCV RBC AUTO: 94 FL
MICROSCOPIC COMMENT: ABNORMAL
MONOCYTES # BLD AUTO: 0.6 K/UL
MONOCYTES NFR BLD: 8.3 %
NEUTROPHILS # BLD AUTO: 3.7 K/UL
NEUTROPHILS NFR BLD: 55.7 %
NITRITE UR QL STRIP: NEGATIVE
NRBC BLD-RTO: 0 /100 WBC
PH UR STRIP: 6 [PH] (ref 5–8)
PLATELET # BLD AUTO: 256 K/UL
PMV BLD AUTO: 11 FL
POTASSIUM SERPL-SCNC: 3.9 MMOL/L
PROT SERPL-MCNC: 7.6 G/DL
PROT UR QL STRIP: NEGATIVE
RBC # BLD AUTO: 4.28 M/UL
RBC #/AREA URNS AUTO: 0 /HPF (ref 0–4)
SODIUM SERPL-SCNC: 139 MMOL/L
SP GR UR STRIP: 1.01 (ref 1–1.03)
SQUAMOUS #/AREA URNS AUTO: 1 /HPF
TROPONIN I SERPL DL<=0.01 NG/ML-MCNC: 0.02 NG/ML
URN SPEC COLLECT METH UR: NORMAL
WBC # BLD AUTO: 6.6 K/UL
WBC #/AREA URNS AUTO: 0 /HPF (ref 0–5)

## 2019-01-30 PROCEDURE — 99284 PR EMERGENCY DEPT VISIT,LEVEL IV: ICD-10-PCS | Mod: ,,, | Performed by: EMERGENCY MEDICINE

## 2019-01-30 PROCEDURE — 93010 EKG 12-LEAD: ICD-10-PCS | Mod: ,,, | Performed by: INTERNAL MEDICINE

## 2019-01-30 PROCEDURE — 81001 URINALYSIS AUTO W/SCOPE: CPT

## 2019-01-30 PROCEDURE — 84484 ASSAY OF TROPONIN QUANT: CPT

## 2019-01-30 PROCEDURE — 80053 COMPREHEN METABOLIC PANEL: CPT

## 2019-01-30 PROCEDURE — 99284 EMERGENCY DEPT VISIT MOD MDM: CPT | Mod: ,,, | Performed by: EMERGENCY MEDICINE

## 2019-01-30 PROCEDURE — 85025 COMPLETE CBC W/AUTO DIFF WBC: CPT

## 2019-01-30 PROCEDURE — 93005 ELECTROCARDIOGRAM TRACING: CPT

## 2019-01-30 PROCEDURE — 83880 ASSAY OF NATRIURETIC PEPTIDE: CPT

## 2019-01-30 PROCEDURE — 99285 EMERGENCY DEPT VISIT HI MDM: CPT

## 2019-01-30 PROCEDURE — 93010 ELECTROCARDIOGRAM REPORT: CPT | Mod: ,,, | Performed by: INTERNAL MEDICINE

## 2019-01-30 RX ORDER — BENZONATATE 100 MG/1
100 CAPSULE ORAL 3 TIMES DAILY PRN
Qty: 20 CAPSULE | Refills: 0 | Status: SHIPPED | OUTPATIENT
Start: 2019-01-30 | End: 2019-02-09

## 2019-01-30 NOTE — ED PROVIDER NOTES
Encounter Date: 1/30/2019       History     Chief Complaint   Patient presents with    Shortness of Breath     arrived via EMS, c/o SOB and non productive cough x2 weeks, hx of CHF     Ms Lyman is a 82 yo  female patient with PMHx of Cardiac arrest (9/30/18), anemia, long-term anticoagulant use, arthritis, asthma, COPD, CHF, CAD, DVT, depression, HTN, hyperlipidemia that presents to the ED with worsening shortness of breath and cough over the last two weeks. Pt currently not complaining of shortness of breath. Pt is complaining that her lips feels dry. Pt's family also concerned that she might have a UTI. Pt currently denies any fevers, chills, myalgias, chest pain, N/V/D, headaches, numbness.          Review of patient's allergies indicates:   Allergen Reactions    Doxycycline hyclate Diarrhea and Nausea And Vomiting    Singulair [montelukast]      Stomach pain, Muscle pain, Cough      Penicillins      Other reaction(s): Anaphylaxis    Ventolin [albuterol sulfate] Other (See Comments)     Severely elevated blood pressure    Latex, natural rubber Rash     Past Medical History:   Diagnosis Date    Anemia     Anticoagulant long-term use     Aortic stenosis     Arthritis     lower back    Asthma     CAD (coronary artery disease) 4/24/2015    Carpal tunnel syndrome on both sides     Cataract     CHF (congestive heart failure) 5/2013    COPD (chronic obstructive pulmonary disease)     Depression     DVT (deep venous thrombosis)     Hyperlipidemia     Hypertension     Pulmonary HTN     TMJ (temporomandibular joint disorder)      Past Surgical History:   Procedure Laterality Date    BACK SURGERY      BIOPSY-BONE MARROW N/A 5/26/2016    [facility]    cardiac stents      CARDIAC VALVE SURGERY      CARPAL TUNNEL RELEASE      Right/Left    CHOLECYSTECTOMY  07/2017    CHOLECYSTECTOMY-LAPAROSCOPIC W/ CHOLANGIOGRAM N/A 7/13/2017    [facility] Luis Carlos  RAMO [facility]    EYE SURGERY      cataract extraction    HYSTERECTOMY      Partial    INSERTION, CARDIAC PACEMAKER, DUAL CHAMBER N/A 7/5/2018    Performed by Giancarlo [facility]ouser [facility] at [facility] CATH LAB    INSERTION, PEG TUBE N/A 10/19/2018    [facility] Eddie [facility]undo [facility] ENDO    JOINT REPLACEMENT  2009    Left hip    Left heart cath Left 10/9/2018    [facility] CATH LAB    POLYPECTOMY      x9    Replacement-valve-aortic N/A 7/5/2018    Performed by Corey Castañeda MD at [facility] CATH LAB    TEMPOROMANDIBULAR JOINT SURGERY      ULTRASOUND-ENDOSCOPIC-UPPER N/A 4/25/2018    Performed by Socrates Andrew MD at [facility] ENDO (2ND FLR)     Family History   Problem Relation Age of Onset    Heart disease Mother     Hypertension Daughter     Cancer Son     Breast cancer Neg Hx     Colon cancer Neg Hx     Ovarian cancer Neg Hx     Esophageal cancer Neg Hx      Social History     Tobacco Use    Smoking status: Never Smoker    Smokeless tobacco: Never Used   Substance Use Topics    Alcohol use: No    Drug use: No     Review of Systems   Constitutional: Negative for chills and fever.   HENT: Negative for congestion, rhinorrhea, sinus pain and sore throat.    Eyes: Negative for pain and visual disturbance.   Respiratory: Positive for cough and shortness of breath.    Cardiovascular: Positive for leg swelling. Negative for chest pain and palpitations.   Gastrointestinal: Positive for abdominal pain. Negative for diarrhea, nausea and vomiting.   Genitourinary: Positive for frequency. Negative for dysuria and flank pain.   Musculoskeletal: Negative for back pain, neck pain and neck stiffness.   Skin: Negative for pallor and rash.   Neurological: Negative for dizziness, syncope, weakness, light-headedness and headaches.   Psychiatric/Behavioral: Positive for confusion.       Physical Exam     Initial Vitals [01/30/19 1248]   BP Pulse Resp Temp SpO2   126/74 70 18  98.1 °F (36.7 °C) 100 %      MAP       --         Physical Exam    Constitutional: She appears well-developed and well-nourished. She is cooperative. She does not appear ill. No distress.   HENT:   Head: Normocephalic and atraumatic.   Eyes: Conjunctivae and EOM are normal. Pupils are equal, round, and reactive to light.   Neck: Normal range of motion. Neck supple.   Cardiovascular: Normal rate. Exam reveals no gallop and no friction rub.    No murmur heard.  Pulmonary/Chest: Effort normal. No respiratory distress. She has no wheezes. She has no rhonchi. She has no rales.   Abdominal: Soft. Bowel sounds are normal. There is no tenderness. There is no rigidity, no rebound, no guarding and no CVA tenderness.   Peg tube LUQ   Neurological: She is alert and oriented to person, place, and time. No cranial nerve deficit or sensory deficit.   Skin: Skin is warm and dry. No rash noted.   Pt has sacral ulcer that appears to he healing appropriately.          ED Course   Procedures  Labs Reviewed   CBC W/ AUTO DIFFERENTIAL - Abnormal; Notable for the following components:       Result Value    RDW 16.2 (*)     All other components within normal limits    Narrative:     lav shared   COMPREHENSIVE METABOLIC PANEL - Abnormal; Notable for the following components:    CO2 22 (*)     BUN, Bld 30 (*)     eGFR if non  53.0 (*)     All other components within normal limits    Narrative:     lav shared   B-TYPE NATRIURETIC PEPTIDE - Abnormal; Notable for the following components:     (*)     All other components within normal limits    Narrative:     lav shared   URINALYSIS MICROSCOPIC - Abnormal; Notable for the following components:    Hyaline Casts, UA 12 (*)     All other components within normal limits    Narrative:     Preferred Collection Type->Urine, Catheterized  yellow and grey tubes   TROPONIN I    Narrative:     lav shared   URINALYSIS, REFLEX TO URINE CULTURE    Narrative:     Preferred Collection  Type->Urine, Catheterized  yellow and grey tubes        ECG Results          EKG 12-lead (Final result)  Result time 01/30/19 15:08:18    Final result by Myles, Lab In [facility] (01/30/19 15:08:18)                 Narrative:    Test Reason : R06.02,    Vent. Rate : 070 BPM     Atrial Rate : 070 BPM     P-R Int : 178 ms          QRS Dur : 186 ms      QT Int : 514 ms       P-R-T Axes : 047 -52 135 degrees     QTc Int : 555 ms    Atrial-paced rhythm  Intraventricular conduction delay  When compared with ECG of 03-OCT-2018 16:01,  Vent. rate has decreased BY   6 BPM  ventricular pacing is no longer present  Confirmed by Akanksha MCCABE, Nita ([id number]) on 1/30/2019 3:08:05 PM    Referred By: [email or web address]   SELF           Confirmed By:Nita Vale MD                            Imaging Results          X-Ray Chest AP Portable (Final result)  Result time 01/30/19 16:35:06    Final result by aMulik Tinoco MD (01/30/19 16:35:06)                 Impression:      No acute abnormality.      Electronically signed by: Maulik Tinoco MD  Date:    01/30/2019  Time:    16:35             Narrative:    EXAMINATION:  XR CHEST AP PORTABLE    CLINICAL HISTORY:  cough;    TECHNIQUE:  Single frontal view of the chest was performed.    COMPARISON:  None    FINDINGS:  Bipolar pacer..The lungs are clear, with normal appearance of pulmonary vasculature and no pleural effusion or pneumothorax.    The cardiac silhouette is normal in size. The hilar and mediastinal contours are unremarkable.    Bones are intact.                               X-Ray Abdomen AP 1 View (KUB) (Final result)  Result time 01/30/19 16:47:25    Final result by Dejon Stanley MD (01/30/19 16:47:25)                 Impression:      See above      Electronically signed by: Dejon Stanley MD  Date:    01/30/2019  Time:    16:47             Narrative:    EXAMINATION:  XR ABDOMEN AP 1 VIEW    CLINICAL HISTORY:  Unspecified abdominal pain    TECHNIQUE:  AP View(s) of the abdomen was  performed.    COMPARISON:  None    FINDINGS:  Postoperative changes in the right upper quadrant of the abdomen.  Left hip arthroplasty.  Gastrostomy tube present.  No significant bowel dilatation.  No definite free air in the abdomen.  Mild constipation.                                 Medical Decision Making:   Initial Assessment:   Ms Lyman is a 82 yo  female patient with PMHx of Cardiac arrest (9/30/18), anemia, long-term anticoagulant use, arthritis, asthma, COPD, CHF, CAD, DVT, depression, HTN, hyperlipidemia that presents to the ED with worsening shortness of breath and cough over the last two weeks. Pt's family also concerned that she might have a UTI. Pt currently denies any fevers, chest pain, N/V/D, headaches, numbness.  Clinical Tests:   Lab Tests: Ordered and Reviewed  Radiological Study: Ordered and Reviewed  Medical Tests: Ordered and Reviewed  ED Management:  Pt hemodynamically stable on arrival to ED. Pt very pleasant, conversational and in no acute distress. Labs, CXR, KUB, 12 lead ordered. 12 lead reveals Atrial-paced rhythm. CXR shows no acute abnormality. KUB reveals mild constipation, but no acute processes. Troponin WNL. BNP elevated at 667, but no signs of BLE edema or edema on CXR. UA unremarkable. Plan is to discharge patient home with prescription of tessalon and have her follow up with PCP. Results and plan discussed and explained to patient and her family who are understanding and agreeable with plan. Return instructions given. She was discharged with prescription for tessalon.  She will follow up with PCP.  All of the patient's questions were answered.  I reviewed the patient's chart, labs, and imaging and discussed the case with my supervising physician.                       Clinical Impression:   The primary encounter diagnosis was Cough. Diagnoses of SOB (shortness of breath), Weakness, and Abdominal pain were also pertinent to this visit.      Disposition:    Disposition: Discharged  Condition: Stable                        Giancarlo Carrera PA-C  01/30/19 6788

## 2019-01-30 NOTE — TELEPHONE ENCOUNTER
"Contacted patient's daughter, advised her of her mother's appt for tomorrow being cancelled and the urgency of her mother going to ED ASAP. Explained to patient that Dr. Camarena is very concerned about her mother having fluid in her lungs and UTI.  When told patient to take mother to ED she said "ok".   "

## 2019-01-30 NOTE — TELEPHONE ENCOUNTER
Patient needs to be seen today in the ED.  There is no questioning this.  If daughter cannot handle both, then she should call EMS and have another family member meet mom at the hospital.      Dr. Camarena

## 2019-01-30 NOTE — ED NOTES
Cindy Lyman, a 83 y.o. female presents to the ED via EMS with CC increased SOB      Patient identifiers verified verbally with patient and correct for Cindy Lyman.    LOC/ APPEARANCE: The patient is AAOx4. Pt is speaking appropriately, no slurred speech. Pt changed into hospital gown. Continuous cardiac monitor, cont pulse ox, and auto BP cuff applied to patient. Pt is clean and well groomed. No JVD visible.   SKIN: Skin is warm dry and intact, and color is consistent with ethnicity. Capillary refill <3 seconds. No breakdown or brusing visible. Mucus membranes moist, acyanotic.  RESPIRATORY: Airway is open and patent. Respirations-spontaneous, unlabored, regular rate, equal bilaterally on inspiration and expiration. No accessory muscle use noted. Lungs clear to auscultation in all fields bilaterally anterior and posterior.   CARDIAC: Patient has regular heart rate.  No peripheral edema noted, and patient has no c/o chest pain. Peripheral pulses present equal and strong throughout.  ABDOMEN: Soft and non-tender to palpation with no distention noted. Normoactive bowel sounds x4 quadrants. Pt has no complaints of abnormal bowel movements, denies blood in stool. Pt reports normal appetite.   NEUROLOGIC: Eyes open spontaneously and facial expression symmetrical. Pt behavior appropriate to situation, and pt follows commands. Pt reports sensation present in all extremities when touched with a finger, denies any numbness or tingling. PERRLA  MUSCULOSKELETAL: Spontaneous movement noted to all extremities. Hand  equal and leg strength strong +5 bilaterally.   : No complaints of frequency, burning, urgency or blood in the urine. No complaints of incontinence.

## 2019-01-30 NOTE — TELEPHONE ENCOUNTER
----- Message from Steff Pearson sent at 1/30/2019 10:24 AM CST -----  Contact: [facility]   Calling to check the status of an Authorization. [employer] coverage decision request they need the following information: Members Name, Id number [id number] Please put Expedited review and services are approved [id number]. Asking to please call at [phone].

## 2019-01-31 NOTE — DISCHARGE INSTRUCTIONS
Please take new medications as directed. Please follow up with PCP to discuss today's Emergency Department visit and for further evaluation and management. Please return to the Emergency Department if your symptoms persist, worsen or you develop any additional concerning symptoms.

## 2019-01-31 NOTE — ED NOTES
Pt's family requesting dose of bp meds since pt missed 1500 dose.  Notified DEANNA Carrera.  States pt can take home dose.  Transport being arranged at this time.

## 2019-02-07 ENCOUNTER — TELEPHONE (OUTPATIENT)
Dept: FAMILY MEDICINE | Facility: CLINIC | Age: 84
End: 2019-02-07

## 2019-02-07 NOTE — TELEPHONE ENCOUNTER
I cannot admit to skilled care from outpatient medicine.  Her family will need to contact [facility] to see what options they have    Thanks  Dr. Camarena

## 2019-02-07 NOTE — TELEPHONE ENCOUNTER
----- Message from Georges Moser sent at 2/7/2019 10:34 AM CST -----  Contact: Ynes [phone]  The patient's daughter is requesting a call back from the staff. She has some health issues she'd like to discuss. Please call at your earliest convenience.

## 2019-02-07 NOTE — TELEPHONE ENCOUNTER
----- Message from Luisa [email or web address] sent at 2/7/2019  2:02 PM CST -----  Contact: Shreya Rossi  - [phone]  Pt's granddaughter asking for a referral to nursing home only instead of Skilled Nursing Home.

## 2019-02-07 NOTE — TELEPHONE ENCOUNTER
----- Message from Dora Schneider sent at 2/7/2019 10:24 AM CST -----  Contact: [facility] Sanchez called to request orders to have patient's home health extended. Home health is expected to end on 02/14/2019. Patient's daughter also request an order for patient to be placed in a skilled nursing facility. [facility] is also requesting medical necessity form, clinical notes, and orders to be faxed to [phone].Please call to advise at [phone]

## 2019-02-07 NOTE — TELEPHONE ENCOUNTER
Spoke with Evan and explained to her that the family would have to contact the patient's insurance company and inquire about what nursing home she can go to or skilled benefits that she has through her insurance in order to have the patient placed at a nursing home or LTAC facility. No further questions or concerns.

## 2019-02-09 ENCOUNTER — PATIENT MESSAGE (OUTPATIENT)
Dept: FAMILY MEDICINE | Facility: CLINIC | Age: 84
End: 2019-02-09

## 2019-02-21 ENCOUNTER — TELEPHONE (OUTPATIENT)
Dept: FAMILY MEDICINE | Facility: CLINIC | Age: 84
End: 2019-02-21

## 2019-02-21 NOTE — TELEPHONE ENCOUNTER
Called to speak with patient family member about appt and transpiration for pt appt w/ dr joshi for paper work for pt for [facility]

## 2019-02-21 NOTE — TELEPHONE ENCOUNTER
----- Message from Robin Chairez sent at 2/21/2019  9:13 AM CST -----  Contact: Radha with [facility] [phone]  Power of  Radha is trying to get group home care, but 's office needs to submit proper paperwork to do so for patient to go to [facility].    ATTN: Nicolette    Please call to advise,Thank you.

## 2019-02-21 NOTE — TELEPHONE ENCOUNTER
----- Message from Robin Chairez sent at 2/21/2019  9:13 AM CST -----  Contact: Radha with [facility] [phone]  [employer] of  Radha is trying to get [facility] care, but 's office needs to submit proper paperwork to do so for patient to go to [facility].    ATTN: Nicolette    Please call to advise,Thank you.

## 2019-02-21 NOTE — TELEPHONE ENCOUNTER
Home Health Recert with [facility]-Dr. Rodger Camarena. A services.     Updated pt poc, pt recalled hitting back of her head when she fell off tub in 10/2018, denies loc.

## 2019-02-28 ENCOUNTER — TELEPHONE (OUTPATIENT)
Dept: FAMILY MEDICINE | Facility: CLINIC | Age: 84
End: 2019-02-28

## 2019-02-28 NOTE — TELEPHONE ENCOUNTER
[employer] called wanted appointment date for patient. Explained to her she had appointment but it was cancelled. She stated we will get an approval for the ambulance drive to cover visit for anytime in march. We should get an approval and so will the ambulance company.     When we get approval, we need to contact patient for an appointment.

## 2019-03-06 ENCOUNTER — TELEPHONE (OUTPATIENT)
Dept: ADMINISTRATIVE | Facility: CLINIC | Age: 84
End: 2019-03-06

## 2019-03-06 NOTE — PROGRESS NOTES
HH re-certification of [facility],Dr. Rodger Camarena. Patient received SN and HH services.

## 2019-04-13 ENCOUNTER — HOSPITAL ENCOUNTER (EMERGENCY)
Facility: HOSPITAL | Age: 84
Discharge: HOME | End: 2019-04-13
Attending: EMERGENCY MEDICINE
Payer: MEDICARE

## 2019-04-13 VITALS
TEMPERATURE: 98 F | WEIGHT: 170 LBS | OXYGEN SATURATION: 100 % | HEART RATE: 75 BPM | SYSTOLIC BLOOD PRESSURE: 167 MMHG | RESPIRATION RATE: 17 BRPM | BODY MASS INDEX: 28.32 KG/M2 | DIASTOLIC BLOOD PRESSURE: 72 MMHG | HEIGHT: 65 IN

## 2019-04-13 DIAGNOSIS — R79.89 ELEVATED TROPONIN I MEASUREMENT: Primary | ICD-10-CM
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
## 2019-04-13 LAB
ALBUMIN SERPL BCP-MCNC: 3.3 G/DL (ref 3.5–5.2)
ALP SERPL-CCNC: 79 U/L (ref 55–135)
ALT SERPL W/O P-5'-P-CCNC: 12 U/L (ref 10–44)
ANION GAP SERPL CALC-SCNC: 8 MMOL/L (ref 8–16)
AST SERPL-CCNC: 16 U/L (ref 10–40)
BASOPHILS # BLD AUTO: 0.02 K/UL (ref 0–0.2)
BASOPHILS NFR BLD: 0.3 % (ref 0–1.9)
BILIRUB SERPL-MCNC: 0.4 MG/DL (ref 0.1–1)
BNP SERPL-MCNC: 544 PG/ML (ref 0–99)
BUN SERPL-MCNC: 24 MG/DL (ref 8–23)
CALCIUM SERPL-MCNC: 9.7 MG/DL (ref 8.7–10.5)
CHLORIDE SERPL-SCNC: 105 MMOL/L (ref 95–110)
CO2 SERPL-SCNC: 27 MMOL/L (ref 23–29)
CREAT SERPL-MCNC: 0.9 MG/DL (ref 0.5–1.4)
DIFFERENTIAL METHOD: ABNORMAL
EOSINOPHIL # BLD AUTO: 0.3 K/UL (ref 0–0.5)
EOSINOPHIL NFR BLD: 4.1 % (ref 0–8)
ERYTHROCYTE [DISTWIDTH] IN BLOOD BY AUTOMATED COUNT: 14.4 % (ref 11.5–14.5)
EST. GFR  (AFRICAN AMERICAN): >60 ML/MIN/1.73 M^2
EST. GFR  (NON AFRICAN AMERICAN): >60 ML/MIN/1.73 M^2
GLUCOSE SERPL-MCNC: 98 MG/DL (ref 70–110)
HCT VFR BLD AUTO: 35.7 % (ref 37–48.5)
HGB BLD-MCNC: 11.6 G/DL (ref 12–16)
LYMPHOCYTES # BLD AUTO: 1.7 K/UL (ref 1–4.8)
LYMPHOCYTES NFR BLD: 26.7 % (ref 18–48)
MCH RBC QN AUTO: 30.6 PG (ref 27–31)
MCHC RBC AUTO-ENTMCNC: 32.5 G/DL (ref 32–36)
MCV RBC AUTO: 94 FL (ref 82–98)
MONOCYTES # BLD AUTO: 0.5 K/UL (ref 0.3–1)
MONOCYTES NFR BLD: 7.9 % (ref 4–15)
NEUTROPHILS # BLD AUTO: 3.9 K/UL (ref 1.8–7.7)
NEUTROPHILS NFR BLD: 61 % (ref 38–73)
PLATELET # BLD AUTO: 258 K/UL (ref 150–350)
PMV BLD AUTO: 10.6 FL (ref 9.2–12.9)
POTASSIUM SERPL-SCNC: 4.3 MMOL/L (ref 3.5–5.1)
PROT SERPL-MCNC: 7 G/DL (ref 6–8.4)
RBC # BLD AUTO: 3.79 M/UL (ref 4–5.4)
SODIUM SERPL-SCNC: 140 MMOL/L (ref 136–145)
TROPONIN I SERPL DL<=0.01 NG/ML-MCNC: 0.05 NG/ML (ref 0–0.03)
TROPONIN I SERPL DL<=0.01 NG/ML-MCNC: 0.05 NG/ML (ref 0–0.03)
WBC # BLD AUTO: 6.32 K/UL (ref 3.9–12.7)

## 2019-04-13 PROCEDURE — 93005 ELECTROCARDIOGRAM TRACING: CPT

## 2019-04-13 PROCEDURE — 93010 EKG 12-LEAD: ICD-10-PCS | Mod: ,,, | Performed by: INTERNAL MEDICINE

## 2019-04-13 PROCEDURE — 85025 COMPLETE CBC W/AUTO DIFF WBC: CPT

## 2019-04-13 PROCEDURE — 80053 COMPREHEN METABOLIC PANEL: CPT

## 2019-04-13 PROCEDURE — 93010 ELECTROCARDIOGRAM REPORT: CPT | Mod: ,,, | Performed by: INTERNAL MEDICINE

## 2019-04-13 PROCEDURE — 84484 ASSAY OF TROPONIN QUANT: CPT

## 2019-04-13 PROCEDURE — 99285 EMERGENCY DEPT VISIT HI MDM: CPT | Mod: 25

## 2019-04-13 PROCEDURE — 83880 ASSAY OF NATRIURETIC PEPTIDE: CPT

## 2019-04-13 RX ORDER — SENNOSIDES 8.6 MG/1
1 TABLET ORAL DAILY
COMMUNITY

## 2019-04-13 RX ORDER — BISACODYL 10 MG
10 SUPPOSITORY, RECTAL RECTAL DAILY PRN
COMMUNITY
End: 2019-04-16

## 2019-04-13 NOTE — ED TRIAGE NOTES
Patient present to the ER via EMS from [facility]. Patient presents after an unwitnessed fall head first out of the bed. Reports hitting her head. C/o head and face pain.

## 2019-04-13 NOTE — ED NOTES
Called report to BRITNEY Goldman.  Verbalized understanding that patient would return to nursing home.

## 2019-04-13 NOTE — ED NOTES
First contact with patient, she is alert, talking, and denying pain.  VSS, BP elevated,  Aware of BP.

## 2019-04-13 NOTE — ED PROVIDER NOTES
Encounter Date: 4/13/2019    SCRIBE #1 NOTE: I, Germaine Rogel, am scribing for, and in the presence of,  Aaron Curtis MD. I have scribed the following portions of the note - Other sections scribed: ROS, HPI, and PE.       History     Chief Complaint   Patient presents with    Fall     Patient presents to the ER via EMS from [facility]. Patient presents after an unwitnessed fall. Nursing home found her head first out of the bed. Patient did hit her head.      CC: Fall    HPI: This 83 y.o. female with a past medical history of Anemia, Anticoagulant long-term use, Aortic stenosis, Arthritis, Asthma, CAD (4/24/2015), Carpal tunnel syndrome on both sides, Cataract, CHF  (5/2013), COPD, Depression, DVT, Hyperlipidemia, Hypertension, Pulmonary HTN, and TMJ, presents to the ED from a [facility] for an emergent evaluation after an unwitnessed  fall just PTA. The patient states she rolled out of her bed. She fell on her L side face. She did hit her head. No LOC reported. Denies any headache, neck pain, facial pain or any other injures. She has been residing at [facility] for last 2 weeks. She does not normally walk. No change in her baseline mental status, per family. No reported medical intervention.    The history is provided by the patient, the nursing home, the EMS personnel and a relative. No  was used.     Review of patient's allergies indicates:   Allergen Reactions    Doxycycline hyclate Diarrhea and Nausea And Vomiting    Singulair [montelukast]      Stomach pain, Muscle pain, Cough      Penicillins      Other reaction(s): Anaphylaxis    Ventolin [albuterol sulfate] Other (See Comments)     Severely elevated blood pressure    Latex, natural rubber Rash     Past Medical History:   Diagnosis Date    Anemia     Anticoagulant long-term use     Aortic stenosis     Arthritis     lower back    Asthma     CAD (coronary artery disease) 4/24/2015    Carpal tunnel syndrome on both sides      Cataract     CHF (congestive heart failure) 5/2013    COPD (chronic obstructive pulmonary disease)     Depression     DVT (deep venous thrombosis)     Hyperlipidemia     Hypertension     Pulmonary HTN     TMJ (temporomandibular joint disorder)      Past Surgical History:   Procedure Laterality Date    BACK SURGERY      BIOPSY-BONE MARROW N/A 5/26/2016    [facility]le [facility] at [facility] ENDO    cardiac stents      CARDIAC VALVE SURGERY      CARPAL TUNNEL RELEASE      Right/Left    CHOLECYSTECTOMY  07/2017    CHOLECYSTECTOMY-LAPAROSCOPIC W/ CHOLANGIOGRAM N/A 7/13/2017    Performed by Luis Carlos Jarvis [facility] at [facility] OR    EYE SURGERY      cataract extraction    HYSTERECTOMY      Partial    INSERTION, CARDIAC PACEMAKER, DUAL CHAMBER N/A 7/5/2018    Performed by [facility]er [facility] at [facility] CATH LAB    INSERTION, PEG TUBE N/A 10/19/2018    Performed by Eddie Segundo [facility] at [facility] ENDO    JOINT REPLACEMENT  2009    Left hip    Left heart cath Left 10/9/2018    Performed by Tony Carmen [facility] at [facility] CATH LAB    POLYPECTOMY      x9    Replacement-valve-aortic N/A 7/5/2018    Performed by Corey Castañeda MD at [facility] CATH LAB    TEMPOROMANDIBULAR JOINT SURGERY      ULTRASOUND-ENDOSCOPIC-UPPER N/A 4/25/2018    Performed by Socrates Andrew MD at [facility] ENDO (2ND FLR)     Family History   Problem Relation Age of Onset    Heart disease Mother     Hypertension Daughter     Cancer Son     Breast cancer Neg Hx     Colon cancer Neg Hx     Ovarian cancer Neg Hx     Esophageal cancer Neg Hx      Social History     Tobacco Use    Smoking status: Never Smoker    Smokeless tobacco: Never Used   Substance Use Topics    Alcohol use: No    Drug use: No     Review of Systems   Constitutional: Negative for appetite change and fever.   HENT: Negative for rhinorrhea and sore throat.    Eyes: Negative for visual disturbance.   Respiratory: Negative for cough and shortness of breath.     Cardiovascular: Negative for chest pain.   Gastrointestinal: Negative for abdominal pain.   Genitourinary: Negative for dysuria.   Musculoskeletal: Negative for gait problem.   Skin: Negative for rash.   Neurological: Negative for syncope.       Physical Exam     Initial Vitals [04/13/19 0249]   BP Pulse Resp Temp SpO2   (!) 175/82 71 18 99.2 °F (37.3 °C) 100 %      MAP       --         Physical Exam    Nursing note and vitals reviewed.  Constitutional: She appears well-developed and well-nourished. She is not diaphoretic.   HENT:   Head: Normocephalic and atraumatic.   Mouth/Throat: Oropharynx is clear and moist.   Eyes: Conjunctivae and EOM are normal. Pupils are equal, round, and reactive to light.   Neck: Normal range of motion. Neck supple.   Cardiovascular: Normal rate, regular rhythm, normal heart sounds and intact distal pulses.   No murmur heard.  Pulmonary/Chest: Breath sounds normal. No respiratory distress. She has no wheezes. She has no rhonchi. She has no rales.   Abdominal: Soft. Bowel sounds are normal. She exhibits no distension. There is no tenderness. There is no rebound and no guarding.   Musculoskeletal: Normal range of motion. She exhibits no edema or tenderness.   Neurological: She is alert and oriented to person, place, and time. She has normal strength.   Moving all extremities.   Skin: Skin is warm and dry. No erythema. No pallor.   Psychiatric: She has a normal mood and affect.         ED Course   Procedures  Labs Reviewed   CBC W/ AUTO DIFFERENTIAL - Abnormal; Notable for the following components:       Result Value    RBC 3.79 (*)     Hemoglobin 11.6 (*)     Hematocrit 35.7 (*)     All other components within normal limits   COMPREHENSIVE METABOLIC PANEL - Abnormal; Notable for the following components:    BUN, Bld 24 (*)     Albumin 3.3 (*)     All other components within normal limits   TROPONIN I - Abnormal; Notable for the following components:    Troponin I 0.048 (*)     All other  components within normal limits   B-TYPE NATRIURETIC PEPTIDE - Abnormal; Notable for the following components:     (*)     All other components within normal limits   TROPONIN I - Abnormal; Notable for the following components:    Troponin I 0.048 (*)     All other components within normal limits          Imaging Results          CT Head Without Contrast (In process)                CT Cervical Spine Without Contrast (In process)                X-Ray Chest AP Portable (Final result)  Result time 04/13/19 03:26:25    Final result by Colton Montgomery MD (04/13/19 03:26:25)                 Impression:      No acute findings.    No significant change from prior study.      Electronically signed by: Colton Montgomery MD  Date:    04/13/2019  Time:    03:26             Narrative:    EXAMINATION:  XR CHEST AP PORTABLE    CLINICAL HISTORY:  Unspecified fall, initial encounter    TECHNIQUE:  Single frontal view of the chest was performed.    COMPARISON:  January 30, 2019.    FINDINGS:  Pacer leads and aortic valve stent appear unchanged.    Heart and lungs  appear unchanged when allowing for differences in technique and positioning.                              Medical decision making:  This is doctor Higginbotham dictating.  I assumed care this patient at 6:00 a.m..  The scalp is atraumatic the spine is nontender the extremities are nontender the lungs are clear.  The heart tones are normal.  The abdomen is soft.  The patient has a feeding tube in the left upper quadrant.  Extremities are nontender there is no apparent pain with range of motion of any joints. The patient has no complaints and is in no distress.   This patient has 2 slightly elevated troponins .  The patient is asymptomatic there is no chest pain.  The patient has a history of elevated troponins.  The patient is otherwise well.  She presented for ruling out of her bed.  I could find no traumatic injuries.                Scribe Attestation:   Scribe #1: I  performed the above scribed service and the documentation accurately describes the services I performed. I attest to the accuracy of the note.    Attending Attestation:           Physician Attestation for Scribe:  Physician Attestation Statement for Scribe #1: I, Aaron Curtis MD, reviewed documentation, as scribed by Germaine Rogel in my presence, and it is both accurate and complete.                    Clinical Impression:       ICD-10-CM ICD-9-CM   1. Elevated troponin I measurement R74.8 790.6   2. Fall W19.XXXA E888.9                                Dejon Higginbotham MD  04/13/19 0736

## 2019-04-13 NOTE — DISCHARGE INSTRUCTIONS
Please return immediately if you get worse or if new problems develop.  Please follow-up with your primary care doctor this week.  Rest.

## 2019-04-13 NOTE — ED NOTES
Arranged transport for patient to return to [facility].

## 2019-04-16 ENCOUNTER — HOSPITAL ENCOUNTER (EMERGENCY)
Facility: HOSPITAL | Age: 84
Discharge: HOME | End: 2019-04-16
Attending: EMERGENCY MEDICINE
Payer: MEDICARE

## 2019-04-16 VITALS
OXYGEN SATURATION: 98 % | SYSTOLIC BLOOD PRESSURE: 176 MMHG | TEMPERATURE: 98 F | RESPIRATION RATE: 18 BRPM | HEIGHT: 65 IN | HEART RATE: 69 BPM | WEIGHT: 170 LBS | DIASTOLIC BLOOD PRESSURE: 97 MMHG | BODY MASS INDEX: 28.32 KG/M2

## 2019-04-16 DIAGNOSIS — K94.23 PEG TUBE MALFUNCTION: Primary | ICD-10-CM

## 2019-04-16 DIAGNOSIS — T85.598A: ICD-10-CM

## 2019-04-16 PROCEDURE — 43762 RPLC GTUBE NO REVJ TRC: CPT

## 2019-04-16 PROCEDURE — 25500020 PHARM REV CODE 255: Performed by: EMERGENCY MEDICINE

## 2019-04-16 PROCEDURE — 99284 EMERGENCY DEPT VISIT MOD MDM: CPT | Mod: 25

## 2019-04-16 RX ADMIN — DIATRIZOATE MEGLUMINE AND DIATRIZOATE SODIUM 60 ML: 660; 100 LIQUID ORAL; RECTAL at 10:04

## 2019-04-17 NOTE — ED TRIAGE NOTES
Patient daughter her mom peg tube has been leaking for 2 weeks and patient would like to have peg tube removed.

## 2019-04-17 NOTE — DISCHARGE INSTRUCTIONS
You were seen in the emergency department for a leaking peg tube catheter. We replaced the catheter with a new one.  Please follow-up with your primary care provider if you continue to have problems.  You have stated that you are now taking all of her nutrition by mouth and may have minimal PEG tube requirements.  Please discuss this with your primary care provider to see if the PEG tube is still warranted if you have recurrent problems.  Please return to the emergency department for any fevers, chills, numbness, weakness, altered mental status, or you become concerned in any other way.

## 2019-04-17 NOTE — ED PROVIDER NOTES
Encounter Date: 4/16/2019    SCRIBE #1 NOTE: I, Evin Sauer , am scribing for, and in the presence of,  Aaron Curtis MD. I have scribed the following portions of the note - Other sections scribed: HPI, ROS, PE .       History     Chief Complaint   Patient presents with    peg tube issue     from [facility], staff report pt's peg tube is leaking, tube appears to be intact, pt states her peg tube has been leaking for the past week      CC: Peg Tube Issue    HPI: 82 y/o F who has a past medical history of Anemia, Anticoagulant long-term use, Aortic stenosis, Arthritis, Asthma, CAD (coronary artery disease) (4/24/2015), Carpal tunnel syndrome on both sides, Cataract, CHF (congestive heart failure) (5/2013), COPD (chronic obstructive pulmonary disease), Depression, DVT (deep venous thrombosis), Hyperlipidemia, Hypertension, Pulmonary HTN, and TMJ (temporomandibular joint disorder) presents to the ED for an emergent evaluation of a peg tube complication. Pt's daughter states her mother's peg tube is leaking and needs to be changed. The daughter reports that the pt is cable of eating by mouth and believes the peg tube is no longer necessary. The pt agrees and denies any other pain or complaints. No fever/chills, nausea/emesis, chest/abdominal/back pain, cough/sob, headache/dizziness, rashes, urinary symptoms, abnormal bowels.       The history is provided by the patient and a relative. No  was used.     Review of patient's allergies indicates:   Allergen Reactions    Doxycycline hyclate Diarrhea and Nausea And Vomiting    Singulair [montelukast]      Stomach pain, Muscle pain, Cough      Penicillins      Other reaction(s): Anaphylaxis    Ventolin [albuterol sulfate] Other (See Comments)     Severely elevated blood pressure    Latex, natural rubber Rash     Past Medical History:   Diagnosis Date    Anemia     Anticoagulant long-term use     Aortic stenosis     Arthritis     lower back     Asthma     CAD (coronary artery disease) 4/24/2015    Carpal tunnel syndrome on both sides     Cataract     CHF (congestive heart failure) 5/2013    COPD (chronic obstructive pulmonary disease)     Depression     DVT (deep venous thrombosis)     Hyperlipidemia     Hypertension     Pulmonary HTN     TMJ (temporomandibular joint disorder)      Past Surgical History:   Procedure Laterality Date    BACK SURGERY      BIOPSY-BONE MARROW N/A 5/26/2016    Performed by [facility]uf[facility]le [facility] at [facility] ENDO    cardiac stents      CARDIAC VALVE SURGERY      CARPAL TUNNEL RELEASE      Right/Left    CHOLECYSTECTOMY  07/2017    CHOLECYSTECTOMY-LAPAROSCOPIC W/ CHOLANGIOGRAM N/A 7/13/2017    Performed by Luis Carlos Jarvis MD at [facility] OR    EYE SURGERY      cataract extraction    HYSTERECTOMY      Partial    INSERTION, CARDIAC PACEMAKER, DUAL CHAMBER N/A 7/5/2018    Performed by [facility]er [facility] at [facility] CATH LAB    INSERTION, PEG TUBE N/A 10/19/2018    Performed by Eddie Segundo MD at [facility] ENDO    JOINT REPLACEMENT  2009    Left hip    Left heart cath Left 10/9/2018    Performed by Tony Carmen MD at [facility] CATH LAB    POLYPECTOMY      x9    Replacement-valve-aortic N/A 7/5/2018    Performed by Corey Castañeda MD at [facility] CATH LAB    TEMPOROMANDIBULAR JOINT SURGERY      ULTRASOUND-ENDOSCOPIC-UPPER N/A 4/25/2018    Performed by Socrates Andrew MD at [facility] ENDO (2ND FLR)     Family History   Problem Relation Age of Onset    Heart disease Mother     Hypertension Daughter     Cancer Son     Breast cancer Neg Hx     Colon cancer Neg Hx     Ovarian cancer Neg Hx     Esophageal cancer Neg Hx      Social History     Tobacco Use    Smoking status: Never Smoker    Smokeless tobacco: Never Used   Substance Use Topics    Alcohol use: No    Drug use: No     Review of Systems   Constitutional: Negative for appetite change and fever.   HENT: Negative for rhinorrhea and sore throat.    Eyes:  Negative for visual disturbance.   Respiratory: Negative for cough and shortness of breath.    Cardiovascular: Negative for chest pain.   Gastrointestinal: Negative for abdominal pain.   Genitourinary: Negative for dysuria.   Musculoskeletal: Negative for gait problem.   Skin: Negative for rash.   Neurological: Negative for syncope.       Physical Exam     Initial Vitals [04/16/19 1931]   BP Pulse Resp Temp SpO2   (!) 142/76 70 18 98.2 °F (36.8 °C) 100 %      MAP       --         Physical Exam    Nursing note and vitals reviewed.  Constitutional: She is not diaphoretic. No distress.   HENT:   Head: Normocephalic and atraumatic.   Mouth/Throat: Oropharynx is clear and moist.   Eyes: EOM are normal. Pupils are equal, round, and reactive to light. No scleral icterus.   Neck: Normal range of motion. Neck supple. No JVD present.   Cardiovascular: Normal rate and regular rhythm.   Pulmonary/Chest: Breath sounds normal. No stridor. No respiratory distress.   Abdominal: Soft. Bowel sounds are normal. She exhibits no distension. There is no tenderness.   Musculoskeletal: Normal range of motion. She exhibits no edema or tenderness.   Neurological: She is alert and oriented to person, place, and time. She has normal strength. No cranial nerve deficit or sensory deficit.   Skin: Skin is warm and dry.   Psychiatric: She has a normal mood and affect.         ED Course   Feeding Tube  Date/Time: 4/16/2019 9:06 PM  Performed by: Aaron Curtis MD  Authorized by: Aaron Curtis MD   Consent: Verbal consent obtained.  Risks and benefits: risks, benefits and alternatives were discussed  Consent given by: patient and guardian  Patient understanding: patient states understanding of the procedure being performed  Patient consent: the patient's understanding of the procedure matches consent given  Procedure consent: procedure consent matches procedure scheduled  Relevant documents: relevant documents present and verified  Test  results: test results available and properly labeled  Site marked: the operative site was marked  Imaging studies: imaging studies available  Patient identity confirmed: verbally with patient and provided demographic data  Indications: tube malfunction    Sedation:  Patient sedated: no    Local anesthesia used: no    Anesthesia:  Local anesthesia used: no  Tube type: gastrostomy  Patient position: supine  Procedure type: replacement  Tube size: 20Fr.  Endoscope used: no  Bulb inflation volume: 6 (ml)  Bulb inflation fluid: normal saline  Placement/position confirmation: gastric contents aspirated and insufflation of air  Tube placement difficulty: none  Complications: No  Specimens: No  Implants: No        Labs Reviewed - No data to display       Imaging Results    None          Medical Decision Making:   Initial Assessment:   83-year-old female with history of PEG tube status post cardiac arrest.  Patient reports her PEG tube has significant leakage during feedings resulting in skin rash and irritation.  Denies abdominal pain. Patient also reports she is eating 3 meals a day by mouth.  Exam otherwise unremarkable. Patient is requesting PEG to be changed due to leakage.  Twenty Divehi PEG tube able to be replaced without difficulty.  X-ray of her abdomen with contrast injection does not reveal any extravasation or complications.  Believe she is safe for discharge home.  Discussed the patient should discuss her PEG tube needs her primary care provider and if she is truly getting enough nutrition by mouth, may be able to be stop using or even removed.  Patient otherwise well-appearing and safe for discharge home.            Scribe Attestation:   Scribe #1: I performed the above scribed service and the documentation accurately describes the services I performed. I attest to the accuracy of the note.    Attending Attestation:           Physician Attestation for Scribe:  Physician Attestation Statement for Scribe #1: I,  Aaron Curtis MD, reviewed documentation, as scribed by Evin Sauer  in my presence, and it is both accurate and complete.                    Clinical Impression:       ICD-10-CM ICD-9-CM   1. PEG tube malfunction K94.23 536.42   2. Impaired oral gastric feeding tube, initial encounter T85.598A 996.59         Disposition:   Disposition: Discharged  Condition: Stable                        Aaron Curtis MD  04/16/19 0284

## 2019-09-20 ENCOUNTER — OFFICE VISIT (OUTPATIENT)
Dept: CARDIOLOGY | Facility: CLINIC | Age: 84
End: 2019-09-20
Payer: MEDICARE

## 2019-09-20 ENCOUNTER — HOSPITAL ENCOUNTER (OUTPATIENT)
Dept: CARDIOLOGY | Facility: CLINIC | Age: 84
Discharge: HOME | End: 2019-09-20
Attending: PHYSICIAN ASSISTANT
Payer: MEDICARE

## 2019-09-20 VITALS
OXYGEN SATURATION: 96 % | DIASTOLIC BLOOD PRESSURE: 91 MMHG | BODY MASS INDEX: 29.99 KG/M2 | SYSTOLIC BLOOD PRESSURE: 194 MMHG | WEIGHT: 180 LBS | HEART RATE: 69 BPM | HEIGHT: 65 IN

## 2019-09-20 VITALS
HEIGHT: 65 IN | DIASTOLIC BLOOD PRESSURE: 97 MMHG | HEART RATE: 71 BPM | BODY MASS INDEX: 28.32 KG/M2 | SYSTOLIC BLOOD PRESSURE: 164 MMHG | WEIGHT: 170 LBS

## 2019-09-20 DIAGNOSIS — Z95.2 S/P TAVR (TRANSCATHETER AORTIC VALVE REPLACEMENT): ICD-10-CM

## 2019-09-20 DIAGNOSIS — Z95.2 S/P TAVR (TRANSCATHETER AORTIC VALVE REPLACEMENT): Primary | ICD-10-CM

## 2019-09-20 LAB
ASCENDING AORTA: 3.02 CM
AV INDEX (PROSTH): 0.29
AV MEAN GRADIENT: 6 MMHG
AV PEAK GRADIENT: 10 MMHG
AV VALVE AREA: 1.9 CM2
AV VELOCITY RATIO: 0.31
BSA FOR ECHO PROCEDURE: 1.88 M2
CV ECHO LV RWT: 0.36 CM
DOP CALC AO PEAK VEL: 1.62 M/S
DOP CALC AO VTI: 28.65 CM
DOP CALC LVOT AREA: 6.5 CM2
DOP CALC LVOT DIAMETER: 2.88 CM
DOP CALC LVOT PEAK VEL: 0.51 M/S
DOP CALC LVOT STROKE VOLUME: 54.5 CM3
DOP CALCLVOT PEAK VEL VTI: 8.37 CM
E/E' RATIO: 21.83 M/S
ECHO LV POSTERIOR WALL: 0.79 CM (ref 0.6–1.1)
FRACTIONAL SHORTENING: 11 % (ref 28–44)
INTERVENTRICULAR SEPTUM: 0.93 CM (ref 0.6–1.1)
IVRT: 0.06 MSEC
LA MAJOR: 6.37 CM
LA MINOR: 6.39 CM
LA WIDTH: 4.33 CM
LEFT ATRIUM SIZE: 4.07 CM
LEFT ATRIUM VOLUME INDEX: 51.8 ML/M2
LEFT ATRIUM VOLUME: 95.57 CM3
LEFT INTERNAL DIMENSION IN SYSTOLE: 3.92 CM (ref 2.1–4)
LEFT VENTRICLE DIASTOLIC VOLUME INDEX: 47.12 ML/M2
LEFT VENTRICLE DIASTOLIC VOLUME: 86.98 ML
LEFT VENTRICLE MASS INDEX: 65 G/M2
LEFT VENTRICLE SYSTOLIC VOLUME INDEX: 36 ML/M2
LEFT VENTRICLE SYSTOLIC VOLUME: 66.55 ML
LEFT VENTRICULAR INTERNAL DIMENSION IN DIASTOLE: 4.38 CM (ref 3.5–6)
LEFT VENTRICULAR MASS: 119.54 G
LV LATERAL E/E' RATIO: 16.38 M/S
LV SEPTAL E/E' RATIO: 32.75 M/S
MV PEAK E VEL: 1.31 M/S
PISA TR MAX VEL: 2.39 M/S
PULM VEIN S/D RATIO: 1.69
PV PEAK D VEL: 0.16 M/S
PV PEAK S VEL: 0.27 M/S
RA MAJOR: 4 CM
RA PRESSURE: 3 MMHG
RA WIDTH: 3.33 CM
RIGHT VENTRICULAR END-DIASTOLIC DIMENSION: 3.25 CM
RV TISSUE DOPPLER FREE WALL SYSTOLIC VELOCITY 1 (APICAL 4 CHAMBER VIEW): 9.65 CM/S
SINUS: 3.16 CM
STJ: 2.45 CM
TDI LATERAL: 0.08 M/S
TDI SEPTAL: 0.04 M/S
TDI: 0.06 M/S
TR MAX PG: 23 MMHG
TRICUSPID ANNULAR PLANE SYSTOLIC EXCURSION: 1.47 CM
TV REST PULMONARY ARTERY PRESSURE: 26 MMHG

## 2019-09-20 PROCEDURE — 3077F PR MOST RECENT SYSTOLIC BLOOD PRESSURE >= 140 MM HG: ICD-10-PCS | Mod: CPTII,S$GLB,, | Performed by: INTERNAL MEDICINE

## 2019-09-20 PROCEDURE — 99999 PR PBB SHADOW E&M-EST. PATIENT-LVL IV: ICD-10-PCS | Mod: PBBFAC,,, | Performed by: INTERNAL MEDICINE

## 2019-09-20 PROCEDURE — 99999 PR PBB SHADOW E&M-EST. PATIENT-LVL IV: CPT | Mod: PBBFAC,,, | Performed by: INTERNAL MEDICINE

## 2019-09-20 PROCEDURE — 1101F PR PT FALLS ASSESS DOC 0-1 FALLS W/OUT INJ PAST YR: ICD-10-PCS | Mod: CPTII,S$GLB,, | Performed by: INTERNAL MEDICINE

## 2019-09-20 PROCEDURE — 3080F PR MOST RECENT DIASTOLIC BLOOD PRESSURE >= 90 MM HG: ICD-10-PCS | Mod: CPTII,S$GLB,, | Performed by: INTERNAL MEDICINE

## 2019-09-20 PROCEDURE — 3077F SYST BP >= 140 MM HG: CPT | Mod: CPTII,S$GLB,, | Performed by: INTERNAL MEDICINE

## 2019-09-20 PROCEDURE — 99213 OFFICE O/P EST LOW 20 MIN: CPT | Mod: S$GLB,,, | Performed by: INTERNAL MEDICINE

## 2019-09-20 PROCEDURE — 93306 TRANSTHORACIC ECHO (TTE) COMPLETE: ICD-10-PCS | Mod: S$GLB,,, | Performed by: INTERNAL MEDICINE

## 2019-09-20 PROCEDURE — 1101F PT FALLS ASSESS-DOCD LE1/YR: CPT | Mod: CPTII,S$GLB,, | Performed by: INTERNAL MEDICINE

## 2019-09-20 PROCEDURE — 99213 PR OFFICE/OUTPT VISIT, EST, LEVL III, 20-29 MIN: ICD-10-PCS | Mod: S$GLB,,, | Performed by: INTERNAL MEDICINE

## 2019-09-20 PROCEDURE — 3080F DIAST BP >= 90 MM HG: CPT | Mod: CPTII,S$GLB,, | Performed by: INTERNAL MEDICINE

## 2019-09-20 PROCEDURE — 93306 TTE W/DOPPLER COMPLETE: CPT | Mod: S$GLB,,, | Performed by: INTERNAL MEDICINE

## 2019-09-20 RX ORDER — METHYLPREDNISOLONE 4 MG/1
TABLET ORAL
Status: ON HOLD | COMMUNITY
Start: 2019-09-16 | End: 2020-09-20 | Stop reason: HOSPADM

## 2019-09-20 RX ORDER — CIPROFLOXACIN 500 MG/1
TABLET ORAL
Status: ON HOLD | COMMUNITY
Start: 2019-08-06 | End: 2020-09-20 | Stop reason: HOSPADM

## 2019-09-20 NOTE — PROGRESS NOTES
Subjective:    Patient ID:  Cindy Lyman is a 83 y.o. female who presents for follow-up of Aortic Stenosis      HPI  Cindy Lyman is a 83 y.o. female underwent TAVR with EvolutR valve 1 year ago. She comes for follow up. She has been bed bound for the last 6 months and comes to the clinic on a gurney. She denies orthopnea.     Review of Systems   Constitution: Negative for fever.   HENT: Negative for congestion and hoarse voice.    Eyes: Negative for blurred vision and double vision.   Cardiovascular: Negative for chest pain, claudication, cyanosis, dyspnea on exertion, irregular heartbeat, leg swelling, near-syncope, orthopnea, palpitations and paroxysmal nocturnal dyspnea.   Respiratory: Negative for cough, hemoptysis and shortness of breath.    Endocrine: Negative for cold intolerance and heat intolerance.   Hematologic/Lymphatic: Negative for bleeding problem. Does not bruise/bleed easily.   Skin: Negative for dry skin and nail changes.   Musculoskeletal: Positive for back pain. Negative for falls.   Gastrointestinal: Negative for abdominal pain and anorexia.   Neurological: Negative for brief paralysis, dizziness and weakness.        Objective:    Physical Exam   Constitutional: She is oriented to person, place, and time. She appears well-nourished.   Laying down on stretcher   Eyes: Pupils are equal, round, and reactive to light.   Neck: No JVD present. No thyromegaly present.   Cardiovascular: Normal rate, regular rhythm, normal heart sounds and intact distal pulses.   Pulmonary/Chest: No respiratory distress. She has no wheezes. She exhibits no tenderness.   Abdominal: She exhibits no distension. There is no tenderness. There is no rebound.   Musculoskeletal: She exhibits no edema or tenderness.   Neurological: She is alert and oriented to person, place, and time.   Skin: Skin is warm and dry.   Psychiatric: She has a normal mood and affect.       Echo today  · Severely decreased left  ventricular systolic function. The estimated ejection fraction is 25%. On direct comparison LVEF has decreased compared to the prior study.  · There is a transcutaneously-placed aortic bioprosthesis present. There is no aortic aortic insufficiency present.  · Indeterminate left ventricular diastolic function.  · Normal right ventricular systolic function.  · Severe left atrial enlargement.  · Mild mitral regurgitation.  · The estimated PA systolic pressure is 26 mm Hg  · Normal central venous pressure (3 mm Hg).      Assessment:       1. S/P TAVR (transcatheter aortic valve replacement)         Plan:         S/P TAVR (transcatheter aortic valve replacement)  S/P 29 mm EvolutR TAVR  No PVL on echo  EF has decreased  Patient is now bed bound with NYHA class IV and CCS class 0 symptoms  She is extremely frail    Recommend hospice care, discussed with the family. No heart interventions indicated.        Hira Black MD [facility]  Interventional Cardiology  Structural/Valvular heart disease  [phone]

## 2019-09-20 NOTE — ASSESSMENT & PLAN NOTE
S/P 29 mm EvolutR TAVR  No PVL on echo  EF has decreased  Patient is now bed bound with NYHA class IV and CCS class 0 symptoms  She is extremely frail    Recommend hospice care, discussed with the family. No heart interventions indicated.

## 2019-09-30 ENCOUNTER — PATIENT OUTREACH (OUTPATIENT)
Dept: ADMINISTRATIVE | Facility: OTHER | Age: 84
End: 2019-09-30

## 2019-10-01 ENCOUNTER — HOSPITAL ENCOUNTER (OUTPATIENT)
Dept: CARDIOLOGY | Facility: CLINIC | Age: 84
Discharge: HOME | End: 2019-10-01
Payer: MEDICARE

## 2019-10-01 ENCOUNTER — OFFICE VISIT (OUTPATIENT)
Dept: ELECTROPHYSIOLOGY | Facility: CLINIC | Age: 84
End: 2019-10-01
Payer: MEDICARE

## 2019-10-01 ENCOUNTER — CLINICAL SUPPORT (OUTPATIENT)
Dept: CARDIOLOGY | Facility: HOSPITAL | Age: 84
End: 2019-10-01
Attending: INTERNAL MEDICINE
Payer: MEDICARE

## 2019-10-01 VITALS
BODY MASS INDEX: 29.91 KG/M2 | HEIGHT: 65 IN | SYSTOLIC BLOOD PRESSURE: 168 MMHG | WEIGHT: 179.5 LBS | DIASTOLIC BLOOD PRESSURE: 70 MMHG | HEART RATE: 70 BPM

## 2019-10-01 DIAGNOSIS — I44.2 CHB (COMPLETE HEART BLOCK): ICD-10-CM

## 2019-10-01 DIAGNOSIS — Z95.0 CARDIAC PACEMAKER IN SITU: ICD-10-CM

## 2019-10-01 DIAGNOSIS — I45.9 HEART BLOCK: ICD-10-CM

## 2019-10-01 DIAGNOSIS — I44.2 COMPLETE HEART BLOCK: ICD-10-CM

## 2019-10-01 DIAGNOSIS — Z95.0 PACEMAKER: Primary | ICD-10-CM

## 2019-10-01 PROCEDURE — 3078F DIAST BP <80 MM HG: CPT | Mod: CPTII,S$GLB,, | Performed by: INTERNAL MEDICINE

## 2019-10-01 PROCEDURE — 99499 RISK ADDL DX/OHS AUDIT: ICD-10-PCS | Mod: S$GLB,,, | Performed by: INTERNAL MEDICINE

## 2019-10-01 PROCEDURE — 93005 ELECTROCARDIOGRAM TRACING: CPT | Mod: S$GLB,,, | Performed by: INTERNAL MEDICINE

## 2019-10-01 PROCEDURE — 93005 RHYTHM STRIP: ICD-10-PCS | Mod: S$GLB,,, | Performed by: INTERNAL MEDICINE

## 2019-10-01 PROCEDURE — 3078F PR MOST RECENT DIASTOLIC BLOOD PRESSURE < 80 MM HG: ICD-10-PCS | Mod: CPTII,S$GLB,, | Performed by: INTERNAL MEDICINE

## 2019-10-01 PROCEDURE — 99214 OFFICE O/P EST MOD 30 MIN: CPT | Mod: S$GLB,,, | Performed by: INTERNAL MEDICINE

## 2019-10-01 PROCEDURE — 99214 PR OFFICE/OUTPT VISIT, EST, LEVL IV, 30-39 MIN: ICD-10-PCS | Mod: S$GLB,,, | Performed by: INTERNAL MEDICINE

## 2019-10-01 PROCEDURE — 1101F PT FALLS ASSESS-DOCD LE1/YR: CPT | Mod: CPTII,S$GLB,, | Performed by: INTERNAL MEDICINE

## 2019-10-01 PROCEDURE — 93280 PM DEVICE PROGR EVAL DUAL: CPT

## 2019-10-01 PROCEDURE — 93010 RHYTHM STRIP: ICD-10-PCS | Mod: S$GLB,,, | Performed by: INTERNAL MEDICINE

## 2019-10-01 PROCEDURE — 93010 ELECTROCARDIOGRAM REPORT: CPT | Mod: S$GLB,,, | Performed by: INTERNAL MEDICINE

## 2019-10-01 PROCEDURE — 99999 PR PBB SHADOW E&M-EST. PATIENT-LVL III: CPT | Mod: PBBFAC,,, | Performed by: INTERNAL MEDICINE

## 2019-10-01 PROCEDURE — 3077F SYST BP >= 140 MM HG: CPT | Mod: CPTII,S$GLB,, | Performed by: INTERNAL MEDICINE

## 2019-10-01 PROCEDURE — 1101F PR PT FALLS ASSESS DOC 0-1 FALLS W/OUT INJ PAST YR: ICD-10-PCS | Mod: CPTII,S$GLB,, | Performed by: INTERNAL MEDICINE

## 2019-10-01 PROCEDURE — 3077F PR MOST RECENT SYSTOLIC BLOOD PRESSURE >= 140 MM HG: ICD-10-PCS | Mod: CPTII,S$GLB,, | Performed by: INTERNAL MEDICINE

## 2019-10-01 PROCEDURE — 99499 UNLISTED E&M SERVICE: CPT | Mod: S$GLB,,, | Performed by: INTERNAL MEDICINE

## 2019-10-01 PROCEDURE — 99999 PR PBB SHADOW E&M-EST. PATIENT-LVL III: ICD-10-PCS | Mod: PBBFAC,,, | Performed by: INTERNAL MEDICINE

## 2019-10-01 NOTE — PROGRESS NOTES
Subjective:    Patient ID:  Cindy Lyman is a 83 y.o. female who presents for follow-up of CHB      83 yoF AS s/p TAVR with EvolutR 9/18, [facility] s/p DC PM afterwards here for follow up. She has been bed bound for the last 6 months and comes to the clinic on a gurney. She has normal DC PM function with 9% , 84% AP, no arrhythmias. She lives in a nursing home and has had a steady functional decline over the past few months. Incision site looks normal.     Past Medical History:  No date: Anemia  No date: Anticoagulant long-term use  No date: Aortic stenosis  No date: Arthritis      Comment:  lower back  No date: Asthma  4/24/2015: CAD (coronary artery disease)  No date: Carpal tunnel syndrome on both sides  No date: Cataract  5/2013: CHF (congestive heart failure)  No date: COPD (chronic obstructive pulmonary disease)  No date: Depression  No date: DVT (deep venous thrombosis)  No date: Hyperlipidemia  No date: Hypertension  No date: Pulmonary HTN  No date: TMJ (temporomandibular joint disorder)    Past Surgical History:  7/5/2018: A-V CARDIAC PACEMAKER INSERTION; N/A      Comment:  Procedure: INSERTION, CARDIAC PACEMAKER, DUAL CHAMBER;                 Surgeon: Giancarlo Houser MD;  Location: [facility] CATH                LAB;  Service: Cardiology;  Laterality: N/A;  7/5/2018: AORTIC VALVE REPLACEMENT; N/A      Comment:  Procedure: Replacement-valve-aortic;  Surgeon: Corey Castañeda MD;  Location: [facility] CATH LAB;  Service:                Cardiology;  Laterality: N/A;  No date: BACK SURGERY  No date: cardiac stents  No date: CARDIAC VALVE SURGERY  No date: CARPAL TUNNEL RELEASE      Comment:  Right/Left  07/2017: CHOLECYSTECTOMY  No date: EYE SURGERY      Comment:  cataract extraction  No date: HYSTERECTOMY      Comment:  Partial  2009: JOINT REPLACEMENT      Comment:  Left hip  10/9/2018: LEFT HEART CATHETERIZATION; Left      Comment:  Procedure: Left heart cath;  Surgeon: Tony Carmen                 MD;  Location: [facility] CATH LAB;  Service: Cardiology;                 Laterality: Left;  No date: POLYPECTOMY      Comment:  x9  No date: TEMPOROMANDIBULAR JOINT SURGERY    Social History    Socioeconomic History      Marital status:       Spouse name: Not on file      Number of children: Not on file      Years of education: Not on file      Highest education level: Not on file    Occupational History      Not on file    Social Needs      Financial resource strain: Not on file      Food insecurity:        Worry: Not on file        Inability: Not on file      Transportation needs:        Medical: Not on file        Non-medical: Not on file    Tobacco Use      Smoking status: Never Smoker      Smokeless tobacco: Never Used    Substance and Sexual Activity      Alcohol use: No      Drug use: No      Sexual activity: Never    Lifestyle      Physical activity:        Days per week: Not on file        Minutes per session: Not on file      Stress: Not on file    Relationships      Social connections:        Talks on phone: Not on file        Gets together: Not on file        Attends Congregation service: Not on file        Active member of club or organization: Not on file        Attends meetings of clubs or organizations: Not on file        Relationship status: Not on file    Other Topics      Concerns:        Not on file    Social History Narrative      Not on file      Review of patient's family history indicates:  Problem: Heart disease      Relation: Mother          Age of Onset: (Not Specified)  Problem: Hypertension      Relation: Daughter          Age of Onset: (Not Specified)  Problem: Cancer      Relation: Son          Age of Onset: (Not Specified)  Problem: Breast cancer      Relation: Neg Hx          Age of Onset: (Not Specified)  Problem: Colon cancer      Relation: Neg Hx          Age of Onset: (Not Specified)  Problem: Ovarian cancer      Relation: Neg Hx          Age of Onset: (Not  Specified)  Problem: Esophageal cancer      Relation: Neg Hx          Age of Onset: (Not Specified)        Review of Systems   Constitution: Negative for fever.   HENT: Negative for congestion and hoarse voice.    Eyes: Negative for blurred vision and double vision.   Cardiovascular: Negative for chest pain, claudication, cyanosis, dyspnea on exertion, irregular heartbeat, leg swelling, near-syncope, orthopnea, palpitations and paroxysmal nocturnal dyspnea.   Respiratory: Negative for cough, hemoptysis and shortness of breath.    Endocrine: Negative for cold intolerance and heat intolerance.   Hematologic/Lymphatic: Negative for bleeding problem. Does not bruise/bleed easily.   Skin: Negative for dry skin and nail changes.   Musculoskeletal: Positive for back pain. Negative for falls.   Gastrointestinal: Negative for abdominal pain and anorexia.   Neurological: Negative for brief paralysis, dizziness and weakness.        Objective:    Physical Exam   Constitutional: She is oriented to person, place, and time. She appears well-nourished.   Laying down on stretcher   Eyes: Pupils are equal, round, and reactive to light.   Neck: No JVD present. No thyromegaly present.   Cardiovascular: Normal rate, regular rhythm, normal heart sounds and intact distal pulses.   Pulmonary/Chest: No respiratory distress. She has no wheezes. She exhibits no tenderness.   L upper chest wound with underlying generator   Abdominal: She exhibits no distension. There is no tenderness. There is no rebound.   Musculoskeletal: She exhibits no edema or tenderness.   Neurological: She is alert and oriented to person, place, and time.   Skin: Skin is warm and dry.   Psychiatric: She has a normal mood and affect.   Vitals reviewed.        Assessment:       1. Pacemaker    2. Complete heart block         Plan:       83 yoF CHB s/p DC PM here for follow up. Normal DC PM function with no sustained arrhythmias. I discussed routine device follow up  including quarterly to bi-annual device checks for device function as well as yearly follow up in the EP clinic. The patient  was advised to call with any concerns regarding their device. Device clinic follow up as scheduled. RTC 1y

## 2019-10-01 NOTE — PROGRESS NOTES
Patient, Cindy Lyman (MRN #[id number]), presented with a recent Estimated PA Systolic Pressure greater than 40 mmHG consistent with the definition of pulmonary hypertension (ICD10 - I27.0).    Est. PA Systolic Pressure   Date Value Ref Range Status   08/10/2018 51.72 (A)       The patient's pulmonary hypertension was monitored, evaluated, addressed and/or treated. This addendum to the medical record is made on 10/01/2019.

## 2020-01-02 ENCOUNTER — CLINICAL SUPPORT (OUTPATIENT)
Dept: CARDIOLOGY | Facility: HOSPITAL | Age: 85
End: 2020-01-02
Payer: MEDICARE

## 2020-01-02 DIAGNOSIS — Z95.0 PRESENCE OF CARDIAC PACEMAKER: ICD-10-CM

## 2020-01-02 PROCEDURE — 93294 CARDIAC DEVICE CHECK - REMOTE: ICD-10-PCS | Mod: ,,, | Performed by: INTERNAL MEDICINE

## 2020-01-02 PROCEDURE — 93294 REM INTERROG EVL PM/LDLS PM: CPT | Mod: ,,, | Performed by: INTERNAL MEDICINE

## 2020-01-02 PROCEDURE — 93296 REM INTERROG EVL PM/IDS: CPT | Performed by: INTERNAL MEDICINE

## 2020-04-01 ENCOUNTER — CLINICAL SUPPORT (OUTPATIENT)
Dept: CARDIOLOGY | Facility: HOSPITAL | Age: 85
End: 2020-04-01
Payer: MEDICARE

## 2020-04-01 DIAGNOSIS — Z95.0 PRESENCE OF CARDIAC PACEMAKER: ICD-10-CM

## 2020-04-01 DIAGNOSIS — I44.2 ATRIOVENTRICULAR BLOCK, COMPLETE: ICD-10-CM

## 2020-04-01 PROCEDURE — 93296 REM INTERROG EVL PM/IDS: CPT | Performed by: INTERNAL MEDICINE

## 2020-04-01 PROCEDURE — 93294 CARDIAC DEVICE CHECK - REMOTE: ICD-10-PCS | Mod: ,,, | Performed by: INTERNAL MEDICINE

## 2020-04-01 PROCEDURE — 93294 REM INTERROG EVL PM/LDLS PM: CPT | Mod: ,,, | Performed by: INTERNAL MEDICINE

## 2020-06-22 ENCOUNTER — LAB VISIT (OUTPATIENT)
Dept: LAB | Facility: OTHER | Age: 85
End: 2020-06-22
Payer: MEDICARE

## 2020-06-22 DIAGNOSIS — Z20.822 SUSPECTED COVID-19 VIRUS INFECTION: ICD-10-CM

## 2020-06-22 PROCEDURE — U0003 INFECTIOUS AGENT DETECTION BY NUCLEIC ACID (DNA OR RNA); SEVERE ACUTE RESPIRATORY SYNDROME CORONAVIRUS 2 (SARS-COV-2) (CORONAVIRUS DISEASE [COVID-19]), AMPLIFIED PROBE TECHNIQUE, MAKING USE OF HIGH THROUGHPUT TECHNOLOGIES AS DESCRIBED BY CMS-2020-01-R: HCPCS

## 2020-06-25 LAB — SARS-COV-2 RNA RESP QL NAA+PROBE: NOT DETECTED

## 2020-06-29 DIAGNOSIS — Z20.822 SUSPECTED COVID-19 VIRUS INFECTION: ICD-10-CM

## 2020-06-30 ENCOUNTER — CLINICAL SUPPORT (OUTPATIENT)
Dept: CARDIOLOGY | Facility: HOSPITAL | Age: 85
End: 2020-06-30
Payer: MEDICARE

## 2020-06-30 DIAGNOSIS — Z95.0 PRESENCE OF CARDIAC PACEMAKER: ICD-10-CM

## 2020-06-30 PROCEDURE — 93294 REM INTERROG EVL PM/LDLS PM: CPT | Mod: ,,, | Performed by: INTERNAL MEDICINE

## 2020-06-30 PROCEDURE — 93296 REM INTERROG EVL PM/IDS: CPT | Performed by: INTERNAL MEDICINE

## 2020-06-30 PROCEDURE — 93294 CARDIAC DEVICE CHECK - REMOTE: ICD-10-PCS | Mod: ,,, | Performed by: INTERNAL MEDICINE

## 2020-07-06 ENCOUNTER — LAB VISIT (OUTPATIENT)
Dept: LAB | Facility: OTHER | Age: 85
End: 2020-07-06
Payer: MEDICARE

## 2020-07-06 DIAGNOSIS — Z20.822 SUSPECTED COVID-19 VIRUS INFECTION: ICD-10-CM

## 2020-07-06 PROCEDURE — U0003 INFECTIOUS AGENT DETECTION BY NUCLEIC ACID (DNA OR RNA); SEVERE ACUTE RESPIRATORY SYNDROME CORONAVIRUS 2 (SARS-COV-2) (CORONAVIRUS DISEASE [COVID-19]), AMPLIFIED PROBE TECHNIQUE, MAKING USE OF HIGH THROUGHPUT TECHNOLOGIES AS DESCRIBED BY CMS-2020-01-R: HCPCS

## 2020-07-07 LAB — SARS-COV-2 RNA RESP QL NAA+PROBE: NEGATIVE

## 2020-08-07 ENCOUNTER — HOSPITAL ENCOUNTER (EMERGENCY)
Facility: HOSPITAL | Age: 85
Discharge: NURSING, REHAB OR LONG-TERM CARE | End: 2020-08-07
Attending: EMERGENCY MEDICINE
Payer: MEDICARE

## 2020-08-07 VITALS
WEIGHT: 156 LBS | RESPIRATION RATE: 20 BRPM | HEART RATE: 92 BPM | HEIGHT: 65 IN | DIASTOLIC BLOOD PRESSURE: 90 MMHG | TEMPERATURE: 99 F | OXYGEN SATURATION: 97 % | SYSTOLIC BLOOD PRESSURE: 170 MMHG | BODY MASS INDEX: 25.99 KG/M2

## 2020-08-07 DIAGNOSIS — M25.539 WRIST PAIN: ICD-10-CM

## 2020-08-07 DIAGNOSIS — M25.561 RIGHT KNEE PAIN: ICD-10-CM

## 2020-08-07 DIAGNOSIS — M25.529 ELBOW PAIN: ICD-10-CM
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
## 2020-08-07 LAB
ALBUMIN SERPL BCP-MCNC: 3.1 G/DL (ref 3.5–5.2)
ALP SERPL-CCNC: 75 U/L (ref 55–135)
ALT SERPL W/O P-5'-P-CCNC: 9 U/L (ref 10–44)
ANION GAP SERPL CALC-SCNC: 10 MMOL/L (ref 8–16)
AST SERPL-CCNC: 18 U/L (ref 10–40)
BASOPHILS # BLD AUTO: 0.04 K/UL (ref 0–0.2)
BASOPHILS NFR BLD: 0.5 % (ref 0–1.9)
BILIRUB SERPL-MCNC: 0.6 MG/DL (ref 0.1–1)
BILIRUB UR QL STRIP: NEGATIVE
BUN SERPL-MCNC: 21 MG/DL (ref 8–23)
CALCIUM SERPL-MCNC: 9.4 MG/DL (ref 8.7–10.5)
CHLORIDE SERPL-SCNC: 108 MMOL/L (ref 95–110)
CLARITY UR REFRACT.AUTO: CLEAR
CO2 SERPL-SCNC: 24 MMOL/L (ref 23–29)
COLOR UR AUTO: YELLOW
CREAT SERPL-MCNC: 0.8 MG/DL (ref 0.5–1.4)
DIFFERENTIAL METHOD: ABNORMAL
EOSINOPHIL # BLD AUTO: 0.1 K/UL (ref 0–0.5)
EOSINOPHIL NFR BLD: 1.6 % (ref 0–8)
ERYTHROCYTE [DISTWIDTH] IN BLOOD BY AUTOMATED COUNT: 16.2 % (ref 11.5–14.5)
EST. GFR  (AFRICAN AMERICAN): >60 ML/MIN/1.73 M^2
EST. GFR  (NON AFRICAN AMERICAN): >60 ML/MIN/1.73 M^2
GLUCOSE SERPL-MCNC: 98 MG/DL (ref 70–110)
GLUCOSE UR QL STRIP: NEGATIVE
HCT VFR BLD AUTO: 34.8 % (ref 37–48.5)
HGB BLD-MCNC: 10.6 G/DL (ref 12–16)
HGB UR QL STRIP: NEGATIVE
IMM GRANULOCYTES # BLD AUTO: 0.02 K/UL (ref 0–0.04)
IMM GRANULOCYTES NFR BLD AUTO: 0.3 % (ref 0–0.5)
INR PPP: 1 (ref 0.8–1.2)
KETONES UR QL STRIP: ABNORMAL
LEUKOCYTE ESTERASE UR QL STRIP: NEGATIVE
LYMPHOCYTES # BLD AUTO: 1.8 K/UL (ref 1–4.8)
LYMPHOCYTES NFR BLD: 23.9 % (ref 18–48)
MCH RBC QN AUTO: 28.6 PG (ref 27–31)
MCHC RBC AUTO-ENTMCNC: 30.5 G/DL (ref 32–36)
MCV RBC AUTO: 94 FL (ref 82–98)
MICROSCOPIC COMMENT: NORMAL
MONOCYTES # BLD AUTO: 0.6 K/UL (ref 0.3–1)
MONOCYTES NFR BLD: 8.3 % (ref 4–15)
NEUTROPHILS # BLD AUTO: 4.8 K/UL (ref 1.8–7.7)
NEUTROPHILS NFR BLD: 65.4 % (ref 38–73)
NITRITE UR QL STRIP: NEGATIVE
NRBC BLD-RTO: 0 /100 WBC
PH UR STRIP: 5 [PH] (ref 5–8)
PLATELET # BLD AUTO: 316 K/UL (ref 150–350)
PMV BLD AUTO: 11.2 FL (ref 9.2–12.9)
POTASSIUM SERPL-SCNC: 4.5 MMOL/L (ref 3.5–5.1)
PROT SERPL-MCNC: 7 G/DL (ref 6–8.4)
PROT UR QL STRIP: NEGATIVE
PROTHROMBIN TIME: 10.8 SEC (ref 9–12.5)
RBC # BLD AUTO: 3.7 M/UL (ref 4–5.4)
SODIUM SERPL-SCNC: 142 MMOL/L (ref 136–145)
SP GR UR STRIP: 1.02 (ref 1–1.03)
SQUAMOUS #/AREA URNS AUTO: 0 /HPF
URN SPEC COLLECT METH UR: ABNORMAL
WBC # BLD AUTO: 7.33 K/UL (ref 3.9–12.7)
WBC #/AREA URNS AUTO: 0 /HPF (ref 0–5)

## 2020-08-07 PROCEDURE — 85025 COMPLETE CBC W/AUTO DIFF WBC: CPT

## 2020-08-07 PROCEDURE — 93010 ELECTROCARDIOGRAM REPORT: CPT | Mod: ,,, | Performed by: INTERNAL MEDICINE

## 2020-08-07 PROCEDURE — 81001 URINALYSIS AUTO W/SCOPE: CPT

## 2020-08-07 PROCEDURE — 85610 PROTHROMBIN TIME: CPT

## 2020-08-07 PROCEDURE — 99285 PR EMERGENCY DEPT VISIT,LEVEL V: ICD-10-PCS | Mod: ,,, | Performed by: EMERGENCY MEDICINE

## 2020-08-07 PROCEDURE — 93010 EKG 12-LEAD: ICD-10-PCS | Mod: ,,, | Performed by: INTERNAL MEDICINE

## 2020-08-07 PROCEDURE — 93005 ELECTROCARDIOGRAM TRACING: CPT

## 2020-08-07 PROCEDURE — 99285 EMERGENCY DEPT VISIT HI MDM: CPT | Mod: ,,, | Performed by: EMERGENCY MEDICINE

## 2020-08-07 PROCEDURE — 99285 EMERGENCY DEPT VISIT HI MDM: CPT | Mod: 25

## 2020-08-07 PROCEDURE — 80053 COMPREHEN METABOLIC PANEL: CPT

## 2020-08-07 PROCEDURE — 96374 THER/PROPH/DIAG INJ IV PUSH: CPT

## 2020-08-07 PROCEDURE — 63600175 PHARM REV CODE 636 W HCPCS: Performed by: STUDENT IN AN ORGANIZED HEALTH CARE EDUCATION/TRAINING PROGRAM

## 2020-08-07 RX ORDER — MORPHINE SULFATE 4 MG/ML
4 INJECTION, SOLUTION INTRAMUSCULAR; INTRAVENOUS
Status: COMPLETED | OUTPATIENT
Start: 2020-08-07 | End: 2020-08-07

## 2020-08-07 RX ADMIN — MORPHINE SULFATE 4 MG: 4 INJECTION INTRAVENOUS at 02:08

## 2020-08-07 NOTE — ED NOTES
Pt incontinent of urine; cleansed. Catheterized urine specimen obtained using aseptic technique.  Approximately 10 ml of clear, yellow, free-flowing urine noted.  Pt tolerated well; specimen sent to lab. New brief, gown, and linens provided.  Pt repositioned to her right side via foam wedge support for comfort.  Bed locked in lowest position; side rails up and locked x 2; call light, bedside table, and personal belongings within reach .Pt instructed to alert nurse for assistance; verbalizes understanding  Will continue to monitor pt.           Lavern Sawyer RN  08/07/20 [id number]

## 2020-08-07 NOTE — ED NOTES
Bed: 24  Expected date:   Expected time:   Means of arrival:   Comments:     Jennie Kraus RN  08/07/20 2552

## 2020-08-07 NOTE — ED NOTES
Bed: 21  Expected date:   Expected time:   Means of arrival:   Comments:     Jennie Kraus RN  08/07/20 8612

## 2020-08-07 NOTE — ED PROVIDER NOTES
Encounter Date: 8/7/2020       History     Chief Complaint   Patient presents with    Fall     rolled out of bed at 8:30am today, hit head on floor, pain to bilateral arms and right knee, hematoma to forehead, + blood thinners, no LOC     84-year-old female with pmhx CAD, HTN, aortic valve replacement (on ASA/clopidegrel), pulmonary HTN, gastric cancer, and bedbound at nursing home presents after falling out of bed. Patient is a limited historian. Patient reportedly fell out of bed this morning, had not been down long before she was found.  States she hit her head.  Denies headache, changes in vision or hearing, lightheadedness or dizziness.  Denies neck or back pain.  She also complains of bilateral wrist and hand pain since falling as well as right knee pain.  States that prior to falling she had been feeling her baseline state of health.          Review of patient's allergies indicates:   Allergen Reactions    Doxycycline hyclate Diarrhea and Nausea And Vomiting    Singulair [montelukast]      Stomach pain, Muscle pain, Cough      Penicillins      Other reaction(s): Anaphylaxis    Ventolin [albuterol sulfate] Other (See Comments)     Severely elevated blood pressure    Latex, natural rubber Rash     Past Medical History:   Diagnosis Date    Anemia     Anticoagulant long-term use     Aortic stenosis     Arthritis     lower back    Asthma     CAD (coronary artery disease) 4/24/2015    Carpal tunnel syndrome on both sides     Cataract     CHF (congestive heart failure) 5/2013    COPD (chronic obstructive pulmonary disease)     Depression     DVT (deep venous thrombosis)     Hyperlipidemia     Hypertension     Pulmonary HTN     TMJ (temporomandibular joint disorder)      Past Surgical History:   Procedure Laterality Date    A-V CARDIAC PACEMAKER INSERTION N/A 7/5/2018    Procedure: INSERTION, CARDIAC PACEMAKER, DUAL CHAMBER;  Surgeon: Giancarlo Houser MD;  Location: [facility] CATH LAB;  Service:  Cardiology;  Laterality: N/A;    AORTIC VALVE REPLACEMENT N/A 7/5/2018    Procedure: Replacement-valve-aortic;  Surgeon: Corey Castañeda MD;  Location: [facility] CATH LAB;  Service: Cardiology;  Laterality: N/A;    BACK SURGERY      cardiac stents      CARDIAC VALVE SURGERY      CARPAL TUNNEL RELEASE      Right/Left    CHOLECYSTECTOMY  07/2017    EYE SURGERY      cataract extraction    HYSTERECTOMY      Partial    JOINT REPLACEMENT  2009    Left hip    LEFT HEART CATHETERIZATION Left 10/9/2018    Procedure: Left heart cath;  Surgeon: Tony Carmen MD;  Location: [facility] CATH LAB;  Service: Cardiology;  Laterality: Left;    POLYPECTOMY      x9    TEMPOROMANDIBULAR JOINT SURGERY       Family History   Problem Relation Age of Onset    Heart disease Mother     Hypertension Daughter     Cancer Son     Breast cancer Neg Hx     Colon cancer Neg Hx     Ovarian cancer Neg Hx     Esophageal cancer Neg Hx      Social History     Tobacco Use    Smoking status: Never Smoker    Smokeless tobacco: Never Used   Substance Use Topics    Alcohol use: No    Drug use: No     Review of Systems   Constitutional: Negative for diaphoresis and fever.   HENT: Negative for ear pain, hearing loss, sore throat and tinnitus.    Eyes: Negative for photophobia, pain and visual disturbance.   Respiratory: Negative for cough, chest tightness and shortness of breath.    Cardiovascular: Negative for chest pain, palpitations and leg swelling.   Gastrointestinal: Negative for abdominal pain, diarrhea, nausea and vomiting.   Genitourinary: Negative for difficulty urinating and dysuria.   Musculoskeletal: Negative for back pain and neck pain.   Neurological: Negative for dizziness, light-headedness and headaches.   Psychiatric/Behavioral: Negative for agitation, confusion and self-injury.       Physical Exam     Initial Vitals [08/07/20 1028]   BP Pulse Resp Temp SpO2   134/74 98 18 99 °F (37.2 °C) 100 %      MAP       --          Physical Exam    Nursing note and vitals reviewed.  Constitutional:   Chronically ill female in NAD laying on her right side    HENT:   Head: Normocephalic and atraumatic.   Right Ear: External ear normal.   Left Ear: External ear normal.   Nose: Nose normal.   Mouth/Throat: Oropharynx is clear and moist. No oropharyngeal exudate.   Eyes: Conjunctivae and EOM are normal. Pupils are equal, round, and reactive to light. Right eye exhibits no discharge. Left eye exhibits no discharge. No scleral icterus.   Neck: Normal range of motion. Neck supple.   Cardiovascular: Normal rate, regular rhythm and normal heart sounds.   Pulmonary/Chest: Breath sounds normal. No respiratory distress. She has no wheezes. She has no rhonchi. She has no rales. She exhibits no tenderness.   Abdominal: Soft. Bowel sounds are normal. She exhibits no distension. There is no abdominal tenderness. There is no rebound and no guarding.   Musculoskeletal: Tenderness present. No edema.      Comments: Stage 4 sacral decubitus ulcer   Mild contractures of B/L upper and lower extremities.  Difficult to perform MSK exam 2/2 mild contractures and inability to lay flat on back.  No visible swelling, wounds, contusions, lacerations, or abrasions. No bony deformity    RUE: mild wrist and hand pain to palpation diffusely. Painful ROM of hand and wrist.  LUE: mild wrist and hand pain to palpation diffusely. Painful ROM of hand and wrist.  RLE: mild right knee pain to palpation diffusely. Painful knee ROM. No TTP at hip or pain with hip ROM.   LLE: normal exam   Neurological: She is alert and oriented to person, place, and time. GCS score is 15. GCS eye subscore is 4. GCS verbal subscore is 5. GCS motor subscore is 6.   Skin: Skin is warm and dry. No rash noted.   Psychiatric:   A&Ox3         ED Course   Procedures  Labs Reviewed   CBC W/ AUTO DIFFERENTIAL - Abnormal; Notable for the following components:       Result Value    RBC 3.70 (*)     Hemoglobin  10.6 (*)     Hematocrit 34.8 (*)     Mean Corpuscular Hemoglobin Conc 30.5 (*)     RDW 16.2 (*)     All other components within normal limits   COMPREHENSIVE METABOLIC PANEL - Abnormal; Notable for the following components:    Albumin 3.1 (*)     ALT 9 (*)     All other components within normal limits   URINALYSIS, REFLEX TO URINE CULTURE - Abnormal; Notable for the following components:    Ketones, UA 1+ (*)     All other components within normal limits    Narrative:     Specimen Source->Urine   PROTIME-INR   URINALYSIS MICROSCOPIC    Narrative:     Specimen Source->Urine     EKG Readings: (Independently Interpreted)   No significant change from prior EKG     ECG Results          EKG 12-lead (Final result)  Result time 08/07/20 15:18:53    Final result by Interface, Lab In [facility] (08/07/20 15:18:53)                 Narrative:    Test Reason : W19.XXXA,    Vent. Rate : 099 BPM     Atrial Rate : 099 BPM     P-R Int : 158 ms          QRS Dur : 142 ms      QT Int : 374 ms       P-R-T Axes : 089 -52 122 degrees     QTc Int : 479 ms    Atrial-sensed ventricular-paced rhythm  Abnormal ECG  When compared with ECG of 01-OCT-2019 10:26,  No significant change was found  Confirmed by Nita Vale MD ([id number]) on 8/7/2020 3:18:36 PM    Referred By: [email or web address]   SELF           Confirmed By:Nita Vale MD                            Imaging Results          CT Head Without Contrast (Final result)  Result time 08/07/20 17:08:30    Final result by Samy Brenner MD (08/07/20 17:08:30)                 Impression:      1. Motion limited examination, no convincing acute intracranial abnormalities noting sequela of chronic microvascular ischemic change, senescent change, and encephalomalacia involving the basal ganglia and left paramedian occipital lobe.  2. Subcutaneous hematoma overlying the frontal soft tissues.      Electronically signed by: Samy Brenner MD  Date:    08/07/2020  Time:    17:08             Narrative:     EXAMINATION:  CT HEAD WITHOUT CONTRAST    CLINICAL HISTORY:  Head trauma, mod-severe;    TECHNIQUE:  Low dose axial images were obtained through the head.  Coronal and sagittal reformations were also performed. Contrast was not administered.    COMPARISON:  04/13/2019    FINDINGS:  There is extensive patient motion.    There is generalized cerebral volume loss.  There is hypoattenuation in a periventricular fashion, likely sequela of chronic microvascular ischemic change.More focal low attenuation is noted within the bilateral basal ganglia, stable.  There is questionable encephalomalacia along the left paramedian occipital lobe.  There is no evidence of acute major vascular territory infarct, hemorrhage, or mass.  There is no hydrocephalus.  There are no abnormal extra-axial fluid collections.  The paranasal sinuses and mastoid air cells are clear, and there is no evidence of calvarial fracture.  The visualized soft tissues are remarkable for subcutaneous edema/hematoma overlying the frontal calvarium..                               X-Ray Knee 1 or 2 View Right (Final result)  Result time 08/07/20 16:00:47   Procedure changed from X-Ray Knee 3 View Right     Final result by Aram Carmona III, MD (08/07/20 16:00:47)                 Narrative:    EXAMINATION:  XR KNEE 1 OR 2 VIEW RIGHT    CLINICAL HISTORY:  right knee pain;  Pain in right knee    FINDINGS:  Two views: There is moderate DJD and spurs on the patella.  There is a mild varus deformity.  No fracture dislocation bone destruction seen.      Electronically signed by: Aram Carmona MD  Date:    08/07/2020  Time:    16:00                             X-Ray Hand 3 View Bilateral (Final result)  Result time 08/07/20 15:39:48    Final result by Aram Carmona III, MD (08/07/20 15:39:48)                 Narrative:    EXAMINATION:  XR HAND COMPLETE 3 VIEWS BILATERAL    CLINICAL HISTORY:  hand pain s/p fall;    FINDINGS:  Three views bilateral.    Right: There  is baseline DJD.  There is scapholunate disassociation probably chronic.  No fracture dislocation bone destruction seen.    Left: There is baseline DJD and chronic scapholunate disassociation.  No fracture, dislocation, or bone destruction seen.      Electronically signed by: Aram Carmona MD  Date:    08/07/2020  Time:    15:39                             X-Ray Forearm Bilateral (Final result)  Result time 08/07/20 15:40:24    Final result by Aram Carmona III, MD (08/07/20 15:40:24)                 Narrative:    EXAMINATION:  XR FOREARM BILATERAL    CLINICAL HISTORY:  arm pain;    FINDINGS:  Four views bilateral.    Right: There is a radiopaque foreign body in the proximal forearm soft tissues.  No fracture dislocation bone destruction seen.  There is a spur on the olecranon.  No trauma seen.      Electronically signed by: Aram Carmona MD  Date:    08/07/2020  Time:    15:40                             X-Ray Elbow Complete Bilateral (Final result)  Result time 08/07/20 15:34:48    Final result by Aram Carmona III, MD (08/07/20 15:34:48)                 Narrative:    EXAMINATION:  XR ELBOW COMPLETE 3 VIEW BILATERAL    CLINICAL HISTORY:  Pain in unspecified elbow    FINDINGS:  Three views bilateral.    Right: There is mild DJD.  No fracture dislocation bone destruction or joint effusion seen.    Left: There is mild DJD and triceps spur.  No fracture dislocation bone destruction or joint effusion seen.      Electronically signed by: Aram Carmona MD  Date:    08/07/2020  Time:    15:34                            X-Rays:   Independently Interpreted Readings:   Head CT: CT head with no evidence of intracranial hemorrhage, no skull fracture  Small frontal subcutaneous hematoma   Other Readings:  XR B/L elbow, forearm, wrist and hands with no acute fracture  XR right knee with no acute fracture    Medical Decision Making:   Initial Assessment:   84yoF with numerous medical comorbidities who is bedridden and  dependent on others for care presents s/p fall from bed with B/L wrist and hand pain, and right knee pain.  Differential Diagnosis:   DDX  -acute ICH 2/2 trauma: must r/o given age and unwitnessed fall, although head appears atraumatic and neuro exam intact  -acute fracture: must r/o in B/L hands and wrist, right knee given acute pain s/p fall; although no bony deformity or crepitus, only mild nonspecific pain on exam  -ACS: r/o as underlying cause of fall; hx heart disease but unlikely given no CP, SOB, associated sx    Independently Interpreted Test(s):   I have ordered and independently interpreted X-rays - see prior notes.  I have ordered and independently interpreted EKG Reading(s) - see prior notes  Clinical Tests:   Lab Tests: Ordered and Reviewed  Radiological Study: Ordered and Reviewed  Medical Tests: Ordered and Reviewed  ED Management:  On initial evaluation, patient in NAD, A&Ox3, VS wnl except mild HTN.  Non-toxic, non-septic, NVI.   Will order CT head non-con to r/o ICH  Will order XR B/L hands and wrists, and right knee to r/o acute fracture  CBC, CMP, troponin, UA, EKG also ordered       5:43 PM  CBC, CMP, troponin, UA with no concerning findings  EKG unchanged to prior  CT head with no intracranial hemorrhage  XR BL elbow, forearm, wrist, and hand with no acute fracture  XR right knee with no acute fracture  Patient's daughter counseled on findings and plan to discharge patient back to nursing home                    Attending Attestation:   Physician Attestation Statement for Resident:  As the supervising MD   Physician Attestation Statement: I have personally seen and examined this patient.   I agree with the above history. -:   As the supervising MD I agree with the above PE.    As the supervising MD I agree with the above treatment, course, plan, and disposition.                    ED Course as of Aug 07 2138   Fri Aug 07, 2020   1520 X-Ray Hand 3 View Bilateral [CC]      ED Course User  Index  [CC] Leisa Max MD                Clinical Impression:       ICD-10-CM ICD-9-CM   1. Fall, initial encounter  W19.XXXA E888.9   2. Wrist pain  M25.539 719.43   3. Elbow pain  M25.529 719.42   4. Right knee pain  M25.561 719.46   5. Fall  W19.XXXA E888.9             ED Disposition Condition    Discharge Stable        ED Prescriptions     None        Follow-up Information     Follow up With Specialties Details Why Contact Info    Rodger Camarena MD Family Medicine, Wound Care   [address] [place] [id number]  [phone]                                       Leisa Max MD  Resident  08/07/20 [id number]       Parul Sarkar MD  08/07/20 2166

## 2020-08-07 NOTE — ED TRIAGE NOTES
Pt with pain to the right knee, both hands, wrists, and forearms after rolling out of bed, falling forward, catching herself on her hands. Pt hit her forehead upon falling; denies LOC. Pt is possibly on Plavix.

## 2020-08-07 NOTE — ED NOTES
Sacral wound cleansed with saline and betadine. Wet to dry gauze placed over the wound. Skin barrier ointment applied; wound covered with Mepilex.     Lavern Sawyer RN  08/07/20 2843

## 2020-08-07 NOTE — ED NOTES
LOC: The patient is awake, alert, and oriented to place, time, situation. Affect is appropriate.  Speech is appropriate and clear.     APPEARANCE: Patient resting on stretcher; is uncomfortable but in no acute distress.  Patient is mostly clean though her brief is soiled with urine.    SKIN: The skin is warm and dry; color consistent with ethnicity.  Patient has poor skin turgor and dry mucus membranes. Stage III sacral wound noted; see documentation.  No bruising noted. Hematoma noted to the middle of the forehead.    MUSCULOSKELETAL: Patient with limited movement of the upper and lower extremities; contractures noted to all extremities.  Pt complains of pain in the bilateral hands, wrists, and forearms; complains of pain in the right knee and hip. Denies weakness.     RESPIRATORY: Airway is open and patent. Respirations spontaneous, even, easy, and non-labored.  Patient has a normal effort and rate.  No accessory muscle use noted. Denies cough.     CARDIAC:  Normal rate noted.  No peripheral edema noted. No complaints of chest pain.      ABDOMEN: Soft and non tender to palpation.  No distention noted.     NEUROLOGIC: Eyes open spontaneously.  Behavior appropriate to situation.  Follows commands; facial expression symmetrical.  Purposeful motor response noted; normal sensation in all extremities.           Lavern Sawyer RN  08/07/20 [id number]

## 2020-08-07 NOTE — ED NOTES
Pt awake and alert; resting quietly on stretcher.  Pt remains on continuous cardiac and pulse ox monitoring with non-invasive blood pressure to cycle every 30 minutes.  VS stable; NSR noted. Pt reporting significant relief in pain since receiving medication; currently rating pain as 5/10; no acute distress or discomfort reported or observed.  Pt denies restroom needs at this time; remains on Pure Wick external catheter to continuous low wall suction.  Brief remains clean and dry. Pt repositioned to her left side via foam wedge support. Bed locked in lowest position; side rails up and locked x 2; call light, bedside table, and personal belongings within reach. Room assessed for safety measures and cleanliness; no action needed at this time. Pt updated on wait for results and CT scan.  Pt instructed to alert nurse for assistance; verbalizes understanding. Pt denies needs or complaints at this time; will continue to monitor.

## 2020-08-08 NOTE — ED NOTES
, manager of peoples health after hours , states transportation is approved through [facility]  And [facility]    ALEX MALDONADO MRN-[id number]    C/O:  51y old Female who presented with a chief complaint of inability to speak properly x1 week and headache (31 Mar 2017 03:05)  Today pt still notes left sided frontal headache. States that she does not want to take any medications as of yet. Pt is able to speak but becomes frustrated with word finding.     ROS:   +headache. no vomiting, no loss of vision, no LOC, no chest pain.   All other ROS negative.    HPI:  50 y/o female with h/o DM II, HTN, was noticed by family of inability to express herself in clear words and was leaning whenever she walked. In ED, CT brain showed a recent left parietal infarct and an old right posterior parietal infarct. Pt was c/o left sided headache with expressive aphasia .no price weakness. systolic heart murmur. hgb: 9.1 (30 Mar 2017 05:24)    HEALTH ISSUES - PROBLEM Dx:  Uncontrolled diabetes mellitus: Uncontrolled diabetes mellitus  Prophylactic measure: Prophylactic measure  Hemianopsia: Hemianopsia  Aphasia complicating stroke: Aphasia complicating stroke  Cerebrovascular accident (CVA) due to other mechanism: Cerebrovascular accident (CVA) due to other mechanism  Anemia, unspecified type: Anemia, unspecified type  Gastroesophageal reflux disease without esophagitis: Gastroesophageal reflux disease without esophagitis  Heart murmur: Heart murmur  Essential hypertension: Essential hypertension  Type 2 diabetes mellitus with complication, without long-term current use of insulin: Type 2 diabetes mellitus with complication, without long-term current use of insulin  Cerebrovascular accident (CVA) due to embolism of pre-cerebral artery: Cerebrovascular accident (CVA) due to embolism of precerebral artery    MEDICATIONS  (STANDING):  sodium chloride 0.9% lock flush 3milliLiter(s) IV Push every 8 hours  pantoprazole    Tablet 40milliGRAM(s) Oral before breakfast  atorvastatin 20milliGRAM(s) Oral at bedtime  aspirin enteric coated 325milliGRAM(s) Oral daily  dextrose 5%. 1000milliLiter(s) IV Continuous <Continuous>  dextrose 50% Injectable 12.5Gram(s) IV Push once  dextrose 50% Injectable 25Gram(s) IV Push once  dextrose 50% Injectable 25Gram(s) IV Push once  insulin lispro (HumaLOG) corrective regimen sliding scale  SubCutaneous three times a day before meals  heparin  Injectable 5000Unit(s) SubCutaneous every 8 hours  insulin glargine Injectable (LANTUS) 15Unit(s) SubCutaneous at bedtime  diphenhydrAMINE   Injectable 25milliGRAM(s) IV Push once    MEDICATIONS  (PRN):  dextrose Gel 1Dose(s) Oral once PRN Blood Glucose LESS THAN 70 milliGRAM(s)/deciliter  glucagon  Injectable 1milliGRAM(s) IntraMuscular once PRN Glucose LESS THAN 70 milligrams/deciliter  cyclobenzaprine 10milliGRAM(s) Oral three times a day PRN Muscle Spasm  acetaminophen   Tablet. 650milliGRAM(s) Oral every 6 hours PRN Mild Pain (1 - 3)  morphine  - Injectable 2milliGRAM(s) IV Push every 6 hours PRN Severe Pain (7 - 10)    LABS:   ( 01 Apr 2017 07:45 )               8.8    6.5   )-----------( 207                   31.8     137    |  100    |  9.0    ----------------------------<  298    4.1     |  28.0   |  0.59     Ca    9.1        01 Apr 2017 07:45    IMAGING:  Echo Summary:   1. Hyperdynamic left ventricle. Left ventricular ejection fraction, by   visual estimation, is >75%.   2. Spectral Doppler shows impaired relaxation pattern of left   ventricular myocardial filling (Grade I diastolic dysfunction).   3. RV systolic function is normal.   4. No significant valvular abnormalities.   5. No pericardial effusion.   6. ** No prior echocardiograms available for comparison.    MRI/MRA:  Left parietal temporal subdural acute/subacute infarct. Smaller focus of   acute/subacute infarct in the left occipital lobe.  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 Lack of flow related enhancement   is seen in the left middle cerebral artery, also suspicious for occlusion.  Chronic infarct of the right occipital lobe.    Vital Signs Last 24 Hrs  T(C): 36.9, Max: 36.9 (04-01 @ 04:43)  T(F): 98.5, Max: 98.5 (04-01 @ 12:05)  HR: 98 (69 - 98)  BP: 124/76 (111/59 - 150/57)  RR: 16 (15 - 18)  SpO2: 100% (99% - 100%)    PHYSICAL EXAM:  Constitutional: alert  Head: +facial assymetry  Respiratory: blcta, no rhonchi, no wheezing, no increased work of breathing  Cardiovascular:s1s2, rrr  Extremities: moves bilateral extremities.   Neurological: +expressive aphasia  Skin: warm, dry   Lymph Nodes:  Musculoskeletal:  Psychiatric: easily frustrated

## 2020-08-14 ENCOUNTER — TELEPHONE (OUTPATIENT)
Dept: CARDIOLOGY | Facility: HOSPITAL | Age: 85
End: 2020-08-14

## 2020-08-14 NOTE — TELEPHONE ENCOUNTER
"Pt resides at [facility] ([phone]). I contacted center re: monitor disconnected from patient. Unit secretary stated that the monitor is plugged in and says "OK" in the screen. Will continue to monitor for now.   "

## 2020-08-15 ENCOUNTER — HOSPITAL ENCOUNTER (EMERGENCY)
Facility: HOSPITAL | Age: 85
Discharge: NURSING, REHAB OR LONG-TERM CARE | End: 2020-08-15
Attending: EMERGENCY MEDICINE
Payer: MEDICARE

## 2020-08-15 VITALS
DIASTOLIC BLOOD PRESSURE: 79 MMHG | BODY MASS INDEX: 28.82 KG/M2 | TEMPERATURE: 98 F | OXYGEN SATURATION: 99 % | SYSTOLIC BLOOD PRESSURE: 175 MMHG | HEIGHT: 65 IN | RESPIRATION RATE: 18 BRPM | HEART RATE: 85 BPM | WEIGHT: 173 LBS

## 2020-08-15 DIAGNOSIS — L89.159 PRESSURE INJURY OF SKIN OF SACRAL REGION, UNSPECIFIED INJURY STAGE: ICD-10-CM

## 2020-08-15 DIAGNOSIS — M25.511 ACUTE PAIN OF RIGHT SHOULDER: ICD-10-CM

## 2020-08-15 DIAGNOSIS — S00.83XA TRAUMATIC HEMATOMA OF FOREHEAD, INITIAL ENCOUNTER: Primary | ICD-10-CM

## 2020-08-15 DIAGNOSIS — M25.561 ACUTE PAIN OF RIGHT KNEE: ICD-10-CM

## 2020-08-15 DIAGNOSIS — M25.511 RIGHT SHOULDER PAIN, UNSPECIFIED CHRONICITY: ICD-10-CM
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
## 2020-08-15 LAB — SARS-COV-2 RDRP RESP QL NAA+PROBE: NEGATIVE

## 2020-08-15 PROCEDURE — 99284 EMERGENCY DEPT VISIT MOD MDM: CPT | Mod: 25

## 2020-08-15 PROCEDURE — 96372 THER/PROPH/DIAG INJ SC/IM: CPT

## 2020-08-15 PROCEDURE — U0002 COVID-19 LAB TEST NON-CDC: HCPCS

## 2020-08-15 PROCEDURE — 63600175 PHARM REV CODE 636 W HCPCS: Performed by: EMERGENCY MEDICINE

## 2020-08-15 PROCEDURE — 25000003 PHARM REV CODE 250: Performed by: EMERGENCY MEDICINE

## 2020-08-15 RX ORDER — DEXTROMETHORPHAN HYDROBROMIDE, GUAIFENESIN 5; 100 MG/5ML; MG/5ML
650 LIQUID ORAL EVERY 8 HOURS
Status: ON HOLD | COMMUNITY
End: 2020-09-20 | Stop reason: HOSPADM

## 2020-08-15 RX ORDER — ZINC GLUCONATE 50 MG
50 TABLET ORAL DAILY
Status: ON HOLD | COMMUNITY
End: 2020-09-20 | Stop reason: HOSPADM

## 2020-08-15 RX ORDER — ACETAMINOPHEN 325 MG/1
650 TABLET ORAL
Status: COMPLETED | OUTPATIENT
Start: 2020-08-15 | End: 2020-08-15

## 2020-08-15 RX ORDER — MORPHINE SULFATE 10 MG/ML
4 INJECTION INTRAMUSCULAR; INTRAVENOUS; SUBCUTANEOUS
Status: COMPLETED | OUTPATIENT
Start: 2020-08-15 | End: 2020-08-15

## 2020-08-15 RX ADMIN — MORPHINE SULFATE 4 MG: 10 INJECTION INTRAVENOUS at 09:08

## 2020-08-15 RX ADMIN — ACETAMINOPHEN 650 MG: 325 TABLET ORAL at 08:08
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
## 2020-08-16 NOTE — ED NOTES
Old dressing removed. Wound cleaned with aloe-vesta. Wet-to-dry dressing applied under large mepilex dressing.
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
## 2020-08-16 NOTE — ED PROVIDER NOTES
Encounter Date: 8/15/2020    SCRIBE #1 NOTE: I, Jyoti Braswell, am scribing for, and in the presence of,  Riya Velez MD. I have scribed the following portions of the note - Other sections scribed: HPI, ROS, PE.       History     Chief Complaint   Patient presents with    Fall     Patient present to the ED via EMS reports the patient is from [facility] nursing she fell out the bed and placed back in bed. Pt c/ of right shoulder pain. noted a hematoma to the left eye and scratch to the the left elbow denies LOC, any other injuies AAOx4      This is an 85 y/o female with a PMHx of Hypertension, Hyperlipidemia, COPD, CHF, CAD, DVT, and Aortic Stenosis. She presents to the ED via EMS after falling out of bed PTA. [facility] reports the fall was unwitnessed, and they helped her back into bed before noticing the hematoma to her left eyebrow. Patient complains of pain to the wound on her sacrum and right shoulder. She denies any loss of consciousness. She reports she was too close to the edge of the bed before she fell. She also denies any fever or coughing. Patient is currently taking Plavix . Patient has allergies Doxycycline Hyclate, Singulair, Tuberclin, Penicillins, Ventolin, and Latex. The patient is DNR. She declines need for pain medicine.    The history is provided by the patient and the nursing home.     Review of patient's allergies indicates:   Allergen Reactions    Doxycycline hyclate Diarrhea and Nausea And Vomiting    Singulair [montelukast]      Stomach pain, Muscle pain, Cough      Tuberculin ppd Rash    Penicillins      Other reaction(s): Anaphylaxis    Ventolin [albuterol sulfate] Other (See Comments)     Severely elevated blood pressure    Latex, natural rubber Rash     Past Medical History:   Diagnosis Date    Anemia     Anticoagulant long-term use     Aortic stenosis     Arthritis     lower back    Asthma     CAD (coronary artery disease) 4/24/2015    Carpal tunnel  syndrome on both sides     Cataract     CHF (congestive heart failure) 5/2013    COPD (chronic obstructive pulmonary disease)     Depression     DVT (deep venous thrombosis)     Hyperlipidemia     Hypertension     Pulmonary HTN     TMJ (temporomandibular joint disorder)      Past Surgical History:   Procedure Laterality Date    A-V CARDIAC PACEMAKER INSERTION N/A 7/5/2018    Procedure: INSERTION, CARDIAC PACEMAKER, DUAL CHAMBER;  Surgeon: Giancarlo Houser MD;  Location: [facility] CATH LAB;  Service: Cardiology;  Laterality: N/A;    AORTIC VALVE REPLACEMENT N/A 7/5/2018    Procedure: Replacement-valve-aortic;  Surgeon: Corey Castañeda MD;  Location: [facility] CATH LAB;  Service: Cardiology;  Laterality: N/A;    BACK SURGERY      cardiac stents      CARDIAC VALVE SURGERY      CARPAL TUNNEL RELEASE      Right/Left    CHOLECYSTECTOMY  07/2017    EYE SURGERY      cataract extraction    HYSTERECTOMY      Partial    JOINT REPLACEMENT  2009    Left hip    LEFT HEART CATHETERIZATION Left 10/9/2018    Procedure: Left heart cath;  Surgeon: Tony Carmen MD;  Location: [facility] CATH LAB;  Service: Cardiology;  Laterality: Left;    POLYPECTOMY      x9    TEMPOROMANDIBULAR JOINT SURGERY       Family History   Problem Relation Age of Onset    Heart disease Mother     Hypertension Daughter     Cancer Son     Breast cancer Neg Hx     Colon cancer Neg Hx     Ovarian cancer Neg Hx     Esophageal cancer Neg Hx      Social History     Tobacco Use    Smoking status: Never Smoker    Smokeless tobacco: Never Used   Substance Use Topics    Alcohol use: No    Drug use: No     Review of Systems   Constitutional: Negative for chills and fever.   HENT: Negative for congestion and sore throat.    Eyes: Negative for visual disturbance.   Respiratory: Negative for cough and shortness of breath.    Cardiovascular: Negative for chest pain.   Gastrointestinal: Negative for abdominal pain, nausea and vomiting.    Genitourinary: Negative for dysuria and vaginal discharge.   Musculoskeletal: Positive for arthralgias.             Skin: Positive for wound (Ulcer to sanctum ). Negative for rash.   Neurological: Negative for syncope and headaches.   Psychiatric/Behavioral: Negative for decreased concentration.       Physical Exam     Initial Vitals [08/15/20 1948]   BP Pulse Resp Temp SpO2   138/63 77 18 99.8 °F (37.7 °C) 98 %      MAP       --         Physical Exam    Nursing note and vitals reviewed.  Constitutional: She appears well-developed and well-nourished. She is not diaphoretic. No distress.   HENT:   Head: Head is with contusion. Head is without raccoon's eyes and without abrasion.   Nose: No nasal septal hematoma. No epistaxis.   Mouth/Throat: Oropharynx is clear and moist. No lacerations.   Hematoma to left eyebrow   Eyes: Pupils are equal, round, and reactive to light.   Neck: Neck supple. No spinous process tenderness and no muscular tenderness present.   Cardiovascular: Normal rate and regular rhythm.   Pulses:       Radial pulses are 2+ on the right side and 2+ on the left side.   Pulmonary/Chest: Breath sounds normal.   Abdominal: Soft. There is no abdominal tenderness.   Musculoskeletal: No edema.      Right shoulder: She exhibits no swelling, no crepitus and no deformity.      Right elbow: She exhibits normal range of motion. No tenderness found.      Comments: Tenderness to right shoulder with normal range of motion  Contractures to bilateral upper and lower extremities limits MSK exam   Neurological: She is alert and oriented to person, place, and time. GCS score is 15. GCS eye subscore is 4. GCS verbal subscore is 5. GCS motor subscore is 6.   Alert and oriented x4  Light touch intact to bilateral upper extremities.   Contractures present in all 4 extremities.   Skin: Skin is warm and dry. Abrasion noted.   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         ED Course   Procedures  Labs Reviewed - No data to display       Imaging Results    None          Medical Decision Making:   Initial Assessment:   84 year old female presents after fall from bed from [facility]. She is complaining of pain to right shoulder. She is alert, oriented, has mile contractures in all 4 extremities, has hematoma to left eyebrow and small superficial abrasion to left elbow. Will get CT head to assess for ICH, XR shoulder to rule out fracture/dislocation.             Scribe Attestation:   Scribe #1: I performed the above scribed service and the documentation accurately describes the services I performed. I attest to the accuracy of the note.            ED Course as of Aug 16 1617   Sat Aug 15, 2020   2032 Patient complained of right knee pain while repositioning for xray- XR of right knee and AP pelvis added to work up.     [LH]   2213 Patient's son and daguther-in-law updated- they state they would like to speak with SW about placing patient in a different nursing home. SW consult placed.     [LH]   2233 Patient's daughter at bedside requesting COVID screen for her mom as she plans to take her out of the nursing home.    [LH]      ED Course User Index  [LH] Riya Velez MD                Clinical Impression:       ICD-10-CM ICD-9-CM   1. Traumatic hematoma of forehead, initial encounter  S00.83XA 920   2. Fall  W19.XXXA E888.9   3. Acute pain of right shoulder  M25.511 719.41             I, Riya Velez MD, personally performed the services described in this documentation. All medical record entries made by the scribe were at my direction and in my presence.  I have reviewed the chart and agree that the record reflects my personal performance and is accurate and complete.             This dictation has been generated using M-Modal Fluency Direct dictation; some phonetic errors may occur.            Riya Velez MD  08/16/20  [id number]
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
## 2020-08-24 ENCOUNTER — TELEPHONE (OUTPATIENT)
Dept: FAMILY MEDICINE | Facility: CLINIC | Age: 85
End: 2020-08-24

## 2020-08-24 NOTE — TELEPHONE ENCOUNTER
----- Message from Dora Schnieder sent at 8/24/2020 11:07 AM CDT -----  Regarding: Advice  Contact: Lesley  Type: Patient Call Back    Who called:Nancy    What is the request in detail:Nancy called to speak Dr. Camarena about patient's health and taking her out of the nursing facility. Please call to advise    Can the clinic reply by [facility]?    Would the patient rather a call back or a response via [facility]? Call    Best call back number:[phone]
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
## 2020-08-25 NOTE — TELEPHONE ENCOUNTER
Please let daughter know that I would recommend she look into other nursing facilities that would potentially be a better fit for her mom.  Given Mrs. Lyman's condition I don't think she will do well at home unless there is round the clock care.  She would need to have everything set up prior to bringing her mom home and would need that to be approved through her insurance company.  Patient can be referred to our wound center for the pressure injury, but given her physical condition she will be very prone to these ulcers.      Thanks  Dr. Camarena

## 2020-08-25 NOTE — TELEPHONE ENCOUNTER
I spoke to the pt daughter and advised of recommendations from Dr. Camarena. She verbalizes understanding and is adamant that she is bringing her Mother home. She states that there will be 24 hour care and she is looking for orders for reinsertion of the feeding tube. I advised Dr. Camarena can not write orders for the pt as he is not her attending. She was advised to talk with the facility medical director about that order. She verbalizes understanding and reports she will call Dr. Camarena back after she gets the pt home to set up an appointment.

## 2020-09-18 ENCOUNTER — HOSPITAL ENCOUNTER (OUTPATIENT)
Facility: HOSPITAL | Age: 85
Discharge: HOME | End: 2020-09-23
Attending: STUDENT IN AN ORGANIZED HEALTH CARE EDUCATION/TRAINING PROGRAM | Admitting: STUDENT IN AN ORGANIZED HEALTH CARE EDUCATION/TRAINING PROGRAM
Payer: MEDICARE

## 2020-09-18 DIAGNOSIS — R53.81 PHYSICAL DEBILITY: ICD-10-CM

## 2020-09-18 DIAGNOSIS — R79.89 ELEVATED TROPONIN: ICD-10-CM

## 2020-09-18 DIAGNOSIS — I10 ESSENTIAL HYPERTENSION: Chronic | ICD-10-CM

## 2020-09-18 DIAGNOSIS — E78.2 MIXED HYPERLIPIDEMIA: Chronic | ICD-10-CM

## 2020-09-18 DIAGNOSIS — I27.20 PULMONARY HYPERTENSION: Chronic | ICD-10-CM

## 2020-09-18 DIAGNOSIS — Z95.2 HISTORY OF AORTIC VALVE REPLACEMENT: ICD-10-CM

## 2020-09-18 DIAGNOSIS — D63.8 ANEMIA OF CHRONIC DISEASE: Chronic | ICD-10-CM

## 2020-09-18 DIAGNOSIS — L89.154 PRESSURE INJURY OF SACRAL REGION, STAGE 4: Primary | ICD-10-CM

## 2020-09-18 DIAGNOSIS — R07.9 CHEST PAIN: ICD-10-CM

## 2020-09-18 DIAGNOSIS — Z74.01 BEDBOUND: ICD-10-CM

## 2020-09-18 DIAGNOSIS — I50.9 HEART FAILURE: ICD-10-CM

## 2020-09-18 DIAGNOSIS — L89.154 SACRAL DECUBITUS ULCER, STAGE IV: ICD-10-CM

## 2020-09-18 DIAGNOSIS — L89.154 PRESSURE INJURY OF SACRAL REGION, STAGE 4: ICD-10-CM

## 2020-09-18 DIAGNOSIS — R53.81 DEBILITY: ICD-10-CM

## 2020-09-18 LAB
ALBUMIN SERPL BCP-MCNC: 2.5 G/DL (ref 3.5–5.2)
ALP SERPL-CCNC: 90 U/L (ref 55–135)
ALT SERPL W/O P-5'-P-CCNC: 9 U/L (ref 10–44)
ANION GAP SERPL CALC-SCNC: 12 MMOL/L (ref 8–16)
AST SERPL-CCNC: 12 U/L (ref 10–40)
BASOPHILS # BLD AUTO: 0.04 K/UL (ref 0–0.2)
BASOPHILS NFR BLD: 0.6 % (ref 0–1.9)
BILIRUB SERPL-MCNC: 0.3 MG/DL (ref 0.1–1)
BNP SERPL-MCNC: 2092 PG/ML (ref 0–99)
BUN SERPL-MCNC: 24 MG/DL (ref 8–23)
CALCIUM SERPL-MCNC: 8.8 MG/DL (ref 8.7–10.5)
CHLORIDE SERPL-SCNC: 106 MMOL/L (ref 95–110)
CO2 SERPL-SCNC: 24 MMOL/L (ref 23–29)
CREAT SERPL-MCNC: 1 MG/DL (ref 0.5–1.4)
CRP SERPL-MCNC: 93.4 MG/L (ref 0–8.2)
DIFFERENTIAL METHOD: ABNORMAL
EOSINOPHIL # BLD AUTO: 0.3 K/UL (ref 0–0.5)
EOSINOPHIL NFR BLD: 3.6 % (ref 0–8)
ERYTHROCYTE [DISTWIDTH] IN BLOOD BY AUTOMATED COUNT: 16.4 % (ref 11.5–14.5)
EST. GFR  (AFRICAN AMERICAN): >60 ML/MIN/1.73 M^2
EST. GFR  (NON AFRICAN AMERICAN): 53 ML/MIN/1.73 M^2
GLUCOSE SERPL-MCNC: 116 MG/DL (ref 70–110)
HCT VFR BLD AUTO: 30.3 % (ref 37–48.5)
HGB BLD-MCNC: 9.3 G/DL (ref 12–16)
IMM GRANULOCYTES # BLD AUTO: 0.02 K/UL (ref 0–0.04)
IMM GRANULOCYTES NFR BLD AUTO: 0.3 % (ref 0–0.5)
LACTATE SERPL-SCNC: 1.2 MMOL/L (ref 0.5–2.2)
LYMPHOCYTES # BLD AUTO: 1.8 K/UL (ref 1–4.8)
LYMPHOCYTES NFR BLD: 25.1 % (ref 18–48)
MCH RBC QN AUTO: 27.8 PG (ref 27–31)
MCHC RBC AUTO-ENTMCNC: 30.7 G/DL (ref 32–36)
MCV RBC AUTO: 91 FL (ref 82–98)
MONOCYTES # BLD AUTO: 0.6 K/UL (ref 0.3–1)
MONOCYTES NFR BLD: 8.2 % (ref 4–15)
NEUTROPHILS # BLD AUTO: 4.5 K/UL (ref 1.8–7.7)
NEUTROPHILS NFR BLD: 62.2 % (ref 38–73)
NRBC BLD-RTO: 0 /100 WBC
PLATELET # BLD AUTO: 347 K/UL (ref 150–350)
PMV BLD AUTO: 10.4 FL (ref 9.2–12.9)
POTASSIUM SERPL-SCNC: 4.1 MMOL/L (ref 3.5–5.1)
PROT SERPL-MCNC: 6.9 G/DL (ref 6–8.4)
RBC # BLD AUTO: 3.34 M/UL (ref 4–5.4)
SARS-COV-2 RDRP RESP QL NAA+PROBE: NEGATIVE
SODIUM SERPL-SCNC: 142 MMOL/L (ref 136–145)
TROPONIN I SERPL DL<=0.01 NG/ML-MCNC: 0.15 NG/ML (ref 0–0.03)
WBC # BLD AUTO: 7.21 K/UL (ref 3.9–12.7)

## 2020-09-18 PROCEDURE — U0002 COVID-19 LAB TEST NON-CDC: HCPCS

## 2020-09-18 PROCEDURE — 93010 EKG 12-LEAD: ICD-10-PCS | Mod: ,,, | Performed by: INTERNAL MEDICINE

## 2020-09-18 PROCEDURE — 83605 ASSAY OF LACTIC ACID: CPT

## 2020-09-18 PROCEDURE — 25000003 PHARM REV CODE 250: Performed by: STUDENT IN AN ORGANIZED HEALTH CARE EDUCATION/TRAINING PROGRAM

## 2020-09-18 PROCEDURE — 85025 COMPLETE CBC W/AUTO DIFF WBC: CPT

## 2020-09-18 PROCEDURE — 83880 ASSAY OF NATRIURETIC PEPTIDE: CPT

## 2020-09-18 PROCEDURE — 99292 CRITICAL CARE ADDL 30 MIN: CPT

## 2020-09-18 PROCEDURE — 87040 BLOOD CULTURE FOR BACTERIA: CPT

## 2020-09-18 PROCEDURE — 80053 COMPREHEN METABOLIC PANEL: CPT

## 2020-09-18 PROCEDURE — 99291 CRITICAL CARE FIRST HOUR: CPT

## 2020-09-18 PROCEDURE — 93005 ELECTROCARDIOGRAM TRACING: CPT

## 2020-09-18 PROCEDURE — 84484 ASSAY OF TROPONIN QUANT: CPT

## 2020-09-18 PROCEDURE — 93010 ELECTROCARDIOGRAM REPORT: CPT | Mod: ,,, | Performed by: INTERNAL MEDICINE

## 2020-09-18 PROCEDURE — 86140 C-REACTIVE PROTEIN: CPT

## 2020-09-18 RX ORDER — ASPIRIN 325 MG
325 TABLET ORAL
Status: COMPLETED | OUTPATIENT
Start: 2020-09-18 | End: 2020-09-18

## 2020-09-18 RX ADMIN — ASPIRIN 325 MG ORAL TABLET 325 MG: 325 PILL ORAL at 06:09

## 2020-09-18 NOTE — ED TRIAGE NOTES
Pt presented to ER via EMS WITH abnormal labs. Pt reports leg pain, from [facility]. Pt appears somewhat confused and states that she had SOB for a long time.

## 2020-09-18 NOTE — ED PROVIDER NOTES
"Encounter Date: 9/18/2020    SCRIBE #1 NOTE: I, Radha Ahumada, am scribing for, and in the presence of,  Jamila Delcid DO. I have scribed the following portions of the note - Other sections scribed: HPI, ROS, PE, EKG.       History     Chief Complaint   Patient presents with    Abnormal Lab     EMS reports pt from [facility] had labs drawn yesterday, today staff notified of abnormal troponin and BNP with decreased urine output.      Cindy Lyman is a 84 y.o. female, with a PMHx of aortic stenosis status post TVAR, pulmonary HTN, CHF, CAD, asthma, and hypertension, who presents to the ED via EMS transport with abnormal troponin and BMP results and decreased urine output today. EMS reports patient was at [facility] and had lab work done 1 day ago, resulting a troponin of 0.093, a CRP of 7.6, and BNP of 2770. EMS also reports pt has congestion. She appears to be confused since she said "I was kicked out from [facility], I live at home". She is oriented to person and time. Pt has baseline shortness of breath, but no other complaints at this time. Pt's daughter notes Hx of inadequate care and multiple falls at [facility] for the last several months, and has a worsening sacral ulcer. Pt's daughter notes she is the power of  and is prepared to withdraw pt from [facility] once pt has her peg tube replaced. Denies chest pain, nausea, vomiting, abdominal pain, or urine catheter use. No other associated symptoms. Patient is a DNR.     The history is provided by the patient, the EMS personnel, a significant other and the nursing home (daughter).     Review of patient's allergies indicates:   Allergen Reactions    Doxycycline hyclate Diarrhea and Nausea And Vomiting    Singulair [montelukast]      Stomach pain, Muscle pain, Cough      Tuberculin ppd Rash    Penicillins      Other reaction(s): Anaphylaxis    Ventolin [albuterol sulfate] Other (See Comments)     Severely elevated blood " pressure    Latex, natural rubber Rash     Past Medical History:   Diagnosis Date    Anemia     Anticoagulant long-term use     Aortic stenosis     Arthritis     lower back    Asthma     CAD (coronary artery disease) 4/24/2015    Carpal tunnel syndrome on both sides     Cataract     CHF (congestive heart failure) 5/2013    COPD (chronic obstructive pulmonary disease)     Depression     DVT (deep venous thrombosis)     Hyperlipidemia     Hypertension     Pulmonary HTN     TMJ (temporomandibular joint disorder)      Past Surgical History:   Procedure Laterality Date    A-V CARDIAC PACEMAKER INSERTION N/A 7/5/2018    Procedure: INSERTION, CARDIAC PACEMAKER, DUAL CHAMBER;  Surgeon: Giancarlo Houser MD;  Location: [facility] CATH LAB;  Service: Cardiology;  Laterality: N/A;    AORTIC VALVE REPLACEMENT N/A 7/5/2018    Procedure: Replacement-valve-aortic;  Surgeon: Corey Castañeda MD;  Location: [facility] CATH LAB;  Service: Cardiology;  Laterality: N/A;    BACK SURGERY      cardiac stents      CARDIAC VALVE SURGERY      CARPAL TUNNEL RELEASE      Right/Left    CHOLECYSTECTOMY  07/2017    EYE SURGERY      cataract extraction    HYSTERECTOMY      Partial    JOINT REPLACEMENT  2009    Left hip    LEFT HEART CATHETERIZATION Left 10/9/2018    Procedure: Left heart cath;  Surgeon: Tony Carmen MD;  Location: [facility] CATH LAB;  Service: Cardiology;  Laterality: Left;    POLYPECTOMY      x9    TEMPOROMANDIBULAR JOINT SURGERY       Family History   Problem Relation Age of Onset    Heart disease Mother     Hypertension Daughter     Cancer Son     Breast cancer Neg Hx     Colon cancer Neg Hx     Ovarian cancer Neg Hx     Esophageal cancer Neg Hx      Social History     Tobacco Use    Smoking status: Never Smoker    Smokeless tobacco: Never Used   Substance Use Topics    Alcohol use: No    Drug use: No     Review of Systems   Constitutional: Negative for appetite change, chills and fever.   HENT:  Positive for congestion. Negative for drooling, facial swelling and sore throat.    Eyes: Negative for redness and visual disturbance.   Respiratory: Negative for cough and shortness of breath.    Cardiovascular: Negative for chest pain.   Gastrointestinal: Negative for abdominal pain, diarrhea, nausea and vomiting.   Genitourinary: Positive for decreased urine volume. Negative for dysuria.   Musculoskeletal: Negative for back pain.   Skin: Positive for wound. Negative for rash.   Neurological: Negative for seizures, syncope, facial asymmetry, speech difficulty, weakness and headaches.   Hematological: Does not bruise/bleed easily.   Psychiatric/Behavioral: Positive for confusion.       Physical Exam     Initial Vitals [09/18/20 1816]   BP Pulse Resp Temp SpO2   118/76 70 (!) 22 98.3 °F (36.8 °C) 97 %      MAP       --         Physical Exam    Nursing note and vitals reviewed.  Constitutional: She is not diaphoretic.  Non-toxic appearance. She does not appear ill.   Elderly female.    HENT:   Head: Normocephalic and atraumatic.   Right Ear: External ear normal.   Left Ear: External ear normal.   Eyes: Conjunctivae and EOM are normal. Pupils are equal, round, and reactive to light. No scleral icterus.   Neck: Normal range of motion. Neck supple. No thyromegaly present. No JVD present.   Cardiovascular: Normal rate and regular rhythm. Exam reveals no gallop and no friction rub.    No murmur heard.  Pulmonary/Chest: No respiratory distress.   Coarse breath sounds in bilateral bases.    Abdominal: Soft. Bowel sounds are normal. There is no abdominal tenderness. There is no rebound and no guarding.   Stage 4 decubitus ulcer 5x4 cm.    Musculoskeletal: Normal range of motion. No tenderness or edema.   Neurological: She is alert. She has normal strength. GCS eye subscore is 4. GCS verbal subscore is 4. GCS motor subscore is 6.   GCS 14 due to confusion.    Skin: Skin is warm and dry. Capillary refill takes less than 2  seconds. No rash noted.   Psychiatric: She has a normal mood and affect. Thought content normal.         ED Course   Critical Care    Date/Time: 9/18/2020 11:57 PM  Performed by: Jamila Delcid DO  Authorized by: Jamila Delcid DO   Direct patient critical care time: 15 minutes  Additional history critical care time: 15 minutes  Ordering / reviewing critical care time: 10 minutes  Documentation critical care time: 15 minutes  Consulting other physicians critical care time: 10 minutes  Consult with family critical care time: 10 minutes  Total critical care time (exclusive of procedural time) : 75 minutes  Critical care time was exclusive of separately billable procedures and treating other patients.  Critical care was necessary to treat or prevent imminent or life-threatening deterioration of the following conditions: cardiac failure.  Critical care was time spent personally by me on the following activities: obtaining history from patient or surrogate, ordering and review of laboratory studies, review of old charts, development of treatment plan with patient or surrogate and examination of patient.        Labs Reviewed   CBC W/ AUTO DIFFERENTIAL - Abnormal; Notable for the following components:       Result Value    RBC 3.34 (*)     Hemoglobin 9.3 (*)     Hematocrit 30.3 (*)     Mean Corpuscular Hemoglobin Conc 30.7 (*)     RDW 16.4 (*)     All other components within normal limits   COMPREHENSIVE METABOLIC PANEL - Abnormal; Notable for the following components:    Glucose 116 (*)     BUN, Bld 24 (*)     Albumin 2.5 (*)     ALT 9 (*)     eGFR if non  53 (*)     All other components within normal limits    Narrative:     Recoll. [id number] by LM1 at 09/18/2020 19:23, reason:   HEMOLYZED/DISCARDED/JANE   TROPONIN I - Abnormal; Notable for the following components:    Troponin I 0.155 (*)     All other components within normal limits    Narrative:     Recoll. [id number] by  LM1 at 09/18/2020 19:23, reason:   HEMOLYZED/DISCARDED/JANE   B-TYPE NATRIURETIC PEPTIDE - Abnormal; Notable for the following components:    BNP 2,092 (*)     All other components within normal limits    Narrative:     Recoll. [id number] by LM1 at 09/18/2020 19:23, reason:   HEMOLYZED/DISCARDED/JANE   C-REACTIVE PROTEIN - Abnormal; Notable for the following components:    CRP 93.4 (*)     All other components within normal limits    Narrative:     Recoll. [id number] by LM1 at 09/18/2020 19:23, reason:   HEMOLYZED/DISCARDED/JANE   CULTURE, BLOOD   CULTURE, BLOOD   SARS-COV-2 RNA AMPLIFICATION, QUAL   LACTIC ACID, PLASMA    Narrative:     Recoll. [id number] by LM1 at 09/18/2020 19:23, reason:   HEMOLYZED/DISCARDED/JANE   TROPONIN I   URINALYSIS, REFLEX TO URINE CULTURE     EKG Readings: (Independently Interpreted)   Rhythm: Paced Rhythm. Heart Rate: 70 bpm. Axis: Left Axis Deviation. Clinical Impression: with LBBB   Appears new from last EKG in August.        Imaging Results          X-Ray Chest AP Portable (Final result)  Result time 09/18/20 19:30:53    Final result by Malik Valverde MD (09/18/20 19:30:53)                 Impression:      Stable chest with no evidence of acute pulmonary disease.      Electronically signed by: Malik Valverde  Date:    09/18/2020  Time:    19:30             Narrative:    EXAMINATION:  XR CHEST AP PORTABLE    CLINICAL HISTORY:  Chest Pain;    TECHNIQUE:  Single frontal view of the chest was performed.    COMPARISON:  Chest x-ray, 04/13/2019    FINDINGS:  Aortic TAVR is again noted.  Twin lead pacemaker appear stable with intact leads.  The elevation of the left hemidiaphragm is stable.  The lungs remain clear.                                 Medical Decision Making:   History:   I obtained history from: EMS provider and someone other than patient.  Old Medical Records: I decided to obtain old medical records.  Old Records Summarized: other records.  Initial  Assessment:   8  Independently Interpreted Test(s):   I have ordered and independently interpreted EKG Reading(s) - see prior notes  Clinical Tests:   Lab Tests: Ordered and Reviewed  Radiological Study: Ordered and Reviewed  Medical Tests: Ordered and Reviewed  ED Management:   [facility]  This is an emergent evaluation of a 84 y.o. female, with a PMHx of aortic stenosis status post TVAR, pulmonary HTN, CHF, CAD, asthma, and hypertension, who presents to the ED via EMS from [facility] with abnormal troponin and BMP results on labs obtained yesterday and decreased urine output today.  Initial vitals with respirations of 20 and remainder of vitals within normal limits. Physical exam notable for a nontoxic-appearing elderly female. Lungs with coarse breath sounds in bilateral bases. 5 x 4 cm stage four sacral ulcer. GCS 14 with no focal neurologic deficits, GUADARRAMA. DDx includes but is not limited to ACS, sepsis due to bacteremia vs UTI, viral illness vs COVID.  Given history and presentation, a chest pain work-up was obtained, including COVID screening, CRP, lactic acid, and blood cultures. Work-up remarkable for a CMP with Albumin of 2.5, troponin of 0.155, BNP 2,092, CRP 93.4. CBC with Hgb of 9.3, which is stable when compared to previous. CXR shows no acute abnormalities, with pacemaker in place and elevated left hemidiaphragm. Patient was treated with  mg. Will admit to observation and plan to consult cardiology if troponin continue to trend up. Plan discussed with daughter, Radha who is aware and agreeable to the plan. Will discuss with hospital medicine for admission.     Jamila Delcid D.O  EMERGENCY MEDICINE  11:38 PM 09/18/2020    Other:   I have discussed this case with another health care provider.            Scribe Attestation:   Scribe #1: I performed the above scribed service and the documentation accurately describes the services I performed. I attest to the accuracy of the  note.            ED Course as of Sep 19 0051   Fri Sep 18, 2020   1904 Patient discussed with Dr. Carmen, Cardiology, who states he will look at EKG. However, feels patient will likely only need troponin trended and official cardiology consult if trending upward.     [AC]   1950 Spoke with Eil Burton LPN at [facility], who states no other issues other than what was given in EMS report. She states that patient did have a fall at the nursing facility 3 weeks ago and has been on scheduled narcotics since then. However, she states that patient has been at baseline mental status and no recent falls reported.     [AC]   2028 Discussed delays in lab results with nursing staff. RN reports initial labs hemolyzed and lab to come re-draw. She was in the process of calling lab to get an ETA.     [AC]   Sat Sep 19, 2020   0011 Patient discussed with Belia HUI, and admitted to observation. She remained hemodynamically stable while in the ED.     [AC]      ED Course User Index  [AC] Jamila Delcid DO            Clinical Impression:     ICD-10-CM ICD-9-CM   1. Elevated troponin  R79.89 790.6   2. Chest pain  R07.9 786.50   3. Sacral decubitus ulcer, stage IV  L89.154 707.03     707.24   4. History of aortic valve replacement  Z95.2 V43.3                          ED Disposition Condition    Observation                I, Jamila Delcid DO, personally performed the services described in this documentation. All medical record entries made by the scribe were at my direction and in my presence. I have reviewed the chart and agree that the record reflects my personal performance and is accurate and complete.             Jamila Delcid DO  09/19/20 0011       Jamila Delcid DO  09/19/20 0052

## 2020-09-19 PROBLEM — R79.89 ELEVATED TROPONIN: Status: ACTIVE | Noted: 2020-09-19

## 2020-09-19 PROBLEM — I50.20 SYSTOLIC CONGESTIVE HEART FAILURE: Status: ACTIVE | Noted: 2020-09-19

## 2020-09-19 PROBLEM — L89.154 PRESSURE INJURY OF SACRAL REGION, STAGE 4: Status: ACTIVE | Noted: 2020-09-19

## 2020-09-19 LAB
ALBUMIN SERPL BCP-MCNC: 2.3 G/DL (ref 3.5–5.2)
ALP SERPL-CCNC: 90 U/L (ref 55–135)
ALT SERPL W/O P-5'-P-CCNC: 7 U/L (ref 10–44)
ANION GAP SERPL CALC-SCNC: 8 MMOL/L (ref 8–16)
AST SERPL-CCNC: 12 U/L (ref 10–40)
AV INDEX (PROSTH): 0.43
AV MEAN GRADIENT: 8 MMHG
AV PEAK GRADIENT: 16 MMHG
AV VALVE AREA: 1.26 CM2
AV VELOCITY RATIO: 0.45
BASOPHILS # BLD AUTO: 0.04 K/UL (ref 0–0.2)
BASOPHILS NFR BLD: 0.5 % (ref 0–1.9)
BILIRUB SERPL-MCNC: 0.3 MG/DL (ref 0.1–1)
BSA FOR ECHO PROCEDURE: 1.9 M2
BUN SERPL-MCNC: 24 MG/DL (ref 8–23)
CALCIUM SERPL-MCNC: 8.8 MG/DL (ref 8.7–10.5)
CHLORIDE SERPL-SCNC: 108 MMOL/L (ref 95–110)
CHOLEST SERPL-MCNC: 190 MG/DL (ref 120–199)
CHOLEST/HDLC SERPL: 5.8 {RATIO} (ref 2–5)
CO2 SERPL-SCNC: 25 MMOL/L (ref 23–29)
CREAT SERPL-MCNC: 0.9 MG/DL (ref 0.5–1.4)
CV ECHO LV RWT: 0.47 CM
DIFFERENTIAL METHOD: ABNORMAL
DOP CALC AO PEAK VEL: 2.01 M/S
DOP CALC AO VTI: 39.97 CM
DOP CALC LVOT AREA: 2.9 CM2
DOP CALC LVOT DIAMETER: 1.93 CM
DOP CALC LVOT PEAK VEL: 0.91 M/S
DOP CALC LVOT STROKE VOLUME: 50.38 CM3
DOP CALCLVOT PEAK VEL VTI: 17.23 CM
E WAVE DECELERATION TIME: 403.07 MSEC
E/A RATIO: 0.57
ECHO LV POSTERIOR WALL: 1.06 CM (ref 0.6–1.1)
EOSINOPHIL # BLD AUTO: 0.4 K/UL (ref 0–0.5)
EOSINOPHIL NFR BLD: 4.7 % (ref 0–8)
ERYTHROCYTE [DISTWIDTH] IN BLOOD BY AUTOMATED COUNT: 16.2 % (ref 11.5–14.5)
ERYTHROCYTE [SEDIMENTATION RATE] IN BLOOD BY WESTERGREN METHOD: 55 MM/HR (ref 0–20)
EST. GFR  (AFRICAN AMERICAN): >60 ML/MIN/1.73 M^2
EST. GFR  (NON AFRICAN AMERICAN): 60 ML/MIN/1.73 M^2
ESTIMATED AVG GLUCOSE: 88 MG/DL (ref 68–131)
FRACTIONAL SHORTENING: 17 % (ref 28–44)
GLUCOSE SERPL-MCNC: 105 MG/DL (ref 70–110)
HBA1C MFR BLD HPLC: 4.7 % (ref 4–5.6)
HCT VFR BLD AUTO: 31.5 % (ref 37–48.5)
HDLC SERPL-MCNC: 33 MG/DL (ref 40–75)
HDLC SERPL: 17.4 % (ref 20–50)
HGB BLD-MCNC: 9.7 G/DL (ref 12–16)
IMM GRANULOCYTES # BLD AUTO: 0.02 K/UL (ref 0–0.04)
IMM GRANULOCYTES NFR BLD AUTO: 0.2 % (ref 0–0.5)
INTERVENTRICULAR SEPTUM: 1.31 CM (ref 0.6–1.1)
IVRT: 87.66 MSEC
LA MAJOR: 4.82 CM
LA MINOR: 5.04 CM
LA WIDTH: 4.15 CM
LDLC SERPL CALC-MCNC: 130.6 MG/DL (ref 63–159)
LEFT ATRIUM SIZE: 3.44 CM
LEFT ATRIUM VOLUME INDEX: 33.8 ML/M2
LEFT ATRIUM VOLUME: 59.79 CM3
LEFT INTERNAL DIMENSION IN SYSTOLE: 3.76 CM (ref 2.1–4)
LEFT VENTRICLE DIASTOLIC VOLUME INDEX: 53.06 ML/M2
LEFT VENTRICLE DIASTOLIC VOLUME: 93.83 ML
LEFT VENTRICLE MASS INDEX: 111 G/M2
LEFT VENTRICLE SYSTOLIC VOLUME INDEX: 34.2 ML/M2
LEFT VENTRICLE SYSTOLIC VOLUME: 60.54 ML
LEFT VENTRICULAR INTERNAL DIMENSION IN DIASTOLE: 4.53 CM (ref 3.5–6)
LEFT VENTRICULAR MASS: 196.58 G
LV LATERAL E/E' RATIO: 23.25 M/S
LYMPHOCYTES # BLD AUTO: 2.5 K/UL (ref 1–4.8)
LYMPHOCYTES NFR BLD: 30.5 % (ref 18–48)
MCH RBC QN AUTO: 28 PG (ref 27–31)
MCHC RBC AUTO-ENTMCNC: 30.8 G/DL (ref 32–36)
MCV RBC AUTO: 91 FL (ref 82–98)
MONOCYTES # BLD AUTO: 0.6 K/UL (ref 0.3–1)
MONOCYTES NFR BLD: 7 % (ref 4–15)
MV PEAK A VEL: 1.62 M/S
MV PEAK E VEL: 0.93 M/S
MV STENOSIS PRESSURE HALF TIME: 116.89 MS
MV VALVE AREA P 1/2 METHOD: 1.88 CM2
NEUTROPHILS # BLD AUTO: 4.7 K/UL (ref 1.8–7.7)
NEUTROPHILS NFR BLD: 57.1 % (ref 38–73)
NONHDLC SERPL-MCNC: 157 MG/DL
NRBC BLD-RTO: 0 /100 WBC
PISA TR MAX VEL: 3.18 M/S
PLATELET # BLD AUTO: 290 K/UL (ref 150–350)
PMV BLD AUTO: 10.6 FL (ref 9.2–12.9)
POTASSIUM SERPL-SCNC: 4.2 MMOL/L (ref 3.5–5.1)
PROT SERPL-MCNC: 6.5 G/DL (ref 6–8.4)
PV PEAK VELOCITY: 0.58 CM/S
RA MAJOR: 3.76 CM
RA PRESSURE: 3 MMHG
RA WIDTH: 3.01 CM
RBC # BLD AUTO: 3.47 M/UL (ref 4–5.4)
RIGHT VENTRICULAR END-DIASTOLIC DIMENSION: 3.42 CM
SODIUM SERPL-SCNC: 141 MMOL/L (ref 136–145)
TDI LATERAL: 0.04 M/S
TR MAX PG: 40 MMHG
TRICUSPID ANNULAR PLANE SYSTOLIC EXCURSION: 1.66 CM
TRIGL SERPL-MCNC: 132 MG/DL (ref 30–150)
TROPONIN I SERPL DL<=0.01 NG/ML-MCNC: 0.08 NG/ML (ref 0–0.03)
TROPONIN I SERPL DL<=0.01 NG/ML-MCNC: 0.11 NG/ML (ref 0–0.03)
TSH SERPL DL<=0.005 MIU/L-ACNC: 0.67 UIU/ML (ref 0.4–4)
TV REST PULMONARY ARTERY PRESSURE: 43 MMHG
WBC # BLD AUTO: 8.29 K/UL (ref 3.9–12.7)

## 2020-09-19 PROCEDURE — 96374 THER/PROPH/DIAG INJ IV PUSH: CPT | Mod: 59 | Performed by: STUDENT IN AN ORGANIZED HEALTH CARE EDUCATION/TRAINING PROGRAM

## 2020-09-19 PROCEDURE — G0378 HOSPITAL OBSERVATION PER HR: HCPCS

## 2020-09-19 PROCEDURE — 80061 LIPID PANEL: CPT

## 2020-09-19 PROCEDURE — 85025 COMPLETE CBC W/AUTO DIFF WBC: CPT

## 2020-09-19 PROCEDURE — 84484 ASSAY OF TROPONIN QUANT: CPT

## 2020-09-19 PROCEDURE — 63600175 PHARM REV CODE 636 W HCPCS: Performed by: NURSE PRACTITIONER

## 2020-09-19 PROCEDURE — 83036 HEMOGLOBIN GLYCOSYLATED A1C: CPT

## 2020-09-19 PROCEDURE — 84443 ASSAY THYROID STIM HORMONE: CPT

## 2020-09-19 PROCEDURE — 80053 COMPREHEN METABOLIC PANEL: CPT

## 2020-09-19 PROCEDURE — 36415 COLL VENOUS BLD VENIPUNCTURE: CPT

## 2020-09-19 PROCEDURE — 84484 ASSAY OF TROPONIN QUANT: CPT | Mod: 91

## 2020-09-19 PROCEDURE — 85652 RBC SED RATE AUTOMATED: CPT

## 2020-09-19 PROCEDURE — 25000003 PHARM REV CODE 250: Performed by: NURSE PRACTITIONER

## 2020-09-19 RX ORDER — IBUPROFEN 200 MG
16 TABLET ORAL
Status: DISCONTINUED | OUTPATIENT
Start: 2020-09-19 | End: 2020-09-23 | Stop reason: HOSPADM

## 2020-09-19 RX ORDER — SODIUM CHLORIDE 0.9 % (FLUSH) 0.9 %
10 SYRINGE (ML) INJECTION
Status: DISCONTINUED | OUTPATIENT
Start: 2020-09-19 | End: 2020-09-23 | Stop reason: HOSPADM

## 2020-09-19 RX ORDER — ASCORBIC ACID 500 MG/5ML
500 SYRUP ORAL DAILY
Status: DISCONTINUED | OUTPATIENT
Start: 2020-09-19 | End: 2020-09-19

## 2020-09-19 RX ORDER — CLONIDINE HYDROCHLORIDE 0.1 MG/1
0.3 TABLET ORAL 3 TIMES DAILY
Status: DISCONTINUED | OUTPATIENT
Start: 2020-09-19 | End: 2020-09-23 | Stop reason: HOSPADM

## 2020-09-19 RX ORDER — IBUPROFEN 200 MG
24 TABLET ORAL
Status: DISCONTINUED | OUTPATIENT
Start: 2020-09-19 | End: 2020-09-23 | Stop reason: HOSPADM

## 2020-09-19 RX ORDER — GLUCAGON 1 MG
1 KIT INJECTION
Status: DISCONTINUED | OUTPATIENT
Start: 2020-09-19 | End: 2020-09-23 | Stop reason: HOSPADM

## 2020-09-19 RX ORDER — FUROSEMIDE 10 MG/ML
40 INJECTION INTRAMUSCULAR; INTRAVENOUS ONCE
Status: COMPLETED | OUTPATIENT
Start: 2020-09-19 | End: 2020-09-19

## 2020-09-19 RX ORDER — METOPROLOL TARTRATE 50 MG/1
100 TABLET ORAL 2 TIMES DAILY
Status: DISCONTINUED | OUTPATIENT
Start: 2020-09-19 | End: 2020-09-23 | Stop reason: HOSPADM

## 2020-09-19 RX ORDER — SENNOSIDES 8.6 MG/1
1 TABLET ORAL DAILY
Status: DISCONTINUED | OUTPATIENT
Start: 2020-09-19 | End: 2020-09-23 | Stop reason: HOSPADM

## 2020-09-19 RX ORDER — ASPIRIN 81 MG/1
81 TABLET ORAL DAILY
Status: DISCONTINUED | OUTPATIENT
Start: 2020-09-19 | End: 2020-09-23 | Stop reason: HOSPADM

## 2020-09-19 RX ORDER — ACETAMINOPHEN 500 MG
500 TABLET ORAL EVERY 6 HOURS PRN
Status: DISCONTINUED | OUTPATIENT
Start: 2020-09-19 | End: 2020-09-23 | Stop reason: HOSPADM

## 2020-09-19 RX ORDER — FUROSEMIDE 40 MG/1
40 TABLET ORAL DAILY
Status: DISCONTINUED | OUTPATIENT
Start: 2020-09-19 | End: 2020-09-23 | Stop reason: HOSPADM

## 2020-09-19 RX ORDER — PANTOPRAZOLE SODIUM 40 MG/1
40 TABLET, DELAYED RELEASE ORAL DAILY
Status: DISCONTINUED | OUTPATIENT
Start: 2020-09-19 | End: 2020-09-23 | Stop reason: HOSPADM

## 2020-09-19 RX ORDER — CLOPIDOGREL BISULFATE 75 MG/1
75 TABLET ORAL DAILY
Status: DISCONTINUED | OUTPATIENT
Start: 2020-09-19 | End: 2020-09-23 | Stop reason: HOSPADM

## 2020-09-19 RX ADMIN — PANTOPRAZOLE SODIUM 40 MG: 40 TABLET, DELAYED RELEASE ORAL at 09:09

## 2020-09-19 RX ADMIN — ASPIRIN 81 MG: 81 TABLET, COATED ORAL at 09:09

## 2020-09-19 RX ADMIN — METOPROLOL TARTRATE 100 MG: 50 TABLET, FILM COATED ORAL at 09:09

## 2020-09-19 RX ADMIN — CLONIDINE HYDROCHLORIDE 0.3 MG: 0.1 TABLET ORAL at 09:09

## 2020-09-19 RX ADMIN — ACETAMINOPHEN 500 MG: 500 TABLET ORAL at 11:09

## 2020-09-19 RX ADMIN — CLOPIDOGREL 75 MG: 75 TABLET, FILM COATED ORAL at 09:09

## 2020-09-19 RX ADMIN — CLONIDINE HYDROCHLORIDE 0.3 MG: 0.1 TABLET ORAL at 03:09

## 2020-09-19 RX ADMIN — SENNOSIDES 1 TABLET: 8.6 TABLET, FILM COATED ORAL at 09:09

## 2020-09-19 RX ADMIN — FUROSEMIDE 40 MG: 40 TABLET ORAL at 09:09

## 2020-09-19 RX ADMIN — FUROSEMIDE 40 MG: 10 INJECTION, SOLUTION INTRAVENOUS at 05:09

## 2020-09-19 NOTE — CARE UPDATE
I agree with Zaina Celaya NP assessment and plan. Mrs. Lyman is a 83 yo female who was transferred to [facility] for elevated BNP. Denies chest pain or shortness of breath. EKG NSR LBBB 70 (not new) and elevated troponin trend flat pattern. On exam, no volume excess. Lasix given in ED x 1. Incontinence. Main complaint is right hip pain. Will get x ray hip. Turn q 2 hrs. Wound care for stage 4. Will discuss with family goals of care. Code status -DNR. 2 d echo.  Please see HP for full plan of care.     Patricia Celaya NP  Staff: Grupo Wharton MD

## 2020-09-19 NOTE — ASSESSMENT & PLAN NOTE
Denies SOB.  Echo 9/20/19 with EF 25%.  BNP elevated outpatient 2770.  BNP 2000 today. Chest xray no acute process. Lungs with rale. No leg edema. Home med with Lasix    - Lasix IV  - Echo pending   - Strict I/Os  - Daily weight

## 2020-09-19 NOTE — ASSESSMENT & PLAN NOTE
Chest pain free. Elevated troponin 0.09 and BNP 2770 outpatient. Initial Trop today 0.15 and trended down 0.07 second set. BNP 2092. Chest xray no acute process. Lungs with rale. No leg edema. EKG 70 NSR LBBB. Echo 9/20/19 EF 25% indeterminate DD    - Trend Troponin  - Echo pending  - Telemetry  - IV lasix  - Continue ASA/Plavix/BB  - Consider Cardiology consult if troponin trending up

## 2020-09-19 NOTE — H&P
[facility] Medicine  History & Physical    Patient Name: Cindy Lyman  MRN: [id number]  Admission Date: 2020  Attending Physician: Grupo Wharton MD   Primary Care Provider: Rodger Camarena MD         Patient information was obtained from patient and ER records.     Subjective:     Principal Problem:Elevated troponin    Chief Complaint:   Chief Complaint   Patient presents with    Abnormal Lab     EMS reports pt from [facility] had labs drawn yesterday, today staff notified of abnormal troponin and BNP with decreased urine output.         HPI: This is a 84 y.o female with aortic stenosis s/p TVAR (2018), pacemaker (2018), CAD, CHF, COPD, DVT, HTN, HLD, and pulmonary HTN who presents to the hospital from [facility] for evaluation of abnormal troponin and BNP results. Patient is confused and doesn't know why she was sent to the hospital. She is able to tell me her name and  and place (hospital). History is limited due to patient's mental status. Per ED record, patient is here due to elevated her troponin and BNP lab. Her lab workup in NH yesterday with troponin of 0.093, a CRP of 7.6, and BNP of 2770. During my exam, patient seems sleepy/drowsy but easily aroused with voice. Patient reports to me she doesn't have any complains/pain/discomfort at this time. She denies fever, cough, SOB, chest pain, abdominal pain, headache, dysuria, N/V/D. Patient also has stage 4 sacral ulcer which treated in NH.  Per ED noted, her daughter reports patient has baseline SOB and multiple falls at her NH. Daughter concerns the patient getting inadequate care at her NH and wants to withdraw pt from [facility] once pt has her PEG tube placed. In ED, initial troponin 0.1 but trended down to 0.07 second set. BNP 2092, CRP 93. Chest xray with no acute process. EKG with 70 NSR with LBBB. Per ED note, Cardiologist Dr. Carmen was contacted and he feels patient will likely  only need troponin trended and official cardiology consult if trending upward.    Patient is placed in Observation for further evaluation and management elevated Troponin/CHF exacerbation and possible infected stage 3-4 sacral ulcer      Past Medical History:   Diagnosis Date    Anemia     Anticoagulant long-term use     Aortic stenosis     Arthritis     lower back    Asthma     CAD (coronary artery disease) 4/24/2015    Carpal tunnel syndrome on both sides     Cataract     CHF (congestive heart failure) 5/2013    COPD (chronic obstructive pulmonary disease)     Depression     DVT (deep venous thrombosis)     Hyperlipidemia     Hypertension     Pulmonary HTN     TMJ (temporomandibular joint disorder)        Past Surgical History:   Procedure Laterality Date    A-V CARDIAC PACEMAKER INSERTION N/A 7/5/2018    Procedure: INSERTION, CARDIAC PACEMAKER, DUAL CHAMBER;  Surgeon: Giancarlo Houser MD;  Location: [facility] CATH LAB;  Service: Cardiology;  Laterality: N/A;    AORTIC VALVE REPLACEMENT N/A 7/5/2018    Procedure: Replacement-valve-aortic;  Surgeon: Corey Castañeda MD;  Location: [facility] CATH LAB;  Service: Cardiology;  Laterality: N/A;    BACK SURGERY      cardiac stents      CARDIAC VALVE SURGERY      CARPAL TUNNEL RELEASE      Right/Left    CHOLECYSTECTOMY  07/2017    EYE SURGERY      cataract extraction    HYSTERECTOMY      Partial    JOINT REPLACEMENT  2009    Left hip    LEFT HEART CATHETERIZATION Left 10/9/2018    Procedure: Left heart cath;  Surgeon: Tony Carmen MD;  Location: [facility] CATH LAB;  Service: Cardiology;  Laterality: Left;    POLYPECTOMY      x9    TEMPOROMANDIBULAR JOINT SURGERY         Review of patient's allergies indicates:   Allergen Reactions    Doxycycline hyclate Diarrhea and Nausea And Vomiting    Singulair [montelukast]      Stomach pain, Muscle pain, Cough      Tuberculin ppd Rash    Penicillins      Other reaction(s): Anaphylaxis    Ventolin  [albuterol sulfate] Other (See Comments)     Severely elevated blood pressure    Latex, natural rubber Rash       No current facility-administered medications on file prior to encounter.      Current Outpatient Medications on File Prior to Encounter   Medication Sig    acetaminophen (TYLENOL) 650 MG TbSR Take 650 mg by mouth every 8 (eight) hours.    arginine-vitamin C-vitamin E 4.5 gram-156 mg/9.2 gram PwPk Take by mouth.    ASCORBATE CALCIUM, VITAMIN C, ORAL Take 1 tablet by mouth.    aspirin (ECOTRIN) 81 MG EC tablet Take 81 mg by mouth once daily.    azelastine (ASTELIN) 137 mcg (0.1 %) nasal spray 1 spray (137 mcg total) by Nasal route 2 (two) times daily.    cetirizine (ZYRTEC) 10 MG tablet Take 1 tablet (10 mg total) by mouth once daily.    ciprofloxacin HCl (CIPRO) 500 MG tablet     cloNIDine (CATAPRES) 0.3 MG tablet Take 1 tablet (0.3 mg total) by mouth 3 (three) times daily.    clopidogrel (PLAVIX) 75 mg tablet Take 1 tablet (75 mg total) by mouth once daily.    fluticasone (FLOVENT HFA) 220 mcg/actuation inhaler Inhale 1 puff into the lungs 2 (two) times daily. Controller    furosemide (LASIX) 40 MG tablet Take 1 tablet (40 mg total) by mouth once daily. TAKE ONE TABLET BY MOUTH EVERY DAY AS NEEDED FOR SHORTNESS OF BREATH or leg swelling    gabapentin (NEURONTIN) 300 MG capsule ONE CAPSULE BY MOUTH THREE TIMES DAILY    methylPREDNISolone (MEDROL DOSEPACK) 4 mg tablet     metoprolol tartrate (LOPRESSOR) 100 MG tablet ONE TABLET BY MOUTH TWICE DAILY    pantoprazole (PROTONIX) 40 MG tablet Take 1 tablet (40 mg total) by mouth once daily.    polyethylene glycol (GLYCOLAX) 17 gram PwPk 17 g by Per NG tube route 2 (two) times daily as needed.    senna (SENOKOT) 8.6 mg tablet Take 1 tablet by mouth once daily.    walker (ULTRA-LIGHT ROLLATOR) Misc One rollator, size large.    zinc gluconate 50 mg tablet Take 50 mg by mouth once daily.     Family History     Problem Relation (Age of Onset)     Cancer Son    Heart disease Mother    Hypertension Daughter        Tobacco Use    Smoking status: Never Smoker    Smokeless tobacco: Never Used   Substance and Sexual Activity    Alcohol use: No    Drug use: No    Sexual activity: Never     Review of Systems   Constitutional: Positive for appetite change. Negative for diaphoresis and fever.   HENT: Negative for congestion and sore throat.    Respiratory: Negative for cough, shortness of breath and wheezing.    Cardiovascular: Negative for chest pain and leg swelling.   Gastrointestinal: Negative for abdominal pain, nausea and vomiting.   Genitourinary: Negative for dysuria and flank pain.   Musculoskeletal: Positive for arthralgias (leg pain - chronic). Negative for back pain.   Skin: Positive for wound (sacral ulcer).   Neurological: Negative for headaches.   Psychiatric/Behavioral: Positive for confusion.     Objective:     Vital Signs (Most Recent):  Temp: 97.6 °F (36.4 °C) (09/19/20 0447)  Pulse: 70 (09/19/20 0447)  Resp: 16 (09/19/20 0447)  BP: 139/67 (09/19/20 0447)  SpO2: 98 % (09/19/20 0447) Vital Signs (24h Range):  Temp:  [97.6 °F (36.4 °C)-98.5 °F (36.9 °C)] 97.6 °F (36.4 °C)  Pulse:  [69-70] 70  Resp:  [16-53] 16  SpO2:  [93 %-99 %] 98 %  BP: (115-148)/(53-80) 139/67     Weight: 69.7 kg (153 lb 10.6 oz)  Body mass index is 25.57 kg/m².    Physical Exam  Constitutional:       General: She is not in acute distress.     Appearance: She is not diaphoretic.      Comments: drowsy   HENT:      Head: Normocephalic and atraumatic.      Nose: No congestion or rhinorrhea.      Mouth/Throat:      Mouth: Mucous membranes are dry.   Eyes:      Conjunctiva/sclera: Conjunctivae normal.   Neck:      Musculoskeletal: Neck supple.   Cardiovascular:      Rate and Rhythm: Normal rate and regular rhythm.      Pulses: Normal pulses.      Heart sounds: Normal heart sounds.   Pulmonary:      Breath sounds: Rales present. No wheezing.   Abdominal:      General: Bowel sounds  are normal.      Palpations: Abdomen is soft.      Tenderness: There is no abdominal tenderness. There is no right CVA tenderness, left CVA tenderness or guarding.   Musculoskeletal:      Right lower leg: No edema.      Left lower leg: No edema.   Skin:     General: Skin is warm and dry.      Findings: Lesion (Sacral ulcer/wound stage 4 5x4 cm. No active drainage.  See media) present.   Neurological:      Comments: Oriented to self, , place. But not situation   Psychiatric:         Mood and Affect: Mood normal.             Significant Labs:   CBC:   Recent Labs   Lab 20  1842 20  0648   WBC 7.21 8.29   HGB 9.3* 9.7*   HCT 30.3* 31.5*    290     CMP:   Recent Labs   Lab 20  21520  0648    141   K 4.1 4.2    108   CO2 24 25   * 105   BUN 24* 24*   CREATININE 1.0 0.9   CALCIUM 8.8 8.8   PROT 6.9 6.5   ALBUMIN 2.5* 2.3*   BILITOT 0.3 0.3   ALKPHOS 90 90   AST 12 12   ALT 9* 7*   ANIONGAP 12 8   EGFRNONAA 53* 60     Cardiac Markers:   Recent Labs   Lab 20   BNP 2,092*     Lactic Acid:   Recent Labs   Lab 20  2120   LACTATE 1.2     Troponin:   Recent Labs   Lab 20  2154 20  0113 20  0648   TROPONINI 0.155* 0.078* 0.109*     Urine Studies: No results for input(s): COLORU, APPEARANCEUA, PHUR, SPECGRAV, PROTEINUA, GLUCUA, KETONESU, BILIRUBINUA, OCCULTUA, NITRITE, UROBILINOGEN, LEUKOCYTESUR, RBCUA, WBCUA, BACTERIA, SQUAMEPITHEL, HYALINECASTS in the last 48 hours.    Invalid input(s): WRIGHTSUR  All pertinent labs within the past 24 hours have been reviewed.    Significant Imaging: I have reviewed and interpreted all pertinent imaging results/findings within the past 24 hours.    Assessment/Plan:     * Elevated troponin  Chest pain free. Elevated troponin 0.09 and BNP 2770 outpatient. Initial Trop today 0.15 and trended down 0.07 second set. BNP 2092. Chest xray no acute process. Lungs with rale. No leg edema. EKG 70 NSR LBBB. Echo  9/20/19 EF 25% indeterminate DD    - Trend Troponin  - Echo pending  - Telemetry  - IV lasix  - Continue ASA/Plavix/BB  - Consider Cardiology consult if troponin trending up      Systolic congestive heart failure  Denies SOB.  Echo 9/20/19 with EF 25%.  BNP elevated outpatient 2770.  BNP 2000 today. Chest xray no acute process. Lungs with rale. No leg edema. Home med with Lasix    - Lasix IV  - Echo pending   - Strict I/Os  - Daily weight          Pressure injury of sacral region, stage 4  Sacral ulcer stage 4. CRP 7.6 outpatient and today elevated 93. Afebrile. No leukocytosis    - Will order bone scan due to her pacemaker  - Consult Wound care        Stage 3 chronic kidney disease  sCr 1.0 today with BUN 24. GFR > 60. Continue to monitor while diuresis    - Repeat BMP in AM      Essential hypertension  Controlled. Continue home BB      CAD s/p PCI  No chest pain. Elevated Trop and BNP today. See above.  Aspirin, plavix, BB and statin resumed     Pulmonary hypertension  Echo 9/20/19 PA systolic is 26.   - Echo pending  - Monitor      Hyperlipidemia  Lab Results   Component Value Date    LDLCALC 114.2 11/15/2017     Lipid panel pending  - Continue statin        VTE Risk Mitigation (From admission, onward)         Ordered     IP VTE HIGH RISK PATIENT  Once      09/19/20 0134     Place sequential compression device  Until discontinued      09/19/20 0134     Place LAUREANO hose  Until discontinued      09/19/20 0134                   Zaina Celaya NP  Department of Hospital Medicine   [facility] [place]

## 2020-09-19 NOTE — NURSING
Handoff report given to BRITNEY Krishna at bedside. Patient resting comfortably in bed. NAD noted. Fall/Safety precautions maintained. Call light and personal items within reach. Communication board updated.

## 2020-09-19 NOTE — ASSESSMENT & PLAN NOTE
Sacral ulcer stage 4. CRP 7.6 outpatient and today elevated 93. Afebrile. No leukocytosis    - Will order bone scan due to her pacemaker  - Consult Wound care

## 2020-09-19 NOTE — SUBJECTIVE & OBJECTIVE
Past Medical History:   Diagnosis Date    Anemia     Anticoagulant long-term use     Aortic stenosis     Arthritis     lower back    Asthma     CAD (coronary artery disease) 4/24/2015    Carpal tunnel syndrome on both sides     Cataract     CHF (congestive heart failure) 5/2013    COPD (chronic obstructive pulmonary disease)     Depression     DVT (deep venous thrombosis)     Hyperlipidemia     Hypertension     Pulmonary HTN     TMJ (temporomandibular joint disorder)        Past Surgical History:   Procedure Laterality Date    A-V CARDIAC PACEMAKER INSERTION N/A 7/5/2018    Procedure: INSERTION, CARDIAC PACEMAKER, DUAL CHAMBER;  Surgeon: Giancarlo Houser MD;  Location: [facility] CATH LAB;  Service: Cardiology;  Laterality: N/A;    AORTIC VALVE REPLACEMENT N/A 7/5/2018    Procedure: Replacement-valve-aortic;  Surgeon: Corey Castañeda MD;  Location: [facility] CATH LAB;  Service: Cardiology;  Laterality: N/A;    BACK SURGERY      cardiac stents      CARDIAC VALVE SURGERY      CARPAL TUNNEL RELEASE      Right/Left    CHOLECYSTECTOMY  07/2017    EYE SURGERY      cataract extraction    HYSTERECTOMY      Partial    JOINT REPLACEMENT  2009    Left hip    LEFT HEART CATHETERIZATION Left 10/9/2018    Procedure: Left heart cath;  Surgeon: Tony Carmen MD;  Location[facility] CATH LAB;  Service: Cardiology;  Laterality: Left;    POLYPECTOMY      x9    TEMPOROMANDIBULAR JOINT SURGERY         Review of patient's allergies indicates:   Allergen Reactions    Doxycycline hyclate Diarrhea and Nausea And Vomiting    Singulair [montelukast]      Stomach pain, Muscle pain, Cough      Tuberculin ppd Rash    Penicillins      Other reaction(s): Anaphylaxis    Ventolin [albuterol sulfate] Other (See Comments)     Severely elevated blood pressure    Latex, natural rubber Rash       No current facility-administered medications on file prior to encounter.      Current Outpatient Medications on File Prior to  Encounter   Medication Sig    acetaminophen (TYLENOL) 650 MG TbSR Take 650 mg by mouth every 8 (eight) hours.    arginine-vitamin C-vitamin E 4.5 gram-156 mg/9.2 gram PwPk Take by mouth.    ASCORBATE CALCIUM, VITAMIN C, ORAL Take 1 tablet by mouth.    aspirin (ECOTRIN) 81 MG EC tablet Take 81 mg by mouth once daily.    azelastine (ASTELIN) 137 mcg (0.1 %) nasal spray 1 spray (137 mcg total) by Nasal route 2 (two) times daily.    cetirizine (ZYRTEC) 10 MG tablet Take 1 tablet (10 mg total) by mouth once daily.    ciprofloxacin HCl (CIPRO) 500 MG tablet     cloNIDine (CATAPRES) 0.3 MG tablet Take 1 tablet (0.3 mg total) by mouth 3 (three) times daily.    clopidogrel (PLAVIX) 75 mg tablet Take 1 tablet (75 mg total) by mouth once daily.    fluticasone (FLOVENT HFA) 220 mcg/actuation inhaler Inhale 1 puff into the lungs 2 (two) times daily. Controller    furosemide (LASIX) 40 MG tablet Take 1 tablet (40 mg total) by mouth once daily. TAKE ONE TABLET BY MOUTH EVERY DAY AS NEEDED FOR SHORTNESS OF BREATH or leg swelling    gabapentin (NEURONTIN) 300 MG capsule ONE CAPSULE BY MOUTH THREE TIMES DAILY    methylPREDNISolone (MEDROL DOSEPACK) 4 mg tablet     metoprolol tartrate (LOPRESSOR) 100 MG tablet ONE TABLET BY MOUTH TWICE DAILY    pantoprazole (PROTONIX) 40 MG tablet Take 1 tablet (40 mg total) by mouth once daily.    polyethylene glycol (GLYCOLAX) 17 gram PwPk 17 g by Per NG tube route 2 (two) times daily as needed.    senna (SENOKOT) 8.6 mg tablet Take 1 tablet by mouth once daily.    walker (ULTRA-LIGHT ROLLATOR) Misc One rollator, size large.    zinc gluconate 50 mg tablet Take 50 mg by mouth once daily.     Family History     Problem Relation (Age of Onset)    Cancer Son    Heart disease Mother    Hypertension Daughter        Tobacco Use    Smoking status: Never Smoker    Smokeless tobacco: Never Used   Substance and Sexual Activity    Alcohol use: No    Drug use: No    Sexual activity: Never      Review of Systems   Constitutional: Positive for appetite change. Negative for diaphoresis and fever.   HENT: Negative for congestion and sore throat.    Respiratory: Negative for cough, shortness of breath and wheezing.    Cardiovascular: Negative for chest pain and leg swelling.   Gastrointestinal: Negative for abdominal pain, nausea and vomiting.   Genitourinary: Negative for dysuria and flank pain.   Musculoskeletal: Positive for arthralgias (leg pain - chronic). Negative for back pain.   Skin: Positive for wound (sacral ulcer).   Neurological: Negative for headaches.   Psychiatric/Behavioral: Positive for confusion.     Objective:     Vital Signs (Most Recent):  Temp: 97.6 °F (36.4 °C) (09/19/20 0447)  Pulse: 70 (09/19/20 0447)  Resp: 16 (09/19/20 0447)  BP: 139/67 (09/19/20 0447)  SpO2: 98 % (09/19/20 0447) Vital Signs (24h Range):  Temp:  [97.6 °F (36.4 °C)-98.5 °F (36.9 °C)] 97.6 °F (36.4 °C)  Pulse:  [69-70] 70  Resp:  [16-53] 16  SpO2:  [93 %-99 %] 98 %  BP: (115-148)/(53-80) 139/67     Weight: 69.7 kg (153 lb 10.6 oz)  Body mass index is 25.57 kg/m².    Physical Exam  Constitutional:       General: She is not in acute distress.     Appearance: She is not diaphoretic.      Comments: drowsy   HENT:      Head: Normocephalic and atraumatic.      Nose: No congestion or rhinorrhea.      Mouth/Throat:      Mouth: Mucous membranes are dry.   Eyes:      Conjunctiva/sclera: Conjunctivae normal.   Neck:      Musculoskeletal: Neck supple.   Cardiovascular:      Rate and Rhythm: Normal rate and regular rhythm.      Pulses: Normal pulses.      Heart sounds: Normal heart sounds.   Pulmonary:      Breath sounds: Rales present. No wheezing.   Abdominal:      General: Bowel sounds are normal.      Palpations: Abdomen is soft.      Tenderness: There is no abdominal tenderness. There is no right CVA tenderness, left CVA tenderness or guarding.   Musculoskeletal:      Right lower leg: No edema.      Left lower leg: No  edema.   Skin:     General: Skin is warm and dry.      Findings: Lesion (Sacral ulcer/wound stage 4 5x4 cm. No active drainage.  See media) present.   Neurological:      Comments: Oriented to self, , place. But not situation   Psychiatric:         Mood and Affect: Mood normal.             Significant Labs:   CBC:   Recent Labs   Lab 20  1842 20  0648   WBC 7.21 8.29   HGB 9.3* 9.7*   HCT 30.3* 31.5*    290     CMP:   Recent Labs   Lab 20  21520  0648    141   K 4.1 4.2    108   CO2 24 25   * 105   BUN 24* 24*   CREATININE 1.0 0.9   CALCIUM 8.8 8.8   PROT 6.9 6.5   ALBUMIN 2.5* 2.3*   BILITOT 0.3 0.3   ALKPHOS 90 90   AST 12 12   ALT 9* 7*   ANIONGAP 12 8   EGFRNONAA 53* 60     Cardiac Markers:   Recent Labs   Lab 20   BNP 2,092*     Lactic Acid:   Recent Labs   Lab 20  2120   LACTATE 1.2     Troponin:   Recent Labs   Lab 20  2154 20  0113 20  0648   TROPONINI 0.155* 0.078* 0.109*     Urine Studies: No results for input(s): COLORU, APPEARANCEUA, PHUR, SPECGRAV, PROTEINUA, GLUCUA, KETONESU, BILIRUBINUA, OCCULTUA, NITRITE, UROBILINOGEN, LEUKOCYTESUR, RBCUA, WBCUA, BACTERIA, SQUAMEPITHEL, HYALINECASTS in the last 48 hours.    Invalid input(s): ELSYSUR  All pertinent labs within the past 24 hours have been reviewed.    Significant Imaging: I have reviewed and interpreted all pertinent imaging results/findings within the past 24 hours.

## 2020-09-19 NOTE — ASSESSMENT & PLAN NOTE
Lab Results   Component Value Date    LDLCALC 114.2 11/15/2017     Lipid panel pending  - Continue statin

## 2020-09-19 NOTE — HPI
This is a 84 y.o female with aortic stenosis s/p TVAR (2018), pacemaker (2018), CAD, CHF, COPD, DVT, HTN, HLD, and pulmonary HTN who presents to the hospital from [facility] for evaluation of abnormal troponin and BNP results. Patient is confused and doesn't know why she was sent to the hospital. She is able to tell me her name and  and place (hospital). History is limited due to patient's mental status. Per ED record, patient is here due to elevated her troponin and BNP lab. Her lab workup in NH yesterday with troponin of 0.093, a CRP of 7.6, and BNP of 2770. During my exam, patient seems sleepy/drowsy but easily aroused with voice. Patient reports to me she doesn't have any complains/pain/discomfort at this time. She denies fever, cough, SOB, chest pain, abdominal pain, headache, dysuria, N/V/D. Patient also has stage 4 sacral ulcer which treated in NH.  Per ED noted, her daughter reports patient has baseline SOB and multiple falls at her NH. Daughter concerns the patient getting inadequate care at her NH and wants to withdraw pt from [place] once pt has her PEG tube placed. In ED, initial troponin 0.1 but trended down to 0.07 second set. BNP 2092, CRP 93. Chest xray with no acute process. EKG with 70 NSR with LBBB. Per ED note, Cardiologist Dr. Carmen was contacted and he feels patient will likely only need troponin trended and official cardiology consult if trending upward.    Patient is placed in Observation for further evaluation and management elevated Troponin/CHF exacerbation and possible infected stage 3-4 sacral ulcer

## 2020-09-19 NOTE — NURSING
New admit report received from TOO Lentz RN @0110. Room prepared and telemetry box 1483 placed in room awaiting patient arrival.    Update: Patient arrived to the unit via stretcher with transport. Telemetry monitor placed. VS and H2T exam completed and recorded in [employer]. Admissions navigator completed. Applied mepilex dressing to deep tissue pressure ulcer located on sacrum, changed into hospital gown, and changed incontinence brief. NAD noted at this time.  Fall/Safety precautions maintained. Call light and personal items within reach. Communication board updated.

## 2020-09-19 NOTE — ED NOTES
Lab called again for an eta on the phlebotomist to draw blood work and I was told it would take about 15-20min at this time

## 2020-09-19 NOTE — PLAN OF CARE
Patient transferred from [facility]; [place] called daughter Oumou Swartz @ [phone];; stated that she does not want patient to return to [facility] and would like patient to discharge home with her; stated that she has initiated the process of getting LTC services for patient and is requesting assistance with resources and support to care for patient at home;     TN spoke with ANDRÉS Gomez regarding Plan of Care; informed of daughter's decision not to have patient return to NH but home with her instead; pt will need PT eval for home DME suggestions as daughter is requesting hospital bed and lift; daughter also stated that she is opened to having a discussion with Palliative care to find best approach to attain goals of care for patient; ANDRÉS Gomez informed for orders; pt will benefit from Home Health with SN for wound care, HHA (to bridge LTC services) and SS to assist family with resources to manage care at home       09/19/20 1184   Discharge Assessment   Assessment Type Discharge Planning Assessment   Assessment information obtained from? Caregiver   Expected Length of Stay (days) 2   Prior to hospitilization cognitive status: Alert/Oriented   Prior to hospitalization functional status: Completely Dependent   Current cognitive status: Alert/Oriented   Current Functional Status: Completely Dependent   Facility Arrived From: [facility]   Lives With facility resident   Able to Return to Prior Arrangements no  (family does not want patient to retun to [facility])   Is patient able to care for self after discharge? No   Who are your caregiver(s) and their phone number(s)? Oumou Swartz (daughter) [phone]   Patient's perception of discharge disposition home or selfcare   Patient currently being followed by outpatient case management? No   Patient currently receives any other outside agency services? No   Part D Coverage [employer]   Discharge Plan A Home Health   DME Needed Upon Discharge  wheelchair;oxygen;hospital  bed;lift device   Patient/Family in Agreement with Plan yes

## 2020-09-20 PROBLEM — Z74.01 BEDBOUND: Status: ACTIVE | Noted: 2018-12-12

## 2020-09-20 PROBLEM — R41.89 COGNITIVE IMPAIRMENT: Status: ACTIVE | Noted: 2020-09-20

## 2020-09-20 LAB
FERRITIN SERPL-MCNC: 146 NG/ML (ref 20–300)
IRON SERPL-MCNC: 18 UG/DL (ref 30–160)
SATURATED IRON: 9 % (ref 20–50)
TOTAL IRON BINDING CAPACITY: 207 UG/DL (ref 250–450)
TRANSFERRIN SERPL-MCNC: 140 MG/DL (ref 200–375)

## 2020-09-20 PROCEDURE — 25000003 PHARM REV CODE 250: Performed by: NURSE PRACTITIONER

## 2020-09-20 PROCEDURE — 97161 PT EVAL LOW COMPLEX 20 MIN: CPT | Performed by: PHYSICAL THERAPIST

## 2020-09-20 PROCEDURE — 83540 ASSAY OF IRON: CPT

## 2020-09-20 PROCEDURE — 36415 COLL VENOUS BLD VENIPUNCTURE: CPT

## 2020-09-20 PROCEDURE — G0378 HOSPITAL OBSERVATION PER HR: HCPCS

## 2020-09-20 PROCEDURE — 82728 ASSAY OF FERRITIN: CPT

## 2020-09-20 RX ORDER — ACETAMINOPHEN 325 MG/1
650 TABLET ORAL EVERY 8 HOURS PRN
Qty: 60 TABLET | Refills: 0 | Status: SHIPPED | OUTPATIENT
Start: 2020-09-20

## 2020-09-20 RX ADMIN — ACETAMINOPHEN 500 MG: 500 TABLET ORAL at 11:09

## 2020-09-20 RX ADMIN — ACETAMINOPHEN 500 MG: 500 TABLET ORAL at 09:09

## 2020-09-20 RX ADMIN — CLONIDINE HYDROCHLORIDE 0.3 MG: 0.1 TABLET ORAL at 02:09

## 2020-09-20 RX ADMIN — SENNOSIDES 1 TABLET: 8.6 TABLET, FILM COATED ORAL at 08:09

## 2020-09-20 RX ADMIN — PANTOPRAZOLE SODIUM 40 MG: 40 TABLET, DELAYED RELEASE ORAL at 08:09

## 2020-09-20 RX ADMIN — FUROSEMIDE 40 MG: 40 TABLET ORAL at 08:09

## 2020-09-20 RX ADMIN — ASPIRIN 81 MG: 81 TABLET, COATED ORAL at 08:09

## 2020-09-20 RX ADMIN — METOPROLOL TARTRATE 100 MG: 50 TABLET, FILM COATED ORAL at 09:09

## 2020-09-20 RX ADMIN — CLOPIDOGREL 75 MG: 75 TABLET, FILM COATED ORAL at 08:09

## 2020-09-20 RX ADMIN — CLONIDINE HYDROCHLORIDE 0.3 MG: 0.1 TABLET ORAL at 08:09

## 2020-09-20 RX ADMIN — CLONIDINE HYDROCHLORIDE 0.3 MG: 0.1 TABLET ORAL at 09:09

## 2020-09-20 RX ADMIN — METOPROLOL TARTRATE 100 MG: 50 TABLET, FILM COATED ORAL at 08:09

## 2020-09-20 NOTE — PLAN OF CARE
"  Problem: Physical Therapy Goal  Goal: Physical Therapy Goal  Description: Physical Therapy Order for Evaluation was RECEIVED and patient's Initial Evaluation was COMPLETED.    No Additional Physical Therapy Services required at this time due to Pt is currently at Prior Level of Function (patient is total dependent, bed bound, and non-ambulatory as per daughter).       After discharge from hospital, the Recommendation is for Home Health Physical Therapy Services at time of discharge for a   · Home Environment Assessment for patient safety  · Family Education for patient handling with DME's.     Recommended DME's for patient  include:  marjan lift (for transfers OOB) and "tilting" wheel chair 2* limited postural strength (for functional mobility and re-positioning to facilitate wound healing).     Outcome: Adequate for Care Transition     Riley Cortez, PT  9/20/2020  "

## 2020-09-20 NOTE — NURSING
"I have alerted ANDRÉS Gomez, that patient's urine sample obtained last night on night shift could not be tested according to lab b/c it's "gelatinous". Patient refuses to allow a 2nd sample to be collected via straight cath and she is incontinent. Patricia is ok with not obtaining a sample and cancelling order.   "

## 2020-09-20 NOTE — HOSPITAL COURSE
Mrs. Lyman is a 83 yo female who was transferred to [facility] for elevated BNP. Denies chest pain or shortness of breath. EKG NSR LBBB 70 (not new) and elevated troponin trend flat pattern. On exam, no volume excess. Lasix given in ED x 1. Incontinence. Main complaint is right hip/leg pain. X ray right femur no fracture hips or knee. X ray pelvis no able to visualize right iliac wing and proximal right femur. CT thigh no fracture.   Repeat 2 D echo EF 30%, grade I DD,  Aortic bioprothesis-no aortic insufficiency, and PASP 43 mmHg. Denies shortness of breath and breathing comfortable on RA 99%.   interview the patient's family and -Daughters -Jennifer Tomlinson Mellissa (POA) cruz initially refused to send patient back to [facility].  Wound care was consulted and evaluated the patient's Stage 4 sacral wound and voice concerns for osteomyelitis. NM bone scan taken on 09/22/2020 negative for evidence of osteo. Daughter Kady GUERRA voiced the family's request.  09/22/2020 the family recanted their  request of bring the patient home in now request nursing home placement for the patient at [facility].   enlisted and has submitted the request. NM Bone Scan showed no evidence for underlying osteomyelitis. [facility] has accepted the patient - cleared for discharge she is stable. Wound care orders as listed by the Wound care specialist written on NH orders.

## 2020-09-20 NOTE — ASSESSMENT & PLAN NOTE
Denies SOB.  Echo 9/20/19 with EF 25%.  BNP elevated outpatient 2770.  BNP 2000 today. Chest xray no acute process. Lungs with rale. No leg edema. Home med with Lasix  2 echo EF 30%, grade I DD, PH 43 mmHg  No volume excess on exam  Continue home lasix, metoprolol

## 2020-09-20 NOTE — ASSESSMENT & PLAN NOTE
CARLOS MANUEL to mid-LAD 4/2015 per Dr. Cresencio Alfredo  No chest pain or SOB  Aspirin, plavix, BB and statin per home

## 2020-09-20 NOTE — ASSESSMENT & PLAN NOTE
Patient have been in [place] for 2 years? Bedbound at NH and need assistant with all ADL's  Per chart review, cardiac arrest in 2018 and at that time had severe oropharyngeal dysphagia then PEG placement 10/19/19 ; not sure when was removed  Per Daughter, patient at times refuse to eat and take medications. No issues with medications so far. Patient states she doesn't like nursing home food. She did eat 100% lunch yesterday 9/19 but little for dinner. She is requesting Ensure  Daughters Bushra, Gregory, and Jennifer would like for other to return home with  and Palliative care. Patient and Daughters refusing to go back to NH  TN assisting to get hospital bed, marjan lift and wheelchair  Code status -DNR

## 2020-09-20 NOTE — PLAN OF CARE
Problem: Fall Injury Risk  Goal: Absence of Fall and Fall-Related Injury  Outcome: Ongoing, Progressing     Problem: Skin Injury Risk Increased  Goal: Skin Health and Integrity  Outcome: Ongoing, Progressing     Problem: Adult Inpatient Plan of Care  Goal: Plan of Care Review  Outcome: Ongoing, Progressing  Goal: Patient-Specific Goal (Individualization)  Outcome: Ongoing, Progressing  Goal: Absence of Hospital-Acquired Illness or Injury  Outcome: Ongoing, Progressing  Goal: Optimal Comfort and Wellbeing  Outcome: Ongoing, Progressing  Goal: Readiness for Transition of Care  Outcome: Ongoing, Progressing  Goal: Rounds/Family Conference  Outcome: Ongoing, Progressing     Problem: Wound  Goal: Optimal Wound Healing  Outcome: Ongoing, Progressing

## 2020-09-20 NOTE — PLAN OF CARE
[facility]    HOME HEALTH ORDERS  FACE TO FACE ENCOUNTER    Patient Name: Cindy Lyman  YOB: 1935    PCP: Rodger Camarena MD   PCP Address: [address] / [place] [address]  PCP Phone Number: [phone]  PCP Fax: [phone]    Encounter Date: 09/20/2020    Admit to Home Health    Diagnoses:  Active Hospital Problems    Diagnosis  POA    *Elevated troponin [R79.89]  Yes    Pressure injury of sacral region, stage 4 [L89.154]  Yes    Systolic congestive heart failure [I50.20]  Yes    Stage 3 chronic kidney disease [N18.3]  Yes     Chronic    Essential hypertension [I10]  Yes     Chronic    CAD s/p PCI [I25.10]  Yes     Chronic     CARLOS MANUEL to mid-LAD 4/2015 per Dr. Cresencio Alfredo      Pulmonary hypertension [I27.20]  Yes     Chronic     Dr. Weston      Hyperlipidemia [E78.5]  Yes     Chronic      Resolved Hospital Problems   No resolved problems to display.       Future Appointments   Date Time Provider Department Center   9/28/2020 10:00 AM HOME MONITOR DEVICE CHECK, [facility]     Follow-up Information     Rodger Camarena MD.    Specialties: Family Medicine, Wound Care  Contact information:  [address] [id number] [phone]                     I have seen and examined this patient face to face today. My clinical findings that support the need for the home health skilled services and home bound status are the following:  Weakness/numbness causing balance and gait disturbance due to Weakness/Debility making it taxing to leave home.  Requiring assistive device to leave home due to unsteady gait caused by  Weakness/Debility.  Patient with medication mismanagement issues requiring home bound status as evidenced by  Unstable vital signs (blood pressure, heart rate), Poor understanding of medication regimen/dosage and Poor adherence to medication regimen/dosage.  Medical restrictions requiring assistance of another human to leave home due  to  Wound care needs and Bedbound.  Mental confusion making it unsafe for patient to leave home alone due to  cognitive impairment.    Allergies:  Review of patient's allergies indicates:   Allergen Reactions    Doxycycline hyclate Diarrhea and Nausea And Vomiting    Singulair [montelukast]      Stomach pain, Muscle pain, Cough      Tuberculin ppd Rash    Penicillins      Other reaction(s): Anaphylaxis    Ventolin [albuterol sulfate] Other (See Comments)     Severely elevated blood pressure    Latex, natural rubber Rash       Diet: cardiac diet and soft diet    Activities: activity as tolerated    Nursing:   SN to complete comprehensive assessment including routine vital signs. Instruct on disease process and s/s of complications to report to MD. Review/verify medication list sent home with the patient at time of discharge  and instruct patient/caregiver as needed. Frequency may be adjusted depending on start of care date.    Notify MD if SBP > 160 or < 90; DBP > 90 or < 50; HR > 120 or < 50; Temp > 101; Other:         CONSULTS:    Physical Therapy to evaluate and treat. Evaluate for home safety and equipment needs; Establish/upgrade home exercise program. Perform / instruct on therapeutic exercises, gait training, transfer training, and Range of Motion.   to evaluate for community resources/long-range planning.  Aide to provide assistance with personal care, ADLs, and vital signs.    MISCELLANEOUS CARE:  Routine Skin for Bedridden Patients: Instruct patient/caregiver to apply moisture barrier cream to all skin folds and wet areas in perineal area daily and after baths and all bowel movements.  Heart Failure:      SN to instruct on the following:    Instruct on the definition of CHF.   Instruct on the signs/sympoms of CHF to be reported.   Instruct on and monitor daily weights.   Instruct on factors that cause exacerbation.   Instruct on action, dose, schedule, and side effects of  medications.   Instruct on diet as prescribed.   Instruct on activity allowed.   Instruct on life-style modifications for life long management of CHF   SN to assess compliance with daily weights, diet, medications, fluid retention,    safety precautions, activities permitted and life-style modifications.   Additional 1-2 SN visits per week as needed for signs and symptoms     of CHF exacerbation.      For Weight Gain > 2-3 lbs in 1 day or 4-6 lbs over 1 week notify PCP:      WOUND CARE ORDERS  yes:  Pressure Ulcer(s) Stage IV:  Location: sacral wound    Consult ET nurse        Apply the following to wound:  Previously at NH wound care-Cleanse with  NS, pat dry, apply anasept gel to wound bed and loosely pack cavity with gauze and cover    Other: Evaluate and Treat (frequency)         Medications: Review discharge medications with patient and family and provide education.      Current Discharge Medication List      CONTINUE these medications which have NOT CHANGED    Details   aspirin (ECOTRIN) 81 MG EC tablet Take 81 mg by mouth once daily.      cloNIDine (CATAPRES) 0.3 MG tablet Take 1 tablet (0.3 mg total) by mouth 3 (three) times daily.  Qty: 270 tablet, Refills: 1    Associated Diagnoses: Essential hypertension      clopidogrel (PLAVIX) 75 mg tablet Take 1 tablet (75 mg total) by mouth once daily.  Qty: 30 tablet, Refills: 11      furosemide (LASIX) 40 MG tablet Take 1 tablet (40 mg total) by mouth once daily. TAKE ONE TABLET BY MOUTH EVERY DAY AS NEEDED FOR SHORTNESS OF BREATH or leg swelling  Qty: 90 tablet, Refills: 1    Associated Diagnoses: Essential hypertension      metoprolol tartrate (LOPRESSOR) 100 MG tablet ONE TABLET BY MOUTH TWICE DAILY  Qty: 180 tablet, Refills: 1      pantoprazole (PROTONIX) 40 MG tablet Take 1 tablet (40 mg total) by mouth once daily.  Qty: 30 tablet, Refills: 11      senna (SENOKOT) 8.6 mg tablet Take 1 tablet by mouth once daily.         STOP taking these medications        acetaminophen (TYLENOL) 650 MG TbSR Comments:   Reason for Stopping:         arginine-vitamin C-vitamin E 4.5 gram-156 mg/9.2 gram PwPk Comments:   Reason for Stopping:         ASCORBATE CALCIUM, VITAMIN C, ORAL Comments:   Reason for Stopping:         azelastine (ASTELIN) 137 mcg (0.1 %) nasal spray Comments:   Reason for Stopping:         cetirizine (ZYRTEC) 10 MG tablet Comments:   Reason for Stopping:         ciprofloxacin HCl (CIPRO) 500 MG tablet Comments:   Reason for Stopping:         fluticasone (FLOVENT HFA) 220 mcg/actuation inhaler Comments:   Reason for Stopping:         gabapentin (NEURONTIN) 300 MG capsule Comments:   Reason for Stopping:         methylPREDNISolone (MEDROL DOSEPACK) 4 mg tablet Comments:   Reason for Stopping:         polyethylene glycol (GLYCOLAX) 17 gram PwPk Comments:   Reason for Stopping:         walker (ULTRA-LIGHT ROLLATOR) Misc Comments:   Reason for Stopping:         zinc gluconate 50 mg tablet Comments:   Reason for Stopping:               I certify that this patient is confined to her home and needs intermittent skilled nursing care and physical therapy.

## 2020-09-20 NOTE — PROGRESS NOTES
[facility]  Progress Note    Patient Name: Cindy Lyman  MRN: [id number]  Patient Class: OP- Observation   Admission Date: 2020  Length of Stay: 0 days  Attending Physician: Grupo Wharton MD  Primary Care Provider: Rodger Camarena MD        Subjective:     Principal Problem:Elevated troponin        HPI:  This is a 84 y.o female with aortic stenosis s/p TVAR (2018), pacemaker (2018), CAD, CHF, COPD, DVT, HTN, HLD, and pulmonary HTN who presents to the hospital from [facility] for evaluation of abnormal troponin and BNP results. Patient is confused and doesn't know why she was sent to the hospital. She is able to tell me her name and  and place (hospital). History is limited due to patient's mental status. Per ED record, patient is here due to elevated her troponin and BNP lab. Her lab workup in NH yesterday with troponin of 0.093, a CRP of 7.6, and BNP of 2770. During my exam, patient seems sleepy/drowsy but easily aroused with voice. Patient reports to me she doesn't have any complains/pain/discomfort at this time. She denies fever, cough, SOB, chest pain, abdominal pain, headache, dysuria, N/V/D. Patient also has stage 4 sacral ulcer which treated in NH.  Per ED noted, her daughter reports patient has baseline SOB and multiple falls at her NH. Daughter concerns the patient getting inadequate care at her NH and wants to withdraw pt from [place] once pt has her PEG tube placed. In ED, initial troponin 0.1 but trended down to 0.07 second set. BNP 2092, CRP 93. Chest xray with no acute process. EKG with 70 NSR with LBBB. Per ED note, Cardiologist Dr. Carmen was contacted and he feels patient will likely only need troponin trended and official cardiology consult if trending upward.    Patient is placed in Observation for further evaluation and management elevated Troponin/CHF exacerbation and possible infected stage 3-4 sacral  ulcer      Overview/Hospital Course:  Mrs. Lyman is a 83 yo female who was transferred to [facility] for elevated BNP. Denies chest pain or shortness of breath. EKG NSR LBBB 70 (not new) and elevated troponin trend flat pattern. On exam, no volume excess. Lasix given in ED x 1. Incontinence. Main complaint is right hip/leg pain. X ray right femur no fracture hips or knee. X ray pelvis no able to visualize right iliac wing and proximal right femur. For CT today  Repeat 2 D echo EF 30%, grade I DD,  Aortic bioprothesis-no aortic insufficiency, and PASP 43 mmHg.   Patient and family -Daughters -Jennifer Tomlinson, Becca (POA) refused to go back to [facility]. Full code -DNR. Discharge canceled due to no hoyler lift till tomorrow. Plan to discharge home with HH and Palliative Care.        Interval History: Feels better today in regards to     Review of Systems   Constitutional: Positive for appetite change. Negative for diaphoresis and fever.   HENT: Negative for congestion and sore throat.    Respiratory: Negative for cough, shortness of breath and wheezing.    Cardiovascular: Negative for chest pain and leg swelling.   Gastrointestinal: Negative for abdominal pain, nausea and vomiting.   Genitourinary: Negative for dysuria and flank pain.   Musculoskeletal: Positive for arthralgias (leg pain - chronic). Negative for back pain.   Skin: Positive for wound (sacral ulcer).   Neurological: Negative for headaches.   Psychiatric/Behavioral: Positive for confusion.     Objective:     Vital Signs (Most Recent):  Temp: 99 °F (37.2 °C) (09/20/20 1056)  Pulse: 70 (09/20/20 1056)  Resp: 18 (09/20/20 1056)  BP: 135/61 (09/20/20 1056)  SpO2: 100 % (09/20/20 1056) Vital Signs (24h Range):  Temp:  [97.3 °F (36.3 °C)-99 °F (37.2 °C)] 99 °F (37.2 °C)  Pulse:  [69-73] 70  Resp:  [18-20] 18  SpO2:  [99 %-100 %] 100 %  BP: (133-152)/(61-94) 135/61     Weight: 69.9 kg (154 lb 1.6 oz)  Body mass index is 25.64 kg/m².  No intake or output  data in the 24 hours ending 20 1346   Physical Exam  Constitutional:       General: She is not in acute distress.     Appearance: She is not diaphoretic.      Comments: drowsy   HENT:      Head: Normocephalic and atraumatic.      Nose: No congestion or rhinorrhea.      Mouth/Throat:      Mouth: Mucous membranes are dry.   Eyes:      Conjunctiva/sclera: Conjunctivae normal.   Neck:      Musculoskeletal: Neck supple.   Cardiovascular:      Rate and Rhythm: Normal rate and regular rhythm.      Pulses: Normal pulses.      Heart sounds: Normal heart sounds.   Pulmonary:      Breath sounds: Rales present. No wheezing.   Abdominal:      General: Bowel sounds are normal.      Palpations: Abdomen is soft.      Tenderness: There is no abdominal tenderness. There is no right CVA tenderness, left CVA tenderness or guarding.   Musculoskeletal:      Right lower leg: No edema.      Left lower leg: No edema.   Skin:     General: Skin is warm and dry.      Findings: Lesion (Sacral ulcer/wound stage 4 5x4 cm. No active drainage.  See media) present.   Neurological:      Mental Status: Mental status is at baseline.      Comments: Oriented to self, , place. But not situation  Per Daughter Bushra, patient is at baseline   Psychiatric:         Mood and Affect: Mood normal.         Significant Labs: All pertinent labs within the past 24 hours have been reviewed.    Significant Imaging: I have reviewed and interpreted all pertinent imaging results/findings within the past 24 hours.      Assessment/Plan:      * Elevated troponin  Chest pain free. Elevated troponin 0.09 and BNP 2770 outpatient. Initial Trop today 0.15 and trended down 0.07 second set. BNP 2092. Chest xray no acute process. Lungs with rale. No leg edema. EKG 70 NSR LBBB. Echo 19 EF 25% indeterminate DD  Flat pattern in setting of heart failure  Denies chest pain        Bedbound  Patient have been in [place] for 2 years? Bedbound at NH and need assistant with  all ADL's  Per chart review, cardiac arrest in 2018 and at that time had severe oropharyngeal dysphagia then PEG placement 10/19/19 ; not sure when was removed  Per Daughter, patient at times refuse to eat and take medications. No issues with medications so far. Patient states she doesn't like nursing home food. She did eat 100% lunch yesterday 9/19 but little for dinner. She is requesting Ensure  Daughters Bushra, Gregory, and Jennifer would like for other to return home with HH and Palliative care. Patient and Daughters refusing to go back to NH  TN assisting to get hospital bed, marjan lift and wheelchair  Code status -DNR           Pressure injury of sacral region, stage 4  Sacral ulcer stage 4. CRP 7.6 outpatient and today elevated 93. Afebrile. No leukocytosis  Patient refused heel protects  Off load heels on pillow for now  Turn every 2 hrs  Clean wound with normal saline, wet to dry dressing until seen by wound care tomorrow          Cognitive impairment  Per Daughter Bushra, patient oriented to self, place year and situation but at times confused. Patient still thinks her  is alive and that he lives at their house.   Avoid sedatives       Systolic congestive heart failure  Denies SOB.  Echo 9/20/19 with EF 25%.  BNP elevated outpatient 2770.  BNP 2000 today. Chest xray no acute process. Lungs with rale. No leg edema. Home med with Lasix  2 echo EF 30%, grade I DD, PH 43 mmHg  No volume excess on exam  Continue home lasix, metoprolol          Stage 3 chronic kidney disease  Stable      Essential hypertension  Controlled. Continue home BB      CAD s/p PCI  CARLOS MANUEL to mid-LAD 4/2015 per Dr. Cresencio Alfredo  No chest pain or SOB  Aspirin, plavix, BB and statin per home    Anemia of chronic disease  Chronic inflammation and poor nutrition  Low iron studies. Ferritin 146 okay  Ferrous sulfate      Pulmonary hypertension  Group 2  See Heart failure      Hyperlipidemia  Lab Results   Component Value Date     LDLCALC 130.6 09/19/2020   Continue statin        VTE Risk Mitigation (From admission, onward)         Ordered     IP VTE HIGH RISK PATIENT  Once      09/19/20 0134     Place sequential compression device  Until discontinued      09/19/20 0134     Place LAUREANO hose  Until discontinued      09/19/20 0134                Discharge Planning   JAMES: 9/20/2020     Code Status: DNR   Is the patient medically ready for discharge?:     Reason for patient still in hospital (select all that apply): Treatment  Discharge Plan A: Home Health                  Patricia Celaya NP  Department of Hospital Medicine   [facility]

## 2020-09-20 NOTE — SUBJECTIVE & OBJECTIVE
Interval History: Feels better today in regards to     Review of Systems   Constitutional: Positive for appetite change. Negative for diaphoresis and fever.   HENT: Negative for congestion and sore throat.    Respiratory: Negative for cough, shortness of breath and wheezing.    Cardiovascular: Negative for chest pain and leg swelling.   Gastrointestinal: Negative for abdominal pain, nausea and vomiting.   Genitourinary: Negative for dysuria and flank pain.   Musculoskeletal: Positive for arthralgias (leg pain - chronic). Negative for back pain.   Skin: Positive for wound (sacral ulcer).   Neurological: Negative for headaches.   Psychiatric/Behavioral: Positive for confusion.     Objective:     Vital Signs (Most Recent):  Temp: 99 °F (37.2 °C) (09/20/20 1056)  Pulse: 70 (09/20/20 1056)  Resp: 18 (09/20/20 1056)  BP: 135/61 (09/20/20 1056)  SpO2: 100 % (09/20/20 1056) Vital Signs (24h Range):  Temp:  [97.3 °F (36.3 °C)-99 °F (37.2 °C)] 99 °F (37.2 °C)  Pulse:  [69-73] 70  Resp:  [18-20] 18  SpO2:  [99 %-100 %] 100 %  BP: (133-152)/(61-94) 135/61     Weight: 69.9 kg (154 lb 1.6 oz)  Body mass index is 25.64 kg/m².  No intake or output data in the 24 hours ending 09/20/20 1346   Physical Exam  Constitutional:       General: She is not in acute distress.     Appearance: She is not diaphoretic.      Comments: drowsy   HENT:      Head: Normocephalic and atraumatic.      Nose: No congestion or rhinorrhea.      Mouth/Throat:      Mouth: Mucous membranes are dry.   Eyes:      Conjunctiva/sclera: Conjunctivae normal.   Neck:      Musculoskeletal: Neck supple.   Cardiovascular:      Rate and Rhythm: Normal rate and regular rhythm.      Pulses: Normal pulses.      Heart sounds: Normal heart sounds.   Pulmonary:      Breath sounds: Rales present. No wheezing.   Abdominal:      General: Bowel sounds are normal.      Palpations: Abdomen is soft.      Tenderness: There is no abdominal tenderness. There is no right CVA tenderness,  left CVA tenderness or guarding.   Musculoskeletal:      Right lower leg: No edema.      Left lower leg: No edema.   Skin:     General: Skin is warm and dry.      Findings: Lesion (Sacral ulcer/wound stage 4 5x4 cm. No active drainage.  See media) present.   Neurological:      Mental Status: Mental status is at baseline.      Comments: Oriented to self, , place. But not situation  Per Daughter Bushra, patient is at baseline   Psychiatric:         Mood and Affect: Mood normal.         Significant Labs: All pertinent labs within the past 24 hours have been reviewed.    Significant Imaging: I have reviewed and interpreted all pertinent imaging results/findings within the past 24 hours.

## 2020-09-20 NOTE — PLAN OF CARE
Pt to discharge tomorrow with home health and DME: TN called [facility] to arrange; unable to deliver today; will arrange for tomorrow; orders and clinicals faxed to [phone];     Clinicals also faxed to [phone] to arrange for HH; pending review    Note to Yolo via  to inform of family's decision not to return       09/20/20 1619   Post-Acute Status   Post-Acute Authorization HME;Home Health;Placement   Post-Acute Placement Status Patient/Family Changed Mind  (note to Y[place] via  informing of family's decision not to return to facility)   HME Status   (clinicals and orders faxed to [facility] for hospital bed, lift and wheelchair)   Home Health Status Referrals Sent  (clinicals faxed to [facility] to arrange HH; DC expected 9/21/20)   Patient choice form signed by patient/caregiver List from [facility]   Discharge Delays (!) Home Medical Equipment (Insurance, Delivery)   Discharge Plan   Discharge Plan A Home Health      09/20/20 1619   Post-Acute Status   Post-Acute Authorization HME;Home Health;Placement   Post-Acute Placement Status Patient/Family Changed Mind  (note to [place] via  informing of family's decision not to return to facility)   HME Status   (clinicals and orders faxed to [facility] for hospital bed, lift and wheelchair)   Home Health Status Referrals Sent  (clinicals faxed to [facility] to arrange HH; DC expected 9/21/20)   Patient choice form signed by patient/caregiver List from [facility]   Discharge Delays (!) Home Medical Equipment (Insurance, Delivery)   Discharge Plan   Discharge Plan A Home Health

## 2020-09-20 NOTE — PT/OT/SLP EVAL
"Physical Therapy Evaluation and Discharge Note    Patient Name:  Cindy Lyman   MRN:  [id number]    Recommendations:     Discharge Recommendations:  home health PT(- for Family Training and Home Care Environment Assessment to ensure patient safety with care within the Home Environments.)   Discharge Equipment Recommendations: "Tilting" wheelchair or Lilian Chair, lift device   Barriers to discharge: None    Assessment:     Cindy Lyman is a 84 y.o. female admitted with a medical diagnosis of Elevated troponin. .  At this time, patient is functioning at their prior level of function and does not require further acute PT services at this setting.       Recent Surgery: * No surgery found *      Plan:     During this hospitalization, patient does not require further acute PT services.  Please re-consult if situation changes.      Subjective     Chief Complaint: my Left Leg is Hurting... Don't even think about moving it.   Patient/Family Comments/goals: to return to Home Environment with Family.   Pain/Comfort:  · Pain Rating 1: 10/10  · Location - Side 1: Left  · Location 1: leg  · Pain Addressed 1: Cessation of Activity    Patients cultural, spiritual, Faith conflicts given the current situation:      Living Environment:  Patient is a Facility Resident from [facility], but family would like for patient to be discharge to Daughter's home.  Daughter lives in a 2 story home with 1 IVONNE.  Pt's bed will be on the 1st floor of the home.    Prior to admission, patients level of function was Total Dependent.  Equipment used at home: lift device, hospital bed.  DME owned (not currently used): none.  Upon discharge, patient will have assistance from Daughter and Patient's other Daughter to provide assistance as available..    Objective:     Communicated with Nurse Practictor prior to session.  Patient found supine with telemetry, peripheral IV, bed alarm upon PT entry to room.    General Precautions: " Standard, fall, pacemaker   Orthopedic Precautions:Full weight bearing   Braces: N/A     Exams:  · Cognitive Exam:  Patient is oriented to Person and Situation  · Gross Motor Coordination:  Unable to formally test 2* patient refusal due to pain  · Postural Exam:  Patient presented with the following abnormalities:    · -       Rounded shoulders  · -       Forward head  · -       Lateral weight shift of hips  · -       Abnormal trunk flexion  · Skin Integrity/Edema:      · -       Skin integrity: Wound sacral pressure ulcer 5.0 cm x 4.0 cm  · -       Edema: Mild dandre lateral Lower Leg   · RLE ROM: Unable to formally assess 2* patient refusal due to pain    · RLE Strength: Unable to formally assess 2* patient refusal due to pain   · LLE ROM: Unable to formally assess 2* patient refusal due to pain   · LLE Strength: Unable to formally assess 2* patient refusal due to pain     Functional Mobility:  · Bed Mobility:     · Rolling Left:  dependence  · Rolling Right: dependence  · Balance: Max Assist 2* poor postural control with sitting balance.     AM-PAC 6 CLICK MOBILITY  Total Score:6     Patient left supine with all lines intact, call button in reach and daughter present.     GOALS:   Multidisciplinary Problems     Physical Therapy Goals        Problem: Physical Therapy Goal    Goal Priority Disciplines Outcome Goal Variances Interventions   Physical Therapy Goal     PT, PT/OT Adequate for Care Transition     Description: Physical Therapy Order for Evaluation was RECEIVED and patient's Initial Evaluation was COMPLETED.    No Additional Physical Therapy Services required at this time due to Pt is currently at Prior Level of Function (patient is total dependent, bed bound, and non-ambulatory as per daughter).       After discharge from hospital, the Recommendation is for Home Health Physical Therapy Services for a Home Environment Assessment for patient safety and Family Education for patient handling with DME's.  "    Recommended DME's for patient  include:  marjan lift (for transfers OOB) and "tilting" wheel chair 2* limited postural strength (for functional mobility and re-positioning to facilitate wound healing).      Riley Cortez, PT   9/20/2020                      History:     Past Medical History:   Diagnosis Date    Anemia     Anticoagulant long-term use     Aortic stenosis     Arthritis     lower back    Asthma     CAD (coronary artery disease) 4/24/2015    Carpal tunnel syndrome on both sides     Cataract     CHF (congestive heart failure) 5/2013    COPD (chronic obstructive pulmonary disease)     Depression     DVT (deep venous thrombosis)     Hyperlipidemia     Hypertension     Pulmonary HTN     TMJ (temporomandibular joint disorder)        Past Surgical History:   Procedure Laterality Date    A-V CARDIAC PACEMAKER INSERTION N/A 7/5/2018    Procedure: INSERTION, CARDIAC PACEMAKER, DUAL CHAMBER;  Surgeon: Giancarlo Houser MD;  Location: [facility] CATH LAB;  Service: Cardiology;  Laterality: N/A;    AORTIC VALVE REPLACEMENT N/A 7/5/2018    Procedure: Replacement-valve-aortic;  Surgeon: Corey Castañeda MD;  Location: [facility] CATH LAB;  Service: Cardiology;  Laterality: N/A;    BACK SURGERY      cardiac stents      CARDIAC VALVE SURGERY      CARPAL TUNNEL RELEASE      Right/Left    CHOLECYSTECTOMY  07/2017    EYE SURGERY      cataract extraction    HYSTERECTOMY      Partial    JOINT REPLACEMENT  2009    Left hip    LEFT HEART CATHETERIZATION Left 10/9/2018    Procedure: Left heart cath;  Surgeon: Tony Carmen MD;  Location: [facility] CATH LAB;  Service: Cardiology;  Laterality: Left;    POLYPECTOMY      x9    TEMPOROMANDIBULAR JOINT SURGERY         Time Tracking:     PT Received On: 09/20/20  PT Start Time: 0930     PT Stop Time: 1000  PT Total Time (min): 30 min     Billable Minutes: Evaluation 30 min      Riley Cortez, PT  09/20/2020  "

## 2020-09-20 NOTE — ASSESSMENT & PLAN NOTE
Per Daughter Bushra, patient oriented to self, place year and situation but at times confused. Patient still thinks her  is alive and that he lives at their house.   Avoid sedatives

## 2020-09-20 NOTE — ASSESSMENT & PLAN NOTE
Sacral ulcer stage 4. CRP 7.6 outpatient and today elevated 93. Afebrile. No leukocytosis  Patient refused heel protects  Off load heels on pillow for now  Turn every 2 hrs  Clean wound with normal saline, wet to dry dressing until seen by wound care tomorrow

## 2020-09-20 NOTE — ASSESSMENT & PLAN NOTE
Chest pain free. Elevated troponin 0.09 and BNP 2770 outpatient. Initial Trop today 0.15 and trended down 0.07 second set. BNP 2092. Chest xray no acute process. Lungs with rale. No leg edema. EKG 70 NSR LBBB. Echo 9/20/19 EF 25% indeterminate DD  Flat pattern in setting of heart failure  Denies chest pain

## 2020-09-21 PROCEDURE — 96372 THER/PROPH/DIAG INJ SC/IM: CPT | Mod: 59 | Performed by: STUDENT IN AN ORGANIZED HEALTH CARE EDUCATION/TRAINING PROGRAM

## 2020-09-21 PROCEDURE — 63600175 PHARM REV CODE 636 W HCPCS: Performed by: NURSE PRACTITIONER

## 2020-09-21 PROCEDURE — G0378 HOSPITAL OBSERVATION PER HR: HCPCS

## 2020-09-21 PROCEDURE — 25000003 PHARM REV CODE 250: Performed by: NURSE PRACTITIONER

## 2020-09-21 RX ORDER — OXYCODONE HYDROCHLORIDE 5 MG/1
5 TABLET ORAL ONCE
Status: COMPLETED | OUTPATIENT
Start: 2020-09-21 | End: 2020-09-21

## 2020-09-21 RX ORDER — FERROUS SULFATE 325(65) MG
325 TABLET, DELAYED RELEASE (ENTERIC COATED) ORAL DAILY
Status: DISCONTINUED | OUTPATIENT
Start: 2020-09-21 | End: 2020-09-23 | Stop reason: HOSPADM

## 2020-09-21 RX ORDER — ENOXAPARIN SODIUM 100 MG/ML
40 INJECTION SUBCUTANEOUS EVERY 24 HOURS
Status: DISCONTINUED | OUTPATIENT
Start: 2020-09-21 | End: 2020-09-23 | Stop reason: HOSPADM

## 2020-09-21 RX ADMIN — CLONIDINE HYDROCHLORIDE 0.3 MG: 0.1 TABLET ORAL at 08:09

## 2020-09-21 RX ADMIN — PANTOPRAZOLE SODIUM 40 MG: 40 TABLET, DELAYED RELEASE ORAL at 10:09

## 2020-09-21 RX ADMIN — CLONIDINE HYDROCHLORIDE 0.3 MG: 0.1 TABLET ORAL at 04:09

## 2020-09-21 RX ADMIN — CLOPIDOGREL 75 MG: 75 TABLET, FILM COATED ORAL at 10:09

## 2020-09-21 RX ADMIN — METOPROLOL TARTRATE 100 MG: 50 TABLET, FILM COATED ORAL at 10:09

## 2020-09-21 RX ADMIN — FUROSEMIDE 40 MG: 40 TABLET ORAL at 10:09

## 2020-09-21 RX ADMIN — Medication 325 MG: at 04:09

## 2020-09-21 RX ADMIN — ACETAMINOPHEN 500 MG: 500 TABLET ORAL at 10:09

## 2020-09-21 RX ADMIN — ENOXAPARIN SODIUM 40 MG: 40 INJECTION SUBCUTANEOUS at 06:09

## 2020-09-21 RX ADMIN — SENNOSIDES 1 TABLET: 8.6 TABLET, FILM COATED ORAL at 10:09

## 2020-09-21 RX ADMIN — CLONIDINE HYDROCHLORIDE 0.3 MG: 0.1 TABLET ORAL at 10:09

## 2020-09-21 RX ADMIN — ASPIRIN 81 MG: 81 TABLET, COATED ORAL at 10:09

## 2020-09-21 RX ADMIN — METOPROLOL TARTRATE 100 MG: 50 TABLET, FILM COATED ORAL at 08:09

## 2020-09-21 RX ADMIN — OXYCODONE 5 MG: 5 TABLET ORAL at 01:09

## 2020-09-21 NOTE — CONSULTS
Consulted for sacral wounds  An 84 year old female admitted to O[facility] 9/18/20 from [facility] with elevated troponin; systolic congestive heart failure; pressure injury sacrum Stage 4; Stage 3 CKD; essential hypertension; CAD S/P PCI; pulmonary hypertension; hyperlipidemia  PMH: Anemia; anticoagulant long term use; aortic stenosis; arthritis lower back; asthma; CAD; carpal tunnel syndrome; CHF; COPD; depression; DVT; HLD; HTN; pulmonary HTN; TMJ; S/P pacemaker; S/P aortic valve replacement  Patient discharging home with HH for wound care  9/19 WBC 8.29 Hgb 9.7 Hct 31.5 Alb 2.3  On Isoflex mattress; dependent bed mobility; bedbound- son reports patient has not been up in a chair since COVID- 19 (March, 2019); incontinent- wearing adult brief; family reports poor po intake  Assessment:  Photodocumentation from admit (9/19)    Sacral pressure injury- Stage 4 pressure injury 5 cm(L) 4 cm(W) 3 cm (D) with 5 mm undermining from 10-1 o'clock. Noted bone felt in area of undermining with small amount of slough. Developing buds of granulation in base. No surrounding erythema/induration.   Right heel- Scar, healed pressure injury  Left medial heel- DTI, drying dark 3 cm circular blister. No surrounding erythema. Patient requesting that boots not be used to offload pressure due to being too tight.  Treatment:  Local wound care to sacral pressure injury every 12 hours with Vashe moistened gauze. Change dressing if soiled with incontinence.   Check for incontinence frequently and change incontinence garment as needed.   Reposition patient side to side avoiding pressure on sacrum.   Low air loss bed for hospital bed.  EHOB boots to suspend heels off bed.   Discussed sitting up in chair with son, Rafael. Since patient has not sat up since March, will need assistance sitting patient up. Will need cushion such as a [place] wheelchair cushion if patient able to sit up. Limit time sitting to 1 hour intervals with pressure shifts at least  every 30 minutes.

## 2020-09-21 NOTE — ASSESSMENT & PLAN NOTE
Sacral ulcer stage 4. CRP 7.6 outpatient and today elevated 93. Afebrile. No leukocytosis  Patient refused heel protects  Off load heels on pillow for now  Turn every 2 hrs  Appreciate Wound Care Nurse  Local wound care to sacral pressure injury every 12 hours with Vashe moistened gauze. Change dressing if soiled with incontinence.   Check for incontinence frequently and change incontinence garment as needed.   Reposition patient side to side avoiding pressure on sacrum.   Low air loss bed for hospital bed.  EHOB boots to suspend heels off bed.   Concern for sacrum osteomyelitis . NM Bone scan cannot be done until tomorrow 9/22. Consult General Surgery and ID pending Bone Scan.  Discussed with Daughter Kady GUERRA that patient may need bone biopsy and weeks of IV antibiotics. Rachel Kady wants continue treatment for wound/osteo

## 2020-09-21 NOTE — PROGRESS NOTES
[facility] Medicine  Progress Note    Patient Name: Cindy Lyman  MRN: [id number]  Patient Class: OP- Observation   Admission Date: 2020  Length of Stay: 0 days  Attending Physician: Grupo Wharton MD  Primary Care Provider: Rodger Camarena MD        Subjective:     Principal Problem:Pressure injury of sacral region, stage 4        HPI:  This is a 84 y.o female with aortic stenosis s/p TVAR (2018), pacemaker (2018), CAD, CHF, COPD, DVT, HTN, HLD, and pulmonary HTN who presents to the hospital from [facility] for evaluation of abnormal troponin and BNP results. Patient is confused and doesn't know why she was sent to the hospital. She is able to tell me her name and  and place (hospital). History is limited due to patient's mental status. Per ED record, patient is here due to elevated her troponin and BNP lab. Her lab workup in NH yesterday with troponin of 0.093, a CRP of 7.6, and BNP of 2770. During my exam, patient seems sleepy/drowsy but easily aroused with voice. Patient reports to me she doesn't have any complains/pain/discomfort at this time. She denies fever, cough, SOB, chest pain, abdominal pain, headache, dysuria, N/V/D. Patient also has stage 4 sacral ulcer which treated in NH.  Per ED noted, her daughter reports patient has baseline SOB and multiple falls at her NH. Daughter concerns the patient getting inadequate care at her NH and wants to withdraw pt from [facility] once pt has her PEG tube placed. In ED, initial troponin 0.1 but trended down to 0.07 second set. BNP 2092, CRP 93. Chest xray with no acute process. EKG with 70 NSR with LBBB. Per ED note, Cardiologist Dr. Carmen was contacted and he feels patient will likely only need troponin trended and official cardiology consult if trending upward.    Patient is placed in Observation for further evaluation and management elevated Troponin/CHF exacerbation and possible infected stage 3-4 sacral  ulcer      Overview/Hospital Course:  Mrs. Lyman is a 85 yo female who was transferred to [facility] for elevated BNP. Denies chest pain or shortness of breath. EKG NSR LBBB 70 (not new) and elevated troponin trend flat pattern. On exam, no volume excess. Lasix given in ED x 1. Incontinence. Main complaint is right hip/leg pain. X ray right femur no fracture hips or knee. X ray pelvis no able to visualize right iliac wing and proximal right femur. CT thigh no fracture.   Repeat 2 D echo EF 30%, grade I DD,  Aortic bioprothesis-no aortic insufficiency, and PASP 43 mmHg. Denies shortness of breath and breathing comfortable on RA 99%  Patient and family -Daughters -Bushra, Jennifer, Kady (POA) refused to go back to [facility].   Stage 4 sacral wound concern for osteomyelitis. NM bone scan cannot be done until tomorrow 9/22/20. Daughter Kady GUERRA wants continue evaluation and treatment for stage 4 wound/probable osteomyelitis.        Interval History: Screams when turning patient but she cannot elaborate on pain.    Review of Systems   Constitutional: Positive for appetite change. Negative for diaphoresis and fever.   HENT: Negative for congestion and sore throat.    Respiratory: Negative for cough, shortness of breath and wheezing.    Cardiovascular: Negative for chest pain and leg swelling.   Gastrointestinal: Negative for abdominal pain, nausea and vomiting.   Genitourinary: Negative for dysuria and flank pain.   Musculoskeletal: Positive for arthralgias (leg pain - chronic). Negative for back pain.   Skin: Positive for wound (sacral ulcer).   Neurological: Negative for headaches.   Psychiatric/Behavioral: Positive for confusion.     Objective:     Vital Signs (Most Recent):  Temp: 99 °F (37.2 °C) (09/21/20 1234)  Pulse: 72 (09/21/20 1234)  Resp: 18 (09/21/20 1234)  BP: 135/63 (09/21/20 1234)  SpO2: 99 % (09/21/20 1234) Vital Signs (24h Range):  Temp:  [98 °F (36.7 °C)-99 °F (37.2 °C)] 99 °F (37.2  °C)  Pulse:  [70-72] 72  Resp:  [17-18] 18  SpO2:  [98 %-100 %] 99 %  BP: (121-149)/(57-77) 135/63     Weight: 69.8 kg (153 lb 14.1 oz)  Body mass index is 25.61 kg/m².  No intake or output data in the 24 hours ending 20 1436   Physical Exam  Constitutional:       General: She is not in acute distress.     Appearance: She is not diaphoretic.      Comments: drowsy   HENT:      Head: Normocephalic and atraumatic.      Nose: No congestion or rhinorrhea.      Mouth/Throat:      Mouth: Mucous membranes are dry.   Eyes:      Conjunctiva/sclera: Conjunctivae normal.   Neck:      Musculoskeletal: Neck supple.   Cardiovascular:      Rate and Rhythm: Normal rate and regular rhythm.      Pulses: Normal pulses.      Heart sounds: Normal heart sounds.   Pulmonary:      Breath sounds: Rales present. No wheezing.   Abdominal:      General: Bowel sounds are normal.      Palpations: Abdomen is soft.      Tenderness: There is no abdominal tenderness. There is no right CVA tenderness, left CVA tenderness or guarding.   Musculoskeletal:      Right lower leg: No edema.      Left lower leg: No edema.   Skin:     General: Skin is warm and dry.      Findings: Lesion (Sacral ulcer/wound stage 4 5x4 cm. No active drainage.  See media) present.   Neurological:      Mental Status: Mental status is at baseline.      Comments: Oriented to self, , place. But not situation  Per Daughter Bushra, patient is at baseline   Psychiatric:         Mood and Affect: Mood normal.         Significant Labs: All pertinent labs within the past 24 hours have been reviewed.    Significant Imaging: I have reviewed and interpreted all pertinent imaging results/findings within the past 24 hours.      Assessment/Plan:      * Pressure injury of sacral region, stage 4/Probably Osteomyelitis  Sacral ulcer stage 4. CRP 7.6 outpatient and today elevated 93. Afebrile. No leukocytosis  Patient refused heel protects  Off load heels on pillow for now  Turn every 2  hrs  Appreciate Wound Care Nurse  Local wound care to sacral pressure injury every 12 hours with Vashe moistened gauze. Change dressing if soiled with incontinence.   Check for incontinence frequently and change incontinence garment as needed.   Reposition patient side to side avoiding pressure on sacrum.   Low air loss bed for hospital bed.  EHOB boots to suspend heels off bed.   Concern for sacrum osteomyelitis . NM Bone scan cannot be done until tomorrow 9/22. Consult General Surgery and ID pending Bone Scan.  Discussed with Daughter Kady GUERRA that patient may need bone biopsy and weeks of IV antibiotics. Rejidelvin Hillman wants continue treatment for wound/osteo            Bedbound  Patient have been in [place] for 2 years? Bedbound at NH and need assistant with all ADL's  Per chart review, cardiac arrest in 2018 and at that time had severe oropharyngeal dysphagia then PEG placement 10/19/19 ; not sure when was removed  Per Daughter, patient at times refuse to eat and take medications. No issues with medications so far. Patient states she doesn't like nursing home food. She did eat 100% lunch yesterday 9/19 but little for dinner. She is requesting Ensure  Daughters Bushra, Gregory, and Jennifer would like for other to return home with  and Palliative care. Patient and Daughters refusing to go back to NH  TN assisting to get hospital bed, marjan lift and wheelchair  Code status -DNR           Cognitive impairment  Per Daughter Bushra, patient oriented to self, place year and situation but at times confused. Patient still thinks her  is alive and that he lives at their house.   Avoid sedatives       Systolic congestive heart failure  Denies SOB.  Echo 9/20/19 with EF 25%.  BNP elevated outpatient 2770.  BNP 2000 today. Chest xray no acute process. Lungs with rale. No leg edema. Home med with Lasix  2 echo EF 30%, grade I DD, PH 43 mmHg  No volume excess on exam  Continue home lasix,  metoprolol          Elevated troponin  Chest pain free. Elevated troponin 0.09 and BNP 2770 outpatient. Initial Trop today 0.15 and trended down 0.07 second set. BNP 2092. Chest xray no acute process. Lungs with rale. No leg edema. EKG 70 NSR LBBB. Echo 9/20/19 EF 25% indeterminate DD  Flat pattern in setting of heart failure  Denies chest pain        Stage 3 chronic kidney disease  Stable      Essential hypertension  Controlled. Continue home BB      CAD s/p PCI  CARLOS MANUEL to mid-LAD 4/2015 per Dr. Cresencio Alfredo  No chest pain or SOB  Aspirin, plavix, BB and statin per home    Anemia of chronic disease  Chronic inflammation and poor nutrition  Low iron studies. Ferritin 146 okay  Ferrous sulfate      Pulmonary hypertension  Group 2  See Heart failure      Hyperlipidemia  Lab Results   Component Value Date    LDLCALC 130.6 09/19/2020   Continue statin      VTE Risk Mitigation (From admission, onward)         Ordered     enoxaparin injection 40 mg  Every 24 hours      09/21/20 1445     IP VTE HIGH RISK PATIENT  Once      09/19/20 0134     Place sequential compression device  Until discontinued      09/19/20 0134     Place LAUREANO hose  Until discontinued      09/19/20 0134                Discharge Planning   JAMES: 9/21/2020     Code Status: DNR   Is the patient medically ready for discharge?:     Reason for patient still in hospital (select all that apply): Treatment  Discharge Plan A: Home Health   Discharge Delays: (!) Home Medical Equipment (Insurance, Delivery)              Patricia Celaya NP  Department of Hospital Medicine   [facility]

## 2020-09-21 NOTE — PROGRESS NOTES
Message sent to LILIAM Robledo with [facility] to follow up if auth for DME has been received from N. Radha reports no auth as of this time.    1322- call placed to Nallely with N to follow up on auth for HH and HME. Nallely to call TN back.  TN to follow

## 2020-09-21 NOTE — SUBJECTIVE & OBJECTIVE
Interval History: Screams when turning patient but she cannot elaborate on pain.    Review of Systems   Constitutional: Positive for appetite change. Negative for diaphoresis and fever.   HENT: Negative for congestion and sore throat.    Respiratory: Negative for cough, shortness of breath and wheezing.    Cardiovascular: Negative for chest pain and leg swelling.   Gastrointestinal: Negative for abdominal pain, nausea and vomiting.   Genitourinary: Negative for dysuria and flank pain.   Musculoskeletal: Positive for arthralgias (leg pain - chronic). Negative for back pain.   Skin: Positive for wound (sacral ulcer).   Neurological: Negative for headaches.   Psychiatric/Behavioral: Positive for confusion.     Objective:     Vital Signs (Most Recent):  Temp: 99 °F (37.2 °C) (09/21/20 1234)  Pulse: 72 (09/21/20 1234)  Resp: 18 (09/21/20 1234)  BP: 135/63 (09/21/20 1234)  SpO2: 99 % (09/21/20 1234) Vital Signs (24h Range):  Temp:  [98 °F (36.7 °C)-99 °F (37.2 °C)] 99 °F (37.2 °C)  Pulse:  [70-72] 72  Resp:  [17-18] 18  SpO2:  [98 %-100 %] 99 %  BP: (121-149)/(57-77) 135/63     Weight: 69.8 kg (153 lb 14.1 oz)  Body mass index is 25.61 kg/m².  No intake or output data in the 24 hours ending 09/21/20 1436   Physical Exam  Constitutional:       General: She is not in acute distress.     Appearance: She is not diaphoretic.      Comments: drowsy   HENT:      Head: Normocephalic and atraumatic.      Nose: No congestion or rhinorrhea.      Mouth/Throat:      Mouth: Mucous membranes are dry.   Eyes:      Conjunctiva/sclera: Conjunctivae normal.   Neck:      Musculoskeletal: Neck supple.   Cardiovascular:      Rate and Rhythm: Normal rate and regular rhythm.      Pulses: Normal pulses.      Heart sounds: Normal heart sounds.   Pulmonary:      Breath sounds: Rales present. No wheezing.   Abdominal:      General: Bowel sounds are normal.      Palpations: Abdomen is soft.      Tenderness: There is no abdominal tenderness. There is  no right CVA tenderness, left CVA tenderness or guarding.   Musculoskeletal:      Right lower leg: No edema.      Left lower leg: No edema.   Skin:     General: Skin is warm and dry.      Findings: Lesion (Sacral ulcer/wound stage 4 5x4 cm. No active drainage.  See media) present.   Neurological:      Mental Status: Mental status is at baseline.      Comments: Oriented to self, , place. But not situation  Per Daughter Bushra, patient is at baseline   Psychiatric:         Mood and Affect: Mood normal.         Significant Labs: All pertinent labs within the past 24 hours have been reviewed.    Significant Imaging: I have reviewed and interpreted all pertinent imaging results/findings within the past 24 hours.

## 2020-09-21 NOTE — PROGRESS NOTES
Spoke with Zaina Celaya NP about Ms Dow right leg pain. Tylenol given at 2146 with no relief. She is now crying out and moaning. Repositioned and ice applied. Home meds include OxyContin.

## 2020-09-21 NOTE — PROGRESS NOTES
Call placed to [facility], spoke to Nallely to follow up on arrangement of HHCS, Nallely reports that she does not see an auth for HH but does see auth for DME- hospital bed, marjan lift and wheelchair-- to be provided by [facility].  Per review of record no HH orders have been entered as of this time due to pt not being seen by wound care as of this time for sacral decub care.   TN to send orders when available for arrangement. Also at this time TN advised that the pt will likely need an ambulance for transportation to home later today.        1113- met with pt and son at bedside to discuss d/c plan and confirm that d/c plan is the same as discussed on yesterday.  Son and pt state that the plan is for the pt to return home with home health and ordered DME-- hospital bed, marjan lift and wheelchair. Son at this time states that pts daughter Radha is the person to contact for any additional information     1128- call placed to pts daughter, Radha @ [phone] to confirm d/c plan and advise that today will be d/c day.  Radha reports that she has not heard anything about the pts equipment but that the plan is for the pt to d/c to her home at time of d/c.  Radha at this time requested that a copy of pts negative COVID testing be sent home with the pt as she also has an autistic son whom she care for at there home and has caregivers that come in to assist her with this and the company will need proof of negative results to continue caring for her son.  TN printed copy of results and given to pts son to place in Blue Folder for Radha.  TN advised Radha once all arrangements are made and transportation has been scheduled TN will call her back.    1136- message sent to ANILA Robledo with [facility] to follow up on  If auth for equipment has been provided to deliver equipment to the home on today.  Radha reports that they have not received the auth yet but will notify TN when auth received    1155-  HH orders faxed to [facility] for  arrangement. TN indicated that the plan is for pt to d/c to home on today and was with [facility] in the past.  TN provided name and contact information and requested to notify TN when arrangement complete.

## 2020-09-22 ENCOUNTER — TELEPHONE (OUTPATIENT)
Dept: ELECTROPHYSIOLOGY | Facility: CLINIC | Age: 85
End: 2020-09-22

## 2020-09-22 LAB
ALBUMIN SERPL BCP-MCNC: 2.2 G/DL (ref 3.5–5.2)
ALP SERPL-CCNC: 83 U/L (ref 55–135)
ALT SERPL W/O P-5'-P-CCNC: <5 U/L (ref 10–44)
ANION GAP SERPL CALC-SCNC: 8 MMOL/L (ref 8–16)
AST SERPL-CCNC: 7 U/L (ref 10–40)
BASOPHILS # BLD AUTO: 0.04 K/UL (ref 0–0.2)
BASOPHILS NFR BLD: 0.7 % (ref 0–1.9)
BILIRUB SERPL-MCNC: 0.4 MG/DL (ref 0.1–1)
BUN SERPL-MCNC: 19 MG/DL (ref 8–23)
CALCIUM SERPL-MCNC: 8.5 MG/DL (ref 8.7–10.5)
CHLORIDE SERPL-SCNC: 103 MMOL/L (ref 95–110)
CO2 SERPL-SCNC: 27 MMOL/L (ref 23–29)
CREAT SERPL-MCNC: 0.8 MG/DL (ref 0.5–1.4)
DIFFERENTIAL METHOD: ABNORMAL
EOSINOPHIL # BLD AUTO: 0.3 K/UL (ref 0–0.5)
EOSINOPHIL NFR BLD: 4.3 % (ref 0–8)
ERYTHROCYTE [DISTWIDTH] IN BLOOD BY AUTOMATED COUNT: 15.4 % (ref 11.5–14.5)
EST. GFR  (AFRICAN AMERICAN): >60 ML/MIN/1.73 M^2
EST. GFR  (NON AFRICAN AMERICAN): >60 ML/MIN/1.73 M^2
GLUCOSE SERPL-MCNC: 113 MG/DL (ref 70–110)
HCT VFR BLD AUTO: 30.5 % (ref 37–48.5)
HGB BLD-MCNC: 9.9 G/DL (ref 12–16)
IMM GRANULOCYTES # BLD AUTO: 0.02 K/UL (ref 0–0.04)
IMM GRANULOCYTES NFR BLD AUTO: 0.3 % (ref 0–0.5)
LYMPHOCYTES # BLD AUTO: 1.8 K/UL (ref 1–4.8)
LYMPHOCYTES NFR BLD: 28.6 % (ref 18–48)
MCH RBC QN AUTO: 27.9 PG (ref 27–31)
MCHC RBC AUTO-ENTMCNC: 32.5 G/DL (ref 32–36)
MCV RBC AUTO: 86 FL (ref 82–98)
MONOCYTES # BLD AUTO: 0.6 K/UL (ref 0.3–1)
MONOCYTES NFR BLD: 9 % (ref 4–15)
NEUTROPHILS # BLD AUTO: 3.5 K/UL (ref 1.8–7.7)
NEUTROPHILS NFR BLD: 57.1 % (ref 38–73)
NRBC BLD-RTO: 0 /100 WBC
PLATELET # BLD AUTO: 373 K/UL (ref 150–350)
PMV BLD AUTO: 10.4 FL (ref 9.2–12.9)
POTASSIUM SERPL-SCNC: 3.5 MMOL/L (ref 3.5–5.1)
PROT SERPL-MCNC: 6.4 G/DL (ref 6–8.4)
RBC # BLD AUTO: 3.55 M/UL (ref 4–5.4)
SODIUM SERPL-SCNC: 138 MMOL/L (ref 136–145)
WBC # BLD AUTO: 6.11 K/UL (ref 3.9–12.7)

## 2020-09-22 PROCEDURE — G0378 HOSPITAL OBSERVATION PER HR: HCPCS

## 2020-09-22 PROCEDURE — 63600175 PHARM REV CODE 636 W HCPCS: Performed by: NURSE PRACTITIONER

## 2020-09-22 PROCEDURE — 85025 COMPLETE CBC W/AUTO DIFF WBC: CPT

## 2020-09-22 PROCEDURE — 80053 COMPREHEN METABOLIC PANEL: CPT

## 2020-09-22 PROCEDURE — 25000003 PHARM REV CODE 250: Performed by: NURSE PRACTITIONER

## 2020-09-22 PROCEDURE — 63600175 PHARM REV CODE 636 W HCPCS: Performed by: HOSPITALIST

## 2020-09-22 PROCEDURE — 96372 THER/PROPH/DIAG INJ SC/IM: CPT | Mod: 59 | Performed by: STUDENT IN AN ORGANIZED HEALTH CARE EDUCATION/TRAINING PROGRAM

## 2020-09-22 PROCEDURE — 96375 TX/PRO/DX INJ NEW DRUG ADDON: CPT | Performed by: STUDENT IN AN ORGANIZED HEALTH CARE EDUCATION/TRAINING PROGRAM

## 2020-09-22 PROCEDURE — 36415 COLL VENOUS BLD VENIPUNCTURE: CPT

## 2020-09-22 RX ORDER — LORAZEPAM 2 MG/ML
1 INJECTION INTRAMUSCULAR ONCE
Status: DISCONTINUED | OUTPATIENT
Start: 2020-09-22 | End: 2020-09-22

## 2020-09-22 RX ORDER — OXYCODONE AND ACETAMINOPHEN 5; 325 MG/1; MG/1
1 TABLET ORAL EVERY 8 HOURS PRN
Status: DISCONTINUED | OUTPATIENT
Start: 2020-09-22 | End: 2020-09-23 | Stop reason: HOSPADM

## 2020-09-22 RX ORDER — MORPHINE SULFATE 1 MG/ML
1 INJECTION, SOLUTION EPIDURAL; INTRATHECAL; INTRAVENOUS
Status: DISCONTINUED | OUTPATIENT
Start: 2020-09-22 | End: 2020-09-22 | Stop reason: CLARIF

## 2020-09-22 RX ORDER — MORPHINE SULFATE 10 MG/ML
1 INJECTION INTRAMUSCULAR; INTRAVENOUS; SUBCUTANEOUS
Status: DISCONTINUED | OUTPATIENT
Start: 2020-09-22 | End: 2020-09-23 | Stop reason: HOSPADM

## 2020-09-22 RX ORDER — LORAZEPAM 2 MG/ML
1 INJECTION INTRAMUSCULAR ONCE
Status: COMPLETED | OUTPATIENT
Start: 2020-09-22 | End: 2020-09-22

## 2020-09-22 RX ADMIN — SENNOSIDES 1 TABLET: 8.6 TABLET, FILM COATED ORAL at 08:09

## 2020-09-22 RX ADMIN — LORAZEPAM 1 MG: 2 INJECTION, SOLUTION INTRAMUSCULAR; INTRAVENOUS at 04:09

## 2020-09-22 RX ADMIN — CLONIDINE HYDROCHLORIDE 0.3 MG: 0.1 TABLET ORAL at 05:09

## 2020-09-22 RX ADMIN — ENOXAPARIN SODIUM 40 MG: 40 INJECTION SUBCUTANEOUS at 05:09

## 2020-09-22 RX ADMIN — Medication 325 MG: at 08:09

## 2020-09-22 RX ADMIN — FUROSEMIDE 40 MG: 40 TABLET ORAL at 08:09

## 2020-09-22 RX ADMIN — PANTOPRAZOLE SODIUM 40 MG: 40 TABLET, DELAYED RELEASE ORAL at 08:09

## 2020-09-22 RX ADMIN — CLOPIDOGREL 75 MG: 75 TABLET, FILM COATED ORAL at 08:09

## 2020-09-22 RX ADMIN — ASPIRIN 81 MG: 81 TABLET, COATED ORAL at 08:09

## 2020-09-22 RX ADMIN — MORPHINE SULFATE 1 MG: 10 INJECTION INTRAVENOUS at 03:09

## 2020-09-22 RX ADMIN — CLONIDINE HYDROCHLORIDE 0.3 MG: 0.1 TABLET ORAL at 08:09

## 2020-09-22 RX ADMIN — METOPROLOL TARTRATE 100 MG: 50 TABLET, FILM COATED ORAL at 08:09

## 2020-09-22 NOTE — NURSING
Handoff received from BRITNEY Rajan     Pt resting in bed quietly. NAD noted. No c/o pain.     Fall and safety precautions maintained. Bed alarm activated and audible.. Bed locked in lowest position, with side rails up x2. Call bell and personal items within reach

## 2020-09-22 NOTE — PROGRESS NOTES
pts nurse Estrella Dickinson advised TN that the pts daughter called stating that she was wondering if the pt could go to LTAC at time of discharge.    1305-  Call placed to pts daughter Radha to discuss conversation she had with nursing.  At this time Radha states that she talk to a nurse she knows and they told her something about her mother should be able to go to LTAC.  TN advised that at this time her mother does not have a diagnosis for LTAC based on information in chart as of right now. Radha continues to talk about that she is in her 60's with high blood pressure and has an autistic son she cares for and does not feel that she can take care of her mothers wound at home due to information that she received from the pts nurse that the pt cannot sit up at all nor turn in bed to assist in her caring for her wounds and she is the only person in the home.  She reports that prior to today no one including the doctor told her that her mom could not do anything and was bed bound.  TN again advised that the pt is in Observation and that if bone scan comes back negative pt will likely be ready for discharge.  Radha then requests for TN to send referral to other facilities to see if can get mother accepted to new facility. TN agreed to do so but also advised her that if the pt is medically ready for d/c on today the pt has a safe d/c plan of returning to [facility] and the SW at [facility] can assist with getting pt to another facility when available. Radha also advised that she would like to have pt at a closer facility than [facility] if possible since she believes that the NH are now allowing people to visit at NH. TN to follow up with Radha once response received from other facilities.      1320- as per daughter Radha's request referral sent via [facility] to the following facilities for review:  1.[facility]  2. [facility]  3. [facility]  4. [facility]  5. [facility]  6. [facility]    Along with the above  facilities also sent a referral to [facility] to inquire if they would be able to accept the pt back in the event that the pt does not get accepted to another facility and is ready for d/c on today    1328- call received from pts daughter Radha stating that she spoke to Kyleigh at [facility] who told her to have TN at hospital to send referral for review.  Radha also states that Kyleigh told her that they would have to review the clinicals to determine if they can meet the pts needs or not and also that they do not have any private medicaid rooms for sure that they know of.  TN advised that referral has already been sent and pending review.      1330- call received from Danielle with [facility] stating that the DON will look at the pts clinicals to see if they can meet her needs and if so they will need to reach out to [facility] to confirm if pt has long term medicaid.  If DON approves and pt has long term medicaid Danielle will reach out to the family to discuss admission      1340- call received from Karime with [facility] stating that when they received the message the other day that the family did not want the pt to return, they did discharge her but that she is their pt and that they can take the pt back if the family is in agreement with this.  TN advised that we are waiting for the results from the bone scan to determine if pt ready for d/c and that TN will contact Kar[facility] when updated information available.    1351- call received from Marga at [facility] stating that she is going to have DON review clinicals to see if they are able to meet pts needs and will call TN when additional information available.    1414- message sent to Alanna with [facility] to follow up on referral sent.      1454- call received from Danielle with [facility] stating that they can accept the and daughter is in agreement with her going there.  The only issue is that they need some paperwork from [facility] in order to be able  to admit the pt. And fears that she may not be able to get all paperwork needed today from [facility] as she tried to call and was told that [facility] was busy and could not talk but requested that Danielle email the list of things needed and she would send as soon as she was able to get all paperwork together. If not able to get all requested paperwork the pt will not be able to go today but should be able to go on tomorrow and daughter will come and sign paperwork when told.

## 2020-09-22 NOTE — CARE UPDATE
Spoke to patient's daughter Radha via telephone regarding patient's refusal of CT scan.  Explain to the patient with the need for the skin use.  She tells me she is in pain from her hip.  She agrees to take pain medicine and agrees to read try the CT scan.  Will continue to follow.    BERENICE Ray, FNP-C  Hospitalist - Department of Hospital Medicine  [address], [place] [address]  Office [phone]; Pager [phone]

## 2020-09-22 NOTE — SUBJECTIVE & OBJECTIVE
Interval History: Screams when turning patient but she cannot elaborate on pain.    Review of Systems   Constitutional: Positive for appetite change. Negative for diaphoresis and fever.   HENT: Negative for congestion and sore throat.    Respiratory: Negative for cough, shortness of breath and wheezing.    Cardiovascular: Negative for chest pain and leg swelling.   Gastrointestinal: Negative for abdominal pain, nausea and vomiting.   Genitourinary: Negative for dysuria and flank pain.   Musculoskeletal: Positive for arthralgias (leg pain - chronic). Negative for back pain.   Skin: Positive for wound (sacral ulcer).   Neurological: Negative for headaches.   Psychiatric/Behavioral: Positive for confusion.     Objective:     Vital Signs (Most Recent):  Temp: 97.6 °F (36.4 °C) (09/22/20 1704)  Pulse: 68 (09/22/20 1704)  Resp: 17 (09/22/20 1704)  BP: 136/65 (09/22/20 1704)  SpO2: 98 % (09/22/20 1704) Vital Signs (24h Range):  Temp:  [97.6 °F (36.4 °C)-98.9 °F (37.2 °C)] 97.6 °F (36.4 °C)  Pulse:  [68-70] 68  Resp:  [17-20] 17  SpO2:  [98 %-100 %] 98 %  BP: (133-167)/(65-79) 136/65     Weight: 69.8 kg (153 lb 14.1 oz)  Body mass index is 25.61 kg/m².    Intake/Output Summary (Last 24 hours) at 9/22/2020 1841  Last data filed at 9/22/2020 0600  Gross per 24 hour   Intake 100 ml   Output --   Net 100 ml      Physical Exam  Constitutional:       General: She is not in acute distress.     Appearance: She is not diaphoretic.      Comments: drowsy   HENT:      Head: Normocephalic and atraumatic.      Nose: No congestion or rhinorrhea.      Mouth/Throat:      Mouth: Mucous membranes are dry.   Eyes:      Conjunctiva/sclera: Conjunctivae normal.   Neck:      Musculoskeletal: Neck supple.   Cardiovascular:      Rate and Rhythm: Normal rate and regular rhythm.      Pulses: Normal pulses.      Heart sounds: Normal heart sounds.   Pulmonary:      Breath sounds: Rales present. No wheezing.   Abdominal:      General: Bowel sounds are  normal.      Palpations: Abdomen is soft.      Tenderness: There is no abdominal tenderness. There is no right CVA tenderness, left CVA tenderness or guarding.   Musculoskeletal:      Right lower leg: No edema.      Left lower leg: No edema.   Skin:     General: Skin is warm and dry.      Findings: Lesion (Sacral ulcer/wound stage 4 5x4 cm. No active drainage.  See media) present.   Neurological:      Mental Status: Mental status is at baseline.      Comments: Oriented to self, , place. But not situation  Per Daughter Bushra, patient is at baseline   Psychiatric:         Mood and Affect: Mood normal.         Significant Labs: All pertinent labs within the past 24 hours have been reviewed.    Significant Imaging: I have reviewed and interpreted all pertinent imaging results/findings within the past 24 hours.

## 2020-09-22 NOTE — ASSESSMENT & PLAN NOTE
Patient bed bound from a nursing home has been bed-bound since COVID-19 isolation started the nursing home.  She is obese in a family is unable to take her home.  Case management following attempting placement at [facility]

## 2020-09-22 NOTE — PLAN OF CARE
Problem: Fall Injury Risk  Goal: Absence of Fall and Fall-Related Injury  Outcome: Ongoing, Progressing     Problem: Skin Injury Risk Increased  Goal: Skin Health and Integrity  Outcome: Ongoing, Progressing     Problem: Adult Inpatient Plan of Care  Goal: Plan of Care Review  Outcome: Ongoing, Progressing  Goal: Patient-Specific Goal (Individualization)  Outcome: Ongoing, Progressing  Goal: Absence of Hospital-Acquired Illness or Injury  Outcome: Ongoing, Progressing  Goal: Optimal Comfort and Wellbeing  Outcome: Ongoing, Progressing  Goal: Readiness for Transition of Care  Outcome: Ongoing, Progressing  Goal: Rounds/Family Conference  Outcome: Ongoing, Progressing     Problem: Infection  Goal: Infection Symptom Resolution  Outcome: Ongoing, Progressing

## 2020-09-22 NOTE — NURSING
Patient to scheduled testing as ordered. Tele monitoring in progress. Patient awake and alert. No apparent distress noted.

## 2020-09-22 NOTE — ASSESSMENT & PLAN NOTE
09/21/2020   Sacral ulcer stage 4. CRP 7.6 outpatient and today elevated 93. Afebrile. No leukocytosis  Patient refused heel protects  Off load heels on pillow for now  Turn every 2 hrs  Appreciate Wound Care Nurse  Local wound care to sacral pressure injury every 12 hours with Vashe moistened gauze. Change dressing if soiled with incontinence.   Check for incontinence frequently and change incontinence garment as needed.   Reposition patient side to side avoiding pressure on sacrum.   Low air loss bed for hospital bed.  EHOB boots to suspend heels off bed.   Concern for sacrum osteomyelitis . NM Bone scan cannot be done until tomorrow 9/22. Consult General Surgery and ID pending Bone Scan.  Discussed with Daughter Kady GUERRA that patient may need bone biopsy and weeks of IV antibiotics. Genosondra Hillman wants continue treatment for wound/osteo      09/22/2020:  Continue current treatment.  Of anemia bone scan showed,  No focal   bony uptake is identified to strongly suggest underlying osteomyelitis.Impression: No scintigraphic evidence for underlying osteomyelitis.No white count or fever

## 2020-09-22 NOTE — PLAN OF CARE
Problem: Fall Injury Risk  Goal: Absence of Fall and Fall-Related Injury  Outcome: Ongoing, Progressing  Intervention: Identify and Manage Contributors to Fall Injury Risk  Flowsheets (Taken 9/22/2020 0141)  Self-Care Promotion:   independence encouraged   BADL personal objects within reach   BADL personal routines maintained  Medication Review/Management: medications reviewed  Intervention: Promote Injury-Free Environment  Flowsheets (Taken 9/22/2020 0141)  Safety Promotion/Fall Prevention:   bed alarm set   room near unit station   assistive device/personal item within reach  Environmental Safety Modification:   clutter free environment maintained   lighting adjusted     Problem: Skin Injury Risk Increased  Goal: Skin Health and Integrity  Outcome: Ongoing, Progressing     Problem: Adult Inpatient Plan of Care  Goal: Plan of Care Review  Outcome: Ongoing, Progressing  Flowsheets (Taken 9/22/2020 0141)  Plan of Care Reviewed With: patient  Goal: Patient-Specific Goal (Individualization)  Outcome: Ongoing, Progressing  Goal: Absence of Hospital-Acquired Illness or Injury  Outcome: Ongoing, Progressing  Intervention: Identify and Manage Fall Risk  Flowsheets (Taken 9/22/2020 0141)  Safety Promotion/Fall Prevention:   bed alarm set   room near unit station   assistive device/personal item within reach  Intervention: Prevent VTE (venous thromboembolism)  Flowsheets (Taken 9/22/2020 0141)  VTE Prevention/Management: ROM (passive) performed  Goal: Optimal Comfort and Wellbeing  Outcome: Ongoing, Progressing  Goal: Readiness for Transition of Care  Outcome: Ongoing, Progressing  Goal: Rounds/Family Conference  Outcome: Ongoing, Progressing     Problem: Wound  Goal: Optimal Wound Healing  Outcome: Ongoing, Progressing  Intervention: Promote Effective Wound Healing  Flowsheets (Taken 9/22/2020 0141)  Sleep/Rest Enhancement:   awakenings minimized   regular sleep/rest pattern promoted   relaxation techniques promoted

## 2020-09-22 NOTE — PROGRESS NOTES
[facility] Medicine  Progress Note    Patient Name: Cindy Lyman  MRN: [id number]  Patient Class: OP- Observation   Admission Date: 2020  Length of Stay: 0 days  Attending Physician: Masha Cooper MD  Primary Care Provider: Rodger Camarena MD        Subjective:     Principal Problem:Physical debility        HPI:  This is a 84 y.o female with aortic stenosis s/p TVAR (2018), pacemaker (2018), CAD, CHF, COPD, DVT, HTN, HLD, and pulmonary HTN who presents to the hospital from [facility] for evaluation of abnormal troponin and BNP results. Patient is confused and doesn't know why she was sent to the hospital. She is able to tell me her name and  and place (hospital). History is limited due to patient's mental status. Per ED record, patient is here due to elevated her troponin and BNP lab. Her lab workup in NH yesterday with troponin of 0.093, a CRP of 7.6, and BNP of 2770. During my exam, patient seems sleepy/drowsy but easily aroused with voice. Patient reports to me she doesn't have any complains/pain/discomfort at this time. She denies fever, cough, SOB, chest pain, abdominal pain, headache, dysuria, N/V/D. Patient also has stage 4 sacral ulcer which treated in NH.  Per ED noted, her daughter reports patient has baseline SOB and multiple falls at her NH. Daughter concerns the patient getting inadequate care at her NH and wants to withdraw pt from [place] once pt has her PEG tube placed. In ED, initial troponin 0.1 but trended down to 0.07 second set. BNP 2092, CRP 93. Chest xray with no acute process. EKG with 70 NSR with LBBB. Per ED note, Cardiologist Dr. Carmen was contacted and he feels patient will likely only need troponin trended and official cardiology consult if trending upward.    Patient is placed in Observation for further evaluation and management elevated Troponin/CHF exacerbation and possible infected stage 3-4 sacral  ulcer      Overview/Hospital Course:  Mrs. Lyman is a 83 yo female who was transferred to [facility] for elevated BNP. Denies chest pain or shortness of breath. EKG NSR LBBB 70 (not new) and elevated troponin trend flat pattern. On exam, no volume excess. Lasix given in ED x 1. Incontinence. Main complaint is right hip/leg pain. X ray right femur no fracture hips or knee. X ray pelvis no able to visualize right iliac wing and proximal right femur. CT thigh no fracture.   Repeat 2 D echo EF 30%, grade I DD,  Aortic bioprothesis-no aortic insufficiency, and PASP 43 mmHg. Denies shortness of breath and breathing comfortable on RA 99%.   interview the patient's family and -Daughters -Jennifer Tomlinson Mellissa (POA) cruz initially refused to send patient back to [facility].  Wound care was consulted and evaluated the patient's Stage 4 sacral wound and voice concerns for osteomyelitis. NM bone scan taken on 09/22/2020 negative for evidence of osteo. Daughter Kady GUERRA voiced the family's request.  09/22/2020 the family recanted there request of bring the patient home in now request nursing home placement for the patient at [facility].   enlisted and has submitted the request.  Awaiting placement.      Interval History: Screams when turning patient but she cannot elaborate on pain.    Review of Systems   Constitutional: Positive for appetite change. Negative for diaphoresis and fever.   HENT: Negative for congestion and sore throat.    Respiratory: Negative for cough, shortness of breath and wheezing.    Cardiovascular: Negative for chest pain and leg swelling.   Gastrointestinal: Negative for abdominal pain, nausea and vomiting.   Genitourinary: Negative for dysuria and flank pain.   Musculoskeletal: Positive for arthralgias (leg pain - chronic). Negative for back pain.   Skin: Positive for wound (sacral ulcer).   Neurological: Negative for headaches.   Psychiatric/Behavioral:  Positive for confusion.     Objective:     Vital Signs (Most Recent):  Temp: 97.6 °F (36.4 °C) (20)  Pulse: 68 (20)  Resp: 17 (20)  BP: 136/65 (20)  SpO2: 98 % (20) Vital Signs (24h Range):  Temp:  [97.6 °F (36.4 °C)-98.9 °F (37.2 °C)] 97.6 °F (36.4 °C)  Pulse:  [68-70] 68  Resp:  [17-20] 17  SpO2:  [98 %-100 %] 98 %  BP: (133-167)/(65-79) 136/65     Weight: 69.8 kg (153 lb 14.1 oz)  Body mass index is 25.61 kg/m².    Intake/Output Summary (Last 24 hours) at 2020 1841  Last data filed at 2020 0600  Gross per 24 hour   Intake 100 ml   Output --   Net 100 ml      Physical Exam  Constitutional:       General: She is not in acute distress.     Appearance: She is not diaphoretic.      Comments: drowsy   HENT:      Head: Normocephalic and atraumatic.      Nose: No congestion or rhinorrhea.      Mouth/Throat:      Mouth: Mucous membranes are dry.   Eyes:      Conjunctiva/sclera: Conjunctivae normal.   Neck:      Musculoskeletal: Neck supple.   Cardiovascular:      Rate and Rhythm: Normal rate and regular rhythm.      Pulses: Normal pulses.      Heart sounds: Normal heart sounds.   Pulmonary:      Breath sounds: Rales present. No wheezing.   Abdominal:      General: Bowel sounds are normal.      Palpations: Abdomen is soft.      Tenderness: There is no abdominal tenderness. There is no right CVA tenderness, left CVA tenderness or guarding.   Musculoskeletal:      Right lower leg: No edema.      Left lower leg: No edema.   Skin:     General: Skin is warm and dry.      Findings: Lesion (Sacral ulcer/wound stage 4 5x4 cm. No active drainage.  See media) present.   Neurological:      Mental Status: Mental status is at baseline.      Comments: Oriented to self, , place. But not situation  Per Daughter Bushra, patient is at baseline   Psychiatric:         Mood and Affect: Mood normal.         Significant Labs: All pertinent labs within the past 24 hours have  been reviewed.    Significant Imaging: I have reviewed and interpreted all pertinent imaging results/findings within the past 24 hours.            Assessment/Plan:      * Physical debility  Patient bed bound from a nursing home has been bed-bound since COVID-19 isolation started the nursing home.  She is obese in a family is unable to take her home.  Case management following attempting placement at [facility]      Pressure injury of sacral region, stage 4/Probably Osteomyelitis  09/21/2020   Sacral ulcer stage 4. CRP 7.6 outpatient and today elevated 93. Afebrile. No leukocytosis  Patient refused heel protects  Off load heels on pillow for now  Turn every 2 hrs  Appreciate Wound Care Nurse  Local wound care to sacral pressure injury every 12 hours with Vashe moistened gauze. Change dressing if soiled with incontinence.   Check for incontinence frequently and change incontinence garment as needed.   Reposition patient side to side avoiding pressure on sacrum.   Low air loss bed for hospital bed.  EHOB boots to suspend heels off bed.   Concern for sacrum osteomyelitis . NM Bone scan cannot be done until tomorrow 9/22. Consult General Surgery and ID pending Bone Scan.  Discussed with Daughter Kady GUERRA that patient may need bone biopsy and weeks of IV antibiotics. Rachel Hillman wants continue treatment for wound/osteo      09/22/2020:  Continue current treatment.  Of anemia bone scan showed,  No focal   bony uptake is identified to strongly suggest underlying osteomyelitis.Impression: No scintigraphic evidence for underlying osteomyelitis.No white count or fever        Essential hypertension  Controlled. Continue home BB      Pulmonary hypertension  Group 2  See Heart failure      Hyperlipidemia  Lab Results   Component Value Date    LDLCALC 130.6 09/19/2020   Continue statin      Cognitive impairment  Per Daughter Bushra, patient oriented to self, place year and situation but at times confused. Patient  still thinks her  is alive and that he lives at their house.   Avoid sedatives       Systolic congestive heart failure  Denies SOB.  Echo 9/20/19 with EF 25%.  BNP elevated outpatient 2770.  BNP 2000 today. Chest xray no acute process. Lungs with rale. No leg edema. Home med with Lasix  2 echo EF 30%, grade I DD, PH 43 mmHg  No volume excess on exam  Continue home lasix, metoprolol          Elevated troponin  Chest pain free. Elevated troponin 0.09 and BNP 2770 outpatient. Initial Trop today 0.15 and trended down 0.07 second set. BNP 2092. Chest xray no acute process. Lungs with rale. No leg edema. EKG 70 NSR LBBB. Echo 9/20/19 EF 25% indeterminate DD  Flat pattern in setting of heart failure  Denies chest pain        Bedbound  Patient have been in [place] for 2 years? Bedbound at NH and need assistant with all ADL's  Per chart review, cardiac arrest in 2018 and at that time had severe oropharyngeal dysphagia then PEG placement 10/19/19 ; not sure when was removed  Per Daughter, patient at times refuse to eat and take medications. No issues with medications so far. Patient states she doesn't like nursing home food. She did eat 100% lunch yesterday 9/19 but little for dinner. She is requesting [employer]ure  Daughters Gregory Tomlinson, and Jennifer would like for other to return home with HH and Palliative care. Patient and Daughters refusing to go back to NH  TN assisting to get hospital bed, marjan lift and wheelchair  Code status -DNR           Stage 3 chronic kidney disease  Stable      CAD s/p PCI  CARLOS MANUEL to mid-LAD 4/2015 per Dr. Cresencio Alfredo  No chest pain or SOB  Aspirin, plavix, BB and statin per home    Anemia of chronic disease  Chronic inflammation and poor nutrition  Low iron studies. Ferritin 146 okay  Ferrous sulfate        VTE Risk Mitigation (From admission, onward)         Ordered     enoxaparin injection 40 mg  Every 24 hours      09/21/20 1445     IP VTE HIGH RISK PATIENT  Once      09/19/20 0134      Place sequential compression device  Until discontinued      09/19/20 0134     Place LAUREANO hose  Until discontinued      09/19/20 0134                Discharge Planning   JAMES: 9/21/2020     Code Status: DNR   Is the patient medically ready for discharge?:     Reason for patient still in hospital (select all that apply): Pending disposition  Discharge Plan A: Home Health   Discharge Delays: (!) Home Medical Equipment (Insurance, Delivery) to nursing home family changed their minds about taking home      BERENICE Ray, FNP-C  Hospitalist - Department of Hospital Medicine  [address] [place] [address]  Office [phone]; Pager [phone]

## 2020-09-22 NOTE — NURSING
Patient returned to unit from scheduled testing.  Patient without c/o discomfort. Tele monitoring in progress. No apparent distress noted.

## 2020-09-23 VITALS
BODY MASS INDEX: 25.64 KG/M2 | DIASTOLIC BLOOD PRESSURE: 62 MMHG | HEART RATE: 70 BPM | TEMPERATURE: 98 F | OXYGEN SATURATION: 100 % | SYSTOLIC BLOOD PRESSURE: 135 MMHG | WEIGHT: 153.88 LBS | HEIGHT: 65 IN | RESPIRATION RATE: 17 BRPM

## 2020-09-23 PROCEDURE — 97802 MEDICAL NUTRITION INDIV IN: CPT

## 2020-09-23 PROCEDURE — 25000003 PHARM REV CODE 250: Performed by: NURSE PRACTITIONER

## 2020-09-23 PROCEDURE — G0378 HOSPITAL OBSERVATION PER HR: HCPCS

## 2020-09-23 RX ADMIN — FUROSEMIDE 40 MG: 40 TABLET ORAL at 09:09

## 2020-09-23 RX ADMIN — PANTOPRAZOLE SODIUM 40 MG: 40 TABLET, DELAYED RELEASE ORAL at 09:09

## 2020-09-23 RX ADMIN — CLONIDINE HYDROCHLORIDE 0.3 MG: 0.1 TABLET ORAL at 09:09

## 2020-09-23 RX ADMIN — CLOPIDOGREL 75 MG: 75 TABLET, FILM COATED ORAL at 09:09

## 2020-09-23 RX ADMIN — METOPROLOL TARTRATE 100 MG: 50 TABLET, FILM COATED ORAL at 09:09

## 2020-09-23 RX ADMIN — ACETAMINOPHEN 500 MG: 500 TABLET ORAL at 05:09

## 2020-09-23 RX ADMIN — OXYCODONE HYDROCHLORIDE AND ACETAMINOPHEN 1 TABLET: 5; 325 TABLET ORAL at 10:09

## 2020-09-23 RX ADMIN — ASPIRIN 81 MG: 81 TABLET, COATED ORAL at 09:09

## 2020-09-23 NOTE — PROGRESS NOTES
Message sent to Danielle with  [facility] via [facility] to inquire if daughter completed paperwork and if call report was available as of this time    1425- call received from Danielle with [facility] stating that the paperwork is complete and that the pt will go to room 5050B and nurse to call report to [phone]-- ask for 5th floor nurse to give report.    1430- transportation packet and call report provided to pts nurse Teena and advised that transportation to be scheduled for  as soon as available.    1432- ADT 30 order entered for transport to [facility] requested 3:30pm   If transportation does not arrive at ETA time nurse will be instructed to follow protocol for transportation below:  · How can I get in touch directly with dispatch, if needed?                 Non-emergent dispatch: [phone]                                    Emergent dispatch: [phone]  Non-emergent (stretcher): [phone]  Escalation Needs (PFC Lead): [phone]    1435- attempted to contact pts dionte Garcia to advise of scheduled transportation for pt to be transported to [facility] for as soon as available. Provided TN contact information on voicemail for questions or concerns

## 2020-09-23 NOTE — DISCHARGE SUMMARY
[facility] Medicine  Discharge Summary      Patient Name: Cindy Lyman  MRN: [id number]  Admission Date: 2020  Hospital Length of Stay: 0 days  Discharge Date and Time:  2020 5:58 PM  Attending Physician: No att. providers found   Discharging Provider: MARINA Michelle  Primary Care Provider: Rodger Camarena MD      HPI:   This is a 84 y.o female with aortic stenosis s/p TVAR (2018), pacemaker (2018), CAD, CHF, COPD, DVT, HTN, HLD, and pulmonary HTN who presents to the hospital from [facility] for evaluation of abnormal troponin and BNP results. Patient is confused and doesn't know why she was sent to the hospital. She is able to tell me her name and  and place (hospital). History is limited due to patient's mental status. Per ED record, patient is here due to elevated her troponin and BNP lab. Her lab workup in NH yesterday with troponin of 0.093, a CRP of 7.6, and BNP of 2770. During my exam, patient seems sleepy/drowsy but easily aroused with voice. Patient reports to me she doesn't have any complains/pain/discomfort at this time. She denies fever, cough, SOB, chest pain, abdominal pain, headache, dysuria, N/V/D. Patient also has stage 4 sacral ulcer which treated in NH.  Per ED noted, her daughter reports patient has baseline SOB and multiple falls at her NH. Daughter concerns the patient getting inadequate care at her NH and wants to withdraw pt from [place] once pt has her PEG tube placed. In ED, initial troponin 0.1 but trended down to 0.07 second set. BNP 2, CRP 93. Chest xray with no acute process. EKG with 70 NSR with LBBB. Per ED note, Cardiologist Dr. Carmen was contacted and he feels patient will likely only need troponin trended and official cardiology consult if trending upward.    Patient is placed in Observation for further evaluation and management elevated Troponin/CHF exacerbation and possible infected stage 3-4 sacral  ulcer      * No surgery found *      Hospital Course:   Mrs. Lyman is a 85 yo female who was transferred to [facility] for elevated BNP. Denies chest pain or shortness of breath. EKG NSR LBBB 70 (not new) and elevated troponin trend flat pattern. On exam, no volume excess. Lasix given in ED x 1. Incontinence. Main complaint is right hip/leg pain. X ray right femur no fracture hips or knee. X ray pelvis no able to visualize right iliac wing and proximal right femur. CT thigh no fracture.   Repeat 2 D echo EF 30%, grade I DD,  Aortic bioprothesis-no aortic insufficiency, and PASP 43 mmHg. Denies shortness of breath and breathing comfortable on RA 99%.   interview the patient's family and -Daughters -Jennifer Tomlinson Mellissa (POA) cruz initially refused to send patient back to [facility].  Wound care was consulted and evaluated the patient's Stage 4 sacral wound and voice concerns for osteomyelitis. NM bone scan taken on 09/22/2020 negative for evidence of osteo. Daughter Kady GUERRA voiced the family's request.  09/22/2020 the family recanted their  request of bring the patient home in now request nursing home placement for the patient at [facility].   enlisted and has submitted the request. NM Bone Scan showed no evidence for underlying osteomyelitis. [facility] has accepted the patient - cleared for discharge she is stable. Wound care orders as listed by the Wound care specialist written on NH orders.          Consults:   Consults (From admission, onward)        Status Ordering Provider     Inpatient consult to Social Work  Once     Provider:  (Not yet assigned)    Completed FAISAL URRUTIA new Assessment & Plan notes have been filed under this hospital service since the last note was generated.  Service: Hospital Medicine    Final Active Diagnoses:    Diagnosis Date Noted POA    PRINCIPAL PROBLEM:  Physical debility [R53.81] 10/10/2018 Unknown     Pressure injury of sacral region, stage 4/Probably Osteomyelitis [L89.154] 09/19/2020 Yes    Essential hypertension [I10] 06/10/2016 Yes     Chronic    Pulmonary hypertension [I27.20] 09/28/2012 Yes     Chronic    Hyperlipidemia [E78.5]  Yes     Chronic    Cognitive impairment [R41.89] 09/20/2020 Yes    Elevated troponin [R79.89] 09/19/2020 Yes    Systolic congestive heart failure [I50.20] 09/19/2020 Yes    Bedbound [Z74.01] 12/12/2018 Not Applicable    Stage 3 chronic kidney disease [N18.3] 07/08/2017 Yes     Chronic    CAD s/p PCI [I25.10] 04/24/2015 Yes     Chronic    Anemia of chronic disease [D63.8] 11/29/2013 Yes     Chronic      Problems Resolved During this Admission:       Discharged Condition: stable    Disposition: Home or Self Care    Follow Up:  Follow-up Information     [facility].    Contact information:  [address] [place] [address] [phone]               Patient Instructions:      Diet Dysphagia Soft     Diet Cardiac     Change dressing (specify)   Order Comments: Dressing change per wound care nurse     Activity as tolerated       Significant Diagnostic Studies: Labs:   CMP   Recent Labs   Lab 09/22/20  0605      K 3.5      CO2 27   *   BUN 19   CREATININE 0.8   CALCIUM 8.5*   PROT 6.4   ALBUMIN 2.2*   BILITOT 0.4   ALKPHOS 83   AST 7*   ALT <5*   ANIONGAP 8   ESTGFRAFRICA >60   EGFRNONAA >60   , CBC   Recent Labs   Lab 09/22/20  0605   WBC 6.11   HGB 9.9*   HCT 30.5*   *   , INR   Lab Results   Component Value Date    INR 1.0 08/07/2020    INR 1.0 10/19/2018    INR 1.0 10/04/2018   , Lipid Panel   Lab Results   Component Value Date    CHOL 190 09/19/2020    HDL 33 (L) 09/19/2020    LDLCALC 130.6 09/19/2020    TRIG 132 09/19/2020    CHOLHDL 17.4 (L) 09/19/2020   , Troponin   Recent Labs   Lab 09/18/20  2154 09/19/20  0113 09/19/20  0648   TROPONINI 0.155* 0.078* 0.109*    and A1C:   Recent Labs   Lab 09/19/20  0648    HGBA1C 4.7       Pending Diagnostic Studies:     None         Medications:  Reconciled Home Medications:      Medication List      START taking these medications    acetaminophen 325 MG tablet  Commonly known as: TylenoL  Take 2 tablets (650 mg total) by mouth every 8 (eight) hours as needed for Pain.  Replaces: acetaminophen 650 MG Tbsr  Notes to patient: OTC medication, can be purchased without a prescription.        CONTINUE taking these medications    aspirin 81 MG EC tablet  Commonly known as: ECOTRIN  Take 81 mg by mouth once daily.  Notes to patient: Blood Thinner     cloNIDine 0.3 MG tablet  Commonly known as: CATAPRES  Take 1 tablet (0.3 mg total) by mouth 3 (three) times daily.  Notes to patient: Blood pressure medication     clopidogreL 75 mg tablet  Commonly known as: PLAVIX  Take 1 tablet (75 mg total) by mouth once daily.  Notes to patient: Blood Thinner to help prevent clots leading to stroke     furosemide 40 MG tablet  Commonly known as: LASIX  Take 1 tablet (40 mg total) by mouth once daily. TAKE ONE TABLET BY MOUTH EVERY DAY AS NEEDED FOR SHORTNESS OF BREATH or leg swelling  Notes to patient: Diuretic, removes fluid by increasing urination     metoprolol tartrate 100 MG tablet  Commonly known as: LOPRESSOR  ONE TABLET BY MOUTH TWICE DAILY  Notes to patient: Controls heart rate, assists in heart working more efficiently     pantoprazole 40 MG tablet  Commonly known as: PROTONIX  Take 1 tablet (40 mg total) by mouth once daily.  Notes to patient: Protects lining of stomach     senna 8.6 mg tablet  Commonly known as: SENOKOT  Take 1 tablet by mouth once daily.  Notes to patient: Laxative        STOP taking these medications    acetaminophen 650 MG Tbsr  Commonly known as: TYLENOL  Replaced by: acetaminophen 325 MG tablet     arginine-vitamin C-vitamin E 4.5 gram-156 mg/9.2 gram Pwpk     ASCORBATE CALCIUM (VITAMIN C) ORAL     azelastine 137 mcg (0.1 %) nasal spray  Commonly known as: ASTELIN      cetirizine 10 MG tablet  Commonly known as: ZYRTEC     ciprofloxacin HCl 500 MG tablet  Commonly known as: CIPRO     fluticasone propionate 220 mcg/actuation inhaler  Commonly known as: FLOVENT HFA     gabapentin 300 MG capsule  Commonly known as: NEURONTIN     methylPREDNISolone 4 mg tablet  Commonly known as: MEDROL DOSEPACK     polyethylene glycol 17 gram Pwpk  Commonly known as: GLYCOLAX     walker Misc  Commonly known as: ULTRA-LIGHT ROLLATOR     zinc gluconate 50 mg tablet            Indwelling Lines/Drains at time of discharge:   Lines/Drains/Airways     None                 Time spent on the discharge of patient: 35 minutes  Patient was seen and examined on the date of discharge and determined to be suitable for discharge.         Deanne Garcia, TIMOTEO, APRN, FNP-C  Department of Hospital Medicine - [facility]  [address] [place] [address]  Office [phone]; Pager [phone]

## 2020-09-23 NOTE — PROGRESS NOTES
Spoke to pts dionte Garcia to further discuss d/c plan along with NP.  TN advised Radha that the pt will be discharging on today. Radha reports that she has spoken to Danielle with [facility] and that she is just waiting for her to call to advise when to come to [facility] to sign admission paperwork. TN advised Radha that I as well spoke to Daneille at [facility] and Karime from [facility] regarding discharge plan and that Danielle is waiting on paperwork from [facility] with [facility] and once received she can accept the pt.  TN then advised pts dionte Garcia that at this time the pt is medically ready for discharge and that if [facility] cannot take the pt on today pt will have to either d/c back to [facility] and transfer to [facility] from there or d/c to home with home health with family if not in agreement with going back to [facility].  Radha states that the pt will not go to [facility] she will come home as she will not allow that to happen and will need hospital bed at home along with other DME previously discussed.  TN to call both [facility] and [facility] to follow up on discharge.    1208- call to [facility] to follow up on if paperwork requested from [facility] was received and if pt can d/c to facility on today.  No answer at this time, message left will continue to follow for call back.  Message also sent in [facility] to request call back as well.    1219- call placed to [facility] to speak to Karime, TN advised that Karime is off today, requested to speak to whom is on for admissions today. Placed on hold for 12 mins with no one ever coming to the phone. Message sent via [facility] to admissions to request a call back.    1220- call received from Danielle with [facility] stating that she did get the paperwork needed from [facility] and is good on her end and is about to call pts dionte Garcia to come in and sign paperwork.  TN advised Danielle that NP is currently on the phone with daughter and TN will let  daughter know to go to [facility] to complete paperwork and has to be there by 2:30 to complete paperwork as Danielle has to leave by 3pm today and will not be able to come if not completed. Radha to go there now and complete paperwork and call TN and NP once paperwork complete.    1250- orders completed by NP. TN uploaded to [facility] and sent to [facility] for review.  Message sent via [facility] to [facility] to advise of orders uploaded.    1305- call received from Danielle with [facility] stating that she also needs a chest xray for admission and that the pts daughter Radha is on her way and should be to the facility any minute now.  Danielle to call TN when paperwork complete

## 2020-09-23 NOTE — PLAN OF CARE
09/23/20 1442   Final Note   Assessment Type Final Discharge Note   Anticipated Discharge Disposition retirement Nu   Hospital Follow Up  Appt(s) scheduled? No   Discharge plans and expectations educations in teach back method with documentation complete? Yes  (with daughter Radha)   Right Care Referral Info   Post Acute Recommendation Other   Referral Type ICF   Facility Name [facility]   Post-Acute Status   Post-Acute Authorization Placement   Post-Acute Placement Status Set-up Complete

## 2020-09-23 NOTE — NURSING
Handoff given to BRITNEY Quezada      Pt resting in bed quietly. NAD noted. No c/o pain.     Fall and safety precautions maintained. Bed alarm activated and audible.. Bed locked in lowest position, with side rails up x2. Call bell and personal items within reach

## 2020-09-23 NOTE — NURSING
Handoff received from BRITNEY Quezada     Pt resting in bed quietly. NAD noted. No c/o pain.     Fall and safety precautions maintained. Bed alarm activated and audible.. Bed locked in lowest position, with side rails up x2. Call bell and personal items within reach

## 2020-09-23 NOTE — PLAN OF CARE
Problem: Skin Injury Risk Increased  Goal: Skin Health and Integrity  Outcome: Ongoing, Progressing  Intervention: Optimize Skin Protection  Flowsheets (Taken 9/23/2020 1136)  Pressure Reduction Techniques:   frequent weight shift encouraged   positioned off wounds  Pressure Reduction Devices: pressure-redistributing mattress utilized  Skin Protection: incontinence pads utilized  Head of Bed (HOB): HOB elevated

## 2020-09-23 NOTE — PLAN OF CARE
Recommendations    1. Continue dysphagia soft/cardiac diet + boost plus TID encourage PO intake.   2. RD to order Rashel BID to aid in wound healing.   3. Consider adding Vitamin C and Zinc sulfate to maintain skin intergrity.   4. Weigh pt weekly.    Goals: 1. Consume >/=50% of meals and supplements by discharge  Nutrition Goal Status: new  Communication of RD Recs: (plan of care)

## 2020-09-23 NOTE — NURSING
"Received report from BRITNEY Dickinson last night (19:25). Patient alert and confuse with time and situation. No pain. No sign of distress. IV line intact - saline locked. On room air. Cardiac monitor in place. Call bell given on her reach, instructed to call for assistance all the time. Bed on lowest position. Bed alarm on. Safety maintained. Turn patient regularly, noted dressing on her bottom dry and intact.      Patient refuse to take oral meds last night. Explained benefit of medicine but still refused. Stated "leave me alone".   "

## 2020-09-23 NOTE — CARE UPDATE
Patients family called and asked me if I thought the wound would heal. I advised the family member that I have seen wounds healed of this sort in patients. I did advise her about the patients challenges including debility, early dementia (requires reorienting frequently and has some behavior disturbances including resisting care, fighting staff, impaired judgement and thought process [stating that no-one offered her pain medication when in fact she had been refusing medications])     Family wants to continue full care for the patient - Daughter Radha has changed her mind again; two days ago they wanted to take her home - 09/22/20 yesterday they changed their mind and wanted her to go back to NH. Today 9/23/20 they are wanting to change her mind again.     At this time there is no added hospital value for the patient's hospitalization. I will recommend discharge. SW following        Deanne Garcia, TIMOTEO, APRN, FNP-C  Department of Hospital Medicine - [facility]  [address] [place] [address]  Office [phone]; Pager [phone]

## 2020-09-23 NOTE — PROGRESS NOTES
[facility]  Adult Nutrition  Progress Note    SUMMARY       Recommendations    1. Continue dysphagia soft/cardiac diet + boost plus TID encourage PO intake.   2. RD to order Rashel BID to aid in wound healing.   3. Consider adding Vitamin C and Zinc sulfate to maintain skin intergrity.   4. Weigh pt weekly.    Goals: 1. Consume >/=50% of meals and supplements by discharge  Nutrition Goal Status: new  Communication of RD Recs: (plan of care)    Reason for Assessment    Reason For Assessment: identified at risk by screening criteria(dysphagia)  Diagnosis: (physical debility)  Relevant Medical History: HTN, HLD, TMJ, carpal tunnel, pulmonary HTN, aortic stenosis, asthma, COPD, CHF, CAD, anemia, DVT, arthritis  Interdisciplinary Rounds: did not attend    General Information Comments: 84 y.o. female presented to the ED from [facility] with c/o SOB and abnormal troponin and BMP. Per chart pt once had PEG tube but was removed 4/2019. Pt was awake lying in bed at visit. Pt reports she's eating ok but she is tired and chicken and mashed potatoes. Pt was looking forward to the cabbage for lunch. Meal intake mostly 25%. Pt reports she likes the boost and is drinking them. Noted 3 boost on side table. Wts are inconsistant but Noted 14.4% weight loss in 1 year (doesn't meet criteria for malnutrition). Last BM 9/21, no reports of GI distress. NFPE not performed, will attempt at follow-up however pt appeared well-nourished.    Nutrition Discharge Planning: Adequate intake on dysphagia soft/cardiac diet + oral supplements    Nutrition Risk Screen    Nutrition Risk Screen: dysphagia or difficulty swallowing    Nutrition/Diet History    Patient Reported Diet/Restrictions/Preferences: low salt, soft  Food Preferences: okra, cabbage  Spiritual, Cultural Beliefs, Scientology Practices, Values that Affect Care: no  Food Allergies: NKFA  Factors Affecting Nutritional Intake: None identified at this  "time    Anthropometrics    Temp: 97.4 °F (36.3 °C)  Height Method: Estimated  Height: 5' 5" (165.1 cm)  Height (inches): 65 in  Weight Method: Bed Scale  Weight: 69.8 kg (153 lb 14.1 oz)  Weight (lb): 153.88 lb  Ideal Body Weight (IBW), Female: 125 lb  % Ideal Body Weight, Female (lb): 138.4 %  BMI (Calculated): 25.6  BMI Grade: 25 - 29.9 - overweight  Weight Loss: unintentional  Usual Body Weight (UBW), k.6 kg(2019)  % Usual Body Weight: 85.72  % Weight Change From Usual Weight: -14.46 %     Lab/Procedures/Meds    Pertinent Labs Reviewed: reviewed  Pertinent Labs Comments: : Glu 113, C 8.5, Alb 2.2, AST 7, ALT <5  Pertinent Medications Reviewed: reviewed  Pertinent Medications Comments: ferrous sulfate, furosemide, pantoprazole    Estimated/Assessed Needs    Weight Used For Calorie Calculations: 69.8 kg (153 lb 14.1 oz)  Energy Calorie Requirements (kcal): 1745 kcal (25 kcal/kg)  Energy Need Method: Kcal/kg  Protein Requirements: 83 g (1.2 g/kg)  Weight Used For Protein Calculations: 69.8 kg (153 lb 14.1 oz)  Fluid Requirements (mL): 1 mL/kcal or per MD  Estimated Fluid Requirement Method: RDA Method  RDA Method (mL): 1745  CHO Requirement: 220 g daily    Nutrition Prescription Ordered    Current Diet Order: dysphagia soft/cardiac  Oral Nutrition Supplement: Boost plus TID    Evaluation of Received Nutrient/Fluid Intake    I/O: +100  Energy Calories Required: meeting needs  Protein Required: meeting needs  Fluid Required: meeting needs  Comments: Last BM   Tolerance: tolerating  % Intake of Estimated Energy Needs: 75 - 100 %  % Meal Intake: 25 - 50 %    Nutrition Risk    Level of Risk/Frequency of Follow-up: low - moderate(1x/week)     Assessment and Plan  Nutrition Problem  Unintentional weight loss    Related to (etiology):   Inadequate oral intake    Signs and Symptoms (as evidenced by):   14.4% weight loss in 1 year    Interventions(treatment strategy):  Commercial beverage  Collaboration with " other providers    Nutrition Diagnosis Status:   New    Monitor and Evaluation    Food and Nutrient Intake: energy intake, food and beverage intake  Food and Nutrient Adminstration: diet order  Physical Activity and Function: nutrition-related ADLs and IADLs  Anthropometric Measurements: weight, weight change  Biochemical Data, Medical Tests and Procedures: glucose/endocrine profile  Nutrition-Focused Physical Findings: overall appearance, skin     Malnutrition Assessment  Energy Intake: (9/18-9/23: meal intake 25-50% + boost plus)  Skin (Micronutrient): (charla score-14, wound to sacral spine)  Teeth (Micronutrient): (absent)       Weight Loss (Malnutrition):   9/20/19 81.6 kg   9/23/20 69.8 kg -14.4% weight loss in 1 year     Edema (Fluid Accumulation): 0-->no edema present     Nutrition Follow-Up    RD Follow-up?: Yes

## 2020-09-23 NOTE — PLAN OF CARE
Problem: Fall Injury Risk  Goal: Absence of Fall and Fall-Related Injury  Outcome: Met     Problem: Skin Injury Risk Increased  Goal: Skin Health and Integrity  Outcome: Met     Problem: Adult Inpatient Plan of Care  Goal: Plan of Care Review  Outcome: Met  Goal: Patient-Specific Goal (Individualization)  Outcome: Met  Goal: Absence of Hospital-Acquired Illness or Injury  Outcome: Met  Goal: Optimal Comfort and Wellbeing  Outcome: Met  Goal: Readiness for Transition of Care  Outcome: Met  Goal: Rounds/Family Conference  Outcome: Met     Problem: Wound  Goal: Optimal Wound Healing  Outcome: Met     Problem: Infection  Goal: Infection Symptom Resolution  Outcome: Met

## 2020-09-23 NOTE — CARE UPDATE
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 Weston      Hyperlipidemia [E78.5]  Yes     Priority: 4      Chronic    Cognitive impairment [R41.89]  Yes    Elevated troponin [R79.89]  Yes    Systolic congestive heart failure [I50.20]  Yes    Bedbound [Z74.01]  Not Applicable    Stage 3 chronic kidney disease [N18.3]  Yes     Chronic    CAD s/p PCI [I25.10]  Yes     Chronic     CARLOS MANUEL to mid-LAD 4/2015 per Dr. Cresencio Alfredo      Anemia of chronic disease [D63.8]  Yes     Chronic      Resolved Hospital Problems   No resolved problems to display.       Patient is homebound due to:  Physical debility    Allergies:  Review of patient's allergies indicates:   Allergen Reactions    Doxycycline hyclate Diarrhea and Nausea And Vomiting    Singulair [montelukast]      Stomach pain, Muscle pain, Cough      Tuberculin ppd Rash    Penicillins      Other reaction(s): Anaphylaxis    Ventolin [albuterol sulfate] Other (See Comments)     Severely elevated blood pressure    Latex, natural rubber Rash       Vitals:        Routine, once monthly      Diet: 2 gram      Supplement:  1 can every three times a day with meals                          Glucerna      Acitivities:         - Bedbound; Turn every 2 hours.    LABS:  Per facility protocol    Nursing Precautions:      - Aspiration precautions:             - Total assistance with meals            -  Upright 90 degrees befor during  and after meals             -  Suction at bedside          - Fall precautions per nursing home protocol   - Decubitus precautions:        -  for positioning   - Pressure reducing foam mattress   - Turn patient every two hours. Use wedge pillows to anchor patient    CONSULTS:        Wound Care  Local wound care to sacral pressure injury every 12 hours with Vashe moistened gauze. Change dressing if soiled with incontinence.   Check for incontinence frequently and change incontinence garment as needed.   Reposition patient side to side avoiding pressure on sacrum.   Low air loss bed for hospital bed.  EHOB boots to suspend heels off bed.   Discussed sitting up in chair with sonRafael. Since patient has not sat up since March, will need assistance sitting patient up. Will need cushion such as a "[employer] wheelchair cushion if patient able to sit up. Limit time sitting to 1 hour intervals with pressure shifts at least every 30 minutes.       MISCELLANEOUS CARE:       Routine Skin for Bedridden Patients:  Apply moisture barrier cream to all skin folds and wet areas in perineal area daily and after baths and all bowel movements.          Medications: Discontinue all previous medication orders, if any. See new list below.     Cindy Lyman   Home Medication Instructions PAUL:[id number]    Printed on:09/23/20 1241   Medication Information                      acetaminophen (TYLENOL) 325 MG tablet  Take 2 tablets (650 mg total) by mouth every 8 (eight) hours as needed for Pain.             aspirin (ECOTRIN) 81 MG EC tablet  Take 81 mg by mouth once daily.             cloNIDine (CATAPRES) 0.3 MG tablet  Take 1 tablet (0.3 mg total) by mouth 3 (three) times daily.             clopidogrel (PLAVIX) 75 mg tablet  Take 1 tablet (75 mg total) by mouth once daily.             furosemide (LASIX) 40 MG tablet  Take 1 tablet (40 mg total) by mouth once daily. TAKE ONE TABLET BY MOUTH EVERY DAY AS NEEDED FOR SHORTNESS OF  BREATH or leg swelling             metoprolol tartrate (LOPRESSOR) 100 MG tablet  ONE TABLET BY MOUTH TWICE DAILY             pantoprazole (PROTONIX) 40 MG tablet  Take 1 tablet (40 mg total) by mouth once daily.             senna (SENOKOT) 8.6 mg tablet  Take 1 tablet by mouth once daily.                       _________________________________  Deanne Garcia DNP, APRN, FNP-C  Department [facility] Medicine - [facility] [place] [place] [address]  Office [phone]; Pager [phone]  09/23/2020

## 2020-09-24 LAB
BACTERIA BLD CULT: NORMAL
BACTERIA BLD CULT: NORMAL

## 2020-09-25 ENCOUNTER — TELEPHONE (OUTPATIENT)
Dept: CARDIOLOGY | Facility: CLINIC | Age: 85
End: 2020-09-25

## 2020-09-25 NOTE — TELEPHONE ENCOUNTER
----- Message from Kedar Mohr MA sent at 9/25/2020  1:50 PM CDT -----  Regarding: FW: release for surgery    ----- Message -----  From: Georges Moser  Sent: 9/25/2020  12:29 PM CDT  To: Kermit Jimenez Staff  Subject: release for surgery                              Type: Patient Call Back    Who called:Lesley (daughter)    What is the request in detail:Lesley, daughter of the patient,called to get a release for the patient to have surgery. She stated that the patient is having a peg tube put in. The patient is currently on plavix and was told to get the ok from her cardiologist     Can the clinic reply by [facility]?no    Would the patient rather a call back or a response via [facility]? Call back     Best call back number: [phone]

## 2020-09-25 NOTE — TELEPHONE ENCOUNTER
----- Message from Kedar Mohr MA sent at 9/25/2020  1:50 PM CDT -----  Regarding: FW: release for surgery    ----- Message -----  From: Georges Moser  Sent: 9/25/2020  12:29 PM CDT  To: Kermit Jimenez Staff  Subject: release for surgery                              Type: Patient Call Back    Who called:Lesley (daughter)    What is the request in detail:Lesley, daughter of the patient,called to get a release for the patient to have surgery. She stated that the patient is having a peg tube put in. The patient is currently on plavix and was told to get the ok from her cardiologist     Can the clinic reply by [facility]?no    Would the patient rather a call back or a response via [facility]? Call back     Best call back number: [phone]

## 2020-09-28 ENCOUNTER — LAB VISIT (OUTPATIENT)
Dept: LAB | Facility: OTHER | Age: 85
End: 2020-09-28
Payer: MEDICARE

## 2020-09-28 DIAGNOSIS — Z03.818 ENCOUNTER FOR OBSERVATION FOR SUSPECTED EXPOSURE TO OTHER BIOLOGICAL AGENTS RULED OUT: ICD-10-CM

## 2020-09-28 PROCEDURE — U0003 INFECTIOUS AGENT DETECTION BY NUCLEIC ACID (DNA OR RNA); SEVERE ACUTE RESPIRATORY SYNDROME CORONAVIRUS 2 (SARS-COV-2) (CORONAVIRUS DISEASE [COVID-19]), AMPLIFIED PROBE TECHNIQUE, MAKING USE OF HIGH THROUGHPUT TECHNOLOGIES AS DESCRIBED BY CMS-2020-01-R: HCPCS

## 2020-09-29 LAB — SARS-COV-2 RNA RESP QL NAA+PROBE: NOT DETECTED

## 2020-10-01 ENCOUNTER — TELEPHONE (OUTPATIENT)
Dept: CARDIOLOGY | Facility: HOSPITAL | Age: 85
End: 2020-10-01

## 2020-10-01 NOTE — TELEPHONE ENCOUNTER
Spoke w/daughter Radha re: home monitor not connecting with her pacemaker. She has been moved from [facility] to [facility]. Daughter will contact nursing home to have them locate her monitor and plug it in. She will call me back once she talks with them.

## 2020-10-04 ENCOUNTER — CLINICAL SUPPORT (OUTPATIENT)
Dept: CARDIOLOGY | Facility: HOSPITAL | Age: 85
End: 2020-10-04
Payer: MEDICARE

## 2020-10-04 DIAGNOSIS — Z95.0 PRESENCE OF CARDIAC PACEMAKER: ICD-10-CM

## 2020-10-04 PROCEDURE — 93294 REM INTERROG EVL PM/LDLS PM: CPT | Mod: ,,, | Performed by: INTERNAL MEDICINE

## 2020-10-04 PROCEDURE — 93296 REM INTERROG EVL PM/IDS: CPT | Performed by: INTERNAL MEDICINE

## 2020-10-04 PROCEDURE — 93294 CARDIAC DEVICE CHECK - REMOTE: ICD-10-PCS | Mod: ,,, | Performed by: INTERNAL MEDICINE

## 2020-10-05 ENCOUNTER — LAB VISIT (OUTPATIENT)
Dept: LAB | Facility: OTHER | Age: 85
End: 2020-10-05
Attending: INTERNAL MEDICINE
Payer: MEDICARE

## 2020-10-05 DIAGNOSIS — Z03.818 ENCOUNTER FOR OBSERVATION FOR SUSPECTED EXPOSURE TO OTHER BIOLOGICAL AGENTS RULED OUT: ICD-10-CM

## 2020-10-05 PROCEDURE — U0003 INFECTIOUS AGENT DETECTION BY NUCLEIC ACID (DNA OR RNA); SEVERE ACUTE RESPIRATORY SYNDROME CORONAVIRUS 2 (SARS-COV-2) (CORONAVIRUS DISEASE [COVID-19]), AMPLIFIED PROBE TECHNIQUE, MAKING USE OF HIGH THROUGHPUT TECHNOLOGIES AS DESCRIBED BY CMS-2020-01-R: HCPCS

## 2020-10-06 LAB — SARS-COV-2 RNA RESP QL NAA+PROBE: NOT DETECTED

## 2020-10-12 ENCOUNTER — LAB VISIT (OUTPATIENT)
Dept: LAB | Facility: OTHER | Age: 85
End: 2020-10-12
Payer: MEDICARE

## 2020-10-12 DIAGNOSIS — Z03.818 ENCOUNTER FOR OBSERVATION FOR SUSPECTED EXPOSURE TO OTHER BIOLOGICAL AGENTS RULED OUT: ICD-10-CM

## 2020-10-12 PROCEDURE — U0003 INFECTIOUS AGENT DETECTION BY NUCLEIC ACID (DNA OR RNA); SEVERE ACUTE RESPIRATORY SYNDROME CORONAVIRUS 2 (SARS-COV-2) (CORONAVIRUS DISEASE [COVID-19]), AMPLIFIED PROBE TECHNIQUE, MAKING USE OF HIGH THROUGHPUT TECHNOLOGIES AS DESCRIBED BY CMS-2020-01-R: HCPCS

## 2020-10-13 LAB — SARS-COV-2 RNA RESP QL NAA+PROBE: NOT DETECTED

## 2020-11-16 ENCOUNTER — LAB VISIT (OUTPATIENT)
Dept: LAB | Facility: OTHER | Age: 85
End: 2020-11-16
Payer: MEDICARE

## 2020-11-16 DIAGNOSIS — Z03.818 ENCOUNTER FOR OBSERVATION FOR SUSPECTED EXPOSURE TO OTHER BIOLOGICAL AGENTS RULED OUT: ICD-10-CM

## 2020-11-16 PROCEDURE — U0003 INFECTIOUS AGENT DETECTION BY NUCLEIC ACID (DNA OR RNA); SEVERE ACUTE RESPIRATORY SYNDROME CORONAVIRUS 2 (SARS-COV-2) (CORONAVIRUS DISEASE [COVID-19]), AMPLIFIED PROBE TECHNIQUE, MAKING USE OF HIGH THROUGHPUT TECHNOLOGIES AS DESCRIBED BY CMS-2020-01-R: HCPCS

## 2020-11-18 LAB — SARS-COV-2 RNA RESP QL NAA+PROBE: NOT DETECTED

## 2020-11-23 ENCOUNTER — LAB VISIT (OUTPATIENT)
Dept: LAB | Facility: OTHER | Age: 85
End: 2020-11-23
Payer: MEDICARE

## 2020-11-23 DIAGNOSIS — Z03.818 ENCOUNTER FOR OBSERVATION FOR SUSPECTED EXPOSURE TO OTHER BIOLOGICAL AGENTS RULED OUT: ICD-10-CM

## 2020-11-23 PROCEDURE — U0003 INFECTIOUS AGENT DETECTION BY NUCLEIC ACID (DNA OR RNA); SEVERE ACUTE RESPIRATORY SYNDROME CORONAVIRUS 2 (SARS-COV-2) (CORONAVIRUS DISEASE [COVID-19]), AMPLIFIED PROBE TECHNIQUE, MAKING USE OF HIGH THROUGHPUT TECHNOLOGIES AS DESCRIBED BY CMS-2020-01-R: HCPCS

## 2020-11-24 LAB — SARS-COV-2 RNA RESP QL NAA+PROBE: NOT DETECTED

## 2020-11-30 ENCOUNTER — LAB VISIT (OUTPATIENT)
Dept: LAB | Facility: OTHER | Age: 85
End: 2020-11-30
Payer: MEDICARE

## 2020-11-30 DIAGNOSIS — Z03.818 ENCOUNTER FOR OBSERVATION FOR SUSPECTED EXPOSURE TO OTHER BIOLOGICAL AGENTS RULED OUT: ICD-10-CM

## 2020-11-30 PROCEDURE — U0003 INFECTIOUS AGENT DETECTION BY NUCLEIC ACID (DNA OR RNA); SEVERE ACUTE RESPIRATORY SYNDROME CORONAVIRUS 2 (SARS-COV-2) (CORONAVIRUS DISEASE [COVID-19]), AMPLIFIED PROBE TECHNIQUE, MAKING USE OF HIGH THROUGHPUT TECHNOLOGIES AS DESCRIBED BY CMS-2020-01-R: HCPCS

## 2020-12-02 LAB — SARS-COV-2 RNA RESP QL NAA+PROBE: NOT DETECTED

## 2020-12-07 ENCOUNTER — LAB VISIT (OUTPATIENT)
Dept: LAB | Facility: OTHER | Age: 85
End: 2020-12-07
Payer: MEDICARE

## 2020-12-07 DIAGNOSIS — Z03.818 ENCOUNTER FOR OBSERVATION FOR SUSPECTED EXPOSURE TO OTHER BIOLOGICAL AGENTS RULED OUT: ICD-10-CM

## 2020-12-07 PROCEDURE — U0003 INFECTIOUS AGENT DETECTION BY NUCLEIC ACID (DNA OR RNA); SEVERE ACUTE RESPIRATORY SYNDROME CORONAVIRUS 2 (SARS-COV-2) (CORONAVIRUS DISEASE [COVID-19]), AMPLIFIED PROBE TECHNIQUE, MAKING USE OF HIGH THROUGHPUT TECHNOLOGIES AS DESCRIBED BY CMS-2020-01-R: HCPCS

## 2020-12-08 ENCOUNTER — TELEPHONE (OUTPATIENT)
Dept: FAMILY MEDICINE | Facility: CLINIC | Age: 85
End: 2020-12-08

## 2020-12-08 NOTE — TELEPHONE ENCOUNTER
I spoke to the pt daughter and she reports her mother will be returning home next week or the week after and is asking if Dr. Camarena will accept care of the pt again. She is asking if they can schedule a virtual appointment to do this. I advised that I believe an in-person visit will be necessary. Please advise

## 2020-12-08 NOTE — TELEPHONE ENCOUNTER
She would need an in person visit and I don't have any availability until after the first of the year.    Dr. Camarena

## 2020-12-08 NOTE — ED TRIAGE NOTES
Pt presents to the ED after fall at [facility] this evening. Hematoma to left orbital area and small scratch to left elbow noted. Pt. Reports she was too close to the edge of her bed when she fell off. Pt. Is aaox4. Pt. Reports 10/10 pain to stage 4 pressure injury to sacrum, left knee and left shoulder.   
yes

## 2020-12-08 NOTE — TELEPHONE ENCOUNTER
----- Message from Ginakareem Michaels sent at 12/7/2020 12:16 PM CST -----  Contact: Self [phone]  Type: Patient Call Back    Who called:Daughter    What is the request in detail:Pt is calling to schedule an appt with Dr. Camarena to get her mom out of the nursing facility, pt daughter wants her back with her original PCP next availability isn't until January. Pt daughter would like to speak with a nurse      Can the clinic reply by [facility]? Call back    Would the patient rather a call back or a response via [facility]? Call back    Best Call back: [phone]            Thank You

## 2020-12-09 LAB — SARS-COV-2 RNA RESP QL NAA+PROBE: NOT DETECTED

## 2020-12-10 ENCOUNTER — LAB VISIT (OUTPATIENT)
Dept: LAB | Facility: OTHER | Age: 85
End: 2020-12-10
Payer: MEDICARE

## 2020-12-10 DIAGNOSIS — Z03.818 ENCOUNTER FOR OBSERVATION FOR SUSPECTED EXPOSURE TO OTHER BIOLOGICAL AGENTS RULED OUT: ICD-10-CM

## 2020-12-10 PROCEDURE — U0003 INFECTIOUS AGENT DETECTION BY NUCLEIC ACID (DNA OR RNA); SEVERE ACUTE RESPIRATORY SYNDROME CORONAVIRUS 2 (SARS-COV-2) (CORONAVIRUS DISEASE [COVID-19]), AMPLIFIED PROBE TECHNIQUE, MAKING USE OF HIGH THROUGHPUT TECHNOLOGIES AS DESCRIBED BY CMS-2020-01-R: HCPCS

## 2020-12-12 LAB — SARS-COV-2 RNA RESP QL NAA+PROBE: NOT DETECTED

## 2020-12-14 ENCOUNTER — LAB VISIT (OUTPATIENT)
Dept: LAB | Facility: OTHER | Age: 85
End: 2020-12-14
Payer: MEDICARE

## 2020-12-14 DIAGNOSIS — Z03.818 ENCOUNTER FOR OBSERVATION FOR SUSPECTED EXPOSURE TO OTHER BIOLOGICAL AGENTS RULED OUT: ICD-10-CM

## 2020-12-14 PROCEDURE — U0003 INFECTIOUS AGENT DETECTION BY NUCLEIC ACID (DNA OR RNA); SEVERE ACUTE RESPIRATORY SYNDROME CORONAVIRUS 2 (SARS-COV-2) (CORONAVIRUS DISEASE [COVID-19]), AMPLIFIED PROBE TECHNIQUE, MAKING USE OF HIGH THROUGHPUT TECHNOLOGIES AS DESCRIBED BY CMS-2020-01-R: HCPCS

## 2020-12-16 LAB — SARS-COV-2 RNA RESP QL NAA+PROBE: NOT DETECTED

## 2020-12-29 ENCOUNTER — TELEPHONE (OUTPATIENT)
Dept: FAMILY MEDICINE | Facility: CLINIC | Age: 85
End: 2020-12-29

## 2020-12-29 NOTE — TELEPHONE ENCOUNTER
"----- Message from Ольга Torres sent at 12/29/2020  3:45 PM CST -----  Type: Patient Call Back    Who called: Liya [facility]"    What is the request in detail: calling in regards to getting transportation set up for pt. Stated a written letter has to be sent to [employer] on why pt. Needs transportation. [employer] fax : [phone]. Will need to rescheduled missed appt. On 12/29/20 once transportation is set up.     Can the clinic reply by [facility]? Call back     Would the patient rather a call back or a response via [facility]? Call back     Best call back number: [phone]        "

## 2020-12-29 NOTE — TELEPHONE ENCOUNTER
"----- Message from Ольга Torres sent at 12/29/2020  3:51 PM CST -----  Type: Patient Call Back    Who called: Liya"[facility]"    What is the request in detail: follow up from previous message. Will need time date and address on letter of pt. Next appt to be faxed to [employer] for transportation set up.     Can the clinic reply by [facility]? Call back     Would the patient rather a call back or a response via [facility]?      Best call back number: [phone]        "

## 2020-12-29 NOTE — TELEPHONE ENCOUNTER
----- Message from Kathe Zabala sent at 12/28/2020  2:02 PM CST -----  Contact: Satish Swartz [phone]  Type: Patient Call Back    Who called: Satish Swartz    What is the request in detail: Waiting on a call back in regards to Ambulance Service that's supposed to be ordered for mother's appointment on tomorrow, 12-29-20 at 11 am.  Need to speak to nurse about this ASAP! Please call daughter before the end of the day.     Would the patient rather a call back or a response via [facility]? Call back    Best call back number: [phone]

## 2020-12-29 NOTE — TELEPHONE ENCOUNTER
Faxed appointment note to [employer] requesting transportation fax- [phone]. Appointment 12/29/20 11:20am

## 2020-12-29 NOTE — TELEPHONE ENCOUNTER
Pt's daughter called the office. Requesting medication refills for pt on all medications and requesting orders to N for ambulance  to r/s office visit. Pt is bedridden. Please advise.

## 2020-12-30 ENCOUNTER — TELEPHONE (OUTPATIENT)
Dept: FAMILY MEDICINE | Facility: CLINIC | Age: 85
End: 2020-12-30

## 2020-12-30 NOTE — TELEPHONE ENCOUNTER
Called Tahira, requesting Medical Necessity form and ambulance transport order to fax to [facility] for approval of transportation for upcoming OV 12/31/20. Fax: [phone]

## 2020-12-30 NOTE — TELEPHONE ENCOUNTER
Faxed medical necessity form and ambulance transportation letter to [facility]. Fax: [phone].

## 2020-12-30 NOTE — TELEPHONE ENCOUNTER
10:39 AM CST -----  Lianet Soares Staff  Caller: Unspecified (Today, 10:35 AM)         Type: Patient Call Back     Who called: [employer]     What is the request in detail: Rep states the pt is requesting orders for  an ambulance ride to the hospital on tomorrow and needs the Dr to fill out a medical necessities form.     Can the clinic reply by [facility]? No     Would the patient rather a call back or a response via [facility]? Call back     Best call back number: [phone]     Additional Information: fax # [phone]       Thank You

## 2021-01-01 ENCOUNTER — TELEPHONE (OUTPATIENT)
Dept: WOUND CARE | Facility: HOSPITAL | Age: 86
End: 2021-01-01

## 2021-01-01 ENCOUNTER — DOCUMENT SCAN (OUTPATIENT)
Dept: HOME HEALTH SERVICES | Facility: HOSPITAL | Age: 86
End: 2021-01-01
Payer: MEDICARE

## 2021-01-01 ENCOUNTER — TELEPHONE (OUTPATIENT)
Dept: FAMILY MEDICINE | Facility: CLINIC | Age: 86
End: 2021-01-01

## 2021-01-01 ENCOUNTER — HOSPITAL ENCOUNTER (OUTPATIENT)
Dept: WOUND CARE | Facility: HOSPITAL | Age: 86
Discharge: HOME | End: 2021-03-18
Attending: INTERNAL MEDICINE
Payer: MEDICARE

## 2021-01-01 ENCOUNTER — DOCUMENT SCAN (OUTPATIENT)
Dept: HOME HEALTH SERVICES | Facility: HOSPITAL | Age: 86
End: 2021-01-01
Payer: MEDICAID

## 2021-01-01 ENCOUNTER — HOSPITAL ENCOUNTER (OUTPATIENT)
Dept: WOUND CARE | Facility: HOSPITAL | Age: 86
Discharge: HOME | End: 2021-02-25
Attending: INTERNAL MEDICINE
Payer: MEDICARE

## 2021-01-01 ENCOUNTER — HOSPITAL ENCOUNTER (OUTPATIENT)
Dept: WOUND CARE | Facility: HOSPITAL | Age: 86
Discharge: HOME | End: 2021-05-17
Attending: FAMILY MEDICINE
Payer: MEDICARE

## 2021-01-01 ENCOUNTER — EXTERNAL HOME HEALTH (OUTPATIENT)
Dept: HOME HEALTH SERVICES | Facility: HOSPITAL | Age: 86
End: 2021-01-01
Payer: MEDICARE

## 2021-01-01 ENCOUNTER — OFFICE VISIT (OUTPATIENT)
Dept: FAMILY MEDICINE | Facility: CLINIC | Age: 86
End: 2021-01-01
Payer: MEDICARE

## 2021-01-01 ENCOUNTER — HOSPITAL ENCOUNTER (OUTPATIENT)
Dept: WOUND CARE | Facility: HOSPITAL | Age: 86
Discharge: HOME | End: 2021-04-21
Attending: FAMILY MEDICINE
Payer: MEDICARE

## 2021-01-01 ENCOUNTER — PATIENT OUTREACH (OUTPATIENT)
Dept: ADMINISTRATIVE | Facility: CLINIC | Age: 86
End: 2021-01-01

## 2021-01-01 ENCOUNTER — HOSPITAL ENCOUNTER (OUTPATIENT)
Dept: WOUND CARE | Facility: HOSPITAL | Age: 86
Discharge: HOME | End: 2021-02-04
Attending: FAMILY MEDICINE
Payer: MEDICARE

## 2021-01-01 ENCOUNTER — OFFICE VISIT (OUTPATIENT)
Dept: CARDIOLOGY | Facility: CLINIC | Age: 86
End: 2021-01-01
Payer: MEDICARE

## 2021-01-01 ENCOUNTER — HOSPITAL ENCOUNTER (INPATIENT)
Facility: HOSPITAL | Age: 86
LOS: 1 days | Discharge: HOME HEALTH | DRG: 291 | End: 2021-03-08
Attending: EMERGENCY MEDICINE | Admitting: INTERNAL MEDICINE
Payer: MEDICARE

## 2021-01-01 ENCOUNTER — EXTERNAL HOME HEALTH (OUTPATIENT)
Dept: HOME HEALTH SERVICES | Facility: HOSPITAL | Age: 86
End: 2021-01-01
Payer: MEDICAID

## 2021-01-01 VITALS
HEIGHT: 65 IN | DIASTOLIC BLOOD PRESSURE: 78 MMHG | HEART RATE: 71 BPM | SYSTOLIC BLOOD PRESSURE: 94 MMHG | BODY MASS INDEX: 25.61 KG/M2 | OXYGEN SATURATION: 97 %

## 2021-01-01 VITALS
BODY MASS INDEX: 25.08 KG/M2 | WEIGHT: 150.56 LBS | SYSTOLIC BLOOD PRESSURE: 102 MMHG | HEART RATE: 69 BPM | HEIGHT: 65 IN | DIASTOLIC BLOOD PRESSURE: 41 MMHG | OXYGEN SATURATION: 99 %

## 2021-01-01 VITALS
TEMPERATURE: 98 F | BODY MASS INDEX: 25.08 KG/M2 | HEIGHT: 65 IN | WEIGHT: 150.56 LBS | DIASTOLIC BLOOD PRESSURE: 56 MMHG | RESPIRATION RATE: 17 BRPM | OXYGEN SATURATION: 96 % | HEART RATE: 70 BPM | SYSTOLIC BLOOD PRESSURE: 101 MMHG

## 2021-01-01 VITALS — SYSTOLIC BLOOD PRESSURE: 132 MMHG | HEART RATE: 69 BPM | TEMPERATURE: 97 F | DIASTOLIC BLOOD PRESSURE: 60 MMHG

## 2021-01-01 VITALS
DIASTOLIC BLOOD PRESSURE: 73 MMHG | TEMPERATURE: 97 F | RESPIRATION RATE: 17 BRPM | HEART RATE: 70 BPM | SYSTOLIC BLOOD PRESSURE: 145 MMHG

## 2021-01-01 VITALS — DIASTOLIC BLOOD PRESSURE: 71 MMHG | HEART RATE: 70 BPM | SYSTOLIC BLOOD PRESSURE: 136 MMHG | TEMPERATURE: 98 F

## 2021-01-01 VITALS
SYSTOLIC BLOOD PRESSURE: 160 MMHG | TEMPERATURE: 98 F | DIASTOLIC BLOOD PRESSURE: 88 MMHG | RESPIRATION RATE: 20 BRPM | HEART RATE: 69 BPM

## 2021-01-01 VITALS
SYSTOLIC BLOOD PRESSURE: 95 MMHG | DIASTOLIC BLOOD PRESSURE: 54 MMHG | RESPIRATION RATE: 20 BRPM | HEART RATE: 69 BPM | TEMPERATURE: 98 F

## 2021-01-01 DIAGNOSIS — I25.10 CORONARY ARTERY DISEASE DUE TO LIPID RICH PLAQUE: ICD-10-CM

## 2021-01-01 DIAGNOSIS — L89.153 PRESSURE INJURY OF SACRAL REGION, STAGE 3: Primary | ICD-10-CM

## 2021-01-01 DIAGNOSIS — I50.9 CHF (CONGESTIVE HEART FAILURE): ICD-10-CM

## 2021-01-01 DIAGNOSIS — K21.9 GASTROESOPHAGEAL REFLUX DISEASE WITHOUT ESOPHAGITIS: ICD-10-CM

## 2021-01-01 DIAGNOSIS — E78.2 MIXED HYPERLIPIDEMIA: Chronic | ICD-10-CM

## 2021-01-01 DIAGNOSIS — J45.909 UNCOMPLICATED ASTHMA, UNSPECIFIED ASTHMA SEVERITY, UNSPECIFIED WHETHER PERSISTENT: ICD-10-CM

## 2021-01-01 DIAGNOSIS — Z95.2 S/P TAVR (TRANSCATHETER AORTIC VALVE REPLACEMENT): ICD-10-CM

## 2021-01-01 DIAGNOSIS — I50.22 CHRONIC SYSTOLIC CONGESTIVE HEART FAILURE: ICD-10-CM

## 2021-01-01 DIAGNOSIS — Z74.01 BEDBOUND: ICD-10-CM

## 2021-01-01 DIAGNOSIS — I35.0 NONRHEUMATIC AORTIC VALVE STENOSIS: Chronic | ICD-10-CM

## 2021-01-01 DIAGNOSIS — I50.32 CHRONIC DIASTOLIC HEART FAILURE: ICD-10-CM

## 2021-01-01 DIAGNOSIS — I10 MALIGNANT ESSENTIAL HYPERTENSION: ICD-10-CM

## 2021-01-01 DIAGNOSIS — I25.10 CORONARY ARTERY DISEASE INVOLVING NATIVE CORONARY ARTERY OF NATIVE HEART WITHOUT ANGINA PECTORIS: Chronic | ICD-10-CM

## 2021-01-01 DIAGNOSIS — N18.30 STAGE 3 CHRONIC KIDNEY DISEASE, UNSPECIFIED WHETHER STAGE 3A OR 3B CKD: Chronic | ICD-10-CM

## 2021-01-01 DIAGNOSIS — R53.81 PHYSICAL DEBILITY: ICD-10-CM

## 2021-01-01 DIAGNOSIS — R06.02 SOB (SHORTNESS OF BREATH): Primary | ICD-10-CM

## 2021-01-01 DIAGNOSIS — E66.2 OBESITY HYPOVENTILATION SYNDROME: Chronic | ICD-10-CM

## 2021-01-01 DIAGNOSIS — L89.154 PRESSURE INJURY OF SACRAL REGION, STAGE 4: ICD-10-CM

## 2021-01-01 DIAGNOSIS — D63.8 ANEMIA OF CHRONIC DISEASE: Chronic | ICD-10-CM

## 2021-01-01 DIAGNOSIS — I10 ESSENTIAL HYPERTENSION: ICD-10-CM

## 2021-01-01 DIAGNOSIS — Z95.5 PRESENCE OF DRUG COATED STENT IN LAD CORONARY ARTERY: Chronic | ICD-10-CM

## 2021-01-01 DIAGNOSIS — I50.41 SYSTOLIC AND DIASTOLIC CHF, ACUTE: ICD-10-CM

## 2021-01-01 DIAGNOSIS — L89.154 PRESSURE INJURY OF SACRAL REGION, STAGE 4: Primary | ICD-10-CM

## 2021-01-01 DIAGNOSIS — R10.33 PERIUMBILICAL ABDOMINAL PAIN: ICD-10-CM

## 2021-01-01 DIAGNOSIS — I25.83 CORONARY ARTERY DISEASE DUE TO LIPID RICH PLAQUE: ICD-10-CM

## 2021-01-01 DIAGNOSIS — R41.89 COGNITIVE IMPAIRMENT: ICD-10-CM

## 2021-01-01 DIAGNOSIS — I27.20 PULMONARY HYPERTENSION: Primary | Chronic | ICD-10-CM

## 2021-01-01 DIAGNOSIS — I27.20 PULMONARY HYPERTENSION: Chronic | ICD-10-CM

## 2021-01-01 DIAGNOSIS — I10 ESSENTIAL HYPERTENSION: Chronic | ICD-10-CM

## 2021-01-01 DIAGNOSIS — R07.9 CHEST PAIN: ICD-10-CM

## 2021-01-01 DIAGNOSIS — I50.9 CONGESTIVE HEART FAILURE, UNSPECIFIED HF CHRONICITY, UNSPECIFIED HEART FAILURE TYPE: ICD-10-CM

## 2021-01-01 DIAGNOSIS — I44.2 COMPLETE HEART BLOCK: ICD-10-CM

## 2021-01-01 DIAGNOSIS — Z00.00 WELL WOMAN EXAM WITHOUT GYNECOLOGICAL EXAM: Primary | ICD-10-CM

## 2021-01-01 LAB
ALBUMIN SERPL BCP-MCNC: 3.2 G/DL (ref 3.5–5.2)
ALBUMIN SERPL BCP-MCNC: 3.5 G/DL (ref 3.5–5.2)
ALBUMIN SERPL BCP-MCNC: 3.8 G/DL (ref 3.5–5.2)
ALP SERPL-CCNC: 60 U/L (ref 55–135)
ALP SERPL-CCNC: 70 U/L (ref 55–135)
ALP SERPL-CCNC: 78 U/L (ref 55–135)
ALT SERPL W/O P-5'-P-CCNC: 11 U/L (ref 10–44)
ALT SERPL W/O P-5'-P-CCNC: 7 U/L (ref 10–44)
ALT SERPL W/O P-5'-P-CCNC: 9 U/L (ref 10–44)
ANION GAP SERPL CALC-SCNC: 10 MMOL/L (ref 8–16)
ANION GAP SERPL CALC-SCNC: 13 MMOL/L (ref 8–16)
ANION GAP SERPL CALC-SCNC: 18 MMOL/L (ref 8–16)
AORTIC ROOT ANNULUS: 2.34 CM
AORTIC VALVE CUSP SEPERATION: 0.78 CM
AST SERPL-CCNC: 13 U/L (ref 10–40)
AST SERPL-CCNC: 13 U/L (ref 10–40)
AST SERPL-CCNC: 15 U/L (ref 10–40)
AV INDEX (PROSTH): 0.36
AV MEAN GRADIENT: 9 MMHG
AV PEAK GRADIENT: 18 MMHG
AV VALVE AREA: 1.11 CM2
AV VELOCITY RATIO: 0.39
BACTERIA #/AREA URNS HPF: ABNORMAL /HPF
BACTERIA BLD CULT: NORMAL
BACTERIA BLD CULT: NORMAL
BASOPHILS # BLD AUTO: 0.03 K/UL (ref 0–0.2)
BASOPHILS # BLD AUTO: 0.03 K/UL (ref 0–0.2)
BASOPHILS # BLD AUTO: 0.04 K/UL (ref 0–0.2)
BASOPHILS NFR BLD: 0.3 % (ref 0–1.9)
BASOPHILS NFR BLD: 0.3 % (ref 0–1.9)
BASOPHILS NFR BLD: 0.5 % (ref 0–1.9)
BILIRUB SERPL-MCNC: 0.5 MG/DL (ref 0.1–1)
BILIRUB SERPL-MCNC: 0.5 MG/DL (ref 0.1–1)
BILIRUB SERPL-MCNC: 0.6 MG/DL (ref 0.1–1)
BILIRUB UR QL STRIP: NEGATIVE
BNP SERPL-MCNC: 3059 PG/ML (ref 0–99)
BSA FOR ECHO PROCEDURE: 1.77 M2
BUN SERPL-MCNC: 29 MG/DL (ref 8–23)
BUN SERPL-MCNC: 29 MG/DL (ref 8–23)
BUN SERPL-MCNC: 37 MG/DL (ref 8–23)
CALCIUM SERPL-MCNC: 9.1 MG/DL (ref 8.7–10.5)
CALCIUM SERPL-MCNC: 9.2 MG/DL (ref 8.7–10.5)
CALCIUM SERPL-MCNC: 9.6 MG/DL (ref 8.7–10.5)
CHLORIDE SERPL-SCNC: 103 MMOL/L (ref 95–110)
CHLORIDE SERPL-SCNC: 103 MMOL/L (ref 95–110)
CHLORIDE SERPL-SCNC: 105 MMOL/L (ref 95–110)
CLARITY UR: CLEAR
CO2 SERPL-SCNC: 21 MMOL/L (ref 23–29)
CO2 SERPL-SCNC: 25 MMOL/L (ref 23–29)
CO2 SERPL-SCNC: 27 MMOL/L (ref 23–29)
COLOR UR: COLORLESS
CREAT SERPL-MCNC: 1 MG/DL (ref 0.5–1.4)
CREAT SERPL-MCNC: 1 MG/DL (ref 0.5–1.4)
CREAT SERPL-MCNC: 1.4 MG/DL (ref 0.5–1.4)
CTP QC/QA: YES
CV ECHO LV RWT: 0.43 CM
DIFFERENTIAL METHOD: ABNORMAL
DOP CALC AO PEAK VEL: 2.12 M/S
DOP CALC AO VTI: 31.59 CM
DOP CALC LVOT AREA: 3 CM2
DOP CALC LVOT DIAMETER: 1.97 CM
DOP CALC LVOT PEAK VEL: 0.82 M/S
DOP CALC LVOT STROKE VOLUME: 35.03 CM3
DOP CALCLVOT PEAK VEL VTI: 11.5 CM
E WAVE DECELERATION TIME: 228.55 MSEC
E/A RATIO: 0.67
E/E' RATIO: 24.44 M/S
ECHO LV POSTERIOR WALL: 1.01 CM (ref 0.6–1.1)
EOSINOPHIL # BLD AUTO: 0 K/UL (ref 0–0.5)
EOSINOPHIL # BLD AUTO: 0 K/UL (ref 0–0.5)
EOSINOPHIL # BLD AUTO: 0.1 K/UL (ref 0–0.5)
EOSINOPHIL NFR BLD: 0 % (ref 0–8)
EOSINOPHIL NFR BLD: 0.4 % (ref 0–8)
EOSINOPHIL NFR BLD: 0.7 % (ref 0–8)
ERYTHROCYTE [DISTWIDTH] IN BLOOD BY AUTOMATED COUNT: 15.8 % (ref 11.5–14.5)
ERYTHROCYTE [DISTWIDTH] IN BLOOD BY AUTOMATED COUNT: 15.8 % (ref 11.5–14.5)
ERYTHROCYTE [DISTWIDTH] IN BLOOD BY AUTOMATED COUNT: 16 % (ref 11.5–14.5)
EST. GFR  (AFRICAN AMERICAN): 40 ML/MIN/1.73 M^2
EST. GFR  (AFRICAN AMERICAN): >60 ML/MIN/1.73 M^2
EST. GFR  (AFRICAN AMERICAN): >60 ML/MIN/1.73 M^2
EST. GFR  (NON AFRICAN AMERICAN): 35 ML/MIN/1.73 M^2
EST. GFR  (NON AFRICAN AMERICAN): 52 ML/MIN/1.73 M^2
EST. GFR  (NON AFRICAN AMERICAN): 52 ML/MIN/1.73 M^2
FOLATE SERPL-MCNC: 30.1 NG/ML (ref 4–24)
FRACTIONAL SHORTENING: 18 % (ref 28–44)
GLUCOSE SERPL-MCNC: 147 MG/DL (ref 70–110)
GLUCOSE SERPL-MCNC: 170 MG/DL (ref 70–110)
GLUCOSE SERPL-MCNC: 92 MG/DL (ref 70–110)
GLUCOSE UR QL STRIP: NEGATIVE
HCT VFR BLD AUTO: 27.6 % (ref 37–48.5)
HCT VFR BLD AUTO: 31.4 % (ref 37–48.5)
HCT VFR BLD AUTO: 34.7 % (ref 37–48.5)
HGB BLD-MCNC: 10 G/DL (ref 12–16)
HGB BLD-MCNC: 11 G/DL (ref 12–16)
HGB BLD-MCNC: 8.8 G/DL (ref 12–16)
HGB UR QL STRIP: ABNORMAL
IMM GRANULOCYTES # BLD AUTO: 0.02 K/UL (ref 0–0.04)
IMM GRANULOCYTES # BLD AUTO: 0.04 K/UL (ref 0–0.04)
IMM GRANULOCYTES # BLD AUTO: 0.05 K/UL (ref 0–0.04)
IMM GRANULOCYTES NFR BLD AUTO: 0.4 % (ref 0–0.5)
INTERVENTRICULAR SEPTUM: 0.79 CM (ref 0.6–1.1)
IRON SERPL-MCNC: 21 UG/DL (ref 30–160)
IVRT: 120.84 MSEC
KETONES UR QL STRIP: NEGATIVE
LA MAJOR: 4.83 CM
LA MINOR: 5.13 CM
LA WIDTH: 3.83 CM
LACTATE SERPL-SCNC: 1.9 MMOL/L (ref 0.5–2.2)
LEFT ATRIUM SIZE: 3.98 CM
LEFT ATRIUM VOLUME INDEX: 36.8 ML/M2
LEFT ATRIUM VOLUME: 64.47 CM3
LEFT INTERNAL DIMENSION IN SYSTOLE: 3.85 CM (ref 2.1–4)
LEFT VENTRICLE DIASTOLIC VOLUME INDEX: 57.6 ML/M2
LEFT VENTRICLE DIASTOLIC VOLUME: 100.8 ML
LEFT VENTRICLE MASS INDEX: 81 G/M2
LEFT VENTRICLE SYSTOLIC VOLUME INDEX: 36.5 ML/M2
LEFT VENTRICLE SYSTOLIC VOLUME: 63.8 ML
LEFT VENTRICULAR INTERNAL DIMENSION IN DIASTOLE: 4.67 CM (ref 3.5–6)
LEFT VENTRICULAR MASS: 141.2 G
LEUKOCYTE ESTERASE UR QL STRIP: ABNORMAL
LIPASE SERPL-CCNC: 27 U/L (ref 4–60)
LV LATERAL E/E' RATIO: 15.71 M/S
LV SEPTAL E/E' RATIO: 55 M/S
LYMPHOCYTES # BLD AUTO: 0.7 K/UL (ref 1–4.8)
LYMPHOCYTES # BLD AUTO: 1.2 K/UL (ref 1–4.8)
LYMPHOCYTES # BLD AUTO: 1.9 K/UL (ref 1–4.8)
LYMPHOCYTES NFR BLD: 34.8 % (ref 18–48)
LYMPHOCYTES NFR BLD: 6 % (ref 18–48)
LYMPHOCYTES NFR BLD: 9 % (ref 18–48)
MAGNESIUM SERPL-MCNC: 2.1 MG/DL (ref 1.6–2.6)
MAGNESIUM SERPL-MCNC: 2.2 MG/DL (ref 1.6–2.6)
MCH RBC QN AUTO: 27.7 PG (ref 27–31)
MCH RBC QN AUTO: 27.8 PG (ref 27–31)
MCH RBC QN AUTO: 28 PG (ref 27–31)
MCHC RBC AUTO-ENTMCNC: 31.7 G/DL (ref 32–36)
MCHC RBC AUTO-ENTMCNC: 31.8 G/DL (ref 32–36)
MCHC RBC AUTO-ENTMCNC: 31.9 G/DL (ref 32–36)
MCV RBC AUTO: 87 FL (ref 82–98)
MCV RBC AUTO: 88 FL (ref 82–98)
MCV RBC AUTO: 88 FL (ref 82–98)
MICROSCOPIC COMMENT: ABNORMAL
MOLECULAR STREP A: NEGATIVE
MONOCYTES # BLD AUTO: 0.3 K/UL (ref 0.3–1)
MONOCYTES # BLD AUTO: 0.5 K/UL (ref 0.3–1)
MONOCYTES # BLD AUTO: 0.6 K/UL (ref 0.3–1)
MONOCYTES NFR BLD: 10.3 % (ref 4–15)
MONOCYTES NFR BLD: 2.5 % (ref 4–15)
MONOCYTES NFR BLD: 4.1 % (ref 4–15)
MV PEAK A VEL: 1.63 M/S
MV PEAK E VEL: 1.1 M/S
MV STENOSIS PRESSURE HALF TIME: 94.66 MS
MV VALVE AREA P 1/2 METHOD: 2.32 CM2
NEUTROPHILS # BLD AUTO: 11.2 K/UL (ref 1.8–7.7)
NEUTROPHILS # BLD AUTO: 2.9 K/UL (ref 1.8–7.7)
NEUTROPHILS # BLD AUTO: 9.9 K/UL (ref 1.8–7.7)
NEUTROPHILS NFR BLD: 53.3 % (ref 38–73)
NEUTROPHILS NFR BLD: 85.8 % (ref 38–73)
NEUTROPHILS NFR BLD: 90.8 % (ref 38–73)
NITRITE UR QL STRIP: NEGATIVE
NRBC BLD-RTO: 0 /100 WBC
PH UR STRIP: 7 [PH] (ref 5–8)
PHOSPHATE SERPL-MCNC: 3.7 MG/DL (ref 2.7–4.5)
PHOSPHATE SERPL-MCNC: 4.4 MG/DL (ref 2.7–4.5)
PISA TR MAX VEL: 1.86 M/S
PLATELET # BLD AUTO: 266 K/UL (ref 150–350)
PLATELET # BLD AUTO: 350 K/UL (ref 150–350)
PLATELET # BLD AUTO: 365 K/UL (ref 150–350)
PMV BLD AUTO: 10.7 FL (ref 9.2–12.9)
PMV BLD AUTO: 11.1 FL (ref 9.2–12.9)
PMV BLD AUTO: 11.3 FL (ref 9.2–12.9)
POC MOLECULAR INFLUENZA A AGN: NEGATIVE
POC MOLECULAR INFLUENZA B AGN: NEGATIVE
POTASSIUM SERPL-SCNC: 3.6 MMOL/L (ref 3.5–5.1)
POTASSIUM SERPL-SCNC: 3.8 MMOL/L (ref 3.5–5.1)
POTASSIUM SERPL-SCNC: 4.1 MMOL/L (ref 3.5–5.1)
PROCALCITONIN SERPL IA-MCNC: 0.12 NG/ML
PROT SERPL-MCNC: 6.8 G/DL (ref 6–8.4)
PROT SERPL-MCNC: 7.6 G/DL (ref 6–8.4)
PROT SERPL-MCNC: 8.3 G/DL (ref 6–8.4)
PROT UR QL STRIP: NEGATIVE
PULM VEIN S/D RATIO: 1.21
PV PEAK D VEL: 0.28 M/S
PV PEAK S VEL: 0.34 M/S
RBC # BLD AUTO: 3.18 M/UL (ref 4–5.4)
RBC # BLD AUTO: 3.57 M/UL (ref 4–5.4)
RBC # BLD AUTO: 3.95 M/UL (ref 4–5.4)
RBC #/AREA URNS HPF: 20 /HPF (ref 0–4)
SARS-COV-2 RDRP RESP QL NAA+PROBE: NEGATIVE
SATURATED IRON: 5 % (ref 20–50)
SINUS: 1.9 CM
SODIUM SERPL-SCNC: 140 MMOL/L (ref 136–145)
SODIUM SERPL-SCNC: 142 MMOL/L (ref 136–145)
SODIUM SERPL-SCNC: 143 MMOL/L (ref 136–145)
SP GR UR STRIP: 1.01 (ref 1–1.03)
SQUAMOUS #/AREA URNS HPF: 5 /HPF
TDI LATERAL: 0.07 M/S
TDI SEPTAL: 0.02 M/S
TDI: 0.05 M/S
TOTAL IRON BINDING CAPACITY: 443 UG/DL (ref 250–450)
TR MAX PG: 14 MMHG
TRANSFERRIN SERPL-MCNC: 299 MG/DL (ref 200–375)
TRICUSPID ANNULAR PLANE SYSTOLIC EXCURSION: 1.55 CM
TROPONIN I SERPL DL<=0.01 NG/ML-MCNC: 0.06 NG/ML (ref 0–0.03)
TROPONIN I SERPL DL<=0.01 NG/ML-MCNC: 0.09 NG/ML (ref 0–0.03)
TROPONIN I SERPL DL<=0.01 NG/ML-MCNC: 0.21 NG/ML (ref 0–0.03)
URN SPEC COLLECT METH UR: ABNORMAL
UROBILINOGEN UR STRIP-ACNC: NEGATIVE EU/DL
VIT B12 SERPL-MCNC: 1706 PG/ML (ref 210–950)
WBC # BLD AUTO: 10.91 K/UL (ref 3.9–12.7)
WBC # BLD AUTO: 13.02 K/UL (ref 3.9–12.7)
WBC # BLD AUTO: 5.52 K/UL (ref 3.9–12.7)
WBC #/AREA URNS HPF: 3 /HPF (ref 0–5)

## 2021-01-01 PROCEDURE — 11042 DBRDMT SUBQ TIS 1ST 20SQCM/<: CPT | Mod: ,,, | Performed by: INTERNAL MEDICINE

## 2021-01-01 PROCEDURE — 3074F PR MOST RECENT SYSTOLIC BLOOD PRESSURE < 130 MM HG: ICD-10-PCS | Mod: CPTII,S$GLB,, | Performed by: INTERNAL MEDICINE

## 2021-01-01 PROCEDURE — 85025 COMPLETE CBC W/AUTO DIFF WBC: CPT | Performed by: EMERGENCY MEDICINE

## 2021-01-01 PROCEDURE — 25500020 PHARM REV CODE 255: Performed by: EMERGENCY MEDICINE

## 2021-01-01 PROCEDURE — 99999 PR PBB SHADOW E&M-EST. PATIENT-LVL V: ICD-10-PCS | Mod: PBBFAC,,, | Performed by: FAMILY MEDICINE

## 2021-01-01 PROCEDURE — 99291 CRITICAL CARE FIRST HOUR: CPT | Mod: 25

## 2021-01-01 PROCEDURE — G0179 PR HOME HEALTH MD RECERTIFICATION: ICD-10-PCS | Mod: ,,, | Performed by: INTERNAL MEDICINE

## 2021-01-01 PROCEDURE — 3288F FALL RISK ASSESSMENT DOCD: CPT | Mod: CPTII,S$GLB,, | Performed by: FAMILY MEDICINE

## 2021-01-01 PROCEDURE — 93005 ELECTROCARDIOGRAM TRACING: CPT

## 2021-01-01 PROCEDURE — 1101F PT FALLS ASSESS-DOCD LE1/YR: CPT | Mod: CPTII,S$GLB,, | Performed by: INTERNAL MEDICINE

## 2021-01-01 PROCEDURE — 63600175 PHARM REV CODE 636 W HCPCS: Performed by: INTERNAL MEDICINE

## 2021-01-01 PROCEDURE — 25000242 PHARM REV CODE 250 ALT 637 W/ HCPCS: Performed by: EMERGENCY MEDICINE

## 2021-01-01 PROCEDURE — 99214 OFFICE O/P EST MOD 30 MIN: CPT | Mod: 25,,, | Performed by: FAMILY MEDICINE

## 2021-01-01 PROCEDURE — 82746 ASSAY OF FOLIC ACID SERUM: CPT | Performed by: INTERNAL MEDICINE

## 2021-01-01 PROCEDURE — 84145 PROCALCITONIN (PCT): CPT | Performed by: EMERGENCY MEDICINE

## 2021-01-01 PROCEDURE — 99203 PR OFFICE/OUTPT VISIT, NEW, LEVL III, 30-44 MIN: ICD-10-PCS | Mod: ,,, | Performed by: INTERNAL MEDICINE

## 2021-01-01 PROCEDURE — 87502 INFLUENZA DNA AMP PROBE: CPT

## 2021-01-01 PROCEDURE — 3078F DIAST BP <80 MM HG: CPT | Mod: CPTII,S$GLB,, | Performed by: INTERNAL MEDICINE

## 2021-01-01 PROCEDURE — 1157F PR ADVANCE CARE PLAN OR EQUIV PRESENT IN MEDICAL RECORD: ICD-10-PCS | Mod: S$GLB,,, | Performed by: INTERNAL MEDICINE

## 2021-01-01 PROCEDURE — 99203 OFFICE O/P NEW LOW 30 MIN: CPT | Mod: ,,, | Performed by: INTERNAL MEDICINE

## 2021-01-01 PROCEDURE — 80053 COMPREHEN METABOLIC PANEL: CPT | Performed by: EMERGENCY MEDICINE

## 2021-01-01 PROCEDURE — 83540 ASSAY OF IRON: CPT | Performed by: INTERNAL MEDICINE

## 2021-01-01 PROCEDURE — 25000003 PHARM REV CODE 250: Performed by: INTERNAL MEDICINE

## 2021-01-01 PROCEDURE — 31720 CLEARANCE OF AIRWAYS: CPT

## 2021-01-01 PROCEDURE — P9612 CATHETERIZE FOR URINE SPEC: HCPCS

## 2021-01-01 PROCEDURE — 97161 PT EVAL LOW COMPLEX 20 MIN: CPT

## 2021-01-01 PROCEDURE — 11042 WOUND DEBRIDEMENT: ICD-10-PCS | Mod: ,,, | Performed by: INTERNAL MEDICINE

## 2021-01-01 PROCEDURE — 99214 OFFICE O/P EST MOD 30 MIN: CPT | Mod: ,,, | Performed by: INTERNAL MEDICINE

## 2021-01-01 PROCEDURE — 36415 COLL VENOUS BLD VENIPUNCTURE: CPT | Performed by: INTERNAL MEDICINE

## 2021-01-01 PROCEDURE — 1126F AMNT PAIN NOTED NONE PRSNT: CPT | Mod: S$GLB,,, | Performed by: INTERNAL MEDICINE

## 2021-01-01 PROCEDURE — 3288F PR FALLS RISK ASSESSMENT DOCUMENTED: ICD-10-PCS | Mod: CPTII,S$GLB,, | Performed by: FAMILY MEDICINE

## 2021-01-01 PROCEDURE — 99205 OFFICE O/P NEW HI 60 MIN: CPT | Performed by: INTERNAL MEDICINE

## 2021-01-01 PROCEDURE — 1157F PR ADVANCE CARE PLAN OR EQUIV PRESENT IN MEDICAL RECORD: ICD-10-PCS | Mod: S$GLB,,, | Performed by: FAMILY MEDICINE

## 2021-01-01 PROCEDURE — 85025 COMPLETE CBC W/AUTO DIFF WBC: CPT | Performed by: INTERNAL MEDICINE

## 2021-01-01 PROCEDURE — 96375 TX/PRO/DX INJ NEW DRUG ADDON: CPT

## 2021-01-01 PROCEDURE — 1100F PTFALLS ASSESS-DOCD GE2>/YR: CPT | Mod: CPTII,S$GLB,, | Performed by: FAMILY MEDICINE

## 2021-01-01 PROCEDURE — 1101F PR PT FALLS ASSESS DOC 0-1 FALLS W/OUT INJ PAST YR: ICD-10-PCS | Mod: CPTII,S$GLB,, | Performed by: INTERNAL MEDICINE

## 2021-01-01 PROCEDURE — 84100 ASSAY OF PHOSPHORUS: CPT | Performed by: INTERNAL MEDICINE

## 2021-01-01 PROCEDURE — 97530 THERAPEUTIC ACTIVITIES: CPT

## 2021-01-01 PROCEDURE — 3078F PR MOST RECENT DIASTOLIC BLOOD PRESSURE < 80 MM HG: ICD-10-PCS | Mod: CPTII,S$GLB,, | Performed by: FAMILY MEDICINE

## 2021-01-01 PROCEDURE — 93010 ELECTROCARDIOGRAM REPORT: CPT | Mod: ,,, | Performed by: INTERNAL MEDICINE

## 2021-01-01 PROCEDURE — 1159F PR MEDICATION LIST DOCUMENTED IN MEDICAL RECORD: ICD-10-PCS | Mod: S$GLB,,, | Performed by: INTERNAL MEDICINE

## 2021-01-01 PROCEDURE — 80053 COMPREHEN METABOLIC PANEL: CPT | Mod: 91 | Performed by: INTERNAL MEDICINE

## 2021-01-01 PROCEDURE — 3078F PR MOST RECENT DIASTOLIC BLOOD PRESSURE < 80 MM HG: ICD-10-PCS | Mod: CPTII,S$GLB,, | Performed by: INTERNAL MEDICINE

## 2021-01-01 PROCEDURE — G0180 MD CERTIFICATION HHA PATIENT: HCPCS | Mod: ,,, | Performed by: INTERNAL MEDICINE

## 2021-01-01 PROCEDURE — 83605 ASSAY OF LACTIC ACID: CPT | Performed by: EMERGENCY MEDICINE

## 2021-01-01 PROCEDURE — 96374 THER/PROPH/DIAG INJ IV PUSH: CPT

## 2021-01-01 PROCEDURE — 11000001 HC ACUTE MED/SURG PRIVATE ROOM

## 2021-01-01 PROCEDURE — 1159F MED LIST DOCD IN RCRD: CPT | Mod: S$GLB,,, | Performed by: INTERNAL MEDICINE

## 2021-01-01 PROCEDURE — 81000 URINALYSIS NONAUTO W/SCOPE: CPT | Performed by: EMERGENCY MEDICINE

## 2021-01-01 PROCEDURE — 94640 AIRWAY INHALATION TREATMENT: CPT

## 2021-01-01 PROCEDURE — G0180 PR HOME HEALTH MD CERTIFICATION: ICD-10-PCS | Mod: ,,, | Performed by: INTERNAL MEDICINE

## 2021-01-01 PROCEDURE — 99214 PR OFFICE/OUTPT VISIT, EST, LEVL IV, 30-39 MIN: ICD-10-PCS | Mod: S$GLB,,, | Performed by: INTERNAL MEDICINE

## 2021-01-01 PROCEDURE — 87040 BLOOD CULTURE FOR BACTERIA: CPT | Mod: 59 | Performed by: EMERGENCY MEDICINE

## 2021-01-01 PROCEDURE — 11042 DBRDMT SUBQ TIS 1ST 20SQCM/<: CPT | Mod: ,,, | Performed by: FAMILY MEDICINE

## 2021-01-01 PROCEDURE — 3288F FALL RISK ASSESSMENT DOCD: CPT | Mod: CPTII,S$GLB,, | Performed by: INTERNAL MEDICINE

## 2021-01-01 PROCEDURE — 99214 PR OFFICE/OUTPT VISIT, EST, LEVL IV, 30-39 MIN: ICD-10-PCS | Mod: S$GLB,,, | Performed by: FAMILY MEDICINE

## 2021-01-01 PROCEDURE — 63600175 PHARM REV CODE 636 W HCPCS: Performed by: EMERGENCY MEDICINE

## 2021-01-01 PROCEDURE — 27201912 HC WOUND CARE DEBRIDEMENT SUPPLIES: Performed by: INTERNAL MEDICINE

## 2021-01-01 PROCEDURE — 11042 DBRDMT SUBQ TIS 1ST 20SQCM/<: CPT | Performed by: INTERNAL MEDICINE

## 2021-01-01 PROCEDURE — 3074F PR MOST RECENT SYSTOLIC BLOOD PRESSURE < 130 MM HG: ICD-10-PCS | Mod: CPTII,S$GLB,, | Performed by: FAMILY MEDICINE

## 2021-01-01 PROCEDURE — 82607 VITAMIN B-12: CPT | Performed by: INTERNAL MEDICINE

## 2021-01-01 PROCEDURE — 1157F ADVNC CARE PLAN IN RCRD: CPT | Mod: S$GLB,,, | Performed by: FAMILY MEDICINE

## 2021-01-01 PROCEDURE — 83880 ASSAY OF NATRIURETIC PEPTIDE: CPT | Performed by: EMERGENCY MEDICINE

## 2021-01-01 PROCEDURE — 85025 COMPLETE CBC W/AUTO DIFF WBC: CPT | Mod: 91 | Performed by: INTERNAL MEDICINE

## 2021-01-01 PROCEDURE — 83735 ASSAY OF MAGNESIUM: CPT | Performed by: INTERNAL MEDICINE

## 2021-01-01 PROCEDURE — 97165 OT EVAL LOW COMPLEX 30 MIN: CPT

## 2021-01-01 PROCEDURE — 11042 DBRDMT SUBQ TIS 1ST 20SQCM/<: CPT | Performed by: FAMILY MEDICINE

## 2021-01-01 PROCEDURE — 11042 DEBRIDEMENT: ICD-10-PCS | Mod: ,,, | Performed by: FAMILY MEDICINE

## 2021-01-01 PROCEDURE — 99214 OFFICE O/P EST MOD 30 MIN: CPT | Mod: 25,,, | Performed by: INTERNAL MEDICINE

## 2021-01-01 PROCEDURE — 1126F PR PAIN SEVERITY QUANTIFIED, NO PAIN PRESENT: ICD-10-PCS | Mod: S$GLB,,, | Performed by: INTERNAL MEDICINE

## 2021-01-01 PROCEDURE — 99499 UNLISTED E&M SERVICE: CPT | Mod: S$GLB,,, | Performed by: FAMILY MEDICINE

## 2021-01-01 PROCEDURE — 99214 OFFICE O/P EST MOD 30 MIN: CPT | Mod: S$GLB,,, | Performed by: FAMILY MEDICINE

## 2021-01-01 PROCEDURE — 99214 PR OFFICE/OUTPT VISIT, EST, LEVL IV, 30-39 MIN: ICD-10-PCS | Mod: 25,,, | Performed by: FAMILY MEDICINE

## 2021-01-01 PROCEDURE — 3074F SYST BP LT 130 MM HG: CPT | Mod: CPTII,S$GLB,, | Performed by: INTERNAL MEDICINE

## 2021-01-01 PROCEDURE — U0002 COVID-19 LAB TEST NON-CDC: HCPCS | Performed by: EMERGENCY MEDICINE

## 2021-01-01 PROCEDURE — 83690 ASSAY OF LIPASE: CPT | Performed by: EMERGENCY MEDICINE

## 2021-01-01 PROCEDURE — 25000003 PHARM REV CODE 250: Performed by: EMERGENCY MEDICINE

## 2021-01-01 PROCEDURE — 99499 RISK ADDL DX/OHS AUDIT: ICD-10-PCS | Mod: S$GLB,,, | Performed by: FAMILY MEDICINE

## 2021-01-01 PROCEDURE — 99214 PR OFFICE/OUTPT VISIT, EST, LEVL IV, 30-39 MIN: ICD-10-PCS | Mod: ,,, | Performed by: INTERNAL MEDICINE

## 2021-01-01 PROCEDURE — 27201912 HC WOUND CARE DEBRIDEMENT SUPPLIES: Performed by: FAMILY MEDICINE

## 2021-01-01 PROCEDURE — 99999 PR PBB SHADOW E&M-EST. PATIENT-LVL V: CPT | Mod: PBBFAC,,, | Performed by: FAMILY MEDICINE

## 2021-01-01 PROCEDURE — 1157F ADVNC CARE PLAN IN RCRD: CPT | Mod: S$GLB,,, | Performed by: INTERNAL MEDICINE

## 2021-01-01 PROCEDURE — 84484 ASSAY OF TROPONIN QUANT: CPT | Performed by: EMERGENCY MEDICINE

## 2021-01-01 PROCEDURE — 27201912 HC WOUND CARE DEBRIDEMENT SUPPLIES

## 2021-01-01 PROCEDURE — 3288F PR FALLS RISK ASSESSMENT DOCUMENTED: ICD-10-PCS | Mod: CPTII,S$GLB,, | Performed by: INTERNAL MEDICINE

## 2021-01-01 PROCEDURE — 84484 ASSAY OF TROPONIN QUANT: CPT | Mod: 91 | Performed by: INTERNAL MEDICINE

## 2021-01-01 PROCEDURE — G0179 MD RECERTIFICATION HHA PT: HCPCS | Mod: ,,, | Performed by: INTERNAL MEDICINE

## 2021-01-01 PROCEDURE — 80053 COMPREHEN METABOLIC PANEL: CPT | Performed by: INTERNAL MEDICINE

## 2021-01-01 PROCEDURE — 3074F SYST BP LT 130 MM HG: CPT | Mod: CPTII,S$GLB,, | Performed by: FAMILY MEDICINE

## 2021-01-01 PROCEDURE — 99214 PR OFFICE/OUTPT VISIT, EST, LEVL IV, 30-39 MIN: ICD-10-PCS | Mod: 25,,, | Performed by: INTERNAL MEDICINE

## 2021-01-01 PROCEDURE — 99213 OFFICE O/P EST LOW 20 MIN: CPT | Performed by: INTERNAL MEDICINE

## 2021-01-01 PROCEDURE — 11042 DBRDMT SUBQ TIS 1ST 20SQCM/<: CPT

## 2021-01-01 PROCEDURE — 99999 PR PBB SHADOW E&M-EST. PATIENT-LVL IV: CPT | Mod: PBBFAC,,, | Performed by: INTERNAL MEDICINE

## 2021-01-01 PROCEDURE — 99214 OFFICE O/P EST MOD 30 MIN: CPT | Performed by: INTERNAL MEDICINE

## 2021-01-01 PROCEDURE — 99999 PR PBB SHADOW E&M-EST. PATIENT-LVL IV: ICD-10-PCS | Mod: PBBFAC,,, | Performed by: INTERNAL MEDICINE

## 2021-01-01 PROCEDURE — 99214 OFFICE O/P EST MOD 30 MIN: CPT | Mod: S$GLB,,, | Performed by: INTERNAL MEDICINE

## 2021-01-01 PROCEDURE — 3078F DIAST BP <80 MM HG: CPT | Mod: CPTII,S$GLB,, | Performed by: FAMILY MEDICINE

## 2021-01-01 PROCEDURE — 92610 EVALUATE SWALLOWING FUNCTION: CPT

## 2021-01-01 PROCEDURE — 1100F PR PT FALLS ASSESS DOC 2+ FALLS/FALL W/INJURY/YR: ICD-10-PCS | Mod: CPTII,S$GLB,, | Performed by: FAMILY MEDICINE

## 2021-01-01 PROCEDURE — 93010 EKG 12-LEAD: ICD-10-PCS | Mod: ,,, | Performed by: INTERNAL MEDICINE

## 2021-01-01 RX ORDER — HYDRALAZINE HYDROCHLORIDE 20 MG/ML
10 INJECTION INTRAMUSCULAR; INTRAVENOUS
Status: COMPLETED | OUTPATIENT
Start: 2021-01-01 | End: 2021-01-01

## 2021-01-01 RX ORDER — AMOXICILLIN 250 MG
1 CAPSULE ORAL 2 TIMES DAILY PRN
Status: DISCONTINUED | OUTPATIENT
Start: 2021-01-01 | End: 2021-01-01 | Stop reason: HOSPADM

## 2021-01-01 RX ORDER — FUROSEMIDE 10 MG/ML
40 INJECTION INTRAMUSCULAR; INTRAVENOUS
Status: COMPLETED | OUTPATIENT
Start: 2021-01-01 | End: 2021-01-01

## 2021-01-01 RX ORDER — PANTOPRAZOLE SODIUM 40 MG/1
40 TABLET, DELAYED RELEASE ORAL DAILY
Qty: 30 TABLET | Refills: 11
Start: 2021-01-01 | End: 2022-03-09

## 2021-01-01 RX ORDER — HYDRALAZINE HYDROCHLORIDE 25 MG/1
25 TABLET, FILM COATED ORAL EVERY 8 HOURS
Qty: 90 TABLET | Refills: 5 | OUTPATIENT
Start: 2021-01-01 | End: 2022-10-08

## 2021-01-01 RX ORDER — HYDRALAZINE HYDROCHLORIDE 25 MG/1
25 TABLET, FILM COATED ORAL EVERY 8 HOURS
Status: DISCONTINUED | OUTPATIENT
Start: 2021-01-01 | End: 2021-01-01 | Stop reason: HOSPADM

## 2021-01-01 RX ORDER — LISINOPRIL 5 MG/1
5 TABLET ORAL DAILY
Status: DISCONTINUED | OUTPATIENT
Start: 2021-01-01 | End: 2021-01-01 | Stop reason: HOSPADM

## 2021-01-01 RX ORDER — LEVOFLOXACIN 5 MG/ML
750 INJECTION, SOLUTION INTRAVENOUS
Status: DISCONTINUED | OUTPATIENT
Start: 2021-01-01 | End: 2021-01-01

## 2021-01-01 RX ORDER — ASPIRIN 81 MG/1
81 TABLET ORAL DAILY
Status: DISCONTINUED | OUTPATIENT
Start: 2021-01-01 | End: 2021-01-01 | Stop reason: HOSPADM

## 2021-01-01 RX ORDER — TALC
6 POWDER (GRAM) TOPICAL NIGHTLY PRN
Status: DISCONTINUED | OUTPATIENT
Start: 2021-01-01 | End: 2021-01-01 | Stop reason: HOSPADM

## 2021-01-01 RX ORDER — CLOPIDOGREL BISULFATE 75 MG/1
75 TABLET ORAL DAILY
Qty: 30 TABLET | Refills: 11 | Status: SHIPPED | OUTPATIENT
Start: 2021-01-01 | End: 2022-03-05

## 2021-01-01 RX ORDER — FUROSEMIDE 10 MG/ML
40 INJECTION INTRAMUSCULAR; INTRAVENOUS EVERY 8 HOURS
Status: DISCONTINUED | OUTPATIENT
Start: 2021-01-01 | End: 2021-01-01 | Stop reason: HOSPADM

## 2021-01-01 RX ORDER — CLONIDINE HYDROCHLORIDE 0.1 MG/1
0.3 TABLET ORAL 3 TIMES DAILY
Status: DISCONTINUED | OUTPATIENT
Start: 2021-01-01 | End: 2021-01-01 | Stop reason: HOSPADM

## 2021-01-01 RX ORDER — GLUCAGON 1 MG
1 KIT INJECTION
Status: DISCONTINUED | OUTPATIENT
Start: 2021-01-01 | End: 2021-01-01 | Stop reason: HOSPADM

## 2021-01-01 RX ORDER — LISINOPRIL 5 MG/1
5 TABLET ORAL DAILY
Qty: 30 TABLET | Refills: 5 | OUTPATIENT
Start: 2021-01-01 | End: 2022-10-08

## 2021-01-01 RX ORDER — ENOXAPARIN SODIUM 100 MG/ML
40 INJECTION SUBCUTANEOUS EVERY 24 HOURS
Status: DISCONTINUED | OUTPATIENT
Start: 2021-01-01 | End: 2021-01-01 | Stop reason: HOSPADM

## 2021-01-01 RX ORDER — PANTOPRAZOLE SODIUM 40 MG/1
40 TABLET, DELAYED RELEASE ORAL DAILY
Status: DISCONTINUED | OUTPATIENT
Start: 2021-01-01 | End: 2021-01-01 | Stop reason: HOSPADM

## 2021-01-01 RX ORDER — ISOSORBIDE MONONITRATE 60 MG/1
60 TABLET, EXTENDED RELEASE ORAL DAILY
Qty: 30 TABLET | Refills: 5 | OUTPATIENT
Start: 2021-01-01 | End: 2022-10-08

## 2021-01-01 RX ORDER — METOPROLOL TARTRATE 50 MG/1
100 TABLET ORAL 2 TIMES DAILY
Status: DISCONTINUED | OUTPATIENT
Start: 2021-01-01 | End: 2021-01-01 | Stop reason: HOSPADM

## 2021-01-01 RX ORDER — CLONIDINE HYDROCHLORIDE 0.3 MG/1
0.3 TABLET ORAL 3 TIMES DAILY
Qty: 270 TABLET | Refills: 1 | Status: SHIPPED | OUTPATIENT
Start: 2021-01-01

## 2021-01-01 RX ORDER — GEL DRESSING 2" X 2"
BANDAGE TOPICAL
Qty: 30 EACH | Refills: 1 | Status: SHIPPED | OUTPATIENT
Start: 2021-01-01

## 2021-01-01 RX ORDER — NITROGLYCERIN 0.4 MG/1
0.4 TABLET SUBLINGUAL
COMMUNITY
End: 2021-01-01 | Stop reason: SDUPTHER

## 2021-01-01 RX ORDER — OXYCODONE AND ACETAMINOPHEN 5; 325 MG/1; MG/1
TABLET ORAL
COMMUNITY
Start: 2020-11-18

## 2021-01-01 RX ORDER — ACETIC ACID 0.25 G/100ML
IRRIGANT IRRIGATION
COMMUNITY
Start: 2020-11-13

## 2021-01-01 RX ORDER — ISOSORBIDE MONONITRATE 60 MG/1
60 TABLET, EXTENDED RELEASE ORAL DAILY
Qty: 30 TABLET | Refills: 5 | Status: SHIPPED | OUTPATIENT
Start: 2021-01-01 | End: 2021-01-01

## 2021-01-01 RX ORDER — HYDRALAZINE HYDROCHLORIDE 25 MG/1
25 TABLET, FILM COATED ORAL EVERY 8 HOURS
Qty: 90 TABLET | Refills: 5 | Status: SHIPPED | OUTPATIENT
Start: 2021-01-01 | End: 2021-01-01

## 2021-01-01 RX ORDER — POTASSIUM CHLORIDE 20 MEQ/1
20 TABLET, EXTENDED RELEASE ORAL ONCE
Status: DISCONTINUED | OUTPATIENT
Start: 2021-01-01 | End: 2021-01-01

## 2021-01-01 RX ORDER — ONDANSETRON 8 MG/1
8 TABLET, ORALLY DISINTEGRATING ORAL EVERY 8 HOURS PRN
Status: DISCONTINUED | OUTPATIENT
Start: 2021-01-01 | End: 2021-01-01 | Stop reason: HOSPADM

## 2021-01-01 RX ORDER — NITROGLYCERIN 0.4 MG/1
0.4 TABLET SUBLINGUAL EVERY 5 MIN PRN
Qty: 30 TABLET | Refills: 1 | Status: SHIPPED | OUTPATIENT
Start: 2021-01-01

## 2021-01-01 RX ORDER — IPRATROPIUM BROMIDE AND ALBUTEROL SULFATE 2.5; .5 MG/3ML; MG/3ML
3 SOLUTION RESPIRATORY (INHALATION)
Status: COMPLETED | OUTPATIENT
Start: 2021-01-01 | End: 2021-01-01

## 2021-01-01 RX ORDER — IBUPROFEN 200 MG
24 TABLET ORAL
Status: DISCONTINUED | OUTPATIENT
Start: 2021-01-01 | End: 2021-01-01 | Stop reason: HOSPADM

## 2021-01-01 RX ORDER — CIPROFLOXACIN 500 MG/1
500 TABLET ORAL
Status: DISCONTINUED | OUTPATIENT
Start: 2021-01-01 | End: 2021-01-01

## 2021-01-01 RX ORDER — LISINOPRIL 5 MG/1
5 TABLET ORAL DAILY
Qty: 30 TABLET | Refills: 5 | Status: SHIPPED | OUTPATIENT
Start: 2021-01-01 | End: 2022-03-09

## 2021-01-01 RX ORDER — CLOPIDOGREL BISULFATE 75 MG/1
75 TABLET ORAL DAILY
Status: DISCONTINUED | OUTPATIENT
Start: 2021-01-01 | End: 2021-01-01 | Stop reason: HOSPADM

## 2021-01-01 RX ORDER — HYDROCODONE BITARTRATE AND ACETAMINOPHEN 5; 325 MG/1; MG/1
1 TABLET ORAL EVERY 6 HOURS PRN
Status: DISCONTINUED | OUTPATIENT
Start: 2021-01-01 | End: 2021-01-01 | Stop reason: HOSPADM

## 2021-01-01 RX ORDER — SODIUM CHLORIDE 0.9 % (FLUSH) 0.9 %
10 SYRINGE (ML) INJECTION
Status: DISCONTINUED | OUTPATIENT
Start: 2021-01-01 | End: 2021-01-01 | Stop reason: HOSPADM

## 2021-01-01 RX ORDER — PROCHLORPERAZINE EDISYLATE 5 MG/ML
5 INJECTION INTRAMUSCULAR; INTRAVENOUS EVERY 6 HOURS PRN
Status: DISCONTINUED | OUTPATIENT
Start: 2021-01-01 | End: 2021-01-01 | Stop reason: HOSPADM

## 2021-01-01 RX ORDER — NITROGLYCERIN 0.4 MG/1
0.4 TABLET SUBLINGUAL EVERY 5 MIN PRN
Status: DISCONTINUED | OUTPATIENT
Start: 2021-01-01 | End: 2021-01-01 | Stop reason: HOSPADM

## 2021-01-01 RX ORDER — LACTULOSE 10 G/10G
10 SOLUTION ORAL
COMMUNITY

## 2021-01-01 RX ORDER — ISOSORBIDE MONONITRATE 30 MG/1
60 TABLET, EXTENDED RELEASE ORAL DAILY
Status: DISCONTINUED | OUTPATIENT
Start: 2021-01-01 | End: 2021-01-01 | Stop reason: HOSPADM

## 2021-01-01 RX ORDER — IBUPROFEN 200 MG
16 TABLET ORAL
Status: DISCONTINUED | OUTPATIENT
Start: 2021-01-01 | End: 2021-01-01 | Stop reason: HOSPADM

## 2021-01-01 RX ORDER — ACETAMINOPHEN 500 MG
500 TABLET ORAL EVERY 4 HOURS PRN
Status: DISCONTINUED | OUTPATIENT
Start: 2021-01-01 | End: 2021-01-01 | Stop reason: HOSPADM

## 2021-01-01 RX ADMIN — HYDROCODONE BITARTRATE AND ACETAMINOPHEN 1 TABLET: 5; 325 TABLET ORAL at 08:03

## 2021-01-01 RX ADMIN — CLONIDINE HYDROCHLORIDE 0.3 MG: 0.1 TABLET ORAL at 08:03

## 2021-01-01 RX ADMIN — PANTOPRAZOLE SODIUM 40 MG: 40 TABLET, DELAYED RELEASE ORAL at 08:03

## 2021-01-01 RX ADMIN — CLONIDINE HYDROCHLORIDE 0.3 MG: 0.1 TABLET ORAL at 02:03

## 2021-01-01 RX ADMIN — IPRATROPIUM BROMIDE AND ALBUTEROL SULFATE 3 ML: .5; 3 SOLUTION RESPIRATORY (INHALATION) at 02:03

## 2021-01-01 RX ADMIN — METOPROLOL TARTRATE 100 MG: 50 TABLET, FILM COATED ORAL at 09:03

## 2021-01-01 RX ADMIN — PROCHLORPERAZINE EDISYLATE 5 MG: 5 INJECTION INTRAMUSCULAR; INTRAVENOUS at 05:03

## 2021-01-01 RX ADMIN — FUROSEMIDE 40 MG: 10 INJECTION, SOLUTION INTRAVENOUS at 09:03

## 2021-01-01 RX ADMIN — HYDROCODONE BITARTRATE AND ACETAMINOPHEN 1 TABLET: 5; 325 TABLET ORAL at 04:03

## 2021-01-01 RX ADMIN — CLONIDINE HYDROCHLORIDE 0.3 MG: 0.1 TABLET ORAL at 09:03

## 2021-01-01 RX ADMIN — NITROGLYCERIN 1 INCH: 20 OINTMENT TOPICAL at 03:03

## 2021-01-01 RX ADMIN — ASPIRIN 81 MG: 81 TABLET, COATED ORAL at 08:03

## 2021-01-01 RX ADMIN — ISOSORBIDE MONONITRATE 60 MG: 30 TABLET, EXTENDED RELEASE ORAL at 08:03

## 2021-01-01 RX ADMIN — HYDROCODONE BITARTRATE AND ACETAMINOPHEN 1 TABLET: 5; 325 TABLET ORAL at 05:03

## 2021-01-01 RX ADMIN — POTASSIUM BICARBONATE 20 MEQ: 391 TABLET, EFFERVESCENT ORAL at 06:03

## 2021-01-01 RX ADMIN — IOHEXOL 75 ML: 350 INJECTION, SOLUTION INTRAVENOUS at 02:03

## 2021-01-01 RX ADMIN — FUROSEMIDE 40 MG: 10 INJECTION, SOLUTION INTRAMUSCULAR; INTRAVENOUS at 03:03

## 2021-01-01 RX ADMIN — CLOPIDOGREL 75 MG: 75 TABLET, FILM COATED ORAL at 08:03

## 2021-01-01 RX ADMIN — FUROSEMIDE 40 MG: 10 INJECTION, SOLUTION INTRAVENOUS at 05:03

## 2021-01-01 RX ADMIN — METOPROLOL TARTRATE 100 MG: 50 TABLET, FILM COATED ORAL at 08:03

## 2021-01-01 RX ADMIN — LISINOPRIL 5 MG: 5 TABLET ORAL at 08:03

## 2021-01-01 RX ADMIN — ONDANSETRON 8 MG: 8 TABLET, ORALLY DISINTEGRATING ORAL at 05:03

## 2021-01-01 RX ADMIN — HYDRALAZINE HYDROCHLORIDE 25 MG: 25 TABLET, FILM COATED ORAL at 05:03

## 2021-01-01 RX ADMIN — HYDRALAZINE HYDROCHLORIDE 10 MG: 20 INJECTION, SOLUTION INTRAMUSCULAR; INTRAVENOUS at 03:03

## 2021-01-07 ENCOUNTER — TELEPHONE (OUTPATIENT)
Dept: FAMILY MEDICINE | Facility: CLINIC | Age: 86
End: 2021-01-07

## 2021-01-07 DIAGNOSIS — K21.9 GASTROESOPHAGEAL REFLUX DISEASE WITHOUT ESOPHAGITIS: ICD-10-CM

## 2021-01-07 DIAGNOSIS — I25.10 CORONARY ARTERY DISEASE INVOLVING NATIVE CORONARY ARTERY OF NATIVE HEART WITHOUT ANGINA PECTORIS: Chronic | ICD-10-CM

## 2021-01-07 DIAGNOSIS — I10 ESSENTIAL HYPERTENSION: ICD-10-CM

## 2021-01-07 DIAGNOSIS — I35.0 NONRHEUMATIC AORTIC VALVE STENOSIS: Primary | Chronic | ICD-10-CM

## 2021-01-07 RX ORDER — FUROSEMIDE 40 MG/1
40 TABLET ORAL DAILY
Qty: 90 TABLET | Refills: 1 | Status: SHIPPED | OUTPATIENT
Start: 2021-01-07

## 2021-01-07 RX ORDER — METOPROLOL TARTRATE 100 MG/1
100 TABLET ORAL 2 TIMES DAILY
Qty: 180 TABLET | Refills: 1 | Status: SHIPPED | OUTPATIENT
Start: 2021-01-07 | End: 2021-01-01

## 2021-01-07 RX ORDER — CLOPIDOGREL BISULFATE 75 MG/1
75 TABLET ORAL DAILY
Qty: 30 TABLET | Refills: 11 | Status: SHIPPED | OUTPATIENT
Start: 2021-01-07 | End: 2021-01-01 | Stop reason: SDUPTHER

## 2021-01-07 RX ORDER — PANTOPRAZOLE SODIUM 40 MG/1
40 TABLET, DELAYED RELEASE ORAL DAILY
Qty: 30 TABLET | Refills: 11
Start: 2021-01-07 | End: 2021-01-01 | Stop reason: SDUPTHER

## 2021-01-11 ENCOUNTER — TELEPHONE (OUTPATIENT)
Dept: FAMILY MEDICINE | Facility: CLINIC | Age: 86
End: 2021-01-11

## 2021-01-13 ENCOUNTER — TELEPHONE (OUTPATIENT)
Dept: CARDIOLOGY | Facility: HOSPITAL | Age: 86
End: 2021-01-13

## 2021-01-15 PROBLEM — I46.9 CARDIAC ARREST: Status: RESOLVED | Noted: 2018-09-30 | Resolved: 2021-01-01

## 2021-01-15 PROBLEM — J96.01 ACUTE HYPOXEMIC RESPIRATORY FAILURE: Status: RESOLVED | Noted: 2018-09-30 | Resolved: 2021-01-01

## 2021-01-15 PROBLEM — Z87.19 HISTORY OF GASTRIC POLYP: Status: ACTIVE | Noted: 2018-04-11

## 2021-01-15 PROBLEM — M54.31 RIGHT SCIATIC NERVE PAIN: Status: RESOLVED | Noted: 2017-01-23 | Resolved: 2021-01-01

## 2021-02-25 PROBLEM — L89.153 PRESSURE INJURY OF SACRAL REGION, STAGE 3: Status: ACTIVE | Noted: 2020-09-19

## 2021-03-04 NOTE — PROGRESS NOTES
Call received from ANDRÉS Gomez stating that pt has been seen by wound care RN NICHOLAS Diaz and was advised that the pt will need further evaluation of wound as pt may have osteo.  NP contacted daughter Radha to discuss d/c and if findings from wound care and treatment for wound.  Radha advised NP that she wants all measures done for pt.  Discharge to be cancelled.   [Today's Date] : [unfilled] [Name] : Name: [unfilled] [] : : ~~ [Today's Date:] : [unfilled] [Dear  ___:] : Dear Dr. [unfilled]: [Consult] : I had the pleasure of evaluating your patient, [unfilled]. My full evaluation follows. [Consult - Single Provider] : Thank you very much for allowing me to participate in the care of this patient. If you have any questions, please do not hesitate to contact me. [Sincerely,] : Sincerely, [FreeTextEntry4] : Millie Lopez MD [FreeTextEntry5] : [address] [FreeTextEntry6] : [place] [address] [de-identified] : Tony Cullen MD, FACC, FASE, FAAP\par Pediatric Cardiologist\par [facility]\par

## 2021-03-07 PROBLEM — I50.43 ACUTE ON CHRONIC COMBINED SYSTOLIC AND DIASTOLIC CONGESTIVE HEART FAILURE: Status: ACTIVE | Noted: 2021-01-01

## 2021-03-07 PROBLEM — I50.20 SYSTOLIC CONGESTIVE HEART FAILURE: Status: RESOLVED | Noted: 2020-09-19 | Resolved: 2021-01-01

## 2021-03-07 PROBLEM — R94.31 QT PROLONGATION: Status: ACTIVE | Noted: 2021-01-01

## 2021-03-07 PROBLEM — I50.32 CHRONIC DIASTOLIC HEART FAILURE: Status: RESOLVED | Noted: 2017-06-17 | Resolved: 2021-01-01

## 2021-03-07 PROBLEM — I50.9 CHF (CONGESTIVE HEART FAILURE): Status: ACTIVE | Noted: 2021-01-01

## 2021-03-07 PROBLEM — I50.1 PULMONARY EDEMA CARDIAC CAUSE: Status: ACTIVE | Noted: 2018-09-30

## 2021-03-08 PROBLEM — I50.43 ACUTE ON CHRONIC COMBINED SYSTOLIC AND DIASTOLIC CONGESTIVE HEART FAILURE: Status: RESOLVED | Noted: 2021-01-01 | Resolved: 2021-01-01

## 2021-03-08 PROBLEM — N18.30 STAGE 3 CHRONIC KIDNEY DISEASE: Chronic | Status: RESOLVED | Noted: 2017-07-08 | Resolved: 2021-01-01

## 2021-06-26 NOTE — PROGRESS NOTES
Subjective:    Patient ID:  Cindy Lyman is a 82 y.o. female who presents for follow-up of Coronary Artery Disease      HPI     CAD - stent LAD 4/2015 - moderate Cx disease, Diastolic CHF  AS - severe, HTN, COPD, Hx DVT, anemia    Echo 5/8/18    1 - Normal left ventricular systolic function (EF 60-65%).     2 - Concentric hypertrophy.     3 - Biatrial enlargement.     4 - Pulmonary hypertension. The estimated PA systolic pressure is 61 mmHg.     5 - Severe aortic valve stenosis (CLEMENT 0.82 cm2, AVAi 0.41 cm2/m2, peak aortic jet velocity 4.0 m/s,MG 44 mmHg, ).     6 - Trivial mitral regurgitation.     7 - Mild tricuspid regurgitation.      Echo 10/10/17    1 - Normal left ventricular systolic function (EF 55-60%).     2 - Concentric hypertrophy.     3 - Severe left atrial enlargement.     4 - Normal right ventricular systolic function .     5 - Pulmonary hypertension. The estimated PA systolic pressure is 53 mmHg.     6 - Moderate to severe aortic stenosis, CLEMENT = 0.76 cm2, peak velocity = 3.95 m/s, mean gradient = 35 mmHg.     7 - Mild mitral stenosis, MVA = 1.95 cm2.      Stress test 10/10/17  LVEF: 63 %  Impression: NORMAL MYOCARDIAL PERFUSION  1. The perfusion scan is free of evidence for myocardial ischemia or injury.   2. There is a mild to moderate intensity fixed defect in the anterolateral wall of the left ventricle, secondary to breast attenuation.   3. Resting wall motion is physiologic.   4. Resting LV function is normal.   5. The ventricular volumes are normal at rest and stress.   6. The extracardiac distribution of radioactivity is normal.   7. When compared to the previous study from 07/10/2017, no change.     Admitted 1/3/18  82 y.o. female with COPD, obesity hypoventilation syndrome, CAD s/p PCI, AVS, pulmonary hypertension, CKD stage III, hypertension, and hyperlipidemia presents with a complaint of acutely worsening shortness of breath and cough beginning yesterday.  Cough is not productive  and associated with some mild abdominal and back soreness, recent sick contact grandson who has a cold.  No alleviating or exacerbating factors, no attempted self treatment.  She has been taking and tolerating her home medications as prescribed. Not on home O2.  Denies fever, chills, chest pain, palpitations, swelling, orthopnea, PND, dysuria, frequency, urgency, nausea, vomiting, diarrhea, bloody or dark stools.  ED evaluation significant for Flu negative, EKG without evidence of acute ischemia, troponin negative, , chest xray without acute abnormality.  Treated with supplemental oxygen, nebs, and steroids without satisfactory improvement.  Placed in observation for further evaluation and treatment.     Patient improved with IV steroids, duo nebs and ABX. Last walked with RA sat 95%. Suspected acute bronchitis, flu negative, no fever or leukocytosis. Home with Zpak and medrol dose pack. Allergy to albuterol/xopenex.     GALVAN has worsened some  Denies CP       Review of Systems   Constitution: Negative for decreased appetite.   HENT: Negative for ear discharge.    Eyes: Negative for blurred vision.   Respiratory: Negative for hemoptysis.    Endocrine: Negative for polyphagia.   Hematologic/Lymphatic: Negative for adenopathy.   Skin: Negative for color change.   Musculoskeletal: Negative for joint swelling.   Neurological: Negative for brief paralysis.   Psychiatric/Behavioral: Negative for hallucinations.        Objective:    Physical Exam   Constitutional: She is oriented to person, place, and time. She appears well-developed and well-nourished.   HENT:   Head: Normocephalic and atraumatic.   Eyes: Conjunctivae are normal. Pupils are equal, round, and reactive to light.   Neck: Normal range of motion. Neck supple.   Cardiovascular: Normal rate and intact distal pulses.    Murmur heard.   Early systolic murmur is present with a grade of 2/6   Pulmonary/Chest: Effort normal and breath sounds normal.    Abdominal: Soft. Bowel sounds are normal.   Musculoskeletal: Normal range of motion.   Neurological: She is alert and oriented to person, place, and time.   Skin: Skin is warm and dry.         Assessment:       1. Pulmonary hypertension    2. Chronic obstructive pulmonary disease, unspecified COPD type    3. Nonrheumatic aortic valve stenosis    4. Hyperlipidemia, unspecified hyperlipidemia type    5. Coronary artery disease due to lipid rich plaque    6. Shortness of breath         Plan:       AS is now severe - mild worsening symptoms  Will refer to TAVR clinic  OV here 3 months             ambulatory

## 2022-01-01 ENCOUNTER — TELEPHONE (OUTPATIENT)
Dept: CARDIOLOGY | Facility: CLINIC | Age: 87
End: 2022-01-01
Payer: MEDICAID

## 2022-01-07 NOTE — TELEPHONE ENCOUNTER
----- Message from Malissa Cano MA sent at 1/7/2022 11:43 AM CST -----  Regarding: FW: pacemaker  The patient is at home on [facility], trying to see how to go about with getting the pacemaker turned off. Please advise!  ----- Message -----  From: Vane Bradshaw  Sent: 1/3/2022  11:33 AM CST  To: Kermit Jimenez Staff  Subject: pacemaker                                        Name of Who is Calling: [facility]           What is the request in detail: Odalys is requesting a call back in regards to family request to turn off the pacemaker.            Can the clinic reply by [facility]: No           What Number to Call Back [facility]: [phone]

## 2022-01-27 ENCOUNTER — EXTERNAL HOME HEALTH (OUTPATIENT)
Dept: HOME HEALTH SERVICES | Facility: HOSPITAL | Age: 87
End: 2022-01-27
Payer: MEDICAID

## 2022-03-16 DIAGNOSIS — I10 MALIGNANT ESSENTIAL HYPERTENSION: ICD-10-CM

## 2022-05-03 NOTE — ASSESSMENT & PLAN NOTE
Stable. Trop unremarkable. Restart plavix, BB, statin and BP control    Spironolactone Pregnancy And Lactation Text: This medication can cause feminization of the male fetus and should be avoided during pregnancy. The active metabolite is also found in breast milk.

## 2022-08-31 DIAGNOSIS — Z78.0 ASYMPTOMATIC MENOPAUSAL STATE: ICD-10-CM

## 2022-09-13 ENCOUNTER — TELEPHONE (OUTPATIENT)
Dept: ELECTROPHYSIOLOGY | Facility: CLINIC | Age: 87
End: 2022-09-13

## 2022-09-13 NOTE — TELEPHONE ENCOUNTER
Pt was due for a remote.   Jayda Solis brought me a slip from [employer] that pt is .    Tried to call both her daughter and son to verify her demise but no one answered.

## 2023-02-16 NOTE — TELEPHONE ENCOUNTER
----- Message from Ethan Chairez sent at 1/30/2018  1:24 PM CST -----  Contact: Danay HALL:Sharon [phone]  Sharon calling to get an update on the pt referral.    Multiple falls

## 2024-01-04 NOTE — PROGRESS NOTES
Severe AS     Cindy Lyman is a 82 y.o. female referred by Dr Carmen for evaluation of severe AS (NYHA Class III sx).     The patient has undergone the following TAVR work-up:   · ECHO (Date 5/8/2018): CLEMENT= 0.82 cm2, MG= 44mmHg, Peak Duncan= 4 m/s, EF= 60-65%.   · LHC (Date ):   · STS: 3.6%   · Frailty: 3/4   · Iliacs are > on R and >on L   · LVOT area by CTA is cm2 ( mm X  mm) and Avg Diameter is per    · Incidental findings on CT:  · CT Surgery risk assessment: High Risk per Pararcenio  · Rhythm issues: NSR, RBBB, LAFB  · PFTs: FEV1 % predicted, DLCO % predicted.  · Comorbidities: COPD, diastolic HF     Cath and CT scan pending.   Labs ordered.

## 2025-05-16 NOTE — TELEPHONE ENCOUNTER
Daughter notified of need for in-person visit and advised first available not until after the first of the year. She verbalizes understanding.   The dilator was inserted into the  right femoral vein.

## (undated) DEVICE — BAG TISS RETRV MONARCH 10MM

## (undated) DEVICE — SUT ETHILON 3/0 18IN PS-1

## (undated) DEVICE — Device

## (undated) DEVICE — ELECTRODE EDGE SYSTEM RTS

## (undated) DEVICE — KIT SYR REUSABLE

## (undated) DEVICE — TUBING INSUFFLATION 10

## (undated) DEVICE — CATH DXTERITY PIGSTR 110CM 6FR

## (undated) DEVICE — SOL NS 1000CC

## (undated) DEVICE — SET DECANTER MEDICHOICE

## (undated) DEVICE — TROCAR ENDOPATH XCEL 5X100MM

## (undated) DEVICE — IRRIGATOR ENDOSCOPY DISP.

## (undated) DEVICE — OMNIPAQUE 350MG 150ML VIAL

## (undated) DEVICE — SYR ONLY LUER LOCK 20CC

## (undated) DEVICE — HEMOSTAT SURGICEL 4X8IN

## (undated) DEVICE — ELECTRODE REM PLYHSV RETURN 9

## (undated) DEVICE — GLOVE BIOGEL ECLIPSE SZ 7.5

## (undated) DEVICE — CHLORAPREP W TINT 26ML APPL

## (undated) DEVICE — KIT HAND CONTROL HIGH PRESSUR

## (undated) DEVICE — WIRE GUIDE SAFE-T-J .035 260CM

## (undated) DEVICE — INTRODUCER CATH 6F 11CM

## (undated) DEVICE — CATH DXTERITY JL40 100CM 6FR

## (undated) DEVICE — NDL HYPO REG 25G X 1 1/2

## (undated) DEVICE — SEE MEDLINE ITEM 146292

## (undated) DEVICE — PACK ENDOSCOPY GENERAL

## (undated) DEVICE — KIT MANIFOLD LOW PRESS TUBING

## (undated) DEVICE — DRAPE C-ARM FOR MOBILE XRAY

## (undated) DEVICE — APPLIER CLIP ENDO MED/LG 10MM

## (undated) DEVICE — PACK CATH LAB

## (undated) DEVICE — BLADE SURG CARBON STEEL SZ11

## (undated) DEVICE — CATH DXTERITY JR40 100CM 6FR

## (undated) DEVICE — SUT CTD VICRYL CR/RB-1 4-0

## (undated) DEVICE — CATH CHOLANG 6F 33CM L

## (undated) DEVICE — CATH DXTERITY NOTO 100CM 6FR

## (undated) DEVICE — NDL INSUF ULTRA VERESS 120MM

## (undated) DEVICE — TROCAR ENDOPATH XCEL 11MM 10CM

## (undated) DEVICE — SCISSOR CURVED ENDOPATH 5MM

## (undated) DEVICE — CLOSURE SKIN STERI STRIP 1/2X4

## (undated) DEVICE — CANISTER SUCTION 2 LTR

## (undated) DEVICE — COVER OVERHEAD SURG LT BLUE

## (undated) DEVICE — SYR 10CC LUER LOCK

## (undated) DEVICE — KIT ANTIFOG

## (undated) DEVICE — DRESSING ADH ISLAND 2.5 X 3

## (undated) DEVICE — GUIDEWIRE SAFE T J TIP 145X2.5

## (undated) DEVICE — DRESSING TRANS 4X4 TEGADERM

## (undated) DEVICE — FOAM APP FILM BARRIER NO STING